# Patient Record
Sex: FEMALE | Race: WHITE | Employment: FULL TIME | URBAN - METROPOLITAN AREA
[De-identification: names, ages, dates, MRNs, and addresses within clinical notes are randomized per-mention and may not be internally consistent; named-entity substitution may affect disease eponyms.]

---

## 2017-01-04 ENCOUNTER — PATIENT MESSAGE (OUTPATIENT)
Dept: FAMILY MEDICINE CLINIC | Age: 42
End: 2017-01-04

## 2017-01-04 DIAGNOSIS — Z79.4 TYPE 2 DIABETES MELLITUS WITH COMPLICATION, WITH LONG-TERM CURRENT USE OF INSULIN (HCC): ICD-10-CM

## 2017-01-04 DIAGNOSIS — E11.8 TYPE 2 DIABETES MELLITUS WITH COMPLICATION, WITH LONG-TERM CURRENT USE OF INSULIN (HCC): ICD-10-CM

## 2017-01-04 DIAGNOSIS — E11.8 TYPE 2 DIABETES MELLITUS WITH COMPLICATION, WITH LONG-TERM CURRENT USE OF INSULIN (HCC): Primary | ICD-10-CM

## 2017-01-04 DIAGNOSIS — Z79.4 TYPE 2 DIABETES MELLITUS WITH COMPLICATION, WITH LONG-TERM CURRENT USE OF INSULIN (HCC): Primary | ICD-10-CM

## 2017-01-04 RX ORDER — LOSARTAN POTASSIUM 50 MG/1
50 TABLET ORAL DAILY
Qty: 90 TABLET | Refills: 1 | Status: SHIPPED | OUTPATIENT
Start: 2017-01-04 | End: 2017-05-30 | Stop reason: SDUPTHER

## 2017-01-04 RX ORDER — OMEPRAZOLE 40 MG/1
40 CAPSULE, DELAYED RELEASE ORAL DAILY
Qty: 30 CAPSULE | Refills: 5 | Status: SHIPPED | OUTPATIENT
Start: 2017-01-04 | End: 2017-01-06 | Stop reason: SDUPTHER

## 2017-01-04 RX ORDER — ATORVASTATIN CALCIUM 20 MG/1
20 TABLET, FILM COATED ORAL DAILY
Qty: 30 TABLET | Refills: 5 | Status: SHIPPED | OUTPATIENT
Start: 2017-01-04 | End: 2017-01-06 | Stop reason: SDUPTHER

## 2017-01-04 RX ORDER — ALPRAZOLAM 0.5 MG/1
TABLET ORAL
Qty: 60 TABLET | Refills: 3 | Status: SHIPPED | OUTPATIENT
Start: 2017-01-04 | End: 2017-07-11 | Stop reason: SDUPTHER

## 2017-01-04 RX ORDER — VENLAFAXINE HYDROCHLORIDE 75 MG/1
75 CAPSULE, EXTENDED RELEASE ORAL DAILY
Qty: 60 CAPSULE | Refills: 5 | Status: SHIPPED | OUTPATIENT
Start: 2017-01-04 | End: 2017-01-06 | Stop reason: SDUPTHER

## 2017-01-04 RX ORDER — PROMETHAZINE HYDROCHLORIDE 25 MG/1
25 TABLET ORAL EVERY 8 HOURS PRN
Qty: 90 TABLET | Refills: 0 | Status: SHIPPED | OUTPATIENT
Start: 2017-01-04 | End: 2017-01-06 | Stop reason: SDUPTHER

## 2017-01-04 RX ORDER — GLUCOSAMINE HCL/CHONDROITIN SU 500-400 MG
CAPSULE ORAL
Qty: 100 STRIP | Refills: 3 | Status: SHIPPED | OUTPATIENT
Start: 2017-01-04 | End: 2017-10-10 | Stop reason: CLARIF

## 2017-01-04 RX ORDER — HYDROCHLOROTHIAZIDE 25 MG/1
25 TABLET ORAL DAILY
Qty: 90 TABLET | Refills: 1 | Status: SHIPPED | OUTPATIENT
Start: 2017-01-04 | End: 2017-05-30 | Stop reason: SDUPTHER

## 2017-01-04 RX ORDER — METOPROLOL TARTRATE 100 MG/1
100 TABLET ORAL 2 TIMES DAILY
Qty: 60 TABLET | Refills: 6 | Status: SHIPPED | OUTPATIENT
Start: 2017-01-04 | End: 2017-01-06 | Stop reason: SDUPTHER

## 2017-01-04 RX ORDER — ERYTHROMYCIN 250 MG/1
250 TABLET, COATED ORAL 4 TIMES DAILY
Qty: 90 TABLET | Refills: 6 | Status: CANCELLED | OUTPATIENT
Start: 2017-01-04

## 2017-01-06 ENCOUNTER — PATIENT MESSAGE (OUTPATIENT)
Dept: FAMILY MEDICINE CLINIC | Age: 42
End: 2017-01-06

## 2017-01-06 RX ORDER — PROMETHAZINE HYDROCHLORIDE 25 MG/1
25 TABLET ORAL EVERY 8 HOURS PRN
Qty: 120 TABLET | Refills: 0 | Status: SHIPPED | OUTPATIENT
Start: 2017-01-06 | End: 2017-06-23

## 2017-01-06 RX ORDER — ATORVASTATIN CALCIUM 20 MG/1
20 TABLET, FILM COATED ORAL DAILY
Qty: 90 TABLET | Refills: 1 | Status: SHIPPED | OUTPATIENT
Start: 2017-01-06 | End: 2017-09-01 | Stop reason: SDUPTHER

## 2017-01-06 RX ORDER — METOPROLOL TARTRATE 100 MG/1
100 TABLET ORAL 2 TIMES DAILY
Qty: 180 TABLET | Refills: 1 | Status: SHIPPED | OUTPATIENT
Start: 2017-01-06 | End: 2017-08-31 | Stop reason: SDUPTHER

## 2017-01-06 RX ORDER — ERYTHROMYCIN 250 MG/1
TABLET, COATED ORAL
Qty: 270 TABLET | Refills: 1 | Status: SHIPPED | OUTPATIENT
Start: 2017-01-06 | End: 2017-07-11 | Stop reason: SDUPTHER

## 2017-01-06 RX ORDER — OMEPRAZOLE 40 MG/1
40 CAPSULE, DELAYED RELEASE ORAL DAILY
Qty: 90 CAPSULE | Refills: 1 | Status: SHIPPED | OUTPATIENT
Start: 2017-01-06 | End: 2017-05-30 | Stop reason: SDUPTHER

## 2017-01-06 RX ORDER — VENLAFAXINE HYDROCHLORIDE 75 MG/1
75 CAPSULE, EXTENDED RELEASE ORAL 2 TIMES DAILY
Qty: 180 CAPSULE | Refills: 1 | Status: SHIPPED | OUTPATIENT
Start: 2017-01-06 | End: 2017-08-31 | Stop reason: SDUPTHER

## 2017-01-10 ENCOUNTER — CLINICAL DOCUMENTATION (OUTPATIENT)
Dept: PHYSICAL THERAPY | Age: 42
End: 2017-01-10

## 2017-01-10 ENCOUNTER — PATIENT MESSAGE (OUTPATIENT)
Dept: FAMILY MEDICINE CLINIC | Age: 42
End: 2017-01-10

## 2017-01-10 DIAGNOSIS — H93.8X3 AUDIBLE HEARTBEAT IN BOTH EARS: Primary | ICD-10-CM

## 2017-01-21 DIAGNOSIS — Z72.0 TOBACCO ABUSE: ICD-10-CM

## 2017-01-26 ENCOUNTER — HOSPITAL ENCOUNTER (OUTPATIENT)
Dept: ULTRASOUND IMAGING | Age: 42
Discharge: HOME | End: 2017-01-26
Payer: COMMERCIAL

## 2017-01-26 DIAGNOSIS — H93.8X3 AUDIBLE HEARTBEAT IN BOTH EARS: ICD-10-CM

## 2017-01-26 DIAGNOSIS — E11.8 TYPE 2 DIABETES MELLITUS WITH COMPLICATION, WITH LONG-TERM CURRENT USE OF INSULIN (HCC): ICD-10-CM

## 2017-01-26 DIAGNOSIS — Z79.4 TYPE 2 DIABETES MELLITUS WITH COMPLICATION, WITH LONG-TERM CURRENT USE OF INSULIN (HCC): ICD-10-CM

## 2017-01-26 PROCEDURE — 93880 EXTRACRANIAL BILAT STUDY: CPT

## 2017-01-27 RX ORDER — BLOOD SUGAR DIAGNOSTIC
STRIP MISCELLANEOUS
Qty: 100 STRIP | Refills: 3 | Status: SHIPPED | OUTPATIENT
Start: 2017-01-27 | End: 2017-10-09 | Stop reason: SDUPTHER

## 2017-01-31 ENCOUNTER — OFFICE VISIT (OUTPATIENT)
Dept: FAMILY MEDICINE CLINIC | Age: 42
End: 2017-01-31

## 2017-01-31 VITALS
DIASTOLIC BLOOD PRESSURE: 62 MMHG | HEART RATE: 84 BPM | SYSTOLIC BLOOD PRESSURE: 114 MMHG | RESPIRATION RATE: 16 BRPM | WEIGHT: 275 LBS | TEMPERATURE: 99.2 F | BODY MASS INDEX: 44.2 KG/M2 | HEIGHT: 66 IN

## 2017-01-31 DIAGNOSIS — E78.5 DYSLIPIDEMIA: ICD-10-CM

## 2017-01-31 DIAGNOSIS — E53.8 B12 DEFICIENCY: ICD-10-CM

## 2017-01-31 DIAGNOSIS — M94.0 COSTOCHONDRITIS: ICD-10-CM

## 2017-01-31 DIAGNOSIS — E11.8 TYPE 2 DIABETES MELLITUS WITH COMPLICATION, WITH LONG-TERM CURRENT USE OF INSULIN (HCC): Primary | ICD-10-CM

## 2017-01-31 DIAGNOSIS — Z79.4 TYPE 2 DIABETES MELLITUS WITH COMPLICATION, WITH LONG-TERM CURRENT USE OF INSULIN (HCC): Primary | ICD-10-CM

## 2017-01-31 DIAGNOSIS — I27.20 PULMONARY HTN (HCC): ICD-10-CM

## 2017-01-31 DIAGNOSIS — R07.9 CHEST PAIN, UNSPECIFIED TYPE: ICD-10-CM

## 2017-01-31 PROCEDURE — 93000 ELECTROCARDIOGRAM COMPLETE: CPT | Performed by: FAMILY MEDICINE

## 2017-01-31 PROCEDURE — 99213 OFFICE O/P EST LOW 20 MIN: CPT | Performed by: FAMILY MEDICINE

## 2017-01-31 PROCEDURE — 96372 THER/PROPH/DIAG INJ SC/IM: CPT | Performed by: FAMILY MEDICINE

## 2017-01-31 RX ORDER — PREDNISONE 20 MG/1
20 TABLET ORAL DAILY
Qty: 5 TABLET | Refills: 0 | Status: SHIPPED | OUTPATIENT
Start: 2017-01-31 | End: 2017-02-05

## 2017-01-31 RX ORDER — CYCLOBENZAPRINE HCL 10 MG
10 TABLET ORAL NIGHTLY PRN
Qty: 40 TABLET | Refills: 1 | Status: SHIPPED | OUTPATIENT
Start: 2017-01-31 | End: 2017-02-10

## 2017-01-31 RX ORDER — CYCLOBENZAPRINE HCL 10 MG
10 TABLET ORAL NIGHTLY PRN
Qty: 40 TABLET | Refills: 1 | Status: SHIPPED | OUTPATIENT
Start: 2017-01-31 | End: 2017-01-31 | Stop reason: SDUPTHER

## 2017-01-31 RX ORDER — PREDNISONE 20 MG/1
20 TABLET ORAL DAILY
Qty: 5 TABLET | Refills: 0 | Status: SHIPPED | OUTPATIENT
Start: 2017-01-31 | End: 2017-01-31 | Stop reason: SDUPTHER

## 2017-01-31 RX ORDER — CYANOCOBALAMIN 1000 UG/ML
1000 INJECTION INTRAMUSCULAR; SUBCUTANEOUS ONCE
Status: COMPLETED | OUTPATIENT
Start: 2017-01-31 | End: 2017-01-31

## 2017-01-31 RX ADMIN — CYANOCOBALAMIN 1000 MCG: 1000 INJECTION INTRAMUSCULAR; SUBCUTANEOUS at 12:02

## 2017-03-06 ENCOUNTER — EMPLOYEE WELLNESS (OUTPATIENT)
Dept: OTHER | Age: 42
End: 2017-03-06

## 2017-03-06 DIAGNOSIS — E11.8 TYPE 2 DIABETES MELLITUS WITH COMPLICATION, WITH LONG-TERM CURRENT USE OF INSULIN (HCC): ICD-10-CM

## 2017-03-06 DIAGNOSIS — Z79.4 TYPE 2 DIABETES MELLITUS WITH COMPLICATION, WITH LONG-TERM CURRENT USE OF INSULIN (HCC): ICD-10-CM

## 2017-03-06 RX ORDER — INSULIN LISPRO 100 [IU]/ML
INJECTION, SOLUTION INTRAVENOUS; SUBCUTANEOUS
Qty: 45 ML | Refills: 1 | Status: SHIPPED | OUTPATIENT
Start: 2017-03-06 | End: 2017-05-08 | Stop reason: SDUPTHER

## 2017-03-15 ENCOUNTER — PATIENT MESSAGE (OUTPATIENT)
Dept: FAMILY MEDICINE CLINIC | Age: 42
End: 2017-03-15

## 2017-03-15 RX ORDER — FLUCONAZOLE 150 MG/1
150 TABLET ORAL ONCE
Qty: 2 TABLET | Refills: 0 | Status: SHIPPED | OUTPATIENT
Start: 2017-03-15 | End: 2017-03-15

## 2017-03-20 ENCOUNTER — OFFICE VISIT (OUTPATIENT)
Dept: OBGYN | Age: 42
End: 2017-03-20

## 2017-03-20 VITALS
DIASTOLIC BLOOD PRESSURE: 62 MMHG | WEIGHT: 281 LBS | HEART RATE: 76 BPM | SYSTOLIC BLOOD PRESSURE: 108 MMHG | BODY MASS INDEX: 45.35 KG/M2

## 2017-03-20 DIAGNOSIS — Z71.3 WEIGHT LOSS COUNSELING, ENCOUNTER FOR: ICD-10-CM

## 2017-03-20 DIAGNOSIS — Z01.419 VISIT FOR GYNECOLOGIC EXAMINATION: Primary | ICD-10-CM

## 2017-03-20 DIAGNOSIS — Z79.890 POSTMENOPAUSAL HRT (HORMONE REPLACEMENT THERAPY): ICD-10-CM

## 2017-03-20 PROCEDURE — 99396 PREV VISIT EST AGE 40-64: CPT | Performed by: OBSTETRICS & GYNECOLOGY

## 2017-03-20 RX ORDER — ESTRADIOL 0.04 MG/D
1 FILM, EXTENDED RELEASE TRANSDERMAL
Qty: 4 PATCH | Refills: 0 | COMMUNITY
Start: 2017-03-20 | End: 2017-06-19

## 2017-03-24 LAB — PAP SMEAR: NORMAL

## 2017-03-30 ASSESSMENT — ENCOUNTER SYMPTOMS
COUGH: 0
VOMITING: 0
NAUSEA: 0
SHORTNESS OF BREATH: 0

## 2017-04-12 ENCOUNTER — NURSE ONLY (OUTPATIENT)
Dept: FAMILY MEDICINE CLINIC | Age: 42
End: 2017-04-12

## 2017-04-12 DIAGNOSIS — E53.8 B12 DEFICIENCY: Primary | ICD-10-CM

## 2017-04-12 PROCEDURE — 96372 THER/PROPH/DIAG INJ SC/IM: CPT | Performed by: FAMILY MEDICINE

## 2017-04-12 RX ORDER — CYANOCOBALAMIN 1000 UG/ML
1000 INJECTION INTRAMUSCULAR; SUBCUTANEOUS ONCE
Status: COMPLETED | OUTPATIENT
Start: 2017-04-12 | End: 2017-04-12

## 2017-04-12 RX ADMIN — CYANOCOBALAMIN 1000 MCG: 1000 INJECTION INTRAMUSCULAR; SUBCUTANEOUS at 15:39

## 2017-04-24 ENCOUNTER — PATIENT MESSAGE (OUTPATIENT)
Dept: FAMILY MEDICINE CLINIC | Age: 42
End: 2017-04-24

## 2017-04-24 RX ORDER — FLUCONAZOLE 150 MG/1
150 TABLET ORAL ONCE
Qty: 2 TABLET | Refills: 1 | Status: SHIPPED | OUTPATIENT
Start: 2017-04-24 | End: 2017-04-24

## 2017-05-08 DIAGNOSIS — Z79.4 TYPE 2 DIABETES MELLITUS WITH COMPLICATION, WITH LONG-TERM CURRENT USE OF INSULIN (HCC): ICD-10-CM

## 2017-05-08 DIAGNOSIS — E11.8 TYPE 2 DIABETES MELLITUS WITH COMPLICATION, WITH LONG-TERM CURRENT USE OF INSULIN (HCC): ICD-10-CM

## 2017-05-08 RX ORDER — INSULIN GLARGINE 100 [IU]/ML
INJECTION, SOLUTION SUBCUTANEOUS
Qty: 60 ML | Refills: 1 | Status: SHIPPED | OUTPATIENT
Start: 2017-05-08 | End: 2017-05-09 | Stop reason: SDUPTHER

## 2017-05-08 RX ORDER — INSULIN LISPRO 100 [IU]/ML
INJECTION, SOLUTION INTRAVENOUS; SUBCUTANEOUS
Qty: 45 ML | Refills: 1 | Status: SHIPPED | OUTPATIENT
Start: 2017-05-08 | End: 2017-05-09 | Stop reason: SDUPTHER

## 2017-05-09 ENCOUNTER — PATIENT MESSAGE (OUTPATIENT)
Dept: FAMILY MEDICINE CLINIC | Age: 42
End: 2017-05-09

## 2017-05-09 DIAGNOSIS — Z79.4 TYPE 2 DIABETES MELLITUS WITH COMPLICATION, WITH LONG-TERM CURRENT USE OF INSULIN (HCC): ICD-10-CM

## 2017-05-09 DIAGNOSIS — E11.8 TYPE 2 DIABETES MELLITUS WITH COMPLICATION, WITH LONG-TERM CURRENT USE OF INSULIN (HCC): ICD-10-CM

## 2017-05-09 DIAGNOSIS — Z12.31 ENCOUNTER FOR SCREENING MAMMOGRAM FOR BREAST CANCER: Primary | ICD-10-CM

## 2017-05-30 DIAGNOSIS — E11.8 TYPE 2 DIABETES MELLITUS WITH COMPLICATION, WITH LONG-TERM CURRENT USE OF INSULIN (HCC): ICD-10-CM

## 2017-05-30 DIAGNOSIS — Z79.4 TYPE 2 DIABETES MELLITUS WITH COMPLICATION, WITH LONG-TERM CURRENT USE OF INSULIN (HCC): ICD-10-CM

## 2017-05-31 RX ORDER — OMEPRAZOLE 40 MG/1
CAPSULE, DELAYED RELEASE ORAL
Qty: 90 CAPSULE | Refills: 1 | Status: SHIPPED | OUTPATIENT
Start: 2017-05-31 | End: 2018-06-21 | Stop reason: SDUPTHER

## 2017-05-31 RX ORDER — LOSARTAN POTASSIUM 50 MG/1
TABLET ORAL
Qty: 90 TABLET | Refills: 1 | Status: SHIPPED | OUTPATIENT
Start: 2017-05-31 | End: 2018-01-02 | Stop reason: SDUPTHER

## 2017-05-31 RX ORDER — PROMETHAZINE HYDROCHLORIDE 25 MG/1
TABLET ORAL
Qty: 90 TABLET | Refills: 1 | Status: SHIPPED | OUTPATIENT
Start: 2017-05-31 | End: 2017-06-19

## 2017-05-31 RX ORDER — HYDROCHLOROTHIAZIDE 25 MG/1
TABLET ORAL
Qty: 90 TABLET | Refills: 1 | Status: SHIPPED | OUTPATIENT
Start: 2017-05-31 | End: 2018-01-02 | Stop reason: SDUPTHER

## 2017-06-01 ENCOUNTER — HOSPITAL ENCOUNTER (OUTPATIENT)
Dept: WOMENS IMAGING | Age: 42
Discharge: HOME | End: 2017-06-01
Payer: COMMERCIAL

## 2017-06-01 DIAGNOSIS — Z12.31 ENCOUNTER FOR SCREENING MAMMOGRAM FOR BREAST CANCER: ICD-10-CM

## 2017-06-01 PROCEDURE — 77063 BREAST TOMOSYNTHESIS BI: CPT

## 2017-06-05 ENCOUNTER — PATIENT MESSAGE (OUTPATIENT)
Dept: FAMILY MEDICINE CLINIC | Age: 42
End: 2017-06-05

## 2017-06-05 DIAGNOSIS — B00.9 HSV-1 (HERPES SIMPLEX VIRUS 1) INFECTION: Primary | ICD-10-CM

## 2017-06-06 DIAGNOSIS — B00.9 HSV-1 (HERPES SIMPLEX VIRUS 1) INFECTION: ICD-10-CM

## 2017-06-06 LAB — HBA1C MFR BLD: 8 % (ref 4.8–5.9)

## 2017-06-19 ENCOUNTER — OFFICE VISIT (OUTPATIENT)
Dept: UROLOGY | Age: 42
End: 2017-06-19

## 2017-06-19 VITALS
HEIGHT: 66 IN | DIASTOLIC BLOOD PRESSURE: 72 MMHG | HEART RATE: 79 BPM | BODY MASS INDEX: 45 KG/M2 | SYSTOLIC BLOOD PRESSURE: 124 MMHG | WEIGHT: 280 LBS

## 2017-06-19 DIAGNOSIS — R33.9 URINARY RETENTION: ICD-10-CM

## 2017-06-19 DIAGNOSIS — R30.0 DYSURIA: Primary | ICD-10-CM

## 2017-06-19 LAB
BILIRUBIN, POC: NORMAL
BLOOD URINE, POC: NORMAL
CLARITY, POC: CLEAR
COLOR, POC: YELLOW
GLUCOSE URINE, POC: NORMAL
KETONES, POC: NORMAL
LEUKOCYTE EST, POC: NORMAL
NITRITE, POC: NORMAL
PH, POC: 5.5
POST VOID RESIDUAL (PVR): 12 ML
PROTEIN, POC: NORMAL
SPECIFIC GRAVITY, POC: 1.02
UROBILINOGEN, POC: 0.2

## 2017-06-19 PROCEDURE — 99214 OFFICE O/P EST MOD 30 MIN: CPT | Performed by: UROLOGY

## 2017-06-19 PROCEDURE — 51798 US URINE CAPACITY MEASURE: CPT | Performed by: UROLOGY

## 2017-06-19 PROCEDURE — 81003 URINALYSIS AUTO W/O SCOPE: CPT | Performed by: UROLOGY

## 2017-06-19 RX ORDER — PENICILLIN V POTASSIUM 500 MG/1
TABLET ORAL
Refills: 0 | COMMUNITY
Start: 2017-06-16 | End: 2017-06-28

## 2017-06-19 RX ORDER — SOLIFENACIN SUCCINATE 5 MG/1
5 TABLET, FILM COATED ORAL DAILY
Qty: 28 TABLET | Refills: 0 | COMMUNITY
Start: 2017-06-19 | End: 2017-09-21

## 2017-06-19 RX ORDER — SOLIFENACIN SUCCINATE 5 MG/1
5 TABLET, FILM COATED ORAL DAILY
Qty: 30 TABLET | Refills: 3 | Status: CANCELLED | OUTPATIENT
Start: 2017-06-19 | End: 2018-06-19

## 2017-06-22 ENCOUNTER — HOSPITAL ENCOUNTER (EMERGENCY)
Age: 42
Discharge: HOME | End: 2017-06-23
Attending: EMERGENCY MEDICINE
Payer: COMMERCIAL

## 2017-06-22 ENCOUNTER — APPOINTMENT (OUTPATIENT)
Dept: GENERAL RADIOLOGY | Age: 42
End: 2017-06-22
Payer: COMMERCIAL

## 2017-06-22 ENCOUNTER — APPOINTMENT (OUTPATIENT)
Dept: CT IMAGING | Age: 42
End: 2017-06-22
Payer: COMMERCIAL

## 2017-06-22 VITALS
SYSTOLIC BLOOD PRESSURE: 111 MMHG | BODY MASS INDEX: 45 KG/M2 | HEART RATE: 74 BPM | HEIGHT: 66 IN | WEIGHT: 280 LBS | OXYGEN SATURATION: 96 % | TEMPERATURE: 97.8 F | DIASTOLIC BLOOD PRESSURE: 48 MMHG | RESPIRATION RATE: 18 BRPM

## 2017-06-22 DIAGNOSIS — H81.10 BENIGN PAROXYSMAL POSITIONAL VERTIGO, UNSPECIFIED LATERALITY: Primary | ICD-10-CM

## 2017-06-22 LAB
CHP ED QC CHECK: YES
GLUCOSE BLD-MCNC: 121 MG/DL
GLUCOSE BLD-MCNC: 121 MG/DL (ref 60–115)
PERFORMED ON: ABNORMAL

## 2017-06-22 PROCEDURE — 96374 THER/PROPH/DIAG INJ IV PUSH: CPT

## 2017-06-22 PROCEDURE — 99285 EMERGENCY DEPT VISIT HI MDM: CPT

## 2017-06-22 PROCEDURE — 71010 XR CHEST PORTABLE: CPT

## 2017-06-22 PROCEDURE — 84484 ASSAY OF TROPONIN QUANT: CPT

## 2017-06-22 PROCEDURE — 70450 CT HEAD/BRAIN W/O DYE: CPT

## 2017-06-22 PROCEDURE — 6360000002 HC RX W HCPCS: Performed by: EMERGENCY MEDICINE

## 2017-06-22 PROCEDURE — 36415 COLL VENOUS BLD VENIPUNCTURE: CPT

## 2017-06-22 PROCEDURE — 85025 COMPLETE CBC W/AUTO DIFF WBC: CPT

## 2017-06-22 PROCEDURE — 6370000000 HC RX 637 (ALT 250 FOR IP): Performed by: EMERGENCY MEDICINE

## 2017-06-22 PROCEDURE — 2580000003 HC RX 258: Performed by: EMERGENCY MEDICINE

## 2017-06-22 PROCEDURE — 93005 ELECTROCARDIOGRAM TRACING: CPT

## 2017-06-22 PROCEDURE — 80053 COMPREHEN METABOLIC PANEL: CPT

## 2017-06-22 RX ORDER — MECLIZINE HYDROCHLORIDE 25 MG/1
25 TABLET ORAL ONCE
Status: COMPLETED | OUTPATIENT
Start: 2017-06-22 | End: 2017-06-22

## 2017-06-22 RX ORDER — SODIUM CHLORIDE 0.9 % (FLUSH) 0.9 %
3 SYRINGE (ML) INJECTION EVERY 8 HOURS
Status: DISCONTINUED | OUTPATIENT
Start: 2017-06-22 | End: 2017-06-23 | Stop reason: HOSPADM

## 2017-06-22 RX ORDER — LORAZEPAM 2 MG/ML
0.5 INJECTION INTRAMUSCULAR ONCE
Status: COMPLETED | OUTPATIENT
Start: 2017-06-22 | End: 2017-06-22

## 2017-06-22 RX ORDER — 0.9 % SODIUM CHLORIDE 0.9 %
500 INTRAVENOUS SOLUTION INTRAVENOUS ONCE
Status: COMPLETED | OUTPATIENT
Start: 2017-06-22 | End: 2017-06-23

## 2017-06-22 RX ADMIN — LORAZEPAM 0.5 MG: 2 INJECTION INTRAMUSCULAR; INTRAVENOUS at 23:26

## 2017-06-22 RX ADMIN — SODIUM CHLORIDE 500 ML: 9 INJECTION, SOLUTION INTRAVENOUS at 23:26

## 2017-06-22 RX ADMIN — SODIUM CHLORIDE, PRESERVATIVE FREE 10 ML: 5 INJECTION INTRAVENOUS at 23:30

## 2017-06-22 RX ADMIN — MECLIZINE HYDROCHLORIDE 25 MG: 25 TABLET ORAL at 23:21

## 2017-06-23 LAB
ALBUMIN SERPL-MCNC: 4.2 G/DL (ref 3.9–4.9)
ALP BLD-CCNC: 93 U/L (ref 40–130)
ALT SERPL-CCNC: 29 U/L (ref 0–33)
ANION GAP SERPL CALCULATED.3IONS-SCNC: 14 MEQ/L (ref 7–13)
AST SERPL-CCNC: 28 U/L (ref 0–35)
BASOPHILS ABSOLUTE: 0.1 K/UL (ref 0–0.2)
BASOPHILS RELATIVE PERCENT: 0.7 %
BILIRUB SERPL-MCNC: 0.5 MG/DL (ref 0–1.2)
BUN BLDV-MCNC: 16 MG/DL (ref 6–20)
CALCIUM SERPL-MCNC: 9.1 MG/DL (ref 8.6–10.2)
CHLORIDE BLD-SCNC: 99 MEQ/L (ref 98–107)
CO2: 24 MEQ/L (ref 22–29)
CREAT SERPL-MCNC: 0.5 MG/DL (ref 0.5–0.9)
EKG ATRIAL RATE: 74 BPM
EKG P AXIS: 38 DEGREES
EKG P-R INTERVAL: 182 MS
EKG Q-T INTERVAL: 426 MS
EKG QRS DURATION: 88 MS
EKG QTC CALCULATION (BAZETT): 472 MS
EKG R AXIS: 26 DEGREES
EKG T AXIS: 43 DEGREES
EKG VENTRICULAR RATE: 74 BPM
EOSINOPHILS ABSOLUTE: 0.2 K/UL (ref 0–0.7)
EOSINOPHILS RELATIVE PERCENT: 2.2 %
GFR AFRICAN AMERICAN: >60
GFR NON-AFRICAN AMERICAN: >60
GLOBULIN: 2.9 G/DL (ref 2.3–3.5)
GLUCOSE BLD-MCNC: 144 MG/DL (ref 74–109)
HCT VFR BLD CALC: 37.2 % (ref 37–47)
HEMOGLOBIN: 12.2 G/DL (ref 12–16)
LYMPHOCYTES ABSOLUTE: 2.7 K/UL (ref 1–4.8)
LYMPHOCYTES RELATIVE PERCENT: 25.3 %
MCH RBC QN AUTO: 26 PG (ref 27–31.3)
MCHC RBC AUTO-ENTMCNC: 32.8 % (ref 33–37)
MCV RBC AUTO: 79.1 FL (ref 82–100)
MONOCYTES ABSOLUTE: 0.7 K/UL (ref 0.2–0.8)
MONOCYTES RELATIVE PERCENT: 6.4 %
NEUTROPHILS ABSOLUTE: 6.9 K/UL (ref 1.4–6.5)
NEUTROPHILS RELATIVE PERCENT: 65.4 %
PDW BLD-RTO: 16.3 % (ref 11.5–14.5)
PLATELET # BLD: 185 K/UL (ref 130–400)
POTASSIUM SERPL-SCNC: 3.5 MEQ/L (ref 3.5–5.1)
RBC # BLD: 4.7 M/UL (ref 4.2–5.4)
SODIUM BLD-SCNC: 137 MEQ/L (ref 132–144)
TOTAL PROTEIN: 7.1 G/DL (ref 6.4–8.1)
TROPONIN: <0.01 NG/ML (ref 0–0.01)
WBC # BLD: 10.5 K/UL (ref 4.8–10.8)

## 2017-06-23 RX ORDER — PROMETHAZINE HYDROCHLORIDE 25 MG/1
25 SUPPOSITORY RECTAL EVERY 6 HOURS PRN
Qty: 12 SUPPOSITORY | Refills: 0 | Status: SHIPPED | OUTPATIENT
Start: 2017-06-23 | End: 2017-06-28

## 2017-06-23 RX ORDER — MECLIZINE HYDROCHLORIDE 25 MG/1
25 TABLET ORAL 3 TIMES DAILY PRN
Qty: 20 TABLET | Refills: 0 | Status: SHIPPED | OUTPATIENT
Start: 2017-06-23 | End: 2017-06-28 | Stop reason: SDUPTHER

## 2017-06-23 RX ORDER — ONDANSETRON 4 MG/1
4 TABLET, FILM COATED ORAL EVERY 8 HOURS PRN
Qty: 20 TABLET | Refills: 0 | Status: SHIPPED | OUTPATIENT
Start: 2017-06-23 | End: 2017-06-29 | Stop reason: SDUPTHER

## 2017-06-26 ENCOUNTER — CARE COORDINATION (OUTPATIENT)
Dept: CARE COORDINATION | Age: 42
End: 2017-06-26

## 2017-06-28 ENCOUNTER — OFFICE VISIT (OUTPATIENT)
Dept: FAMILY MEDICINE CLINIC | Age: 42
End: 2017-06-28

## 2017-06-28 VITALS
SYSTOLIC BLOOD PRESSURE: 122 MMHG | DIASTOLIC BLOOD PRESSURE: 80 MMHG | HEART RATE: 76 BPM | HEIGHT: 66 IN | TEMPERATURE: 98.4 F | RESPIRATION RATE: 14 BRPM

## 2017-06-28 DIAGNOSIS — E11.8 TYPE 2 DIABETES MELLITUS WITH COMPLICATION, WITH LONG-TERM CURRENT USE OF INSULIN (HCC): ICD-10-CM

## 2017-06-28 DIAGNOSIS — Z79.4 TYPE 2 DIABETES MELLITUS WITH COMPLICATION, WITH LONG-TERM CURRENT USE OF INSULIN (HCC): ICD-10-CM

## 2017-06-28 DIAGNOSIS — I95.1 ORTHOSTATIC HYPOTENSION: ICD-10-CM

## 2017-06-28 DIAGNOSIS — E53.8 B12 DEFICIENCY: ICD-10-CM

## 2017-06-28 DIAGNOSIS — H81.10 BENIGN PAROXYSMAL POSITIONAL VERTIGO, UNSPECIFIED LATERALITY: Primary | ICD-10-CM

## 2017-06-28 DIAGNOSIS — E78.5 DYSLIPIDEMIA: ICD-10-CM

## 2017-06-28 PROCEDURE — 99214 OFFICE O/P EST MOD 30 MIN: CPT | Performed by: FAMILY MEDICINE

## 2017-06-28 PROCEDURE — 96372 THER/PROPH/DIAG INJ SC/IM: CPT | Performed by: FAMILY MEDICINE

## 2017-06-28 RX ORDER — MECLIZINE HYDROCHLORIDE 25 MG/1
25 TABLET ORAL 3 TIMES DAILY PRN
Qty: 20 TABLET | Refills: 0 | Status: SHIPPED | OUTPATIENT
Start: 2017-06-28 | End: 2017-06-28 | Stop reason: SDUPTHER

## 2017-06-28 RX ORDER — AMOXICILLIN 875 MG/1
875 TABLET, COATED ORAL 2 TIMES DAILY
Qty: 20 TABLET | Refills: 0 | Status: SHIPPED | OUTPATIENT
Start: 2017-06-28 | End: 2017-07-08

## 2017-06-28 RX ORDER — FLUTICASONE PROPIONATE 50 MCG
1 SPRAY, SUSPENSION (ML) NASAL DAILY
Qty: 1 BOTTLE | Refills: 3 | Status: SHIPPED | OUTPATIENT
Start: 2017-06-28 | End: 2017-06-28 | Stop reason: SDUPTHER

## 2017-06-28 RX ORDER — CYANOCOBALAMIN 1000 UG/ML
1000 INJECTION INTRAMUSCULAR; SUBCUTANEOUS ONCE
Status: COMPLETED | OUTPATIENT
Start: 2017-06-28 | End: 2017-06-28

## 2017-06-28 RX ORDER — AMOXICILLIN 875 MG/1
875 TABLET, COATED ORAL 2 TIMES DAILY
Qty: 20 TABLET | Refills: 0 | Status: SHIPPED | OUTPATIENT
Start: 2017-06-28 | End: 2017-06-28 | Stop reason: SDUPTHER

## 2017-06-28 RX ORDER — FLUCONAZOLE 150 MG/1
150 TABLET ORAL ONCE
Qty: 1 TABLET | Refills: 3 | Status: SHIPPED | OUTPATIENT
Start: 2017-06-28 | End: 2017-06-28

## 2017-06-28 RX ORDER — FLUTICASONE PROPIONATE 50 MCG
1 SPRAY, SUSPENSION (ML) NASAL DAILY
Qty: 1 BOTTLE | Refills: 3 | Status: ON HOLD | OUTPATIENT
Start: 2017-06-28 | End: 2018-09-28 | Stop reason: HOSPADM

## 2017-06-28 RX ORDER — MECLIZINE HYDROCHLORIDE 25 MG/1
25 TABLET ORAL 3 TIMES DAILY PRN
Qty: 20 TABLET | Refills: 0 | Status: SHIPPED | OUTPATIENT
Start: 2017-06-28 | End: 2017-07-08

## 2017-06-28 RX ORDER — ONDANSETRON 4 MG/1
4 TABLET, ORALLY DISINTEGRATING ORAL EVERY 8 HOURS PRN
Qty: 40 TABLET | Refills: 1 | Status: SHIPPED | OUTPATIENT
Start: 2017-06-28 | End: 2018-06-08 | Stop reason: SDUPTHER

## 2017-06-28 RX ORDER — ONDANSETRON 4 MG/1
4 TABLET, ORALLY DISINTEGRATING ORAL EVERY 8 HOURS PRN
Qty: 40 TABLET | Refills: 1 | Status: SHIPPED | OUTPATIENT
Start: 2017-06-28 | End: 2017-06-28 | Stop reason: SDUPTHER

## 2017-06-28 RX ORDER — FLUCONAZOLE 150 MG/1
150 TABLET ORAL ONCE
Qty: 1 TABLET | Refills: 3 | Status: SHIPPED | OUTPATIENT
Start: 2017-06-28 | End: 2017-06-28 | Stop reason: SDUPTHER

## 2017-06-28 RX ADMIN — CYANOCOBALAMIN 1000 MCG: 1000 INJECTION INTRAMUSCULAR; SUBCUTANEOUS at 14:28

## 2017-06-29 ENCOUNTER — CARE COORDINATION (OUTPATIENT)
Dept: CARE COORDINATION | Age: 42
End: 2017-06-29

## 2017-07-09 DIAGNOSIS — E11.8 TYPE 2 DIABETES MELLITUS WITH COMPLICATION, WITH LONG-TERM CURRENT USE OF INSULIN (HCC): ICD-10-CM

## 2017-07-09 DIAGNOSIS — Z79.4 TYPE 2 DIABETES MELLITUS WITH COMPLICATION, WITH LONG-TERM CURRENT USE OF INSULIN (HCC): ICD-10-CM

## 2017-07-10 ENCOUNTER — CARE COORDINATION (OUTPATIENT)
Dept: CARE COORDINATION | Age: 42
End: 2017-07-10

## 2017-07-10 RX ORDER — INSULIN LISPRO 100 [IU]/ML
INJECTION, SOLUTION INTRAVENOUS; SUBCUTANEOUS
Qty: 45 ML | Refills: 1 | Status: SHIPPED | OUTPATIENT
Start: 2017-07-10 | End: 2017-08-30 | Stop reason: SDUPTHER

## 2017-07-10 RX ORDER — INSULIN GLARGINE 100 [IU]/ML
INJECTION, SOLUTION SUBCUTANEOUS
Qty: 60 ML | Refills: 1 | Status: SHIPPED | OUTPATIENT
Start: 2017-07-10 | End: 2017-08-30 | Stop reason: SDUPTHER

## 2017-07-11 ENCOUNTER — CLINICAL DOCUMENTATION (OUTPATIENT)
Dept: FAMILY MEDICINE CLINIC | Age: 42
End: 2017-07-11

## 2017-07-11 RX ORDER — ALPRAZOLAM 0.5 MG/1
TABLET ORAL
Qty: 60 TABLET | Refills: 2 | Status: SHIPPED | OUTPATIENT
Start: 2017-07-11 | End: 2018-02-02 | Stop reason: SDUPTHER

## 2017-07-11 RX ORDER — ERYTHROMYCIN 250 MG/1
TABLET, COATED ORAL
Qty: 270 TABLET | Refills: 1 | Status: SHIPPED | OUTPATIENT
Start: 2017-07-11 | End: 2018-06-08 | Stop reason: ALTCHOICE

## 2017-07-13 ENCOUNTER — CARE COORDINATION (OUTPATIENT)
Dept: CARE COORDINATION | Age: 42
End: 2017-07-13

## 2017-07-31 RX ORDER — OMEPRAZOLE 40 MG/1
CAPSULE, DELAYED RELEASE ORAL
Qty: 90 CAPSULE | Refills: 1 | Status: SHIPPED | OUTPATIENT
Start: 2017-07-31 | End: 2017-09-21 | Stop reason: SDUPTHER

## 2017-08-22 RX ORDER — CIPROFLOXACIN 500 MG/1
500 TABLET, FILM COATED ORAL 2 TIMES DAILY
Qty: 14 TABLET | Refills: 0 | Status: SHIPPED | OUTPATIENT
Start: 2017-08-22 | End: 2017-08-29

## 2017-08-30 ENCOUNTER — PATIENT MESSAGE (OUTPATIENT)
Dept: FAMILY MEDICINE CLINIC | Age: 42
End: 2017-08-30

## 2017-08-30 DIAGNOSIS — E78.5 DYSLIPIDEMIA: Primary | ICD-10-CM

## 2017-08-30 DIAGNOSIS — I27.20 PULMONARY HTN (HCC): ICD-10-CM

## 2017-08-30 DIAGNOSIS — E11.8 TYPE 2 DIABETES MELLITUS WITH COMPLICATION, WITH LONG-TERM CURRENT USE OF INSULIN (HCC): ICD-10-CM

## 2017-08-30 DIAGNOSIS — Z79.4 TYPE 2 DIABETES MELLITUS WITH COMPLICATION, WITH LONG-TERM CURRENT USE OF INSULIN (HCC): ICD-10-CM

## 2017-08-31 DIAGNOSIS — Z79.4 TYPE 2 DIABETES MELLITUS WITH COMPLICATION, WITH LONG-TERM CURRENT USE OF INSULIN (HCC): ICD-10-CM

## 2017-08-31 DIAGNOSIS — E11.8 TYPE 2 DIABETES MELLITUS WITH COMPLICATION, WITH LONG-TERM CURRENT USE OF INSULIN (HCC): ICD-10-CM

## 2017-08-31 DIAGNOSIS — I27.20 PULMONARY HTN (HCC): ICD-10-CM

## 2017-08-31 DIAGNOSIS — E78.5 DYSLIPIDEMIA: ICD-10-CM

## 2017-08-31 LAB
ALBUMIN SERPL-MCNC: 3.8 G/DL (ref 3.9–4.9)
ALP BLD-CCNC: 91 U/L (ref 40–130)
ALT SERPL-CCNC: 38 U/L (ref 0–33)
ANION GAP SERPL CALCULATED.3IONS-SCNC: 18 MEQ/L (ref 7–13)
AST SERPL-CCNC: 31 U/L (ref 0–35)
BILIRUB SERPL-MCNC: 0.3 MG/DL (ref 0–1.2)
BUN BLDV-MCNC: 16 MG/DL (ref 6–20)
CALCIUM SERPL-MCNC: 8.6 MG/DL (ref 8.6–10.2)
CHLORIDE BLD-SCNC: 97 MEQ/L (ref 98–107)
CHOLESTEROL, TOTAL: 121 MG/DL (ref 0–199)
CO2: 23 MEQ/L (ref 22–29)
CREAT SERPL-MCNC: 0.55 MG/DL (ref 0.5–0.9)
CREATININE URINE: 153.1 MG/DL
GFR AFRICAN AMERICAN: >60
GFR NON-AFRICAN AMERICAN: >60
GLOBULIN: 3 G/DL (ref 2.3–3.5)
GLUCOSE BLD-MCNC: 288 MG/DL (ref 74–109)
HBA1C MFR BLD: 8.4 % (ref 4.8–5.9)
HDLC SERPL-MCNC: 23 MG/DL (ref 40–59)
LDL CHOLESTEROL CALCULATED: 44 MG/DL (ref 0–129)
MICROALBUMIN UR-MCNC: 7.3 MG/DL
MICROALBUMIN/CREAT UR-RTO: 47.7 MG/G (ref 0–30)
POTASSIUM SERPL-SCNC: 4.6 MEQ/L (ref 3.5–5.1)
SODIUM BLD-SCNC: 138 MEQ/L (ref 132–144)
TOTAL PROTEIN: 6.8 G/DL (ref 6.4–8.1)
TRIGL SERPL-MCNC: 271 MG/DL (ref 0–200)

## 2017-09-05 ENCOUNTER — PATIENT MESSAGE (OUTPATIENT)
Dept: FAMILY MEDICINE CLINIC | Age: 42
End: 2017-09-05

## 2017-09-05 DIAGNOSIS — E11.8 TYPE 2 DIABETES MELLITUS WITH COMPLICATION, WITH LONG-TERM CURRENT USE OF INSULIN (HCC): ICD-10-CM

## 2017-09-05 DIAGNOSIS — Z79.4 TYPE 2 DIABETES MELLITUS WITH COMPLICATION, WITH LONG-TERM CURRENT USE OF INSULIN (HCC): ICD-10-CM

## 2017-09-05 RX ORDER — INSULIN GLARGINE 100 [IU]/ML
INJECTION, SOLUTION SUBCUTANEOUS
Qty: 60 ML | Refills: 1 | Status: SHIPPED | OUTPATIENT
Start: 2017-09-05 | End: 2017-09-21 | Stop reason: SDUPTHER

## 2017-09-05 RX ORDER — INSULIN LISPRO 100 [IU]/ML
INJECTION, SOLUTION INTRAVENOUS; SUBCUTANEOUS
Qty: 45 ML | Refills: 1 | Status: SHIPPED | OUTPATIENT
Start: 2017-09-05 | End: 2017-09-05 | Stop reason: SDUPTHER

## 2017-09-05 RX ORDER — METOPROLOL TARTRATE 100 MG/1
TABLET ORAL
Qty: 180 TABLET | Refills: 1 | Status: SHIPPED | OUTPATIENT
Start: 2017-09-05 | End: 2018-01-02 | Stop reason: SDUPTHER

## 2017-09-05 RX ORDER — VENLAFAXINE HYDROCHLORIDE 75 MG/1
CAPSULE, EXTENDED RELEASE ORAL
Qty: 180 CAPSULE | Refills: 1 | Status: SHIPPED | OUTPATIENT
Start: 2017-09-05 | End: 2017-10-26 | Stop reason: SDUPTHER

## 2017-09-05 RX ORDER — ATORVASTATIN CALCIUM 20 MG/1
TABLET, FILM COATED ORAL
Qty: 90 TABLET | Refills: 1 | Status: SHIPPED | OUTPATIENT
Start: 2017-09-05 | End: 2018-01-22 | Stop reason: ALTCHOICE

## 2017-09-05 RX ORDER — PROMETHAZINE HYDROCHLORIDE 25 MG/1
TABLET ORAL
Qty: 90 TABLET | Refills: 1 | Status: SHIPPED | OUTPATIENT
Start: 2017-09-05 | End: 2018-01-02 | Stop reason: SDUPTHER

## 2017-09-21 ENCOUNTER — OFFICE VISIT (OUTPATIENT)
Dept: SURGERY | Age: 42
End: 2017-09-21

## 2017-09-21 VITALS
BODY MASS INDEX: 45.29 KG/M2 | SYSTOLIC BLOOD PRESSURE: 119 MMHG | WEIGHT: 281.8 LBS | DIASTOLIC BLOOD PRESSURE: 77 MMHG | OXYGEN SATURATION: 95 % | HEIGHT: 66 IN | HEART RATE: 86 BPM | RESPIRATION RATE: 16 BRPM

## 2017-09-21 DIAGNOSIS — Z23 NEED FOR INFLUENZA VACCINATION: ICD-10-CM

## 2017-09-21 DIAGNOSIS — E53.8 B12 DEFICIENCY: ICD-10-CM

## 2017-09-21 DIAGNOSIS — E66.01 MORBID OBESITY DUE TO EXCESS CALORIES (HCC): ICD-10-CM

## 2017-09-21 DIAGNOSIS — E78.00 HYPERCHOLESTEREMIA: ICD-10-CM

## 2017-09-21 DIAGNOSIS — Z79.4 TYPE 2 DIABETES MELLITUS WITH COMPLICATION, WITH LONG-TERM CURRENT USE OF INSULIN (HCC): Primary | ICD-10-CM

## 2017-09-21 DIAGNOSIS — E11.8 TYPE 2 DIABETES MELLITUS WITH COMPLICATION, WITH LONG-TERM CURRENT USE OF INSULIN (HCC): Primary | ICD-10-CM

## 2017-09-21 LAB — GLUCOSE BLD-MCNC: 171 MG/DL

## 2017-09-21 PROCEDURE — 99213 OFFICE O/P EST LOW 20 MIN: CPT | Performed by: INTERNAL MEDICINE

## 2017-09-21 PROCEDURE — 90471 IMMUNIZATION ADMIN: CPT | Performed by: INTERNAL MEDICINE

## 2017-09-21 PROCEDURE — 96372 THER/PROPH/DIAG INJ SC/IM: CPT | Performed by: INTERNAL MEDICINE

## 2017-09-21 PROCEDURE — 90688 IIV4 VACCINE SPLT 0.5 ML IM: CPT | Performed by: INTERNAL MEDICINE

## 2017-09-21 PROCEDURE — 82962 GLUCOSE BLOOD TEST: CPT | Performed by: INTERNAL MEDICINE

## 2017-09-21 RX ORDER — CYANOCOBALAMIN 1000 UG/ML
1000 INJECTION INTRAMUSCULAR; SUBCUTANEOUS ONCE
Status: COMPLETED | OUTPATIENT
Start: 2017-09-21 | End: 2017-09-21

## 2017-09-21 RX ORDER — ICOSAPENT ETHYL 1000 MG/1
CAPSULE ORAL
Qty: 120 CAPSULE | Refills: 3 | Status: SHIPPED | OUTPATIENT
Start: 2017-09-21 | End: 2017-10-24 | Stop reason: SDUPTHER

## 2017-09-21 RX ADMIN — CYANOCOBALAMIN 1000 MCG: 1000 INJECTION INTRAMUSCULAR; SUBCUTANEOUS at 16:42

## 2017-09-26 ENCOUNTER — OFFICE VISIT (OUTPATIENT)
Dept: CARDIOLOGY | Age: 42
End: 2017-09-26

## 2017-09-26 VITALS
HEART RATE: 76 BPM | BODY MASS INDEX: 45 KG/M2 | RESPIRATION RATE: 12 BRPM | HEIGHT: 66 IN | SYSTOLIC BLOOD PRESSURE: 118 MMHG | WEIGHT: 280 LBS | DIASTOLIC BLOOD PRESSURE: 68 MMHG

## 2017-09-26 DIAGNOSIS — E78.5 DYSLIPIDEMIA: ICD-10-CM

## 2017-09-26 DIAGNOSIS — I95.1 ORTHOSTATIC HYPOTENSION: Primary | ICD-10-CM

## 2017-09-26 DIAGNOSIS — G47.33 SLEEP APNEA, OBSTRUCTIVE: ICD-10-CM

## 2017-09-26 PROCEDURE — 99213 OFFICE O/P EST LOW 20 MIN: CPT | Performed by: INTERNAL MEDICINE

## 2017-09-26 ASSESSMENT — ENCOUNTER SYMPTOMS
COLOR CHANGE: 0
SHORTNESS OF BREATH: 0
CHEST TIGHTNESS: 0
COUGH: 0
APNEA: 0

## 2017-10-04 ENCOUNTER — CLINICAL DOCUMENTATION (OUTPATIENT)
Dept: PHARMACY | Facility: CLINIC | Age: 42
End: 2017-10-04

## 2017-10-04 NOTE — PROGRESS NOTES
Pharmacy Pop Care Documentation:      The application for Elisha Lopes for enrollment into the diabetes management program has been reviewed and accepted on 10/4/17.     Jeremie Celeste

## 2017-10-04 NOTE — LETTER
? Visit with your physician (Second yearly visit)  ? Second A1c  ? Flu vaccination   ? Requirements if A1c is greater than 8 percent:  ? Engage with a [facility] diabetes educator at one of our hospital locations or an ambulatory care coordinator by phone, if your A1c is greater than 8 percent. We will be reviewing your [employer] account and sending you reminders for needed actions. Please remember if you dont have a [address], you will need to submit documentation to [email or web address] or by fax to [phone]. As a reminder, if programs requirements are not met, your benefit may be terminated and you will not be eligible to participate in the program next year. Thank you for participating in the program and taking steps to improve your health.

## 2017-10-09 DIAGNOSIS — Z79.4 TYPE 2 DIABETES MELLITUS WITH COMPLICATION, WITH LONG-TERM CURRENT USE OF INSULIN (HCC): ICD-10-CM

## 2017-10-09 DIAGNOSIS — E11.8 TYPE 2 DIABETES MELLITUS WITH COMPLICATION, WITH LONG-TERM CURRENT USE OF INSULIN (HCC): ICD-10-CM

## 2017-10-10 RX ORDER — LANCETS 30 GAUGE
EACH MISCELLANEOUS
Qty: 200 EACH | Refills: 3 | Status: SHIPPED | OUTPATIENT
Start: 2017-10-10 | End: 2018-10-15 | Stop reason: SDUPTHER

## 2017-10-10 RX ORDER — BLOOD-GLUCOSE METER
EACH MISCELLANEOUS
Qty: 1 DEVICE | Refills: 0 | Status: SHIPPED | OUTPATIENT
Start: 2017-10-10 | End: 2018-05-21 | Stop reason: CLARIF

## 2017-10-24 ENCOUNTER — PATIENT MESSAGE (OUTPATIENT)
Dept: FAMILY MEDICINE CLINIC | Age: 42
End: 2017-10-24

## 2017-10-24 DIAGNOSIS — Z79.4 TYPE 2 DIABETES MELLITUS WITH COMPLICATION, WITH LONG-TERM CURRENT USE OF INSULIN (HCC): ICD-10-CM

## 2017-10-24 DIAGNOSIS — E11.8 TYPE 2 DIABETES MELLITUS WITH COMPLICATION, WITH LONG-TERM CURRENT USE OF INSULIN (HCC): ICD-10-CM

## 2017-10-24 RX ORDER — ICOSAPENT ETHYL 1000 MG/1
CAPSULE ORAL
Qty: 120 CAPSULE | Refills: 3 | Status: SHIPPED | OUTPATIENT
Start: 2017-10-24 | End: 2018-02-27

## 2017-10-25 RX ORDER — FLUCONAZOLE 150 MG/1
150 TABLET ORAL ONCE
Qty: 1 TABLET | Refills: 3 | Status: SHIPPED | OUTPATIENT
Start: 2017-10-25 | End: 2017-10-25

## 2017-10-25 RX ORDER — INSULIN LISPRO 100 [IU]/ML
INJECTION, SOLUTION INTRAVENOUS; SUBCUTANEOUS
Qty: 45 ML | Refills: 1 | Status: SHIPPED | OUTPATIENT
Start: 2017-10-25 | End: 2018-01-08 | Stop reason: SDUPTHER

## 2017-10-25 NOTE — TELEPHONE ENCOUNTER
From: Klever Covert  To: Santy Weems MD  Sent: 10/24/2017 8:26 PM EDT  Subject: Non-Urgent Medical Question    Can you please also order diflucan thanks

## 2017-10-26 ENCOUNTER — PATIENT MESSAGE (OUTPATIENT)
Dept: FAMILY MEDICINE CLINIC | Age: 42
End: 2017-10-26

## 2017-10-26 DIAGNOSIS — Z79.4 TYPE 2 DIABETES MELLITUS WITH COMPLICATION, WITH LONG-TERM CURRENT USE OF INSULIN (HCC): ICD-10-CM

## 2017-10-26 DIAGNOSIS — E11.8 TYPE 2 DIABETES MELLITUS WITH COMPLICATION, WITH LONG-TERM CURRENT USE OF INSULIN (HCC): ICD-10-CM

## 2017-10-26 NOTE — TELEPHONE ENCOUNTER
From: Merrick Leyva  Sent: 10/24/2017 7:35 PM EDT  Subject: Medication Renewal Request    Merrick Autumn would like a refill of the following medications:  venlafaxine (EFFEXOR XR) 75 MG extended release capsule Dorothy Rodriguez MD]    Preferred pharmacy: [address] [place] [phone] -  [phone]    Comment:  you changed my script to take 2 in morning 1 in the evening please refill with those directions thanks

## 2017-10-27 RX ORDER — VENLAFAXINE HYDROCHLORIDE 75 MG/1
CAPSULE, EXTENDED RELEASE ORAL
Qty: 270 CAPSULE | Refills: 1 | Status: SHIPPED | OUTPATIENT
Start: 2017-10-27 | End: 2018-04-14 | Stop reason: SDUPTHER

## 2017-11-01 ENCOUNTER — PATIENT MESSAGE (OUTPATIENT)
Dept: FAMILY MEDICINE CLINIC | Age: 42
End: 2017-11-01

## 2017-11-04 ENCOUNTER — PATIENT MESSAGE (OUTPATIENT)
Dept: FAMILY MEDICINE CLINIC | Age: 42
End: 2017-11-04

## 2017-11-04 RX ORDER — AMOXICILLIN 875 MG/1
875 TABLET, COATED ORAL 2 TIMES DAILY
Qty: 20 TABLET | Refills: 0 | Status: SHIPPED | OUTPATIENT
Start: 2017-11-04 | End: 2017-11-06 | Stop reason: SDUPTHER

## 2017-11-06 RX ORDER — AMOXICILLIN 875 MG/1
875 TABLET, COATED ORAL 2 TIMES DAILY
Qty: 20 TABLET | Refills: 0 | Status: SHIPPED | OUTPATIENT
Start: 2017-11-06 | End: 2017-11-16

## 2017-12-13 DIAGNOSIS — Z79.4 TYPE 2 DIABETES MELLITUS WITH COMPLICATION, WITH LONG-TERM CURRENT USE OF INSULIN (HCC): ICD-10-CM

## 2017-12-13 DIAGNOSIS — E11.8 TYPE 2 DIABETES MELLITUS WITH COMPLICATION, WITH LONG-TERM CURRENT USE OF INSULIN (HCC): ICD-10-CM

## 2017-12-13 DIAGNOSIS — E78.00 HYPERCHOLESTEREMIA: ICD-10-CM

## 2017-12-13 LAB
ALBUMIN SERPL-MCNC: 4.4 G/DL (ref 3.9–4.9)
ALP BLD-CCNC: 83 U/L (ref 40–130)
ALT SERPL-CCNC: 35 U/L (ref 0–33)
ANION GAP SERPL CALCULATED.3IONS-SCNC: 20 MEQ/L (ref 9–17)
AST SERPL-CCNC: 39 U/L (ref 0–35)
BILIRUB SERPL-MCNC: 0.4 MG/DL (ref 0–1.2)
BILIRUBIN DIRECT: 0 MG/DL (ref 0–0.3)
BILIRUBIN, INDIRECT: 0.4 MG/DL (ref 0–0.6)
BUN BLDV-MCNC: 15 MG/DL (ref 6–20)
CALCIUM SERPL-MCNC: 8.7 MG/DL (ref 8.6–10.2)
CHLORIDE BLD-SCNC: 98 MEQ/L (ref 97–107)
CHOLESTEROL, TOTAL: 190 MG/DL (ref 0–199)
CO2: 22 MEQ/L (ref 17–24)
CREAT SERPL-MCNC: 0.37 MG/DL (ref 0.5–0.9)
GFR AFRICAN AMERICAN: >60
GFR NON-AFRICAN AMERICAN: >60
GLUCOSE BLD-MCNC: 165 MG/DL (ref 74–109)
HBA1C MFR BLD: 7.6 % (ref 4.8–5.9)
HDLC SERPL-MCNC: 26 MG/DL (ref 40–59)
LDL CHOLESTEROL CALCULATED: 127 MG/DL (ref 0–129)
POTASSIUM SERPL-SCNC: 4.9 MEQ/L (ref 3.2–4.4)
SODIUM BLD-SCNC: 140 MEQ/L (ref 132–138)
TOTAL PROTEIN: 7.5 G/DL (ref 6.4–8.1)
TRIGL SERPL-MCNC: 183 MG/DL (ref 0–200)

## 2017-12-19 DIAGNOSIS — E11.8 TYPE 2 DIABETES MELLITUS WITH COMPLICATION, WITH LONG-TERM CURRENT USE OF INSULIN (HCC): ICD-10-CM

## 2017-12-19 DIAGNOSIS — Z79.4 TYPE 2 DIABETES MELLITUS WITH COMPLICATION, WITH LONG-TERM CURRENT USE OF INSULIN (HCC): ICD-10-CM

## 2018-01-02 DIAGNOSIS — Z79.4 TYPE 2 DIABETES MELLITUS WITH COMPLICATION, WITH LONG-TERM CURRENT USE OF INSULIN (HCC): ICD-10-CM

## 2018-01-02 DIAGNOSIS — E11.8 TYPE 2 DIABETES MELLITUS WITH COMPLICATION, WITH LONG-TERM CURRENT USE OF INSULIN (HCC): ICD-10-CM

## 2018-01-03 RX ORDER — HYDROCHLOROTHIAZIDE 25 MG/1
TABLET ORAL
Qty: 90 TABLET | Refills: 1 | Status: SHIPPED | OUTPATIENT
Start: 2018-01-03 | End: 2018-06-25 | Stop reason: SDUPTHER

## 2018-01-03 RX ORDER — METOPROLOL TARTRATE 100 MG/1
TABLET ORAL
Qty: 180 TABLET | Refills: 1 | Status: SHIPPED | OUTPATIENT
Start: 2018-01-03 | End: 2018-08-29 | Stop reason: SDUPTHER

## 2018-01-03 RX ORDER — LOSARTAN POTASSIUM 50 MG/1
TABLET ORAL
Qty: 90 TABLET | Refills: 1 | Status: SHIPPED | OUTPATIENT
Start: 2018-01-03 | End: 2018-06-25 | Stop reason: SDUPTHER

## 2018-01-03 RX ORDER — OMEPRAZOLE 40 MG/1
CAPSULE, DELAYED RELEASE ORAL
Qty: 90 CAPSULE | Refills: 1 | Status: SHIPPED | OUTPATIENT
Start: 2018-01-03 | End: 2018-01-18 | Stop reason: SDUPTHER

## 2018-01-03 RX ORDER — PROMETHAZINE HYDROCHLORIDE 25 MG/1
TABLET ORAL
Qty: 90 TABLET | Refills: 1 | Status: SHIPPED | OUTPATIENT
Start: 2018-01-03 | End: 2018-03-24 | Stop reason: SDUPTHER

## 2018-01-07 DIAGNOSIS — E11.8 TYPE 2 DIABETES MELLITUS WITH COMPLICATION, WITH LONG-TERM CURRENT USE OF INSULIN (HCC): ICD-10-CM

## 2018-01-07 DIAGNOSIS — Z79.4 TYPE 2 DIABETES MELLITUS WITH COMPLICATION, WITH LONG-TERM CURRENT USE OF INSULIN (HCC): ICD-10-CM

## 2018-01-08 DIAGNOSIS — E11.8 TYPE 2 DIABETES MELLITUS WITH COMPLICATION, WITH LONG-TERM CURRENT USE OF INSULIN (HCC): ICD-10-CM

## 2018-01-08 DIAGNOSIS — Z79.4 TYPE 2 DIABETES MELLITUS WITH COMPLICATION, WITH LONG-TERM CURRENT USE OF INSULIN (HCC): ICD-10-CM

## 2018-01-08 RX ORDER — INSULIN LISPRO 100 [IU]/ML
INJECTION, SOLUTION INTRAVENOUS; SUBCUTANEOUS
Qty: 15 ML | Refills: 1 | Status: SHIPPED | OUTPATIENT
Start: 2018-01-08 | End: 2018-05-21 | Stop reason: SDUPTHER

## 2018-01-08 NOTE — TELEPHONE ENCOUNTER
From: Ba Morris  Sent: 1/7/2018 12:57 PM EST  Subject: Medication Renewal Request    Ba oMrris would like a refill of the following medications:  HUMALOG KWIKPEN 100 UNIT/ML pen Elziabeth Sampson MD]    Preferred pharmacy: [address] [place] [address] [phone] -  [phone]    Comment:

## 2018-01-17 DIAGNOSIS — E11.8 TYPE 2 DIABETES MELLITUS WITH COMPLICATION, WITH LONG-TERM CURRENT USE OF INSULIN (HCC): ICD-10-CM

## 2018-01-17 DIAGNOSIS — Z79.4 TYPE 2 DIABETES MELLITUS WITH COMPLICATION, WITH LONG-TERM CURRENT USE OF INSULIN (HCC): ICD-10-CM

## 2018-01-18 ENCOUNTER — TELEPHONE (OUTPATIENT)
Dept: PHARMACY | Facility: CLINIC | Age: 43
End: 2018-01-18

## 2018-01-18 ENCOUNTER — SCHEDULED TELEPHONE ENCOUNTER (OUTPATIENT)
Dept: PHARMACY | Facility: CLINIC | Age: 43
End: 2018-01-18

## 2018-01-19 RX ORDER — PEN NEEDLE, DIABETIC 30 GX3/16"
NEEDLE, DISPOSABLE MISCELLANEOUS
Qty: 400 EACH | Refills: 1 | Status: SHIPPED | OUTPATIENT
Start: 2018-01-19 | End: 2018-10-15 | Stop reason: SDUPTHER

## 2018-01-22 RX ORDER — ROSUVASTATIN CALCIUM 10 MG/1
10 TABLET, COATED ORAL NIGHTLY
Qty: 90 TABLET | Refills: 3 | Status: SHIPPED | OUTPATIENT
Start: 2018-01-22 | End: 2018-06-25 | Stop reason: SDUPTHER

## 2018-01-22 NOTE — TELEPHONE ENCOUNTER
Per other encounter:  Zackery Patel MD Physician Signed  Date of Service: 01/19/2018 2:29 PM         Ok crestor 10  No trulicity  vascepa /?  Who started I am uncertain whether to continue or not try only crestor           Albert Pineda, PharmD, [address]  Direct: [phone]  Department, toll free: [phone], option 7     =======================================================   For Pharmacy Admin Tracking Only    PHSO: Yes  Total # of Interventions Recommended: 4  - Discontinued Medication #: 1 Discontinue Reason(s): Patient Preference  - New Order #: 1 New Medication Order Reason(s): Needs Additional Medication Therapy  - Refills Provided #: 1  Total Interventions Accepted: 3  Time Spent (min): 45

## 2018-01-22 NOTE — TELEPHONE ENCOUNTER
Noted, thank you! Left message for patient advising (VM ok per enrollment form):  · Trulicity removed from medication list  · Start rosuvastatin daily (rx sent to mail order pharmacy and covered under diabetes program/benefit)  · This would be in place of atorvastatin and does not carry same drug interaction concern with the erythromycin. · Still to clarify with PCP and/or Dr. Amador Weir at upcoming appts whether the [facility] would still be necessary. Provided my contact information. Sending [employer] message in other encounter also.     Leanna Pineda, PharmD, [address] [facility]  Direct: [phone]  Department, toll free: [phone], option 7

## 2018-02-01 ENCOUNTER — PATIENT MESSAGE (OUTPATIENT)
Dept: FAMILY MEDICINE CLINIC | Age: 43
End: 2018-02-01

## 2018-02-01 DIAGNOSIS — F41.9 ANXIETY: Primary | ICD-10-CM

## 2018-02-02 ENCOUNTER — CLINICAL DOCUMENTATION (OUTPATIENT)
Dept: FAMILY MEDICINE CLINIC | Age: 43
End: 2018-02-02

## 2018-02-02 RX ORDER — FLUCONAZOLE 150 MG/1
150 TABLET ORAL ONCE
Qty: 1 TABLET | Refills: 3 | Status: SHIPPED | OUTPATIENT
Start: 2018-02-02 | End: 2018-02-02

## 2018-02-02 RX ORDER — ALPRAZOLAM 0.5 MG/1
TABLET ORAL
Qty: 60 TABLET | Refills: 0 | Status: SHIPPED | OUTPATIENT
Start: 2018-02-02 | End: 2018-07-18 | Stop reason: SDUPTHER

## 2018-02-02 NOTE — TELEPHONE ENCOUNTER
From: Jarrett Maya  To: Zackery Patel MD  Sent: 2/1/2018 6:15 PM EST  Subject: Prescription Question    Why is it saying my Xanax can't be refilled?

## 2018-02-02 NOTE — TELEPHONE ENCOUNTER
From: Sofia Browning  To: Myrna Dunlap MD  Sent: 2/1/2018 6:16 PM EST  Subject: Prescription Question    Please send diflucan to [facility] I have a yeast infection.  Thank you

## 2018-02-12 ENCOUNTER — E-VISIT (OUTPATIENT)
Dept: FAMILY MEDICINE CLINIC | Age: 43
End: 2018-02-12
Payer: COMMERCIAL

## 2018-02-12 PROCEDURE — 99444 PR PHYSICIAN ONLINE EVALUATION & MANAGEMENT SERVICE: CPT | Performed by: FAMILY MEDICINE

## 2018-02-12 RX ORDER — AMOXICILLIN 875 MG/1
875 TABLET, COATED ORAL 2 TIMES DAILY
Qty: 20 TABLET | Refills: 0 | Status: SHIPPED | OUTPATIENT
Start: 2018-02-12 | End: 2018-02-22

## 2018-02-12 ASSESSMENT — LIFESTYLE VARIABLES: SMOKING_STATUS: NO

## 2018-02-12 NOTE — PROGRESS NOTES
[id number] Meme Timmons for dx sinusitis and tx w amoxil ANY nonresolution or worsening needs appt  The patient was reminded that an antibiotic has been prescribed the predicted course is improvement to cure with no persistent issues. Take antibiotics as directed. If any problems occur, an appointment should be made or ER visit if severe. Because of the risk with ANY antibiotic of C. Difficile colitis if persistent diarrhea or abdominal pain or any concerning symptoms, we should be notified. To reduce this risk, a probiotic pill, yogurt or other preparations containing active cultures should be ingested daily -particularly while on the antibiotic. If any persistent symptoms of illness, follow up appointment should be made in a timely fashion with a physician.

## 2018-02-27 ENCOUNTER — OFFICE VISIT (OUTPATIENT)
Dept: UROLOGY | Age: 43
End: 2018-02-27
Payer: COMMERCIAL

## 2018-02-27 VITALS
SYSTOLIC BLOOD PRESSURE: 120 MMHG | BODY MASS INDEX: 45 KG/M2 | DIASTOLIC BLOOD PRESSURE: 64 MMHG | HEIGHT: 66 IN | HEART RATE: 80 BPM | WEIGHT: 280 LBS

## 2018-02-27 DIAGNOSIS — R33.9 URINE RETENTION: Primary | ICD-10-CM

## 2018-02-27 LAB
BILIRUBIN, POC: NORMAL
BLOOD URINE, POC: NORMAL
CLARITY, POC: CLEAR
COLOR, POC: YELLOW
GLUCOSE URINE, POC: NORMAL
KETONES, POC: NORMAL
LEUKOCYTE EST, POC: NORMAL
NITRITE, POC: NORMAL
PH, POC: 6
POST VOID RESIDUAL (PVR): 84 ML
PROTEIN, POC: NORMAL
SPECIFIC GRAVITY, POC: 1.02
UROBILINOGEN, POC: 1

## 2018-02-27 PROCEDURE — 51798 US URINE CAPACITY MEASURE: CPT | Performed by: UROLOGY

## 2018-02-27 PROCEDURE — 81003 URINALYSIS AUTO W/O SCOPE: CPT | Performed by: UROLOGY

## 2018-02-27 PROCEDURE — 99214 OFFICE O/P EST MOD 30 MIN: CPT | Performed by: UROLOGY

## 2018-02-27 RX ORDER — SOLIFENACIN SUCCINATE 5 MG/1
5 TABLET, FILM COATED ORAL DAILY
Qty: 28 TABLET | Refills: 0 | COMMUNITY
Start: 2018-02-27 | End: 2018-03-13 | Stop reason: SDUPTHER

## 2018-02-27 NOTE — PROGRESS NOTES
[facility] EVALUATION NOTE                                                 H&P                                                                                                                                                 Reason for Visit  Detrusor instability    History of Present Illness  Variable levels of urinary control issues especially with urgency  Most likely secondary to poor blood sugar control  Patient will be given a trial of Vesicare  Patient has not had any UTIs over the past 6 months      Urologic Review of Systems/Symptoms  Denies hematuria  Denies dysuria  Denies incontinence  Denies flank pain  Other Urologic: Intermittent urgency    Review of Systems  Head and neck: No issues/reviewed  Cardiac: No recent issues/reviewed  Pulmonary: No issues/reviewed  Gastrointestinal: No issues/reviewed  Neurologic: No recent issues/reviewed  Extremities: No issues/reviewed  Lymphatics: No lymphadenopathy no change  Genitourinary: See above  Skin: No issues/reviewed  Hospitalization: None recent  Last hemoglobin A1c was about 8  All 14 categories of Review of Systems otherwise reviewed no other findings reported. Past Medical History:   Diagnosis Date    B12 deficiency     Family history of premature CAD 9/4/2013    Fatty liver     HPV (human papilloma virus) anogenital infection     Hyperlipidemia     Hypertension     Liver hemangioma 2009/2015    Obesity 3/11/13    BMI 43.42    Orthostatic hypotension     Ovarian cyst     Rectal fissure     Tobacco abuse 9/3/2015    Tobacco abuse     Uncontrolled diabetes mellitus with complications ([facility] [address]     Unspecified sleep apnea      Past Surgical History:   Procedure Laterality Date    CARDIAC CATHETERIZATION  4-07    COLONOSCOPY  2013    for diarrhea (-)    HYSTERECTOMY  12-10    LEEP  1-05    TONSILLECTOMY AND ADENOIDECTOMY  1981    UPPER GASTROINTESTINAL ENDOSCOPY  10/13/15       [address] [place] [address] Rd SURGERY  6-2015     Social History     Social History    Marital status:      Spouse name: N/A    Number of children: N/A    Years of education: N/A     Social History Main Topics    Smoking status: Former Smoker     Packs/day: 1.00     Types: Cigarettes     Quit date: 1/1/2017    Smokeless tobacco: Never Used      Comment: pt trying to cut back    Alcohol use 0.0 oz/week      Comment: socially    Drug use: No    Sexual activity: Yes     Partners: Male      Comment: single     Other Topics Concern    None     Social History Narrative    None     Family History   Problem Relation Age of Onset    Diabetes Father      Current Outpatient Prescriptions   Medication Sig Dispense Refill    rosuvastatin (CRESTOR) 10 MG tablet Take 1 tablet by mouth nightly 90 tablet 3    HUMALOG KWIKPEN 100 UNIT/ML pen INJECT 40 UNITS INTO THE SKIN THREE TIMES A DAY WITH MEALS 15 mL 1    insulin lispro (HUMALOG KWIKPEN) 100 UNIT/ML pen Inject 40 Units into the skin 3 times daily (before meals) 45 mL 5    hydrochlorothiazide (HYDRODIURIL) 25 MG tablet TAKE 1 TABLET BY MOUTH ONE TIME DAILY 90 tablet 1    metFORMIN (GLUCOPHAGE) 500 MG tablet TAKE ONE TABLET BY MOUTH THREE TIMES A  tablet 1    promethazine (PHENERGAN) 25 MG tablet TAKE ONE TABLET BY MOUTH EVERY 8 HOURS AS NEEDED FOR NAUSEA 90 tablet 1    losartan (COZAAR) 50 MG tablet TAKE 1 TABLET BY MOUTH ONE TIME DAILY 90 tablet 1    metoprolol (LOPRESSOR) 100 MG tablet TAKE ONE TABLET BY MOUTH TWICE A  tablet 1    insulin glargine (TOUJEO SOLOSTAR) 300 UNIT/ML injection pen 200 units at bedtime 20 pen 3    venlafaxine (EFFEXOR XR) 75 MG extended release capsule 2 Q AM, 1Q  capsule 1    erythromycin base (E-MYCIN) 250 MG tablet TAKE ONE TABLET BY MOUTH THREE TIMES DAILY 270 tablet 1    omeprazole (PRILOSEC) 40 MG delayed release capsule TAKE ONE CAPSULE BY MOUTH ONE TIME A DAY 90 capsule 1    ALPRAZolam (XANAX) 0.5 MG tablet TAKE ONE OR TWO TABLETS BY MOUTH THREE

## 2018-03-12 ENCOUNTER — OFFICE VISIT (OUTPATIENT)
Dept: ENDOCRINOLOGY | Age: 43
End: 2018-03-12
Payer: COMMERCIAL

## 2018-03-12 VITALS
BODY MASS INDEX: 44.03 KG/M2 | DIASTOLIC BLOOD PRESSURE: 76 MMHG | HEART RATE: 80 BPM | HEIGHT: 66 IN | SYSTOLIC BLOOD PRESSURE: 122 MMHG | WEIGHT: 274 LBS

## 2018-03-12 DIAGNOSIS — E11.8 TYPE 2 DIABETES MELLITUS WITH COMPLICATION, WITH LONG-TERM CURRENT USE OF INSULIN (HCC): Primary | ICD-10-CM

## 2018-03-12 DIAGNOSIS — E78.5 DYSLIPIDEMIA: ICD-10-CM

## 2018-03-12 DIAGNOSIS — Z79.4 TYPE 2 DIABETES MELLITUS WITH COMPLICATION, WITH LONG-TERM CURRENT USE OF INSULIN (HCC): Primary | ICD-10-CM

## 2018-03-12 LAB
GLUCOSE BLD-MCNC: 272 MG/DL
HBA1C MFR BLD: 6.8 %

## 2018-03-12 PROCEDURE — 83036 HEMOGLOBIN GLYCOSYLATED A1C: CPT | Performed by: INTERNAL MEDICINE

## 2018-03-12 PROCEDURE — 99213 OFFICE O/P EST LOW 20 MIN: CPT | Performed by: INTERNAL MEDICINE

## 2018-03-12 PROCEDURE — 82962 GLUCOSE BLOOD TEST: CPT | Performed by: INTERNAL MEDICINE

## 2018-03-13 RX ORDER — SOLIFENACIN SUCCINATE 5 MG/1
5 TABLET, FILM COATED ORAL DAILY
Qty: 90 TABLET | Refills: 3 | Status: SHIPPED | OUTPATIENT
Start: 2018-03-13 | End: 2019-02-19 | Stop reason: SDUPTHER

## 2018-03-14 ENCOUNTER — EMPLOYEE WELLNESS (OUTPATIENT)
Dept: OTHER | Age: 43
End: 2018-03-14

## 2018-03-14 DIAGNOSIS — E11.8 TYPE 2 DIABETES MELLITUS WITH COMPLICATION, WITH LONG-TERM CURRENT USE OF INSULIN (HCC): ICD-10-CM

## 2018-03-14 DIAGNOSIS — Z79.4 TYPE 2 DIABETES MELLITUS WITH COMPLICATION, WITH LONG-TERM CURRENT USE OF INSULIN (HCC): ICD-10-CM

## 2018-03-14 LAB
CHOLESTEROL, TOTAL: 128 MG/DL (ref 0–199)
CREATININE URINE: 155.4 MG/DL
GLUCOSE BLD-MCNC: 189 MG/DL (ref 74–109)
HBA1C MFR BLD: 7.8 % (ref 4.8–5.9)
HDLC SERPL-MCNC: 28 MG/DL (ref 40–59)
LDL CHOLESTEROL CALCULATED: 55 MG/DL (ref 0–129)
MICROALBUMIN UR-MCNC: 9 MG/DL
MICROALBUMIN/CREAT UR-RTO: 57.9 MG/G (ref 0–30)
TRIGL SERPL-MCNC: 226 MG/DL (ref 0–200)

## 2018-03-17 ENCOUNTER — NURSE TRIAGE (OUTPATIENT)
Dept: OTHER | Facility: CLINIC | Age: 43
End: 2018-03-17

## 2018-03-17 NOTE — TELEPHONE ENCOUNTER
Reason for Disposition   [1] Blood glucose > 240 mg/dl (13 mmol/l) AND [2] uses insulin (e.g., insulin-dependent, all type 1 diabetics)   (all triage questions negative)    Protocols used: DIABETES - HIGH BLOOD SUGAR-ADULT-    Pt is having a hard time bring her blood sugar down. Blood sugar is currently 360. Pt is on Insulin and having no other symptoms. Directed pt on home care instructions.

## 2018-03-19 NOTE — PROGRESS NOTES
Subjective:      Patient ID: Sofia Browning is a 40 y.o. female. Diabetes   She presents for her follow-up diabetic visit. She has type 2 diabetes mellitus. Her disease course has been improving. Hypoglycemia symptoms include confusion, mood changes, nervousness/anxiousness and sweats. Diabetic complications include nephropathy. (Gastroparesis ) Risk factors for coronary artery disease include diabetes mellitus, dyslipidemia, obesity, sedentary lifestyle and hypertension. Current diabetic treatment includes insulin injections (toujeo humalog plus metformin ). She is compliant with treatment all of the time. She is currently taking insulin pre-breakfast, pre-lunch, pre-dinner and at bedtime. She participates in exercise intermittently. Her overall blood glucose range is 180-200 mg/dl. (Lab Results       Component                Value               Date                       LABA1C                   7.8 (H)             03/14/2018                  ) An ACE inhibitor/angiotensin II receptor blocker is being taken. Hyperlipidemia   This is a chronic problem. The current episode started more than 1 year ago. The problem is uncontrolled. Exacerbating diseases include diabetes and obesity. Current antihyperlipidemic treatment includes statins. Risk factors for coronary artery disease include diabetes mellitus, dyslipidemia, obesity and a sedentary lifestyle. Obesity Body mass index is 44.22 kg/m².           Patient Active Problem List   Diagnosis    Orthostatic hypotension    B12 deficiency    HPV (human papilloma virus) anogenital infection    Ovarian cyst    Sleep apnea, obstructive    Dyslipidemia    Family history of premature CAD    Pulmonary HTN    HSV-1 (herpes simplex virus 1) infection    Tobacco abuse    Type 2 diabetes mellitus with complication (HCC)       Current Outpatient Prescriptions:     rosuvastatin (CRESTOR) 10 MG tablet, Take 1 tablet by mouth nightly, Disp: 90 tablet, Rfl: 3    Insulin Pen Needle (PEN NEEDLES) 32G X 4 MM MISC, Use as directed with insulin pens, 4 times daily, Disp: 400 each, Rfl: 1    HUMALOG KWIKPEN 100 UNIT/ML pen, INJECT 40 UNITS INTO THE SKIN THREE TIMES A DAY WITH MEALS, Disp: 15 mL, Rfl: 1    insulin lispro (HUMALOG KWIKPEN) 100 UNIT/ML pen, Inject 40 Units into the skin 3 times daily (before meals), Disp: 45 mL, Rfl: 5    hydrochlorothiazide (HYDRODIURIL) 25 MG tablet, TAKE 1 TABLET BY MOUTH ONE TIME DAILY, Disp: 90 tablet, Rfl: 1    metFORMIN (GLUCOPHAGE) 500 MG tablet, TAKE ONE TABLET BY MOUTH THREE TIMES A DAY, Disp: 270 tablet, Rfl: 1    promethazine (PHENERGAN) 25 MG tablet, TAKE ONE TABLET BY MOUTH EVERY 8 HOURS AS NEEDED FOR NAUSEA, Disp: 90 tablet, Rfl: 1    losartan (COZAAR) 50 MG tablet, TAKE 1 TABLET BY MOUTH ONE TIME DAILY, Disp: 90 tablet, Rfl: 1    metoprolol (LOPRESSOR) 100 MG tablet, TAKE ONE TABLET BY MOUTH TWICE A DAY, Disp: 180 tablet, Rfl: 1    insulin glargine (TOUJEO SOLOSTAR) 300 UNIT/ML injection pen, 200 units at bedtime, Disp: 20 pen, Rfl: 3    venlafaxine (EFFEXOR XR) 75 MG extended release capsule, 2 Q AM, 1Q PM, Disp: 270 capsule, Rfl: 1    Blood Glucose Monitoring Suppl (AGAMATRIX AMP) ALEXANDREA, DX:IDDM--ICD-10 E11.9, Disp: 1 Device, Rfl: 0    glucose blood VI test strips (AGAMATRIX AMP TEST) strip, Checks 2 times daily. Dx:IDDM--ICD-10 E11.9, Disp: 200 each, Rfl: 3    Lancets MISC, Checks 2 times daily.  Dx:IDDM--ICD-10 E11.9, Disp: 200 each, Rfl: 3    ondansetron (ZOFRAN-ODT) 4 MG disintegrating tablet, Take 1 tablet by mouth every 8 hours as needed for Nausea or Vomiting, Disp: 40 tablet, Rfl: 1    fluticasone (FLONASE) 50 MCG/ACT nasal spray, 1 spray by Nasal route daily, Disp: 1 Bottle, Rfl: 3    omeprazole (PRILOSEC) 40 MG delayed release capsule, TAKE ONE CAPSULE BY MOUTH ONE TIME A DAY, Disp: 90 capsule, Rfl: 1    solifenacin (VESICARE) 5 MG tablet, Take 1 tablet by mouth daily, Disp: 90 tablet, Rfl: 3   erythromycin base (E-MYCIN) 250 MG tablet, TAKE ONE TABLET BY MOUTH THREE TIMES DAILY, Disp: 270 tablet, Rfl: 1      Review of Systems   Constitutional: Positive for unexpected weight change. Psychiatric/Behavioral: Positive for confusion. The patient is nervous/anxious. All other systems reviewed and are negative. Vitals:    03/12/18 1542   BP: 122/76   Site: Left Arm   Position: Sitting   Cuff Size: Large Adult   Pulse: 80   Weight: 274 lb (124.3 kg)   Height: 5' 6\" (1.676 m)       Objective:   Physical Exam   Constitutional: She appears well-developed and well-nourished. Neck: Neck supple. Cardiovascular: Normal rate. Pulmonary/Chest: Effort normal.   Abdominal:   Obese    Musculoskeletal: Normal range of motion. Neurological: She is alert. Skin: Skin is warm. Psychiatric: She has a normal mood and affect. Results for Juan Moctezuma (MRN [id number]) as of 3/19/2018 07:10   Ref. Range 3/14/2018 07:30 3/14/2018 07:40   Glucose Latest Ref Range: 74 - 109 mg/dL  189 (H)   Cholesterol, Total Latest Ref Range: 0 - 199 mg/dL  128   HDL Cholesterol Latest Ref Range: 40 - 59 mg/dL  28 (L)   LDL Calculated Latest Ref Range: 0 - 129 mg/dL  55   Triglycerides Latest Ref Range: 0 - 200 mg/dL  226 (H)   Creatinine, Ur Latest Ref Range: Not Established mg/dL 155.4    Microalbumin Creatinine Ratio Latest Ref Range: 0.0 - 30.0 mg/G 57.9 (H)    Microalbumin, Random Urine Latest Ref Range: Not Established mg/dL 9.00 (H)        Assessment:      1. Type 2 diabetes mellitus with complication, with long-term current use of insulin ([facility])  POCT Glucose    POCT glycosylated hemoglobin (Hb A1C)    Microalbumin / Creatinine Urine Ratio    Basic Metabolic Panel    Hemoglobin A1C    CANCELED: POCT rapid strep A   2.  Dyslipidemia  Microalbumin / Creatinine Urine Ratio    Lipid Panel    Hepatic Function Panel           Plan:      Orders Placed This Encounter   Procedures    Microalbumin / Creatinine Urine Ratio

## 2018-03-20 VITALS — BODY MASS INDEX: 44.87 KG/M2 | WEIGHT: 278 LBS

## 2018-03-26 ENCOUNTER — E-VISIT (OUTPATIENT)
Dept: INTERNAL MEDICINE CLINIC | Age: 43
End: 2018-03-26

## 2018-03-26 DIAGNOSIS — N30.00 ACUTE CYSTITIS WITHOUT HEMATURIA: Primary | ICD-10-CM

## 2018-03-26 PROCEDURE — 99444 PR PHYSICIAN ONLINE EVALUATION & MANAGEMENT SERVICE: CPT | Performed by: INTERNAL MEDICINE

## 2018-03-26 RX ORDER — PROMETHAZINE HYDROCHLORIDE 25 MG/1
TABLET ORAL
Qty: 16 TABLET | Refills: 0 | Status: SHIPPED | OUTPATIENT
Start: 2018-03-26 | End: 2018-04-03 | Stop reason: SDUPTHER

## 2018-03-27 ENCOUNTER — PATIENT MESSAGE (OUTPATIENT)
Dept: FAMILY MEDICINE CLINIC | Age: 43
End: 2018-03-27

## 2018-03-27 ENCOUNTER — TELEPHONE (OUTPATIENT)
Dept: FAMILY MEDICINE CLINIC | Age: 43
End: 2018-03-27

## 2018-03-27 RX ORDER — MECLIZINE HYDROCHLORIDE 25 MG/1
25 TABLET ORAL 3 TIMES DAILY PRN
Qty: 40 TABLET | Refills: 1 | Status: SHIPPED | OUTPATIENT
Start: 2018-03-27 | End: 2018-07-18 | Stop reason: SDUPTHER

## 2018-03-27 RX ORDER — CIPROFLOXACIN 500 MG/1
500 TABLET, FILM COATED ORAL 2 TIMES DAILY
Qty: 10 TABLET | Refills: 0 | Status: SHIPPED | OUTPATIENT
Start: 2018-03-27 | End: 2018-04-01

## 2018-03-27 RX ORDER — CIPROFLOXACIN 500 MG/1
500 TABLET, FILM COATED ORAL 2 TIMES DAILY
Qty: 10 TABLET | Refills: 0 | Status: SHIPPED | OUTPATIENT
Start: 2018-03-27 | End: 2018-03-27 | Stop reason: SDUPTHER

## 2018-03-27 NOTE — TELEPHONE ENCOUNTER
From: Iron Cuenca  To: Ashok Salas MD  Sent: 3/27/2018 1:54 PM EDT  Subject: Prescription Question    Can you please send this prescription in to be ordered.  meclizine 25 MG tablet    thank you

## 2018-04-02 ENCOUNTER — PATIENT MESSAGE (OUTPATIENT)
Dept: FAMILY MEDICINE CLINIC | Age: 43
End: 2018-04-02

## 2018-04-02 VITALS — BODY MASS INDEX: 44.06 KG/M2 | WEIGHT: 273 LBS

## 2018-04-03 RX ORDER — PROMETHAZINE HYDROCHLORIDE 25 MG/1
TABLET ORAL
Qty: 180 TABLET | Refills: 1 | Status: SHIPPED | OUTPATIENT
Start: 2018-04-03 | End: 2018-06-25 | Stop reason: SDUPTHER

## 2018-04-05 ENCOUNTER — PATIENT MESSAGE (OUTPATIENT)
Dept: FAMILY MEDICINE CLINIC | Age: 43
End: 2018-04-05

## 2018-04-05 DIAGNOSIS — Z12.31 ENCOUNTER FOR SCREENING MAMMOGRAM FOR BREAST CANCER: Primary | ICD-10-CM

## 2018-04-11 ENCOUNTER — TELEPHONE (OUTPATIENT)
Dept: PHARMACY | Facility: CLINIC | Age: 43
End: 2018-04-11

## 2018-04-16 RX ORDER — VENLAFAXINE HYDROCHLORIDE 75 MG/1
CAPSULE, EXTENDED RELEASE ORAL
Qty: 270 CAPSULE | Refills: 1 | Status: SHIPPED | OUTPATIENT
Start: 2018-04-16 | End: 2018-11-16 | Stop reason: SDUPTHER

## 2018-04-18 ENCOUNTER — PATIENT MESSAGE (OUTPATIENT)
Dept: FAMILY MEDICINE CLINIC | Age: 43
End: 2018-04-18

## 2018-04-19 ENCOUNTER — TELEPHONE (OUTPATIENT)
Dept: FAMILY MEDICINE CLINIC | Age: 43
End: 2018-04-19

## 2018-04-19 DIAGNOSIS — J01.90 ACUTE RHINOSINUSITIS: ICD-10-CM

## 2018-04-19 RX ORDER — AMOXICILLIN AND CLAVULANATE POTASSIUM 875; 125 MG/1; MG/1
1 TABLET, FILM COATED ORAL 2 TIMES DAILY
Qty: 20 TABLET | Refills: 0 | Status: SHIPPED | OUTPATIENT
Start: 2018-04-19 | End: 2018-04-29

## 2018-05-02 ENCOUNTER — PATIENT MESSAGE (OUTPATIENT)
Dept: FAMILY MEDICINE CLINIC | Age: 43
End: 2018-05-02

## 2018-05-02 ENCOUNTER — TELEPHONE (OUTPATIENT)
Dept: FAMILY MEDICINE CLINIC | Age: 43
End: 2018-05-02

## 2018-05-02 RX ORDER — FLUCONAZOLE 150 MG/1
150 TABLET ORAL ONCE
Qty: 1 TABLET | Refills: 3 | Status: SHIPPED | OUTPATIENT
Start: 2018-05-02 | End: 2018-05-02

## 2018-05-03 ENCOUNTER — PATIENT MESSAGE (OUTPATIENT)
Dept: FAMILY MEDICINE CLINIC | Age: 43
End: 2018-05-03

## 2018-05-03 DIAGNOSIS — G47.30 SLEEP APNEA, UNSPECIFIED TYPE: Primary | ICD-10-CM

## 2018-05-14 ENCOUNTER — OFFICE VISIT (OUTPATIENT)
Dept: OBGYN CLINIC | Age: 43
End: 2018-05-14
Payer: COMMERCIAL

## 2018-05-14 VITALS
HEIGHT: 66 IN | BODY MASS INDEX: 44.03 KG/M2 | WEIGHT: 274 LBS | RESPIRATION RATE: 16 BRPM | DIASTOLIC BLOOD PRESSURE: 60 MMHG | OXYGEN SATURATION: 98 % | HEART RATE: 76 BPM | SYSTOLIC BLOOD PRESSURE: 110 MMHG

## 2018-05-14 DIAGNOSIS — Z12.31 ENCOUNTER FOR SCREENING MAMMOGRAM FOR BREAST CANCER: ICD-10-CM

## 2018-05-14 DIAGNOSIS — Z01.419 VISIT FOR GYNECOLOGIC EXAMINATION: ICD-10-CM

## 2018-05-14 DIAGNOSIS — N89.8 VAGINAL ITCHING: ICD-10-CM

## 2018-05-14 DIAGNOSIS — N89.8 VAGINAL IRRITATION: ICD-10-CM

## 2018-05-14 DIAGNOSIS — Z01.419 VISIT FOR GYNECOLOGIC EXAMINATION: Primary | ICD-10-CM

## 2018-05-14 PROCEDURE — 99396 PREV VISIT EST AGE 40-64: CPT | Performed by: OBSTETRICS & GYNECOLOGY

## 2018-05-14 RX ORDER — FLUCONAZOLE 150 MG/1
TABLET ORAL
Qty: 4 TABLET | Refills: 0 | Status: ON HOLD | OUTPATIENT
Start: 2018-05-14 | End: 2018-06-19 | Stop reason: HOSPADM

## 2018-05-14 ASSESSMENT — ENCOUNTER SYMPTOMS
COUGH: 0
VOMITING: 0
NAUSEA: 0
SHORTNESS OF BREATH: 0

## 2018-05-17 LAB
HPV COMMENT: ABNORMAL
HPV TYPE 16: NOT DETECTED
HPV TYPE 18: NOT DETECTED
HPVOH (OTHER TYPES): DETECTED

## 2018-05-21 ENCOUNTER — OFFICE VISIT (OUTPATIENT)
Dept: FAMILY MEDICINE CLINIC | Age: 43
End: 2018-05-21
Payer: COMMERCIAL

## 2018-05-21 VITALS
DIASTOLIC BLOOD PRESSURE: 62 MMHG | BODY MASS INDEX: 44.2 KG/M2 | RESPIRATION RATE: 18 BRPM | SYSTOLIC BLOOD PRESSURE: 114 MMHG | HEART RATE: 72 BPM | TEMPERATURE: 98.1 F | HEIGHT: 66 IN | WEIGHT: 275 LBS

## 2018-05-21 DIAGNOSIS — I95.1 ORTHOSTATIC HYPOTENSION: ICD-10-CM

## 2018-05-21 DIAGNOSIS — Z79.4 TYPE 2 DIABETES MELLITUS WITH COMPLICATION, WITH LONG-TERM CURRENT USE OF INSULIN (HCC): Primary | ICD-10-CM

## 2018-05-21 DIAGNOSIS — R53.83 FATIGUE, UNSPECIFIED TYPE: ICD-10-CM

## 2018-05-21 DIAGNOSIS — E11.8 TYPE 2 DIABETES MELLITUS WITH COMPLICATION, WITH LONG-TERM CURRENT USE OF INSULIN (HCC): ICD-10-CM

## 2018-05-21 DIAGNOSIS — E78.5 DYSLIPIDEMIA: ICD-10-CM

## 2018-05-21 DIAGNOSIS — G47.33 SLEEP APNEA, OBSTRUCTIVE: ICD-10-CM

## 2018-05-21 DIAGNOSIS — E11.8 TYPE 2 DIABETES MELLITUS WITH COMPLICATION, WITH LONG-TERM CURRENT USE OF INSULIN (HCC): Primary | ICD-10-CM

## 2018-05-21 DIAGNOSIS — E53.8 B12 DEFICIENCY: ICD-10-CM

## 2018-05-21 DIAGNOSIS — Z79.4 TYPE 2 DIABETES MELLITUS WITH COMPLICATION, WITH LONG-TERM CURRENT USE OF INSULIN (HCC): ICD-10-CM

## 2018-05-21 PROBLEM — Z01.419 VISIT FOR GYNECOLOGIC EXAMINATION: Status: RESOLVED | Noted: 2018-05-14 | Resolved: 2018-05-21

## 2018-05-21 PROBLEM — Z12.31 ENCOUNTER FOR SCREENING MAMMOGRAM FOR BREAST CANCER: Status: RESOLVED | Noted: 2018-05-14 | Resolved: 2018-05-21

## 2018-05-21 LAB — HGB, POC: 10.2

## 2018-05-21 PROCEDURE — 85018 HEMOGLOBIN: CPT | Performed by: FAMILY MEDICINE

## 2018-05-21 PROCEDURE — 96372 THER/PROPH/DIAG INJ SC/IM: CPT | Performed by: FAMILY MEDICINE

## 2018-05-21 PROCEDURE — 99214 OFFICE O/P EST MOD 30 MIN: CPT | Performed by: FAMILY MEDICINE

## 2018-05-21 RX ORDER — CYANOCOBALAMIN 1000 UG/ML
1000 INJECTION INTRAMUSCULAR; SUBCUTANEOUS ONCE
Status: COMPLETED | OUTPATIENT
Start: 2018-05-21 | End: 2018-05-21

## 2018-05-21 RX ADMIN — CYANOCOBALAMIN 1000 MCG: 1000 INJECTION INTRAMUSCULAR; SUBCUTANEOUS at 16:04

## 2018-05-21 ASSESSMENT — PATIENT HEALTH QUESTIONNAIRE - PHQ9
SUM OF ALL RESPONSES TO PHQ9 QUESTIONS 1 & 2: 0
2. FEELING DOWN, DEPRESSED OR HOPELESS: 0
1. LITTLE INTEREST OR PLEASURE IN DOING THINGS: 0
SUM OF ALL RESPONSES TO PHQ QUESTIONS 1-9: 0

## 2018-06-08 ENCOUNTER — OFFICE VISIT (OUTPATIENT)
Dept: INTERNAL MEDICINE CLINIC | Age: 43
End: 2018-06-08
Payer: COMMERCIAL

## 2018-06-08 VITALS
HEART RATE: 86 BPM | SYSTOLIC BLOOD PRESSURE: 118 MMHG | TEMPERATURE: 98 F | RESPIRATION RATE: 16 BRPM | BODY MASS INDEX: 43.04 KG/M2 | HEIGHT: 66 IN | WEIGHT: 267.8 LBS | DIASTOLIC BLOOD PRESSURE: 70 MMHG | OXYGEN SATURATION: 96 %

## 2018-06-08 DIAGNOSIS — I95.1 ORTHOSTATIC HYPOTENSION: ICD-10-CM

## 2018-06-08 DIAGNOSIS — R53.83 FATIGUE, UNSPECIFIED TYPE: ICD-10-CM

## 2018-06-08 DIAGNOSIS — E11.8 TYPE 2 DIABETES MELLITUS WITH COMPLICATION, WITH LONG-TERM CURRENT USE OF INSULIN (HCC): ICD-10-CM

## 2018-06-08 DIAGNOSIS — E78.5 DYSLIPIDEMIA: ICD-10-CM

## 2018-06-08 DIAGNOSIS — K52.9 GASTROENTERITIS: Primary | ICD-10-CM

## 2018-06-08 DIAGNOSIS — Z79.4 TYPE 2 DIABETES MELLITUS WITH COMPLICATION, WITH LONG-TERM CURRENT USE OF INSULIN (HCC): ICD-10-CM

## 2018-06-08 LAB
ALBUMIN SERPL-MCNC: 4.3 G/DL (ref 3.9–4.9)
ALP BLD-CCNC: 89 U/L (ref 40–130)
ALT SERPL-CCNC: 34 U/L (ref 0–33)
ANION GAP SERPL CALCULATED.3IONS-SCNC: 15 MEQ/L (ref 7–13)
AST SERPL-CCNC: 40 U/L (ref 0–35)
BILIRUB SERPL-MCNC: 0.5 MG/DL (ref 0–1.2)
BUN BLDV-MCNC: 16 MG/DL (ref 6–20)
CALCIUM SERPL-MCNC: 9.5 MG/DL (ref 8.6–10.2)
CHLORIDE BLD-SCNC: 96 MEQ/L (ref 98–107)
CHOLESTEROL, TOTAL: 142 MG/DL (ref 0–199)
CO2: 25 MEQ/L (ref 22–29)
CREAT SERPL-MCNC: 0.55 MG/DL (ref 0.5–0.9)
GFR AFRICAN AMERICAN: >60
GFR NON-AFRICAN AMERICAN: >60
GLOBULIN: 3.6 G/DL (ref 2.3–3.5)
GLUCOSE BLD-MCNC: 182 MG/DL (ref 74–109)
HBA1C MFR BLD: 8.1 % (ref 4.8–5.9)
HCT VFR BLD CALC: 37.4 % (ref 37–47)
HDLC SERPL-MCNC: 28 MG/DL (ref 40–59)
HEMOGLOBIN: 12.3 G/DL (ref 12–16)
LDL CHOLESTEROL CALCULATED: 84 MG/DL (ref 0–129)
MCH RBC QN AUTO: 26.8 PG (ref 27–31.3)
MCHC RBC AUTO-ENTMCNC: 32.9 % (ref 33–37)
MCV RBC AUTO: 81.3 FL (ref 82–100)
PDW BLD-RTO: 17.1 % (ref 11.5–14.5)
PLATELET # BLD: 213 K/UL (ref 130–400)
POTASSIUM SERPL-SCNC: 4.3 MEQ/L (ref 3.5–5.1)
RBC # BLD: 4.6 M/UL (ref 4.2–5.4)
SODIUM BLD-SCNC: 136 MEQ/L (ref 132–144)
T4 FREE: 1.55 NG/DL (ref 0.93–1.7)
TOTAL PROTEIN: 7.9 G/DL (ref 6.4–8.1)
TRIGL SERPL-MCNC: 151 MG/DL (ref 0–200)
TSH SERPL DL<=0.05 MIU/L-ACNC: 1.33 UIU/ML (ref 0.27–4.2)
VITAMIN B-12: 940 PG/ML (ref 232–1245)
VITAMIN D 25-HYDROXY: 17.4 NG/ML (ref 30–100)
WBC # BLD: 8.7 K/UL (ref 4.8–10.8)

## 2018-06-08 PROCEDURE — 99213 OFFICE O/P EST LOW 20 MIN: CPT | Performed by: NURSE PRACTITIONER

## 2018-06-08 RX ORDER — ONDANSETRON 4 MG/1
4 TABLET, ORALLY DISINTEGRATING ORAL EVERY 8 HOURS PRN
Qty: 40 TABLET | Refills: 1 | Status: SHIPPED | OUTPATIENT
Start: 2018-06-08 | End: 2019-01-08 | Stop reason: SDUPTHER

## 2018-06-08 ASSESSMENT — ENCOUNTER SYMPTOMS
SORE THROAT: 0
COUGH: 0
TROUBLE SWALLOWING: 0
VOMITING: 0
WHEEZING: 0
DIARRHEA: 1
SWOLLEN GLANDS: 0
NAUSEA: 1
CHANGE IN BOWEL HABIT: 1
BLOOD IN STOOL: 0
VISUAL CHANGE: 0
ABDOMINAL PAIN: 1
CONSTIPATION: 0
SHORTNESS OF BREATH: 0

## 2018-06-18 ENCOUNTER — APPOINTMENT (OUTPATIENT)
Dept: MRI IMAGING | Age: 43
End: 2018-06-18
Payer: COMMERCIAL

## 2018-06-18 ENCOUNTER — APPOINTMENT (OUTPATIENT)
Dept: GENERAL RADIOLOGY | Age: 43
End: 2018-06-18
Payer: COMMERCIAL

## 2018-06-18 ENCOUNTER — HOSPITAL ENCOUNTER (OUTPATIENT)
Age: 43
Setting detail: OBSERVATION
Discharge: HOME | End: 2018-06-19
Attending: EMERGENCY MEDICINE | Admitting: INTERNAL MEDICINE
Payer: COMMERCIAL

## 2018-06-18 ENCOUNTER — NURSE TRIAGE (OUTPATIENT)
Dept: OTHER | Facility: CLINIC | Age: 43
End: 2018-06-18

## 2018-06-18 DIAGNOSIS — R20.2 PARESTHESIA OF BOTH LOWER EXTREMITIES: Primary | ICD-10-CM

## 2018-06-18 DIAGNOSIS — E11.8 TYPE 2 DIABETES MELLITUS WITH COMPLICATION, WITH LONG-TERM CURRENT USE OF INSULIN (HCC): ICD-10-CM

## 2018-06-18 DIAGNOSIS — Z79.4 TYPE 2 DIABETES MELLITUS WITH COMPLICATION, WITH LONG-TERM CURRENT USE OF INSULIN (HCC): ICD-10-CM

## 2018-06-18 PROBLEM — R29.898 WEAKNESS OF BOTH LOWER EXTREMITIES: Status: ACTIVE | Noted: 2018-06-18

## 2018-06-18 LAB
ALBUMIN SERPL-MCNC: 4.2 G/DL (ref 3.9–4.9)
ALP BLD-CCNC: 94 U/L (ref 40–130)
ALT SERPL-CCNC: 24 U/L (ref 0–33)
ANION GAP SERPL CALCULATED.3IONS-SCNC: 15 MEQ/L (ref 7–13)
AST SERPL-CCNC: 30 U/L (ref 0–35)
BASOPHILS ABSOLUTE: 0 K/UL (ref 0–0.2)
BASOPHILS RELATIVE PERCENT: 0.5 %
BILIRUB SERPL-MCNC: 0.6 MG/DL (ref 0–1.2)
BUN BLDV-MCNC: 11 MG/DL (ref 6–20)
C-REACTIVE PROTEIN: 36.9 MG/L (ref 0–5)
CALCIUM SERPL-MCNC: 9.3 MG/DL (ref 8.6–10.2)
CHLORIDE BLD-SCNC: 96 MEQ/L (ref 98–107)
CO2: 29 MEQ/L (ref 22–29)
CREAT SERPL-MCNC: 0.49 MG/DL (ref 0.5–0.9)
EOSINOPHILS ABSOLUTE: 0.2 K/UL (ref 0–0.7)
EOSINOPHILS RELATIVE PERCENT: 2.3 %
FERRITIN: 157.1 NG/ML (ref 13–150)
GFR AFRICAN AMERICAN: >60
GFR NON-AFRICAN AMERICAN: >60
GLOBULIN: 3.8 G/DL (ref 2.3–3.5)
GLUCOSE BLD-MCNC: 116 MG/DL (ref 60–115)
GLUCOSE BLD-MCNC: 140 MG/DL (ref 60–115)
GLUCOSE BLD-MCNC: 188 MG/DL (ref 74–109)
GLUCOSE BLD-MCNC: 81 MG/DL (ref 60–115)
HCT VFR BLD CALC: 37.8 % (ref 37–47)
HEMOGLOBIN: 12.6 G/DL (ref 12–16)
IRON SATURATION: 14 % (ref 11–46)
IRON: 51 UG/DL (ref 37–145)
LACTIC ACID: 1.9 MMOL/L (ref 0.5–2.2)
LYMPHOCYTES ABSOLUTE: 2.3 K/UL (ref 1–4.8)
LYMPHOCYTES RELATIVE PERCENT: 23.8 %
MCH RBC QN AUTO: 26.9 PG (ref 27–31.3)
MCHC RBC AUTO-ENTMCNC: 33.3 % (ref 33–37)
MCV RBC AUTO: 80.8 FL (ref 82–100)
MONOCYTES ABSOLUTE: 0.5 K/UL (ref 0.2–0.8)
MONOCYTES RELATIVE PERCENT: 4.7 %
NEUTROPHILS ABSOLUTE: 6.7 K/UL (ref 1.4–6.5)
NEUTROPHILS RELATIVE PERCENT: 68.7 %
PDW BLD-RTO: 16.6 % (ref 11.5–14.5)
PERFORMED ON: ABNORMAL
PERFORMED ON: ABNORMAL
PERFORMED ON: NORMAL
PLATELET # BLD: 202 K/UL (ref 130–400)
POTASSIUM SERPL-SCNC: 4.5 MEQ/L (ref 3.5–5.1)
RBC # BLD: 4.68 M/UL (ref 4.2–5.4)
SEDIMENTATION RATE, ERYTHROCYTE: 63 MM (ref 0–20)
SODIUM BLD-SCNC: 140 MEQ/L (ref 132–144)
TOTAL CK: 100 U/L (ref 0–170)
TOTAL IRON BINDING CAPACITY: 354 UG/DL (ref 178–450)
TOTAL PROTEIN: 8 G/DL (ref 6.4–8.1)
TSH SERPL DL<=0.05 MIU/L-ACNC: 1.76 UIU/ML (ref 0.27–4.2)
WBC # BLD: 9.7 K/UL (ref 4.8–10.8)

## 2018-06-18 PROCEDURE — G0378 HOSPITAL OBSERVATION PER HR: HCPCS

## 2018-06-18 PROCEDURE — 2580000003 HC RX 258: Performed by: NURSE PRACTITIONER

## 2018-06-18 PROCEDURE — 83550 IRON BINDING TEST: CPT

## 2018-06-18 PROCEDURE — 72110 X-RAY EXAM L-2 SPINE 4/>VWS: CPT

## 2018-06-18 PROCEDURE — 70553 MRI BRAIN STEM W/O & W/DYE: CPT

## 2018-06-18 PROCEDURE — 82550 ASSAY OF CK (CPK): CPT

## 2018-06-18 PROCEDURE — 36415 COLL VENOUS BLD VENIPUNCTURE: CPT

## 2018-06-18 PROCEDURE — 94660 CPAP INITIATION&MGMT: CPT

## 2018-06-18 PROCEDURE — 80053 COMPREHEN METABOLIC PANEL: CPT

## 2018-06-18 PROCEDURE — 6360000004 HC RX CONTRAST MEDICATION: Performed by: EMERGENCY MEDICINE

## 2018-06-18 PROCEDURE — 6370000000 HC RX 637 (ALT 250 FOR IP): Performed by: INTERNAL MEDICINE

## 2018-06-18 PROCEDURE — 83605 ASSAY OF LACTIC ACID: CPT

## 2018-06-18 PROCEDURE — 82728 ASSAY OF FERRITIN: CPT

## 2018-06-18 PROCEDURE — 84443 ASSAY THYROID STIM HORMONE: CPT

## 2018-06-18 PROCEDURE — 96372 THER/PROPH/DIAG INJ SC/IM: CPT

## 2018-06-18 PROCEDURE — 83540 ASSAY OF IRON: CPT

## 2018-06-18 PROCEDURE — A9579 GAD-BASE MR CONTRAST NOS,1ML: HCPCS | Performed by: EMERGENCY MEDICINE

## 2018-06-18 PROCEDURE — 6360000002 HC RX W HCPCS: Performed by: NURSE PRACTITIONER

## 2018-06-18 PROCEDURE — 85652 RBC SED RATE AUTOMATED: CPT

## 2018-06-18 PROCEDURE — 86140 C-REACTIVE PROTEIN: CPT

## 2018-06-18 PROCEDURE — 99285 EMERGENCY DEPT VISIT HI MDM: CPT

## 2018-06-18 PROCEDURE — 85025 COMPLETE CBC W/AUTO DIFF WBC: CPT

## 2018-06-18 RX ORDER — DEXTROSE MONOHYDRATE 25 G/50ML
12.5 INJECTION, SOLUTION INTRAVENOUS PRN
Status: DISCONTINUED | OUTPATIENT
Start: 2018-06-18 | End: 2018-06-19 | Stop reason: HOSPADM

## 2018-06-18 RX ORDER — ROPINIROLE 0.25 MG/1
0.5 TABLET, FILM COATED ORAL 3 TIMES DAILY
Status: DISCONTINUED | OUTPATIENT
Start: 2018-06-18 | End: 2018-06-19 | Stop reason: HOSPADM

## 2018-06-18 RX ORDER — SODIUM CHLORIDE 0.9 % (FLUSH) 0.9 %
10 SYRINGE (ML) INJECTION PRN
Status: DISCONTINUED | OUTPATIENT
Start: 2018-06-18 | End: 2018-06-19 | Stop reason: HOSPADM

## 2018-06-18 RX ORDER — DEXTROSE MONOHYDRATE 50 MG/ML
100 INJECTION, SOLUTION INTRAVENOUS PRN
Status: DISCONTINUED | OUTPATIENT
Start: 2018-06-18 | End: 2018-06-19 | Stop reason: HOSPADM

## 2018-06-18 RX ORDER — SODIUM CHLORIDE 0.9 % (FLUSH) 0.9 %
10 SYRINGE (ML) INJECTION EVERY 12 HOURS SCHEDULED
Status: DISCONTINUED | OUTPATIENT
Start: 2018-06-18 | End: 2018-06-19 | Stop reason: HOSPADM

## 2018-06-18 RX ORDER — ONDANSETRON 2 MG/ML
4 INJECTION INTRAMUSCULAR; INTRAVENOUS EVERY 6 HOURS PRN
Status: DISCONTINUED | OUTPATIENT
Start: 2018-06-18 | End: 2018-06-19 | Stop reason: HOSPADM

## 2018-06-18 RX ORDER — OXYCODONE HYDROCHLORIDE AND ACETAMINOPHEN 5; 325 MG/1; MG/1
1 TABLET ORAL EVERY 4 HOURS PRN
Status: DISCONTINUED | OUTPATIENT
Start: 2018-06-18 | End: 2018-06-19

## 2018-06-18 RX ORDER — NICOTINE POLACRILEX 4 MG
15 LOZENGE BUCCAL PRN
Status: DISCONTINUED | OUTPATIENT
Start: 2018-06-18 | End: 2018-06-19 | Stop reason: HOSPADM

## 2018-06-18 RX ADMIN — OXYCODONE HYDROCHLORIDE AND ACETAMINOPHEN 1 TABLET: 5; 325 TABLET ORAL at 15:54

## 2018-06-18 RX ADMIN — Medication 10 ML: at 20:56

## 2018-06-18 RX ADMIN — OXYCODONE HYDROCHLORIDE AND ACETAMINOPHEN 1 TABLET: 5; 325 TABLET ORAL at 20:45

## 2018-06-18 RX ADMIN — ENOXAPARIN SODIUM 40 MG: 40 INJECTION SUBCUTANEOUS at 18:27

## 2018-06-18 RX ADMIN — GADOTERIDOL 20 ML: 279.3 INJECTION, SOLUTION INTRAVENOUS at 11:18

## 2018-06-18 RX ADMIN — ROPINIROLE HYDROCHLORIDE 0.5 MG: 0.25 TABLET, FILM COATED ORAL at 18:27

## 2018-06-18 RX ADMIN — ROPINIROLE HYDROCHLORIDE 0.5 MG: 0.25 TABLET, FILM COATED ORAL at 20:46

## 2018-06-18 ASSESSMENT — PAIN DESCRIPTION - LOCATION
LOCATION: BACK

## 2018-06-18 ASSESSMENT — ENCOUNTER SYMPTOMS
BACK PAIN: 1
VOMITING: 0
COUGH: 0
SHORTNESS OF BREATH: 0

## 2018-06-18 ASSESSMENT — PAIN DESCRIPTION - DESCRIPTORS
DESCRIPTORS: ACHING
DESCRIPTORS: ACHING

## 2018-06-18 ASSESSMENT — PAIN DESCRIPTION - ORIENTATION
ORIENTATION: LOWER

## 2018-06-18 ASSESSMENT — PAIN DESCRIPTION - PAIN TYPE
TYPE: ACUTE PAIN

## 2018-06-18 ASSESSMENT — PAIN SCALES - GENERAL
PAINLEVEL_OUTOF10: 5
PAINLEVEL_OUTOF10: 7
PAINLEVEL_OUTOF10: 0
PAINLEVEL_OUTOF10: 7
PAINLEVEL_OUTOF10: 8

## 2018-06-19 ENCOUNTER — APPOINTMENT (OUTPATIENT)
Dept: MRI IMAGING | Age: 43
End: 2018-06-19
Payer: COMMERCIAL

## 2018-06-19 VITALS
WEIGHT: 265 LBS | HEART RATE: 81 BPM | OXYGEN SATURATION: 96 % | RESPIRATION RATE: 18 BRPM | TEMPERATURE: 97.8 F | HEIGHT: 66 IN | BODY MASS INDEX: 42.59 KG/M2 | SYSTOLIC BLOOD PRESSURE: 136 MMHG | DIASTOLIC BLOOD PRESSURE: 66 MMHG

## 2018-06-19 LAB
ANION GAP SERPL CALCULATED.3IONS-SCNC: 13 MEQ/L (ref 7–13)
BUN BLDV-MCNC: 11 MG/DL (ref 6–20)
CALCIUM SERPL-MCNC: 9 MG/DL (ref 8.6–10.2)
CHLORIDE BLD-SCNC: 96 MEQ/L (ref 98–107)
CO2: 31 MEQ/L (ref 22–29)
CREAT SERPL-MCNC: 0.52 MG/DL (ref 0.5–0.9)
GFR AFRICAN AMERICAN: >60
GFR NON-AFRICAN AMERICAN: >60
GLUCOSE BLD-MCNC: 163 MG/DL (ref 60–115)
GLUCOSE BLD-MCNC: 166 MG/DL (ref 60–115)
GLUCOSE BLD-MCNC: 172 MG/DL (ref 74–109)
HCT VFR BLD CALC: 34.3 % (ref 37–47)
HEMOGLOBIN: 11.2 G/DL (ref 12–16)
MCH RBC QN AUTO: 26.4 PG (ref 27–31.3)
MCHC RBC AUTO-ENTMCNC: 32.7 % (ref 33–37)
MCV RBC AUTO: 80.6 FL (ref 82–100)
PDW BLD-RTO: 16.3 % (ref 11.5–14.5)
PERFORMED ON: ABNORMAL
PERFORMED ON: ABNORMAL
PLATELET # BLD: 179 K/UL (ref 130–400)
POTASSIUM REFLEX MAGNESIUM: 3.8 MEQ/L (ref 3.5–5.1)
RBC # BLD: 4.26 M/UL (ref 4.2–5.4)
SODIUM BLD-SCNC: 140 MEQ/L (ref 132–144)
WBC # BLD: 8 K/UL (ref 4.8–10.8)

## 2018-06-19 PROCEDURE — 2580000003 HC RX 258: Performed by: INTERNAL MEDICINE

## 2018-06-19 PROCEDURE — 72158 MRI LUMBAR SPINE W/O & W/DYE: CPT

## 2018-06-19 PROCEDURE — 96372 THER/PROPH/DIAG INJ SC/IM: CPT

## 2018-06-19 PROCEDURE — 80048 BASIC METABOLIC PNL TOTAL CA: CPT

## 2018-06-19 PROCEDURE — 96374 THER/PROPH/DIAG INJ IV PUSH: CPT

## 2018-06-19 PROCEDURE — 85027 COMPLETE CBC AUTOMATED: CPT

## 2018-06-19 PROCEDURE — A9579 GAD-BASE MR CONTRAST NOS,1ML: HCPCS | Performed by: INTERNAL MEDICINE

## 2018-06-19 PROCEDURE — 6370000000 HC RX 637 (ALT 250 FOR IP): Performed by: NURSE PRACTITIONER

## 2018-06-19 PROCEDURE — 6360000002 HC RX W HCPCS: Performed by: NURSE PRACTITIONER

## 2018-06-19 PROCEDURE — G0378 HOSPITAL OBSERVATION PER HR: HCPCS

## 2018-06-19 PROCEDURE — 6360000004 HC RX CONTRAST MEDICATION: Performed by: INTERNAL MEDICINE

## 2018-06-19 PROCEDURE — 72147 MRI CHEST SPINE W/DYE: CPT

## 2018-06-19 PROCEDURE — 36415 COLL VENOUS BLD VENIPUNCTURE: CPT

## 2018-06-19 PROCEDURE — 6370000000 HC RX 637 (ALT 250 FOR IP): Performed by: INTERNAL MEDICINE

## 2018-06-19 RX ORDER — MECLIZINE HYDROCHLORIDE 25 MG/1
25 TABLET ORAL 3 TIMES DAILY PRN
Status: DISCONTINUED | OUTPATIENT
Start: 2018-06-19 | End: 2018-06-19 | Stop reason: HOSPADM

## 2018-06-19 RX ORDER — VENLAFAXINE HYDROCHLORIDE 37.5 MG/1
75 CAPSULE, EXTENDED RELEASE ORAL
Status: DISCONTINUED | OUTPATIENT
Start: 2018-06-19 | End: 2018-06-19 | Stop reason: HOSPADM

## 2018-06-19 RX ORDER — ATORVASTATIN CALCIUM 40 MG/1
40 TABLET, FILM COATED ORAL NIGHTLY
Status: DISCONTINUED | OUTPATIENT
Start: 2018-06-19 | End: 2018-06-19 | Stop reason: HOSPADM

## 2018-06-19 RX ORDER — METOPROLOL TARTRATE 50 MG/1
100 TABLET, FILM COATED ORAL 2 TIMES DAILY
Status: DISCONTINUED | OUTPATIENT
Start: 2018-06-19 | End: 2018-06-19 | Stop reason: HOSPADM

## 2018-06-19 RX ORDER — PANTOPRAZOLE SODIUM 40 MG/1
40 TABLET, DELAYED RELEASE ORAL EVERY MORNING
Status: DISCONTINUED | OUTPATIENT
Start: 2018-06-19 | End: 2018-06-19 | Stop reason: HOSPADM

## 2018-06-19 RX ORDER — TRAMADOL HYDROCHLORIDE 50 MG/1
25 TABLET ORAL EVERY 6 HOURS PRN
Status: DISCONTINUED | OUTPATIENT
Start: 2018-06-19 | End: 2018-06-19 | Stop reason: HOSPADM

## 2018-06-19 RX ORDER — INSULIN GLARGINE 100 [IU]/ML
80 INJECTION, SOLUTION SUBCUTANEOUS NIGHTLY
Status: DISCONTINUED | OUTPATIENT
Start: 2018-06-19 | End: 2018-06-19 | Stop reason: HOSPADM

## 2018-06-19 RX ORDER — ACETAMINOPHEN 80 MG
TABLET,CHEWABLE ORAL ONCE
Status: DISCONTINUED | OUTPATIENT
Start: 2018-06-19 | End: 2018-06-19 | Stop reason: HOSPADM

## 2018-06-19 RX ORDER — SODIUM CHLORIDE 9 MG/ML
INJECTION, SOLUTION INTRAVENOUS CONTINUOUS
Status: DISPENSED | OUTPATIENT
Start: 2018-06-19 | End: 2018-06-19

## 2018-06-19 RX ORDER — 0.9 % SODIUM CHLORIDE 0.9 %
10 VIAL (ML) INJECTION ONCE
Status: DISCONTINUED | OUTPATIENT
Start: 2018-06-19 | End: 2018-06-19 | Stop reason: HOSPADM

## 2018-06-19 RX ORDER — ROSUVASTATIN CALCIUM 10 MG/1
10 TABLET, COATED ORAL NIGHTLY
Status: DISCONTINUED | OUTPATIENT
Start: 2018-06-19 | End: 2018-06-19 | Stop reason: CLARIF

## 2018-06-19 RX ADMIN — ENOXAPARIN SODIUM 40 MG: 40 INJECTION SUBCUTANEOUS at 09:58

## 2018-06-19 RX ADMIN — PANTOPRAZOLE SODIUM 40 MG: 40 TABLET, DELAYED RELEASE ORAL at 09:57

## 2018-06-19 RX ADMIN — SODIUM CHLORIDE: 9 INJECTION, SOLUTION INTRAVENOUS at 06:53

## 2018-06-19 RX ADMIN — METOPROLOL TARTRATE 100 MG: 50 TABLET ORAL at 09:58

## 2018-06-19 RX ADMIN — MECLIZINE HYDROCHLORIDE 25 MG: 25 TABLET ORAL at 12:11

## 2018-06-19 RX ADMIN — ONDANSETRON 4 MG: 2 INJECTION INTRAMUSCULAR; INTRAVENOUS at 17:59

## 2018-06-19 RX ADMIN — GADOTERIDOL 20 ML: 279.3 INJECTION, SOLUTION INTRAVENOUS at 13:27

## 2018-06-19 RX ADMIN — VENLAFAXINE HYDROCHLORIDE 75 MG: 37.5 CAPSULE, EXTENDED RELEASE ORAL at 09:58

## 2018-06-19 RX ADMIN — INSULIN LISPRO 2 UNITS: 100 INJECTION, SOLUTION INTRAVENOUS; SUBCUTANEOUS at 09:59

## 2018-06-19 RX ADMIN — MECLIZINE HYDROCHLORIDE 25 MG: 25 TABLET ORAL at 17:59

## 2018-06-19 ASSESSMENT — ENCOUNTER SYMPTOMS
SHORTNESS OF BREATH: 0
COUGH: 0
NAUSEA: 0
DIARRHEA: 0
VOMITING: 0

## 2018-06-22 ENCOUNTER — TELEPHONE (OUTPATIENT)
Dept: FAMILY MEDICINE CLINIC | Age: 43
End: 2018-06-22

## 2018-06-22 ENCOUNTER — CARE COORDINATION (OUTPATIENT)
Dept: CASE MANAGEMENT | Age: 43
End: 2018-06-22

## 2018-06-22 RX ORDER — OMEPRAZOLE 40 MG/1
40 CAPSULE, DELAYED RELEASE ORAL DAILY
Qty: 90 CAPSULE | Refills: 3 | Status: SHIPPED | OUTPATIENT
Start: 2018-06-22 | End: 2018-11-16 | Stop reason: SDUPTHER

## 2018-06-25 ENCOUNTER — OFFICE VISIT (OUTPATIENT)
Dept: FAMILY MEDICINE CLINIC | Age: 43
End: 2018-06-25
Payer: COMMERCIAL

## 2018-06-25 VITALS
HEIGHT: 66 IN | OXYGEN SATURATION: 95 % | SYSTOLIC BLOOD PRESSURE: 130 MMHG | HEART RATE: 92 BPM | WEIGHT: 260 LBS | BODY MASS INDEX: 41.78 KG/M2 | RESPIRATION RATE: 18 BRPM | DIASTOLIC BLOOD PRESSURE: 66 MMHG | TEMPERATURE: 98 F

## 2018-06-25 DIAGNOSIS — Z79.4 TYPE 2 DIABETES MELLITUS WITH COMPLICATION, WITH LONG-TERM CURRENT USE OF INSULIN (HCC): ICD-10-CM

## 2018-06-25 DIAGNOSIS — M79.10 MYALGIA DUE TO HMG COA REDUCTASE INHIBITOR: ICD-10-CM

## 2018-06-25 DIAGNOSIS — E11.8 TYPE 2 DIABETES MELLITUS WITH COMPLICATION, WITH LONG-TERM CURRENT USE OF INSULIN (HCC): ICD-10-CM

## 2018-06-25 DIAGNOSIS — T46.6X5A MYALGIA DUE TO HMG COA REDUCTASE INHIBITOR: ICD-10-CM

## 2018-06-25 DIAGNOSIS — T46.6X5A MYALGIA DUE TO HMG COA REDUCTASE INHIBITOR: Primary | ICD-10-CM

## 2018-06-25 DIAGNOSIS — M79.10 MYALGIA DUE TO HMG COA REDUCTASE INHIBITOR: Primary | ICD-10-CM

## 2018-06-25 LAB
ANION GAP SERPL CALCULATED.3IONS-SCNC: 20 MEQ/L (ref 7–13)
BUN BLDV-MCNC: 16 MG/DL (ref 6–20)
CALCIUM SERPL-MCNC: 10 MG/DL (ref 8.6–10.2)
CHLORIDE BLD-SCNC: 93 MEQ/L (ref 98–107)
CO2: 26 MEQ/L (ref 22–29)
CREAT SERPL-MCNC: 0.6 MG/DL (ref 0.5–0.9)
GFR AFRICAN AMERICAN: >60
GFR NON-AFRICAN AMERICAN: >60
GLUCOSE BLD-MCNC: 98 MG/DL (ref 74–109)
POTASSIUM SERPL-SCNC: 4.2 MEQ/L (ref 3.5–5.1)
SODIUM BLD-SCNC: 139 MEQ/L (ref 132–144)
TOTAL CK: 71 U/L (ref 0–170)

## 2018-06-25 PROCEDURE — 99213 OFFICE O/P EST LOW 20 MIN: CPT | Performed by: INTERNAL MEDICINE

## 2018-06-25 RX ORDER — LOSARTAN POTASSIUM 50 MG/1
50 TABLET ORAL DAILY
Qty: 90 TABLET | Refills: 1 | Status: ON HOLD | OUTPATIENT
Start: 2018-06-25 | End: 2018-09-28 | Stop reason: HOSPADM

## 2018-06-25 RX ORDER — ATORVASTATIN CALCIUM 20 MG/1
20 TABLET, FILM COATED ORAL DAILY
Qty: 30 TABLET | Refills: 0 | Status: ON HOLD | OUTPATIENT
Start: 2018-06-25 | End: 2018-09-28 | Stop reason: HOSPADM

## 2018-06-25 RX ORDER — ROSUVASTATIN CALCIUM 10 MG/1
10 TABLET, COATED ORAL NIGHTLY
Qty: 90 TABLET | Refills: 1 | Status: ON HOLD | OUTPATIENT
Start: 2018-06-25 | End: 2018-09-28 | Stop reason: HOSPADM

## 2018-06-25 RX ORDER — HYDROCHLOROTHIAZIDE 25 MG/1
25 TABLET ORAL DAILY
Qty: 90 TABLET | Refills: 1 | Status: SHIPPED | OUTPATIENT
Start: 2018-06-25 | End: 2019-01-08 | Stop reason: SDUPTHER

## 2018-06-25 RX ORDER — PROMETHAZINE HYDROCHLORIDE 25 MG/1
TABLET ORAL
Qty: 180 TABLET | Refills: 1 | Status: SHIPPED | OUTPATIENT
Start: 2018-06-25 | End: 2018-12-10 | Stop reason: SDUPTHER

## 2018-06-25 ASSESSMENT — ENCOUNTER SYMPTOMS
EYE PAIN: 0
BACK PAIN: 0
ABDOMINAL PAIN: 0
SHORTNESS OF BREATH: 0

## 2018-07-02 ENCOUNTER — PATIENT MESSAGE (OUTPATIENT)
Dept: FAMILY MEDICINE CLINIC | Age: 43
End: 2018-07-02

## 2018-07-02 ENCOUNTER — CARE COORDINATION (OUTPATIENT)
Dept: FAMILY MEDICINE CLINIC | Age: 43
End: 2018-07-02

## 2018-07-02 DIAGNOSIS — M54.16 LUMBAR RADICULOPATHY: ICD-10-CM

## 2018-07-02 DIAGNOSIS — R29.898 WEAKNESS OF BOTH LOWER EXTREMITIES: Primary | ICD-10-CM

## 2018-07-18 ENCOUNTER — PATIENT MESSAGE (OUTPATIENT)
Dept: FAMILY MEDICINE CLINIC | Age: 43
End: 2018-07-18

## 2018-07-18 DIAGNOSIS — F41.9 ANXIETY: ICD-10-CM

## 2018-07-18 RX ORDER — ALPRAZOLAM 0.5 MG/1
TABLET ORAL
Qty: 60 TABLET | Refills: 0 | Status: SHIPPED | OUTPATIENT
Start: 2018-07-18 | End: 2019-02-11 | Stop reason: SDUPTHER

## 2018-07-18 RX ORDER — MECLIZINE HYDROCHLORIDE 25 MG/1
25 TABLET ORAL 3 TIMES DAILY PRN
Qty: 40 TABLET | Refills: 3 | Status: SHIPPED | OUTPATIENT
Start: 2018-07-18 | End: 2020-01-17

## 2018-07-18 NOTE — TELEPHONE ENCOUNTER
From: Stacie Matamoros  Sent: 7/18/2018 11:19 AM EDT  Subject: Medication Renewal Request    Stacie Matamoros would like a refill of the following medications:     meclizine (ANTIVERT) 25 MG tablet Phuong Schneider MD]    Preferred pharmacy: [address] [place] [address], [address] [place] [address] -  [phone]

## 2018-07-18 NOTE — TELEPHONE ENCOUNTER
From: Shea Malin  To: Saul Tenorio MD  Sent: 7/18/2018 11:20 AM EDT  Subject: Prescription Question    I need a script for Xanax.  Do I need to make an appointment to come in?

## 2018-07-23 ENCOUNTER — HOSPITAL ENCOUNTER (OUTPATIENT)
Dept: PHYSICAL THERAPY | Age: 43
Setting detail: THERAPIES SERIES
Discharge: HOME | End: 2018-07-23
Payer: COMMERCIAL

## 2018-07-23 PROCEDURE — 97162 PT EVAL MOD COMPLEX 30 MIN: CPT

## 2018-07-23 NOTE — PROGRESS NOTES
[address]  PHYSICAL THERAPY EVALUATION    Date: 2018  Patient Name: Rocio Diaz       MRN: [id number]   Account: [de-identified]   : 1975  (44 y.o.)   Gender: female   Referring Practitioner: Tatum Lopez                 Diagnosis: B LE Weakness; Lx Radiculopathy  Treatment Diagnosis: Impaired Strength Core, B LEs; Impaired Balance; Impaired MC  Additional Pertinent Hx: MRI: syrinx in distal spinal cord . mild diffuse disc bulging in multiple levels             Past Medical History:  has a past medical history of B12 deficiency; Family history of premature CAD; Fatty liver; HPV (human papilloma virus) anogenital infection; Hyperlipidemia; Hypertension; Liver hemangioma; Obesity; Orthostatic hypotension; Ovarian cyst; Rectal fissure; Tobacco abuse; Tobacco abuse; Uncontrolled diabetes mellitus with complications ([facility] [address] and Unspecified sleep apnea. Past Surgical History:   has a past surgical history that includes Tonsillectomy and adenoidectomy (); LEEP (); Cardiac catheterization (); Hysterectomy (12-10); Colonoscopy (); Urethra surgery (); and Upper gastrointestinal endoscopy (10/13/15 ). Vital Signs  Patient Currently in Pain: Denies   Pain Screening  Patient Currently in Pain: Denies                Lives With: Spouse  Type of Home: Apartment  Home Layout: One level  Home Access: Stairs to enter with rails  Entrance Stairs - Number of Steps: 10  Entrance Stairs - Rails: Both  Bathroom Shower/Tub: Tub/Shower unit  Type of occupation: [employer]  IADL Comments: Dec function w/ standing jaime for Carmella Delgadillo, cooking, household chores , walking jaime of < 3 mins; non- reciprocal stair decent ; Dec I/ADL's d/t dec jaime to LE WB        Subjective:  Subjective: 3 week onset of LE muscle fatigue w/ amb of 2-3 mins and shaking in her legs w/ walking downstairs;  Denies falls , got a shower chair d/t unable to stand long enough for shower. Was taken off statins adn was off for 2 weeks w/out change;   Comments: Denies N or T; Pt states saw neurologist who felt the syrinx had gotten bigger but is too dangerous to operate. Objective:                  Ambulation 1  Surface: carpet  Device: No Device  Assistance: Independent  Quality of Gait: WNL except dec lumbo-pelvic dissociation  Distance: 2 x 8 ft  Stairs  Stairs Height: 6\"  Rails: Bilateral  Device: No Device  Assistance: Independent  Comment: shaking through B LE's w/ stair descent          Strength RLE  Comment: Quad 4+/5; HS 4/5; DF 5/5;  iliopsoas 4+/5 to 4-/5; Abd 4/5 ; Ext 4/5 ( inc effort to maintain) : shaking  and / twitching of target and non- target muscles w/ extended hold (all w/ 10 s holds )  Strength LLE  Comment: Quad 4+/5; HS 4- /5; DF 5/5;  iliopsoas 4+/5 to 4-/5; Abd 4/5 ; Ext 4-/5 ( inc effort to [facility] ESTEVAN);  shaking  and / twitching of target and non- target muscles w/ extended hold        Strength Other  Other: Pt w/ shaking though trunk w/ lev 1 TA march w/ wlt lumbo-pelvic instability             Observation/Palpation  Observation: Reflexes: Patellar Tendon R WNL, L brisk; Achilles Tendon - slt delay on R , delayed to altered repsonse of continuing responses on L ;  Corrdination: WNL w/ double toe tap, alternaitn toe tap and heel up shin                    Exercises:   Exercises  Exercise 1: TBD by neuro PT*  Modalities:     Manual:     *Indicates exercise,modality, or manual techniques to be initiated when appropriate  Assessment:   Body structures, Functions, Activity limitations: Decreased functional mobility , Decreased ADL status, Decreased balance, Decreased high-level IADLs  Assessment: Pt presents w/ 3 week onset of LE weakness and \"shaking\" w/out incident and MRI (+) for syrinx in distal SC;  Pt demos dec strength and endurance of core and B LEs , dec MC through trunk and LEs, dec balance w/ SLS or narrow DONATO and abnormal LE tendon reflexes B; Dec function 8-10  Short term goal 1: Pt to demo stair descent  w/ 1 HR x 10 steps w/ minimal shaking   Short term goal 2: Pt  to improve BYERS to 56  Short term goal 3: Pt to inc B hip flexor, Abd and ext strength to >/= 4+/5 throughout a 10 s sustained MMT          PT Individual Minutes  Time In: 5584  Time Out: 9562  Minutes: 55  Timed Code Treatment Minutes: 0 Minutes  Procedure Minutes: 55  Electronically signed by Gwendolyn Goodwin PT on 7/23/18 at 5:54 PM

## 2018-07-23 NOTE — PROGRESS NOTES
[address]     Ph: [phone]  Fax: [phone]    [] Certification  [] Recertification [x]  Plan of Care  [] Progress Note [] Discharge      To:  Referring Practitioner: Radha Weiss      From:  Imelda Hwang PT  Patient: [place] Degree     : 1975  Diagnosis: B LE Weakness; Lx Radiculopathy     Date: 2018  Treatment Diagnosis: Impaired Strength Core, B LEs; Impaired Balance; Impaired MC       Progress Report Period from:  2018  to 2018    Total # of Visits to Date: 1   No Show: 0    Canceled Appointment: 0     OBJECTIVE:   Short Term Goals - Time Frame for Short term goals: 8-10    Goals Current/Discharge status  Met   Short term goal 1: Pt to demo stair descent  w/ 1 HR x 10 steps w/ minimal shaking   Pt uses 1-2 HRs w// descent of 4, 6\" steps w/ significant shaking of B LE's  [] yes  [] no   Short term goal 2: Pt  to improve BYERS to 56  51 [] yes  [] no   Short term goal 3: Pt to inc B hip flexor, Abd and ext strength to >/= 4+/5 throughout a 10 s sustained MMT   Strength RLE  Comment: Quad 4+/5; HS 4/5; DF 5/5;  iliopsoas 4+/5 to 4-/5; Abd 4/5 ; Ext 4/5 ( inc effort to maintain) : shaking  and / twitching of target and non- target muscles w/ extended hold (all w/ 10 s holds )  Strength LLE  Comment: Quad 4+/5; HS 4- /5; DF 5/5;  iliopsoas 4+/5 to 4-/5;   Abd 4/5 ; Ext 4-/5 ( inc effort to [facility] ESTEVAN);  shaking  and / twitching of target and non- target muscles w/ extended hold        Strength Other  Other: Pt w/ shaking though trunk w/ lev  w/ wlt lumbo-pelvic instability    [] yes  [] no     Body structures, Functions, Activity limitations: Decreased functional mobility , Decreased ADL status, Decreased balance, Decreased high-level IADLs    Assessment: Pt presents w/ 3 week onset of LE weakness and \"shaking\" w/out incident and MRI (+) for syrinx in distal SC;  Pt

## 2018-07-24 ENCOUNTER — HOSPITAL ENCOUNTER (OUTPATIENT)
Dept: WOMENS IMAGING | Age: 43
Discharge: HOME | End: 2018-07-26
Payer: COMMERCIAL

## 2018-07-24 DIAGNOSIS — Z12.31 ENCOUNTER FOR SCREENING MAMMOGRAM FOR BREAST CANCER: ICD-10-CM

## 2018-07-24 PROCEDURE — 77063 BREAST TOMOSYNTHESIS BI: CPT

## 2018-07-30 ENCOUNTER — PROCEDURE VISIT (OUTPATIENT)
Dept: FAMILY MEDICINE CLINIC | Age: 43
End: 2018-07-30
Payer: COMMERCIAL

## 2018-07-30 VITALS
HEIGHT: 66 IN | DIASTOLIC BLOOD PRESSURE: 58 MMHG | BODY MASS INDEX: 40.98 KG/M2 | RESPIRATION RATE: 16 BRPM | TEMPERATURE: 97.7 F | WEIGHT: 255 LBS | HEART RATE: 100 BPM | SYSTOLIC BLOOD PRESSURE: 136 MMHG

## 2018-07-30 DIAGNOSIS — E78.5 DYSLIPIDEMIA: ICD-10-CM

## 2018-07-30 DIAGNOSIS — I27.20 PULMONARY HTN (HCC): ICD-10-CM

## 2018-07-30 DIAGNOSIS — R20.8 OTHER DISTURBANCES OF SKIN SENSATION: ICD-10-CM

## 2018-07-30 DIAGNOSIS — E11.8 TYPE 2 DIABETES MELLITUS WITH COMPLICATION, WITH LONG-TERM CURRENT USE OF INSULIN (HCC): ICD-10-CM

## 2018-07-30 DIAGNOSIS — Z79.4 TYPE 2 DIABETES MELLITUS WITH COMPLICATION, WITH LONG-TERM CURRENT USE OF INSULIN (HCC): ICD-10-CM

## 2018-07-30 DIAGNOSIS — L98.9 SKIN LESION: ICD-10-CM

## 2018-07-30 DIAGNOSIS — L98.9 SKIN LESION: Primary | ICD-10-CM

## 2018-07-30 DIAGNOSIS — E53.8 B12 DEFICIENCY: ICD-10-CM

## 2018-07-30 DIAGNOSIS — L91.8 SKIN TAG: ICD-10-CM

## 2018-07-30 PROCEDURE — 11200 RMVL SKIN TAGS UP TO&INC 15: CPT | Performed by: FAMILY MEDICINE

## 2018-07-30 PROCEDURE — 99213 OFFICE O/P EST LOW 20 MIN: CPT | Performed by: FAMILY MEDICINE

## 2018-07-30 PROCEDURE — 96372 THER/PROPH/DIAG INJ SC/IM: CPT | Performed by: FAMILY MEDICINE

## 2018-07-30 PROCEDURE — 11300 SHAVE SKIN LESION 0.5 CM/<: CPT | Performed by: FAMILY MEDICINE

## 2018-07-30 RX ORDER — CYANOCOBALAMIN 1000 UG/ML
1000 INJECTION INTRAMUSCULAR; SUBCUTANEOUS ONCE
Status: COMPLETED | OUTPATIENT
Start: 2018-07-30 | End: 2018-07-30

## 2018-07-30 RX ADMIN — CYANOCOBALAMIN 1000 MCG: 1000 INJECTION INTRAMUSCULAR; SUBCUTANEOUS at 18:11

## 2018-07-30 NOTE — PROGRESS NOTES
06/25/2018 71  0 - 170 U/L Final    Sodium 06/25/2018 139  132 - 144 mEq/L Final    Potassium 06/25/2018 4.2  3.5 - 5.1 mEq/L Final    Chloride 06/25/2018 93* 98 - 107 mEq/L Final    CO2 06/25/2018 26  22 - 29 mEq/L Final    Anion Gap 06/25/2018 20* 7 - 13 mEq/L Final    Glucose 06/25/2018 98  74 - 109 mg/dL Final    BUN 06/25/2018 16  6 - 20 mg/dL Final    CREATININE 06/25/2018 0.60  0.50 - 0.90 mg/dL Final    GFR Non- 06/25/2018 >60.0  >60 Final    Comment: >60 mL/min/1.73m2 EGFR, calc. for ages 25 and older using the  MDRD formula (not corrected for weight), is valid for stable  renal function.  GFR  06/25/2018 >60.0  >60 Final    Comment: >60 mL/min/1.73m2 EGFR, calc. for ages 25 and older using the  MDRD formula (not corrected for weight), is valid for stable  renal function.       Calcium 06/25/2018 10.0  8.6 - 10.2 mg/dL Final   Admission on 06/18/2018, Discharged on 06/19/2018   Component Date Value Ref Range Status    WBC 06/18/2018 9.7  4.8 - 10.8 K/uL Final    RBC 06/18/2018 4.68  4.20 - 5.40 M/uL Final    Hemoglobin 06/18/2018 12.6  12.0 - 16.0 g/dL Final    Hematocrit 06/18/2018 37.8  37.0 - 47.0 % Final    MCV 06/18/2018 80.8* 82.0 - 100.0 fL Final    MCH 06/18/2018 26.9* 27.0 - 31.3 pg Final    MCHC 06/18/2018 33.3  33.0 - 37.0 % Final    RDW 06/18/2018 16.6* 11.5 - 14.5 % Final    Platelets 99/83/0112 202  130 - 400 K/uL Final    Neutrophils % 06/18/2018 68.7  % Final    Lymphocytes % 06/18/2018 23.8  % Final    Monocytes % 06/18/2018 4.7  % Final    Eosinophils % 06/18/2018 2.3  % Final    Basophils % 06/18/2018 0.5  % Final    Neutrophils # 06/18/2018 6.7* 1.4 - 6.5 K/uL Final    Lymphocytes # 06/18/2018 2.3  1.0 - 4.8 K/uL Final    Monocytes # 06/18/2018 0.5  0.2 - 0.8 K/uL Final    Eosinophils # 06/18/2018 0.2  0.0 - 0.7 K/uL Final    Basophils # 06/18/2018 0.0  0.0 - 0.2 K/uL Final    Sodium 06/18/2018 140  132 - 144 mEq/L Final 60 - 115 mg/dl Final    Performed on 06/19/2018 ACCU-CHEK   Final    POC Glucose 06/19/2018 166* 60 - 115 mg/dl Final    Performed on 06/19/2018 ACCU-CHEK   Final   Orders Only on 06/08/2018   Component Date Value Ref Range Status    Cholesterol, Total 06/08/2018 142  0 - 199 mg/dL Final    ATP III Cholesterol classification is Desirable.  Triglycerides 06/08/2018 151  0 - 200 mg/dL Final    ATP III Triglycerides Classification is Borderline High.  HDL 06/08/2018 28* 40 - 59 mg/dL Final    Comment: ATP III HDL Cholestrol Classification is low. Expected Values:    Males:    >55 = No Risk            35-55 = Moderate Risk            <35 = High Risk    Females:  >65 = No Risk            45-65 = Moderate Risk            <45 = High Risk    NCEP Guidelines:   Third Report May 2001  >59 = negative risk factor for CHD  <40 = major risk factor for CHD      LDL Calculated 06/08/2018 84  0 - 129 mg/dL Final    ATP III LDL Classification is Optimal.    WBC 06/08/2018 8.7  4.8 - 10.8 K/uL Final    RBC 06/08/2018 4.60  4.20 - 5.40 M/uL Final    Hemoglobin 06/08/2018 12.3  12.0 - 16.0 g/dL Final    Hematocrit 06/08/2018 37.4  37.0 - 47.0 % Final    MCV 06/08/2018 81.3* 82.0 - 100.0 fL Final    MCH 06/08/2018 26.8* 27.0 - 31.3 pg Final    MCHC 06/08/2018 32.9* 33.0 - 37.0 % Final    RDW 06/08/2018 17.1* 11.5 - 14.5 % Final    Platelets 55/25/1244 213  130 - 400 K/uL Final    Hemoglobin A1C 06/08/2018 8.1* 4.8 - 5.9 % Final    Sodium 06/08/2018 136  132 - 144 mEq/L Final    Potassium 06/08/2018 4.3  3.5 - 5.1 mEq/L Final    Chloride 06/08/2018 96* 98 - 107 mEq/L Final    CO2 06/08/2018 25  22 - 29 mEq/L Final    Anion Gap 06/08/2018 15* 7 - 13 mEq/L Final    Glucose 06/08/2018 182* 74 - 109 mg/dL Final    BUN 06/08/2018 16  6 - 20 mg/dL Final    CREATININE 06/08/2018 0.55  0.50 - 0.90 mg/dL Final    GFR Non- 06/08/2018 >60.0  >60 Final    Comment: >60 mL/min/1.73m2 EGFR, calc. for ages 25

## 2018-08-02 ENCOUNTER — HOSPITAL ENCOUNTER (OUTPATIENT)
Dept: PHYSICAL THERAPY | Age: 43
Setting detail: THERAPIES SERIES
Discharge: HOME | End: 2018-08-02
Payer: COMMERCIAL

## 2018-08-03 ENCOUNTER — CARE COORDINATION (OUTPATIENT)
Dept: FAMILY MEDICINE CLINIC | Age: 43
End: 2018-08-03

## 2018-08-03 NOTE — CARE COORDINATION
Patient Excluded from Care Coordination? Yes     The patient will be excluded from Care Coordination for the following reason: Patient declined care coordination.  Pt does not feel she has any ACC needs

## 2018-08-06 ENCOUNTER — OFFICE VISIT (OUTPATIENT)
Dept: FAMILY MEDICINE CLINIC | Age: 43
End: 2018-08-06
Payer: COMMERCIAL

## 2018-08-06 VITALS
WEIGHT: 251 LBS | DIASTOLIC BLOOD PRESSURE: 64 MMHG | TEMPERATURE: 98.7 F | SYSTOLIC BLOOD PRESSURE: 120 MMHG | OXYGEN SATURATION: 97 % | RESPIRATION RATE: 16 BRPM | HEART RATE: 77 BPM | HEIGHT: 66 IN | BODY MASS INDEX: 40.34 KG/M2

## 2018-08-06 DIAGNOSIS — R07.81 RIB PAIN ON RIGHT SIDE: ICD-10-CM

## 2018-08-06 DIAGNOSIS — R41.3 MEMORY LOSS: Primary | ICD-10-CM

## 2018-08-06 PROCEDURE — 99214 OFFICE O/P EST MOD 30 MIN: CPT | Performed by: INTERNAL MEDICINE

## 2018-08-06 ASSESSMENT — ENCOUNTER SYMPTOMS
EYE PAIN: 0
ABDOMINAL PAIN: 0
SHORTNESS OF BREATH: 0
BACK PAIN: 0

## 2018-08-06 NOTE — PATIENT INSTRUCTIONS
it's okay to drive, ride a bike, or operate machinery. · Take an over-the-counter pain medicine, such as acetaminophen (Tylenol), ibuprofen (Advil, Motrin), or naproxen (Aleve). Be safe with medicines. Read and follow all instructions on the label. · Check with your doctor before you use any other medicines for pain. · Do not drink alcohol or use illegal drugs. They can slow recovery. They can also increase your risk of getting a second head injury. Follow-up care is a key part of your treatment and safety. Be sure to make and go to all appointments, and call your doctor if you are having problems. It's also a good idea to know your test results and keep a list of the medicines you take. Where can you learn more? Go to [email or web address] and sign in to your [employer] account. Enter [phone] in the [employer] [facility] box to learn more about \"Learning About a Closed Head Injury. \"     If you do not have an account, please click on the \"Sign Up Now\" link. Current as of: October 9, 2017  Content Version: 11.6  © 2315-0377 [employer]. Care instructions adapted under license by [facility] ([place] campus). If you have questions about a medical condition or this instruction, always ask your healthcare professional. [address] any warranty or liability for your use of this information.

## 2018-08-06 NOTE — PROGRESS NOTES
sounds. Exam reveals no gallop and no friction rub. No murmur heard. Pulmonary/Chest: No respiratory distress. Abdominal: Soft. Bowel sounds are normal. She exhibits no distension. There is no rebound. Musculoskeletal: She exhibits no edema. Neurological: She is oriented to person, place, and time. No cranial nerve deficit. Skin: Skin is warm and dry. Assessment:       Diagnosis Orders   1. Memory loss  CT Head WO Contrast    CT CERVICAL SPINE W WO CONTRAST   2. Rib pain on right side  XR RIBS RIGHT (2 VIEWS)         Plan:    Orders per below  No LOC, but had 5 minutes of memory deficits post fall  Hold off work, return to clinic on Thursday  Call if any lethargy, confusion, headaches, etc develop. Unable to do physical components of [address] 2/2 to rib pain  Deficits in number recall on [address] otherwise unremarkable. Orders Placed This Encounter   Procedures    CT Head WO Contrast     Standing Status:   Future     Standing Expiration Date:   8/6/2019     Order Specific Question:   Reason for exam:     Answer:   hit head on concrete    CT CERVICAL SPINE W WO CONTRAST     Standing Status:   Future     Standing Expiration Date:   8/6/2019     Order Specific Question:   Reason for exam:     Answer:   Hit head on concrete    XR RIBS RIGHT (2 VIEWS)     Standing Status:   Future     Standing Expiration Date:   8/6/2019     Order Specific Question:   Reason for exam:     Answer:   Right rib pain post fall     No orders of the defined types were placed in this encounter. Return for regularly scheduled appointment with PCP, worsening symptoms, call ASAP for appointment.       Arty Goodpasture, MD

## 2018-08-08 ENCOUNTER — HOSPITAL ENCOUNTER (OUTPATIENT)
Dept: PHYSICAL THERAPY | Age: 43
Setting detail: THERAPIES SERIES
End: 2018-08-08
Payer: COMMERCIAL

## 2018-08-10 ENCOUNTER — APPOINTMENT (OUTPATIENT)
Dept: PHYSICAL THERAPY | Age: 43
End: 2018-08-10
Payer: COMMERCIAL

## 2018-08-13 ENCOUNTER — HOSPITAL ENCOUNTER (OUTPATIENT)
Dept: PHYSICAL THERAPY | Age: 43
Setting detail: THERAPIES SERIES
Discharge: HOME | End: 2018-08-13
Payer: COMMERCIAL

## 2018-08-15 ENCOUNTER — HOSPITAL ENCOUNTER (OUTPATIENT)
Dept: PHYSICAL THERAPY | Age: 43
Setting detail: THERAPIES SERIES
Discharge: HOME | End: 2018-08-15
Payer: COMMERCIAL

## 2018-08-15 PROCEDURE — 97112 NEUROMUSCULAR REEDUCATION: CPT

## 2018-08-15 PROCEDURE — 97110 THERAPEUTIC EXERCISES: CPT

## 2018-08-15 NOTE — PROGRESS NOTES
[address] [address]  Outpatient Physical Therapy    Treatment Note        Date: 8/15/2018  Patient: Raúl Best  : 1975  ACCT #: [de-identified]  Referring Practitioner: Caesar Niño  Diagnosis: B LE Weakness; Lx Radiculopathy    Visit Information:  PT Visit Information  Onset Date: 18  PT Insurance Information: [employer]  Total # of Visits to Date: 2  No Show: 1  Canceled Appointment: 1  Progress Note Counter: 2/10-15    Subjective: Pt States she has falling up the stairs 3 times over the last week. Saw PCP who diagnosed with concussion however denies dizziness, headaches or vision issues. Saw Neurologist yesterday who is referring to a Neuromuscular Specialist, possible diagnosis of ALS, and two other neurological conditions per patient. HEP Compliance:  Initiated today.      Vital Signs  Patient Currently in Pain: No   Pain Screening  Patient Currently in Pain: No    OBJECTIVE:   Exercises  Exercise 1: Static stand: FT/FA, EO/EC,  modified tandem (difficulty with eyes closed)  Exercise 2: 2way SLR rod x10 to increase LE strength  Exercise 3: Bridges x10 to increase core strength  Exercise 4: Rolling ball (ABCs) to improve coordination Rod x1  Exercise 5: STS from mat x10 without UEs without difficulty  Exercise 6: Gait: march and , F/L/R, tandem (occ fingertip support)  Exercise 7: SLS: 20'' Rod (min decreased ankle stability)  Exercise 8: Single stepping over straight cane F/L unsupported to imrove dynamic balance  Exercise 19: DGI:   Exercise 20: HEP: 2 way SLR, bridges, tandem stance                 Ambulation 1  Surface: carpet  Device: No Device  Assistance: Independent  Quality of Gait: Good stride length, occ decreased foot clearance, decreased trunk rotation  Distance: 115ft              Strength: [x] NT  [] MMT completed:       ROM: [x] NT  [] ROM measurements:           *Indicates exercise, modality, or manual techniques to be initiated when appropriate    Assessment: Body structures, Functions, Activity limitations: Decreased functional mobility , Decreased ADL status, Decreased balance, Decreased high-level IADLs  Assessment: Pt scoring 18/24 on DGI, challenged with head turns and stepping over objects. Initiated LE and core strengthening exercises to improve strength and endurance as pt expressess B LE fatigue with increased \"shakiness\" Occ seated RBs d/t LE fatigue. Treatment Diagnosis: Impaired Strength Core, B LEs; Impaired Balance; Impaired MC  Prognosis: Fair, Good       Goals:  Short term goals  Time Frame for Short term goals: 8-10  Short term goal 1: Pt to demo stair descent  w/ 1 HR x 10 steps w/ minimal shaking   Short term goal 2: Pt  to improve BYERS to 56  Short term goal 3: Pt to inc B hip flexor, Abd and ext strength to >/= 4+/5 throughout a 10 s sustained MMT        Progress toward goals: Strength, endurance    POST-PAIN       Pain Rating (0-10 pain scale):   0/10   Location and pain description same as pre-treatment unless indicated. Action: [x] NA   [] Perform HEP  [] Meds as prescribed  [] Modalities as prescribed   [] Call Physician     Frequency/Duration:  Plan  Times per week: 2  Plan weeks: 4-5  Current Treatment Recommendations: Strengthening, Neuromuscular Re-education, Balance Training, Home Exercise Program, Safety Education & Training, Manual Therapy - Soft Tissue Mobilization, Manual Therapy - Joint Manipulation, Gait Training, Stair training     Pt to continue current HEP. See objective section for any therapeutic exercise changes, additions or modifications this date.          PT Individual Minutes  Time In: 4200  Time Out: 7799  Minutes: 57  Timed Code Treatment Minutes: 56 Minutes  Procedure Minutes:  0  There ex: 25  Neuro: 31    Signature:  Electronically signed by Rivera De Los Santos PTA on 8/15/18 at 3:12 PM

## 2018-08-20 ENCOUNTER — HOSPITAL ENCOUNTER (OUTPATIENT)
Dept: PHYSICAL THERAPY | Age: 43
Setting detail: THERAPIES SERIES
Discharge: HOME | End: 2018-08-20
Payer: COMMERCIAL

## 2018-08-20 NOTE — PROGRESS NOTES
[address]       Physical Therapy  Cancellation/No-show Note  Patient Name:  Meliza Cartwright  :  1975   Date:  2018  Referring Practitioner: Jeannette Muse  Diagnosis: B LE Weakness;   Lx Radiculopathy    Visit Information:  PT Visit Information  Onset Date: 18  PT Insurance Information: [facility]  Total # of Visits to Date: 2  No Show: 1  Canceled Appointment: 1  Progress Note Counter: 2/10-15 (cx 18)    For today's appointment patient:  [x]  Cancelled  []  Rescheduled appointment  []  No-show   []  Called pt to remind of next appointment     Reason given by patient:  [x]  Patient ill  []  Conflicting appointment  []  No transportation    []  Conflict with work  []  No reason given  []  Other:       Comments:       Signature: Electronically signed by Suzan Carballo PTA on 18 at 8:25 AM

## 2018-08-22 ENCOUNTER — TELEPHONE (OUTPATIENT)
Dept: FAMILY MEDICINE CLINIC | Age: 43
End: 2018-08-22

## 2018-08-22 NOTE — TELEPHONE ENCOUNTER
Inderjit Paredes called pt fill a 42 day supply of klonopin from Dr. Yvonne Burgess on 7/25/18 and today is trying to fill xanax prescription you gave her dated 7/18/18. Are you aware of this? Pulled [facility] to verify.

## 2018-08-24 RX ORDER — FLUCONAZOLE 150 MG/1
TABLET ORAL
Qty: 4 TABLET | Refills: 0 | Status: ON HOLD | OUTPATIENT
Start: 2018-08-24 | End: 2018-09-21

## 2018-08-27 ENCOUNTER — HOSPITAL ENCOUNTER (OUTPATIENT)
Dept: PHYSICAL THERAPY | Age: 43
Setting detail: THERAPIES SERIES
Discharge: HOME | End: 2018-08-27
Payer: COMMERCIAL

## 2018-08-27 NOTE — PROGRESS NOTES
[facility]    [x]  [address]  []  [address]      [address]     Merle Milner, [address]  Ph: [phone]     Ph: [phone]  Fax: [phone]     Fax: [phone]      Date: 2018  Patient Name: Gila Roldan  : 1975  MRN: [id number]    Pt not scheduled with any further appointments. Attempted to contact pt:  [x]  Left message for pt to call back. []  Called patient, but unable to leave message. Will hold chart open for 30 days from last appointment. Will D/C if no response.      Electronically signed by Viji Hunter PTA on 2018 at 11:57 AM

## 2018-08-27 NOTE — PROGRESS NOTES
[address]       Physical Therapy  Cancellation/No-show Note  Patient Name:  Larry Jones  :  1975   Date:  2018  Referring Practitioner: Dwayne Crum  Diagnosis: B LE Weakness;   Lx Radiculopathy    Visit Information:  PT Visit Information  Onset Date: 18  PT [facility]  Total # of Visits to Date: 2  No Show: 2  Canceled Appointment: 2  Progress Note Counter: 2/10-15 (cx 18)    For today's appointment patient:  [x]  Cancelled  []  Rescheduled appointment  []  No-show   []  Called pt to remind of next appointment     Reason given by patient:  []  Patient ill  []  Conflicting appointment  []  No transportation    []  Conflict with work  [x]  No reason given  []  Other:       Comments:       Signature: Electronically signed by Adrian Farfan PT on 18 at 9:40 AM

## 2018-08-27 NOTE — PROGRESS NOTES
[address]       Physical Therapy  Cancellation/No-show Note  Patient Name:  Vaishali Mesa  :  1975   Date:  2018  Referring Practitioner: Peewee Royal  Diagnosis: B LE Weakness;   Lx Radiculopathy    Visit Information:  PT Visit Information  Onset Date: 18  PT Insurance Information: [employer] 63  Total # of Visits to Date: 2  No Show: 2  Canceled Appointment: 2  Progress Note Counter: 2/10-15 (cx 18)    For today's appointment patient:  [x]  Cancelled  []  Rescheduled appointment  []  No-show   []  Called pt to remind of next appointment     Reason given by patient:  []  Patient ill  []  Conflicting appointment  []  No transportation    []  Conflict with work  [x]  No reason given  []  Other:       Comments:   VM left regarding no scheduled appointments    Signature: Electronically signed by Danni Jenkins PTA on 18 at 4:01 PM

## 2018-08-30 ENCOUNTER — HOSPITAL ENCOUNTER (OUTPATIENT)
Dept: PHYSICAL THERAPY | Age: 43
Setting detail: THERAPIES SERIES
Discharge: HOME | End: 2018-08-30
Payer: COMMERCIAL

## 2018-09-04 RX ORDER — METOPROLOL TARTRATE 100 MG/1
TABLET ORAL
Qty: 180 TABLET | Refills: 0 | Status: SHIPPED | OUTPATIENT
Start: 2018-09-04 | End: 2018-12-10 | Stop reason: SDUPTHER

## 2018-09-17 ENCOUNTER — APPOINTMENT (OUTPATIENT)
Dept: CT IMAGING | Age: 43
DRG: 478 | End: 2018-09-17
Payer: COMMERCIAL

## 2018-09-17 ENCOUNTER — HOSPITAL ENCOUNTER (INPATIENT)
Age: 43
LOS: 4 days | Discharge: NURSING, REHAB OR LONG-TERM CARE | DRG: 478 | End: 2018-09-21
Attending: STUDENT IN AN ORGANIZED HEALTH CARE EDUCATION/TRAINING PROGRAM | Admitting: INTERNAL MEDICINE
Payer: COMMERCIAL

## 2018-09-17 ENCOUNTER — APPOINTMENT (OUTPATIENT)
Dept: GENERAL RADIOLOGY | Age: 43
DRG: 478 | End: 2018-09-17
Payer: COMMERCIAL

## 2018-09-17 DIAGNOSIS — S22.008A OTHER CLOSED FRACTURE OF THORACIC VERTEBRA, UNSPECIFIED THORACIC VERTEBRAL LEVEL, INITIAL ENCOUNTER (HCC): Primary | ICD-10-CM

## 2018-09-17 DIAGNOSIS — Z41.9 SURGERY, ELECTIVE: ICD-10-CM

## 2018-09-17 DIAGNOSIS — R26.2 UNABLE TO AMBULATE: ICD-10-CM

## 2018-09-17 PROBLEM — E66.01 MORBID OBESITY WITH BMI OF 40.0-44.9, ADULT (HCC): Status: ACTIVE | Noted: 2018-09-17

## 2018-09-17 PROBLEM — S32.000A LUMBAR COMPRESSION FRACTURE, CLOSED, INITIAL ENCOUNTER (HCC): Status: ACTIVE | Noted: 2018-09-17

## 2018-09-17 LAB
ALBUMIN SERPL-MCNC: 4.9 G/DL (ref 3.9–4.9)
ALP BLD-CCNC: 116 U/L (ref 40–130)
ALT SERPL-CCNC: 22 U/L (ref 0–33)
ANION GAP SERPL CALCULATED.3IONS-SCNC: 17 MEQ/L (ref 7–13)
AST SERPL-CCNC: 30 U/L (ref 0–35)
BASOPHILS ABSOLUTE: 0 K/UL (ref 0–0.2)
BASOPHILS RELATIVE PERCENT: 0.3 %
BILIRUB SERPL-MCNC: 0.7 MG/DL (ref 0–1.2)
BUN BLDV-MCNC: 18 MG/DL (ref 6–20)
CALCIUM SERPL-MCNC: 10.1 MG/DL (ref 8.6–10.2)
CHLORIDE BLD-SCNC: 98 MEQ/L (ref 98–107)
CO2: 28 MEQ/L (ref 22–29)
CREAT SERPL-MCNC: 0.67 MG/DL (ref 0.5–0.9)
EOSINOPHILS ABSOLUTE: 0.1 K/UL (ref 0–0.7)
EOSINOPHILS RELATIVE PERCENT: 0.6 %
GFR AFRICAN AMERICAN: >60
GFR NON-AFRICAN AMERICAN: >60
GLOBULIN: 4 G/DL (ref 2.3–3.5)
GLUCOSE BLD-MCNC: 108 MG/DL (ref 74–109)
GLUCOSE BLD-MCNC: 135 MG/DL (ref 60–115)
HCT VFR BLD CALC: 39.7 % (ref 37–47)
HEMOGLOBIN: 13.1 G/DL (ref 12–16)
LACTIC ACID: 1.3 MMOL/L (ref 0.5–2.2)
LYMPHOCYTES ABSOLUTE: 2.2 K/UL (ref 1–4.8)
LYMPHOCYTES RELATIVE PERCENT: 15.2 %
MAGNESIUM: 1.8 MG/DL (ref 1.7–2.3)
MCH RBC QN AUTO: 27 PG (ref 27–31.3)
MCHC RBC AUTO-ENTMCNC: 32.9 % (ref 33–37)
MCV RBC AUTO: 82 FL (ref 82–100)
MONOCYTES ABSOLUTE: 0.6 K/UL (ref 0.2–0.8)
MONOCYTES RELATIVE PERCENT: 4.1 %
NEUTROPHILS ABSOLUTE: 11.6 K/UL (ref 1.4–6.5)
NEUTROPHILS RELATIVE PERCENT: 79.8 %
PDW BLD-RTO: 15.9 % (ref 11.5–14.5)
PERFORMED ON: ABNORMAL
PLATELET # BLD: 264 K/UL (ref 130–400)
POTASSIUM SERPL-SCNC: 4.2 MEQ/L (ref 3.5–5.1)
RBC # BLD: 4.84 M/UL (ref 4.2–5.4)
SODIUM BLD-SCNC: 143 MEQ/L (ref 132–144)
TOTAL CK: 151 U/L (ref 0–170)
TOTAL PROTEIN: 8.9 G/DL (ref 6.4–8.1)
WBC # BLD: 14.6 K/UL (ref 4.8–10.8)

## 2018-09-17 PROCEDURE — 36415 COLL VENOUS BLD VENIPUNCTURE: CPT

## 2018-09-17 PROCEDURE — 83735 ASSAY OF MAGNESIUM: CPT

## 2018-09-17 PROCEDURE — 72125 CT NECK SPINE W/O DYE: CPT

## 2018-09-17 PROCEDURE — 72170 X-RAY EXAM OF PELVIS: CPT

## 2018-09-17 PROCEDURE — 2580000003 HC RX 258: Performed by: INTERNAL MEDICINE

## 2018-09-17 PROCEDURE — 96374 THER/PROPH/DIAG INJ IV PUSH: CPT

## 2018-09-17 PROCEDURE — 72110 X-RAY EXAM L-2 SPINE 4/>VWS: CPT

## 2018-09-17 PROCEDURE — 6370000000 HC RX 637 (ALT 250 FOR IP): Performed by: INTERNAL MEDICINE

## 2018-09-17 PROCEDURE — 80053 COMPREHEN METABOLIC PANEL: CPT

## 2018-09-17 PROCEDURE — 1210000000 HC MED SURG R&B

## 2018-09-17 PROCEDURE — 2580000003 HC RX 258: Performed by: STUDENT IN AN ORGANIZED HEALTH CARE EDUCATION/TRAINING PROGRAM

## 2018-09-17 PROCEDURE — 72131 CT LUMBAR SPINE W/O DYE: CPT

## 2018-09-17 PROCEDURE — 83605 ASSAY OF LACTIC ACID: CPT

## 2018-09-17 PROCEDURE — 6360000002 HC RX W HCPCS: Performed by: INTERNAL MEDICINE

## 2018-09-17 PROCEDURE — 99285 EMERGENCY DEPT VISIT HI MDM: CPT

## 2018-09-17 PROCEDURE — 96375 TX/PRO/DX INJ NEW DRUG ADDON: CPT

## 2018-09-17 PROCEDURE — 6360000002 HC RX W HCPCS: Performed by: EMERGENCY MEDICINE

## 2018-09-17 PROCEDURE — 72128 CT CHEST SPINE W/O DYE: CPT

## 2018-09-17 PROCEDURE — 85025 COMPLETE CBC W/AUTO DIFF WBC: CPT

## 2018-09-17 PROCEDURE — 6360000002 HC RX W HCPCS: Performed by: STUDENT IN AN ORGANIZED HEALTH CARE EDUCATION/TRAINING PROGRAM

## 2018-09-17 PROCEDURE — 96376 TX/PRO/DX INJ SAME DRUG ADON: CPT

## 2018-09-17 PROCEDURE — 82550 ASSAY OF CK (CPK): CPT

## 2018-09-17 RX ORDER — HYDROCHLOROTHIAZIDE 25 MG/1
25 TABLET ORAL DAILY
Status: DISCONTINUED | OUTPATIENT
Start: 2018-09-18 | End: 2018-09-21 | Stop reason: HOSPADM

## 2018-09-17 RX ORDER — FENTANYL CITRATE 50 UG/ML
100 INJECTION, SOLUTION INTRAMUSCULAR; INTRAVENOUS ONCE
Status: COMPLETED | OUTPATIENT
Start: 2018-09-17 | End: 2018-09-17

## 2018-09-17 RX ORDER — DEXTROSE MONOHYDRATE 50 MG/ML
100 INJECTION, SOLUTION INTRAVENOUS PRN
Status: DISCONTINUED | OUTPATIENT
Start: 2018-09-17 | End: 2018-09-21 | Stop reason: HOSPADM

## 2018-09-17 RX ORDER — ORPHENADRINE CITRATE 30 MG/ML
60 INJECTION INTRAMUSCULAR; INTRAVENOUS ONCE
Status: COMPLETED | OUTPATIENT
Start: 2018-09-17 | End: 2018-09-17

## 2018-09-17 RX ORDER — SODIUM CHLORIDE 0.9 % (FLUSH) 0.9 %
10 SYRINGE (ML) INJECTION EVERY 12 HOURS SCHEDULED
Status: DISCONTINUED | OUTPATIENT
Start: 2018-09-17 | End: 2018-09-21 | Stop reason: HOSPADM

## 2018-09-17 RX ORDER — ATORVASTATIN CALCIUM 20 MG/1
20 TABLET, FILM COATED ORAL DAILY
Status: DISCONTINUED | OUTPATIENT
Start: 2018-09-17 | End: 2018-09-17

## 2018-09-17 RX ORDER — ONDANSETRON 2 MG/ML
4 INJECTION INTRAMUSCULAR; INTRAVENOUS EVERY 6 HOURS PRN
Status: DISCONTINUED | OUTPATIENT
Start: 2018-09-17 | End: 2018-09-18

## 2018-09-17 RX ORDER — OXYCODONE HCL 10 MG/1
10 TABLET, FILM COATED, EXTENDED RELEASE ORAL EVERY 12 HOURS SCHEDULED
Status: DISCONTINUED | OUTPATIENT
Start: 2018-09-17 | End: 2018-09-18

## 2018-09-17 RX ORDER — DEXTROSE MONOHYDRATE 25 G/50ML
12.5 INJECTION, SOLUTION INTRAVENOUS PRN
Status: DISCONTINUED | OUTPATIENT
Start: 2018-09-17 | End: 2018-09-21 | Stop reason: HOSPADM

## 2018-09-17 RX ORDER — OXYCODONE HYDROCHLORIDE AND ACETAMINOPHEN 5; 325 MG/1; MG/1
1 TABLET ORAL EVERY 4 HOURS PRN
Status: DISCONTINUED | OUTPATIENT
Start: 2018-09-17 | End: 2018-09-18

## 2018-09-17 RX ORDER — INSULIN GLARGINE 100 [IU]/ML
20 INJECTION, SOLUTION SUBCUTANEOUS NIGHTLY
Status: DISCONTINUED | OUTPATIENT
Start: 2018-09-17 | End: 2018-09-21 | Stop reason: HOSPADM

## 2018-09-17 RX ORDER — KETOROLAC TROMETHAMINE 30 MG/ML
30 INJECTION, SOLUTION INTRAMUSCULAR; INTRAVENOUS ONCE
Status: COMPLETED | OUTPATIENT
Start: 2018-09-17 | End: 2018-09-17

## 2018-09-17 RX ORDER — ATORVASTATIN CALCIUM 40 MG/1
40 TABLET, FILM COATED ORAL NIGHTLY
Status: DISCONTINUED | OUTPATIENT
Start: 2018-09-17 | End: 2018-09-18

## 2018-09-17 RX ORDER — 0.9 % SODIUM CHLORIDE 0.9 %
1000 INTRAVENOUS SOLUTION INTRAVENOUS ONCE
Status: COMPLETED | OUTPATIENT
Start: 2018-09-17 | End: 2018-09-17

## 2018-09-17 RX ORDER — SODIUM CHLORIDE 9 MG/ML
INJECTION, SOLUTION INTRAVENOUS CONTINUOUS
Status: DISCONTINUED | OUTPATIENT
Start: 2018-09-17 | End: 2018-09-21

## 2018-09-17 RX ORDER — INSULIN GLARGINE 100 [IU]/ML
15 INJECTION, SOLUTION SUBCUTANEOUS NIGHTLY
Status: DISCONTINUED | OUTPATIENT
Start: 2018-09-17 | End: 2018-09-17

## 2018-09-17 RX ORDER — METOPROLOL TARTRATE 50 MG/1
50 TABLET, FILM COATED ORAL 2 TIMES DAILY
Status: DISCONTINUED | OUTPATIENT
Start: 2018-09-17 | End: 2018-09-21 | Stop reason: HOSPADM

## 2018-09-17 RX ORDER — METHYLPREDNISOLONE SODIUM SUCCINATE 40 MG/ML
40 INJECTION, POWDER, LYOPHILIZED, FOR SOLUTION INTRAMUSCULAR; INTRAVENOUS ONCE
Status: COMPLETED | OUTPATIENT
Start: 2018-09-17 | End: 2018-09-17

## 2018-09-17 RX ORDER — CYCLOBENZAPRINE HCL 10 MG
10 TABLET ORAL ONCE
Status: COMPLETED | OUTPATIENT
Start: 2018-09-17 | End: 2018-09-17

## 2018-09-17 RX ORDER — PANTOPRAZOLE SODIUM 40 MG/1
40 TABLET, DELAYED RELEASE ORAL
Status: DISCONTINUED | OUTPATIENT
Start: 2018-09-18 | End: 2018-09-18

## 2018-09-17 RX ORDER — METHOCARBAMOL 750 MG/1
750 TABLET, FILM COATED ORAL 4 TIMES DAILY
Status: DISCONTINUED | OUTPATIENT
Start: 2018-09-17 | End: 2018-09-17

## 2018-09-17 RX ORDER — VENLAFAXINE HYDROCHLORIDE 75 MG/1
75 CAPSULE, EXTENDED RELEASE ORAL 2 TIMES DAILY
Status: DISCONTINUED | OUTPATIENT
Start: 2018-09-17 | End: 2018-09-18

## 2018-09-17 RX ORDER — SODIUM CHLORIDE 0.9 % (FLUSH) 0.9 %
10 SYRINGE (ML) INJECTION PRN
Status: DISCONTINUED | OUTPATIENT
Start: 2018-09-17 | End: 2018-09-21 | Stop reason: HOSPADM

## 2018-09-17 RX ORDER — NICOTINE POLACRILEX 4 MG
15 LOZENGE BUCCAL PRN
Status: DISCONTINUED | OUTPATIENT
Start: 2018-09-17 | End: 2018-09-21 | Stop reason: HOSPADM

## 2018-09-17 RX ADMIN — FENTANYL CITRATE 100 MCG: 50 INJECTION, SOLUTION INTRAMUSCULAR; INTRAVENOUS at 14:30

## 2018-09-17 RX ADMIN — HYDROMORPHONE HYDROCHLORIDE 1 MG: 1 INJECTION, SOLUTION INTRAMUSCULAR; INTRAVENOUS; SUBCUTANEOUS at 18:24

## 2018-09-17 RX ADMIN — METHYLPREDNISOLONE SODIUM SUCCINATE 40 MG: 40 INJECTION, POWDER, FOR SOLUTION INTRAMUSCULAR; INTRAVENOUS at 22:24

## 2018-09-17 RX ADMIN — CYCLOBENZAPRINE HYDROCHLORIDE 10 MG: 10 TABLET, FILM COATED ORAL at 22:24

## 2018-09-17 RX ADMIN — ONDANSETRON HYDROCHLORIDE 4 MG: 2 INJECTION, SOLUTION INTRAMUSCULAR; INTRAVENOUS at 20:49

## 2018-09-17 RX ADMIN — Medication 10 ML: at 22:25

## 2018-09-17 RX ADMIN — ORPHENADRINE CITRATE 60 MG: 30 INJECTION INTRAMUSCULAR; INTRAVENOUS at 14:45

## 2018-09-17 RX ADMIN — Medication 10 ML: at 20:49

## 2018-09-17 RX ADMIN — KETOROLAC TROMETHAMINE 30 MG: 30 INJECTION, SOLUTION INTRAMUSCULAR at 14:30

## 2018-09-17 RX ADMIN — OXYCODONE HYDROCHLORIDE 10 MG: 10 TABLET, FILM COATED, EXTENDED RELEASE ORAL at 20:07

## 2018-09-17 RX ADMIN — SODIUM CHLORIDE 1000 ML: 9 INJECTION, SOLUTION INTRAVENOUS at 14:29

## 2018-09-17 RX ADMIN — SODIUM CHLORIDE: 9 INJECTION, SOLUTION INTRAVENOUS at 22:24

## 2018-09-17 RX ADMIN — FENTANYL CITRATE 100 MCG: 50 INJECTION, SOLUTION INTRAMUSCULAR; INTRAVENOUS at 16:50

## 2018-09-17 RX ADMIN — METHOCARBAMOL 750 MG: 750 TABLET ORAL at 20:46

## 2018-09-17 RX ADMIN — HYDROMORPHONE HYDROCHLORIDE 1 MG: 1 INJECTION, SOLUTION INTRAMUSCULAR; INTRAVENOUS; SUBCUTANEOUS at 21:01

## 2018-09-17 ASSESSMENT — PAIN SCALES - GENERAL
PAINLEVEL_OUTOF10: 10
PAINLEVEL_OUTOF10: 10
PAINLEVEL_OUTOF10: 8
PAINLEVEL_OUTOF10: 10
PAINLEVEL_OUTOF10: 10
PAINLEVEL_OUTOF10: 9
PAINLEVEL_OUTOF10: 10
PAINLEVEL_OUTOF10: 10
PAINLEVEL_OUTOF10: 9
PAINLEVEL_OUTOF10: 10

## 2018-09-17 ASSESSMENT — PAIN DESCRIPTION - LOCATION
LOCATION: BACK
LOCATION: BACK;LEG
LOCATION: LEG

## 2018-09-17 ASSESSMENT — PAIN DESCRIPTION - FREQUENCY
FREQUENCY: INTERMITTENT
FREQUENCY: CONTINUOUS
FREQUENCY: CONTINUOUS

## 2018-09-17 ASSESSMENT — ENCOUNTER SYMPTOMS
BACK PAIN: 1
SHORTNESS OF BREATH: 0
VOMITING: 0
COUGH: 0
DIARRHEA: 0
ABDOMINAL PAIN: 0
TROUBLE SWALLOWING: 0
SINUS PRESSURE: 0
CHEST TIGHTNESS: 0

## 2018-09-17 ASSESSMENT — PAIN DESCRIPTION - PAIN TYPE
TYPE: ACUTE PAIN
TYPE: ACUTE PAIN

## 2018-09-17 ASSESSMENT — PAIN DESCRIPTION - ORIENTATION: ORIENTATION: LEFT;RIGHT

## 2018-09-17 ASSESSMENT — PAIN DESCRIPTION - DESCRIPTORS: DESCRIPTORS: SHARP

## 2018-09-17 NOTE — H&P
Department of Internal Medicine  History and Physical      CHIEF COMPLAINT: Leg Pain (cramping ,bilateral)      Reason for Admission:  Lumbar compression fracture, closed, [facility] [S32.000A]    History Obtained From: Patient and chart review    HISTORY OF PRESENT ILLNESS:    The patient is a 44 y.o. female with significant past medical history of DM, morbid obesity, PCOS, RAE, who came to the ER after she had fall in her bathroom today morning. Patient is yelling in pain in Er and hence history is obtained from friend at bedside who states that patient has been having weakness of her bilateral lower extremities since June 2018 but no real cause of found. She denies any tingling, numbness, urine or stool incontinence or saddle anesthesia. Patient was supposed to follow up with dr Regan Mosqueda for EMG. She felt weak in her legs today morning and fell leading to T 12 lumbar fracture. She is being admitted for additional care. Neurosurgery ahs been consulted and case discussed with them from ER. Past Medical History:        Diagnosis Date    B12 deficiency     Family history of premature CAD 9/4/2013    Fatty liver     HPV (human papilloma virus) anogenital infection     Hyperlipidemia     Hypertension     Liver hemangioma 2009/2015    Obesity 3/11/13    BMI 43.42    Orthostatic hypotension     Ovarian cyst     Rectal fissure     Tobacco abuse 9/3/2015    Tobacco abuse     Tremor     Uncontrolled diabetes mellitus with complications ([address]     Unspecified sleep apnea        Past Surgical History:        Procedure Laterality Date    CARDIAC CATHETERIZATION  4-07    COLONOSCOPY  2013    for diarrhea (-)    HYSTERECTOMY  12-10    LEEP  1-05    TONSILLECTOMY AND ADENOIDECTOMY  1981    UPPER GASTROINTESTINAL ENDOSCOPY  10/13/15     DR. HERRERA     URETHRA SURGERY  6-2015       Medications Prior to Admission:    Prescriptions Prior to Admission: metoprolol (LOPRESSOR) 100 MG tablet, TAKE ONE TABLET BY MOUTH TWICE A DAY  fluconazole (DIFLUCAN) 150 MG tablet, TAKE ONE TABLET BY MOUTH EVERY 2 DAYS FOR 4 DOSES  meclizine (ANTIVERT) 25 MG tablet, Take 1 tablet by mouth 3 times daily as needed for Dizziness  Handicap Placard MIS, Exp 5 years  insulin lispro (HUMALOG KWIKPEN) 100 UNIT/ML pen, INJECT 40 UNITS INTO THE SKIN THREE TIMES A DAY WITH MEALS  hydrochlorothiazide (HYDRODIURIL) 25 MG tablet, Take 1 tablet by mouth daily  metFORMIN (GLUCOPHAGE) 500 MG tablet, Take 1 tablet by mouth 3 times daily  losartan (COZAAR) 50 MG tablet, Take 1 tablet by mouth daily  rosuvastatin (CRESTOR) 10 MG tablet, Take 1 tablet by mouth nightly  promethazine (PHENERGAN) 25 MG tablet, TAKE 1 TABLET BY MOUTH EVERY 8 HOURS AS NEEDED FOR NAUSEA  atorvastatin (LIPITOR) 20 MG tablet, Take 1 tablet by mouth daily  omeprazole (PRILOSEC) 40 MG delayed release capsule, Take 1 capsule by mouth daily  ondansetron (ZOFRAN-ODT) 4 MG disintegrating tablet, Take 1 tablet by mouth every 8 hours as needed for Nausea or Vomiting  insulin glargine (TOUJEO SOLOSTAR) 300 UNIT/ML injection pen, 200 units at bedtime  glucose blood VI test strips (AGAMATRIX AMP TEST) strip, Checks 2 times daily. Dx:IDDM--ICD-10 E11.9  venlafaxine (EFFEXOR XR) 75 MG extended release capsule, TAKE TWO CAPSULES BY MOUTH EVERY MORNING AND TAKE ONE CAPSULE BY MOUTH EVERY EVENING (Patient taking differently: 1 capsule every morning. 2 capsules every night.)  solifenacin (VESICARE) 5 MG tablet, Take 1 tablet by mouth daily  Insulin Pen Needle (PEN NEEDLES) 32G X 4 MM MISC, Use as directed with insulin pens, 4 times daily  Lancets MISC, Checks 2 times daily. Dx:IDDM--ICD-10 E11.9  fluticasone (FLONASE) 50 MCG/ACT nasal spray, 1 spray by Nasal route daily    Allergies:  Bactrim [sulfamethoxazole-trimethoprim]; Nitroglycerin; Victoza [liraglutide];  Ace inhibitors; and Nitrofuran derivatives    Social History:   Social History     Tobacco History     Smoking

## 2018-09-17 NOTE — ED TRIAGE NOTES
Pt fell backward today,states has had bilateral leg cramping since June ,under treatment. Also states due to legs giving out has had numerous falls. . Today hit her head ,no loc.per orders ems 128/82-96%-76-one touch 120.upon arrival pt is a+o x4,resp even and unlabored.

## 2018-09-17 NOTE — ED PROVIDER NOTES
[facility] [facility] ED  eMERGENCY dEPARTMENT eNCOUnter      Pt Name: Herson Evangelista  MRN: [id number]  [place] 1975  Date of evaluation: 9/17/2018  Provider: Justine Johnson Dr       Chief Complaint   Patient presents with    Leg Pain     cramping ,bilateral         HISTORY OF PRESENT ILLNESS   (Location/Symptom, Timing/Onset, Context/Setting, Quality, Duration, Modifying Factors, Severity)  Note limiting factors. Herson Evangelista is a 44 y.o. female who presents to the emergency department with complaint of  Severe bilateral leg cramping which has been going on for  5 weeks. Patient is being followed by Dr. Solis  Who has been working her up for this problem. Today while the patient was in the shower her legs gave out and she fell onto her buttocks. There is no loss of consciousness. Patient doesn't know why she keeps getting these leg spasms. Patient states is very painful for her to turn. Patient denies any incontinence of bowel or bladder. Patient is no saddle anesthesia symptoms. The history is provided by the patient and a friend. Nursing Notes were reviewed. REVIEW OF SYSTEMS    (2-9 systems for level 4, 10 or more for level 5)     Review of Systems   Constitutional: Negative for activity change, appetite change, chills, fever and unexpected weight change. HENT: Negative for drooling, ear pain, nosebleeds, sinus pressure and trouble swallowing. Respiratory: Negative for cough, chest tightness and shortness of breath. Cardiovascular: Negative for chest pain and leg swelling. Gastrointestinal: Negative for abdominal pain, diarrhea and vomiting. Endocrine: Negative for polydipsia and polyphagia. Genitourinary: Negative for dysuria, flank pain and frequency. Musculoskeletal: Positive for back pain. Negative for myalgias. Bilateral leg spasms. Skin: Negative for pallor and rash.    Neurological: Negative for syncope, weakness and headaches. Hematological: Does not bruise/bleed easily. All other systems reviewed and are negative. Except as noted above the remainder of the review of systems was reviewed and negative. PAST MEDICAL HISTORY     Past Medical History:   Diagnosis Date    B12 deficiency     Family history of premature CAD 9/4/2013    Fatty liver     HPV (human papilloma virus) anogenital infection     Hyperlipidemia     Hypertension     Liver hemangioma 2009/2015    Obesity 3/11/13    BMI 43.42    Orthostatic hypotension     Ovarian cyst     Rectal fissure     Tobacco abuse 9/3/2015    Tobacco abuse     Tremor     Uncontrolled diabetes mellitus with complications ([facility] [address]     Unspecified sleep apnea          SURGICAL HISTORY       Past Surgical History:   Procedure Laterality Date    CARDIAC CATHETERIZATION  4-07    COLONOSCOPY  2013    for diarrhea (-)    HYSTERECTOMY  12-10    LEEP  1-05    TONSILLECTOMY AND ADENOIDECTOMY  1981    UPPER GASTROINTESTINAL ENDOSCOPY  10/13/15     DR. HERRERA     URETHRA SURGERY  6-2015         CURRENT MEDICATIONS       Previous Medications    ATORVASTATIN (LIPITOR) 20 MG TABLET    Take 1 tablet by mouth daily    FLUCONAZOLE (DIFLUCAN) 150 MG TABLET    TAKE ONE TABLET BY MOUTH EVERY 2 DAYS FOR 4 DOSES    FLUTICASONE (FLONASE) 50 MCG/ACT NASAL SPRAY    1 spray by Nasal route daily    GLUCOSE BLOOD VI TEST STRIPS (AGAMATRIX AMP TEST) STRIP    Checks 2 times daily. Dx:IDDM--ICD-10 E11.9    HANDICAP PLACARD MISC    Exp 5 years    HYDROCHLOROTHIAZIDE (HYDRODIURIL) 25 MG TABLET    Take 1 tablet by mouth daily    INSULIN GLARGINE (TOUJEO SOLOSTAR) 300 UNIT/ML INJECTION PEN    200 units at bedtime    INSULIN LISPRO (HUMALOG KWIKPEN) 100 UNIT/ML PEN    INJECT 40 UNITS INTO THE SKIN THREE TIMES A DAY WITH MEALS    INSULIN PEN NEEDLE (PEN NEEDLES) 32G X 4 MM MISC    Use as directed with insulin pens, 4 times daily    LANCETS MISC    Checks 2 times daily.  Dx:IDDM--ICD-10 E11.9 disk spaces are intact   No acute fractures or spondylo-listhesis. IMPRESSION:      NO ACUTE FRACTURES. All CT scans at this facility use dose modulation, iterative reconstruction, and/or weight based dosing when appropriate to reduce radiation dose to as low as reasonably achievable. EXAMINATION:  CT SCAN LUMBAR SPINE      CLINICAL HISTORY:  History of fall. Pain. COMPARISON:      TECHNIQUE:  Multiple serial axial images of the lumbar spine from the T12 through the sacral levels with both sagittal and coronal reconstructions were performed. FINDINGS:     Again note is made of a compression fracture of the T12 vertebra with retropulsion of a fracture fragment posteriorly. Please see above report for additional details      There are no acute fractures or spondylolisthesis of lumbar vertebra. The SI joints are intact. IMPRESSION:        1. NO ACUTE FRACTURES OF THE LUMBAR VERTEBRA. 2. AGAIN NOTE IS MADE OF A COMPRESSION FRACTURE OF THE T12 VERTEBRA WITH RETROPULSION OF FRACTURE FRAGMENT WITH ASSOCIATED CANAL STENO. All CT scans at this facility use dose modulation, iterative reconstruction, and/or weight based dosing when appropriate to reduce radiation dose to as low as reasonably achievable. CT CERVICAL SPINE WO CONTRAST   Final Result   THERE IS A COMPRESSION FRACTURE OF T12 WITH ASSOCIATED RETROPULSION OF FRACTURE FRAGMENT. ASSOCIATED CANAL STENOSIS AT THIS LEVEL. DR. Vidal Clemens OF THE EMERGENCY ROOM WAS NOTIFIED OF THE ABOVE FINDINGS AT 0530 HOURS ON SEPTEMBER 17, 2018         All CT scans at this facility use dose modulation, iterative reconstruction, and/or weight based dosing when appropriate to reduce radiation dose to as low as reasonably achievable.                CT THORACIC SPINE WO CONTRAST, CT CERVICAL SPINE WO CONTRAST, CT LUMBAR SPINE WO CONTRAST      CLINICAL HISTORY:  inability to ambulate       COMPARISON: NONE      Findings:      Multiple serial patient's condition which required my urgent intervention. CONSULTS:  IP CONSULT TO HOSPITALIST  IP CONSULT TO NEUROSURGERY    PROCEDURES:  Unless otherwise noted below, none     Procedures    FINAL IMPRESSION      1. Other closed fracture of thoracic vertebra, unspecified thoracic vertebral level, initial encounter ([facility] [address]    2.  Unable to ambulate          DISPOSITION/PLAN   DISPOSITION Admitted 09/17/2018 06:09:01 PM      PATIENT REFERRED TO:  Kely Mcgrath MD  [facility] [address] Pavel Villa MD  [facility] [phone]            DISCHARGE MEDICATIONS:  New Prescriptions    No medications on file          (Please note that portions of this note were completed with a voice recognition program.  Efforts were made to edit the dictations but occasionally words are mis-transcribed.)    Dorian Valle DO (electronically signed)  Attending Emergency Physician          Dorian Valle DO  09/17/18 [id number]

## 2018-09-18 ENCOUNTER — ANESTHESIA EVENT (OUTPATIENT)
Dept: OPERATING ROOM | Age: 43
DRG: 478 | End: 2018-09-18
Payer: COMMERCIAL

## 2018-09-18 ENCOUNTER — ANESTHESIA (OUTPATIENT)
Dept: OPERATING ROOM | Age: 43
DRG: 478 | End: 2018-09-18
Payer: COMMERCIAL

## 2018-09-18 ENCOUNTER — APPOINTMENT (OUTPATIENT)
Dept: GENERAL RADIOLOGY | Age: 43
DRG: 478 | End: 2018-09-18
Payer: COMMERCIAL

## 2018-09-18 VITALS — SYSTOLIC BLOOD PRESSURE: 128 MMHG | OXYGEN SATURATION: 92 % | DIASTOLIC BLOOD PRESSURE: 67 MMHG

## 2018-09-18 PROBLEM — M54.6 ACUTE MIDLINE THORACIC BACK PAIN: Status: ACTIVE | Noted: 2018-09-18

## 2018-09-18 LAB
ANION GAP SERPL CALCULATED.3IONS-SCNC: 16 MEQ/L (ref 7–13)
BUN BLDV-MCNC: 19 MG/DL (ref 6–20)
CALCIUM SERPL-MCNC: 8.9 MG/DL (ref 8.6–10.2)
CHLORIDE BLD-SCNC: 101 MEQ/L (ref 98–107)
CO2: 26 MEQ/L (ref 22–29)
CREAT SERPL-MCNC: 0.56 MG/DL (ref 0.5–0.9)
GFR AFRICAN AMERICAN: >60
GFR NON-AFRICAN AMERICAN: >60
GLUCOSE BLD-MCNC: 116 MG/DL (ref 60–115)
GLUCOSE BLD-MCNC: 116 MG/DL (ref 60–115)
GLUCOSE BLD-MCNC: 124 MG/DL (ref 60–115)
GLUCOSE BLD-MCNC: 156 MG/DL (ref 74–109)
PERFORMED ON: ABNORMAL
POTASSIUM REFLEX MAGNESIUM: 3.7 MEQ/L (ref 3.5–5.1)
SODIUM BLD-SCNC: 143 MEQ/L (ref 132–144)

## 2018-09-18 PROCEDURE — 3209999900 FLUORO FOR SURGICAL PROCEDURES

## 2018-09-18 PROCEDURE — 6370000000 HC RX 637 (ALT 250 FOR IP): Performed by: PHYSICAL MEDICINE & REHABILITATION

## 2018-09-18 PROCEDURE — A4648 IMPLANTABLE TISSUE MARKER: HCPCS | Performed by: NEUROLOGICAL SURGERY

## 2018-09-18 PROCEDURE — 6360000002 HC RX W HCPCS: Performed by: ANESTHESIOLOGY

## 2018-09-18 PROCEDURE — 6370000000 HC RX 637 (ALT 250 FOR IP): Performed by: NEUROLOGICAL SURGERY

## 2018-09-18 PROCEDURE — 36415 COLL VENOUS BLD VENIPUNCTURE: CPT

## 2018-09-18 PROCEDURE — 99221 1ST HOSP IP/OBS SF/LOW 40: CPT | Performed by: PHYSICAL MEDICINE & REHABILITATION

## 2018-09-18 PROCEDURE — 6370000000 HC RX 637 (ALT 250 FOR IP): Performed by: ANESTHESIOLOGY

## 2018-09-18 PROCEDURE — 2700000000 HC OXYGEN THERAPY PER DAY

## 2018-09-18 PROCEDURE — 0PS43ZZ REPOSITION THORACIC VERTEBRA, PERCUTANEOUS APPROACH: ICD-10-PCS | Performed by: NEUROLOGICAL SURGERY

## 2018-09-18 PROCEDURE — 6360000002 HC RX W HCPCS: Performed by: NEUROLOGICAL SURGERY

## 2018-09-18 PROCEDURE — 2709999900 HC NON-CHARGEABLE SUPPLY: Performed by: NEUROLOGICAL SURGERY

## 2018-09-18 PROCEDURE — 3700000001 HC ADD 15 MINUTES (ANESTHESIA): Performed by: NEUROLOGICAL SURGERY

## 2018-09-18 PROCEDURE — 6360000002 HC RX W HCPCS: Performed by: INTERNAL MEDICINE

## 2018-09-18 PROCEDURE — 3600000014 HC SURGERY LEVEL 4 ADDTL 15MIN: Performed by: NEUROLOGICAL SURGERY

## 2018-09-18 PROCEDURE — 0PU43JZ SUPPLEMENT THORACIC VERTEBRA WITH SYNTHETIC SUBSTITUTE, PERCUTANEOUS APPROACH: ICD-10-PCS | Performed by: NEUROLOGICAL SURGERY

## 2018-09-18 PROCEDURE — 0PB43ZX EXCISION OF THORACIC VERTEBRA, PERCUTANEOUS APPROACH, DIAGNOSTIC: ICD-10-PCS | Performed by: NEUROLOGICAL SURGERY

## 2018-09-18 PROCEDURE — 1210000000 HC MED SURG R&B

## 2018-09-18 PROCEDURE — 3600000004 HC SURGERY LEVEL 4 BASE: Performed by: NEUROLOGICAL SURGERY

## 2018-09-18 PROCEDURE — 7100000001 HC PACU RECOVERY - ADDTL 15 MIN: Performed by: NEUROLOGICAL SURGERY

## 2018-09-18 PROCEDURE — 6370000000 HC RX 637 (ALT 250 FOR IP): Performed by: INTERNAL MEDICINE

## 2018-09-18 PROCEDURE — 88307 TISSUE EXAM BY PATHOLOGIST: CPT

## 2018-09-18 PROCEDURE — 3700000000 HC ANESTHESIA ATTENDED CARE: Performed by: NEUROLOGICAL SURGERY

## 2018-09-18 PROCEDURE — 6360000002 HC RX W HCPCS: Performed by: PHYSICAL MEDICINE & REHABILITATION

## 2018-09-18 PROCEDURE — 6360000002 HC RX W HCPCS: Performed by: NURSE ANESTHETIST, CERTIFIED REGISTERED

## 2018-09-18 PROCEDURE — 2500000003 HC RX 250 WO HCPCS: Performed by: NEUROLOGICAL SURGERY

## 2018-09-18 PROCEDURE — 7100000000 HC PACU RECOVERY - FIRST 15 MIN: Performed by: NEUROLOGICAL SURGERY

## 2018-09-18 PROCEDURE — 2500000003 HC RX 250 WO HCPCS: Performed by: NURSE ANESTHETIST, CERTIFIED REGISTERED

## 2018-09-18 PROCEDURE — 88311 DECALCIFY TISSUE: CPT

## 2018-09-18 PROCEDURE — C1713 ANCHOR/SCREW BN/BN,TIS/BN: HCPCS | Performed by: NEUROLOGICAL SURGERY

## 2018-09-18 PROCEDURE — 80048 BASIC METABOLIC PNL TOTAL CA: CPT

## 2018-09-18 DEVICE — BONE CEMENT C01B HV-R WITH MIXER  US
Type: IMPLANTABLE DEVICE | Site: BACK | Status: FUNCTIONAL
Brand: KYPHON® HV-R® BONE CEMENT AND KYPHON® MIXER PACK

## 2018-09-18 RX ORDER — OXYCODONE HYDROCHLORIDE 5 MG/1
5 TABLET ORAL EVERY 4 HOURS PRN
Status: DISCONTINUED | OUTPATIENT
Start: 2018-09-18 | End: 2018-09-21 | Stop reason: HOSPADM

## 2018-09-18 RX ORDER — ONDANSETRON 2 MG/ML
INJECTION INTRAMUSCULAR; INTRAVENOUS PRN
Status: DISCONTINUED | OUTPATIENT
Start: 2018-09-18 | End: 2018-09-18 | Stop reason: SDUPTHER

## 2018-09-18 RX ORDER — MIDAZOLAM HYDROCHLORIDE 1 MG/ML
INJECTION INTRAMUSCULAR; INTRAVENOUS PRN
Status: DISCONTINUED | OUTPATIENT
Start: 2018-09-18 | End: 2018-09-18 | Stop reason: SDUPTHER

## 2018-09-18 RX ORDER — DIAZEPAM 2 MG/1
2 TABLET ORAL EVERY 6 HOURS PRN
Status: DISCONTINUED | OUTPATIENT
Start: 2018-09-18 | End: 2018-09-19

## 2018-09-18 RX ORDER — FENTANYL 25 UG/H
1 PATCH TRANSDERMAL
Status: DISCONTINUED | OUTPATIENT
Start: 2018-09-18 | End: 2018-09-18

## 2018-09-18 RX ORDER — ONDANSETRON 4 MG/1
4 TABLET, FILM COATED ORAL EVERY 8 HOURS PRN
Status: DISCONTINUED | OUTPATIENT
Start: 2018-09-18 | End: 2018-09-21 | Stop reason: HOSPADM

## 2018-09-18 RX ORDER — ONDANSETRON 2 MG/ML
2 INJECTION INTRAMUSCULAR; INTRAVENOUS 4 TIMES DAILY PRN
Status: DISCONTINUED | OUTPATIENT
Start: 2018-09-18 | End: 2018-09-18

## 2018-09-18 RX ORDER — OXYCODONE HYDROCHLORIDE 5 MG/1
10 TABLET ORAL EVERY 4 HOURS PRN
Status: DISCONTINUED | OUTPATIENT
Start: 2018-09-18 | End: 2018-09-21 | Stop reason: HOSPADM

## 2018-09-18 RX ORDER — ONDANSETRON 2 MG/ML
4 INJECTION INTRAMUSCULAR; INTRAVENOUS EVERY 8 HOURS
Status: DISCONTINUED | OUTPATIENT
Start: 2018-09-18 | End: 2018-09-21 | Stop reason: HOSPADM

## 2018-09-18 RX ORDER — HYDROMORPHONE HYDROCHLORIDE 2 MG/1
1 TABLET ORAL
Status: DISCONTINUED | OUTPATIENT
Start: 2018-09-18 | End: 2018-09-18

## 2018-09-18 RX ORDER — LIDOCAINE HYDROCHLORIDE 20 MG/ML
INJECTION, SOLUTION EPIDURAL; INFILTRATION; INTRACAUDAL; PERINEURAL PRN
Status: DISCONTINUED | OUTPATIENT
Start: 2018-09-18 | End: 2018-09-18 | Stop reason: SDUPTHER

## 2018-09-18 RX ORDER — METAXALONE 800 MG/1
800 TABLET ORAL EVERY 6 HOURS PRN
Status: DISCONTINUED | OUTPATIENT
Start: 2018-09-18 | End: 2018-09-19

## 2018-09-18 RX ORDER — PROPOFOL 10 MG/ML
INJECTION, EMULSION INTRAVENOUS CONTINUOUS PRN
Status: DISCONTINUED | OUTPATIENT
Start: 2018-09-18 | End: 2018-09-18 | Stop reason: SDUPTHER

## 2018-09-18 RX ORDER — LIDOCAINE 50 MG/G
3 PATCH TOPICAL DAILY
Status: DISCONTINUED | OUTPATIENT
Start: 2018-09-18 | End: 2018-09-21 | Stop reason: HOSPADM

## 2018-09-18 RX ORDER — LIDOCAINE HYDROCHLORIDE 10 MG/ML
INJECTION, SOLUTION EPIDURAL; INFILTRATION; INTRACAUDAL; PERINEURAL PRN
Status: DISCONTINUED | OUTPATIENT
Start: 2018-09-18 | End: 2018-09-18 | Stop reason: HOSPADM

## 2018-09-18 RX ORDER — DIAZEPAM 5 MG/1
5 TABLET ORAL EVERY 6 HOURS PRN
Status: DISCONTINUED | OUTPATIENT
Start: 2018-09-18 | End: 2018-09-18

## 2018-09-18 RX ORDER — HYDROCODONE BITARTRATE AND ACETAMINOPHEN 10; 325 MG/1; MG/1
1 TABLET ORAL EVERY 6 HOURS PRN
Status: DISCONTINUED | OUTPATIENT
Start: 2018-09-18 | End: 2018-09-18

## 2018-09-18 RX ORDER — CYCLOBENZAPRINE HCL 10 MG
10 TABLET ORAL 3 TIMES DAILY PRN
Status: DISCONTINUED | OUTPATIENT
Start: 2018-09-18 | End: 2018-09-18

## 2018-09-18 RX ORDER — ACETAMINOPHEN 325 MG/1
650 TABLET ORAL EVERY 6 HOURS SCHEDULED
Status: DISCONTINUED | OUTPATIENT
Start: 2018-09-18 | End: 2018-09-21

## 2018-09-18 RX ORDER — METAXALONE 800 MG/1
800 TABLET ORAL 3 TIMES DAILY
Status: DISCONTINUED | OUTPATIENT
Start: 2018-09-18 | End: 2018-09-18

## 2018-09-18 RX ORDER — OXYCODONE HYDROCHLORIDE 5 MG/1
5 TABLET ORAL EVERY 6 HOURS
Status: DISCONTINUED | OUTPATIENT
Start: 2018-09-18 | End: 2018-09-18

## 2018-09-18 RX ORDER — FENTANYL 25 UG/H
1 PATCH TRANSDERMAL
Status: DISCONTINUED | OUTPATIENT
Start: 2018-09-19 | End: 2018-09-18

## 2018-09-18 RX ADMIN — PROPOFOL 140 MCG/KG/MIN: 10 INJECTION, EMULSION INTRAVENOUS at 14:51

## 2018-09-18 RX ADMIN — ONDANSETRON HYDROCHLORIDE 4 MG: 2 INJECTION, SOLUTION INTRAMUSCULAR; INTRAVENOUS at 17:17

## 2018-09-18 RX ADMIN — HYDROMORPHONE HYDROCHLORIDE 1 MG: 1 INJECTION, SOLUTION INTRAMUSCULAR; INTRAVENOUS; SUBCUTANEOUS at 04:59

## 2018-09-18 RX ADMIN — Medication 2 G: at 14:54

## 2018-09-18 RX ADMIN — LIDOCAINE HYDROCHLORIDE 100 MG: 20 INJECTION, SOLUTION EPIDURAL; INFILTRATION; INTRACAUDAL; PERINEURAL at 14:51

## 2018-09-18 RX ADMIN — ACETAMINOPHEN 650 MG: 325 TABLET ORAL at 21:05

## 2018-09-18 RX ADMIN — ONDANSETRON HYDROCHLORIDE 4 MG: 2 INJECTION, SOLUTION INTRAMUSCULAR; INTRAVENOUS at 12:23

## 2018-09-18 RX ADMIN — METOPROLOL TARTRATE 50 MG: 50 TABLET ORAL at 09:06

## 2018-09-18 RX ADMIN — DIAZEPAM 5 MG: 5 TABLET ORAL at 08:31

## 2018-09-18 RX ADMIN — CYCLOBENZAPRINE HYDROCHLORIDE 10 MG: 10 TABLET, FILM COATED ORAL at 04:14

## 2018-09-18 RX ADMIN — HYDROMORPHONE HYDROCHLORIDE 1 MG: 1 INJECTION, SOLUTION INTRAMUSCULAR; INTRAVENOUS; SUBCUTANEOUS at 15:24

## 2018-09-18 RX ADMIN — OXYCODONE HYDROCHLORIDE 10 MG: 10 TABLET, FILM COATED, EXTENDED RELEASE ORAL at 09:06

## 2018-09-18 RX ADMIN — METOPROLOL TARTRATE 50 MG: 50 TABLET ORAL at 21:05

## 2018-09-18 RX ADMIN — HYDROMORPHONE HYDROCHLORIDE 1 MG: 1 INJECTION, SOLUTION INTRAMUSCULAR; INTRAVENOUS; SUBCUTANEOUS at 21:04

## 2018-09-18 RX ADMIN — HYDROMORPHONE HYDROCHLORIDE 1 MG: 1 INJECTION, SOLUTION INTRAMUSCULAR; INTRAVENOUS; SUBCUTANEOUS at 09:06

## 2018-09-18 RX ADMIN — HYDROMORPHONE HYDROCHLORIDE 1 MG: 1 INJECTION, SOLUTION INTRAMUSCULAR; INTRAVENOUS; SUBCUTANEOUS at 14:45

## 2018-09-18 RX ADMIN — HYDROMORPHONE HYDROCHLORIDE 1 MG: 1 INJECTION, SOLUTION INTRAMUSCULAR; INTRAVENOUS; SUBCUTANEOUS at 00:58

## 2018-09-18 RX ADMIN — HYDROMORPHONE HYDROCHLORIDE 1 MG: 1 INJECTION, SOLUTION INTRAMUSCULAR; INTRAVENOUS; SUBCUTANEOUS at 12:23

## 2018-09-18 RX ADMIN — ONDANSETRON HYDROCHLORIDE 4 MG: 2 INJECTION, SOLUTION INTRAMUSCULAR; INTRAVENOUS at 15:16

## 2018-09-18 RX ADMIN — METAXALONE 800 MG: 800 TABLET ORAL at 21:04

## 2018-09-18 RX ADMIN — MIDAZOLAM HYDROCHLORIDE 2 MG: 1 INJECTION, SOLUTION INTRAMUSCULAR; INTRAVENOUS at 14:45

## 2018-09-18 ASSESSMENT — PAIN SCALES - GENERAL
PAINLEVEL_OUTOF10: 9
PAINLEVEL_OUTOF10: 7
PAINLEVEL_OUTOF10: 10
PAINLEVEL_OUTOF10: 7
PAINLEVEL_OUTOF10: 9
PAINLEVEL_OUTOF10: 10

## 2018-09-18 ASSESSMENT — PULMONARY FUNCTION TESTS
PIF_VALUE: 1
PIF_VALUE: 1
PIF_VALUE: 0
PIF_VALUE: 1
PIF_VALUE: 1
PIF_VALUE: 0
PIF_VALUE: 1
PIF_VALUE: 0
PIF_VALUE: 1
PIF_VALUE: 0
PIF_VALUE: 1
PIF_VALUE: 0

## 2018-09-18 ASSESSMENT — ENCOUNTER SYMPTOMS
BACK PAIN: 1
CHEST TIGHTNESS: 0
FACIAL SWELLING: 0
DIARRHEA: 0
RESPIRATORY NEGATIVE: 1
PHOTOPHOBIA: 0
TROUBLE SWALLOWING: 0
EYE REDNESS: 0
NAUSEA: 1
GASTROINTESTINAL NEGATIVE: 1
EYES NEGATIVE: 1
COLOR CHANGE: 0
RECTAL PAIN: 0
COUGH: 0
ABDOMINAL DISTENTION: 0
EYE PAIN: 0
CHOKING: 0
WHEEZING: 0
ABDOMINAL PAIN: 0
VOMITING: 1
ANAL BLEEDING: 0
CONSTIPATION: 0
SHORTNESS OF BREATH: 0

## 2018-09-18 ASSESSMENT — LIFESTYLE VARIABLES: SMOKING_STATUS: 0

## 2018-09-18 ASSESSMENT — PAIN - FUNCTIONAL ASSESSMENT: PAIN_FUNCTIONAL_ASSESSMENT: 0-10

## 2018-09-18 NOTE — CONSULTS
Physical Medicine & Rehabilitation  Consult Note      Admitting Physician: Laurie Roy MD    Primary Care Provider: Tacho Zelaya MD     Reason for Consult:  Asses rehab needs, promote physical and mental function, and decrease likelihood of re-admit to the hospital after discharge. History of Present Illness:    Janay Hernandez is a 44 y.o. female admitted to [facility] on 9/17/2018. A shunt fell at home getting out of the bathtub and fractured her T 12 vertebrae she's been in severe pain. Unfortunately the pain meds at we have been trying have been causing more nausea. She does not tolerate oxycodone products now or in the past.  So far there are no signs of neurogenic bowel and bladder but we need to monitor that closely for that. Right now she is intolerant to any motion and would not tolerate any therapy of course and we would only entertain that postoperatively and her ways. Right now I'm urging with that current providers to help alleviate her pain as she is in dire straits in her family is quite insistent that somebody do something and they do it now. I also warned him that she needs to make sure that she uses her CPAP of to avoid narcotic-related hypoxemia. They are in agreement they are at bedside and she and they all agree that she will use the CPAP. Leg Pain    The pain is at a severity of 10/10. The pain is severe. The pain has been constant since onset. Associated symptoms include an inability to bear weight and a loss of motion. Pertinent negatives include no loss of sensation, muscle weakness, numbness or tingling. She reports no foreign bodies present. The symptoms are aggravated by movement, palpation and weight bearing. Treatments tried: iv dilaudid. The treatment provided mild relief. Back Pain   This is a new problem. The current episode started in the past 7 days. The problem occurs constantly. The problem is unchanged.  The pain is present in the lumbar spine and thoracic spine. The pain is severe. The symptoms are aggravated by bending, lying down, stress, twisting and position. Stiffness is present all day. Associated symptoms include leg pain and weakness. Pertinent negatives include no abdominal pain, chest pain, dysuria, fever, headaches, numbness or tingling. Risk factors include lack of exercise, menopause, obesity and sedentary lifestyle. She has tried heat, analgesics, ice, muscle relaxant and NSAIDs (iv dilaudid) for the symptoms. The treatment provided mild relief. Nausea & Vomiting   This is a new problem. The current episode started in the past 7 days. The problem occurs constantly. The problem has been unchanged. Associated symptoms include arthralgias, fatigue, myalgias, nausea, vomiting and weakness. Pertinent negatives include no abdominal pain, chest pain, chills, coughing, fever, headaches, neck pain, numbness or rash. Exacerbated by: oxycodone. She has tried rest, walking and heat for the symptoms. The treatment provided mild relief. Their inpatient work up has included:    Imaging:    Imaging and other studies reviewed and discussed with patient and staff    Xr Lumbar Spine (min 4 Views)    Result Date: 9/17/2018  EXAMINATION: XR PELVIS (1-2 VIEWS), XR LUMBAR SPINE (MIN 4 VIEWS) CLINICAL HISTORY:   fall COMPARISON:  none FINDINGS: 4 views of the lumbar spine including oblique views are submitted. Patient refused to do additional cord images. There are 5 lumbar type vertebrae. No acute fractures. Disk spaces are intact No significant spondylolisthesis or spondylolysis. The SI joints are unremarkable. IMPRESSION NO ACUTE FRACTURE. EXAMINATION: AP PELVIS  CLINICAL HISTORY: History of fall 5 weeks ago  COMPARISONS: None  FINDINGS: Single AP view of the pelvis is submitted No acute fractures. No dislocations. No focal bony abnormalities      NO ACUTE FRACTURES.      Xr Pelvis (1-2 Views)    Result Date: listhesis     THERE IS A COMPRESSION FRACTURE OF T12 WITH ASSOCIATED RETROPULSION OF FRACTURE FRAGMENT. ASSOCIATED CANAL STENOSIS AT THIS LEVEL. DR. Manohar Cooper OF THE EMERGENCY ROOM WAS NOTIFIED OF THE ABOVE FINDINGS AT 0530 HOURS ON SEPTEMBER 17, 2018 All CT scans at this facility use dose modulation, iterative reconstruction, and/or weight based dosing when appropriate to reduce radiation dose to as low as reasonably achievable. CT THORACIC SPINE WO CONTRAST, CT CERVICAL SPINE WO CONTRAST, CT LUMBAR SPINE WO CONTRAST CLINICAL HISTORY:  inability to ambulate COMPARISON: NONE Findings: Multiple serial axial images of the cervical spine from the base of the skull through the upper thoracic vertebra with both sagittal and coronal reconstructions was performed. There is straightening of the normal expected cervical lordosis. . Prevertebral  soft tissues are  unremarkable. The disk spaces are intact No acute fractures or spondylo-listhesis. IMPRESSION: NO ACUTE FRACTURES. All CT scans at this facility use dose modulation, iterative reconstruction, and/or weight based dosing when appropriate to reduce radiation dose to as low as reasonably achievable. EXAMINATION:  CT SCAN LUMBAR SPINE CLINICAL HISTORY:  History of fall. Pain. COMPARISON: TECHNIQUE:  Multiple serial axial images of the lumbar spine from the T12 through the sacral levels with both sagittal and coronal reconstructions were performed. FINDINGS:  Again note is made of a compression fracture of the T12 vertebra with retropulsion of a fracture fragment posteriorly. Please see above report for additional details There are no acute fractures or spondylolisthesis of lumbar vertebra. The SI joints are intact. IMPRESSION:  1. NO ACUTE FRACTURES OF THE LUMBAR VERTEBRA. 2. AGAIN NOTE IS MADE OF A COMPRESSION FRACTURE OF THE T12 VERTEBRA WITH RETROPULSION OF FRACTURE FRAGMENT WITH ASSOCIATED CANAL STENO.  All CT scans at this facility use dose modulation, iterative reconstruction, and/or weight based dosing when appropriate to reduce radiation dose to as low as reasonably achievable. Ct Lumbar Spine Wo Contrast    Result Date: 9/17/2018  EXAMINATION:  THORACIC SPINE CLINICAL HISTORY:  History of fall. COMPARISON: TECHNIQUE:  Multiple serial axial images of the thoracic spine from the C6 through the L1 levels with both sagittal coronal reconstruction was performed. FINDINGS:  There is a compression fracture of the T12 vertebra. There is some protrusion of a fracture fragment approximately 5 mm posteriorly with some associated canal stenosis at this level. There is multilevel degenerative changes osteophytosis marginal spurring noted. No significant listhesis     THERE IS A COMPRESSION FRACTURE OF T12 WITH ASSOCIATED RETROPULSION OF FRACTURE FRAGMENT. ASSOCIATED CANAL STENOSIS AT THIS LEVEL. DR. Rich Anthony OF THE EMERGENCY ROOM WAS NOTIFIED OF THE ABOVE FINDINGS AT 0530 HOURS ON SEPTEMBER 17, 2018 All CT scans at this facility use dose modulation, iterative reconstruction, and/or weight based dosing when appropriate to reduce radiation dose to as low as reasonably achievable. CT THORACIC SPINE WO CONTRAST, CT CERVICAL SPINE WO CONTRAST, CT LUMBAR SPINE WO CONTRAST CLINICAL HISTORY:  inability to ambulate COMPARISON: NONE Findings: Multiple serial axial images of the cervical spine from the base of the skull through the upper thoracic vertebra with both sagittal and coronal reconstructions was performed. There is straightening of the normal expected cervical lordosis. . Prevertebral  soft tissues are  unremarkable. The disk spaces are intact No acute fractures or spondylo-listhesis. IMPRESSION: NO ACUTE FRACTURES. All CT scans at this facility use dose modulation, iterative reconstruction, and/or weight based dosing when appropriate to reduce radiation dose to as low as reasonably achievable. EXAMINATION:  CT SCAN LUMBAR SPINE CLINICAL HISTORY:  History of fall. Pain.  COMPARISON: TECHNIQUE:  Multiple serial axial images of the lumbar spine from the T12 through the sacral levels with both sagittal and coronal reconstructions were performed. FINDINGS:  Again note is made of a compression fracture of the T12 vertebra with retropulsion of a fracture fragment posteriorly. Please see above report for additional details There are no acute fractures or spondylolisthesis of lumbar vertebra. The SI joints are intact. IMPRESSION:  1. NO ACUTE FRACTURES OF THE LUMBAR VERTEBRA. 2. AGAIN NOTE IS MADE OF A COMPRESSION FRACTURE OF THE T12 VERTEBRA WITH RETROPULSION OF FRACTURE FRAGMENT WITH ASSOCIATED CANAL STENO. All CT scans at this facility use dose modulation, iterative reconstruction, and/or weight based dosing when appropriate to reduce radiation dose to as low as reasonably achievable.              Labs:     labs reviewed and discussed with patient and staff    Lab Results   Component Value Date    POCGLU 135 09/17/2018    POCGLU 166 06/19/2018    POCGLU 163 06/19/2018    POCGLU 140 06/18/2018    POCGLU 116 06/18/2018     Lab Results   Component Value Date     09/18/2018    K 3.7 09/18/2018     09/18/2018    CO2 26 09/18/2018    BUN 19 09/18/2018    CREATININE 0.56 09/18/2018    CALCIUM 8.9 09/18/2018    LABALBU 4.9 09/17/2018    LABALBU 4.4 05/31/2012    BILITOT 0.7 09/17/2018    ALKPHOS 116 09/17/2018    AST 30 09/17/2018    ALT 22 09/17/2018     Lab Results   Component Value Date    WBC 14.6 09/17/2018    RBC 4.84 09/17/2018    RBC 4.57 02/22/2012    HGB 13.1 09/17/2018    HCT 39.7 09/17/2018    MCV 82.0 09/17/2018    MCH 27.0 09/17/2018    MCHC 32.9 09/17/2018    RDW 15.9 09/17/2018     09/17/2018    MPV 8.8 10/13/2015     Lab Results   Component Value Date    VITD25 17.4 06/08/2018     Lab Results   Component Value Date    APPEARANCE CLEAR 02/15/2013    COLORU yellow 02/27/2018    COLORU Yellow 10/11/2015    NITRU Negative 10/11/2015    GLUCOSEU neg 02/27/2018    GLUCOSEU Negative 10/11/2015    GLUCOSEU NEG 01/09/2012    KETUA neg 02/27/2018    KETUA Negative 10/11/2015    UROBILINOGEN 0.2 10/11/2015    BILIRUBINUR neg 02/27/2018    BILIRUBINUR NEG 01/09/2012     Lab Results   Component Value Date    PROTIME 10.0 10/07/2015    PROTIME 10.0 11/21/2011     Lab Results   Component Value Date    INR 0.9 10/07/2015         I discussed results with patient. Current Rehabilitation Assessments:    Rehabilitation:  Physical therapy: FIMS:  Bed Mobility:      Transfers:  ,  ,      FIMS:  ,  ,        Occupational therapy: FIMS:   ,  ,             LTG:                  Speech therapy: FIMS:           Past Medical History:          Diagnosis Date    B12 deficiency     Family history of premature CAD 9/4/2013    Fatty liver     HPV (human papilloma virus) anogenital infection     Hyperlipidemia     Hypertension     Liver hemangioma 2009/2015    Obesity 3/11/13    BMI 43.42    Orthostatic hypotension     Ovarian cyst     Rectal fissure     Tobacco abuse 9/3/2015    Tobacco abuse     Tremor     Uncontrolled diabetes mellitus with complications ([facility] [address]     Unspecified sleep apnea          Past Surgical History:          Procedure Laterality Date    CARDIAC CATHETERIZATION  4-07    COLONOSCOPY  2013    for diarrhea (-)    HYSTERECTOMY  12-10    LEEP  1-05    TONSILLECTOMY AND ADENOIDECTOMY  1981    UPPER GASTROINTESTINAL ENDOSCOPY  10/13/15     DR. Clark Samples URETHRA SURGERY  6-2015         Allergies:      Allergies   Allergen Reactions    Bactrim [Sulfamethoxazole-Trimethoprim] Itching    Nitroglycerin     Victoza [Liraglutide]      NAUSEA    Ace Inhibitors Other (See Comments)     Dizziness and near syncope    Nitrofuran Derivatives Other (See Comments)     Migraines    Percocet [Oxycodone-Acetaminophen] Nausea And Vomiting        Current Medications:     Current Facility-Administered Medications: diazepam (VALIUM) tablet 5 mg, 5 mg, Oral, Q6H PRN  lidocaine (LIDODERM) 5 % 3 patch, 3 patch, Transdermal, Daily  HYDROmorphone (DILAUDID) injection 1 mg, 1 mg, Intravenous, Q3H PRN  ondansetron (ZOFRAN) injection 4 mg, 4 mg, Intravenous, Q8H  fentaNYL (DURAGESIC) 25 MCG/HR 1 patch, 1 patch, Transdermal, Q72H  ondansetron (ZOFRAN) tablet 4 mg, 4 mg, Oral, Q8H PRN  HYDROmorphone (DILAUDID) tablet 1 mg, 1 mg, Oral, Q3H PRN  metaxalone (SKELAXIN) tablet 800 mg, 800 mg, Oral, TID  hydrochlorothiazide (HYDRODIURIL) tablet 25 mg, 25 mg, Oral, Daily  metoprolol tartrate (LOPRESSOR) tablet 50 mg, 50 mg, Oral, BID  sodium chloride flush 0.9 % injection 10 mL, 10 mL, Intravenous, 2 times per day  sodium chloride flush 0.9 % injection 10 mL, 10 mL, Intravenous, PRN  magnesium hydroxide (MILK OF MAGNESIA) 400 MG/5ML suspension 30 mL, 30 mL, Oral, Daily PRN  enoxaparin (LOVENOX) injection 40 mg, 40 mg, Subcutaneous, Daily  glucose (GLUTOSE) 40 % oral gel 15 g, 15 g, Oral, PRN  dextrose 50 % solution 12.5 g, 12.5 g, Intravenous, PRN  glucagon (rDNA) injection 1 mg, 1 mg, Intramuscular, PRN  dextrose 5 % solution, 100 mL/hr, Intravenous, PRN  insulin lispro (HUMALOG) injection vial 0-6 Units, 0-6 Units, Subcutaneous, TID WC  insulin lispro (HUMALOG) injection vial 0-3 Units, 0-3 Units, Subcutaneous, Nightly  insulin glargine (LANTUS) injection vial 20 Units, 20 Units, Subcutaneous, Nightly  0.9 % sodium chloride infusion, , Intravenous, Continuous      Social History:    Social History     Social History    Marital status:      Spouse name: N/A    Number of children: N/A    Years of education: N/A     Occupational History    Not on file.      Social History Main Topics    Smoking status: Former Smoker     Packs/day: 1.00     Types: Cigarettes     Quit date: 1/1/2017    Smokeless tobacco: Current User      Comment: pt trying to cut back    Alcohol use 0.0 oz/week      Comment: socially    Drug use: No    Sexual activity: Yes     Partners: Male      Comment: single     Other Topics Concern  Not on file     Social History Narrative    No narrative on file          Family History:     Family History   Problem Relation Age of Onset    Diabetes Father     Heart Disease Mother        Review of Systems:     Review of Systems   Constitutional: Positive for activity change and fatigue. Negative for appetite change, chills, fever and unexpected weight change. HENT: Negative for ear discharge, ear pain, facial swelling, hearing loss and trouble swallowing. Eyes: Negative for photophobia, pain and redness. Respiratory: Negative for cough, choking, chest tightness, shortness of breath and wheezing. Cardiovascular: Negative for chest pain, palpitations and leg swelling. Gastrointestinal: Positive for nausea and vomiting. Negative for abdominal distention, abdominal pain, anal bleeding, constipation, diarrhea and rectal pain. Genitourinary: Negative for difficulty urinating, dysuria, flank pain, frequency and urgency. Musculoskeletal: Positive for arthralgias, back pain, gait problem and myalgias. Negative for neck pain and neck stiffness. Skin: Negative for color change, pallor, rash and wound. Neurological: Positive for weakness. Negative for dizziness, tingling, tremors, syncope, facial asymmetry, speech difficulty, light-headedness, numbness and headaches. Hematological: Negative for adenopathy. Does not bruise/bleed easily. Psychiatric/Behavioral: Positive for decreased concentration, dysphoric mood and sleep disturbance. Negative for agitation, behavioral problems, confusion, hallucinations, self-injury and suicidal ideas. The patient is not nervous/anxious and is not hyperactive. All other systems reviewed and are negative.             Physical Exam:    /64   Pulse 88   Temp 97 °F (36.1 °C) (Oral)   Resp 20   Ht 5' 5\" (1.651 m)   Wt 250 lb (113.4 kg)   LMP  (LMP Unknown)   SpO2 (!) 81%   BMI 41.60 kg/m²      Physical Exam   Constitutional: She appears well-developed and well-nourished. She appears listless. Non-toxic appearance. She does not have a sickly appearance. She does not appear ill. No distress. HENT:   Head: Normocephalic. Not macrocephalic and not microcephalic. Head is without raccoon's eyes and without Hanley's sign. Mouth/Throat: Oropharyngeal exudate present. Eyes: Pupils are equal, round, and reactive to light. Conjunctivae and EOM are normal. Right eye exhibits no discharge. Left eye exhibits no discharge. No scleral icterus. Neck: Normal range of motion. Normal carotid pulses, no hepatojugular reflux and no JVD present. Spinous process tenderness and muscular tenderness present. Carotid bruit is not present. No tracheal deviation present. No thyromegaly present. Cardiovascular: Exam reveals distant heart sounds. Pulmonary/Chest: Effort normal. No stridor. She has decreased breath sounds. Abdominal: Soft. She exhibits no distension. Bowel sounds are decreased. There is no tenderness. There is no rebound and no guarding. Musculoskeletal:        Cervical back: She exhibits decreased range of motion and tenderness. Thoracic back: She exhibits decreased range of motion and tenderness. Lumbar back: She exhibits decreased range of motion and tenderness. Back:    Lymphadenopathy:        Head (right side): No submental and no submandibular adenopathy present. Head (left side): No submental and no submandibular adenopathy present. She has no cervical adenopathy. She has no axillary adenopathy. Neurological: She appears listless. A sensory deficit is present. Coordination and gait abnormal.   Reflex Scores:       Tricep reflexes are 1+ on the right side and 1+ on the left side. Bicep reflexes are 1+ on the right side and 1+ on the left side. Brachioradialis reflexes are 1+ on the right side and 1+ on the left side. Patellar reflexes are 1+ on the right side.        Achilles reflexes are 0 on the right side and 0 on the left side. Skin: Skin is warm and dry. She is not diaphoretic. There is pallor. Psychiatric: Thought content normal. Her mood appears not anxious. Her affect is not angry. Her speech is delayed. Her speech is not rapid and/or pressured. She is slowed. She is not withdrawn and not actively hallucinating. Cognition and memory are not impaired. She does not express impulsivity or inappropriate judgment. She exhibits a depressed mood. She exhibits abnormal recent memory. She exhibits normal remote memory. She is attentive. Ortho Exam  Neurologic Exam     Cranial Nerves     CN III, IV, VI   Pupils are equal, round, and reactive to light.   Extraocular motions are normal.     Gait, Coordination, and Reflexes     Reflexes   Right brachioradialis: 1+  Left brachioradialis: 1+  Right biceps: 1+  Left biceps: 1+  Right triceps: 1+  Left triceps: 1+  Right patellar: 1+  Right achilles: 0  Left achilles: 0            Diagnostics:    Recent Results (from the past 24 hour(s))   CK    Collection Time: 09/17/18  2:15 PM   Result Value Ref Range    Total  0 - 170 U/L   CBC Auto Differential    Collection Time: 09/17/18  2:15 PM   Result Value Ref Range    WBC 14.6 (H) 4.8 - 10.8 K/uL    RBC 4.84 4.20 - 5.40 M/uL    Hemoglobin 13.1 12.0 - 16.0 g/dL    Hematocrit 39.7 37.0 - 47.0 %    MCV 82.0 82.0 - 100.0 fL    MCH 27.0 27.0 - 31.3 pg    MCHC 32.9 (L) 33.0 - 37.0 %    RDW 15.9 (H) 11.5 - 14.5 %    Platelets 713 021 - 621 K/uL    Neutrophils % 79.8 %    Lymphocytes % 15.2 %    Monocytes % 4.1 %    Eosinophils % 0.6 %    Basophils % 0.3 %    Neutrophils # 11.6 (H) 1.4 - 6.5 K/uL    Lymphocytes # 2.2 1.0 - 4.8 K/uL    Monocytes # 0.6 0.2 - 0.8 K/uL    Eosinophils # 0.1 0.0 - 0.7 K/uL    Basophils # 0.0 0.0 - 0.2 K/uL   Comprehensive Metabolic Panel    Collection Time: 09/17/18  2:15 PM   Result Value Ref Range    Sodium 143 132 - 144 mEq/L    Potassium 4.2 3.5 - 5.1 mEq/L    Chloride 98 98 - 107 mEq/L    CO2 28 22 - 29 mEq/L    Anion Gap 17 (H) 7 - 13 mEq/L    Glucose 108 74 - 109 mg/dL    BUN 18 6 - 20 mg/dL    CREATININE 0.67 0.50 - 0.90 mg/dL    GFR Non-African American >60.0 >60    GFR  >60.0 >60    Calcium 10.1 8.6 - 10.2 mg/dL    Total Protein 8.9 (H) 6.4 - 8.1 g/dL    Alb 4.9 3.9 - 4.9 g/dL    Total Bilirubin 0.7 0.0 - 1.2 mg/dL    Alkaline Phosphatase 116 40 - 130 U/L    ALT 22 0 - 33 U/L    AST 30 0 - 35 U/L    Globulin 4.0 (H) 2.3 - 3.5 g/dL   Magnesium    Collection Time: 09/17/18  2:15 PM   Result Value Ref Range    Magnesium 1.8 1.7 - 2.3 mg/dL   Lactic Acid, Plasma    Collection Time: 09/17/18  2:21 PM   Result Value Ref Range    Lactic Acid 1.3 0.5 - 2.2 mmol/L   POCT Glucose    Collection Time: 09/17/18  9:32 PM   Result Value Ref Range    POC Glucose 135 (H) 60 - 115 mg/dl    Performed on ACCU-CHEK    Basic Metabolic Panel w/ Reflex to MG    Collection Time: 09/18/18  5:27 AM   Result Value Ref Range    Sodium 143 132 - 144 mEq/L    Potassium reflex Magnesium 3.7 3.5 - 5.1 mEq/L    Chloride 101 98 - 107 mEq/L    CO2 26 22 - 29 mEq/L    Anion Gap 16 (H) 7 - 13 mEq/L    Glucose 156 (H) 74 - 109 mg/dL    BUN 19 6 - 20 mg/dL    CREATININE 0.56 0.50 - 0.90 mg/dL    GFR Non-African American >60.0 >60    GFR  >60.0 >60    Calcium 8.9 8.6 - 10.2 mg/dL              Impression:    1. Impaired mobility and ADLs due to  T12 fracture  2. Fatigue and Malaise  3. Nausea  4. Sleep apnea  5. At risk for cauda equina syndrome monitor postvoid residuals      Complex Active General Medical Issues that complicate care Assess & Plan:     1.  Active Problems:    Sleep apnea, obstructive    Dyslipidemia    Morbid obesity with BMI of 40.0-44.9, adult (HCC)    Tobacco abuse    Type 2 diabetes mellitus with complication, with long-term current use of insulin (HCC)    Lumbar compression fracture, closed, initial encounter [facility])  Resolved Problems:    * No resolved hospital

## 2018-09-18 NOTE — PROGRESS NOTES
Patient was new admit during shift change. Patients family worried about patient because she has been rolling around in pain, screaming and unable to sit up. I messaged doctor Melissa Woodward to let her know patient was unable to take PO meds because she could not sit up. Doctor Crispin Bhatti called and said patient needed to sit up and take the OxyContin because that's what was going to help with the pain.  Doctor Crispin Bhatti also put in a transfer to [place] [id number].

## 2018-09-18 NOTE — FLOWSHEET NOTE
Pt transferred to 2 W from  as a new admission. Pt family at bedside, pt moaning in pain, Dr. Miller Lomax in to see, orders placed for flexeril, 02, solumedrol, and tele, VSS. Pt refusing all HS medications except flexeril, pt SPO2 was at 83% on admission vitals, 02 nasal canula bumped up to 4 per Dr. Miller Lomax, cont to monitor Electronically signed by Sharri Mccollum RN on 9/17/2018 at 10:37 PM  Pt complaining of headache and states she hasnt been up to pee all day, msg sent to hospitalist for orders for pain med and/or richter. Tele in place, family member still at bedside, heating pad on pts legs, fan obtained per pt request, O2 sats 95-97 on 4L NC  Making pt NPO in the event of surg tomm once seen by neurosurg tomm Electronically signed by Sharri Mccollum RN on 9/18/2018 at 12:23 AM   Order obtained for zofran q-6 for nausea Electronically signed by Sharri Mccollum RN on 9/18/2018 at 1:06 AM   Pt remains uncomfortable, had a short rest after last dose dilaudid, will not take percocet as she states it makes her feel terrible, nxt dose flexeril due at 0400  Pt felt urge to urinate, had 100 ml out on bedpan, states she still feels pressure, attempted to obtain bladder scanner from rehab, not there, nursing supervisor is locating and bringing Electronically signed by Sharri Mccollum RN on 9/18/2018 at 5:22 AM   Bladder scan done, 46 in bladder Electronically signed by Sharri Mccollum RN on 9/18/2018 at 6:34 AM

## 2018-09-18 NOTE — PROGRESS NOTES
Intramuscular PRN Molly Zurita MD        dextrose 5 % solution  100 mL/hr Intravenous PRN Molly Zurita MD        insulin lispro (HUMALOG) injection vial 0-6 Units  0-6 Units Subcutaneous TID WC Molly Zurita MD   Stopped at 09/18/18 0855    insulin lispro (HUMALOG) injection vial 0-3 Units  0-3 Units Subcutaneous Nightly Molly Mattson MD        insulin glargine (LANTUS) injection vial 20 Units  20 Units Subcutaneous Nightly Molly Mattson MD        0.9 % sodium chloride infusion   Intravenous Continuous Molly Zurita MD 75 mL/hr at 09/17/18 2224           OBJECTIVE:  Vital Signs:  Vitals:    09/17/18 2309   BP:    Pulse: 88   Resp:    Temp:    SpO2:        Focal exam:      General Exam (except as mentioned above):  CONSTITUTIONAL: Awake, alert, no apparent distress  EYES:  PERRL, conjunctiva normal  ENT:  Normocephalic, atraumatic  NECK:  Supple  BACK:  Symmetric  LUNGS:  CTAB except bilateral basilar crackles. CARDIOVASCULAR:  S1S2 present  ABDOMEN:  soft, non-distended, non-tender  MUSCULOSKELETAL:  There is no redness, warmth, or swelling of the joints. NEUROLOGIC:  Alert, awake, oriented x 3. No FND  EXTREMITIES: Warm and well perfused.      LABS  Recent Labs      09/17/18   1415   WBC  14.6*   RBC  4.84   HGB  13.1   HCT  39.7   MCV  82.0   MCH  27.0   MCHC  32.9*   RDW  15.9*   PLT  264       Recent Labs      09/17/18   1415  09/18/18   0527   NA  143  143   K  4.2  3.7   CL  98  101   CO2  28  26   BUN  18  19   CREATININE  0.67  0.56   GLUCOSE  108  156*   CALCIUM  10.1  8.9       Recent Labs      09/17/18   1415   MG  1.8       ASSESSMENT AND PLAN    Active Hospital Problems    Diagnosis Date Noted    Lumbar compression fracture, closed, initial encounter ([facility] [S32.000A] 09/17/2018    Morbid obesity with BMI of 40.0-44.9, adult ([facility] [E66.01, Z68.41] 09/17/2018    Type 2 diabetes mellitus with complication, with long-term current use of insulin ([facility] [E11.8, Z79.4] 11/30/2015    Tobacco abuse

## 2018-09-18 NOTE — CONSULTS
History     Social History    Marital status:      Spouse name: N/A    Number of children: N/A    Years of education: N/A     Occupational History    Not on file. Social History Main Topics    Smoking status: Former Smoker     Packs/day: 1.00     Types: Cigarettes     Quit date: 1/1/2017    Smokeless tobacco: Current User      Comment: pt trying to cut back    Alcohol use 0.0 oz/week      Comment: socially    Drug use: No    Sexual activity: Yes     Partners: Male      Comment: single     Other Topics Concern    Not on file     Social History Narrative    No narrative on file      [] Unable to obtain due to ventilated and/ or neurologic status      Home Medications:      Prescriptions Prior to Admission: venlafaxine (EFFEXOR XR) 75 MG extended release capsule, TAKE TWO CAPSULES BY MOUTH EVERY MORNING AND TAKE ONE CAPSULE BY MOUTH EVERY EVENING (Patient taking differently: 1 capsule every morning.  2 capsules every night.)  metoprolol (LOPRESSOR) 100 MG tablet, TAKE ONE TABLET BY MOUTH TWICE A DAY  fluconazole (DIFLUCAN) 150 MG tablet, TAKE ONE TABLET BY MOUTH EVERY 2 DAYS FOR 4 DOSES  meclizine (ANTIVERT) 25 MG tablet, Take 1 tablet by mouth 3 times daily as needed for Dizziness  Handicap Placard MISC, Exp 5 years  insulin lispro (HUMALOG KWIKPEN) 100 UNIT/ML pen, INJECT 40 UNITS INTO THE SKIN THREE TIMES A DAY WITH MEALS  hydrochlorothiazide (HYDRODIURIL) 25 MG tablet, Take 1 tablet by mouth daily  metFORMIN (GLUCOPHAGE) 500 MG tablet, Take 1 tablet by mouth 3 times daily  losartan (COZAAR) 50 MG tablet, Take 1 tablet by mouth daily  rosuvastatin (CRESTOR) 10 MG tablet, Take 1 tablet by mouth nightly  promethazine (PHENERGAN) 25 MG tablet, TAKE 1 TABLET BY MOUTH EVERY 8 HOURS AS NEEDED FOR NAUSEA  atorvastatin (LIPITOR) 20 MG tablet, Take 1 tablet by mouth daily  omeprazole (PRILOSEC) 40 MG delayed release capsule, Take 1 capsule by mouth daily  ondansetron (ZOFRAN-ODT) 4 MG disintegrating tablet, Take 1 tablet by mouth every 8 hours as needed for Nausea or Vomiting  insulin glargine (TOUJEO SOLOSTAR) 300 UNIT/ML injection pen, 200 units at bedtime  glucose blood VI test strips (AGAMATRIX AMP TEST) strip, Checks 2 times daily. Dx:IDDM--ICD-10 E11.9  solifenacin (VESICARE) 5 MG tablet, Take 1 tablet by mouth daily  Insulin Pen Needle (PEN NEEDLES) 32G X 4 MM MISC, Use as directed with insulin pens, 4 times daily  Lancets MISC, Checks 2 times daily. Dx:IDDM--ICD-10 E11.9  fluticasone (FLONASE) 50 MCG/ACT nasal spray, 1 spray by Nasal route daily    Current Hospital Medications:     Scheduled Meds:   [START ON 9/19/2018] fentaNYL  1 patch Transdermal Q72H    hydrochlorothiazide  25 mg Oral Daily    venlafaxine  75 mg Oral BID    atorvastatin  40 mg Oral Nightly    pantoprazole  40 mg Oral QAM AC    metoprolol  50 mg Oral BID    sodium chloride flush  10 mL Intravenous 2 times per day    enoxaparin  40 mg Subcutaneous Daily    insulin lispro  0-6 Units Subcutaneous TID WC    insulin lispro  0-3 Units Subcutaneous Nightly    oxyCODONE  10 mg Oral 2 times per day    insulin glargine  20 Units Subcutaneous Nightly     Continuous Infusions:   dextrose      sodium chloride 75 mL/hr at 09/17/18 2224     PRN Meds:.diazepam, sodium chloride flush, magnesium hydroxide, ondansetron, glucose, dextrose, glucagon (rDNA), dextrose, oxyCODONE-acetaminophen, HYDROmorphone  .  dextrose      sodium chloride 75 mL/hr at 09/17/18 2224        Allergies: Allergies   Allergen Reactions    Bactrim [Sulfamethoxazole-Trimethoprim] Itching    Nitroglycerin     Victoza [Liraglutide]      NAUSEA    Ace Inhibitors Other (See Comments)     Dizziness and near syncope    Nitrofuran Derivatives Other (See Comments)     Migraines        Physical Exam     On physical examination accompanied by her friend in extreme pain.   Awake alert oriented ×3 afebrile vital signs stable unable to even touch her back because of

## 2018-09-18 NOTE — ANESTHESIA PRE PROCEDURE
(LOPRESSOR) tablet 50 mg  50 mg Oral BID Molly Sanz MD   50 mg at 09/18/18 0906    sodium chloride flush 0.9 % injection 10 mL  10 mL Intravenous 2 times per day Molly Sanz MD   10 mL at 09/17/18 2225    sodium chloride flush 0.9 % injection 10 mL  10 mL Intravenous PRN Molly Sanz MD        magnesium hydroxide (MILK OF MAGNESIA) 400 MG/5ML suspension 30 mL  30 mL Oral Daily PRN Molly Sanz MD        enoxaparin (LOVENOX) injection 40 mg  40 mg Subcutaneous Daily Molly Sanz MD   Stopped at 09/17/18 2153    glucose (GLUTOSE) 40 % oral gel 15 g  15 g Oral PRN Molly Sanz MD        dextrose 50 % solution 12.5 g  12.5 g Intravenous PRN Molly Sanz MD        glucagon (rDNA) injection 1 mg  1 mg Intramuscular PRN Molly Sanz MD        dextrose 5 % solution  100 mL/hr Intravenous PRN Molly Sanz MD        insulin lispro (HUMALOG) injection vial 0-6 Units  0-6 Units Subcutaneous TID WC Molly Sanz MD   Stopped at 09/18/18 0855    insulin lispro (HUMALOG) injection vial 0-3 Units  0-3 Units Subcutaneous Nightly Molly Mattson MD        insulin glargine (LANTUS) injection vial 20 Units  20 Units Subcutaneous Nightly Molly Mattson MD        0.9 % sodium chloride infusion   Intravenous Continuous Molly Sanz MD 75 mL/hr at 09/17/18 2224         Allergies:     Allergies   Allergen Reactions    Bactrim [Sulfamethoxazole-Trimethoprim] Itching    Nitroglycerin     Victoza [Liraglutide]      NAUSEA    Ace Inhibitors Other (See Comments)     Dizziness and near syncope    Nitrofuran Derivatives Other (See Comments)     Migraines    Percocet [Oxycodone-Acetaminophen] Nausea And Vomiting       Problem List:    Patient Active Problem List   Diagnosis Code    Orthostatic hypotension I95.1    B12 deficiency E53.8    HPV (human papilloma virus) anogenital infection A63.0    Ovarian cyst N83.209    Sleep apnea, obstructive G47.33    Dyslipidemia E78.5    Family history of premature CAD Z82.49

## 2018-09-18 NOTE — CONSULTS
INITIAL CONSULT -  PAIN MANAGEMENT     SERVICE DATE:  9/18/2018   SERVICE TIME:  6:14 PM  Admission date 9/17/17/inpatient  REASON FOR CONSULT:  Severe back pain  REQUESTING PHYSICIAN:  Dr Caroline Kirkpatrick, Dr Suzan Gracia MD  PRIMARY CARE PHYSICIAN:  Freddy Muñoz MD    Chief Complaint   Patient presents with    Leg Pain     cramping ,bilateral         HISTORY OF PRESENT ILLNESS:  Ms. Katerina Roblero is a 44 y.o. female who presents for Initial complain of severe back pain leg weakness cramping after she fell in her bathtub on the back and ended up fracture T12. .    Patient was admitted had imaging performed showed a T12 fracture, was taken by Dr. Ruben Ríos for kyphoplasty which he has finished an hour ago. Patient reported reduction of the severe intractable mid back pain since a kyphoplasty who wishes to have a lot of soreness and muscle spasm are on the whole back. .    Patient has long history was difficulty walking muscular weakness in her both lower extremity, she was seen and evaluated by neurology Dr. Geraldine Stiles whom had patient ordered MRI in the past and was reviewing patient to neuromuscular specialist, patient has appointment at the end of November. She stated that she was getting into her shower her legs give out on her back which ended up being here. .    She denies any problem with bladder or bowel control initial, she described more muscle spasm around the back was also leg weakness and pain twisting behind exaggeration on the thigh bilaterally.     PAIN  ASSESSMENT:    constant, severe    aching, shooting, stabbing and throbbing    pain is perceived as severe (6-8 pain scale)    PAST MEDICAL HISTORY:    Past Medical History:   Diagnosis Date    Acute midline thoracic back pain 9/18/2018    B12 deficiency     Family history of premature CAD 9/4/2013    Fatty liver     HPV (human papilloma virus) anogenital infection     Hyperlipidemia     Hypertension     Liver hemangioma 2009/2015    Obesity 3/11/13 BMI 43.42    Orthostatic hypotension     Ovarian cyst     Rectal fissure     Tobacco abuse 9/3/2015    Tobacco abuse     Tremor     Uncontrolled diabetes mellitus with complications ([facility]     Unspecified sleep apnea      PAST SURGICAL HISTORY:    Past Surgical History:   Procedure Laterality Date    CARDIAC CATHETERIZATION  4-07    COLONOSCOPY  2013    for diarrhea (-)    HYSTERECTOMY  12-10    LEEP  1-05    TONSILLECTOMY AND ADENOIDECTOMY  1981    UPPER GASTROINTESTINAL ENDOSCOPY  10/13/15     DR. HERRERA     URETHRA SURGERY  6-2015     FAMILY HISTORY:    Family History   Problem Relation Age of Onset    Diabetes Father     Heart Disease Mother      SOCIAL HISTORY:    Social History     Social History    Marital status:      Spouse name: N/A    Number of children: N/A    Years of education: N/A     Occupational History    Not on file. Social History Main Topics    Smoking status: Former Smoker     Packs/day: 1.00     Types: Cigarettes     Quit date: 1/1/2017    Smokeless tobacco: Current User      Comment: pt trying to cut back    Alcohol use 0.0 oz/week      Comment: socially    Drug use: No    Sexual activity: Yes     Partners: Male      Comment: single     Other Topics Concern    Not on file     Social History Narrative    Social/Functional History              PSYCHOLOGICAL HISTORY: Anxiety disorder, being in Klonopin Xanax as an outpatient by Dr. Brian Fierro automated prescription reporting system review patient was not in any opioids in the past  Opioid risk assessment reviewed/low    MEDICATIONS:  Prescriptions Prior to Admission: venlafaxine (EFFEXOR XR) 75 MG extended release capsule, TAKE TWO CAPSULES BY MOUTH EVERY MORNING AND TAKE ONE CAPSULE BY MOUTH EVERY EVENING (Patient taking differently: 1 capsule every morning.  2 capsules every night.)  metoprolol (LOPRESSOR) 100 MG tablet, TAKE ONE TABLET BY MOUTH TWICE A DAY  fluconazole (DIFLUCAN) 150 MG tablet, TAKE ONE TABLET BY MOUTH EVERY 2 DAYS FOR 4 DOSES  meclizine (ANTIVERT) 25 MG tablet, Take 1 tablet by mouth 3 times daily as needed for Dizziness  Handicap Placard MISC, Exp 5 years  insulin lispro (HUMALOG KWIKPEN) 100 UNIT/ML pen, INJECT 40 UNITS INTO THE SKIN THREE TIMES A DAY WITH MEALS  hydrochlorothiazide (HYDRODIURIL) 25 MG tablet, Take 1 tablet by mouth daily  metFORMIN (GLUCOPHAGE) 500 MG tablet, Take 1 tablet by mouth 3 times daily  losartan (COZAAR) 50 MG tablet, Take 1 tablet by mouth daily  rosuvastatin (CRESTOR) 10 MG tablet, Take 1 tablet by mouth nightly  promethazine (PHENERGAN) 25 MG tablet, TAKE 1 TABLET BY MOUTH EVERY 8 HOURS AS NEEDED FOR NAUSEA  atorvastatin (LIPITOR) 20 MG tablet, Take 1 tablet by mouth daily  omeprazole (PRILOSEC) 40 MG delayed release capsule, Take 1 capsule by mouth daily  ondansetron (ZOFRAN-ODT) 4 MG disintegrating tablet, Take 1 tablet by mouth every 8 hours as needed for Nausea or Vomiting  insulin glargine (TOUJEO SOLOSTAR) 300 UNIT/ML injection pen, 200 units at bedtime  glucose blood VI test strips (AGAMATRIX AMP TEST) strip, Checks 2 times daily. Dx:IDDM--ICD-10 E11.9  solifenacin (VESICARE) 5 MG tablet, Take 1 tablet by mouth daily  Insulin Pen Needle (PEN NEEDLES) 32G X 4 MM MISC, Use as directed with insulin pens, 4 times daily  Lancets MISC, Checks 2 times daily. Dx:IDDM--ICD-10 E11.9  fluticasone (FLONASE) 50 MCG/ACT nasal spray, 1 spray by Nasal route daily  [unfilled]    ALLERGIES:  Bactrim [sulfamethoxazole-trimethoprim]; Nitroglycerin; Victoza [liraglutide]; Ace inhibitors; Nitrofuran derivatives; and Percocet [oxycodone-acetaminophen]    COMPLETE REVIEW OF SYSTEMS:  As noted in HPI, 12 point ROS reviewed and otherwise negative. Review of Systems   Constitutional: Positive for malaise/fatigue. HENT: Negative. Eyes: Negative. Respiratory: Negative. Cardiovascular: Negative. Gastrointestinal: Negative. Genitourinary: Negative. Musculoskeletal: Positive for back pain, falls, joint pain and myalgias. Skin: Negative. Neurological: Positive for dizziness, tingling, focal weakness and weakness. Negative for tremors, sensory change, speech change, seizures, loss of consciousness and headaches. Endo/Heme/Allergies: Negative. Psychiatric/Behavioral: Positive for hallucinations. Negative for depression, memory loss, substance abuse and suicidal ideas. The patient is nervous/anxious and has insomnia. OBJECTIVE  PHYSICAL EXAM:  BP (!) 158/67   Pulse 82   Temp 100 °F (37.8 °C) (Temporal)   Resp 12   Ht 5' 5\" (1.651 m)   Wt 250 lb (113.4 kg)   LMP  (LMP Unknown)   SpO2 94%   BMI 41.60 kg/m²   Body mass index is 41.6 kg/m². CONSTITUTIONAL:  Awake, alert oriented ×3 but sleepy since she just came from surgery  EYES:  Vision intact  ENT:  Short neck, normocepalic, without obvious abnormality  BACK:  Moderately to severely painful limited range of motion, para spinal tenderness at the T12 lesion  LUNGS:  Moderate respiratory distress, good air exchange and Mild retraction  ABDOMEN:  Soft nontender nondistended  MUSCULOSKELETAL:  Symmetrical bilateral lower extremity weakness, no joints deformity or swelling, para spinal thoracolumbar tenderness  NEUROLOGIC:  Symmetric and weakness post lower extremity, muscle tone and muscle mass are intact and symmetrical bilateral, sensory or symmetrical bilateral, difficulty with walking and weight support  SKIN:  no bruising or bleeding    DATA:   Diagnostic tests reviewed for today's visit:    All labs and imaging results reviewed.      Lab Results   Component Value Date    WBC 14.6 09/17/2018    HGB 13.1 09/17/2018    HCT 39.7 09/17/2018    MCV 82.0 09/17/2018     09/17/2018     09/18/2018    K 3.7 09/18/2018     09/18/2018    CO2 26 09/18/2018    BUN 19 09/18/2018    CREATININE 0.56 09/18/2018    CALCIUM 8.9 09/18/2018    BNP 12 09/01/2013    ALKPHOS 116 09/17/2018 ALT 22 09/17/2018    AST 30 09/17/2018    BILITOT 0.7 09/17/2018    BILIDIR 0.0 12/13/2017    LABALBU 4.9 09/17/2018    LABALBU 4.4 05/31/2012       Xr Lumbar Spine (min 4 Views)    Result Date: 9/17/2018  EXAMINATION: XR PELVIS (1-2 VIEWS), XR LUMBAR SPINE (MIN 4 VIEWS) CLINICAL HISTORY:   fall COMPARISON:  none FINDINGS: 4 views of the lumbar spine including oblique views are submitted. Patient refused to do additional cord images. There are 5 lumbar type vertebrae. No acute fractures. Disk spaces are intact No significant spondylolisthesis or spondylolysis. The SI joints are unremarkable. IMPRESSION NO ACUTE FRACTURE. EXAMINATION: AP PELVIS  CLINICAL HISTORY: History of fall 5 weeks ago  COMPARISONS: None  FINDINGS: Single AP view of the pelvis is submitted No acute fractures. No dislocations. No focal bony abnormalities      NO ACUTE FRACTURES. Xr Pelvis (1-2 Views)    Result Date: 9/17/2018  EXAMINATION: XR PELVIS (1-2 VIEWS), XR LUMBAR SPINE (MIN 4 VIEWS) CLINICAL HISTORY:   fall COMPARISON:  none FINDINGS: 4 views of the lumbar spine including oblique views are submitted. Patient refused to do additional cord images. There are 5 lumbar type vertebrae. No acute fractures. Disk spaces are intact No significant spondylolisthesis or spondylolysis. The SI joints are unremarkable. IMPRESSION NO ACUTE FRACTURE. EXAMINATION: AP PELVIS  CLINICAL HISTORY: History of fall 5 weeks ago  COMPARISONS: None  FINDINGS: Single AP view of the pelvis is submitted No acute fractures. No dislocations. No focal bony abnormalities      NO ACUTE FRACTURES. Ct Cervical Spine Wo Contrast    Result Date: 9/17/2018  EXAMINATION:  THORACIC SPINE CLINICAL HISTORY:  History of fall. COMPARISON: TECHNIQUE:  Multiple serial axial images of the thoracic spine from the C6 through the L1 levels with both sagittal coronal reconstruction was performed. FINDINGS:  There is a compression fracture of the T12 vertebra.  There is some reconstruction, and/or weight based dosing when appropriate to reduce radiation dose to as low as reasonably achievable. EXAMINATION:  CT SCAN LUMBAR SPINE CLINICAL HISTORY:  History of fall. Pain. COMPARISON: TECHNIQUE:  Multiple serial axial images of the lumbar spine from the T12 through the sacral levels with both sagittal and coronal reconstructions were performed. FINDINGS:  Again note is made of a compression fracture of the T12 vertebra with retropulsion of a fracture fragment posteriorly. Please see above report for additional details There are no acute fractures or spondylolisthesis of lumbar vertebra. The SI joints are intact. IMPRESSION:  1. NO ACUTE FRACTURES OF THE LUMBAR VERTEBRA. 2. AGAIN NOTE IS MADE OF A COMPRESSION FRACTURE OF THE T12 VERTEBRA WITH RETROPULSION OF FRACTURE FRAGMENT WITH ASSOCIATED CANAL STENO. All CT scans at this facility use dose modulation, iterative reconstruction, and/or weight based dosing when appropriate to reduce radiation dose to as low as reasonably achievable. Ct Lumbar Spine Wo Contrast    Result Date: 9/17/2018  EXAMINATION:  THORACIC SPINE CLINICAL HISTORY:  History of fall. COMPARISON: TECHNIQUE:  Multiple serial axial images of the thoracic spine from the C6 through the L1 levels with both sagittal coronal reconstruction was performed. FINDINGS:  There is a compression fracture of the T12 vertebra. There is some protrusion of a fracture fragment approximately 5 mm posteriorly with some associated canal stenosis at this level. There is multilevel degenerative changes osteophytosis marginal spurring noted. No significant listhesis     THERE IS A COMPRESSION FRACTURE OF T12 WITH ASSOCIATED RETROPULSION OF FRACTURE FRAGMENT. ASSOCIATED CANAL STENOSIS AT THIS LEVEL.  DR. Indu Womack OF THE EMERGENCY ROOM WAS NOTIFIED OF THE ABOVE FINDINGS AT 0530 HOURS ON SEPTEMBER 17, 2018 All CT scans at this facility use dose modulation, iterative reconstruction, and/or weight based dosing when appropriate to reduce radiation dose to as low as reasonably achievable. CT THORACIC SPINE WO CONTRAST, CT CERVICAL SPINE WO CONTRAST, CT LUMBAR SPINE WO CONTRAST CLINICAL HISTORY:  inability to ambulate COMPARISON: NONE Findings: Multiple serial axial images of the cervical spine from the base of the skull through the upper thoracic vertebra with both sagittal and coronal reconstructions was performed. There is straightening of the normal expected cervical lordosis. . Prevertebral  soft tissues are  unremarkable. The disk spaces are intact No acute fractures or spondylo-listhesis. IMPRESSION: NO ACUTE FRACTURES. All CT scans at this facility use dose modulation, iterative reconstruction, and/or weight based dosing when appropriate to reduce radiation dose to as low as reasonably achievable. EXAMINATION:  CT SCAN LUMBAR SPINE CLINICAL HISTORY:  History of fall. Pain. COMPARISON: TECHNIQUE:  Multiple serial axial images of the lumbar spine from the T12 through the sacral levels with both sagittal and coronal reconstructions were performed. FINDINGS:  Again note is made of a compression fracture of the T12 vertebra with retropulsion of a fracture fragment posteriorly. Please see above report for additional details There are no acute fractures or spondylolisthesis of lumbar vertebra. The SI joints are intact. IMPRESSION:  1. NO ACUTE FRACTURES OF THE LUMBAR VERTEBRA. 2. AGAIN NOTE IS MADE OF A COMPRESSION FRACTURE OF THE T12 VERTEBRA WITH RETROPULSION OF FRACTURE FRAGMENT WITH ASSOCIATED CANAL STENO. All CT scans at this facility use dose modulation, iterative reconstruction, and/or weight based dosing when appropriate to reduce radiation dose to as low as reasonably achievable. Fluoro For Surgical Procedures    Result Date: 9/18/2018  Exam: FLUORO FOR SURGICAL PROCEDURES History:Z41.9 Surgery, elective ICD10 Z41.9 Surgery, elective ICD10 Findings: Fluoroscopy time was 27 seconds.  A total of 2

## 2018-09-19 LAB
GLUCOSE BLD-MCNC: 123 MG/DL (ref 60–115)
PERFORMED ON: ABNORMAL

## 2018-09-19 PROCEDURE — 6370000000 HC RX 637 (ALT 250 FOR IP): Performed by: ANESTHESIOLOGY

## 2018-09-19 PROCEDURE — 2580000003 HC RX 258: Performed by: INTERNAL MEDICINE

## 2018-09-19 PROCEDURE — 6370000000 HC RX 637 (ALT 250 FOR IP): Performed by: INTERNAL MEDICINE

## 2018-09-19 PROCEDURE — G8987 SELF CARE CURRENT STATUS: HCPCS

## 2018-09-19 PROCEDURE — 6360000002 HC RX W HCPCS: Performed by: ANESTHESIOLOGY

## 2018-09-19 PROCEDURE — G8978 MOBILITY CURRENT STATUS: HCPCS

## 2018-09-19 PROCEDURE — 6360000002 HC RX W HCPCS: Performed by: INTERNAL MEDICINE

## 2018-09-19 PROCEDURE — 97162 PT EVAL MOD COMPLEX 30 MIN: CPT

## 2018-09-19 PROCEDURE — 99231 SBSQ HOSP IP/OBS SF/LOW 25: CPT | Performed by: PHYSICAL MEDICINE & REHABILITATION

## 2018-09-19 PROCEDURE — 1210000000 HC MED SURG R&B

## 2018-09-19 PROCEDURE — G8979 MOBILITY GOAL STATUS: HCPCS

## 2018-09-19 PROCEDURE — 6360000002 HC RX W HCPCS: Performed by: PHYSICAL MEDICINE & REHABILITATION

## 2018-09-19 PROCEDURE — 6370000000 HC RX 637 (ALT 250 FOR IP): Performed by: PHYSICAL MEDICINE & REHABILITATION

## 2018-09-19 PROCEDURE — G8988 SELF CARE GOAL STATUS: HCPCS

## 2018-09-19 PROCEDURE — 2700000000 HC OXYGEN THERAPY PER DAY

## 2018-09-19 PROCEDURE — 97166 OT EVAL MOD COMPLEX 45 MIN: CPT

## 2018-09-19 RX ORDER — ERGOCALCIFEROL 1.25 MG/1
50000 CAPSULE ORAL DAILY
Status: DISCONTINUED | OUTPATIENT
Start: 2018-09-19 | End: 2018-09-21 | Stop reason: HOSPADM

## 2018-09-19 RX ORDER — KETOROLAC TROMETHAMINE 15 MG/ML
15 INJECTION, SOLUTION INTRAMUSCULAR; INTRAVENOUS EVERY 6 HOURS PRN
Status: DISCONTINUED | OUTPATIENT
Start: 2018-09-19 | End: 2018-09-21 | Stop reason: HOSPADM

## 2018-09-19 RX ORDER — KETOROLAC TROMETHAMINE 30 MG/ML
30 INJECTION, SOLUTION INTRAMUSCULAR; INTRAVENOUS ONCE
Status: COMPLETED | OUTPATIENT
Start: 2018-09-19 | End: 2018-09-19

## 2018-09-19 RX ORDER — ORPHENADRINE CITRATE 30 MG/ML
60 INJECTION INTRAMUSCULAR; INTRAVENOUS EVERY 12 HOURS PRN
Status: DISCONTINUED | OUTPATIENT
Start: 2018-09-19 | End: 2018-09-19 | Stop reason: RX

## 2018-09-19 RX ORDER — MECLIZINE HYDROCHLORIDE 25 MG/1
25 TABLET ORAL 3 TIMES DAILY PRN
Status: DISCONTINUED | OUTPATIENT
Start: 2018-09-19 | End: 2018-09-21 | Stop reason: HOSPADM

## 2018-09-19 RX ORDER — BACLOFEN 20 MG/1
10 TABLET ORAL EVERY 8 HOURS
Status: DISCONTINUED | OUTPATIENT
Start: 2018-09-19 | End: 2018-09-20

## 2018-09-19 RX ADMIN — HYDROCHLOROTHIAZIDE 25 MG: 25 TABLET ORAL at 09:36

## 2018-09-19 RX ADMIN — ENOXAPARIN SODIUM 40 MG: 40 INJECTION SUBCUTANEOUS at 09:36

## 2018-09-19 RX ADMIN — METAXALONE 800 MG: 800 TABLET ORAL at 07:21

## 2018-09-19 RX ADMIN — BACLOFEN 10 MG: 20 TABLET ORAL at 16:02

## 2018-09-19 RX ADMIN — ACETAMINOPHEN 650 MG: 325 TABLET ORAL at 23:56

## 2018-09-19 RX ADMIN — HYDROMORPHONE HYDROCHLORIDE 1 MG: 1 INJECTION, SOLUTION INTRAMUSCULAR; INTRAVENOUS; SUBCUTANEOUS at 12:50

## 2018-09-19 RX ADMIN — METOPROLOL TARTRATE 50 MG: 50 TABLET ORAL at 23:57

## 2018-09-19 RX ADMIN — MECLIZINE HYDROCHLORIDE 25 MG: 25 TABLET ORAL at 11:33

## 2018-09-19 RX ADMIN — OXYCODONE HYDROCHLORIDE 5 MG: 5 TABLET ORAL at 23:57

## 2018-09-19 RX ADMIN — SODIUM CHLORIDE: 9 INJECTION, SOLUTION INTRAVENOUS at 12:53

## 2018-09-19 RX ADMIN — BACLOFEN 10 MG: 20 TABLET ORAL at 23:56

## 2018-09-19 RX ADMIN — ONDANSETRON HYDROCHLORIDE 4 MG: 2 INJECTION, SOLUTION INTRAMUSCULAR; INTRAVENOUS at 19:21

## 2018-09-19 RX ADMIN — Medication 10 ML: at 08:15

## 2018-09-19 RX ADMIN — ONDANSETRON HYDROCHLORIDE 4 MG: 2 INJECTION, SOLUTION INTRAMUSCULAR; INTRAVENOUS at 08:15

## 2018-09-19 RX ADMIN — KETOROLAC TROMETHAMINE 30 MG: 30 INJECTION, SOLUTION INTRAMUSCULAR at 17:22

## 2018-09-19 RX ADMIN — ONDANSETRON HYDROCHLORIDE 4 MG: 2 INJECTION, SOLUTION INTRAMUSCULAR; INTRAVENOUS at 01:48

## 2018-09-19 RX ADMIN — HYDROMORPHONE HYDROCHLORIDE 1 MG: 1 INJECTION, SOLUTION INTRAMUSCULAR; INTRAVENOUS; SUBCUTANEOUS at 01:48

## 2018-09-19 RX ADMIN — ACETAMINOPHEN 650 MG: 325 TABLET ORAL at 11:33

## 2018-09-19 RX ADMIN — HYDROMORPHONE HYDROCHLORIDE 1 MG: 1 INJECTION, SOLUTION INTRAMUSCULAR; INTRAVENOUS; SUBCUTANEOUS at 07:21

## 2018-09-19 RX ADMIN — ACETAMINOPHEN 650 MG: 325 TABLET ORAL at 17:22

## 2018-09-19 RX ADMIN — MECLIZINE HYDROCHLORIDE 25 MG: 25 TABLET ORAL at 19:23

## 2018-09-19 RX ADMIN — ONDANSETRON HYDROCHLORIDE 4 MG: 4 TABLET, FILM COATED ORAL at 11:33

## 2018-09-19 RX ADMIN — METOPROLOL TARTRATE 50 MG: 50 TABLET ORAL at 09:36

## 2018-09-19 ASSESSMENT — PAIN DESCRIPTION - ORIENTATION: ORIENTATION: RIGHT;LEFT

## 2018-09-19 ASSESSMENT — PAIN DESCRIPTION - PAIN TYPE: TYPE: ACUTE PAIN

## 2018-09-19 ASSESSMENT — PAIN SCALES - GENERAL
PAINLEVEL_OUTOF10: 7
PAINLEVEL_OUTOF10: 5
PAINLEVEL_OUTOF10: 7
PAINLEVEL_OUTOF10: 9
PAINLEVEL_OUTOF10: 7
PAINLEVEL_OUTOF10: 7
PAINLEVEL_OUTOF10: 1
PAINLEVEL_OUTOF10: 7

## 2018-09-19 ASSESSMENT — ENCOUNTER SYMPTOMS
RESPIRATORY NEGATIVE: 1
GASTROINTESTINAL NEGATIVE: 1
EYES NEGATIVE: 1
BACK PAIN: 1

## 2018-09-19 ASSESSMENT — PAIN DESCRIPTION - LOCATION: LOCATION: BACK

## 2018-09-19 NOTE — PROGRESS NOTES
Rollator   []  W/C   [] Gianni Gaspar   [x] Shower Chair   [] [facility]  [] Dressing  AE  [] Other:      Previous Functional Status:  Pt reports that she was independent with adl self care and had assist for homemaking tasks. Pain:   Start of session: 0/10  Description:    Location:    End of session: 0/10  Action: [x] No Action Necessary    [] Patient reports pain at acceptable level for treatment  [] Nursing notified    [] Other      Objective:  Observation:  Pt supine in bed    Orientation: Oriented to  [x] Person   [x] Place  [x]Time    Vision:   [x]  WFL   [] Impaired  Comments:      Hearing:  [x] WFL   [] Impaired  Comments:    Sensation:   [x] WFL   []  Impaired   Comments:      Cognition:   [] WFL   [x] Impaired  Comments:  Decreased problem solving  Communication:   [x] WFL   [] Impaired  Comments:    Hand Dominance: [x] Right   [] Left    Range of Motion:  R UE AROM/PROM: [x]  WFL [] Impaired  Comments:   L UE AROM/PROM:  [x]  WFL [] Impaired  Comments:     Strength:   R UE Strength: []1    [] 2   [] 2+   [] 3   [] 3+   [x] 4   [] 4+  [] 5  Comments:   L UE Strength:  []1    [] 2   [] 2+   [] 3   [] 3+  [x] 4   [] 4+   [] 5  Comments:     Quality of Movement:  [] Good   [x] Fair   [] Poor     Coordination:  Gross motor: [x] WFL   [] Impaired   Fine motor: [x] WFL   [] Impaired     Functional Mobility:  Toilet Transfers:  CGA  Bed Transfer:  Supervision  Sit to stand: CGA  Bed to Chair:  Min A    Seated Balance:      Static: [x] Good  [] Fair   [] Poor   Dynamic: []  Good  [x] Fair   [] Poor     Standing Balance:     Static: [] Good   [x] Fair  [] Poor   Dynamic: [] Good   [x] Fair-   [] Poor     Functional Endurance: [] Good  [] Fair  [x] Poor +    ADLs  Feeding:   Positive hand to mouth  UE Dressing:  Min A  LB Dressing:   Mod A  Bathing:  Min A  Toileting: SBA  Grooming: Set up    Treatment Plan will consist of:   [x] ADL Training   [x] Strengthening   [x] Endurance   [x] Transfer Training   [x]  DME ed      [x] 42.03  ADL Inpatient CMS 0-100% Score: 38.32    Recommended DME:  [] W/W   [] Anjali Feldman   [] Rollator   [] W/C   [] iDane Mae  [] Shower Chair   []Dressing AD []  BSC  [] Other:    Plan:Times per week: 1-4x/wk,      G-Codes:  OT G-codes  Functional Limitation: Self care  Self Care Current Status ():  At least 40 percent but less than 60 percent impaired, limited or restricted  Self Care Goal Status (): 0 percent impaired, limited or restricted      1345  Time in:  1345  Time out:  1405  Timed treatment minutes:  20  Total treatment time/minutes:  20    Electronically signed by:    DHIRAJ Ley  0/53/7184, 4:16 PM Electronically signed by DHIRAJ Ley on 6/59/74 at 4:22 PM

## 2018-09-19 NOTE — PROGRESS NOTES
TONSILLECTOMY AND ADENOIDECTOMY  1981    UPPER GASTROINTESTINAL ENDOSCOPY  10/13/15     DR. HERRERA     [facility] SURGERY  6-2015       Chart Reviewed: Yes  Family / Caregiver Present: Yes (family members)  General Comment  Comments: Pt sitting up at EOB - agreeable to PT evaluation    Restrictions:  Restrictions/Precautions: Fall Risk     SUBJECTIVE: Subjective: \"I feel like I'm going to throw up. \"  Pre Treatment Pain Screening  Pain at present: 7  Scale Used: Numeric Score  Intervention List: Patient able to continue with treatment  Comments / Details: Mid back - pt states that she was recently medicated. Post Treatment Pain Screening:   Pain Assessment  Pain Assessment:  (7/10)    Prior Level of Function:  Social/Functional History  Lives With: Alone  Type of Home: Apartment  Home Layout: One level  Home Access: Stairs to enter with rails, Stairs to enter without rails  Entrance Stairs - Number of Steps: 3 steps to enter building without rails, and 6-7 steps to apartment with rails  Home Equipment:  (NA)  ADL Assistance: Independent  Homemaking Assistance: Independent  Ambulation Assistance: Independent (without AD)  Transfer Assistance: Independent  Active : Yes  Additional Comments: Pt expressing frustrations with her recent falls at home (5) and inability to get in to see the neuromuscular specialist until november. Pt states that when she falls, it is without warning - her legs just \"give out\". She has fallen on her steps multiple times, however the fall which brought her to the hosp with the fracture happened while she was attempting to get into her shower. OBJECTIVE:   Vision/Hearing:  Vision: Within Functional Limits  Hearing: Within functional limits    Cognition:  Overall Orientation Status: Within Functional Limits  Follows Commands: Within Functional Limits    Observation/Palpation  Observation: Pt easily distracted and agitated d/t nausea and pain. \"I just can't think. \"    ROM:  RLE PROM: technique with CGA  Long term goal 2: Pt to complete transfers with supervision  Long term goal 3: Pt to ambulate 25ft with LRD and supervision    [facility] (6 CLICK) Lala Farooq 28 Inpatient Mobility Raw Score : 11     Therapy Time:   Individual   Time In 1040   Time Out 1100   Minutes [address] [place] [address], 09/19/18 at 11:15 AM

## 2018-09-19 NOTE — PROGRESS NOTES
Subjective: The patient complains of severe  acute mid and low back pain as well as severe back spasms unrelieved by  Skelaxin and exacerbated by  any motion. I am concerned about patients  ability to handle acute level rehab as well as her severe pain despite recent surgery. I have will titrate her off of the Valium and Skelaxin and put her on baclofen. I will monitor her urine as she does have an elevated temperature and is quite anxious her by mouth intake is very poor she's happy go to skilled level rehab as she is pretty sure she would be able tolerate acute rehab. Yesterday had a long conversation with her about how she does not tolerate Percocet or any oxycodone products to her off of family trialed Dilaudid orally and IV as well as were going to trial Duragesic however, even the doctor you had ordered at pain management had a rearranged her meds. She is back to being very nauseated and I have deferred this treatment to her between her and her pain management specialist.    I would however like to control her nausea is participate better in therapy. ROS x10: The patient also complains of severely impaired mobility and activities of daily living. Otherwise no new problems with vision, hearing, nose, mouth, throat, dermal, cardiovascular, GI, , pulmonary, musculoskeletal, psychiatric or neurological. See Rehab H&P on Rehab chart dated . Vital signs:  /65   Pulse 73   Temp 99.1 °F (37.3 °C) (Oral)   Resp 24   Ht 5' 5\" (1.651 m)   Wt 250 lb (113.4 kg)   LMP  (LMP Unknown)   SpO2 95%   BMI 41.60 kg/m²   I/O:   PO/Intake:    Poor PO intake,  severe nausea likely secondary to opiates especially Percocet    Bowel/Bladder:  continent, no problems noted. General:  Patient is well developed, adequately nourished, non-obese and     well kempt. HEENT:    PERRLA, hearing intact to loud voice, external inspection of ear     and nose benign.   Inspection of lips, tongue and gums

## 2018-09-19 NOTE — PROGRESS NOTES
30 mL  30 mL Oral Daily PRN Molly Mcnair MD        enoxaparin (LOVENOX) injection 40 mg  40 mg Subcutaneous Daily Molly Mattson MD   40 mg at 09/19/18 0936    glucose (GLUTOSE) 40 % oral gel 15 g  15 g Oral PRN Molly Mcnair MD        dextrose 50 % solution 12.5 g  12.5 g Intravenous PRN Molly Mcnair MD        glucagon (rDNA) injection 1 mg  1 mg Intramuscular PRN Molly Mcnair MD        dextrose 5 % solution  100 mL/hr Intravenous PRN Molly Mcnair MD        insulin lispro (HUMALOG) injection vial 0-6 Units  0-6 Units Subcutaneous TID WC Molly Mcnair MD   Stopped at 09/18/18 0855    insulin lispro (HUMALOG) injection vial 0-3 Units  0-3 Units Subcutaneous Nightly Molly Mattson MD        insulin glargine (LANTUS) injection vial 20 Units  20 Units Subcutaneous Nightly Molly Mattson MD        0.9 % sodium chloride infusion   Intravenous Continuous Molly Mcnair MD 75 mL/hr at 09/17/18 2224           OBJECTIVE:  Vital Signs:  Vitals:    09/19/18 0815   BP: (!) 122/55   Pulse: 80   Resp: 22   Temp: 98.1 °F (36.7 °C)   SpO2:        General Exam (except as mentioned above):  CONSTITUTIONAL: Awake, alert, no apparent distress  EYES:  PERRL, conjunctiva normal  ENT:  Normocephalic, atraumatic  NECK:  Supple  BACK:  Symmetric  LUNGS:  CTAB except bilateral basilar crackles. CARDIOVASCULAR:  S1S2 present  ABDOMEN:  soft, non-distended, non-tender  MUSCULOSKELETAL:  There is no redness, warmth, or swelling of the joints. NEUROLOGIC:  Alert, awake, oriented x 3. No FND  EXTREMITIES: Warm and well perfused.      LABS  Recent Labs      09/17/18   1415   WBC  14.6*   RBC  4.84   HGB  13.1   HCT  39.7   MCV  82.0   MCH  27.0   MCHC  32.9*   RDW  15.9*   PLT  264       Recent Labs      09/17/18   1415  09/18/18   0527   NA  143  143   K  4.2  3.7   CL  98  101   CO2  28  26   BUN  18  19   CREATININE  0.67  0.56   GLUCOSE  108  156*   CALCIUM  10.1  8.9       Recent Labs      09/17/18   1415   MG  1.8 ASSESSMENT AND PLAN    Active Hospital Problems    Diagnosis Date Noted    Acute midline thoracic back pain [M54.6] 09/18/2018    Lumbar compression fracture, closed, initial encounter ([email or web address] [S32.000A] 09/17/2018    Morbid obesity with BMI of 40.0-44.9, adult ([email or web address] [E66.01, Z68.41] 09/17/2018    Type 2 diabetes mellitus with complication, with long-term current use of insulin ([email or web address] [E11.8, Z79.4] 11/30/2015    Tobacco abuse [Z72.0] 09/03/2015    Dyslipidemia [E78.5] 09/04/2013    Sleep apnea, obstructive [G47.33] 01/01/2005     - Lumbar compression fracture: -Patient is s/p T 12 vertebroplasty. Pain is better controlled today. Pain management as per Dr Omayra Liu     - RAE : encouraged to use home CPAP     - DM: Lantus+ SSI+ metformin. Accuchecks     - HTN: continue home meds    Patient has been chronic leg weakness for past 2-3 months now. The weakness led to this fall 2 days ago. She was scheduled to see a Neuromuscular doctor as OP in November. She however would like to have IP evaluation and EMG if possible, Dr Sarah Cast has been consulted for the same.     DVT prophylaxis: Oliver Sue MD  Pager : [phone]

## 2018-09-19 NOTE — PROGRESS NOTES
PROGRESS NOTE -  PAIN MANAGEMENT     SERVICE DATE:  9/19/2018   SERVICE TIME:  4:31 PM    CHIEF COMPLAINT: Severe back pain     SUBJECTIVE:  Ms. Debo Salas is a 44 y.o. female who presented for T 12 fracture after a fall, received T12 kyphoplasty.     Patient still complaining of spasm, no neurological deficits or its legs,     Patient states  pain is improving however she still having MUSCLE spasms are on the region of the thoracolumbar spine    PAIN  ASSESSMENT:    intermittent, moderate, mostly positional    aching, sharp, stabbing, throbbing and Spasming    pain is perceived as mild (1-3  pain scale), pain is perceived as severe (6-8 pain scale), positional      MEDICATIONS:    Current Facility-Administered Medications   Medication Dose Route Frequency Provider Last Rate Last Dose    meclizine (ANTIVERT) tablet 25 mg  25 mg Oral TID PRN Molly Mattson MD   25 mg at 09/19/18 1133    baclofen (LIORESAL) tablet 10 mg  10 mg Oral Q8H Sloane Scullin, DO   10 mg at 09/19/18 1602    vitamin D (ERGOCALCIFEROL) capsule 50,000 Units  50,000 Units Oral Daily Sloane Scullin, DO        ketorolac (TORADOL) injection 15 mg  15 mg Intravenous Q6H PRN Silvia Sam MD        orphenadrine (NORFLEX) injection 60 mg  60 mg Intramuscular Q12H PRN Silvia Sam MD        ketorolac (TORADOL) injection 30 mg  30 mg Intravenous Once Silvia Sam MD        lidocaine (LIDODERM) 5 % 3 patch  3 patch Transdermal Daily Sloane Scullin, DO   3 patch at 09/19/18 1104    ondansetron (ZOFRAN) injection 4 mg  4 mg Intravenous Q8H Sloane Scullin, DO   4 mg at 09/19/18 0815    ondansetron (ZOFRAN) tablet 4 mg  4 mg Oral Q8H PRN Sloane Scullin, DO   4 mg at 09/19/18 1133    acetaminophen (TYLENOL) tablet 650 mg  650 mg Oral 4 times per day Silvia Sam MD   650 mg at 09/19/18 1133    oxyCODONE (ROXICODONE) immediate release tablet 5 mg  5 mg Oral Q4H PRN Silvia Sam MD        Or    oxyCODONE (ROXICODONE) immediate release tablet 10 mg  10 mg Oral Q4H PRN Emilie Collier MD        HYDROmorphone (DILAUDID) injection 1 mg  1 mg Intravenous Q4H PRN Emilie Collier MD   1 mg at 09/19/18 1250    hydrochlorothiazide (HYDRODIURIL) tablet 25 mg  25 mg Oral Daily Molly Cortes MD   25 mg at 09/19/18 0936    metoprolol tartrate (LOPRESSOR) tablet 50 mg  50 mg Oral BID Molly Cortes MD   50 mg at 09/19/18 0936    sodium chloride flush 0.9 % injection 10 mL  10 mL Intravenous 2 times per day Azra Saxena MD   10 mL at 09/19/18 0815    sodium chloride flush 0.9 % injection 10 mL  10 mL Intravenous PRN Molly Cortes MD        magnesium hydroxide (MILK OF MAGNESIA) 400 MG/5ML suspension 30 mL  30 mL Oral Daily PRN Molly Cortes MD        enoxaparin (LOVENOX) injection 40 mg  40 mg Subcutaneous Daily Molly Cortes MD   40 mg at 09/19/18 0936    glucose (GLUTOSE) 40 % oral gel 15 g  15 g Oral PRN Molly Cortes MD        dextrose 50 % solution 12.5 g  12.5 g Intravenous PRN Molly Cortes MD        glucagon (rDNA) injection 1 mg  1 mg Intramuscular PRN Molly Cortes MD        dextrose 5 % solution  100 mL/hr Intravenous PRN Molly Cortes MD        insulin lispro (HUMALOG) injection vial 0-6 Units  0-6 Units Subcutaneous TID WC Molly Cortes MD   Stopped at 09/18/18 0855    insulin lispro (HUMALOG) injection vial 0-3 Units  0-3 Units Subcutaneous Nightly Molly Mattson MD        insulin glargine (LANTUS) injection vial 20 Units  20 Units Subcutaneous Nightly Molly Mattson MD        0.9 % sodium chloride infusion   Intravenous Continuous Molly Cortes MD 75 mL/hr at 09/19/18 1253           ALLERGIES:  Bactrim [sulfamethoxazole-trimethoprim]; Nitroglycerin; Victoza [liraglutide]; Ace inhibitors; and Nitrofuran derivatives    Review of Systems   Constitutional: Positive for malaise/fatigue. Negative for chills, diaphoresis, fever and weight loss. HENT: Negative. Eyes: Negative. Respiratory: Negative.          COPD/sleep apnea on CPAP Cardiovascular: Negative. Gastrointestinal: Negative. Genitourinary: Negative. Musculoskeletal: Positive for back pain, falls and myalgias. Negative for joint pain and neck pain. Skin: Negative. Neurological: Positive for weakness. Negative for dizziness, tingling, tremors, sensory change, speech change, focal weakness, seizures, loss of consciousness and headaches. Endo/Heme/Allergies: Negative. Psychiatric/Behavioral: Negative for depression, hallucinations, memory loss, substance abuse and suicidal ideas. The patient is nervous/anxious and has insomnia. OBJECTIVE  PHYSICAL EXAM:  BP (!) 122/55   Pulse 80   Temp 98.1 °F (36.7 °C) (Oral)   Resp 22   Ht 5' 5\" (1.651 m)   Wt 250 lb (113.4 kg)   LMP  (LMP Unknown)   SpO2 95%   BMI 41.60 kg/m²   Body mass index is 41.6 kg/m². CONSTITUTIONAL:  awake, alert, cooperative, no apparent distress, and appears stated age, on sleep apnea/CPAP machine  NECK:  supple, symmetrical, trachea midline, skin normal and no stridor  BACK:  There is no tenderness on the top of T12 however she has mild paraspinal tenderness around the lower thoracic and upper lumbar region, negative SLR test, positive facet pain bilateral thoracolumbar spine  ABDOMEN:  Abdomen soft nontender nondistended  MUSCULOSKELETAL:  Motor strength is diminished symmetrical bilateral 4/5  there is no redness, warmth, or swelling of the joints  full range of motion noted  tone is normal  NEUROLOGIC:  Awake, alert oriented, cranial nerves exam intact, motor is symmetrical bilateral lower extremity no focal deficits, sensory is symmetrical bilateral lower extremity, muscle power 4/5 lower extremity bilateral symmetric and  SKIN:  no bruising or bleeding    DATA:   Diagnostic tests reviewed for today's visit:    All labs and imaging results reviewed.         Lab Results   Component Value Date    WBC 14.6 09/17/2018    HGB 13.1 09/17/2018    HCT 39.7 09/17/2018    MCV 82.0 unremarkable. IMPRESSION NO ACUTE FRACTURE. EXAMINATION: AP PELVIS  CLINICAL HISTORY: History of fall 5 weeks ago  COMPARISONS: None  FINDINGS: Single AP view of the pelvis is submitted No acute fractures. No dislocations. No focal bony abnormalities      NO ACUTE FRACTURES. Ct Cervical Spine Wo Contrast    Result Date: 9/17/2018  EXAMINATION:  THORACIC SPINE CLINICAL HISTORY:  History of fall. COMPARISON: TECHNIQUE:  Multiple serial axial images of the thoracic spine from the C6 through the L1 levels with both sagittal coronal reconstruction was performed. FINDINGS:  There is a compression fracture of the T12 vertebra. There is some protrusion of a fracture fragment approximately 5 mm posteriorly with some associated canal stenosis at this level. There is multilevel degenerative changes osteophytosis marginal spurring noted. No significant listhesis     THERE IS A COMPRESSION FRACTURE OF T12 WITH ASSOCIATED RETROPULSION OF FRACTURE FRAGMENT. ASSOCIATED CANAL STENOSIS AT THIS LEVEL. DR. Orlando Baldwin OF THE EMERGENCY ROOM WAS NOTIFIED OF THE ABOVE FINDINGS AT 0530 HOURS ON SEPTEMBER 17, 2018 All CT scans at this facility use dose modulation, iterative reconstruction, and/or weight based dosing when appropriate to reduce radiation dose to as low as reasonably achievable. CT THORACIC SPINE WO CONTRAST, CT CERVICAL SPINE WO CONTRAST, CT LUMBAR SPINE WO CONTRAST CLINICAL HISTORY:  inability to ambulate COMPARISON: NONE Findings: Multiple serial axial images of the cervical spine from the base of the skull through the upper thoracic vertebra with both sagittal and coronal reconstructions was performed. There is straightening of the normal expected cervical lordosis. . Prevertebral  soft tissues are  unremarkable. The disk spaces are intact No acute fractures or spondylo-listhesis. IMPRESSION: NO ACUTE FRACTURES.  All CT scans at this facility use dose modulation, iterative reconstruction, and/or weight based dosing when appropriate to reduce radiation dose to as low as reasonably achievable. EXAMINATION:  CT SCAN LUMBAR SPINE CLINICAL HISTORY:  History of fall. Pain. COMPARISON: TECHNIQUE:  Multiple serial axial images of the lumbar spine from the T12 through the sacral levels with both sagittal and coronal reconstructions were performed. FINDINGS:  Again note is made of a compression fracture of the T12 vertebra with retropulsion of a fracture fragment posteriorly. Please see above report for additional details There are no acute fractures or spondylolisthesis of lumbar vertebra. The SI joints are intact. IMPRESSION:  1. NO ACUTE FRACTURES OF THE LUMBAR VERTEBRA. 2. AGAIN NOTE IS MADE OF A COMPRESSION FRACTURE OF THE T12 VERTEBRA WITH RETROPULSION OF FRACTURE FRAGMENT WITH ASSOCIATED CANAL STENO. All CT scans at this facility use dose modulation, iterative reconstruction, and/or weight based dosing when appropriate to reduce radiation dose to as low as reasonably achievable. Ct Thoracic Spine Wo Contrast    Result Date: 9/17/2018  EXAMINATION:  THORACIC SPINE CLINICAL HISTORY:  History of fall. COMPARISON: TECHNIQUE:  Multiple serial axial images of the thoracic spine from the C6 through the L1 levels with both sagittal coronal reconstruction was performed. FINDINGS:  There is a compression fracture of the T12 vertebra. There is some protrusion of a fracture fragment approximately 5 mm posteriorly with some associated canal stenosis at this level. There is multilevel degenerative changes osteophytosis marginal spurring noted. No significant listhesis     THERE IS A COMPRESSION FRACTURE OF T12 WITH ASSOCIATED RETROPULSION OF FRACTURE FRAGMENT. ASSOCIATED CANAL STENOSIS AT THIS LEVEL.  DR. Yessy Rudolph OF THE EMERGENCY ROOM WAS NOTIFIED OF THE ABOVE FINDINGS AT 0530 HOURS ON SEPTEMBER 17, 2018 All CT scans at this facility use dose modulation, iterative reconstruction, and/or weight based dosing when appropriate to reduce radiation dose performed. FINDINGS:  There is a compression fracture of the T12 vertebra. There is some protrusion of a fracture fragment approximately 5 mm posteriorly with some associated canal stenosis at this level. There is multilevel degenerative changes osteophytosis marginal spurring noted. No significant listhesis     THERE IS A COMPRESSION FRACTURE OF T12 WITH ASSOCIATED RETROPULSION OF FRACTURE FRAGMENT. ASSOCIATED CANAL STENOSIS AT THIS LEVEL. DR. Sy Steinberg OF THE EMERGENCY ROOM WAS NOTIFIED OF THE ABOVE FINDINGS AT 0530 HOURS ON SEPTEMBER 17, 2018 All CT scans at this facility use dose modulation, iterative reconstruction, and/or weight based dosing when appropriate to reduce radiation dose to as low as reasonably achievable. CT THORACIC SPINE WO CONTRAST, CT CERVICAL SPINE WO CONTRAST, CT LUMBAR SPINE WO CONTRAST CLINICAL HISTORY:  inability to ambulate COMPARISON: NONE Findings: Multiple serial axial images of the cervical spine from the base of the skull through the upper thoracic vertebra with both sagittal and coronal reconstructions was performed. There is straightening of the normal expected cervical lordosis. . Prevertebral  soft tissues are  unremarkable. The disk spaces are intact No acute fractures or spondylo-listhesis. IMPRESSION: NO ACUTE FRACTURES. All CT scans at this facility use dose modulation, iterative reconstruction, and/or weight based dosing when appropriate to reduce radiation dose to as low as reasonably achievable. EXAMINATION:  CT SCAN LUMBAR SPINE CLINICAL HISTORY:  History of fall. Pain. COMPARISON: TECHNIQUE:  Multiple serial axial images of the lumbar spine from the T12 through the sacral levels with both sagittal and coronal reconstructions were performed. FINDINGS:  Again note is made of a compression fracture of the T12 vertebra with retropulsion of a fracture fragment posteriorly.  Please see above report for additional details There are no acute fractures or spondylolisthesis of lumbar vertebra. The SI joints are intact. IMPRESSION:  1. NO ACUTE FRACTURES OF THE LUMBAR VERTEBRA. 2. AGAIN NOTE IS MADE OF A COMPRESSION FRACTURE OF THE T12 VERTEBRA WITH RETROPULSION OF FRACTURE FRAGMENT WITH ASSOCIATED CANAL STENO. All CT scans at this facility use dose modulation, iterative reconstruction, and/or weight based dosing when appropriate to reduce radiation dose to as low as reasonably achievable. Fluoro For Surgical Procedures    Result Date: 9/18/2018  Exam: FLUORO FOR SURGICAL PROCEDURES History:Z41.9 Surgery, elective ICD10 Z41.9 Surgery, elective ICD10 Findings: Fluoroscopy time was 27 seconds. A total of 2 fluoroscopic images were obtained by Dr. Janel Hernandez during the Operative procedure. Localization obtained at the T11-12 interspace laterally with subsequent kyphoplasty material at T12. Impression: Fluoroscopic assistance provided for operative guidance. [place] Mark Simms 40 Problems    Diagnosis Date Noted    Acute midline thoracic back pain [M54.6] 09/18/2018    Lumbar compression fracture, closed[facility] ([facility] [S32.000A] 09/17/2018    Morbid obesity with BMI of 40.0-44.9, adult ([facility] [E66.01, Z68.41] 09/17/2018    Type 2 diabetes mellitus with complication, with long-term current use of insulin ([address] [E11.8, Z79.4] 11/30/2015    Tobacco abuse [Z72.0] 09/03/2015    Dyslipidemia [E78.5] 09/04/2013    Sleep apnea, obstructive [G47.33] 01/01/2005        Status post T12 kyphoplasty, she has no tenderness on the thoracic spine however she has para spinal thoracolumbar muscular spasm which seems to most concerning, she has no neurological red flag signs or symptoms. RECOMMENDATION:  SEE ORDERS      I recommended for starting low-dose Toradol 30 mg then to 15 mg  to help with the inflammatory process around the T12 vertebral compression fracture area and associated muscular injury and inflammation around the site.     I recommended for intramuscular

## 2018-09-19 NOTE — PROGRESS NOTES
Patient: Meliza Cartwright  Unit/Bed: U744/V168-94  YOB: 1975  MRN: [id number] Acct: [de-identified]   Admitting Diagnosis: Lumbar compression fracture, closed, initial encounter ([facility] [address] [place]  Admit Date:  9/17/2018  Hospital Day: 2    Current Medications:    Scheduled Meds:   lidocaine  3 patch Transdermal Daily    ondansetron  4 mg Intravenous Q8H    acetaminophen  650 mg Oral 4 times per day    hydrochlorothiazide  25 mg Oral Daily    metoprolol  50 mg Oral BID    sodium chloride flush  10 mL Intravenous 2 times per day    enoxaparin  40 mg Subcutaneous Daily    insulin lispro  0-6 Units Subcutaneous TID WC    insulin lispro  0-3 Units Subcutaneous Nightly    insulin glargine  20 Units Subcutaneous Nightly     Continuous Infusions:   dextrose      sodium chloride 75 mL/hr at 09/17/18 2224     PRN Meds:.ondansetron, metaxalone, oxyCODONE **OR** oxyCODONE, diazepam, HYDROmorphone, sodium chloride flush, magnesium hydroxide, glucose, dextrose, glucagon (rDNA), dextrose  .  dextrose      sodium chloride 75 mL/hr at 09/17/18 2224        Recent Labs      09/17/18   1415   WBC  14.6*   HGB  13.1   HCT  39.7   MCV  82.0   PLT  264     Recent Labs      09/17/18   1415  09/18/18   0527   NA  143  143   K  4.2  3.7   CL  98  101   CO2  28  26   BUN  18  19   CREATININE  0.67  0.56     Recent Labs      09/17/18   1415   AST  30   ALT  22   BILITOT  0.7   ALKPHOS  116     No results for input(s): LIPASE, AMYLASE in the last 72 hours. Recent Labs      09/17/18   1415   PROT  8.9*       Imaging Results:    Xr Lumbar Spine (min 4 Views)    Result Date: 9/17/2018  EXAMINATION: XR PELVIS (1-2 VIEWS), XR LUMBAR SPINE (MIN 4 VIEWS) CLINICAL HISTORY:   fall COMPARISON:  none FINDINGS: 4 views of the lumbar spine including oblique views are submitted. Patient refused to do additional cord images. There are 5 lumbar type vertebrae. No acute fractures.   Disk spaces are intact No significant reconstruction, and/or weight based dosing when appropriate to reduce radiation dose to as low as reasonably achievable. CT THORACIC SPINE WO CONTRAST, CT CERVICAL SPINE WO CONTRAST, CT LUMBAR SPINE WO CONTRAST CLINICAL HISTORY:  inability to ambulate COMPARISON: NONE Findings: Multiple serial axial images of the cervical spine from the base of the skull through the upper thoracic vertebra with both sagittal and coronal reconstructions was performed. There is straightening of the normal expected cervical lordosis. . Prevertebral  soft tissues are  unremarkable. The disk spaces are intact No acute fractures or spondylo-listhesis. IMPRESSION: NO ACUTE FRACTURES. All CT scans at this facility use dose modulation, iterative reconstruction, and/or weight based dosing when appropriate to reduce radiation dose to as low as reasonably achievable. EXAMINATION:  CT SCAN LUMBAR SPINE CLINICAL HISTORY:  History of fall. Pain. COMPARISON: TECHNIQUE:  Multiple serial axial images of the lumbar spine from the T12 through the sacral levels with both sagittal and coronal reconstructions were performed. FINDINGS:  Again note is made of a compression fracture of the T12 vertebra with retropulsion of a fracture fragment posteriorly. Please see above report for additional details There are no acute fractures or spondylolisthesis of lumbar vertebra. The SI joints are intact. IMPRESSION:  1. NO ACUTE FRACTURES OF THE LUMBAR VERTEBRA. 2. AGAIN NOTE IS MADE OF A COMPRESSION FRACTURE OF THE T12 VERTEBRA WITH RETROPULSION OF FRACTURE FRAGMENT WITH ASSOCIATED CANAL STENO. All CT scans at this facility use dose modulation, iterative reconstruction, and/or weight based dosing when appropriate to reduce radiation dose to as low as reasonably achievable. Ct Thoracic Spine Wo Contrast    Result Date: 9/17/2018  EXAMINATION:  THORACIC SPINE CLINICAL HISTORY:  History of fall.  COMPARISON: TECHNIQUE:  Multiple serial axial images of the thoracic muscle spasm of anterior thigh. Neurologically stable. Incision is clean and dry. Satisfactory result status post vertebroplasty.   Out of bed ambulate PT OT consult placement we'll see her as an outpatient

## 2018-09-20 LAB
BILIRUBIN URINE: NEGATIVE
BLOOD, URINE: ABNORMAL
CLARITY: CLEAR
COLOR: YELLOW
EPITHELIAL CELLS, UA: ABNORMAL /HPF
GLUCOSE BLD-MCNC: 121 MG/DL (ref 60–115)
GLUCOSE BLD-MCNC: 130 MG/DL (ref 60–115)
GLUCOSE BLD-MCNC: 157 MG/DL (ref 60–115)
GLUCOSE BLD-MCNC: 207 MG/DL (ref 60–115)
GLUCOSE URINE: NEGATIVE MG/DL
KETONES, URINE: NEGATIVE MG/DL
LEUKOCYTE ESTERASE, URINE: ABNORMAL
NITRITE, URINE: NEGATIVE
PERFORMED ON: ABNORMAL
PH UA: 7.5 (ref 5–9)
PROTEIN UA: NEGATIVE MG/DL
RBC UA: ABNORMAL /HPF (ref 0–2)
SPECIFIC GRAVITY UA: 1.01 (ref 1–1.03)
URINE REFLEX TO CULTURE: YES
UROBILINOGEN, URINE: 1 E.U./DL
WBC UA: ABNORMAL /HPF (ref 0–5)

## 2018-09-20 PROCEDURE — 1210000000 HC MED SURG R&B

## 2018-09-20 PROCEDURE — 81001 URINALYSIS AUTO W/SCOPE: CPT

## 2018-09-20 PROCEDURE — 6370000000 HC RX 637 (ALT 250 FOR IP): Performed by: INTERNAL MEDICINE

## 2018-09-20 PROCEDURE — 6370000000 HC RX 637 (ALT 250 FOR IP): Performed by: PSYCHIATRY & NEUROLOGY

## 2018-09-20 PROCEDURE — 81003 URINALYSIS AUTO W/O SCOPE: CPT

## 2018-09-20 PROCEDURE — 6370000000 HC RX 637 (ALT 250 FOR IP): Performed by: PHYSICAL MEDICINE & REHABILITATION

## 2018-09-20 PROCEDURE — 97535 SELF CARE MNGMENT TRAINING: CPT

## 2018-09-20 PROCEDURE — 2700000000 HC OXYGEN THERAPY PER DAY

## 2018-09-20 PROCEDURE — 99232 SBSQ HOSP IP/OBS MODERATE 35: CPT | Performed by: PHYSICAL MEDICINE & REHABILITATION

## 2018-09-20 PROCEDURE — 6360000002 HC RX W HCPCS: Performed by: PHYSICAL MEDICINE & REHABILITATION

## 2018-09-20 PROCEDURE — 99253 IP/OBS CNSLTJ NEW/EST LOW 45: CPT | Performed by: SURGERY

## 2018-09-20 PROCEDURE — 6370000000 HC RX 637 (ALT 250 FOR IP): Performed by: ANESTHESIOLOGY

## 2018-09-20 PROCEDURE — 87086 URINE CULTURE/COLONY COUNT: CPT

## 2018-09-20 PROCEDURE — 6360000002 HC RX W HCPCS: Performed by: INTERNAL MEDICINE

## 2018-09-20 PROCEDURE — 2580000003 HC RX 258: Performed by: INTERNAL MEDICINE

## 2018-09-20 RX ORDER — BACLOFEN 10 MG/1
5 TABLET ORAL EVERY 8 HOURS
Status: DISCONTINUED | OUTPATIENT
Start: 2018-09-20 | End: 2018-09-21 | Stop reason: HOSPADM

## 2018-09-20 RX ORDER — PREDNISONE 20 MG/1
60 TABLET ORAL DAILY
Status: DISCONTINUED | OUTPATIENT
Start: 2018-09-20 | End: 2018-09-21

## 2018-09-20 RX ORDER — ACETAMINOPHEN 80 MG
TABLET,CHEWABLE ORAL ONCE
Status: COMPLETED | OUTPATIENT
Start: 2018-09-20 | End: 2018-09-20

## 2018-09-20 RX ADMIN — BACLOFEN 10 MG: 20 TABLET ORAL at 09:48

## 2018-09-20 RX ADMIN — ENOXAPARIN SODIUM 40 MG: 40 INJECTION SUBCUTANEOUS at 09:48

## 2018-09-20 RX ADMIN — ONDANSETRON HYDROCHLORIDE 4 MG: 2 INJECTION, SOLUTION INTRAMUSCULAR; INTRAVENOUS at 09:48

## 2018-09-20 RX ADMIN — ACETAMINOPHEN 650 MG: 325 TABLET ORAL at 09:50

## 2018-09-20 RX ADMIN — ONDANSETRON HYDROCHLORIDE 4 MG: 2 INJECTION, SOLUTION INTRAMUSCULAR; INTRAVENOUS at 17:58

## 2018-09-20 RX ADMIN — METOPROLOL TARTRATE 50 MG: 50 TABLET ORAL at 09:48

## 2018-09-20 RX ADMIN — PREDNISONE 60 MG: 20 TABLET ORAL at 15:59

## 2018-09-20 RX ADMIN — MECLIZINE HYDROCHLORIDE 25 MG: 25 TABLET ORAL at 09:54

## 2018-09-20 RX ADMIN — INSULIN GLARGINE 20 UNITS: 100 INJECTION, SOLUTION SUBCUTANEOUS at 20:18

## 2018-09-20 RX ADMIN — ERGOCALCIFEROL 50000 UNITS: 1.25 CAPSULE ORAL at 09:48

## 2018-09-20 RX ADMIN — ACETAMINOPHEN 650 MG: 325 TABLET ORAL at 15:59

## 2018-09-20 RX ADMIN — Medication: at 15:59

## 2018-09-20 RX ADMIN — MECLIZINE HYDROCHLORIDE 25 MG: 25 TABLET ORAL at 17:58

## 2018-09-20 RX ADMIN — BACLOFEN 5 MG: 10 TABLET ORAL at 20:31

## 2018-09-20 RX ADMIN — HYDROCHLOROTHIAZIDE 25 MG: 25 TABLET ORAL at 09:48

## 2018-09-20 RX ADMIN — ACETAMINOPHEN 650 MG: 325 TABLET ORAL at 22:28

## 2018-09-20 RX ADMIN — SODIUM CHLORIDE: 9 INJECTION, SOLUTION INTRAVENOUS at 15:59

## 2018-09-20 RX ADMIN — SODIUM CHLORIDE: 9 INJECTION, SOLUTION INTRAVENOUS at 02:19

## 2018-09-20 RX ADMIN — METOPROLOL TARTRATE 50 MG: 50 TABLET ORAL at 20:18

## 2018-09-20 ASSESSMENT — PAIN SCALES - GENERAL
PAINLEVEL_OUTOF10: 7
PAINLEVEL_OUTOF10: 8
PAINLEVEL_OUTOF10: 7
PAINLEVEL_OUTOF10: 3
PAINLEVEL_OUTOF10: 5
PAINLEVEL_OUTOF10: 7

## 2018-09-20 ASSESSMENT — PAIN DESCRIPTION - LOCATION: LOCATION: BACK

## 2018-09-20 ASSESSMENT — PAIN DESCRIPTION - PAIN TYPE: TYPE: ACUTE PAIN

## 2018-09-20 ASSESSMENT — PAIN DESCRIPTION - ORIENTATION: ORIENTATION: RIGHT;LEFT

## 2018-09-20 NOTE — PROGRESS NOTES
Subjective: The patient complains of severe  acute mid and low back pain as well as severe back spasms unrelieved by  Skelaxin and exacerbated by  any motion. I am concerned about patients severe nausea on any oxycodone products. The nausea gets a severe that she is been refusing any type of pain medication other than the Tylenol today. She does state however that the baclofen is very effective though it is making her fairly sleepy. We discussed it and she would like to go down to the 5 mg strength the on scheduled dosing and she continues to be very compliant and wears her CPAP whenever she is asleep. She is not complaining of any  overmedication regarding  baclofen or her pain medications. ROS x10: The patient also complains of severely impaired mobility and activities of daily living. Otherwise no new problems with vision, hearing, nose, mouth, throat, dermal, cardiovascular, GI, , pulmonary, musculoskeletal, psychiatric or neurological. See Rehab H&P on Rehab chart dated . Vital signs:  /62   Pulse 76   Temp 98.2 °F (36.8 °C) (Oral)   Resp 20   Ht 5' 5\" (1.651 m)   Wt 250 lb (113.4 kg)   LMP  (LMP Unknown)   SpO2 98%   BMI 41.60 kg/m²   I/O:   PO/Intake:    Poor PO intake,  severe nausea  secondary to opiates especially Percocet-improving since she's been off the Percocet    Bowel/Bladder:  continent, no problems noted. General:  Patient is well developed, adequately nourished, non-obese and     well kempt. HEENT:    PERRLA, hearing intact to loud voice, external inspection of ear     and nose benign. Inspection of lips, tongue and gums benign  Musculoskeletal: No significant change in strength or tone. All joints stable. Inspection and palpation of digits and nails show no clubbing,       cyanosis or inflammatory conditions. Neuro/Psychiatric: Affect: flat but pleasant. Alert and oriented to person, place and     situation.   No significant change in deep ALLERGIES:    Allergies as of 09/17/2018 - Review Complete 09/17/2018   Allergen Reaction Noted    Bactrim [sulfamethoxazole-trimethoprim] Itching 05/25/2017    Nitroglycerin  08/27/2015    Victoza [liraglutide]  11/30/2015    Ace inhibitors Other (See Comments) 10/24/2011    Nitrofuran derivatives Other (See Comments) 10/24/2011      (please also verify by checking [facility])     Complex Physical Medicine & Rehab Issues Assess & Plan:   1. Severe abnormality of gait and mobility and impaired self-care and ADL's secondary to progressive Pain and muscle spasm status post fractured spine T12 . Functional and medical status reassessed regarding patients ability to participate in therapies and patient found to be able to participate in  Skilled inpatient rehabilitation program including PT/OT to improve balance, ambulation, ADLs, and to improve the P/AROM. She is choosing on hospital  2. Bowel and Bladder dysfunction:  frequent toileting, ambulate to bathroom with assistance, check post void residuals. Check for C.difficile x1 if >2 loose stools in 24 hours, continue bowel & bladder program.   3. Severe T12 low back pain as well as severe muscle spasms and nausea secondary to pain meds generalized OA pain: reassess pain every shift and prior to and after each therapy session, give prn Dilaudid  Tylenol, modalities prn in therapy, consider Lidoderm, K-pad prn. I will transition from Skelaxin and Valium  To baclofen monitor for sedation-due to mild sedation I will decrease rigid 5 mg strength of the baclofen continue scheduled baclofen and then she consists changed to when necessary dosing she was to use her CPAP. 4. Skin healing at the incision and large scratches on her back risk:  continue pressure relief program.  Daily skin exams and reports from nursing. Add bacitracin to her back wounds.   5. Complex discharge planning:   She will go to [facility] once her pain is under better control and her

## 2018-09-20 NOTE — CARE COORDINATION
IRENEW spoke with supervisor at [facility] regarding precert. Still no precert as of the time of this note but pt is going to have a muscle biopsy tomorrow so no DC for today.

## 2018-09-20 NOTE — PROGRESS NOTES
Physical Therapy Med Surg Daily Treatment Note  Facility/Department: [facility]  Room: I684/D480-51       NAME: Shea Malin  : 1975 (44 y.o.)  MRN: [id number]  CODE STATUS: Full Code    Date of Service: 2018    Patient Diagnosis(es): Lumbar compression fracture, closed, initial encounter [facility]) [S32.000A]   Chief Complaint   Patient presents with    Leg Pain     cramping ,bilateral     Patient Active Problem List    Diagnosis Date Noted    Acute midline thoracic back pain 2018    Morbid obesity with BMI of 40.0-44.9, adult ([facility] [address] 2018    Lumbar compression fracture, closed, initial encounter ([facility] 2018    Weakness of both lower extremities 2018    Vaginal itching 2018    Vaginal irritation 2018    Type 2 diabetes mellitus with complication, with long-term current use of insulin ([facility] [address] 2015    Tobacco abuse 2015    HSV-1 (herpes simplex virus 1) infection 2014    Pulmonary HTN ([facility] [address] 2013    Dyslipidemia 2013    Family history of premature CAD 2013    Orthostatic hypotension     B12 deficiency     HPV (human papilloma virus) anogenital infection     Ovarian cyst     Sleep apnea, obstructive 2005        Past Medical History:   Diagnosis Date    Acute midline thoracic back pain 2018    B12 deficiency     Family history of premature CAD 2013    Fatty liver     HPV (human papilloma virus) anogenital infection     Hyperlipidemia     Hypertension     Liver hemangioma     Obesity 3/11/13    BMI 43.42    Orthostatic hypotension     Ovarian cyst     Rectal fissure     Tobacco abuse 9/3/2015    Tobacco abuse     Tremor     Uncontrolled diabetes mellitus with complications ([facility] [address]     Unspecified sleep apnea      Past Surgical History:   Procedure Laterality Date    CARDIAC CATHETERIZATION      COLONOSCOPY      for diarrhea (-)    HYSTERECTOMY  12-10    LEEP      TONSILLECTOMY AND ADENOIDECTOMY  1981    UPPER GASTROINTESTINAL ENDOSCOPY  10/13/15     DR. Heydi Saucedo [facility] SURGERY  6-2015          Restrictions:  Restrictions/Precautions: Fall Risk    SUBJECTIVE:  General  Chart Reviewed: Yes  Subjective  Subjective: I have no pain when I'm laying still. General Comment  Comments: Pt. in bed agreeable to work with therapy. Pre Pain Assessment:  Pre Treatment Pain Screening  Pain at present: 0  Pain Screening  Patient Currently in Pain: Yes       Post Pain Assessment:   Pain Assessment  Pain Assessment: 0-10  Pain Level: 7  Pain Type: Acute pain  Pain Location: Back  Pain Orientation: Right;Left       OBJECTIVE:         Bed mobility  Rolling to Left: Contact guard assistance  Rolling to Right: Contact guard assistance  Supine to Sit: Contact guard assistance  Sit to Supine: Moderate assistance  Scooting: Contact guard assistance  Comment: Pt. performing log roll technique. Transfers  Sit to Stand: Contact guard assistance  Stand to sit: Contact guard assistance  Bed to Chair: Contact guard assistance  Comment: Increased pain with all motion. Ambulation  Ambulation?: Yes  Ambulation 1  Surface: level tile  Device: No Device  Assistance: Contact guard assistance  Quality of Gait: slow, reaching for furniture. Distance: 2' to chair. Comments: pt. unwilling to ambulate any further than to chair. Exercises  Knee Long Arc Quad: x 5 pt. c/o cramping after 5 reps. Ankle Pumps: x 20                     ASSESSMENT:  Body structures, Functions, Activity limitations: Decreased functional mobility ; Decreased safe awareness;Decreased balance;Decreased endurance;Decreased ADL status; Decreased ROM; Decreased strength    Assessment: Pt. groaning and exhibiting pain behaviors throughout tx. c/o cramping during therex. Only tolerated sitting in chair for ~5 minutes then asking to transfer back to bed. Increased time for all tasks d/t pt's pain.      Activity

## 2018-09-20 NOTE — PROGRESS NOTES
50 mg at 09/20/18 0948    sodium chloride flush 0.9 % injection 10 mL  10 mL Intravenous 2 times per day Molly Barton MD   10 mL at 09/19/18 0815    sodium chloride flush 0.9 % injection 10 mL  10 mL Intravenous PRN Molly Barton MD        magnesium hydroxide (MILK OF MAGNESIA) 400 MG/5ML suspension 30 mL  30 mL Oral Daily PRN Molly Barton MD        enoxaparin (LOVENOX) injection 40 mg  40 mg Subcutaneous Daily Molly Barton MD   Stopped at 09/21/18 0900    glucose (GLUTOSE) 40 % oral gel 15 g  15 g Oral PRN Molly Barton MD        dextrose 50 % solution 12.5 g  12.5 g Intravenous PRN Molly Barton MD        glucagon (rDNA) injection 1 mg  1 mg Intramuscular PRN Molly Mattson MD        dextrose 5 % solution  100 mL/hr Intravenous PRN Molly Barton MD        insulin lispro (HUMALOG) injection vial 0-6 Units  0-6 Units Subcutaneous TID WC oMlly Barton MD   Stopped at 09/18/18 0855    insulin lispro (HUMALOG) injection vial 0-3 Units  0-3 Units Subcutaneous Nightly Molly Mattson MD        insulin glargine (LANTUS) injection vial 20 Units  20 Units Subcutaneous Nightly Molly Mattson MD        0.9 % sodium chloride infusion   Intravenous Continuous Molly Barton MD 75 mL/hr at 09/20/18 1559           ALLERGIES:  Bactrim [sulfamethoxazole-trimethoprim]; Nitroglycerin; Victoza [liraglutide]; Ace inhibitors; and Nitrofuran derivatives    Review of Systems   Constitutional: Negative. HENT: Negative. Eyes: Negative. Respiratory: Negative. Cardiovascular: Negative. Gastrointestinal: Negative. Genitourinary: Negative. Musculoskeletal: Positive for back pain and myalgias. Negative for falls and neck pain. Skin: Negative. Neurological: Negative. Endo/Heme/Allergies: Negative. Psychiatric/Behavioral: Negative.             OBJECTIVE  PHYSICAL EXAM:  BP (!) 119/58   Pulse 68   Temp 98.2 °F (36.8 °C) (Oral)   Resp 16   Ht 5' 5\" (1.651 m)   Wt 250 lb (113.4 kg)   LMP  (LMP Unknown) spaces are intact No significant spondylolisthesis or spondylolysis. The SI joints are unremarkable. IMPRESSION NO ACUTE FRACTURE. EXAMINATION: AP PELVIS  CLINICAL HISTORY: History of fall 5 weeks ago  COMPARISONS: None  FINDINGS: Single AP view of the pelvis is submitted No acute fractures. No dislocations. No focal bony abnormalities      NO ACUTE FRACTURES. Xr Pelvis (1-2 Views)    Result Date: 9/17/2018  EXAMINATION: XR PELVIS (1-2 VIEWS), XR LUMBAR SPINE (MIN 4 VIEWS) CLINICAL HISTORY:   fall COMPARISON:  none FINDINGS: 4 views of the lumbar spine including oblique views are submitted. Patient refused to do additional cord images. There are 5 lumbar type vertebrae. No acute fractures. Disk spaces are intact No significant spondylolisthesis or spondylolysis. The SI joints are unremarkable. IMPRESSION NO ACUTE FRACTURE. EXAMINATION: AP PELVIS  CLINICAL HISTORY: History of fall 5 weeks ago  COMPARISONS: None  FINDINGS: Single AP view of the pelvis is submitted No acute fractures. No dislocations. No focal bony abnormalities      NO ACUTE FRACTURES. Ct Cervical Spine Wo Contrast    Result Date: 9/17/2018  EXAMINATION:  THORACIC SPINE CLINICAL HISTORY:  History of fall. COMPARISON: TECHNIQUE:  Multiple serial axial images of the thoracic spine from the C6 through the L1 levels with both sagittal coronal reconstruction was performed. FINDINGS:  There is a compression fracture of the T12 vertebra. There is some protrusion of a fracture fragment approximately 5 mm posteriorly with some associated canal stenosis at this level. There is multilevel degenerative changes osteophytosis marginal spurring noted. No significant listhesis     THERE IS A COMPRESSION FRACTURE OF T12 WITH ASSOCIATED RETROPULSION OF FRACTURE FRAGMENT. ASSOCIATED CANAL STENOSIS AT THIS LEVEL.  DR. Manohar Cooper OF THE EMERGENCY ROOM WAS NOTIFIED OF THE ABOVE FINDINGS AT 0530 HOURS ON SEPTEMBER 17, 2018 All CT scans at this facility use dose modulation, iterative reconstruction, and/or weight based dosing when appropriate to reduce radiation dose to as low as reasonably achievable. CT THORACIC SPINE WO CONTRAST, CT CERVICAL SPINE WO CONTRAST, CT LUMBAR SPINE WO CONTRAST CLINICAL HISTORY:  inability to ambulate COMPARISON: NONE Findings: Multiple serial axial images of the cervical spine from the base of the skull through the upper thoracic vertebra with both sagittal and coronal reconstructions was performed. There is straightening of the normal expected cervical lordosis. . Prevertebral  soft tissues are  unremarkable. The disk spaces are intact No acute fractures or spondylo-listhesis. IMPRESSION: NO ACUTE FRACTURES. All CT scans at this facility use dose modulation, iterative reconstruction, and/or weight based dosing when appropriate to reduce radiation dose to as low as reasonably achievable. EXAMINATION:  CT SCAN LUMBAR SPINE CLINICAL HISTORY:  History of fall. Pain. COMPARISON: TECHNIQUE:  Multiple serial axial images of the lumbar spine from the T12 through the sacral levels with both sagittal and coronal reconstructions were performed. FINDINGS:  Again note is made of a compression fracture of the T12 vertebra with retropulsion of a fracture fragment posteriorly. Please see above report for additional details There are no acute fractures or spondylolisthesis of lumbar vertebra. The SI joints are intact. IMPRESSION:  1. NO ACUTE FRACTURES OF THE LUMBAR VERTEBRA. 2. AGAIN NOTE IS MADE OF A COMPRESSION FRACTURE OF THE T12 VERTEBRA WITH RETROPULSION OF FRACTURE FRAGMENT WITH ASSOCIATED CANAL STENO. All CT scans at this facility use dose modulation, iterative reconstruction, and/or weight based dosing when appropriate to reduce radiation dose to as low as reasonably achievable. Ct Thoracic Spine Wo Contrast    Result Date: 9/17/2018  EXAMINATION:  THORACIC SPINE CLINICAL HISTORY:  History of fall.  COMPARISON: TECHNIQUE:  Multiple serial axial encounter [facility]) [B25.547Y] 09/17/2018    Morbid obesity with BMI of 40.0-44.9, adult ([facility] [address] [E66.01, Z68.41] 09/17/2018    Type 2 diabetes mellitus with complication, with long-term current use of insulin ([facility] [E11.8, Z79.4] 11/30/2015    Tobacco abuse [Z72.0] 09/03/2015    Dyslipidemia [E78.5] 09/04/2013    Sleep apnea, obstructive [G47.33] 01/01/2005            RECOMMENDATION:  SEE ORDERS  Patient has significant benefit from adding baclofen muscle relaxant  Also seen by neurology who recommended prednisone WAS started and our goal and seemed that provided patient with some benefit even after giving short course, for suspected polymyalgia rheumatica    Patient seems to be receiving well pain relief from provided regimen, she has less muscle spasm, able to progress and improve with therapy provided, no neurological red flags    Maintain on the provided treatment provided patient transferred to skilled nursing for continuing therapy      SIGNATURE: Tim Mcqueen MD PATIENT NAME: Amirah Restrepo   DATE: September 20, 2018 MRN: [id number]   TIME: 7:19 PM PAGER/CONTACT #: [phone]

## 2018-09-20 NOTE — FLOWSHEET NOTE
Pt complaining of dizziness and nausea at shift change, was given zofran and meclazine, still refusing to eat very much. Monitor rm called to inform pt is off tele, PCA went in to put tele back on and pt sleepy, friend in room, refusing tele, friend states \"she ripped it off earlier\", monitor room informed.  Pt also refused accuchk at this time, will reasess and educate shortly Electronically signed by Jose Monsivais RN on 9/19/2018 at 8:22 PM informed pt that I was taking blood sugar and why, agreeable 123, refusing all insulin, request for pain medication, siderail put up as pt was hanging leg out of bed while sleeping, call light in reach, cpap on, will cont to monitor Electronically signed by Jose Monsivais RN on 9/19/2018 at 11:59 PM

## 2018-09-20 NOTE — PROGRESS NOTES
Department of Internal Medicine  Progress Note      SUBJECTIVE: Patient states, the pain has significantly improved since the procedure. Patient is s/p T 12 VERTEBRALPLASTY . Workup in process for chronic lower extremity weakness, gait instability. Patient refused transfer to [facility]. Wants to have her further care taken by Dr Maia Valentin . No acute events overnight.        ROS:  All 12 systems reviewed and negative except mentioned in HPI and Assessment and plan    MEDICATIONS:  Current Facility-Administered Medications   Medication Dose Route Frequency Provider Last Rate Last Dose    baclofen (LIORESAL) tablet 5 mg  5 mg Oral Q8H Sloane Scullin, DO        pill splitter   Does not apply Once Donato Rolls, DO        predniSONE (DELTASONE) tablet 60 mg  60 mg Oral Daily Js Perry MD        meclizine (ANTIVERT) tablet 25 mg  25 mg Oral TID PRN Molly Mattson MD   25 mg at 09/20/18 0954    vitamin D (ERGOCALCIFEROL) capsule 50,000 Units  50,000 Units Oral Daily Sloane Scullin, DO   50,000 Units at 09/20/18 0948    ketorolac (TORADOL) injection 15 mg  15 mg Intravenous Q6H PRN Recardo Carrel, MD        lidocaine (LIDODERM) 5 % 3 patch  3 patch Transdermal Daily Sloane Scullin, DO   2 patch at 09/20/18 0954    ondansetron (ZOFRAN) injection 4 mg  4 mg Intravenous Q8H Sloane Scullin, DO   4 mg at 09/20/18 0948    ondansetron (ZOFRAN) tablet 4 mg  4 mg Oral Q8H PRN Sloane Scullin, DO   4 mg at 09/19/18 1133    acetaminophen (TYLENOL) tablet 650 mg  650 mg Oral 4 times per day Recardo Carrel, MD   650 mg at 09/20/18 0950    oxyCODONE (ROXICODONE) immediate release tablet 5 mg  5 mg Oral Q4H PRN Recardo Carrel, MD   5 mg at 09/19/18 2357    Or    oxyCODONE (ROXICODONE) immediate release tablet 10 mg  10 mg Oral Q4H PRN Recardo Carrel, MD        hydrochlorothiazide (HYDRODIURIL) tablet 25 mg  25 mg Oral Daily Molly Mattson MD   25 mg at 09/20/18 0948    metoprolol tartrate (LOPRESSOR) tablet 50 mg  50 mg Oral BID 82.0   MCH  27.0   MCHC  32.9*   RDW  15.9*   PLT  264       Recent Labs      09/17/18   1415  09/18/18   0527   NA  143  143   K  4.2  3.7   CL  98  101   CO2  28  26   BUN  18  19   CREATININE  0.67  0.56   GLUCOSE  108  156*   CALCIUM  10.1  8.9       Recent Labs      09/17/18   1415   MG  1.8       ASSESSMENT AND PLAN    Active Hospital Problems    Diagnosis Date Noted    Acute midline thoracic back pain [M54.6] 09/18/2018    Lumbar compression fracture, closed, initial encounter ([facility] [S32.000A] 09/17/2018    Morbid obesity with BMI of 40.0-44.9, adult ([facility] [E66.01, Z68.41] 09/17/2018    Type 2 diabetes mellitus with complication, with long-term current use of insulin ([facility] [E11.8, Z79.4] 11/30/2015    Tobacco abuse [Z72.0] 09/03/2015    Dyslipidemia [E78.5] 09/04/2013    Sleep apnea, obstructive [G47.33] 01/01/2005     - Lumbar compression fracture: -Patient is s/p T 12 vertebroplasty. Pain is better controlled today. Pain management as per Dr Pily Colmenares     - RAE : encouraged to use home CPAP     - DM: Lantus+ SSI+ metformin. Accuchecks     - HTN: continue home meds     Patient has been chronic leg weakness for past 2-3 months now. The weakness led to this fall 2 days ago. She was scheduled to see a Neuromuscular doctor as OP in November. She however would like to have IP evaluation and EMG if possible, Dr Rosita Herndon has been consulted for the same. Patient to get EMG and muscle biopsy.  Patient refused transfer to [facility]       DVT prophylaxis: Oliver Naylor MD  Pager : [phone]

## 2018-09-20 NOTE — CONSULTS
considerable pain all over. She denies any  headaches, nausea, vomiting, chest pain, or shortness of breath. She did  not have any symptoms prior to these happenings, when she became sick. PAST MEDICAL HISTORY:  Remarkable for diabetes. She has history of B12  deficiency, premature fatty liver, hyperlipidemia, hypertension,  hemangioma, obesity, hypertension, renal fissure, ovarian cyst.    PAST SURGICAL HISTORY:  Past cath history of cardiac catheterization,  colonoscopy, hysterectomy, tonsillectomy, adenoidectomy, endoscopy. CURRENT MEDICATIONS:  Her medications were reviewed. The patient is on  baclofen, dextrose, enoxaparin, insulin, ketorolac. She is on meclizine,  hydrocodone as well as ergocalciferol. PHYSICAL EXAMINATION:  GENERAL:  She is in acute distress due to pain. NECK:  Supple. There are no meningeal signs. CNS:  Pupils are equal and reactive. Speech is normal.  Tongue is midline. Palate moves symmetrically. EXTREMITIES:  Examination of upper extremity reveals a strength of 4+/5. She has some minor proximal weakness with significant pain in odd muscles. The joints do not show any swelling or pain. She has lower extremity  weakness of 4/5 with proximal weakness of 4/5. This may be secondary to  pain as well. Reflexes are 2+ with the absent ankle reflexes. There are  no sensory levels. Vibration sense appears to be intact. We were not able  to walk her due to the pain. LABORATORY DATA:  Lab examination is reviewed. Some of the lab examination  is from previous evaluations, as received from [facility].  Her  sedimentation rate is 86 with a CRP 46.6, CPK level of 111, B12 level of  625, folate of 5.3, and some of her other labs are pending. IMPRESSION:  Polymyalgia rheumatica with the inflammatory markers with the  increased sedimentation rate and CRP, with considerable pain.   These  findings could also be suggestive of a subtle previous Guillain-Barré  syndrome that may have occurred due to her illness, which was described in  06/2018 with subsequent lower extremity weakness. Her CPK though remains  normal.  The patient was recommended to have an EMG nerve conduction study,  though it does appear that she has not been able to obtain this. The  patient was seen by Dr. Glynn Mendez in the office and from her notes, I  do see that she would like to refer this patient to [facility] or  [facility], depending on who is in her insurance network. The reason  for this is that she would like her to have an EMG with an experienced  neuromuscular physician. The patient does have an appointment to see Dr. Donnie Hernandez, as this is the best she could get, in 11/2018. I recommended that  she keep an appointment with her doctors. She already has an established  neurologist, who can follow this. In the interim, I have recommended that  we at least obtain a muscle biopsy, given this significant amount of pain  that she has and she has inflammatory markers, to see if there is anything  neuromuscular. The EMG can be done later on. We will also start her on a  very low dose of prednisone, keeping in mind that she does have diabetes,  to see if this will help just her pain and some weakness. We will continue  to keep her observation and follow. Thank you Dr. Sudha Desai for asking us to assist in the care of this patient.         Inna Bajwa MD    D: 09/20/2018 13:13:11       T: 09/20/2018 14:56:15     [email or web address]  Job#: [id number]     Doc#: [id number]    CC:

## 2018-09-21 ENCOUNTER — HOSPITAL ENCOUNTER (INPATIENT)
Age: 43
LOS: 7 days | Discharge: HOME | DRG: 561 | End: 2018-09-28
Attending: INTERNAL MEDICINE | Admitting: INTERNAL MEDICINE
Payer: COMMERCIAL

## 2018-09-21 ENCOUNTER — ANESTHESIA (OUTPATIENT)
Dept: OPERATING ROOM | Age: 43
DRG: 478 | End: 2018-09-21
Payer: COMMERCIAL

## 2018-09-21 ENCOUNTER — ANESTHESIA EVENT (OUTPATIENT)
Dept: OPERATING ROOM | Age: 43
DRG: 478 | End: 2018-09-21
Payer: COMMERCIAL

## 2018-09-21 VITALS — TEMPERATURE: 97 F | OXYGEN SATURATION: 99 % | DIASTOLIC BLOOD PRESSURE: 54 MMHG | SYSTOLIC BLOOD PRESSURE: 102 MMHG

## 2018-09-21 VITALS
HEART RATE: 77 BPM | SYSTOLIC BLOOD PRESSURE: 134 MMHG | RESPIRATION RATE: 15 BRPM | TEMPERATURE: 98.1 F | HEIGHT: 65 IN | BODY MASS INDEX: 41.65 KG/M2 | DIASTOLIC BLOOD PRESSURE: 65 MMHG | WEIGHT: 250 LBS | OXYGEN SATURATION: 98 %

## 2018-09-21 DIAGNOSIS — S32.000A LUMBAR COMPRESSION FRACTURE, CLOSED, INITIAL ENCOUNTER (HCC): Primary | ICD-10-CM

## 2018-09-21 LAB
AMMONIA: 39 UMOL/L (ref 11–51)
C-REACTIVE PROTEIN, HIGH SENSITIVITY: 55.7 MG/L (ref 0–5)
GLUCOSE BLD-MCNC: 110 MG/DL (ref 60–115)
GLUCOSE BLD-MCNC: 129 MG/DL (ref 60–115)
GLUCOSE BLD-MCNC: 172 MG/DL (ref 60–115)
GLUCOSE BLD-MCNC: 259 MG/DL (ref 60–115)
GLUCOSE BLD-MCNC: 314 MG/DL (ref 60–115)
GLUCOSE BLD-MCNC: NORMAL MG/DL
LACTATE DEHYDROGENASE: 236 U/L (ref 135–214)
PERFORMED ON: ABNORMAL
PERFORMED ON: NORMAL
REJECTED TEST: NORMAL
RHEUMATOID FACTOR: <10 IU/ML (ref 0–14)
SEDIMENTATION RATE, ERYTHROCYTE: 82 MM (ref 0–20)

## 2018-09-21 PROCEDURE — 6370000000 HC RX 637 (ALT 250 FOR IP): Performed by: INTERNAL MEDICINE

## 2018-09-21 PROCEDURE — 6370000000 HC RX 637 (ALT 250 FOR IP): Performed by: ANESTHESIOLOGY

## 2018-09-21 PROCEDURE — 7100000001 HC PACU RECOVERY - ADDTL 15 MIN: Performed by: SURGERY

## 2018-09-21 PROCEDURE — 0KBR0ZX EXCISION OF LEFT UPPER LEG MUSCLE, OPEN APPROACH, DIAGNOSTIC: ICD-10-PCS | Performed by: SURGERY

## 2018-09-21 PROCEDURE — 2580000003 HC RX 258: Performed by: SURGERY

## 2018-09-21 PROCEDURE — 6360000002 HC RX W HCPCS: Performed by: NURSE ANESTHETIST, CERTIFIED REGISTERED

## 2018-09-21 PROCEDURE — 7100000000 HC PACU RECOVERY - FIRST 15 MIN: Performed by: SURGERY

## 2018-09-21 PROCEDURE — 6370000000 HC RX 637 (ALT 250 FOR IP): Performed by: PSYCHIATRY & NEUROLOGY

## 2018-09-21 PROCEDURE — 2709999900 HC NON-CHARGEABLE SUPPLY: Performed by: SURGERY

## 2018-09-21 PROCEDURE — 2500000003 HC RX 250 WO HCPCS: Performed by: NURSE ANESTHETIST, CERTIFIED REGISTERED

## 2018-09-21 PROCEDURE — 86141 C-REACTIVE PROTEIN HS: CPT

## 2018-09-21 PROCEDURE — 3600000003 HC SURGERY LEVEL 3 BASE: Performed by: SURGERY

## 2018-09-21 PROCEDURE — 36415 COLL VENOUS BLD VENIPUNCTURE: CPT

## 2018-09-21 PROCEDURE — 82085 ASSAY OF ALDOLASE: CPT

## 2018-09-21 PROCEDURE — 2500000003 HC RX 250 WO HCPCS: Performed by: SURGERY

## 2018-09-21 PROCEDURE — 6370000000 HC RX 637 (ALT 250 FOR IP): Performed by: SURGERY

## 2018-09-21 PROCEDURE — 88300 SURGICAL PATH GROSS: CPT

## 2018-09-21 PROCEDURE — 6360000002 HC RX W HCPCS: Performed by: INTERNAL MEDICINE

## 2018-09-21 PROCEDURE — 3700000000 HC ANESTHESIA ATTENDED CARE: Performed by: SURGERY

## 2018-09-21 PROCEDURE — 3600000013 HC SURGERY LEVEL 3 ADDTL 15MIN: Performed by: SURGERY

## 2018-09-21 PROCEDURE — 83615 LACTATE (LD) (LDH) ENZYME: CPT

## 2018-09-21 PROCEDURE — 85652 RBC SED RATE AUTOMATED: CPT

## 2018-09-21 PROCEDURE — 20205 DEEP MUSCLE BIOPSY: CPT | Performed by: SURGERY

## 2018-09-21 PROCEDURE — 94150 VITAL CAPACITY TEST: CPT

## 2018-09-21 PROCEDURE — 82784 ASSAY IGA/IGD/IGG/IGM EACH: CPT

## 2018-09-21 PROCEDURE — 6360000002 HC RX W HCPCS: Performed by: PHYSICAL MEDICINE & REHABILITATION

## 2018-09-21 PROCEDURE — 86235 NUCLEAR ANTIGEN ANTIBODY: CPT

## 2018-09-21 PROCEDURE — 3700000001 HC ADD 15 MINUTES (ANESTHESIA): Performed by: SURGERY

## 2018-09-21 PROCEDURE — 86431 RHEUMATOID FACTOR QUANT: CPT

## 2018-09-21 PROCEDURE — 2580000003 HC RX 258: Performed by: INTERNAL MEDICINE

## 2018-09-21 PROCEDURE — A4648 IMPLANTABLE TISSUE MARKER: HCPCS | Performed by: SURGERY

## 2018-09-21 PROCEDURE — 82140 ASSAY OF AMMONIA: CPT

## 2018-09-21 PROCEDURE — 6370000000 HC RX 637 (ALT 250 FOR IP): Performed by: PHYSICAL MEDICINE & REHABILITATION

## 2018-09-21 PROCEDURE — 1200000000 HC SEMI PRIVATE

## 2018-09-21 RX ORDER — PREDNISONE 20 MG/1
60 TABLET ORAL DAILY
Status: CANCELLED | OUTPATIENT
Start: 2018-09-22

## 2018-09-21 RX ORDER — NICOTINE POLACRILEX 4 MG
15 LOZENGE BUCCAL PRN
Status: CANCELLED | OUTPATIENT
Start: 2018-09-21

## 2018-09-21 RX ORDER — METOCLOPRAMIDE HYDROCHLORIDE 5 MG/ML
10 INJECTION INTRAMUSCULAR; INTRAVENOUS
Status: DISCONTINUED | OUTPATIENT
Start: 2018-09-21 | End: 2018-09-21 | Stop reason: HOSPADM

## 2018-09-21 RX ORDER — ONDANSETRON 2 MG/ML
4 INJECTION INTRAMUSCULAR; INTRAVENOUS EVERY 8 HOURS
Status: CANCELLED | OUTPATIENT
Start: 2018-09-21

## 2018-09-21 RX ORDER — HYDROCODONE BITARTRATE AND ACETAMINOPHEN 7.5; 325 MG/1; MG/1
1 TABLET ORAL EVERY 4 HOURS PRN
Status: DISCONTINUED | OUTPATIENT
Start: 2018-09-21 | End: 2018-09-25

## 2018-09-21 RX ORDER — BUPIVACAINE HYDROCHLORIDE AND EPINEPHRINE 5; 5 MG/ML; UG/ML
INJECTION, SOLUTION EPIDURAL; INTRACAUDAL; PERINEURAL PRN
Status: DISCONTINUED | OUTPATIENT
Start: 2018-09-21 | End: 2018-09-21 | Stop reason: HOSPADM

## 2018-09-21 RX ORDER — SODIUM CHLORIDE 0.9 % (FLUSH) 0.9 %
10 SYRINGE (ML) INJECTION PRN
Status: CANCELLED | OUTPATIENT
Start: 2018-09-21

## 2018-09-21 RX ORDER — INSULIN GLARGINE 100 [IU]/ML
20 INJECTION, SOLUTION SUBCUTANEOUS NIGHTLY
Status: CANCELLED | OUTPATIENT
Start: 2018-09-21

## 2018-09-21 RX ORDER — PREDNISONE 20 MG/1
20 TABLET ORAL DAILY
Status: CANCELLED | OUTPATIENT
Start: 2018-09-29

## 2018-09-21 RX ORDER — HYDROCHLOROTHIAZIDE 25 MG/1
25 TABLET ORAL DAILY
Status: DISCONTINUED | OUTPATIENT
Start: 2018-09-22 | End: 2018-09-28 | Stop reason: HOSPADM

## 2018-09-21 RX ORDER — METOPROLOL TARTRATE 50 MG/1
50 TABLET, FILM COATED ORAL 2 TIMES DAILY
Status: CANCELLED | OUTPATIENT
Start: 2018-09-21

## 2018-09-21 RX ORDER — PREDNISONE 20 MG/1
20 TABLET ORAL DAILY
Status: DISCONTINUED | OUTPATIENT
Start: 2018-09-29 | End: 2018-09-25

## 2018-09-21 RX ORDER — HYDROCODONE BITARTRATE AND ACETAMINOPHEN 5; 325 MG/1; MG/1
1 TABLET ORAL PRN
Status: DISCONTINUED | OUTPATIENT
Start: 2018-09-21 | End: 2018-09-21 | Stop reason: HOSPADM

## 2018-09-21 RX ORDER — INSULIN GLARGINE 100 [IU]/ML
40 INJECTION, SOLUTION SUBCUTANEOUS NIGHTLY
Status: DISCONTINUED | OUTPATIENT
Start: 2018-09-21 | End: 2018-09-28 | Stop reason: HOSPADM

## 2018-09-21 RX ORDER — MEPERIDINE HYDROCHLORIDE 25 MG/ML
12.5 INJECTION INTRAMUSCULAR; INTRAVENOUS; SUBCUTANEOUS EVERY 5 MIN PRN
Status: DISCONTINUED | OUTPATIENT
Start: 2018-09-21 | End: 2018-09-21 | Stop reason: HOSPADM

## 2018-09-21 RX ORDER — DEXTROSE MONOHYDRATE 50 MG/ML
100 INJECTION, SOLUTION INTRAVENOUS PRN
Status: CANCELLED | OUTPATIENT
Start: 2018-09-21

## 2018-09-21 RX ORDER — FENTANYL CITRATE 50 UG/ML
50 INJECTION, SOLUTION INTRAMUSCULAR; INTRAVENOUS EVERY 10 MIN PRN
Status: DISCONTINUED | OUTPATIENT
Start: 2018-09-21 | End: 2018-09-21 | Stop reason: HOSPADM

## 2018-09-21 RX ORDER — PANTOPRAZOLE SODIUM 40 MG/1
40 TABLET, DELAYED RELEASE ORAL
Status: DISCONTINUED | OUTPATIENT
Start: 2018-09-21 | End: 2018-09-28 | Stop reason: HOSPADM

## 2018-09-21 RX ORDER — HYDROCODONE BITARTRATE AND ACETAMINOPHEN 5; 325 MG/1; MG/1
1 TABLET ORAL EVERY 4 HOURS PRN
Status: DISCONTINUED | OUTPATIENT
Start: 2018-09-21 | End: 2018-09-21 | Stop reason: HOSPADM

## 2018-09-21 RX ORDER — NICOTINE POLACRILEX 4 MG
15 LOZENGE BUCCAL PRN
Status: DISCONTINUED | OUTPATIENT
Start: 2018-09-21 | End: 2018-09-28 | Stop reason: HOSPADM

## 2018-09-21 RX ORDER — DIPHENHYDRAMINE HYDROCHLORIDE 50 MG/ML
12.5 INJECTION INTRAMUSCULAR; INTRAVENOUS
Status: DISCONTINUED | OUTPATIENT
Start: 2018-09-21 | End: 2018-09-21 | Stop reason: HOSPADM

## 2018-09-21 RX ORDER — PREDNISONE 20 MG/1
60 TABLET ORAL DAILY
Status: DISCONTINUED | OUTPATIENT
Start: 2018-09-21 | End: 2018-09-21 | Stop reason: HOSPADM

## 2018-09-21 RX ORDER — HYDROCHLOROTHIAZIDE 25 MG/1
25 TABLET ORAL DAILY
Status: CANCELLED | OUTPATIENT
Start: 2018-09-22

## 2018-09-21 RX ORDER — LIDOCAINE 50 MG/G
3 PATCH TOPICAL DAILY
Status: DISCONTINUED | OUTPATIENT
Start: 2018-09-22 | End: 2018-09-21 | Stop reason: SDUPTHER

## 2018-09-21 RX ORDER — HYDROCODONE BITARTRATE AND ACETAMINOPHEN 5; 325 MG/1; MG/1
2 TABLET ORAL PRN
Status: DISCONTINUED | OUTPATIENT
Start: 2018-09-21 | End: 2018-09-21 | Stop reason: HOSPADM

## 2018-09-21 RX ORDER — TROLAMINE SALICYLATE 10 G/100G
CREAM TOPICAL 3 TIMES DAILY PRN
Status: DISCONTINUED | OUTPATIENT
Start: 2018-09-21 | End: 2018-09-28 | Stop reason: HOSPADM

## 2018-09-21 RX ORDER — DEXTROSE MONOHYDRATE 25 G/50ML
12.5 INJECTION, SOLUTION INTRAVENOUS PRN
Status: DISCONTINUED | OUTPATIENT
Start: 2018-09-21 | End: 2018-09-28 | Stop reason: HOSPADM

## 2018-09-21 RX ORDER — HYDROCODONE BITARTRATE AND ACETAMINOPHEN 5; 325 MG/1; MG/1
2 TABLET ORAL EVERY 4 HOURS PRN
Status: DISCONTINUED | OUTPATIENT
Start: 2018-09-21 | End: 2018-09-21

## 2018-09-21 RX ORDER — MECLIZINE HCL 12.5 MG/1
25 TABLET ORAL 3 TIMES DAILY PRN
Status: DISCONTINUED | OUTPATIENT
Start: 2018-09-21 | End: 2018-09-21

## 2018-09-21 RX ORDER — METOPROLOL TARTRATE 50 MG/1
50 TABLET, FILM COATED ORAL 2 TIMES DAILY
Status: DISCONTINUED | OUTPATIENT
Start: 2018-09-21 | End: 2018-09-28 | Stop reason: HOSPADM

## 2018-09-21 RX ORDER — PROMETHAZINE HYDROCHLORIDE 12.5 MG/1
25 TABLET ORAL 2 TIMES DAILY
Status: DISCONTINUED | OUTPATIENT
Start: 2018-09-21 | End: 2018-09-28 | Stop reason: HOSPADM

## 2018-09-21 RX ORDER — ACETAMINOPHEN 500 MG
500 TABLET ORAL EVERY 4 HOURS PRN
Status: DISCONTINUED | OUTPATIENT
Start: 2018-09-21 | End: 2018-09-23

## 2018-09-21 RX ORDER — ONDANSETRON 2 MG/ML
4 INJECTION INTRAMUSCULAR; INTRAVENOUS
Status: DISCONTINUED | OUTPATIENT
Start: 2018-09-21 | End: 2018-09-21 | Stop reason: HOSPADM

## 2018-09-21 RX ORDER — MAGNESIUM HYDROXIDE 1200 MG/15ML
LIQUID ORAL CONTINUOUS PRN
Status: COMPLETED | OUTPATIENT
Start: 2018-09-21 | End: 2018-09-21

## 2018-09-21 RX ORDER — VENLAFAXINE HYDROCHLORIDE 37.5 MG/1
75 CAPSULE, EXTENDED RELEASE ORAL 2 TIMES DAILY
Status: DISCONTINUED | OUTPATIENT
Start: 2018-09-21 | End: 2018-09-28 | Stop reason: HOSPADM

## 2018-09-21 RX ORDER — HYDROCODONE BITARTRATE AND ACETAMINOPHEN 5; 325 MG/1; MG/1
2 TABLET ORAL EVERY 4 HOURS PRN
Status: DISCONTINUED | OUTPATIENT
Start: 2018-09-21 | End: 2018-09-21 | Stop reason: HOSPADM

## 2018-09-21 RX ORDER — DEXTROSE MONOHYDRATE 50 MG/ML
100 INJECTION, SOLUTION INTRAVENOUS PRN
Status: DISCONTINUED | OUTPATIENT
Start: 2018-09-21 | End: 2018-09-28 | Stop reason: HOSPADM

## 2018-09-21 RX ORDER — HYDROCODONE BITARTRATE AND ACETAMINOPHEN 5; 325 MG/1; MG/1
2 TABLET ORAL EVERY 4 HOURS PRN
Status: CANCELLED | OUTPATIENT
Start: 2018-09-21

## 2018-09-21 RX ORDER — PREDNISONE 20 MG/1
60 TABLET ORAL DAILY
Status: CANCELLED | OUTPATIENT
Start: 2018-09-21 | End: 2018-09-25

## 2018-09-21 RX ORDER — PREDNISONE 20 MG/1
40 TABLET ORAL DAILY
Status: DISCONTINUED | OUTPATIENT
Start: 2018-09-25 | End: 2018-09-21 | Stop reason: HOSPADM

## 2018-09-21 RX ORDER — FENTANYL CITRATE 50 UG/ML
INJECTION, SOLUTION INTRAMUSCULAR; INTRAVENOUS PRN
Status: DISCONTINUED | OUTPATIENT
Start: 2018-09-21 | End: 2018-09-21 | Stop reason: SDUPTHER

## 2018-09-21 RX ORDER — SODIUM CHLORIDE 0.9 % (FLUSH) 0.9 %
10 SYRINGE (ML) INJECTION PRN
Status: DISCONTINUED | OUTPATIENT
Start: 2018-09-21 | End: 2018-09-28 | Stop reason: HOSPADM

## 2018-09-21 RX ORDER — KETOROLAC TROMETHAMINE 15 MG/ML
15 INJECTION, SOLUTION INTRAMUSCULAR; INTRAVENOUS EVERY 6 HOURS PRN
Status: DISCONTINUED | OUTPATIENT
Start: 2018-09-21 | End: 2018-09-21

## 2018-09-21 RX ORDER — LIDOCAINE 50 MG/G
3 PATCH TOPICAL DAILY
Status: CANCELLED | OUTPATIENT
Start: 2018-09-22

## 2018-09-21 RX ORDER — DEXTROSE MONOHYDRATE 25 G/50ML
12.5 INJECTION, SOLUTION INTRAVENOUS PRN
Status: CANCELLED | OUTPATIENT
Start: 2018-09-21

## 2018-09-21 RX ORDER — PROPOFOL 10 MG/ML
INJECTION, EMULSION INTRAVENOUS PRN
Status: DISCONTINUED | OUTPATIENT
Start: 2018-09-21 | End: 2018-09-21 | Stop reason: SDUPTHER

## 2018-09-21 RX ORDER — ONDANSETRON 2 MG/ML
INJECTION INTRAMUSCULAR; INTRAVENOUS PRN
Status: DISCONTINUED | OUTPATIENT
Start: 2018-09-21 | End: 2018-09-21 | Stop reason: SDUPTHER

## 2018-09-21 RX ORDER — SODIUM CHLORIDE 0.9 % (FLUSH) 0.9 %
10 SYRINGE (ML) INJECTION EVERY 12 HOURS SCHEDULED
Status: DISCONTINUED | OUTPATIENT
Start: 2018-09-21 | End: 2018-09-28 | Stop reason: HOSPADM

## 2018-09-21 RX ORDER — BACLOFEN 10 MG/1
5 TABLET ORAL 2 TIMES DAILY PRN
Status: DISCONTINUED | OUTPATIENT
Start: 2018-09-21 | End: 2018-09-28 | Stop reason: HOSPADM

## 2018-09-21 RX ORDER — ONDANSETRON 4 MG/1
4 TABLET, ORALLY DISINTEGRATING ORAL EVERY 8 HOURS PRN
Status: DISCONTINUED | OUTPATIENT
Start: 2018-09-21 | End: 2018-09-28 | Stop reason: HOSPADM

## 2018-09-21 RX ORDER — ONDANSETRON 2 MG/ML
4 INJECTION INTRAMUSCULAR; INTRAVENOUS EVERY 8 HOURS PRN
Status: DISCONTINUED | OUTPATIENT
Start: 2018-09-21 | End: 2018-09-21

## 2018-09-21 RX ORDER — KETOROLAC TROMETHAMINE 30 MG/ML
30 INJECTION, SOLUTION INTRAMUSCULAR; INTRAVENOUS 2 TIMES DAILY PRN
Status: DISCONTINUED | OUTPATIENT
Start: 2018-09-21 | End: 2018-09-21

## 2018-09-21 RX ORDER — KETOROLAC TROMETHAMINE 15 MG/ML
15 INJECTION, SOLUTION INTRAMUSCULAR; INTRAVENOUS EVERY 6 HOURS PRN
Status: CANCELLED | OUTPATIENT
Start: 2018-09-21 | End: 2018-09-24

## 2018-09-21 RX ORDER — ACETAMINOPHEN 325 MG/1
650 TABLET ORAL EVERY 4 HOURS PRN
Status: CANCELLED | OUTPATIENT
Start: 2018-09-21

## 2018-09-21 RX ORDER — ACETAMINOPHEN 325 MG/1
650 TABLET ORAL EVERY 4 HOURS PRN
Status: DISCONTINUED | OUTPATIENT
Start: 2018-09-21 | End: 2018-09-21

## 2018-09-21 RX ORDER — ACETAMINOPHEN 325 MG/1
650 TABLET ORAL EVERY 4 HOURS PRN
Status: DISCONTINUED | OUTPATIENT
Start: 2018-09-21 | End: 2018-09-21 | Stop reason: HOSPADM

## 2018-09-21 RX ORDER — LIDOCAINE HYDROCHLORIDE 20 MG/ML
INJECTION, SOLUTION INFILTRATION; PERINEURAL PRN
Status: DISCONTINUED | OUTPATIENT
Start: 2018-09-21 | End: 2018-09-21 | Stop reason: SDUPTHER

## 2018-09-21 RX ORDER — BACLOFEN 10 MG/1
5 TABLET ORAL EVERY 8 HOURS
Status: CANCELLED | OUTPATIENT
Start: 2018-09-21

## 2018-09-21 RX ORDER — PREDNISONE 20 MG/1
60 TABLET ORAL DAILY
Status: COMPLETED | OUTPATIENT
Start: 2018-09-22 | End: 2018-09-25

## 2018-09-21 RX ORDER — OXYCODONE HYDROCHLORIDE 5 MG/1
5 TABLET ORAL EVERY 4 HOURS PRN
Status: DISCONTINUED | OUTPATIENT
Start: 2018-09-21 | End: 2018-09-21

## 2018-09-21 RX ORDER — PREDNISONE 20 MG/1
20 TABLET ORAL DAILY
Status: DISCONTINUED | OUTPATIENT
Start: 2018-09-29 | End: 2018-09-21 | Stop reason: HOSPADM

## 2018-09-21 RX ORDER — ONDANSETRON 2 MG/ML
4 INJECTION INTRAMUSCULAR; INTRAVENOUS EVERY 6 HOURS PRN
Status: DISCONTINUED | OUTPATIENT
Start: 2018-09-21 | End: 2018-09-21 | Stop reason: HOSPADM

## 2018-09-21 RX ORDER — SODIUM CHLORIDE, SODIUM LACTATE, POTASSIUM CHLORIDE, CALCIUM CHLORIDE 600; 310; 30; 20 MG/100ML; MG/100ML; MG/100ML; MG/100ML
INJECTION, SOLUTION INTRAVENOUS CONTINUOUS
Status: DISCONTINUED | OUTPATIENT
Start: 2018-09-21 | End: 2018-09-21 | Stop reason: HOSPADM

## 2018-09-21 RX ORDER — MECLIZINE HYDROCHLORIDE 25 MG/1
25 TABLET ORAL 3 TIMES DAILY PRN
Status: CANCELLED | OUTPATIENT
Start: 2018-09-21

## 2018-09-21 RX ORDER — PREDNISONE 20 MG/1
40 TABLET ORAL DAILY
Status: DISCONTINUED | OUTPATIENT
Start: 2018-09-25 | End: 2018-09-28 | Stop reason: HOSPADM

## 2018-09-21 RX ORDER — MIDAZOLAM HYDROCHLORIDE 1 MG/ML
INJECTION INTRAMUSCULAR; INTRAVENOUS PRN
Status: DISCONTINUED | OUTPATIENT
Start: 2018-09-21 | End: 2018-09-21 | Stop reason: SDUPTHER

## 2018-09-21 RX ORDER — INSULIN GLARGINE 100 [IU]/ML
20 INJECTION, SOLUTION SUBCUTANEOUS NIGHTLY
Status: DISCONTINUED | OUTPATIENT
Start: 2018-09-21 | End: 2018-09-21

## 2018-09-21 RX ORDER — ONDANSETRON 2 MG/ML
4 INJECTION INTRAMUSCULAR; INTRAVENOUS EVERY 8 HOURS
Status: DISCONTINUED | OUTPATIENT
Start: 2018-09-21 | End: 2018-09-21

## 2018-09-21 RX ORDER — PREDNISONE 20 MG/1
40 TABLET ORAL DAILY
Status: CANCELLED | OUTPATIENT
Start: 2018-09-25 | End: 2018-09-29

## 2018-09-21 RX ORDER — OXYCODONE HYDROCHLORIDE 5 MG/1
10 TABLET ORAL EVERY 4 HOURS PRN
Status: DISCONTINUED | OUTPATIENT
Start: 2018-09-21 | End: 2018-09-21

## 2018-09-21 RX ORDER — OXYCODONE HYDROCHLORIDE 5 MG/1
5 TABLET ORAL EVERY 4 HOURS PRN
Status: CANCELLED | OUTPATIENT
Start: 2018-09-21

## 2018-09-21 RX ORDER — OXYCODONE HYDROCHLORIDE 5 MG/1
10 TABLET ORAL EVERY 4 HOURS PRN
Status: CANCELLED | OUTPATIENT
Start: 2018-09-21

## 2018-09-21 RX ORDER — SODIUM CHLORIDE 0.9 % (FLUSH) 0.9 %
10 SYRINGE (ML) INJECTION EVERY 12 HOURS SCHEDULED
Status: CANCELLED | OUTPATIENT
Start: 2018-09-21

## 2018-09-21 RX ORDER — BACLOFEN 10 MG/1
5 TABLET ORAL EVERY 6 HOURS PRN
Status: DISCONTINUED | OUTPATIENT
Start: 2018-09-21 | End: 2018-09-21

## 2018-09-21 RX ORDER — BACLOFEN 10 MG/1
5 TABLET ORAL EVERY 8 HOURS
Status: DISCONTINUED | OUTPATIENT
Start: 2018-09-21 | End: 2018-09-21

## 2018-09-21 RX ORDER — LIDOCAINE 50 MG/G
3 PATCH TOPICAL DAILY
Status: DISCONTINUED | OUTPATIENT
Start: 2018-09-21 | End: 2018-09-28 | Stop reason: HOSPADM

## 2018-09-21 RX ORDER — HYDROCODONE BITARTRATE AND ACETAMINOPHEN 5; 325 MG/1; MG/1
1 TABLET ORAL EVERY 4 HOURS PRN
Status: CANCELLED | OUTPATIENT
Start: 2018-09-21

## 2018-09-21 RX ORDER — HYDROCODONE BITARTRATE AND ACETAMINOPHEN 5; 325 MG/1; MG/1
1 TABLET ORAL EVERY 4 HOURS PRN
Status: DISCONTINUED | OUTPATIENT
Start: 2018-09-21 | End: 2018-09-21

## 2018-09-21 RX ADMIN — METOPROLOL TARTRATE 50 MG: 50 TABLET ORAL at 10:16

## 2018-09-21 RX ADMIN — ACETAMINOPHEN 500 MG: 500 TABLET, FILM COATED ORAL at 20:52

## 2018-09-21 RX ADMIN — ACETAMINOPHEN 650 MG: 325 TABLET ORAL at 10:16

## 2018-09-21 RX ADMIN — PREDNISONE 60 MG: 20 TABLET ORAL at 10:16

## 2018-09-21 RX ADMIN — BACLOFEN 5 MG: 10 TABLET ORAL at 21:29

## 2018-09-21 RX ADMIN — MIDAZOLAM HYDROCHLORIDE 2 MG: 1 INJECTION, SOLUTION INTRAMUSCULAR; INTRAVENOUS at 07:29

## 2018-09-21 RX ADMIN — Medication 10 ML: at 10:19

## 2018-09-21 RX ADMIN — INSULIN GLARGINE 40 UNITS: 100 INJECTION, SOLUTION SUBCUTANEOUS at 20:53

## 2018-09-21 RX ADMIN — FENTANYL CITRATE 25 MCG: 50 INJECTION, SOLUTION INTRAMUSCULAR; INTRAVENOUS at 07:39

## 2018-09-21 RX ADMIN — ONDANSETRON HYDROCHLORIDE 4 MG: 2 INJECTION, SOLUTION INTRAMUSCULAR; INTRAVENOUS at 10:19

## 2018-09-21 RX ADMIN — FENTANYL CITRATE 25 MCG: 50 INJECTION, SOLUTION INTRAMUSCULAR; INTRAVENOUS at 07:53

## 2018-09-21 RX ADMIN — MECLIZINE HYDROCHLORIDE 25 MG: 25 TABLET ORAL at 10:16

## 2018-09-21 RX ADMIN — VENLAFAXINE HYDROCHLORIDE 75 MG: 37.5 CAPSULE, EXTENDED RELEASE ORAL at 22:36

## 2018-09-21 RX ADMIN — PROMETHAZINE HYDROCHLORIDE 25 MG: 12.5 TABLET ORAL at 21:35

## 2018-09-21 RX ADMIN — BACLOFEN 5 MG: 10 TABLET ORAL at 04:11

## 2018-09-21 RX ADMIN — HYDROCHLOROTHIAZIDE 25 MG: 25 TABLET ORAL at 10:16

## 2018-09-21 RX ADMIN — ONDANSETRON HYDROCHLORIDE 4 MG: 2 INJECTION, SOLUTION INTRAMUSCULAR; INTRAVENOUS at 07:42

## 2018-09-21 RX ADMIN — ACETAMINOPHEN 650 MG: 325 TABLET ORAL at 04:08

## 2018-09-21 RX ADMIN — SODIUM CHLORIDE: 9 INJECTION, SOLUTION INTRAVENOUS at 05:40

## 2018-09-21 RX ADMIN — BACLOFEN 5 MG: 10 TABLET ORAL at 13:26

## 2018-09-21 RX ADMIN — PANTOPRAZOLE SODIUM 40 MG: 40 TABLET, DELAYED RELEASE ORAL at 23:20

## 2018-09-21 RX ADMIN — METOPROLOL TARTRATE 50 MG: 50 TABLET ORAL at 20:52

## 2018-09-21 RX ADMIN — FENTANYL CITRATE 50 MCG: 50 INJECTION, SOLUTION INTRAMUSCULAR; INTRAVENOUS at 08:00

## 2018-09-21 RX ADMIN — ERGOCALCIFEROL 50000 UNITS: 1.25 CAPSULE ORAL at 13:26

## 2018-09-21 RX ADMIN — LIDOCAINE HYDROCHLORIDE 40 MG: 20 INJECTION, SOLUTION INFILTRATION; PERINEURAL at 07:33

## 2018-09-21 RX ADMIN — PROPOFOL 200 MG: 10 INJECTION, EMULSION INTRAVENOUS at 07:33

## 2018-09-21 ASSESSMENT — PAIN DESCRIPTION - ONSET
ONSET: ON-GOING
ONSET: ON-GOING

## 2018-09-21 ASSESSMENT — PULMONARY FUNCTION TESTS
PIF_VALUE: 11
PIF_VALUE: 12
PIF_VALUE: 11
PIF_VALUE: 12
PIF_VALUE: 4
PIF_VALUE: 11
PIF_VALUE: 4
PIF_VALUE: 12
PIF_VALUE: 11
PIF_VALUE: 4
PIF_VALUE: 12
PIF_VALUE: 15
PIF_VALUE: 2
PIF_VALUE: 13
PIF_VALUE: 3
PIF_VALUE: 14
PIF_VALUE: 3
PIF_VALUE: 5
PIF_VALUE: 13
PIF_VALUE: 5
PIF_VALUE: 12
PIF_VALUE: 4
PIF_VALUE: 11
PIF_VALUE: 0
PIF_VALUE: 3
PIF_VALUE: 15
PIF_VALUE: 5
PIF_VALUE: 10
PIF_VALUE: 1
PIF_VALUE: 12
PIF_VALUE: 11
PIF_VALUE: 12
PIF_VALUE: 13
PIF_VALUE: 4
PIF_VALUE: 12
PIF_VALUE: 11
PIF_VALUE: 12

## 2018-09-21 ASSESSMENT — PAIN DESCRIPTION - DESCRIPTORS
DESCRIPTORS: SHARP;ACHING
DESCRIPTORS: ACHING;SHARP

## 2018-09-21 ASSESSMENT — PAIN DESCRIPTION - PROGRESSION
CLINICAL_PROGRESSION: NOT CHANGED
CLINICAL_PROGRESSION: NOT CHANGED

## 2018-09-21 ASSESSMENT — ENCOUNTER SYMPTOMS
BACK PAIN: 1
RESPIRATORY NEGATIVE: 1
EYES NEGATIVE: 1
GASTROINTESTINAL NEGATIVE: 1

## 2018-09-21 ASSESSMENT — PAIN SCALES - GENERAL
PAINLEVEL_OUTOF10: 0
PAINLEVEL_OUTOF10: 2
PAINLEVEL_OUTOF10: 3
PAINLEVEL_OUTOF10: 5
PAINLEVEL_OUTOF10: 0
PAINLEVEL_OUTOF10: 2

## 2018-09-21 ASSESSMENT — PAIN DESCRIPTION - PAIN TYPE
TYPE: ACUTE PAIN
TYPE: ACUTE PAIN

## 2018-09-21 ASSESSMENT — PAIN DESCRIPTION - LOCATION
LOCATION: BACK
LOCATION: BACK

## 2018-09-21 ASSESSMENT — PAIN DESCRIPTION - FREQUENCY
FREQUENCY: INTERMITTENT
FREQUENCY: INTERMITTENT

## 2018-09-21 ASSESSMENT — PAIN DESCRIPTION - ORIENTATION
ORIENTATION: RIGHT;LEFT
ORIENTATION: RIGHT;LEFT

## 2018-09-21 NOTE — DISCHARGE SUMMARY
Physician Discharge Summary     Patient ID:  Bhumi Mojica  [id number]  65 y.o.  1975    Admit date: 9/17/2018    Discharge date and time: 9/21/2018  2:30 PM    Admitting Physician: Neeru Arriaga MD     Discharge Physician: Neeru Arriaga MD    Admission Diagnoses: Lumbar compression fracture, closed, initial encounter [facility]) [address]    Discharge Diagnoses: Active Hospital Problems    Diagnosis Date Noted    Muscle weakness [M62.81]     Myalgia [M79.1]     Acute midline thoracic back pain [M54.6] 09/18/2018    [facility]bar [facility] [S32.000A] 09/17/2018    Morbid obesity with BMI of 40.0-44.9, adult ([facility] [E66.01, Z68.41] 09/17/2018    Type 2 diabetes mellitus with complication, with long-term current use of insulin ([facility] [E11.8, Z79.4] 11/30/2015    Tobacco abuse [Z72.0] 09/03/2015    Dyslipidemia [E78.5] 09/04/2013    Sleep apnea, obstructive [G47.33] 01/01/2005       Admission Condition: fair    Discharged Condition: good    Indication for Admission: fall    Hospital Course: Patient was admitted with fall and was diagnosed with vertebral fracture. She underwent vertebroplasty . She was also evalauted by neurology for chronic leg pain and muscle weakness.   The patient was discharged in stable condition    Inpatient meds:miconazole, , Topical, BID    baclofen, 5 mg, Oral, Q8H    predniSONE, 60 mg, Oral, Daily    vitamin D, 50,000 Units, Oral, Daily    lidocaine, 3 patch, Transdermal, Daily    ondansetron, 4 mg, Intravenous, Q8H    hydrochlorothiazide, 25 mg, Oral, Daily    metoprolol, 50 mg, Oral, BID    sodium chloride flush, 10 mL, Intravenous, 2 times per day    enoxaparin, 40 mg, Subcutaneous, Daily    insulin lispro, 0-6 Units, Subcutaneous, TID WC    insulin lispro, 0-3 Units, Subcutaneous, Nightly    insulin glargine, 20 Units, Subcutaneous, Nightly    Labs:  LABS  No results for input(s): WBC, RBC, HGB, HCT, MCV, MCH, MCHC, RDW, PLT, MPV IMPRESSION:  1. NO ACUTE FRACTURES OF THE LUMBAR VERTEBRA. 2. AGAIN NOTE IS MADE OF A COMPRESSION FRACTURE OF THE T12 VERTEBRA WITH RETROPULSION OF FRACTURE FRAGMENT WITH ASSOCIATED CANAL STENO. All CT scans at this facility use dose modulation, iterative reconstruction, and/or weight based dosing when appropriate to reduce radiation dose to as low as reasonably achievable. Ct Thoracic Spine Wo Contrast    Result Date: 9/17/2018  EXAMINATION:  THORACIC SPINE CLINICAL HISTORY:  History of fall. COMPARISON: TECHNIQUE:  Multiple serial axial images of the thoracic spine from the C6 through the L1 levels with both sagittal coronal reconstruction was performed. FINDINGS:  There is a compression fracture of the T12 vertebra. There is some protrusion of a fracture fragment approximately 5 mm posteriorly with some associated canal stenosis at this level. There is multilevel degenerative changes osteophytosis marginal spurring noted. No significant listhesis     THERE IS A COMPRESSION FRACTURE OF T12 WITH ASSOCIATED RETROPULSION OF FRACTURE FRAGMENT. ASSOCIATED CANAL STENOSIS AT THIS LEVEL. DR. Tayla Lucas OF THE EMERGENCY ROOM WAS NOTIFIED OF THE ABOVE FINDINGS AT 0530 HOURS ON SEPTEMBER 17, 2018 All CT scans at this facility use dose modulation, iterative reconstruction, and/or weight based dosing when appropriate to reduce radiation dose to as low as reasonably achievable. CT THORACIC SPINE WO CONTRAST, CT CERVICAL SPINE WO CONTRAST, CT LUMBAR SPINE WO CONTRAST CLINICAL HISTORY:  inability to ambulate COMPARISON: NONE Findings: Multiple serial axial images of the cervical spine from the base of the skull through the upper thoracic vertebra with both sagittal and coronal reconstructions was performed. There is straightening of the normal expected cervical lordosis. . Prevertebral  soft tissues are  unremarkable. The disk spaces are intact No acute fractures or spondylo-listhesis. IMPRESSION: NO ACUTE FRACTURES.  All CT scans iterative reconstruction, and/or weight based dosing when appropriate to reduce radiation dose to as low as reasonably achievable. CT THORACIC SPINE WO CONTRAST, CT CERVICAL SPINE WO CONTRAST, CT LUMBAR SPINE WO CONTRAST CLINICAL HISTORY:  inability to ambulate COMPARISON: NONE Findings: Multiple serial axial images of the cervical spine from the base of the skull through the upper thoracic vertebra with both sagittal and coronal reconstructions was performed. There is straightening of the normal expected cervical lordosis. . Prevertebral  soft tissues are  unremarkable. The disk spaces are intact No acute fractures or spondylo-listhesis. IMPRESSION: NO ACUTE FRACTURES. All CT scans at this facility use dose modulation, iterative reconstruction, and/or weight based dosing when appropriate to reduce radiation dose to as low as reasonably achievable. EXAMINATION:  CT SCAN LUMBAR SPINE CLINICAL HISTORY:  History of fall. Pain. COMPARISON: TECHNIQUE:  Multiple serial axial images of the lumbar spine from the T12 through the sacral levels with both sagittal and coronal reconstructions were performed. FINDINGS:  Again note is made of a compression fracture of the T12 vertebra with retropulsion of a fracture fragment posteriorly. Please see above report for additional details There are no acute fractures or spondylolisthesis of lumbar vertebra. The SI joints are intact. IMPRESSION:  1. NO ACUTE FRACTURES OF THE LUMBAR VERTEBRA. 2. AGAIN NOTE IS MADE OF A COMPRESSION FRACTURE OF THE T12 VERTEBRA WITH RETROPULSION OF FRACTURE FRAGMENT WITH ASSOCIATED CANAL STENO. All CT scans at this facility use dose modulation, iterative reconstruction, and/or weight based dosing when appropriate to reduce radiation dose to as low as reasonably achievable.     Fluoro For Surgical Procedures    Result Date: 9/18/2018  Exam: FLUORO FOR SURGICAL PROCEDURES History:Z41.9 Surgery, elective ICD10 Z41.9 Surgery, elective ICD10 Findings: Fluoroscopy time was 27 seconds. A total of 2 fluoroscopic images were obtained by Dr. Rodney Jones during the Operative procedure. Localization obtained at the T11-12 interspace laterally with subsequent kyphoplasty material at T12. Impression: Fluoroscopic assistance provided for operative guidance.          Discharge Exam:  BP (!) 110/56   Pulse 71   Temp 97.4 °F (36.3 °C) (Temporal)   Resp 15   Ht 5' 5\" (1.651 m)   Wt 250 lb (113.4 kg)   LMP  (LMP Unknown)   SpO2 93%   BMI 41.60 kg/m²     General Appearance:    Alert, cooperative, no distress, appears stated age   Head:    Normocephalic, without obvious abnormality, atraumatic   Eyes:    PERRL, conjunctiva/corneas clear, EOM's intact, fundi     benign, both eyes   Ears:    Normal TM's and external ear canals, both ears   Nose:   Nares normal, septum midline, mucosa normal, no drainage    or sinus tenderness   Throat:   Lips, mucosa, and tongue normal; teeth and gums normal   Neck:   Supple, symmetrical, trachea midline, no adenopathy;     thyroid:  no enlargement/tenderness/nodules; no carotid    bruit or JVD   Back:     Symmetric, no curvature, ROM normal, no CVA tenderness   Lungs:     Clear to auscultation bilaterally, respirations unlabored   Chest Wall:    No tenderness or deformity    Heart:    Regular rate and rhythm, S1 and S2 normal, no murmur, rub   or gallop   Breast Exam:    No tenderness, masses, or nipple abnormality   Abdomen:     Soft, non-tender, bowel sounds active all four quadrants,     no masses, no organomegaly   Genitalia:    Normal female without lesion, discharge or tenderness   Rectal:    Normal tone ;guaiac negative stool   Extremities:   Extremities normal, atraumatic, no cyanosis or edema   Pulses:   2+ and symmetric all extremities   Skin:   Skin color, texture, turgor normal, no rashes or lesions   Lymph nodes:   Cervical, supraclavicular, and axillary nodes normal   Neurologic:   CNII-XII intact, normal strength, sensation and

## 2018-09-21 NOTE — ANESTHESIA PRE PROCEDURE
release capsule Take 1 capsule by mouth daily 6/22/18   Analilia Arambula MD   ondansetron (ZOFRAN-ODT) 4 MG disintegrating tablet Take 1 tablet by mouth every 8 hours as needed for Nausea or Vomiting 6/8/18   SANIA Person - CNP   insulin glargine (TOUJEO SOLOSTAR) 300 UNIT/ML injection pen 200 units at bedtime 5/21/18   Nilda Menon MD   glucose blood VI test strips (AGAMATRIX AMP TEST) strip Checks 2 times daily. Dx:IDDM--ICD-10 E11.9 5/7/18   Analilia Arambula MD   solifenacin (VESICARE) 5 MG tablet Take 1 tablet by mouth daily 3/13/18 3/13/19  Rebeca George MD   Insulin Pen Needle (PEN NEEDLES) 32G X 4 MM MISC Use as directed with insulin pens, 4 times daily 1/19/18   Analilia Arambula MD   Lancets MISC Checks 2 times daily.  Dx:IDDM--ICD-10 E11.9 10/10/17   Analilia Arambula MD   fluticasone Dorothe Sloop) 50 MCG/ACT nasal spray 1 spray by Nasal route daily 6/28/17   Analilia Arambula MD       Current medications:    Current Facility-Administered Medications   Medication Dose Route Frequency Provider Last Rate Last Dose    baclofen (LIORESAL) tablet 5 mg  5 mg Oral Q8H Sloane Scullin, DO   5 mg at 09/21/18 0411    predniSONE (DELTASONE) tablet 60 mg  60 mg Oral Daily Matteo Rome MD   60 mg at 09/20/18 1559    meclizine (ANTIVERT) tablet 25 mg  25 mg Oral TID PRN Molly Mattson MD   25 mg at 09/20/18 1758    vitamin D (ERGOCALCIFEROL) capsule 50,000 Units  50,000 Units Oral Daily Sloane Scullin, DO   50,000 Units at 09/20/18 0948    ketorolac (TORADOL) injection 15 mg  15 mg Intravenous Q6H PRN Tim Mcqueen MD        lidocaine (LIDODERM) 5 % 3 patch  3 patch Transdermal Daily Sloane Scullin, DO   2 patch at 09/20/18 0954    ondansetron (ZOFRAN) injection 4 mg  4 mg Intravenous Q8H Sloane Scullin, DO   4 mg at 09/20/18 1758    ondansetron (ZOFRAN) tablet 4 mg  4 mg Oral Q8H PRN Sloane Scullin, DO   4 mg at 09/19/18 1133    acetaminophen (TYLENOL) tablet 650 mg  650 mg Oral 4 times Former Smoker     Packs/day: 1.00     Types: Cigarettes     Quit date: 1/1/2017    Smokeless tobacco: Current User      Comment: pt trying to cut back    Alcohol use 0.0 oz/week      Comment: socially                                Ready to quit: Not Answered  Counseling given: Not Answered      Vital Signs (Current):   Vitals:    09/20/18 1612 09/20/18 1915 09/21/18 0242 09/21/18 0645   BP: (!) 119/58 124/62  136/64   Pulse: 68 73  71   Resp: 16 18 18   Temp: 98.2 °F (36.8 °C) 98.2 °F (36.8 °C)  98.7 °F (37.1 °C)   TempSrc: Oral   Temporal   SpO2: 94% 95%  94%   Weight:       Height:                                                  BP Readings from Last 3 Encounters:   09/21/18 136/64   09/18/18 128/67   08/06/18 120/64       NPO Status: Time of last liquid consumption: 2330                        Time of last solid consumption: 1800                        Date of last liquid consumption: 09/20/18                        Date of last solid food consumption: 09/20/18    BMI:   Wt Readings from Last 3 Encounters:   09/17/18 250 lb (113.4 kg)   08/06/18 251 lb (113.9 kg)   07/30/18 255 lb (115.7 kg)     Body mass index is 41.6 kg/m².     CBC:   Lab Results   Component Value Date    WBC 14.6 09/17/2018    RBC 4.84 09/17/2018    RBC 4.57 02/22/2012    HGB 13.1 09/17/2018    HCT 39.7 09/17/2018    MCV 82.0 09/17/2018    RDW 15.9 09/17/2018     09/17/2018       CMP:   Lab Results   Component Value Date     09/18/2018    K 3.7 09/18/2018     09/18/2018    CO2 26 09/18/2018    BUN 19 09/18/2018    CREATININE 0.56 09/18/2018    GFRAA >60.0 09/18/2018    LABGLOM >60.0 09/18/2018    GLUCOSE 156 09/18/2018    GLUCOSE 262 05/31/2012    PROT 8.9 09/17/2018    CALCIUM 8.9 09/18/2018    BILITOT 0.7 09/17/2018    ALKPHOS 116 09/17/2018    AST 30 09/17/2018    ALT 22 09/17/2018       POC Tests:   Recent Labs      09/21/18   0655   POCGLU  129*       Coags:   Lab Results   Component Value Date    PROTIME 10.0 10/07/2015    PROTIME 10.0 11/21/2011    INR 0.9 10/07/2015    APTT 26.9 06/29/2015       HCG (If Applicable): No results found for: PREGTESTUR, PREGSERUM, HCG, HCGQUANT     ABGs: No results found for: PHART, PO2ART, GSB9HUU, WSF0QQD, BEART, D4TSHDGW     Type & Screen (If Applicable):  No results found for: LABABO, [address]    Anesthesia Evaluation  Patient summary reviewed and Nursing notes reviewed no history of anesthetic complications:   Airway: Mallampati: II  TM distance: >3 FB   Neck ROM: full  Mouth opening: > = 3 FB Dental: normal exam         Pulmonary:normal exam  breath sounds clear to auscultation  (+) sleep apnea:                             Cardiovascular:  Exercise tolerance: good (>4 METS),   (+) hypertension:, hyperlipidemia      ECG reviewed  Rhythm: regular  Rate: normal           Beta Blocker:  Dose within 24 Hrs         Neuro/Psych:   Negative Neuro/Psych ROS              GI/Hepatic/Renal: Neg GI/Hepatic/Renal ROS  (+) liver disease:, morbid obesity         ROS comment: BMI 41. Endo/Other:    (+) DiabetesType II DM, , .          Pt had no PAT visit       Abdominal:           Vascular:                                        Anesthesia Plan      general     ASA 3     (LMA)  Induction: intravenous. MIPS: Postoperative opioids intended and Prophylactic antiemetics administered. Anesthetic plan and risks discussed with patient. Plan discussed with CRNA.     Attending anesthesiologist reviewed and agrees with Martinez Morales DO   9/21/2018

## 2018-09-21 NOTE — ANESTHESIA POSTPROCEDURE EVALUATION
Department of Anesthesiology  Postprocedure Note    Patient: Rudy Alvares  MRN: [id number]  YOB: 1975  Date of evaluation: 9/21/2018  Time:  9:17 AM     Procedure Summary     Date:  09/21/18 Room / Location:  [address]    Anesthesia Start:  0730 Anesthesia Stop:  0820    Procedure:  LEFT QUADRICEPS MUSCLE BIOPSY (Left ) Diagnosis:  (MUSCLE WEAKNESS)    Surgeon:  Tone Grider MD Responsible Provider:  Manasa Matamoros DO    Anesthesia Type:  general ASA Status:  3          Anesthesia Type: general    Shahrzad Phase I: Shahrzad Score: 10    Shahrzad Phase II:      Last vitals: Reviewed and per EMR flowsheets.        Anesthesia Post Evaluation    Patient location during evaluation: PACU  Patient participation: complete - patient participated  Level of consciousness: awake and alert  Pain score: 1  Airway patency: patent  Nausea & Vomiting: no nausea and no vomiting  Complications: no  Cardiovascular status: hemodynamically stable  Respiratory status: acceptable and face mask  Hydration status: euvolemic  Comments: Report to RN, normal sinus rhythm

## 2018-09-21 NOTE — INTERVAL H&P NOTE
History and Physical exam reviewed, the patient interviewed and re examined. There have been no interval changes. Left leg is marked.   Electronically signed by Darío Barrientos MD on 9/21/2018 at 7:24 AM

## 2018-09-21 NOTE — CONSULTS
Michelle Kidd MD    D: 09/20/2018 17:18:41       T: 09/20/2018 17:21:07     [email or web address]  Job#: [id number]     Doc#: [id number]    CC:

## 2018-09-21 NOTE — CONSULTS
INITIAL CONSULT -  PAIN MANAGEMENT   Skilled in patient 79Manpreet Carrero  DATE:  9/21/2018   SERVICE TIME:  6:13 PM    REASON FOR CONSULT:  Back pain and leg pain and weakness   REQUESTING PHYSICIAN:  Dr Paul Going:  Danette Mendoza MD    Chief complaint : mid back pain after fall / T 12 fracture  and LE progressive weakness    HISTORY OF PRESENT ILLNESS:  Ms. Mikey Guerrero is a 44 y.o. female who presents for rehabilitation after T 12 Kyphoplasty , she is admitted to receive therapy . She ahs been also having progressive LE weakness, was under care of neurology Dr Rafael Monzon, seen by Dr Mariposa Trivedi past hospitalization at [facility] , started on High dose steroids and got a muscle biopsy as well/ Pending results       PAIN  ASSESSMENT:    intermittent, spasms    aching, sharp and spasming    pain is perceived as mild (1-3  pain scale), pain is perceived as severe (6-8 pain scale), increase with weight bearing and activities     PAST MEDICAL HISTORY:    Past Medical History:   Diagnosis Date    Acute midline thoracic back pain 9/18/2018    B12 deficiency     Family history of premature CAD 9/4/2013    Fatty liver     HPV (human papilloma virus) anogenital infection     Hyperlipidemia     Hypertension     Liver hemangioma 2009/2015    Obesity 3/11/13    BMI 43.42    Orthostatic hypotension     Ovarian cyst     Rectal fissure     Tobacco abuse 9/3/2015    Tobacco abuse     Tremor     Uncontrolled diabetes mellitus with complications ([address]     Unspecified sleep apnea      PAST SURGICAL HISTORY:    Past Surgical History:   Procedure Laterality Date    CARDIAC CATHETERIZATION  4-07    COLONOSCOPY  2013    for diarrhea (-)    HYSTERECTOMY  12-10    LEEP  1-05    TONSILLECTOMY AND ADENOIDECTOMY  1981    UPPER GASTROINTESTINAL ENDOSCOPY  10/13/15     DR. HERRERA     URETHRA SURGERY  6-2015     FAMILY HISTORY:    Family History   Problem Relation Age of Onset    Diabetes Father vertebra with both sagittal and coronal reconstructions was performed. There is straightening of the normal expected cervical lordosis. . Prevertebral  soft tissues are  unremarkable. The disk spaces are intact No acute fractures or spondylo-listhesis. IMPRESSION: NO ACUTE FRACTURES. All CT scans at this facility use dose modulation, iterative reconstruction, and/or weight based dosing when appropriate to reduce radiation dose to as low as reasonably achievable. EXAMINATION:  CT SCAN LUMBAR SPINE CLINICAL HISTORY:  History of fall. Pain. COMPARISON: TECHNIQUE:  Multiple serial axial images of the lumbar spine from the T12 through the sacral levels with both sagittal and coronal reconstructions were performed. FINDINGS:  Again note is made of a compression fracture of the T12 vertebra with retropulsion of a fracture fragment posteriorly. Please see above report for additional details There are no acute fractures or spondylolisthesis of lumbar vertebra. The SI joints are intact. IMPRESSION:  1. NO ACUTE FRACTURES OF THE LUMBAR VERTEBRA. 2. AGAIN NOTE IS MADE OF A COMPRESSION FRACTURE OF THE T12 VERTEBRA WITH RETROPULSION OF FRACTURE FRAGMENT WITH ASSOCIATED CANAL STENO. All CT scans at this facility use dose modulation, iterative reconstruction, and/or weight based dosing when appropriate to reduce radiation dose to as low as reasonably achievable. Ct Thoracic Spine Wo Contrast    Result Date: 9/17/2018  EXAMINATION:  THORACIC SPINE CLINICAL HISTORY:  History of fall. COMPARISON: TECHNIQUE:  Multiple serial axial images of the thoracic spine from the C6 through the L1 levels with both sagittal coronal reconstruction was performed. FINDINGS:  There is a compression fracture of the T12 vertebra. There is some protrusion of a fracture fragment approximately 5 mm posteriorly with some associated canal stenosis at this level. There is multilevel degenerative changes osteophytosis marginal spurring noted.  No significant listhesis     THERE IS A COMPRESSION FRACTURE OF T12 WITH ASSOCIATED RETROPULSION OF FRACTURE FRAGMENT. ASSOCIATED CANAL STENOSIS AT THIS LEVEL. DR. Jackeline Benedict OF THE EMERGENCY ROOM WAS NOTIFIED OF THE ABOVE FINDINGS AT 0530 HOURS ON SEPTEMBER 17, 2018 All CT scans at this facility use dose modulation, iterative reconstruction, and/or weight based dosing when appropriate to reduce radiation dose to as low as reasonably achievable. CT THORACIC SPINE WO CONTRAST, CT CERVICAL SPINE WO CONTRAST, CT LUMBAR SPINE WO CONTRAST CLINICAL HISTORY:  inability to ambulate COMPARISON: NONE Findings: Multiple serial axial images of the cervical spine from the base of the skull through the upper thoracic vertebra with both sagittal and coronal reconstructions was performed. There is straightening of the normal expected cervical lordosis. . Prevertebral  soft tissues are  unremarkable. The disk spaces are intact No acute fractures or spondylo-listhesis. IMPRESSION: NO ACUTE FRACTURES. All CT scans at this facility use dose modulation, iterative reconstruction, and/or weight based dosing when appropriate to reduce radiation dose to as low as reasonably achievable. EXAMINATION:  CT SCAN LUMBAR SPINE CLINICAL HISTORY:  History of fall. Pain. COMPARISON: TECHNIQUE:  Multiple serial axial images of the lumbar spine from the T12 through the sacral levels with both sagittal and coronal reconstructions were performed. FINDINGS:  Again note is made of a compression fracture of the T12 vertebra with retropulsion of a fracture fragment posteriorly. Please see above report for additional details There are no acute fractures or spondylolisthesis of lumbar vertebra. The SI joints are intact. IMPRESSION:  1. NO ACUTE FRACTURES OF THE LUMBAR VERTEBRA. 2. AGAIN NOTE IS MADE OF A COMPRESSION FRACTURE OF THE T12 VERTEBRA WITH RETROPULSION OF FRACTURE FRAGMENT WITH ASSOCIATED CANAL STENO.  All CT scans at this facility use dose modulation, iterative

## 2018-09-21 NOTE — PROGRESS NOTES
Neurology Follow up    SUBJECTIVE:  NO Headache, double vision,blurry vision,difficulty with speech,difficulty with swallowing,  Pain all over and proximal weaknesss  bucles legs    PHYSICAL EXAM:    BP (!) 110/56   Pulse 71   Temp 97.4 °F (36.3 °C) (Temporal)   Resp 15   Ht 5' 5\" (1.651 m)   Wt 250 lb (113.4 kg)   LMP  (LMP Unknown)   SpO2 93%   BMI 41.60 kg/m²    General Appearance:      Mental Status Exam:             Level of Alertness:   awake            Orientation:   person, place, time                        Attention/Concentration:  normal            Language:  normal      Funduscopic Exam:     Cranial Nerves            Cranial nerve III           Pupils:  equal, round, reactive to light      Cranial nerves III, IV, VI           Extraocular Movements: intact      Cranial nerve V           Facial sensation:  intact      Motor:    Drift:  absent  Motor exam is symmetrical 5 out of 5 all extremities bilaterally (proximal 4/5  Tone:  normal  Abnormal Movements:  absent            Sensory:        Pinprick             Right Upper Extremity:  normal             Left Upper Extremity:  normal             Right Lower Extremity:  normal             Left Lower Extremity:  normal           Vibration                         Touch            Proprioception                 Coordination:           Finger/Nose   Right:  normal              Left:  normal          Heel-Knee-Shin                Right:  normal              Left:  normal          Rapid Alternating Movements              Right:  normal              Left:  normal          Gait:                       Casual:   Mild ataxai                       Romberg:  normal            Reflexes:             Deep Tendon Reflexes:             Reflexes are 2 + (absent ankle reflexes )             Plantar response:                Right:  downgoing               Left:  downgoing    Vascular:  Cardiac Exam:  normal         Xr Lumbar Spine (min 4 Views)    Result Date: No significant listhesis     THERE IS A COMPRESSION FRACTURE OF T12 WITH ASSOCIATED RETROPULSION OF FRACTURE FRAGMENT. ASSOCIATED CANAL STENOSIS AT THIS LEVEL. DR. Zoila Olguin OF THE EMERGENCY ROOM WAS NOTIFIED OF THE ABOVE FINDINGS AT 0530 HOURS ON SEPTEMBER 17, 2018 All CT scans at this facility use dose modulation, iterative reconstruction, and/or weight based dosing when appropriate to reduce radiation dose to as low as reasonably achievable. CT THORACIC SPINE WO CONTRAST, CT CERVICAL SPINE WO CONTRAST, CT LUMBAR SPINE WO CONTRAST CLINICAL HISTORY:  inability to ambulate COMPARISON: NONE Findings: Multiple serial axial images of the cervical spine from the base of the skull through the upper thoracic vertebra with both sagittal and coronal reconstructions was performed. There is straightening of the normal expected cervical lordosis. . Prevertebral  soft tissues are  unremarkable. The disk spaces are intact No acute fractures or spondylo-listhesis. IMPRESSION: NO ACUTE FRACTURES. All CT scans at this facility use dose modulation, iterative reconstruction, and/or weight based dosing when appropriate to reduce radiation dose to as low as reasonably achievable. EXAMINATION:  CT SCAN LUMBAR SPINE CLINICAL HISTORY:  History of fall. Pain. COMPARISON: TECHNIQUE:  Multiple serial axial images of the lumbar spine from the T12 through the sacral levels with both sagittal and coronal reconstructions were performed. FINDINGS:  Again note is made of a compression fracture of the T12 vertebra with retropulsion of a fracture fragment posteriorly. Please see above report for additional details There are no acute fractures or spondylolisthesis of lumbar vertebra. The SI joints are intact. IMPRESSION:  1. NO ACUTE FRACTURES OF THE LUMBAR VERTEBRA. 2. AGAIN NOTE IS MADE OF A COMPRESSION FRACTURE OF THE T12 VERTEBRA WITH RETROPULSION OF FRACTURE FRAGMENT WITH ASSOCIATED CANAL STENO.  All CT scans at this facility use dose modulation, iterative reconstruction, and/or weight based dosing when appropriate to reduce radiation dose to as low as reasonably achievable. Ct Thoracic Spine Wo Contrast    Result Date: 9/17/2018  EXAMINATION:  THORACIC SPINE CLINICAL HISTORY:  History of fall. COMPARISON: TECHNIQUE:  Multiple serial axial images of the thoracic spine from the C6 through the L1 levels with both sagittal coronal reconstruction was performed. FINDINGS:  There is a compression fracture of the T12 vertebra. There is some protrusion of a fracture fragment approximately 5 mm posteriorly with some associated canal stenosis at this level. There is multilevel degenerative changes osteophytosis marginal spurring noted. No significant listhesis     THERE IS A COMPRESSION FRACTURE OF T12 WITH ASSOCIATED RETROPULSION OF FRACTURE FRAGMENT. ASSOCIATED CANAL STENOSIS AT THIS LEVEL. DR. Imelda Patterson OF THE EMERGENCY ROOM WAS NOTIFIED OF THE ABOVE FINDINGS AT 0530 HOURS ON SEPTEMBER 17, 2018 All CT scans at this facility use dose modulation, iterative reconstruction, and/or weight based dosing when appropriate to reduce radiation dose to as low as reasonably achievable. CT THORACIC SPINE WO CONTRAST, CT CERVICAL SPINE WO CONTRAST, CT LUMBAR SPINE WO CONTRAST CLINICAL HISTORY:  inability to ambulate COMPARISON: NONE Findings: Multiple serial axial images of the cervical spine from the base of the skull through the upper thoracic vertebra with both sagittal and coronal reconstructions was performed. There is straightening of the normal expected cervical lordosis. . Prevertebral  soft tissues are  unremarkable. The disk spaces are intact No acute fractures or spondylo-listhesis. IMPRESSION: NO ACUTE FRACTURES. All CT scans at this facility use dose modulation, iterative reconstruction, and/or weight based dosing when appropriate to reduce radiation dose to as low as reasonably achievable. EXAMINATION:  CT SCAN LUMBAR SPINE CLINICAL HISTORY:  History of fall. Pain. Fluoroscopic assistance provided for operative guidance. No results for input(s): WBC, HGB, PLT in the last 72 hours. No results for input(s): NA, K, CL, CO2, BUN, CREATININE, GLUCOSE in the last 72 hours. No results for input(s): BILITOT, ALKPHOS, AST, ALT in the last 72 hours. Lab Results   Component Value Date    PROTIME 10.0 10/07/2015    PROTIME 10.0 11/21/2011    INR 0.9 10/07/2015     No results found for: LITHIUM, DILFRTOT, VALPROATE    ASSESSMENT AND PLAN  Polymyalgia Rheumatica and/or autoimmune myositis Rare case below 50 year  ESR 80. crp 45  Muscle biopsy done  emg as out pt  Predinisone  Feels much better, able to walk better amnd less pain  D/c to [employer].  Natalia Land MD, Radha Kent, American Board of Psychiatry & Neurology  Board Certified in Vascular Neurology  Board Certified in Neuromuscular Medicine  Certified in . [address]

## 2018-09-22 LAB
ALDOLASE: 5.9 U/L (ref 1.5–8.1)
ENA TO SSB (LA) ANTIBODY: 0 AU/ML (ref 0–40)
GLUCOSE BLD-MCNC: 121 MG/DL (ref 60–115)
GLUCOSE BLD-MCNC: 156 MG/DL (ref 60–115)
GLUCOSE BLD-MCNC: 216 MG/DL (ref 60–115)
GLUCOSE BLD-MCNC: 274 MG/DL (ref 60–115)
IGG: 984 MG/DL (ref 768–1632)
IGM: 43 MG/DL (ref 35–263)
PERFORMED ON: ABNORMAL
SSA 52 (RO) (ENA) AB, IGG: 24 AU/ML (ref 0–40)
SSA 60 (RO) (ENA) AB, IGG: 2 AU/ML (ref 0–40)
URINE CULTURE, ROUTINE: NORMAL

## 2018-09-22 PROCEDURE — G8978 MOBILITY CURRENT STATUS: HCPCS

## 2018-09-22 PROCEDURE — 6360000002 HC RX W HCPCS: Performed by: INTERNAL MEDICINE

## 2018-09-22 PROCEDURE — 90686 IIV4 VACC NO PRSV 0.5 ML IM: CPT | Performed by: INTERNAL MEDICINE

## 2018-09-22 PROCEDURE — 6370000000 HC RX 637 (ALT 250 FOR IP): Performed by: INTERNAL MEDICINE

## 2018-09-22 PROCEDURE — G8979 MOBILITY GOAL STATUS: HCPCS

## 2018-09-22 PROCEDURE — G0008 ADMIN INFLUENZA VIRUS VAC: HCPCS | Performed by: INTERNAL MEDICINE

## 2018-09-22 PROCEDURE — 6370000000 HC RX 637 (ALT 250 FOR IP): Performed by: ANESTHESIOLOGY

## 2018-09-22 PROCEDURE — G8987 SELF CARE CURRENT STATUS: HCPCS

## 2018-09-22 PROCEDURE — 97530 THERAPEUTIC ACTIVITIES: CPT

## 2018-09-22 PROCEDURE — 2500000003 HC RX 250 WO HCPCS: Performed by: INTERNAL MEDICINE

## 2018-09-22 PROCEDURE — 97166 OT EVAL MOD COMPLEX 45 MIN: CPT

## 2018-09-22 PROCEDURE — G8988 SELF CARE GOAL STATUS: HCPCS

## 2018-09-22 PROCEDURE — 97162 PT EVAL MOD COMPLEX 30 MIN: CPT

## 2018-09-22 PROCEDURE — 1200000000 HC SEMI PRIVATE

## 2018-09-22 RX ORDER — TROSPIUM CHLORIDE 20 MG/1
20 TABLET, FILM COATED ORAL NIGHTLY
Status: DISCONTINUED | OUTPATIENT
Start: 2018-09-22 | End: 2018-09-28 | Stop reason: HOSPADM

## 2018-09-22 RX ADMIN — PREDNISONE 60 MG: 20 TABLET ORAL at 08:42

## 2018-09-22 RX ADMIN — PROMETHAZINE HYDROCHLORIDE 25 MG: 12.5 TABLET ORAL at 21:39

## 2018-09-22 RX ADMIN — BACLOFEN 5 MG: 10 TABLET ORAL at 21:38

## 2018-09-22 RX ADMIN — ACETAMINOPHEN 500 MG: 500 TABLET, FILM COATED ORAL at 05:08

## 2018-09-22 RX ADMIN — TROSPIUM CHLORIDE 20 MG: 20 TABLET ORAL at 21:48

## 2018-09-22 RX ADMIN — METOPROLOL TARTRATE 50 MG: 50 TABLET ORAL at 08:42

## 2018-09-22 RX ADMIN — VENLAFAXINE HYDROCHLORIDE 75 MG: 37.5 CAPSULE, EXTENDED RELEASE ORAL at 21:39

## 2018-09-22 RX ADMIN — INFLUENZA A VIRUS A/MICHIGAN/45/2015 X-275 (H1N1) ANTIGEN (FORMALDEHYDE INACTIVATED), INFLUENZA A VIRUS A/SINGAPORE/INFIMH-16-0019/2016 IVR-186 (H3N2) ANTIGEN (FORMALDEHYDE INACTIVATED), INFLUENZA B VIRUS B/PHUKET/3073/2013 ANTIGEN (FORMALDEHYDE INACTIVATED), AND INFLUENZA B VIRUS B/MARYLAND/15/2016 BX-69A ANTIGEN (FORMALDEHYDE INACTIVATED) 0.5 ML: 15; 15; 15; 15 INJECTION, SUSPENSION INTRAMUSCULAR at 08:40

## 2018-09-22 RX ADMIN — PANTOPRAZOLE SODIUM 40 MG: 40 TABLET, DELAYED RELEASE ORAL at 06:00

## 2018-09-22 RX ADMIN — BACLOFEN 5 MG: 10 TABLET ORAL at 08:42

## 2018-09-22 RX ADMIN — HYDROCHLOROTHIAZIDE 25 MG: 25 TABLET ORAL at 08:43

## 2018-09-22 RX ADMIN — ENOXAPARIN SODIUM 40 MG: 100 INJECTION SUBCUTANEOUS at 08:41

## 2018-09-22 RX ADMIN — INSULIN GLARGINE 40 UNITS: 100 INJECTION, SOLUTION SUBCUTANEOUS at 21:30

## 2018-09-22 RX ADMIN — PROMETHAZINE HYDROCHLORIDE 25 MG: 12.5 TABLET ORAL at 08:42

## 2018-09-22 RX ADMIN — VENLAFAXINE HYDROCHLORIDE 75 MG: 37.5 CAPSULE, EXTENDED RELEASE ORAL at 08:42

## 2018-09-22 RX ADMIN — ACETAMINOPHEN 500 MG: 500 TABLET, FILM COATED ORAL at 21:39

## 2018-09-22 RX ADMIN — MICONAZOLE NITRATE: 20 POWDER TOPICAL at 21:45

## 2018-09-22 RX ADMIN — METOPROLOL TARTRATE 50 MG: 50 TABLET ORAL at 21:39

## 2018-09-22 RX ADMIN — MICONAZOLE NITRATE: 20 POWDER TOPICAL at 08:43

## 2018-09-22 ASSESSMENT — PAIN DESCRIPTION - LOCATION
LOCATION: BACK;LEG
LOCATION: BACK;LEG

## 2018-09-22 ASSESSMENT — PAIN DESCRIPTION - FREQUENCY: FREQUENCY: CONTINUOUS

## 2018-09-22 ASSESSMENT — PAIN DESCRIPTION - ONSET: ONSET: ON-GOING

## 2018-09-22 ASSESSMENT — PAIN SCALES - GENERAL
PAINLEVEL_OUTOF10: 5
PAINLEVEL_OUTOF10: 4

## 2018-09-22 ASSESSMENT — PAIN DESCRIPTION - ORIENTATION
ORIENTATION: LEFT
ORIENTATION: LEFT;RIGHT

## 2018-09-22 ASSESSMENT — PAIN DESCRIPTION - DESCRIPTORS: DESCRIPTORS: ACHING;SHARP

## 2018-09-22 ASSESSMENT — PAIN DESCRIPTION - PAIN TYPE
TYPE: ACUTE PAIN;SURGICAL PAIN
TYPE: ACUTE PAIN;SURGICAL PAIN

## 2018-09-22 ASSESSMENT — PAIN DESCRIPTION - PROGRESSION: CLINICAL_PROGRESSION: NOT CHANGED

## 2018-09-22 NOTE — H&P
Hospital Medicine History & Physical      PCP: Geovanna Talavera MD    Date of Admission: 9/21/2018    Date of Service: 9/22/18      Chief Complaint:  Back pain      History Of Present Illness:  44 y.o. female who presented to [facility] after mechanical fall, T12 compression fracture 5 days ago, was seen and evaluated by neurosurgery, recommended to manage conservatively, regarding legs weakness she underwent muscular biopsy, started on steroids for polymyalgia rheumatica/myositis and after completing her acute stay were DC to [place]     Past Medical History:          Diagnosis Date    Acute midline thoracic back pain 9/18/2018    B12 deficiency     Family history of premature CAD 9/4/2013    Fatty liver     HPV (human papilloma virus) anogenital infection     Hyperlipidemia     Hypertension     Liver hemangioma 2009/2015    Obesity 3/11/13    BMI 43.42    Orthostatic hypotension     Ovarian cyst     Rectal fissure     Tobacco abuse 9/3/2015    Tobacco abuse     Tremor     Uncontrolled diabetes mellitus with complications [address] [address]     Unspecified sleep apnea        Past Surgical History:          Procedure Laterality Date    CARDIAC CATHETERIZATION  4-07    COLONOSCOPY  2013    for diarrhea (-)    HYSTERECTOMY  12-10    LEEP  1-05    TONSILLECTOMY AND ADENOIDECTOMY  1981    UPPER GASTROINTESTINAL ENDOSCOPY  10/13/15     DR. HERRERA     URETHRA SURGERY  6-2015       Medications Prior to Admission:      Prior to Admission medications    Medication Sig Start Date End Date Taking?  Authorizing Provider   metoprolol (LOPRESSOR) 100 MG tablet TAKE ONE TABLET BY MOUTH TWICE A DAY 9/4/18  Yes Nathalie Cantor APRN - CNP   meclizine (ANTIVERT) 25 MG tablet Take 1 tablet by mouth 3 times daily as needed for Dizziness 7/18/18  Yes Geovanna Talavera MD   Handicap [facility] MISC Exp 5 years 7/3/18  Yes Geovanna Talavera MD   insulin lispro (HUMALOG KWIKPEN) 100 UNIT/ML pen INJECT 40 UNITS INTO THE insight  Capillary Refill: Brisk,< 3 seconds   Peripheral Pulses: +2 palpable, equal bilaterally       Labs:     No results for input(s): WBC, HGB, HCT, PLT in the last 72 hours. No results for input(s): NA, K, CL, CO2, BUN, CREATININE, CALCIUM, PHOS in the last 72 hours. Invalid input(s): MAGNES  No results for input(s): AST, ALT, BILIDIR, BILITOT, ALKPHOS in the last 72 hours. No results for input(s): INR in the last 72 hours. No results for input(s): Frann Goes in the last 72 hours. Urinalysis:      Lab Results   Component Value Date    NITRU Negative 09/20/2018    WBCUA 0-2 09/20/2018    BACTERIA Moderate 10/02/2015    RBCUA 5-10 09/20/2018    BLOODU MODERATE 09/20/2018    SPECGRAV 1.011 09/20/2018    GLUCOSEU Negative 09/20/2018    GLUCOSEU NEG 01/09/2012           No orders to display       ASSESSMENT:    C[address] Problems    Diagnosis Date Noted    Myalgia [M79.1]     Muscle weakness [M62.81]     Acute midline thoracic back pain [M54.6] 09/18/2018    Lumbar compression fracture, closed, initial encounter ([facility] [S32.000A] 09/17/2018    Weakness of both lower extremities [R29.898] 06/18/2018    Type 2 diabetes mellitus with complication, with long-term current use of insulin ([facility] [E11.8, Z79.4] 11/30/2015       PLAN:        DVT Prophylaxis:   Diet: DIET CARB CONTROL;  Code Status: Full Code    PT/OT Eval Status:     Dispo - Mechanical fall, T12 compression fracture- pain controlled, PT on case  polymyalgia rheumatica/myositis -better with steroids, will follow with neurology for biopsy report  HTN- controlled  DM- controlled with current Tx  Morbid obesity with BMI 39 due to excessive calory intake- supportive care, PT on case   Stable for admission for skilled status at [facility]    Thank you Henrik Lorenz MD for the opportunity to be involved in this patient's care. If you have any questions or concerns please feel free to contact me.

## 2018-09-22 NOTE — PROGRESS NOTES
she was attempting to get into her shower. Objective     Observation/Palpation  Posture: Fair  Observation: left quad incision form recent muscle biopsy. PROM RLE (degrees)  RLE PROM: WFL  PROM LLE (degrees)  LLE PROM: WFL  PROM RUE (degrees)  RUE General PROM: See OT Eval  AROM RUE (degrees)  RUE General AROM: See OT Eval  PROM LUE (degrees)  LUE General PROM: See OT Eval  AROM LUE (degrees)  LUE General AROM: See OT Eval  Spine  Thoracic: Limited secondary to s/p surgery  Lumbar: Limited secondary to s/p surgery  Strength RLE  Comment: 4/5 grossly assessed  Strength LLE  Comment: 4/5 grossly assessed  Strength RUE  Comment: See OT Eval  Strength LUE  Comment: See OT Eval  Tone RLE  RLE Tone: Normotonic  Tone LLE  LLE Tone: Normotonic  Motor Control  Gross Motor?: WFL  Sensation  Overall Sensation Status: WFL  Bed mobility  Bridging: Unable to assess  Rolling to Left: Modified independent  Rolling to Right: Modified independent  Supine to Sit: Modified independent  Sit to Supine: Modified independent  Scooting: Unable to assess  Transfers  Sit to Stand: Stand by assistance  Stand to sit: Stand by assistance  Bed to Chair: Stand by assistance  Stand Pivot Transfers: Unable to assess  Squat Pivot Transfers: Unable to assess  Lateral Transfers: Unable to assess  Car Transfer: Unable to assess  Ambulation  Ambulation?: Yes  More Ambulation?: No  Ambulation 1  Surface: level tile  Device: No Device  Assistance: Contact guard assistance  Quality of Gait: Pt ambulates with decreased heel strike, decreased toe off, slow pace, min unsteadiness; reaches for walls. Distance: 200'  Stairs/Curb  Stairs?: No  Wheelchair Activities  Wheelchair Parts Management: No  Propulsion: No     Balance  Posture: Fair  Sitting - Static: Good  Sitting - Dynamic: Good  Standing - Static: Fair  Standing - Dynamic: Fair;-        Assessment   Body structures, Functions, Activity limitations: Decreased functional mobility ; Decreased strength;Decreased balance;Decreased endurance;Decreased coordination  Assessment: Pt tolerated all Rx well, but demos deficits with functional mobility, strength, and balance which indicate a need for skilled PT interventions. Treatment Diagnosis: Difficulty walking  Prognosis: Good  Decision Making: Medium Complexity  History: PMH  Exam: strength/gait deficits  Clinical Presentation: Evolving  Patient Education: Educated on fall risk and safety precautions. Barriers to Learning: history of falls  REQUIRES PT FOLLOW UP: Yes  Activity Tolerance  Activity Tolerance: Patient Tolerated treatment well         Plan   Plan  Times per week: 1-2x/day, 5-7 days/week  Times per day: Daily  Plan weeks: 1 week  Current Treatment Recommendations: Strengthening, Balance Training, Functional Mobility Training, Transfer Training, Gait Training, Stair training, Neuromuscular Re-education, Safety Education & Training, Home Exercise Program  Safety Devices  Type of devices: All fall risk precautions in place, Call light within reach, Left in chair  Restraints  Initially in place: No    G-Code  PT G-Codes  Functional Assessment Tool Used: Clinical Judgement  Functional Limitation: Mobility: Walking and moving around  Mobility: Walking and Moving Around Current Status (): At least 40 percent but less than 60 percent impaired, limited or restricted  Mobility: Walking and Moving Around Goal Status (): At least 1 percent but less than 20 percent impaired, limited or restricted  OutComes Score                                           AM-PAC Score             Goals  Short term goals  Time Frame for Short term goals: 3 days  Short term goal 1: Pt to be supervision with transfers with good safety and min to no pain. Short term goal 2: Pt to be supervision with ambulation with no AD or LRD with min to no antalgia and min c/o pain > or equal to 250'.   Short term goal 3: 4+/5 bilateral LE strength for improved mobility

## 2018-09-22 NOTE — PROGRESS NOTES
assistance  Functional Mobility  Functional - Mobility Device: No device  Activity: To/from bathroom  Assist Level: Contact guard assistance (/SBA)  ADL  Feeding: Modified independent   Grooming: Modified independent   UE Bathing: Supervision  LE Bathing: Stand by assistance  UE Dressing: Modified independent   LE Dressing: Stand by assistance;Contact guard assistance  Toileting: Stand by assistance  Tone RUE  RUE Tone: Normotonic  Tone LUE  LUE Tone: Normotonic        Transfers  Sit to stand: Contact guard assistance  Stand to sit: Contact guard assistance              Sensation  Overall Sensation Status: WFL        LUE PROM (degrees)  LUE PROM: WNL  LUE AROM (degrees)  LUE AROM : WNL  Left Hand PROM (degrees)  Left Hand PROM: WNL  Left Hand AROM (degrees)  Left Hand AROM: WNL  RUE PROM (degrees)  RUE PROM: WNL  RUE AROM (degrees)  RUE AROM : WNL  Right Hand PROM (degrees)  Right Hand PROM: WNL  Right Hand AROM (degrees)  Right Hand AROM: WNL                     Assessment   Performance deficits / Impairments: Decreased functional mobility ; Decreased ADL status; Decreased strength;Decreased endurance;Decreased balance;Decreased high-level IADLs  Prognosis: Good  Decision Making: Medium Complexity  History: multiple comorb  Exam: 6 perf def/impair  REQUIRES OT FOLLOW UP: Yes         Plan   Plan  Times per week: 3-6x/wk  Times per day: Daily  Plan weeks: ~ 1wk  Current Treatment Recommendations: Strengthening, Balance Training, Functional Mobility Training, Endurance Training, Stair training, Neuromuscular Re-education, Safety Education & Training, Self-Care / ADL, Home Management Training    G-Code  OT G-codes  Functional Assessment Tool Used: PSFS  Functional Limitation: Self care  Self Care Current Status ():  At least 1 percent but less than 20 percent impaired, limited or restricted  Self Care Goal Status (): 0 percent impaired, limited or restricted          Goals  Short term goals  Time Frame for Short

## 2018-09-22 NOTE — OP NOTE
Operative Note  ______________________________________________________________    Patient: Lis Morataya  YOB: 1975  MRN: [id number]  Date of Procedure: 9/18/2018    Pre-Op Diagnosis: INPATIENT    Post-Op Diagnosis: Same       Procedure(s):  T 12 VERTEBRALPLASTY ROOM 278    Anesthesia: General    Surgeon(s):  Dread Thompson MD    Staff:    Shamir Haile Person First: Savanna Samuels    Estimated Blood Loss:20 ml    Specimens:   ID Type Source Tests Collected by Time Destination   A : T-12 BONE BX. Tissue Back SURGICAL PATHOLOGY Dread Thompson MD 9/18/2018 1511        Implants:    Implant Name Type Inv. Item Serial No.  Lot No. LRB No. Used                              Procedure:  Patient is a 76-year-old female status post fall with a T12 compression fracture with intractable pain despite medical management for which patient is taken to the OR for vertebroplasty. She was given preoperative antibiotics. Positioned prone or table. Neck and pressure points are carefully inspected and protected. Back was then cleaned with ChloraPrep and draped sterilely. Using biplanar fluoroscopy, a stab incision was made on the right paraspinal area. With Mac anesthesia along with the local using 1% lidocaine without epinephrine. Trocar was then introduced into the vertebral body of T12 confirmed with fluoroscopy. Bone biopsy was obtained. And under direct visualization, methylmethacrylate cement was injected into the compression fracture site with satisfactory filling. Trocar was removed. Skin was closed with a 4-0 Vicryl along with the skin glue. Tolerated procedure well and moving all 4 Puri's without difficulties.     Mariah Valente MD   on 9/18/18 at 3:22 PM
sent off for special pathology. Irrigation was done. Excellent hemostasis was noted and the fascia was  reapproximated with 2-0 Vicryl; 2-0 and 3-0 Vicryl were used for the  subcutaneous tissue; and the skin was closed with 4-0 Monocryl with  Dermaflex on the skin edges. She tolerated the surgery well and went to recovery in stable condition,  and I spoke with her family at the end of the procedure.         Jerrell Perry MD    D: 09/21/2018 18:48:23       T: 09/21/2018 18:49:48     [email or web address]  Job#: [id number]     Doc#: [id number]    CC:

## 2018-09-22 NOTE — PLAN OF CARE
Problem: Falls - Risk of:  Goal: Will remain free from falls  Will remain free from falls   Outcome: Ongoing    Goal: Absence of physical injury  Absence of physical injury   Outcome: Ongoing      Problem: Pain:  Goal: Pain level will decrease  Pain level will decrease   Outcome: Ongoing    Goal: Control of acute pain  Control of acute pain   Outcome: Ongoing    Goal: Control of chronic pain  Control of chronic pain   Outcome: Ongoing      Problem: Activity:  Goal: Ability to avoid complications of mobility impairment will improve  Ability to avoid complications of mobility impairment will improve  Outcome: Ongoing    Goal: Ability to tolerate increased activity will improve  Ability to tolerate increased activity will improve  Outcome: Ongoing      Problem:  Bowel/Gastric:  Goal: Gastrointestinal status for postoperative course will improve  Gastrointestinal status for postoperative course will improve  Outcome: Ongoing      Problem: Fluid Volume:  Goal: Maintenance of adequate hydration will improve  Maintenance of adequate hydration will improve  Outcome: Ongoing      Problem: Health Behavior:  Goal: Identification of resources available to assist in meeting health care needs will improve  Identification of resources available to assist in meeting health care needs will improve  Outcome: Ongoing    Goal: Ability to state signs and symptoms to report to health care provider will improve  Ability to state signs and symptoms to report to health care provider will improve  Outcome: Ongoing      Problem: Nutritional:  Goal: Ability to attain and maintain optimal nutritional status will improve  Ability to attain and maintain optimal nutritional status will improve  Outcome: Ongoing      Problem: Physical Regulation:  Goal: Ability to maintain clinical measurements within normal limits will improve  Ability to maintain clinical measurements within normal limits will improve  Outcome: Ongoing    Goal: Will remain free

## 2018-09-23 LAB
GLUCOSE BLD-MCNC: 149 MG/DL (ref 60–115)
GLUCOSE BLD-MCNC: 195 MG/DL (ref 60–115)
GLUCOSE BLD-MCNC: 262 MG/DL (ref 60–115)
GLUCOSE BLD-MCNC: 97 MG/DL (ref 60–115)
PERFORMED ON: ABNORMAL
PERFORMED ON: NORMAL

## 2018-09-23 PROCEDURE — 6370000000 HC RX 637 (ALT 250 FOR IP): Performed by: INTERNAL MEDICINE

## 2018-09-23 PROCEDURE — 1200000000 HC SEMI PRIVATE

## 2018-09-23 PROCEDURE — 6360000002 HC RX W HCPCS: Performed by: INTERNAL MEDICINE

## 2018-09-23 PROCEDURE — 97110 THERAPEUTIC EXERCISES: CPT

## 2018-09-23 PROCEDURE — 97116 GAIT TRAINING THERAPY: CPT

## 2018-09-23 PROCEDURE — 2580000003 HC RX 258: Performed by: INTERNAL MEDICINE

## 2018-09-23 PROCEDURE — 6370000000 HC RX 637 (ALT 250 FOR IP): Performed by: ANESTHESIOLOGY

## 2018-09-23 RX ORDER — UREA 10 %
5 LOTION (ML) TOPICAL NIGHTLY PRN
Status: DISCONTINUED | OUTPATIENT
Start: 2018-09-23 | End: 2018-09-25

## 2018-09-23 RX ORDER — ACETAMINOPHEN 325 MG/1
650 TABLET ORAL EVERY 4 HOURS PRN
Status: DISCONTINUED | OUTPATIENT
Start: 2018-09-23 | End: 2018-09-28 | Stop reason: HOSPADM

## 2018-09-23 RX ADMIN — ACETAMINOPHEN 650 MG: 325 TABLET, FILM COATED ORAL at 22:38

## 2018-09-23 RX ADMIN — TROSPIUM CHLORIDE 20 MG: 20 TABLET ORAL at 22:50

## 2018-09-23 RX ADMIN — INSULIN GLARGINE 40 UNITS: 100 INJECTION, SOLUTION SUBCUTANEOUS at 22:44

## 2018-09-23 RX ADMIN — MICONAZOLE NITRATE: 20 POWDER TOPICAL at 22:41

## 2018-09-23 RX ADMIN — Medication 10 ML: at 22:39

## 2018-09-23 RX ADMIN — PREDNISONE 60 MG: 20 TABLET ORAL at 09:32

## 2018-09-23 RX ADMIN — ACETAMINOPHEN 500 MG: 500 TABLET, FILM COATED ORAL at 06:04

## 2018-09-23 RX ADMIN — HYDROCHLOROTHIAZIDE 25 MG: 25 TABLET ORAL at 09:33

## 2018-09-23 RX ADMIN — VENLAFAXINE HYDROCHLORIDE 75 MG: 37.5 CAPSULE, EXTENDED RELEASE ORAL at 22:38

## 2018-09-23 RX ADMIN — ACETAMINOPHEN 650 MG: 325 TABLET, FILM COATED ORAL at 15:28

## 2018-09-23 RX ADMIN — ACETAMINOPHEN 650 MG: 325 TABLET, FILM COATED ORAL at 10:33

## 2018-09-23 RX ADMIN — METOPROLOL TARTRATE 50 MG: 50 TABLET ORAL at 09:33

## 2018-09-23 RX ADMIN — METOPROLOL TARTRATE 50 MG: 50 TABLET ORAL at 22:35

## 2018-09-23 RX ADMIN — MICONAZOLE NITRATE: 20 POWDER TOPICAL at 09:34

## 2018-09-23 RX ADMIN — Medication 5 MG: at 22:50

## 2018-09-23 RX ADMIN — ACETAMINOPHEN 500 MG: 500 TABLET, FILM COATED ORAL at 02:19

## 2018-09-23 RX ADMIN — VENLAFAXINE HYDROCHLORIDE 75 MG: 37.5 CAPSULE, EXTENDED RELEASE ORAL at 09:32

## 2018-09-23 RX ADMIN — TROLAMINE SALICYLATE: 8.5 CREAM TOPICAL at 16:14

## 2018-09-23 RX ADMIN — ENOXAPARIN SODIUM 40 MG: 100 INJECTION SUBCUTANEOUS at 09:33

## 2018-09-23 RX ADMIN — PANTOPRAZOLE SODIUM 40 MG: 40 TABLET, DELAYED RELEASE ORAL at 06:04

## 2018-09-23 RX ADMIN — BACLOFEN 5 MG: 10 TABLET ORAL at 22:34

## 2018-09-23 RX ADMIN — PROMETHAZINE HYDROCHLORIDE 25 MG: 12.5 TABLET ORAL at 22:38

## 2018-09-23 RX ADMIN — BACLOFEN 5 MG: 10 TABLET ORAL at 09:31

## 2018-09-23 RX ADMIN — PROMETHAZINE HYDROCHLORIDE 25 MG: 12.5 TABLET ORAL at 09:33

## 2018-09-23 ASSESSMENT — PAIN DESCRIPTION - DESCRIPTORS
DESCRIPTORS: ACHING;SHARP
DESCRIPTORS: ACHING;SHARP;SHOOTING

## 2018-09-23 ASSESSMENT — PAIN DESCRIPTION - FREQUENCY
FREQUENCY: CONTINUOUS
FREQUENCY: CONTINUOUS

## 2018-09-23 ASSESSMENT — PAIN DESCRIPTION - PROGRESSION
CLINICAL_PROGRESSION: NOT CHANGED
CLINICAL_PROGRESSION: RAPIDLY IMPROVING
CLINICAL_PROGRESSION: NOT CHANGED

## 2018-09-23 ASSESSMENT — PAIN DESCRIPTION - PAIN TYPE
TYPE: ACUTE PAIN;SURGICAL PAIN
TYPE: ACUTE PAIN

## 2018-09-23 ASSESSMENT — PAIN DESCRIPTION - LOCATION
LOCATION: BACK;LEG
LOCATION: BACK;LEG

## 2018-09-23 ASSESSMENT — PAIN DESCRIPTION - ONSET
ONSET: ON-GOING
ONSET: ON-GOING

## 2018-09-23 ASSESSMENT — PAIN SCALES - GENERAL
PAINLEVEL_OUTOF10: 4
PAINLEVEL_OUTOF10: 5
PAINLEVEL_OUTOF10: 4
PAINLEVEL_OUTOF10: 0
PAINLEVEL_OUTOF10: 4
PAINLEVEL_OUTOF10: 4
PAINLEVEL_OUTOF10: 6

## 2018-09-23 ASSESSMENT — ENCOUNTER SYMPTOMS
GASTROINTESTINAL NEGATIVE: 1
RESPIRATORY NEGATIVE: 1
BACK PAIN: 1
EYES NEGATIVE: 1

## 2018-09-23 ASSESSMENT — PAIN DESCRIPTION - ORIENTATION: ORIENTATION: RIGHT;LEFT

## 2018-09-24 LAB
ANION GAP SERPL CALCULATED.3IONS-SCNC: 12 MEQ/L (ref 7–13)
BASOPHILS ABSOLUTE: 0 K/UL (ref 0–0.2)
BASOPHILS RELATIVE PERCENT: 0.3 %
BUN BLDV-MCNC: 21 MG/DL (ref 6–20)
CALCIUM SERPL-MCNC: 9.9 MG/DL (ref 8.6–10.2)
CHLORIDE BLD-SCNC: 99 MEQ/L (ref 98–107)
CO2: 30 MEQ/L (ref 22–29)
CREAT SERPL-MCNC: 0.6 MG/DL (ref 0.5–0.9)
EOSINOPHILS ABSOLUTE: 0 K/UL (ref 0–0.7)
EOSINOPHILS RELATIVE PERCENT: 0.4 %
GFR AFRICAN AMERICAN: >60
GFR NON-AFRICAN AMERICAN: >60
GLUCOSE BLD-MCNC: 114 MG/DL (ref 60–115)
GLUCOSE BLD-MCNC: 156 MG/DL (ref 74–109)
GLUCOSE BLD-MCNC: 225 MG/DL (ref 60–115)
GLUCOSE BLD-MCNC: 282 MG/DL (ref 60–115)
GLUCOSE BLD-MCNC: 287 MG/DL (ref 60–115)
HCT VFR BLD CALC: 33.3 % (ref 37–47)
HEMOGLOBIN: 11 G/DL (ref 12–16)
LYMPHOCYTES ABSOLUTE: 4.4 K/UL (ref 1–4.8)
LYMPHOCYTES RELATIVE PERCENT: 44.1 %
MCH RBC QN AUTO: 27.1 PG (ref 27–31.3)
MCHC RBC AUTO-ENTMCNC: 33.1 % (ref 33–37)
MCV RBC AUTO: 81.8 FL (ref 82–100)
MONOCYTES ABSOLUTE: 0.8 K/UL (ref 0.2–0.8)
MONOCYTES RELATIVE PERCENT: 7.7 %
NEUTROPHILS ABSOLUTE: 4.8 K/UL (ref 1.4–6.5)
NEUTROPHILS RELATIVE PERCENT: 47.5 %
PDW BLD-RTO: 15.9 % (ref 11.5–14.5)
PERFORMED ON: ABNORMAL
PERFORMED ON: NORMAL
PLATELET # BLD: 225 K/UL (ref 130–400)
POTASSIUM SERPL-SCNC: 4 MEQ/L (ref 3.5–5.1)
RBC # BLD: 4.07 M/UL (ref 4.2–5.4)
SODIUM BLD-SCNC: 141 MEQ/L (ref 132–144)
WBC # BLD: 10.1 K/UL (ref 4.8–10.8)

## 2018-09-24 PROCEDURE — 6370000000 HC RX 637 (ALT 250 FOR IP): Performed by: INTERNAL MEDICINE

## 2018-09-24 PROCEDURE — 85025 COMPLETE CBC W/AUTO DIFF WBC: CPT

## 2018-09-24 PROCEDURE — 36415 COLL VENOUS BLD VENIPUNCTURE: CPT

## 2018-09-24 PROCEDURE — 1200000000 HC SEMI PRIVATE

## 2018-09-24 PROCEDURE — 97116 GAIT TRAINING THERAPY: CPT

## 2018-09-24 PROCEDURE — 97110 THERAPEUTIC EXERCISES: CPT

## 2018-09-24 PROCEDURE — 6360000002 HC RX W HCPCS: Performed by: INTERNAL MEDICINE

## 2018-09-24 PROCEDURE — 97530 THERAPEUTIC ACTIVITIES: CPT

## 2018-09-24 PROCEDURE — 97112 NEUROMUSCULAR REEDUCATION: CPT

## 2018-09-24 PROCEDURE — 80048 BASIC METABOLIC PNL TOTAL CA: CPT

## 2018-09-24 RX ADMIN — ACETAMINOPHEN 650 MG: 325 TABLET, FILM COATED ORAL at 21:39

## 2018-09-24 RX ADMIN — ACETAMINOPHEN 650 MG: 325 TABLET, FILM COATED ORAL at 15:35

## 2018-09-24 RX ADMIN — INSULIN GLARGINE 40 UNITS: 100 INJECTION, SOLUTION SUBCUTANEOUS at 21:50

## 2018-09-24 RX ADMIN — PROMETHAZINE HYDROCHLORIDE 25 MG: 12.5 TABLET ORAL at 09:17

## 2018-09-24 RX ADMIN — VENLAFAXINE HYDROCHLORIDE 75 MG: 37.5 CAPSULE, EXTENDED RELEASE ORAL at 08:20

## 2018-09-24 RX ADMIN — ENOXAPARIN SODIUM 40 MG: 100 INJECTION SUBCUTANEOUS at 08:20

## 2018-09-24 RX ADMIN — PANTOPRAZOLE SODIUM 40 MG: 40 TABLET, DELAYED RELEASE ORAL at 05:51

## 2018-09-24 RX ADMIN — BACLOFEN 5 MG: 10 TABLET ORAL at 08:21

## 2018-09-24 RX ADMIN — PREDNISONE 60 MG: 20 TABLET ORAL at 08:21

## 2018-09-24 RX ADMIN — TROSPIUM CHLORIDE 20 MG: 20 TABLET ORAL at 21:39

## 2018-09-24 RX ADMIN — ACETAMINOPHEN 650 MG: 325 TABLET, FILM COATED ORAL at 10:16

## 2018-09-24 RX ADMIN — BACLOFEN 5 MG: 10 TABLET ORAL at 21:41

## 2018-09-24 RX ADMIN — ACETAMINOPHEN 650 MG: 325 TABLET, FILM COATED ORAL at 05:51

## 2018-09-24 RX ADMIN — PROMETHAZINE HYDROCHLORIDE 25 MG: 12.5 TABLET ORAL at 21:39

## 2018-09-24 RX ADMIN — MICONAZOLE NITRATE: 20 POWDER TOPICAL at 09:06

## 2018-09-24 RX ADMIN — HYDROCHLOROTHIAZIDE 25 MG: 25 TABLET ORAL at 08:20

## 2018-09-24 RX ADMIN — VENLAFAXINE HYDROCHLORIDE 75 MG: 37.5 CAPSULE, EXTENDED RELEASE ORAL at 21:39

## 2018-09-24 RX ADMIN — METOPROLOL TARTRATE 50 MG: 50 TABLET ORAL at 21:39

## 2018-09-24 RX ADMIN — METOPROLOL TARTRATE 50 MG: 50 TABLET ORAL at 08:21

## 2018-09-24 ASSESSMENT — PAIN SCALES - GENERAL
PAINLEVEL_OUTOF10: 4
PAINLEVEL_OUTOF10: 2
PAINLEVEL_OUTOF10: 5
PAINLEVEL_OUTOF10: 5
PAINLEVEL_OUTOF10: 6
PAINLEVEL_OUTOF10: 4
PAINLEVEL_OUTOF10: 5
PAINLEVEL_OUTOF10: 4

## 2018-09-24 ASSESSMENT — PAIN DESCRIPTION - ONSET: ONSET: ON-GOING

## 2018-09-24 ASSESSMENT — PAIN DESCRIPTION - FREQUENCY
FREQUENCY: CONTINUOUS
FREQUENCY: CONTINUOUS

## 2018-09-24 ASSESSMENT — PAIN DESCRIPTION - PAIN TYPE
TYPE: CHRONIC PAIN
TYPE: SURGICAL PAIN
TYPE: CHRONIC PAIN

## 2018-09-24 ASSESSMENT — PAIN DESCRIPTION - LOCATION
LOCATION: BACK;LEG
LOCATION: BACK;LEG
LOCATION: BACK
LOCATION: BACK
LOCATION: LEG

## 2018-09-24 ASSESSMENT — PAIN DESCRIPTION - DESCRIPTORS
DESCRIPTORS: ACHING;SORE;SPASM
DESCRIPTORS: ACHING

## 2018-09-24 ASSESSMENT — PAIN DESCRIPTION - ORIENTATION
ORIENTATION: LOWER;RIGHT
ORIENTATION: LOWER;RIGHT

## 2018-09-24 ASSESSMENT — PAIN SCALES - WONG BAKER
WONGBAKER_NUMERICALRESPONSE: 4
WONGBAKER_NUMERICALRESPONSE: 0
WONGBAKER_NUMERICALRESPONSE: 2

## 2018-09-24 ASSESSMENT — PAIN DESCRIPTION - PROGRESSION: CLINICAL_PROGRESSION: GRADUALLY IMPROVING

## 2018-09-24 NOTE — PROGRESS NOTES
Device  Assistance: Contact guard assistance  Quality of Gait: Pt. demo's some stiffness in B hip rotation and decrease heel strike to toe off with initial gait pattern. Pt. progress with improve quality of giat with B heel strike with distance. Distance: 100'x3  Stairs/Curb  Stairs?: Yes  Stairs  # Steps : 2  Stairs Height: 6\"  Rails: None  Device: Single pt cane  Assistance: Contact guard assistance  Comment: Stair training with step to (3 point gait) x 5 trials. Pt. progress to alternating B LE with second trial x 5 reps with SPC. Tolerated well 2 x 5 reps with 2 steps. Continued stair training with 9 steps with use of HR(left side ascending) 2 x 3 reps first with step to and progressed with alternating B LE without device. Pt. tolerated well with slow pace. Pt. demo's some fear with participating with stair training; however, pt. tolerated good. Balance  Sitting - Static: Good  Sitting - Dynamic: Good (Flexion/ extension ROM only this day.)  Standing - Static: Fair  Standing - Dynamic: Fair;+  Comments: Seated trunk flexion/extension AROM exercise with stretch x 3 sec hold for ROM. Pt. tolerated good with no complaints of pain. Exercises  Heelslides: 2z10  Gluteal Sets: 2x10  Hip Abduction: 2x10  Comments: Supine ther ex B LE                        Assessment   Body structures, Functions, Activity limitations: Decreased functional mobility ; Decreased strength;Decreased balance;Decreased endurance;Decreased coordination  Assessment: Pt. tolerated well with ambulation. Pt. demo's stifffness and 5/10 pain with LE supine ther ex with L LE/ low back. Intemittent rest breaks required for safety.    Treatment Diagnosis: Difficulty walking  REQUIRES PT FOLLOW UP: Yes     G-Code     OutComes Score                                                    AM-PAC Score             Goals  Short term goals  Time Frame for Short term goals: 3 days  Short term goal 1: Pt to be supervision with transfers with good safety

## 2018-09-24 NOTE — PROGRESS NOTES
Physical Therapy  Facility/Department: [facility] MED SURG UNIT  Daily Treatment Note  NAME: Zoila Fernandes  : 1975  MRN: [id number]    Date of Service: 2018    Discharge Recommendations:  Continue to assess pending progress        Patient Diagnosis(es): There were no encounter diagnoses. has a past medical history of Acute midline thoracic back pain; B12 deficiency; Family history of premature CAD; Fatty liver; HPV (human papilloma virus) anogenital infection; Hyperlipidemia; Hypertension; Liver hemangioma; Obesity; Orthostatic hypotension; Ovarian cyst; Rectal fissure; Tobacco abuse; Tobacco abuse; Tremor; Uncontrolled diabetes mellitus with complications ([facility] [address] and Unspecified sleep apnea. has a past surgical history that includes Tonsillectomy and adenoidectomy (); LEEP (); Cardiac catheterization (); Hysterectomy (12-10); Colonoscopy (); Urethra surgery (); and Upper gastrointestinal endoscopy (10/13/15 ). Restrictions  Restrictions/Precautions  Restrictions/Precautions: Fall Risk  Required Braces or Orthoses?: No  Subjective   General  Chart Reviewed: Yes  Family / Caregiver Present: No  Referring Practitioner: Maynor Hoffmann  Subjective  Subjective: Pt. reports 4/10 low back pain. Pt. reports wanting to work on stairs more. Pt. reports feeling a little nervous performing stairs previous session secondary to history of falls. Pain Assessment  Pain Assessment: 0-10  Pain Level: 5 (with movement during heelslides but tolerable.)  Pain Type: Surgical pain  Pain Location: Leg (Left anterior thigh.)       Orientation     Objective   Bed mobility:   Supine <-> sit Supervision with slow movement demo'd with good tech. Transfers  Sit to Stand: Stand by assistance  Stand to sit: Stand by assistance  Stand Pivot Transfers: Stand by assistance  Comment: Tsf's completed without AD. Slow functional stand pivot mobility demonstrating trunk/low back stiffness.     Ambulation  Ambulation?:

## 2018-09-24 NOTE — PROGRESS NOTES
Physical Therapy  Facility/Department: [facility] MED SURG [id number]  Physical Therapy Discharge      NAME: Stacie Matamoros    : 1975 (44 y.o.)  MRN: [id number]    Account: [de-identified]  Gender: female      Patient has been discharged from acute care hospital. DC patient from current PT program.      Electronically signed by Vishal Araujo PT on 18 at 10:17 AM

## 2018-09-24 NOTE — PROGRESS NOTES
coordination for decreased fall risk during ADL- SBA/CGA (pt completed 4 dyn stands with seated rest breaks between)     Transfers  Sit to stand: Stand by assistance  Stand to sit: Stand by assistance      Functional mobility: SBA without AE long hallway distance without any LOB        Additional Activities Comment  Additional Activities: Practiced navigating up/down staircase with 2 steps up and 2 down utilizing [facility] held in R hand in training for pt planned return home- completed this sequence x10 reps for ensuring safety and competence. Pt then completed full staircase of 9 steps up/down with L handrail only (when ascending) to simulate pt's home env't for safe d/c transition and to ensure safety at her apartment environment. Reviewed \"Up with the good leg, down with the bad leg\" for safety training. Pt with good follow through                          Assessment   Performance deficits / Impairments: Decreased functional mobility ; Decreased ADL status; Decreased strength;Decreased endurance;Decreased balance;Decreased high-level IADLs  Prognosis: Good  REQUIRES OT FOLLOW UP: Yes          Plan   Plan  Times per week: 3-6x/wk  Times per day: Daily  Plan weeks: ~ 1wk  Current Treatment Recommendations: Strengthening, Balance Training, Functional Mobility Training, Endurance Training, Stair training, Neuromuscular Re-education, Safety Education & Training, Self-Care / ADL, Home Management Training          Goals  Short term goals  Time Frame for Short term goals: 3-5 days  Short term goal 1: Tolerate 30-35min BUE ther ex/act to increase strength/end for ADL  Short term goal 2: Increase to Spv for toileting  Short term goal 3:  Increase to Spv for LB dressing and bathing  Long term goals  Time Frame for Long term goals : 5-7 days  Long term goal 1: I with BUE HEP  Long term goal 2: I/MI for all fxl ADL xfers  Long term goal 3: I/MI for light homemaking tasks  Long term goal 4: I/MI for LB dressing and bathing tasks  Long

## 2018-09-25 ENCOUNTER — CLINICAL DOCUMENTATION (OUTPATIENT)
Dept: PHYSICAL THERAPY | Age: 43
End: 2018-09-25
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
## 2018-09-25 PROBLEM — S32.019A: Status: ACTIVE | Noted: 2018-09-25

## 2018-09-25 LAB
GLUCOSE BLD-MCNC: 113 MG/DL (ref 60–115)
GLUCOSE BLD-MCNC: 188 MG/DL (ref 60–115)
GLUCOSE BLD-MCNC: 311 MG/DL (ref 60–115)
GLUCOSE BLD-MCNC: 336 MG/DL (ref 60–115)
PERFORMED ON: ABNORMAL
PERFORMED ON: NORMAL

## 2018-09-25 PROCEDURE — 6370000000 HC RX 637 (ALT 250 FOR IP): Performed by: INTERNAL MEDICINE

## 2018-09-25 PROCEDURE — 97530 THERAPEUTIC ACTIVITIES: CPT

## 2018-09-25 PROCEDURE — 1200000000 HC SEMI PRIVATE

## 2018-09-25 PROCEDURE — 97110 THERAPEUTIC EXERCISES: CPT

## 2018-09-25 PROCEDURE — 6360000002 HC RX W HCPCS: Performed by: INTERNAL MEDICINE

## 2018-09-25 PROCEDURE — 97116 GAIT TRAINING THERAPY: CPT

## 2018-09-25 RX ORDER — UREA 10 %
2 LOTION (ML) TOPICAL NIGHTLY PRN
Status: DISCONTINUED | OUTPATIENT
Start: 2018-09-25 | End: 2018-09-28 | Stop reason: HOSPADM

## 2018-09-25 RX ORDER — HYDROCODONE BITARTRATE AND ACETAMINOPHEN 5; 325 MG/1; MG/1
1 TABLET ORAL EVERY 6 HOURS PRN
Status: DISCONTINUED | OUTPATIENT
Start: 2018-09-25 | End: 2018-09-25

## 2018-09-25 RX ORDER — PREDNISONE 20 MG/1
20 TABLET ORAL DAILY
Status: DISCONTINUED | OUTPATIENT
Start: 2018-09-30 | End: 2018-09-28 | Stop reason: HOSPADM

## 2018-09-25 RX ORDER — TRAMADOL HYDROCHLORIDE 50 MG/1
50 TABLET ORAL EVERY 6 HOURS PRN
Status: DISCONTINUED | OUTPATIENT
Start: 2018-09-25 | End: 2018-09-28 | Stop reason: HOSPADM

## 2018-09-25 RX ADMIN — VENLAFAXINE HYDROCHLORIDE 75 MG: 37.5 CAPSULE, EXTENDED RELEASE ORAL at 20:10

## 2018-09-25 RX ADMIN — PANTOPRAZOLE SODIUM 40 MG: 40 TABLET, DELAYED RELEASE ORAL at 05:56

## 2018-09-25 RX ADMIN — INSULIN GLARGINE 40 UNITS: 100 INJECTION, SOLUTION SUBCUTANEOUS at 20:22

## 2018-09-25 RX ADMIN — PROMETHAZINE HYDROCHLORIDE 25 MG: 12.5 TABLET ORAL at 09:46

## 2018-09-25 RX ADMIN — MICONAZOLE NITRATE: 20 POWDER TOPICAL at 09:53

## 2018-09-25 RX ADMIN — TROSPIUM CHLORIDE 20 MG: 20 TABLET ORAL at 20:10

## 2018-09-25 RX ADMIN — ACETAMINOPHEN 650 MG: 325 TABLET, FILM COATED ORAL at 14:31

## 2018-09-25 RX ADMIN — MICONAZOLE NITRATE: 20 POWDER TOPICAL at 20:11

## 2018-09-25 RX ADMIN — BACLOFEN 5 MG: 10 TABLET ORAL at 20:14

## 2018-09-25 RX ADMIN — ACETAMINOPHEN 650 MG: 325 TABLET, FILM COATED ORAL at 09:52

## 2018-09-25 RX ADMIN — BACLOFEN 5 MG: 10 TABLET ORAL at 09:48

## 2018-09-25 RX ADMIN — ACETAMINOPHEN 650 MG: 325 TABLET, FILM COATED ORAL at 20:19

## 2018-09-25 RX ADMIN — ACETAMINOPHEN 650 MG: 325 TABLET, FILM COATED ORAL at 05:56

## 2018-09-25 RX ADMIN — PROMETHAZINE HYDROCHLORIDE 25 MG: 12.5 TABLET ORAL at 20:10

## 2018-09-25 RX ADMIN — HYDROCHLOROTHIAZIDE 25 MG: 25 TABLET ORAL at 09:48

## 2018-09-25 RX ADMIN — PREDNISONE 60 MG: 20 TABLET ORAL at 09:47

## 2018-09-25 RX ADMIN — VENLAFAXINE HYDROCHLORIDE 75 MG: 37.5 CAPSULE, EXTENDED RELEASE ORAL at 09:46

## 2018-09-25 RX ADMIN — ENOXAPARIN SODIUM 40 MG: 100 INJECTION SUBCUTANEOUS at 09:46

## 2018-09-25 RX ADMIN — METOPROLOL TARTRATE 50 MG: 50 TABLET ORAL at 09:48

## 2018-09-25 RX ADMIN — METOPROLOL TARTRATE 50 MG: 50 TABLET ORAL at 20:10

## 2018-09-25 ASSESSMENT — ENCOUNTER SYMPTOMS
GASTROINTESTINAL NEGATIVE: 1
RESPIRATORY NEGATIVE: 1
BACK PAIN: 1
EYES NEGATIVE: 1

## 2018-09-25 ASSESSMENT — PAIN DESCRIPTION - PAIN TYPE: TYPE: CHRONIC PAIN

## 2018-09-25 ASSESSMENT — PAIN SCALES - GENERAL
PAINLEVEL_OUTOF10: 0
PAINLEVEL_OUTOF10: 6
PAINLEVEL_OUTOF10: 5
PAINLEVEL_OUTOF10: 5
PAINLEVEL_OUTOF10: 6
PAINLEVEL_OUTOF10: 5

## 2018-09-25 ASSESSMENT — PAIN DESCRIPTION - LOCATION: LOCATION: BACK

## 2018-09-25 NOTE — PROGRESS NOTES
Functional Mobility Training, Transfer Training, Gait Training, Stair training, Neuromuscular Re-education, Safety Education & Training, Home Exercise Program  Safety Devices  Type of devices:  All fall risk precautions in place, Call light within reach, Left in chair  Restraints  Initially in place: No     Therapy Time   Individual Concurrent Group Co-treatment   Time In  1100         Time Out  1155         Minutes  Ctrantony Valdes 80, PTA  License and [address] Number: [phone]

## 2018-09-25 NOTE — PROGRESS NOTES
No (No stairs in p.m. due to pain.)  Stairs  # Steps : 9  Stairs Height: 6\"  Rails: Right ascending  Assistance: Stand by assistance;Contact guard assistance (CGA with attempts to progress with SBA)  Comment: Alternating step through gait pattern ascending/descending     Balance  Posture: (P) Fair  Standing - Static: (P) Fair  Standing - Dynamic: (P) Fair;+  Exercises  Hip Flexion: (P) x10  Hip Abduction: (P) x10  Knee Long Arc Quad: (P) x10  Ankle Pumps: (P) x10  Comments: (P) Education on seated ther ex B LE with all available planews of motion for strengthening. Assessment   Body structures, Functions, Activity limitations: (P) Decreased functional mobility ; Decreased strength;Decreased balance;Decreased endurance;Decreased coordination  Assessment: (P) Pt. tolerated good with ambulation with intermittent rests and seated ther ex B LE. Treatment Diagnosis: (P) Difficulty walking  Prognosis: (P) Good  Patient Education: (P) Educated pt. on proper postural seated position to improve quality of function and life with reduce pain. REQUIRES PT FOLLOW UP: (P) Yes  Activity Tolerance  Activity Tolerance: (P) Patient Tolerated treatment well     G-Code     OutComes Score                                                    AM-PAC Score             Goals  Short term goals  Time Frame for Short term goals: (P) 3 days  Short term goal 1: (P) Pt to be supervision with transfers with good safety and min to no pain. Short term goal 2: (P) Pt to be supervision with ambulation with no AD or LRD with min to no antalgia and min c/o pain > or equal to 250'. Short term goal 3: (P) 4+/5 bilateral LE strength for improved mobility tolerance. Short term goal 4: (P) Fair+ standing and ambulatory balance to decrease risk of falls. Short term goal 5: (P) Pt to demo good knowledge of ther-ex for HEP.   Long term goals  Time Frame for Long term goals : (P) 1 week  Long term goal 1: (P) Pt to be (I) with transfers

## 2018-09-26 LAB
GLUCOSE BLD-MCNC: 134 MG/DL (ref 60–115)
GLUCOSE BLD-MCNC: 159 MG/DL (ref 60–115)
GLUCOSE BLD-MCNC: 237 MG/DL (ref 60–115)
GLUCOSE BLD-MCNC: 290 MG/DL (ref 60–115)
PERFORMED ON: ABNORMAL

## 2018-09-26 PROCEDURE — 6370000000 HC RX 637 (ALT 250 FOR IP): Performed by: INTERNAL MEDICINE

## 2018-09-26 PROCEDURE — 6360000002 HC RX W HCPCS: Performed by: INTERNAL MEDICINE

## 2018-09-26 PROCEDURE — 97110 THERAPEUTIC EXERCISES: CPT

## 2018-09-26 PROCEDURE — 97530 THERAPEUTIC ACTIVITIES: CPT

## 2018-09-26 PROCEDURE — 97116 GAIT TRAINING THERAPY: CPT

## 2018-09-26 PROCEDURE — 1200000000 HC SEMI PRIVATE

## 2018-09-26 PROCEDURE — 97112 NEUROMUSCULAR REEDUCATION: CPT

## 2018-09-26 RX ADMIN — PROMETHAZINE HYDROCHLORIDE 25 MG: 12.5 TABLET ORAL at 20:42

## 2018-09-26 RX ADMIN — PANTOPRAZOLE SODIUM 40 MG: 40 TABLET, DELAYED RELEASE ORAL at 05:46

## 2018-09-26 RX ADMIN — MICONAZOLE NITRATE: 20 POWDER TOPICAL at 22:06

## 2018-09-26 RX ADMIN — BACLOFEN 5 MG: 10 TABLET ORAL at 20:43

## 2018-09-26 RX ADMIN — ACETAMINOPHEN 650 MG: 325 TABLET, FILM COATED ORAL at 12:01

## 2018-09-26 RX ADMIN — MICONAZOLE NITRATE: 20 POWDER TOPICAL at 09:11

## 2018-09-26 RX ADMIN — PROMETHAZINE HYDROCHLORIDE 25 MG: 12.5 TABLET ORAL at 09:09

## 2018-09-26 RX ADMIN — ENOXAPARIN SODIUM 40 MG: 100 INJECTION SUBCUTANEOUS at 09:08

## 2018-09-26 RX ADMIN — INSULIN GLARGINE 40 UNITS: 100 INJECTION, SOLUTION SUBCUTANEOUS at 20:41

## 2018-09-26 RX ADMIN — HYDROCHLOROTHIAZIDE 25 MG: 25 TABLET ORAL at 09:09

## 2018-09-26 RX ADMIN — METOPROLOL TARTRATE 50 MG: 50 TABLET ORAL at 20:43

## 2018-09-26 RX ADMIN — Medication 2 MG: at 22:05

## 2018-09-26 RX ADMIN — VENLAFAXINE HYDROCHLORIDE 75 MG: 37.5 CAPSULE, EXTENDED RELEASE ORAL at 09:09

## 2018-09-26 RX ADMIN — ACETAMINOPHEN 325 MG: 325 TABLET, FILM COATED ORAL at 20:44

## 2018-09-26 RX ADMIN — METOPROLOL TARTRATE 50 MG: 50 TABLET ORAL at 09:09

## 2018-09-26 RX ADMIN — BACLOFEN 5 MG: 10 TABLET ORAL at 09:09

## 2018-09-26 RX ADMIN — TROSPIUM CHLORIDE 20 MG: 20 TABLET ORAL at 20:43

## 2018-09-26 RX ADMIN — ACETAMINOPHEN 650 MG: 325 TABLET, FILM COATED ORAL at 06:01

## 2018-09-26 RX ADMIN — VENLAFAXINE HYDROCHLORIDE 75 MG: 37.5 CAPSULE, EXTENDED RELEASE ORAL at 20:43

## 2018-09-26 RX ADMIN — PREDNISONE 40 MG: 20 TABLET ORAL at 09:09

## 2018-09-26 ASSESSMENT — PAIN DESCRIPTION - LOCATION
LOCATION: BACK

## 2018-09-26 ASSESSMENT — PAIN SCALES - GENERAL
PAINLEVEL_OUTOF10: 6
PAINLEVEL_OUTOF10: 4
PAINLEVEL_OUTOF10: 2
PAINLEVEL_OUTOF10: 3
PAINLEVEL_OUTOF10: 3
PAINLEVEL_OUTOF10: 1
PAINLEVEL_OUTOF10: 4
PAINLEVEL_OUTOF10: 3

## 2018-09-26 ASSESSMENT — PAIN DESCRIPTION - ORIENTATION
ORIENTATION: RIGHT;LOWER

## 2018-09-26 ASSESSMENT — PAIN DESCRIPTION - PAIN TYPE
TYPE: CHRONIC PAIN

## 2018-09-26 ASSESSMENT — PAIN DESCRIPTION - PROGRESSION
CLINICAL_PROGRESSION: NOT CHANGED
CLINICAL_PROGRESSION: GRADUALLY IMPROVING
CLINICAL_PROGRESSION: NOT CHANGED
CLINICAL_PROGRESSION: NOT CHANGED
CLINICAL_PROGRESSION: GRADUALLY WORSENING

## 2018-09-26 ASSESSMENT — PAIN DESCRIPTION - FREQUENCY
FREQUENCY: CONTINUOUS

## 2018-09-26 ASSESSMENT — PAIN DESCRIPTION - ONSET
ONSET: ON-GOING

## 2018-09-26 ASSESSMENT — PAIN DESCRIPTION - DESCRIPTORS
DESCRIPTORS: ACHING

## 2018-09-26 NOTE — PROGRESS NOTES
Physical Therapy  Facility/Department: [facility] MED SURG UNIT  Daily Treatment Note  NAME: Wilber Turner  : 1975  MRN: [id number]    Date of Service: 2018    Discharge Recommendations:  Continue to assess pending progress        Patient Diagnosis(es): There were no encounter diagnoses. has a past medical history of Acute midline thoracic back pain; B12 deficiency; Family history of premature CAD; Fatty liver; HPV (human papilloma virus) anogenital infection; Hyperlipidemia; Hypertension; Liver hemangioma; Obesity; Orthostatic hypotension; Ovarian cyst; Rectal fissure; Tobacco abuse; Tobacco abuse; Tremor; Uncontrolled diabetes mellitus with complications ([facility] [address] and Unspecified sleep apnea. has a past surgical history that includes Tonsillectomy and adenoidectomy (); LEEP (); Cardiac catheterization (); Hysterectomy (12-10); Colonoscopy (); Urethra surgery (); Upper gastrointestinal endoscopy (10/13/15 ); pr muscle biopsy (Left, 2018); and pr perq vert agmntj cavity crtj uni/bi cannulation (N/A, 2018). Restrictions  Restrictions/Precautions  Restrictions/Precautions: Fall Risk  Required Braces or Orthoses?: No  Subjective   General  Chart Reviewed: Yes  Family / Caregiver Present: No  Referring Practitioner: Andre Scott  Subjective  Subjective: Pt. seen in p.m. Pt. reports ocasional L anterior thigh pain at times after functional gait/activity. General Comment  Comments: Low back pain increased to 4/10 after ambulating on level surfaces, ascending/descending flight of stairs, and static stand activity.   Pain Screening  Patient Currently in Pain: Yes  Pain Assessment  Pain Level: 3  Vital Signs  Patient Currently in Pain: Yes  Pre Treatment Pain Screening  Pain at present: 3  Scale Used: Numeric Score  Intervention List: Patient able to continue with treatment    Orientation     Objective      Transfers  Sit to Stand: Supervision  Stand to sit: Supervision  Bed to Chair:

## 2018-09-26 NOTE — PROGRESS NOTES
homemaking tasks  Long term goal 4: I/MI for LB dressing and bathing tasks  Long term goal 5: I/MI for toileting tasks  Patient Goals   Patient goals :  To go home       Therapy Time   Individual Concurrent Group Co-treatment   Time In  8:15         Time Out  9:05         Minutes  Chuyita Mccauley

## 2018-09-27 LAB
ANION GAP SERPL CALCULATED.3IONS-SCNC: 14 MEQ/L (ref 7–13)
BASOPHILS ABSOLUTE: 0 K/UL (ref 0–0.2)
BASOPHILS RELATIVE PERCENT: 0.3 %
BUN BLDV-MCNC: 21 MG/DL (ref 6–20)
CALCIUM SERPL-MCNC: 10.2 MG/DL (ref 8.6–10.2)
CHLORIDE BLD-SCNC: 96 MEQ/L (ref 98–107)
CO2: 30 MEQ/L (ref 22–29)
CREAT SERPL-MCNC: 0.61 MG/DL (ref 0.5–0.9)
EOSINOPHILS ABSOLUTE: 0 K/UL (ref 0–0.7)
EOSINOPHILS RELATIVE PERCENT: 0.2 %
GFR AFRICAN AMERICAN: >60
GFR NON-AFRICAN AMERICAN: >60
GLUCOSE BLD-MCNC: 132 MG/DL (ref 60–115)
GLUCOSE BLD-MCNC: 144 MG/DL (ref 74–109)
GLUCOSE BLD-MCNC: 145 MG/DL (ref 60–115)
GLUCOSE BLD-MCNC: 263 MG/DL (ref 60–115)
GLUCOSE BLD-MCNC: 330 MG/DL (ref 60–115)
HCT VFR BLD CALC: 39.1 % (ref 37–47)
HEMOGLOBIN: 12.9 G/DL (ref 12–16)
LYMPHOCYTES ABSOLUTE: 5.9 K/UL (ref 1–4.8)
LYMPHOCYTES RELATIVE PERCENT: 44.8 %
MCH RBC QN AUTO: 27.2 PG (ref 27–31.3)
MCHC RBC AUTO-ENTMCNC: 33 % (ref 33–37)
MCV RBC AUTO: 82.4 FL (ref 82–100)
MONOCYTES ABSOLUTE: 0.9 K/UL (ref 0.2–0.8)
MONOCYTES RELATIVE PERCENT: 6.7 %
NEUTROPHILS ABSOLUTE: 6.3 K/UL (ref 1.4–6.5)
NEUTROPHILS RELATIVE PERCENT: 48 %
PDW BLD-RTO: 15.7 % (ref 11.5–14.5)
PERFORMED ON: ABNORMAL
PLATELET # BLD: 250 K/UL (ref 130–400)
POTASSIUM SERPL-SCNC: 3.8 MEQ/L (ref 3.5–5.1)
RBC # BLD: 4.75 M/UL (ref 4.2–5.4)
SODIUM BLD-SCNC: 140 MEQ/L (ref 132–144)
WBC # BLD: 13.1 K/UL (ref 4.8–10.8)

## 2018-09-27 PROCEDURE — 80048 BASIC METABOLIC PNL TOTAL CA: CPT

## 2018-09-27 PROCEDURE — 6370000000 HC RX 637 (ALT 250 FOR IP): Performed by: INTERNAL MEDICINE

## 2018-09-27 PROCEDURE — 6360000002 HC RX W HCPCS: Performed by: INTERNAL MEDICINE

## 2018-09-27 PROCEDURE — 1200000000 HC SEMI PRIVATE

## 2018-09-27 PROCEDURE — 85025 COMPLETE CBC W/AUTO DIFF WBC: CPT

## 2018-09-27 PROCEDURE — 97530 THERAPEUTIC ACTIVITIES: CPT

## 2018-09-27 PROCEDURE — 97112 NEUROMUSCULAR REEDUCATION: CPT

## 2018-09-27 PROCEDURE — 97110 THERAPEUTIC EXERCISES: CPT

## 2018-09-27 PROCEDURE — 97116 GAIT TRAINING THERAPY: CPT

## 2018-09-27 PROCEDURE — 36415 COLL VENOUS BLD VENIPUNCTURE: CPT

## 2018-09-27 RX ADMIN — ACETAMINOPHEN 650 MG: 325 TABLET, FILM COATED ORAL at 13:48

## 2018-09-27 RX ADMIN — PREDNISONE 40 MG: 20 TABLET ORAL at 09:13

## 2018-09-27 RX ADMIN — PANTOPRAZOLE SODIUM 40 MG: 40 TABLET, DELAYED RELEASE ORAL at 05:31

## 2018-09-27 RX ADMIN — BACLOFEN 5 MG: 10 TABLET ORAL at 20:14

## 2018-09-27 RX ADMIN — MICONAZOLE NITRATE: 20 POWDER TOPICAL at 20:26

## 2018-09-27 RX ADMIN — VENLAFAXINE HYDROCHLORIDE 75 MG: 37.5 CAPSULE, EXTENDED RELEASE ORAL at 20:14

## 2018-09-27 RX ADMIN — ACETAMINOPHEN 650 MG: 325 TABLET, FILM COATED ORAL at 09:38

## 2018-09-27 RX ADMIN — VENLAFAXINE HYDROCHLORIDE 75 MG: 37.5 CAPSULE, EXTENDED RELEASE ORAL at 09:13

## 2018-09-27 RX ADMIN — BACLOFEN 5 MG: 10 TABLET ORAL at 09:12

## 2018-09-27 RX ADMIN — MICONAZOLE NITRATE: 20 POWDER TOPICAL at 09:14

## 2018-09-27 RX ADMIN — INSULIN GLARGINE 40 UNITS: 100 INJECTION, SOLUTION SUBCUTANEOUS at 20:20

## 2018-09-27 RX ADMIN — ENOXAPARIN SODIUM 40 MG: 100 INJECTION SUBCUTANEOUS at 09:11

## 2018-09-27 RX ADMIN — TROSPIUM CHLORIDE 20 MG: 20 TABLET ORAL at 20:13

## 2018-09-27 RX ADMIN — ACETAMINOPHEN 650 MG: 325 TABLET, FILM COATED ORAL at 20:14

## 2018-09-27 RX ADMIN — METOPROLOL TARTRATE 50 MG: 50 TABLET ORAL at 09:13

## 2018-09-27 RX ADMIN — Medication 2 MG: at 20:24

## 2018-09-27 RX ADMIN — PROMETHAZINE HYDROCHLORIDE 25 MG: 12.5 TABLET ORAL at 20:14

## 2018-09-27 RX ADMIN — HYDROCHLOROTHIAZIDE 25 MG: 25 TABLET ORAL at 09:13

## 2018-09-27 RX ADMIN — METOPROLOL TARTRATE 50 MG: 50 TABLET ORAL at 20:13

## 2018-09-27 RX ADMIN — ACETAMINOPHEN 650 MG: 325 TABLET, FILM COATED ORAL at 05:31

## 2018-09-27 RX ADMIN — PROMETHAZINE HYDROCHLORIDE 25 MG: 12.5 TABLET ORAL at 09:13

## 2018-09-27 ASSESSMENT — PAIN SCALES - GENERAL
PAINLEVEL_OUTOF10: 3
PAINLEVEL_OUTOF10: 6
PAINLEVEL_OUTOF10: 3
PAINLEVEL_OUTOF10: 6
PAINLEVEL_OUTOF10: 5
PAINLEVEL_OUTOF10: 0
PAINLEVEL_OUTOF10: 4
PAINLEVEL_OUTOF10: 4
PAINLEVEL_OUTOF10: 3

## 2018-09-27 ASSESSMENT — PAIN DESCRIPTION - ONSET
ONSET: ON-GOING

## 2018-09-27 ASSESSMENT — PAIN DESCRIPTION - PROGRESSION

## 2018-09-27 ASSESSMENT — PAIN DESCRIPTION - DESCRIPTORS
DESCRIPTORS: ACHING

## 2018-09-27 ASSESSMENT — PAIN DESCRIPTION - PAIN TYPE
TYPE: CHRONIC PAIN
TYPE: ACUTE PAIN
TYPE: CHRONIC PAIN

## 2018-09-27 ASSESSMENT — PAIN DESCRIPTION - LOCATION
LOCATION: BACK

## 2018-09-27 ASSESSMENT — PAIN DESCRIPTION - ORIENTATION
ORIENTATION: RIGHT;LOWER
ORIENTATION: RIGHT;LOWER

## 2018-09-27 ASSESSMENT — PAIN DESCRIPTION - FREQUENCY
FREQUENCY: CONTINUOUS

## 2018-09-27 NOTE — PROGRESS NOTES
independent  Stand to sit: Modified independent  Functional Mobility  Functional - Mobility Device: No device  Activity: Other (level hallway surface)  Assist Level: Supervision  Functional Mobility Comments: Pt completed community integration training throughout hospital up/down stairwells 9+ steps all spv. Pt also completed community ambulation and integration training walking outside on sidewalks, through parking lot uneven surfaces, up/down sidewalk curb and up/down outside stairs (2) all with spv to increase safety/I within community in prep for d/c home and to simulate what env't pt navigates at home. Transfers  Sit to stand: Modified independent  Stand to sit: Modified independent     Bed mobility supine -> sit MI using log roll technique                    Assessment   Performance deficits / Impairments: Decreased functional mobility ; Decreased ADL status; Decreased strength;Decreased endurance;Decreased balance;Decreased high-level IADLs  Prognosis: Good  REQUIRES OT FOLLOW UP: Yes          Plan   Plan  Times per week: 3-6x/wk  Times per day: Daily  Plan weeks: ~ 1wk  Current Treatment Recommendations: Strengthening, Balance Training, Functional Mobility Training, Endurance Training, Stair training, Neuromuscular Re-education, Safety Education & Training, Self-Care / ADL, Home Management Training       Goals  Short term goals  Time Frame for Short term goals: 3-5 days  Short term goal 1: Tolerate 30-35min BUE ther ex/act to increase strength/end for ADL  Short term goal 2: Increase to Spv for toileting  Short term goal 3: Increase to Spv for LB dressing and bathing  Long term goals  Time Frame for Long term goals : 5-7 days  Long term goal 1: I with BUE HEP  Long term goal 2: I/MI for all fxl ADL xfers  Long term goal 3: I/MI for light homemaking tasks  Long term goal 4: I/MI for LB dressing and bathing tasks  Long term goal 5: I/MI for toileting tasks  Patient Goals   Patient goals :  To go home

## 2018-09-27 NOTE — PLAN OF CARE
Problem: Falls - Risk of:  Goal: Will remain free from falls  Will remain free from falls   Outcome: Ongoing    Goal: Absence of physical injury  Absence of physical injury   Outcome: Ongoing      Problem: Pain:  Goal: Pain level will decrease  Pain level will decrease   Outcome: Ongoing    Goal: Control of acute pain  Control of acute pain   Outcome: Ongoing    Goal: Control of chronic pain  Control of chronic pain   Outcome: Ongoing      Problem: Activity:  Goal: Ability to avoid complications of mobility impairment will improve  Ability to avoid complications of mobility impairment will improve   Outcome: Ongoing    Goal: Ability to tolerate increased activity will improve  Ability to tolerate increased activity will improve   Outcome: Ongoing      Problem:  Bowel/Gastric:  Goal: Gastrointestinal status for postoperative course will improve  Gastrointestinal status for postoperative course will improve   Outcome: Ongoing      Problem: Fluid Volume:  Goal: Maintenance of adequate hydration will improve  Maintenance of adequate hydration will improve   Outcome: Ongoing      Problem: Health Behavior:  Goal: Identification of resources available to assist in meeting health care needs will improve  Identification of resources available to assist in meeting health care needs will improve   Outcome: Ongoing    Goal: Ability to state signs and symptoms to report to health care provider will improve  Ability to state signs and symptoms to report to health care provider will improve   Outcome: Ongoing      Problem: Nutritional:  Goal: Ability to attain and maintain optimal nutritional status will improve  Ability to attain and maintain optimal nutritional status will improve   Outcome: Ongoing      Problem: Physical Regulation:  Goal: Ability to maintain clinical measurements within normal limits will improve  Ability to maintain clinical measurements within normal limits will improve   Outcome: Ongoing    Goal: Will remain

## 2018-09-27 NOTE — PROGRESS NOTES
Physical Therapy  Facility/Department: [facility] MED SURG UNIT  Daily Treatment Note  NAME: Adelaide Bajwa  : 1975  MRN: [id number]    Date of Service: 2018    Discharge Recommendations:  Continue to assess pending progress        Patient Diagnosis(es): There were no encounter diagnoses. has a past medical history of Acute midline thoracic back pain; B12 deficiency; Family history of premature CAD; Fatty liver; HPV (human papilloma virus) anogenital infection; Hyperlipidemia; Hypertension; Liver hemangioma; Obesity; Orthostatic hypotension; Ovarian cyst; Rectal fissure; Tobacco abuse; Tobacco abuse; Tremor; Uncontrolled diabetes mellitus with complications ([facility] [address] and Unspecified sleep apnea. has a past surgical history that includes Tonsillectomy and adenoidectomy (); LEEP (); Cardiac catheterization (); Hysterectomy (12-10); Colonoscopy (); Urethra surgery (); Upper gastrointestinal endoscopy (10/13/15 ); pr muscle biopsy (Left, 2018); and pr perq vert agmntj cavity crtj uni/bi cannulation (N/A, 2018). Restrictions  Restrictions/Precautions  Restrictions/Precautions: Fall Risk  Required Braces or Orthoses?: No  Subjective   General  Chart Reviewed: Yes  Subjective  Subjective: Pt. reports soreness with B quads this a.m. and didnt sleep as well due to muscle soreness in quads from yesterdays exercises. Orientation     Objective   Bed mobility  Scooting: Supervision  Transfers  Sit to Stand: Independent  Stand to sit: Independent  Bed to Chair: Independent  Ambulation  Ambulation?: Yes  Ambulation 1  Surface: level tile;outdoors;uneven;ramp  Device: No Device  Assistance: Supervision; Independent  Quality of Gait: Pt. demo's good safe stride and DONATO with shoes on ambulating on various surfaces. Distance: ~ 450'x 2, 250'x1  Comments: 0 LOB. steady.   Stairs/Curb  Stairs?: Yes  Stairs  # Steps : 18  Stairs Height: 6\"  Rails: Right ascending  Device: No Device  Assistance: Supervision     Balance  Posture: Good  Sitting - Static: Good  Sitting - Dynamic: Good;-  Standing - Static: Good  Standing - Dynamic: Good;-  Comments: Dynamic stand activity with ball toss, catch, and bounce ambulating forward, retro, and B side stepping to increase balance. Good minus dynamic stand balance. Focus on limited trunk rotation and bending with activity for safety. Other exercises  Other exercises?: Yes  Other exercises 1: Standing B LE hip flexion YTB x 10   Other exercises 2: Standing B LE hip abduction YTB x10  Other exercises 3: Standing B LE hip adduction YTB x10  Other exercises 6: Standing B LE hip adduction YTB x 10'                        Assessment   Body structures, Functions, Activity limitations: Decreased functional mobility ; Decreased strength;Decreased balance;Decreased endurance;Decreased coordination  Assessment: Pt. demo's good stride with heel strike to toe off with shoes on ambulating increase distance and flight of stairs without incident. Pt. tolerated well with dynamic stand activity without LOB demonstrating good minus balance. Treatment Diagnosis: Difficulty walking  Prognosis: Good  REQUIRES PT FOLLOW UP: Yes     G-Code     OutComes Score                                                    AM-PAC Score             Goals  Short term goals  Time Frame for Short term goals: 3 days  Short term goal 1: Pt to be supervision with transfers with good safety and min to no pain. Short term goal 2: Pt to be supervision with ambulation with no AD or LRD with min to no antalgia and min c/o pain > or equal to 250'. Short term goal 3: 4+/5 bilateral LE strength for improved mobility tolerance. Short term goal 4: Fair+ standing and ambulatory balance to decrease risk of falls. Short term goal 5: Pt to demo good knowledge of ther-ex for HEP.   Long term goals  Time Frame for Long term goals : 1 week  Long term goal 1: Pt to be (I) with transfers with increased ease

## 2018-09-28 VITALS
HEART RATE: 76 BPM | HEIGHT: 66 IN | OXYGEN SATURATION: 100 % | RESPIRATION RATE: 18 BRPM | SYSTOLIC BLOOD PRESSURE: 148 MMHG | TEMPERATURE: 97.5 F | WEIGHT: 243.61 LBS | DIASTOLIC BLOOD PRESSURE: 89 MMHG | BODY MASS INDEX: 39.15 KG/M2

## 2018-09-28 LAB
GLUCOSE BLD-MCNC: 190 MG/DL (ref 60–115)
PERFORMED ON: ABNORMAL

## 2018-09-28 PROCEDURE — 6370000000 HC RX 637 (ALT 250 FOR IP): Performed by: INTERNAL MEDICINE

## 2018-09-28 PROCEDURE — 97112 NEUROMUSCULAR REEDUCATION: CPT

## 2018-09-28 PROCEDURE — 97110 THERAPEUTIC EXERCISES: CPT

## 2018-09-28 PROCEDURE — 97116 GAIT TRAINING THERAPY: CPT

## 2018-09-28 PROCEDURE — 6360000002 HC RX W HCPCS: Performed by: INTERNAL MEDICINE

## 2018-09-28 RX ORDER — PREDNISONE 20 MG/1
40 TABLET ORAL DAILY
Qty: 4 TABLET | Refills: 0 | Status: SHIPPED | OUTPATIENT
Start: 2018-09-29 | End: 2018-10-01

## 2018-09-28 RX ORDER — TRAMADOL HYDROCHLORIDE 50 MG/1
50 TABLET ORAL EVERY 6 HOURS PRN
Qty: 10 TABLET | Refills: 0 | Status: SHIPPED | OUTPATIENT
Start: 2018-09-28 | End: 2018-10-05

## 2018-09-28 RX ORDER — LIDOCAINE 50 MG/G
3 PATCH TOPICAL DAILY
Qty: 30 PATCH | Refills: 0 | Status: SHIPPED | OUTPATIENT
Start: 2018-09-29 | End: 2019-05-07 | Stop reason: SDUPTHER

## 2018-09-28 RX ORDER — UREA 10 %
2 LOTION (ML) TOPICAL NIGHTLY PRN
Qty: 30 TABLET | Refills: 0 | Status: SHIPPED | OUTPATIENT
Start: 2018-09-28 | End: 2019-02-09

## 2018-09-28 RX ORDER — PREDNISONE 20 MG/1
20 TABLET ORAL DAILY
Qty: 30 TABLET | Refills: 0 | Status: SHIPPED | OUTPATIENT
Start: 2018-09-30 | End: 2018-10-30

## 2018-09-28 RX ORDER — ACETAMINOPHEN 325 MG/1
650 TABLET ORAL EVERY 4 HOURS PRN
Qty: 60 TABLET | Refills: 0 | Status: SHIPPED | OUTPATIENT
Start: 2018-09-28 | End: 2020-01-17

## 2018-09-28 RX ORDER — BACLOFEN 5 MG/1
5 TABLET ORAL 2 TIMES DAILY PRN
Qty: 60 TABLET | Refills: 0 | Status: SHIPPED | OUTPATIENT
Start: 2018-09-28 | End: 2019-02-19 | Stop reason: SDUPTHER

## 2018-09-28 RX ADMIN — PROMETHAZINE HYDROCHLORIDE 25 MG: 12.5 TABLET ORAL at 08:28

## 2018-09-28 RX ADMIN — PREDNISONE 40 MG: 20 TABLET ORAL at 08:28

## 2018-09-28 RX ADMIN — ACETAMINOPHEN 650 MG: 325 TABLET, FILM COATED ORAL at 05:12

## 2018-09-28 RX ADMIN — PANTOPRAZOLE SODIUM 40 MG: 40 TABLET, DELAYED RELEASE ORAL at 06:28

## 2018-09-28 RX ADMIN — BACLOFEN 5 MG: 10 TABLET ORAL at 08:28

## 2018-09-28 RX ADMIN — METOPROLOL TARTRATE 50 MG: 50 TABLET ORAL at 08:28

## 2018-09-28 RX ADMIN — HYDROCHLOROTHIAZIDE 25 MG: 25 TABLET ORAL at 08:28

## 2018-09-28 RX ADMIN — VENLAFAXINE HYDROCHLORIDE 75 MG: 37.5 CAPSULE, EXTENDED RELEASE ORAL at 08:28

## 2018-09-28 ASSESSMENT — PAIN DESCRIPTION - PROGRESSION
CLINICAL_PROGRESSION: NOT CHANGED

## 2018-09-28 ASSESSMENT — PAIN SCALES - GENERAL: PAINLEVEL_OUTOF10: 4

## 2018-09-28 NOTE — DISCHARGE SUMMARY
known as:  EFFEXOR XR  TAKE TWO CAPSULES BY MOUTH EVERY MORNING AND TAKE ONE CAPSULE BY MOUTH EVERY EVENING  What changed:  See the new instructions. CONTINUE taking these medications    blood glucose test strips strip  Commonly known as: AGAMATRIX AMP TEST  Checks 2 times daily. Dx:IDDM--ICD-10 E11.9     Handicap Placard Misc  Exp 5 years     hydrochlorothiazide 25 MG tablet  Commonly known as:  HYDRODIURIL  Take 1 tablet by mouth daily     insulin glargine 300 UNIT/ML injection pen  Commonly known as:  TOUJEO SOLOSTAR  200 units at bedtime     insulin lispro 100 UNIT/ML pen  Commonly known as:  HUMALOG KWIKPEN  INJECT 40 UNITS INTO THE SKIN THREE TIMES A DAY WITH MEALS     Lancets Misc  Checks 2 times daily.  Dx:IDDM--ICD-10 E11.9     meclizine 25 MG tablet  Commonly known as:  ANTIVERT  Take 1 tablet by mouth 3 times daily as needed for Dizziness     metFORMIN 500 MG tablet  Commonly known as:  GLUCOPHAGE  Take 1 tablet by mouth 3 times daily     metoprolol 100 MG tablet  Commonly known as:  LOPRESSOR  TAKE ONE TABLET BY MOUTH TWICE A DAY     omeprazole 40 MG delayed release capsule  Commonly known as:  PRILOSEC  Take 1 capsule by mouth daily     ondansetron 4 MG disintegrating tablet  Commonly known as:  ZOFRAN-ODT  Take 1 tablet by mouth every 8 hours as needed for Nausea or Vomiting     Pen Needles 32G X 4 MM Misc  Use as directed with insulin pens, 4 times daily     promethazine 25 MG tablet  Commonly known as:  PHENERGAN  TAKE 1 TABLET BY MOUTH EVERY 8 HOURS AS NEEDED FOR NAUSEA     solifenacin 5 MG tablet  Commonly known as:  VESICARE  Take 1 tablet by mouth daily        STOP taking these medications    atorvastatin 20 MG tablet  Commonly known as:  LIPITOR     fluconazole 150 MG tablet  Commonly known as:  DIFLUCAN     fluticasone 50 MCG/ACT nasal spray  Commonly known as:  FLONASE     losartan 50 MG tablet  Commonly known as:  COZAAR     rosuvastatin 10 MG tablet  Commonly known as:  CRESTOR Ref Range    POC Glucose 159 (H) 60 - 115 mg/dl    Performed on ACCU-CHEK    POCT Glucose   Result Value Ref Range    POC Glucose 290 (H) 60 - 115 mg/dl    Performed on ACCU-CHEK    POCT Glucose   Result Value Ref Range    POC Glucose 237 (H) 60 - 115 mg/dl    Performed on ACCU-CHEK    POCT Glucose   Result Value Ref Range    POC Glucose 132 (H) 60 - 115 mg/dl    Performed on ACCU-CHEK    POCT Glucose   Result Value Ref Range    POC Glucose 145 (H) 60 - 115 mg/dl    Performed on ACCU-CHEK    POCT Glucose   Result Value Ref Range    POC Glucose 263 (H) 60 - 115 mg/dl    Performed on ACCU-CHEK    POCT Glucose   Result Value Ref Range    POC Glucose 330 (H) 60 - 115 mg/dl    Performed on ACCU-CHEK    POCT Glucose   Result Value Ref Range    POC Glucose 190 (H) 60 - 115 mg/dl    Performed on ACCU-CHEK        Diet:  DIET CARB CONTROL;     Activity:  Activity as tolerated (Patient may move about with assist as indicated or with supervision.)    Follow-up:  in 1 weeks with Elaine Plasencia MD,     Disposition: home    Condition: Stable    Time Spent: 45 minutes    Electronically signed by Deepti Ospina MD on 9/28/2018 at 8:37 AM    Discharging Hospitalist

## 2018-09-28 NOTE — PROGRESS NOTES
Physical Therapy  Facility/Department: [facility] MED SURG UNIT  Daily Treatment Note  NAME: Rossi Nina  : 1975  MRN: [id number]    Date of Service: 2018    Discharge Recommendations:  Continue to assess pending progress        Patient Diagnosis(es): The encounter diagnosis was Lumbar compression fracture, closed, initial encounter ([facility] [address] has a past medical history of Acute midline thoracic back pain; B12 deficiency; Family history of premature CAD; Fatty liver; HPV (human papilloma virus) anogenital infection; Hyperlipidemia; Hypertension; Liver hemangioma; Obesity; Orthostatic hypotension; Ovarian cyst; Rectal fissure; Tobacco abuse; Tobacco abuse; Tremor; Uncontrolled diabetes mellitus with complications ([facility] [address] and Unspecified sleep apnea. has a past surgical history that includes Tonsillectomy and adenoidectomy (); LEEP (); Cardiac catheterization (); Hysterectomy (12-10); Colonoscopy (); Urethra surgery (); Upper gastrointestinal endoscopy (10/13/15 ); pr muscle biopsy (Left, 2018); and pr perq vert agmntj cavity crtj uni/bi cannulation (N/A, 2018). Restrictions  Restrictions/Precautions  Restrictions/Precautions: Fall Risk  Required Braces or Orthoses?: No  Subjective   General  Chart Reviewed: Yes  Family / Caregiver Present: No  Referring Practitioner: (PYara Berrios  Subjective  Subjective: Pt. reports 1/10 low back pain with exercises and 7/10 low back pain with bed mobility with bridging, rolling, and supine<->sit. Orientation     Objective      Transfers  Sit to Stand: (P) Independent  Stand to sit: (P) Independent  Bed to Chair: (P) Independent  Comment: (P) Pt. demo's safe tsf skills with various surfaces. Ambulation  Ambulation?: Yes  Ambulation 1  Surface: level tile  Device: No Device  Assistance: Independent  Quality of Gait: Pt. continues to demo good heel strike to toe off with shoes with good stride and width.    Distance: (P)

## 2018-09-29 ENCOUNTER — CARE COORDINATION (OUTPATIENT)
Dept: CASE MANAGEMENT | Age: 43
End: 2018-09-29

## 2018-10-15 DIAGNOSIS — E11.8 TYPE 2 DIABETES MELLITUS WITH COMPLICATION, WITH LONG-TERM CURRENT USE OF INSULIN (HCC): ICD-10-CM

## 2018-10-15 DIAGNOSIS — Z79.4 TYPE 2 DIABETES MELLITUS WITH COMPLICATION, WITH LONG-TERM CURRENT USE OF INSULIN (HCC): ICD-10-CM

## 2018-10-15 RX ORDER — PEN NEEDLE, DIABETIC 30 GX3/16"
NEEDLE, DISPOSABLE MISCELLANEOUS
Qty: 400 EACH | Refills: 1 | Status: SHIPPED | OUTPATIENT
Start: 2018-10-15 | End: 2019-10-23 | Stop reason: SDUPTHER

## 2018-10-15 RX ORDER — LANCETS 30 GAUGE
EACH MISCELLANEOUS
Qty: 200 EACH | Refills: 3 | Status: SHIPPED | OUTPATIENT
Start: 2018-10-15 | End: 2020-01-17

## 2018-10-16 DIAGNOSIS — Z79.4 TYPE 2 DIABETES MELLITUS WITH COMPLICATION, WITH LONG-TERM CURRENT USE OF INSULIN (HCC): ICD-10-CM

## 2018-10-16 DIAGNOSIS — E11.8 TYPE 2 DIABETES MELLITUS WITH COMPLICATION, WITH LONG-TERM CURRENT USE OF INSULIN (HCC): ICD-10-CM

## 2018-10-18 ENCOUNTER — E-VISIT (OUTPATIENT)
Dept: INTERNAL MEDICINE | Age: 43
End: 2018-10-18
Payer: COMMERCIAL

## 2018-10-18 PROCEDURE — 99444 PR PHYSICIAN ONLINE EVALUATION & MANAGEMENT SERVICE: CPT | Performed by: INTERNAL MEDICINE

## 2018-10-18 RX ORDER — CIPROFLOXACIN 500 MG/1
500 TABLET, FILM COATED ORAL 2 TIMES DAILY
Qty: 6 TABLET | Refills: 0 | Status: SHIPPED | OUTPATIENT
Start: 2018-10-18 | End: 2018-10-21

## 2018-10-22 ENCOUNTER — PATIENT MESSAGE (OUTPATIENT)
Dept: FAMILY MEDICINE CLINIC | Age: 43
End: 2018-10-22

## 2018-10-22 RX ORDER — BACLOFEN 10 MG/1
5 TABLET ORAL 2 TIMES DAILY
Qty: 180 TABLET | Refills: 3 | Status: SHIPPED | OUTPATIENT
Start: 2018-10-22 | End: 2019-01-08 | Stop reason: SDUPTHER

## 2018-10-23 ENCOUNTER — TELEPHONE (OUTPATIENT)
Dept: UROLOGY | Age: 43
End: 2018-10-23

## 2018-10-23 DIAGNOSIS — E11.8 TYPE 2 DIABETES MELLITUS WITH COMPLICATION, WITH LONG-TERM CURRENT USE OF INSULIN (HCC): ICD-10-CM

## 2018-10-23 DIAGNOSIS — E78.5 DYSLIPIDEMIA: ICD-10-CM

## 2018-10-23 DIAGNOSIS — Z79.4 TYPE 2 DIABETES MELLITUS WITH COMPLICATION, WITH LONG-TERM CURRENT USE OF INSULIN (HCC): ICD-10-CM

## 2018-10-23 LAB
ALBUMIN SERPL-MCNC: 4 G/DL (ref 3.9–4.9)
ALP BLD-CCNC: 82 U/L (ref 40–130)
ALT SERPL-CCNC: 19 U/L (ref 0–33)
ANION GAP SERPL CALCULATED.3IONS-SCNC: 16 MEQ/L (ref 7–13)
AST SERPL-CCNC: 14 U/L (ref 0–35)
BILIRUB SERPL-MCNC: 0.5 MG/DL (ref 0–1.2)
BILIRUBIN DIRECT: <0.2 MG/DL (ref 0–0.3)
BILIRUBIN, INDIRECT: NORMAL MG/DL (ref 0–0.6)
BUN BLDV-MCNC: 20 MG/DL (ref 6–20)
C-REACTIVE PROTEIN: 24.3 MG/L (ref 0–5)
CALCIUM SERPL-MCNC: 9.2 MG/DL (ref 8.6–10.2)
CHLORIDE BLD-SCNC: 98 MEQ/L (ref 98–107)
CHOLESTEROL, TOTAL: 163 MG/DL (ref 0–199)
CO2: 27 MEQ/L (ref 22–29)
CREAT SERPL-MCNC: 0.64 MG/DL (ref 0.5–0.9)
CREATININE URINE: 288.5 MG/DL
GFR AFRICAN AMERICAN: >60
GFR NON-AFRICAN AMERICAN: >60
GLUCOSE BLD-MCNC: 161 MG/DL (ref 74–109)
HBA1C MFR BLD: 6.8 % (ref 4.8–5.9)
HDLC SERPL-MCNC: 40 MG/DL (ref 40–59)
LDL CHOLESTEROL CALCULATED: 83 MG/DL (ref 0–129)
MICROALBUMIN UR-MCNC: 10.4 MG/DL
MICROALBUMIN/CREAT UR-RTO: 36 MG/G (ref 0–30)
POTASSIUM SERPL-SCNC: 3.7 MEQ/L (ref 3.5–5.1)
SEDIMENTATION RATE, ERYTHROCYTE: 36 MM (ref 0–20)
SODIUM BLD-SCNC: 141 MEQ/L (ref 132–144)
TOTAL PROTEIN: 7 G/DL (ref 6.4–8.1)
TRIGL SERPL-MCNC: 198 MG/DL (ref 0–200)

## 2018-10-23 RX ORDER — SOLIFENACIN SUCCINATE 10 MG/1
10 TABLET, FILM COATED ORAL DAILY
Qty: 90 TABLET | Refills: 3 | Status: SHIPPED | OUTPATIENT
Start: 2018-10-23 | End: 2019-01-08 | Stop reason: SDUPTHER

## 2018-11-15 ENCOUNTER — OFFICE VISIT (OUTPATIENT)
Dept: FAMILY MEDICINE CLINIC | Age: 43
End: 2018-11-15
Payer: COMMERCIAL

## 2018-11-15 VITALS
SYSTOLIC BLOOD PRESSURE: 124 MMHG | WEIGHT: 257 LBS | DIASTOLIC BLOOD PRESSURE: 70 MMHG | TEMPERATURE: 98.6 F | BODY MASS INDEX: 41.3 KG/M2 | HEART RATE: 111 BPM | RESPIRATION RATE: 14 BRPM | HEIGHT: 66 IN | OXYGEN SATURATION: 97 %

## 2018-11-15 DIAGNOSIS — R35.89 POLYURIA: ICD-10-CM

## 2018-11-15 DIAGNOSIS — N30.00 ACUTE CYSTITIS WITHOUT HEMATURIA: Primary | ICD-10-CM

## 2018-11-15 LAB
APPEARANCE FLUID: CLEAR
BILIRUBIN, POC: NEGATIVE
BLOOD URINE, POC: NEGATIVE
CLARITY, POC: CLEAR
COLOR, POC: NORMAL
GLUCOSE URINE, POC: NEGATIVE
KETONES, POC: NEGATIVE
LEUKOCYTE EST, POC: NORMAL
NITRITE, POC: NEGATIVE
PH, POC: 6
PROTEIN, POC: NORMAL
SPECIFIC GRAVITY, POC: 1.02
UROBILINOGEN, POC: NEGATIVE

## 2018-11-15 PROCEDURE — 99213 OFFICE O/P EST LOW 20 MIN: CPT | Performed by: PHYSICIAN ASSISTANT

## 2018-11-15 PROCEDURE — 81002 URINALYSIS NONAUTO W/O SCOPE: CPT | Performed by: PHYSICIAN ASSISTANT

## 2018-11-15 RX ORDER — PREDNISONE 10 MG/1
15 TABLET ORAL DAILY
COMMUNITY
End: 2020-04-27 | Stop reason: ALTCHOICE

## 2018-11-15 RX ORDER — PHENAZOPYRIDINE HYDROCHLORIDE 200 MG/1
200 TABLET, FILM COATED ORAL 3 TIMES DAILY PRN
Qty: 6 TABLET | Refills: 0 | Status: SHIPPED | OUTPATIENT
Start: 2018-11-15 | End: 2018-11-17

## 2018-11-15 RX ORDER — NITROFURANTOIN 25; 75 MG/1; MG/1
100 CAPSULE ORAL 2 TIMES DAILY
Qty: 20 CAPSULE | Refills: 0 | Status: SHIPPED | OUTPATIENT
Start: 2018-11-15 | End: 2018-11-25

## 2018-11-15 ASSESSMENT — ENCOUNTER SYMPTOMS
CHEST TIGHTNESS: 0
COUGH: 0

## 2018-11-16 ENCOUNTER — PATIENT MESSAGE (OUTPATIENT)
Dept: FAMILY MEDICINE CLINIC | Age: 43
End: 2018-11-16

## 2018-11-16 RX ORDER — VENLAFAXINE HYDROCHLORIDE 75 MG/1
CAPSULE, EXTENDED RELEASE ORAL
Qty: 270 CAPSULE | Refills: 1 | Status: SHIPPED | OUTPATIENT
Start: 2018-11-16 | End: 2019-01-21 | Stop reason: SDUPTHER

## 2018-11-16 RX ORDER — FLUCONAZOLE 150 MG/1
150 TABLET ORAL ONCE
Qty: 2 TABLET | Refills: 3 | Status: SHIPPED | OUTPATIENT
Start: 2018-11-16 | End: 2018-11-16

## 2018-11-16 RX ORDER — OMEPRAZOLE 40 MG/1
40 CAPSULE, DELAYED RELEASE ORAL DAILY
Qty: 90 CAPSULE | Refills: 3 | Status: SHIPPED | OUTPATIENT
Start: 2018-11-16 | End: 2019-01-21 | Stop reason: SDUPTHER

## 2018-11-27 DIAGNOSIS — Z79.4 TYPE 2 DIABETES MELLITUS WITH COMPLICATION, WITH LONG-TERM CURRENT USE OF INSULIN (HCC): ICD-10-CM

## 2018-11-27 DIAGNOSIS — E11.8 TYPE 2 DIABETES MELLITUS WITH COMPLICATION, WITH LONG-TERM CURRENT USE OF INSULIN (HCC): ICD-10-CM

## 2018-11-27 NOTE — TELEPHONE ENCOUNTER
From: Casie Huerta  Sent: 11/27/2018 3:20 PM EST  Subject: Medication Renewal Request    Casie Huerta would like a refill of the following medications:     metFORMIN (GLUCOPHAGE) 500 MG tablet Eulis Favre, MD]    Preferred pharmacy: [address] [place] [address] [phone] - F [phone]        Medication renewals requested in this message routed separately:     insulin lispro (HUMALOG KWIKPEN) 100 UNIT/ML pen [Patrick Marinelli MD]     insulin glargine (TOUJEO MAX SOLOSTAR) 300 UNIT/ML injection pen Jadyn Mascorro MD]

## 2018-11-28 DIAGNOSIS — Z79.4 TYPE 2 DIABETES MELLITUS WITH COMPLICATION, WITH LONG-TERM CURRENT USE OF INSULIN (HCC): ICD-10-CM

## 2018-11-28 DIAGNOSIS — E11.8 TYPE 2 DIABETES MELLITUS WITH COMPLICATION, WITH LONG-TERM CURRENT USE OF INSULIN (HCC): ICD-10-CM

## 2018-12-10 RX ORDER — METOPROLOL TARTRATE 100 MG/1
TABLET ORAL
Qty: 180 TABLET | Refills: 3 | Status: SHIPPED | OUTPATIENT
Start: 2018-12-10 | End: 2019-01-21 | Stop reason: SDUPTHER

## 2018-12-10 RX ORDER — PROMETHAZINE HYDROCHLORIDE 25 MG/1
TABLET ORAL
Qty: 180 TABLET | Refills: 1 | Status: SHIPPED | OUTPATIENT
Start: 2018-12-10 | End: 2019-04-01 | Stop reason: SDUPTHER

## 2018-12-12 ENCOUNTER — HOSPITAL ENCOUNTER (OUTPATIENT)
Dept: NEUROLOGY | Age: 43
Discharge: HOME | End: 2018-12-12
Payer: COMMERCIAL

## 2018-12-12 PROCEDURE — 95912 NRV CNDJ TEST 11-12 STUDIES: CPT

## 2018-12-12 PROCEDURE — 95886 MUSC TEST DONE W/N TEST COMP: CPT

## 2018-12-12 NOTE — PROCEDURES
[address]                      [address], [address] [facility]                             ELECTROMYOGRAM REPORT    PATIENT NAME: Ifrah Farfan                 :        1975  MED REC NO:   [id number]                            ROOM:  ACCOUNT NO:   [de-identified]                           ADMIT DATE: 2018  PROVIDER:     Tiesha Randall MD    DATE OF EM2018    Neurophysiologic studies are requested for the patient's underlying  lower extremity pain. Motor nerve conduction studies of the right common peroneal nerve shows  normal amplitudes, latencies and conduction velocity. Left common  peroneal nerve shows normal normal amplitudes, latencies, and conduction  velocity. Right tibial nerve shows normal amplitudes, latencies,  conduction velocity. Left tibial nerve shows normal amplitudes,  latencies, and conduction velocity. Right sural nerve conduction study  shows normal peak latencies, low amplitudes, and normal conduction  velocity. Left sural nerve conduction study shows normal peak  latencies, normal amplitudes, normal conduction velocity. Right  superficial peroneal nerve shows normal peak latencies, low amplitudes,  and normal conduction velocity. Left superficial peroneal nerve shows  normal peak latencies, low amplitudes, and decreased conduction  velocity. Saphenous nerves are not recordable. F-responses are normal.  H-responses are normal _____ needle electrode examination is normal.    IMPRESSION:  Essentially normal nerve conduction studies of the lower  extremity with few minor low amplitude tracings and borderline  conduction velocity seen in the left superficial peroneal nerve  secondary to diabetes. This is not a neuropathy, though. There is no  suggestion of active denervation to suggest myositis. Multiple sensory  nerve conduction studies are evaluated to rule out an artifact and  temperature differences.   This test is

## 2019-01-07 ENCOUNTER — OFFICE VISIT (OUTPATIENT)
Dept: ENDOCRINOLOGY | Age: 44
End: 2019-01-07
Payer: COMMERCIAL

## 2019-01-07 ENCOUNTER — PATIENT MESSAGE (OUTPATIENT)
Dept: FAMILY MEDICINE CLINIC | Age: 44
End: 2019-01-07

## 2019-01-07 VITALS
WEIGHT: 272 LBS | HEART RATE: 93 BPM | SYSTOLIC BLOOD PRESSURE: 136 MMHG | DIASTOLIC BLOOD PRESSURE: 82 MMHG | BODY MASS INDEX: 43.71 KG/M2 | HEIGHT: 66 IN

## 2019-01-07 DIAGNOSIS — E11.8 TYPE 2 DIABETES MELLITUS WITH COMPLICATION, WITH LONG-TERM CURRENT USE OF INSULIN (HCC): Primary | ICD-10-CM

## 2019-01-07 DIAGNOSIS — Z79.4 TYPE 2 DIABETES MELLITUS WITH COMPLICATION, WITH LONG-TERM CURRENT USE OF INSULIN (HCC): Primary | ICD-10-CM

## 2019-01-07 DIAGNOSIS — E66.01 MORBID OBESITY (HCC): ICD-10-CM

## 2019-01-07 LAB — GLUCOSE BLD-MCNC: 180 MG/DL

## 2019-01-07 PROCEDURE — 82962 GLUCOSE BLOOD TEST: CPT | Performed by: INTERNAL MEDICINE

## 2019-01-07 PROCEDURE — 99213 OFFICE O/P EST LOW 20 MIN: CPT | Performed by: INTERNAL MEDICINE

## 2019-01-08 DIAGNOSIS — K52.9 GASTROENTERITIS: ICD-10-CM

## 2019-01-08 LAB — DIABETIC RETINOPATHY: NEGATIVE

## 2019-01-08 RX ORDER — ONDANSETRON 4 MG/1
4 TABLET, ORALLY DISINTEGRATING ORAL EVERY 8 HOURS PRN
Qty: 40 TABLET | Refills: 1 | Status: SHIPPED | OUTPATIENT
Start: 2019-01-08 | End: 2019-10-23 | Stop reason: SDUPTHER

## 2019-01-08 RX ORDER — BACLOFEN 10 MG/1
5 TABLET ORAL 2 TIMES DAILY
Qty: 90 TABLET | Refills: 3 | Status: SHIPPED | OUTPATIENT
Start: 2019-01-08 | End: 2019-04-15 | Stop reason: SDUPTHER

## 2019-01-08 RX ORDER — SOLIFENACIN SUCCINATE 10 MG/1
10 TABLET, FILM COATED ORAL DAILY
Qty: 90 TABLET | Refills: 3 | Status: SHIPPED | OUTPATIENT
Start: 2019-01-08 | End: 2019-04-01 | Stop reason: SDUPTHER

## 2019-01-08 RX ORDER — LOSARTAN POTASSIUM 50 MG/1
50 TABLET ORAL DAILY
Qty: 90 TABLET | Refills: 3 | Status: SHIPPED | OUTPATIENT
Start: 2019-01-08 | End: 2019-04-01 | Stop reason: SDUPTHER

## 2019-01-08 RX ORDER — HYDROCHLOROTHIAZIDE 25 MG/1
25 TABLET ORAL DAILY
Qty: 90 TABLET | Refills: 3 | Status: SHIPPED | OUTPATIENT
Start: 2019-01-08 | End: 2019-04-01 | Stop reason: SDUPTHER

## 2019-01-09 ENCOUNTER — PATIENT MESSAGE (OUTPATIENT)
Dept: PHARMACY | Facility: CLINIC | Age: 44
End: 2019-01-09

## 2019-01-09 ENCOUNTER — E-VISIT (OUTPATIENT)
Dept: FAMILY MEDICINE CLINIC | Age: 44
End: 2019-01-09
Payer: COMMERCIAL

## 2019-01-09 ENCOUNTER — NURSE ONLY (OUTPATIENT)
Dept: FAMILY MEDICINE CLINIC | Age: 44
End: 2019-01-09
Payer: COMMERCIAL

## 2019-01-09 DIAGNOSIS — N30.00 ACUTE CYSTITIS WITHOUT HEMATURIA: Primary | ICD-10-CM

## 2019-01-09 DIAGNOSIS — N39.0 URINARY TRACT INFECTION WITHOUT HEMATURIA, SITE UNSPECIFIED: Primary | ICD-10-CM

## 2019-01-09 DIAGNOSIS — N39.0 URINARY TRACT INFECTION WITHOUT HEMATURIA, SITE UNSPECIFIED: ICD-10-CM

## 2019-01-09 LAB
BILIRUBIN, POC: NORMAL
BLOOD URINE, POC: NORMAL
CLARITY, POC: CLEAR
COLOR, POC: YELLOW
GLUCOSE URINE, POC: NORMAL
KETONES, POC: NORMAL
LEUKOCYTE EST, POC: NORMAL
NITRITE, POC: NORMAL
PH, POC: 6
PROTEIN, POC: NORMAL
SPECIFIC GRAVITY, POC: 1.02
UROBILINOGEN, POC: NORMAL

## 2019-01-09 PROCEDURE — 81003 URINALYSIS AUTO W/O SCOPE: CPT | Performed by: FAMILY MEDICINE

## 2019-01-09 PROCEDURE — 98969 PR NONPHYSICIAN ONLINE ASSESSMENT AND MANAGEMENT: CPT | Performed by: NURSE PRACTITIONER

## 2019-01-09 RX ORDER — CEPHALEXIN 500 MG/1
500 CAPSULE ORAL 3 TIMES DAILY
Qty: 21 CAPSULE | Refills: 0 | Status: SHIPPED | OUTPATIENT
Start: 2019-01-09 | End: 2019-01-16

## 2019-01-09 RX ORDER — CIPROFLOXACIN 500 MG/1
500 TABLET, FILM COATED ORAL 2 TIMES DAILY
Qty: 14 TABLET | Refills: 0 | Status: SHIPPED | OUTPATIENT
Start: 2019-01-09 | End: 2019-01-16

## 2019-01-11 LAB — URINE CULTURE, ROUTINE: NORMAL

## 2019-01-15 ENCOUNTER — OFFICE VISIT (OUTPATIENT)
Dept: CARDIOLOGY CLINIC | Age: 44
End: 2019-01-15
Payer: COMMERCIAL

## 2019-01-15 VITALS
DIASTOLIC BLOOD PRESSURE: 60 MMHG | HEIGHT: 66 IN | SYSTOLIC BLOOD PRESSURE: 110 MMHG | HEART RATE: 84 BPM | RESPIRATION RATE: 12 BRPM | BODY MASS INDEX: 43.71 KG/M2 | WEIGHT: 272 LBS

## 2019-01-15 DIAGNOSIS — E78.5 DYSLIPIDEMIA: ICD-10-CM

## 2019-01-15 DIAGNOSIS — G47.33 SLEEP APNEA, OBSTRUCTIVE: ICD-10-CM

## 2019-01-15 DIAGNOSIS — I20.9 ANGINA PECTORIS (HCC): Primary | ICD-10-CM

## 2019-01-15 PROCEDURE — 99213 OFFICE O/P EST LOW 20 MIN: CPT | Performed by: INTERNAL MEDICINE

## 2019-01-15 ASSESSMENT — ENCOUNTER SYMPTOMS
VOICE CHANGE: 0
ABDOMINAL DISTENTION: 0
DIARRHEA: 0
CHEST TIGHTNESS: 0
BLOOD IN STOOL: 0
SHORTNESS OF BREATH: 0
WHEEZING: 0
ANAL BLEEDING: 0
TROUBLE SWALLOWING: 0
ABDOMINAL PAIN: 0
NAUSEA: 0
VOMITING: 0
APNEA: 0
FACIAL SWELLING: 0
COLOR CHANGE: 0
COUGH: 0

## 2019-01-19 ENCOUNTER — PATIENT MESSAGE (OUTPATIENT)
Dept: FAMILY MEDICINE CLINIC | Age: 44
End: 2019-01-19

## 2019-01-19 DIAGNOSIS — E78.5 DYSLIPIDEMIA: Primary | ICD-10-CM

## 2019-01-21 ENCOUNTER — PATIENT MESSAGE (OUTPATIENT)
Dept: FAMILY MEDICINE CLINIC | Age: 44
End: 2019-01-21

## 2019-01-21 RX ORDER — METOPROLOL TARTRATE 100 MG/1
100 TABLET ORAL 2 TIMES DAILY
Qty: 180 TABLET | Refills: 3 | Status: SHIPPED | OUTPATIENT
Start: 2019-01-21 | End: 2019-05-06 | Stop reason: SDUPTHER

## 2019-01-21 RX ORDER — OMEPRAZOLE 40 MG/1
40 CAPSULE, DELAYED RELEASE ORAL DAILY
Qty: 90 CAPSULE | Refills: 3 | Status: SHIPPED | OUTPATIENT
Start: 2019-01-21 | End: 2019-02-27 | Stop reason: DRUGHIGH

## 2019-01-21 RX ORDER — VENLAFAXINE HYDROCHLORIDE 75 MG/1
CAPSULE, EXTENDED RELEASE ORAL
Qty: 270 CAPSULE | Refills: 3 | Status: SHIPPED | OUTPATIENT
Start: 2019-01-21 | End: 2019-05-06 | Stop reason: SDUPTHER

## 2019-01-21 RX ORDER — ROSUVASTATIN CALCIUM 10 MG/1
10 TABLET, COATED ORAL NIGHTLY
Qty: 90 TABLET | Refills: 3 | Status: SHIPPED | OUTPATIENT
Start: 2019-01-21 | End: 2019-05-06 | Stop reason: SDUPTHER

## 2019-01-22 ENCOUNTER — HOSPITAL ENCOUNTER (OUTPATIENT)
Dept: LAB | Age: 44
Discharge: HOME | End: 2019-01-22
Payer: COMMERCIAL

## 2019-01-22 LAB
C-REACTIVE PROTEIN: 21.3 MG/L (ref 0–5)
SEDIMENTATION RATE, ERYTHROCYTE: 43 MM (ref 0–20)

## 2019-01-22 PROCEDURE — 85652 RBC SED RATE AUTOMATED: CPT

## 2019-01-22 PROCEDURE — 86140 C-REACTIVE PROTEIN: CPT

## 2019-01-22 PROCEDURE — 36415 COLL VENOUS BLD VENIPUNCTURE: CPT

## 2019-01-31 ENCOUNTER — OFFICE VISIT (OUTPATIENT)
Dept: OBGYN CLINIC | Age: 44
End: 2019-01-31
Payer: COMMERCIAL

## 2019-01-31 ENCOUNTER — HOSPITAL ENCOUNTER (OUTPATIENT)
Age: 44
Setting detail: SPECIMEN
Discharge: HOME | End: 2019-01-31
Payer: COMMERCIAL

## 2019-01-31 VITALS
HEIGHT: 66 IN | SYSTOLIC BLOOD PRESSURE: 106 MMHG | DIASTOLIC BLOOD PRESSURE: 64 MMHG | WEIGHT: 283 LBS | BODY MASS INDEX: 45.48 KG/M2

## 2019-01-31 DIAGNOSIS — N76.0 ACUTE VAGINITIS: ICD-10-CM

## 2019-01-31 DIAGNOSIS — N76.0 ACUTE VAGINITIS: Primary | ICD-10-CM

## 2019-01-31 PROCEDURE — 99214 OFFICE O/P EST MOD 30 MIN: CPT | Performed by: OBSTETRICS & GYNECOLOGY

## 2019-01-31 PROCEDURE — 87210 SMEAR WET MOUNT SALINE/INK: CPT

## 2019-01-31 PROCEDURE — 87660 TRICHOMONAS VAGIN DIR PROBE: CPT

## 2019-02-01 LAB
CLUE CELLS: NORMAL
TRICHOMONAS PREP: NORMAL
TRICHOMONAS VAGINALIS SCREEN: NEGATIVE
YEAST WET PREP: NORMAL

## 2019-02-01 ASSESSMENT — ENCOUNTER SYMPTOMS
DIARRHEA: 0
SHORTNESS OF BREATH: 0
ABDOMINAL PAIN: 0
APNEA: 0
CONSTIPATION: 0

## 2019-02-04 DIAGNOSIS — Z79.4 TYPE 2 DIABETES MELLITUS WITH COMPLICATION, WITH LONG-TERM CURRENT USE OF INSULIN (HCC): ICD-10-CM

## 2019-02-04 DIAGNOSIS — E11.8 TYPE 2 DIABETES MELLITUS WITH COMPLICATION, WITH LONG-TERM CURRENT USE OF INSULIN (HCC): ICD-10-CM

## 2019-02-07 ENCOUNTER — TELEPHONE (OUTPATIENT)
Dept: PHARMACY | Facility: CLINIC | Age: 44
End: 2019-02-07

## 2019-02-08 ENCOUNTER — SCHEDULED TELEPHONE ENCOUNTER (OUTPATIENT)
Dept: PHARMACY | Facility: CLINIC | Age: 44
End: 2019-02-08

## 2019-02-08 RX ORDER — PHENOL 1.4 %
1 AEROSOL, SPRAY (ML) MUCOUS MEMBRANE DAILY
COMMUNITY
End: 2021-02-11

## 2019-02-08 NOTE — TELEPHONE ENCOUNTER
CLINICAL PHARMACY NOTE - [facility]) Employee Diabetes Program    Edwin Powers is a 44 y.o. female enrolled in the [facility] Diabetes Program.    Medications:  Current Outpatient Prescriptions   Medication Sig Dispense Refill    insulin lispro (HUMALOG KWIKPEN) 100 UNIT/ML pen INJECT 50  UNITS INTO THE SKIN THREE TIMES A DAY WITH MEALS plus coverage 50 pen 3    venlafaxine (EFFEXOR XR) 75 MG extended release capsule 2 every morning 1 every evening 270 capsule 3    omeprazole (PRILOSEC) 40 MG delayed release capsule Take 1 capsule by mouth daily 90 capsule 3    metoprolol (LOPRESSOR) 100 MG tablet Take 1 tablet by mouth 2 times daily 180 tablet 3    rosuvastatin (CRESTOR) 10 MG tablet Take 1 tablet by mouth nightly 90 tablet 3    miconazole (MICOTIN) 2 % cream Apply topically 2 times daily. 141 g 3    hydrochlorothiazide (HYDRODIURIL) 25 MG tablet Take 1 tablet by mouth daily 90 tablet 3    baclofen (LIORESAL) 10 MG tablet Take 0.5 tablets by mouth 2 times daily 90 tablet 3    losartan (COZAAR) 50 MG tablet Take 1 tablet by mouth daily 90 tablet 3    solifenacin (VESICARE) 10 MG tablet Take 1 tablet by mouth daily 90 tablet 3    ondansetron (ZOFRAN-ODT) 4 MG disintegrating tablet Take 1 tablet by mouth every 8 hours as needed for Nausea or Vomiting 40 tablet 1    insulin glargine (TOUJEO MAX SOLOSTAR) 300 UNIT/ML injection pen Inject 200 units at bedtime 5 pen 0    promethazine (PHENERGAN) 25 MG tablet TAKE 1 TABLET BY MOUTH EVERY 8 HOURS AS NEEDED FOR NAUSEA 180 tablet 1    metFORMIN (GLUCOPHAGE) 500 MG tablet Take 1 tablet by mouth 3 times daily 270 tablet 3    predniSONE (DELTASONE) 20 MG tablet Take 20 mg by mouth daily      Lancets MISC Checks 2 times daily.  Dx:IDDM--ICD-10 E11.9 200 each 3    Insulin Pen Needle (PEN NEEDLES) 32G X 4 MM MISC Use as directed with insulin pens, 4 times daily 400 each 1    blood glucose test strips (AGAMATRIX AMP TEST) strip Checks 2 times daily. Dx:IDDM--ICD-10 E11.9 200 each 3    acetaminophen (TYLENOL) 325 MG tablet Take 2 tablets by mouth every 4 hours as needed for Pain 60 tablet 0    melatonin 1 MG tablet Take 2 tablets by mouth nightly as needed (insomnia) 30 tablet 0    lidocaine (LIDODERM) 5 % Place 3 patches onto the skin daily 12 hours on, 12 hours off. 30 patch 0    baclofen 5 MG TABS Take 5 mg by mouth 2 times daily as needed (spasms) 60 tablet 0    meclizine (ANTIVERT) 25 MG tablet Take 1 tablet by mouth 3 times daily as needed for Dizziness 40 tablet 3    Handicap Placard MISC Exp 5 years 1 each 0    solifenacin (VESICARE) 5 MG tablet Take 1 tablet by mouth daily 90 tablet 3     No current facility-administered medications for this visit. Current Pharmacy: [facility]  Current testing supplies/frequency: Agamatrix, increased d/t prednisone 4-5x/day --> switch to Prodigy    Allergies:   Allergen Reactions    Bactrim [Sulfamethoxazole-Trimethoprim] Itching    Nitroglycerin     Victoza [Liraglutide]      NAUSEA    Ace Inhibitors Other (See Comments)     Dizziness and near syncope    Nitrofuran Derivatives Other (See Comments)     Migraines    Percocet [Oxycodone-Acetaminophen] Nausea And Vomiting      Vitals/Labs:  BP Readings from Last 3 Encounters:   01/31/19 106/64   01/15/19 110/60   01/07/19 136/82     Microalbumin   Component Value Date    LABMICR 10.40 (H) 10/23/2018     A1c   Component Value Date    LABA1C 6.8 (H) 10/23/2018    LABA1C 8.1 (H) 06/08/2018    LABA1C 7.8 (H) 03/14/2018     Lipids   Component Value Date    CHOL 163 10/23/2018    TRIG 198 10/23/2018    HDL 40 10/23/2018    LDLCALC 83 10/23/2018     ALT   Date Value Ref Range Status   10/23/2018 19 0 - 33 U/L Final     AST   Date Value Ref Range Status   10/23/2018 14 0 - 35 U/L Final     The 10-year ASCVD risk score (Ella Anderson, et al., 2013) is: 1.1%    Values used to calculate the score:      Age: 44 years      Sex: Female      Is Non- : No      Diabetic: Yes      Tobacco smoker: No      Systolic Blood Pressure: 531 mmHg      Is BP treated: No      HDL Cholesterol: 40 mg/dL      Total Cholesterol: 163 mg/dL     SCr   Component Value Date    CREATININE 0.64 10/23/2018     CrCL ~157 mL/min (calculated using sCr 0.64 mg/dL, Ht 1.676 m, Adj. BW 87 kg, using C-G equation)   eGFR: >60 mL/min/1.73 m^2    Immunizations:  Administered Date(s) Administered    Influenza Virus Vaccine 10/22/2013, 10/20/2014, 10/14/2015    Influenza, Stacey Tyrese, 3 Years and older, IM (Fluzone 3 yrs and older or Afluria 5 yrs and older) 09/28/2016, 09/21/2017    Influenza, Stacey Tyrese, 6 mo and older, IM, PF (Flulaval, Fluarix) 09/22/2018    Pneumococcal Polysaccharide ([id number]) 10/14/2015    Tdap (Boostrix, Adacel) 03/06/2012      Social History:  Substance Use Topics    Smoking status: Former Smoker     Packs/day: 1.00     Types: Cigarettes     Quit date: 1/1/2017    Smokeless tobacco: Current User      Comment: pt trying to cut back    Alcohol use 0.0 oz/week      Comment: socially     ASSESSMENT:  Initial Program Requirements (Y indicates has completed for the year, N indicates needs to be completed by 07/01/2019):  Yes - OV with provider for DM (1st)  No - A1c (1st)  Yes - On statin or contraindication(s) - rosuvastatin  Yes - On ACEi/ARB or contraindication(s) - losartan      Ongoing Program Requirements (Y indicates has completed for the year, N indicates needs to be completed by 12/31/2019):   No - OV with provider for DM (2nd)  No - ACC/diabetes educator visit (if A1c over 8%)  No - A1c (2nd)  No - Lipid panel  No - Urine microalbumin  Yes - Pneumococcal vaccination: Up-to-date (not needed again until 66yo)  No - Influenza vaccination for Fall 2019  No - Medication adherence over 70%    Current medications eligible for copay waiver, up to $600, through [facility] ([facility]) (mail order) Pharmacy:  [facility], metformin, rosuvastatin, losartan  - Generic ([facility]-stocked) insulin syringes and pen needles  - Agamatrix or Prodigy meter and supplies    PLAN:  - DM program gaps identified:   · Initial requirements: A1c (1st)   · Ongoing requirements: OV with provider for DM (2nd), ACC/diabetes educator visit (if A1c over 8%), A1c (2nd), Lipid panel, Urine microalbumin, Influenza vaccination for [id number] and Medication adherence over 70%   - Education to patient: program requirements and benefits, meds, testing supplies  - Follow up: PCP for identified gaps or as scheduled below  - Upcoming appointments:   Date Time Provider [facility] Radha   2/19/2019 5:30 PM Wily Lloyd MD [address] [place] [address]   7/16/2019 8:45 AM Luis Miguel Reeder MD [address] [place] [address], PharmD, Marixa mays [facility], [address] [place] [address] Drive Specialist  O: [phone]  C: [address] [place] [phone], [address]    =======================================================    For Pharmacy Admin Tracking Only  PHSO: Yes  Total # of Interventions Recommended: 1  - Updated Order #: 1 Updated Order Reason(s):  Other  Total Interventions Accepted: 1  Time Spent (min): 30

## 2019-02-09 ENCOUNTER — APPOINTMENT (OUTPATIENT)
Dept: GENERAL RADIOLOGY | Age: 44
End: 2019-02-09
Payer: COMMERCIAL

## 2019-02-09 ENCOUNTER — HOSPITAL ENCOUNTER (EMERGENCY)
Age: 44
Discharge: HOME | End: 2019-02-09
Attending: INTERNAL MEDICINE
Payer: COMMERCIAL

## 2019-02-09 ENCOUNTER — NURSE TRIAGE (OUTPATIENT)
Dept: OTHER | Facility: CLINIC | Age: 44
End: 2019-02-09

## 2019-02-09 VITALS
SYSTOLIC BLOOD PRESSURE: 142 MMHG | OXYGEN SATURATION: 98 % | HEART RATE: 89 BPM | DIASTOLIC BLOOD PRESSURE: 70 MMHG | WEIGHT: 280 LBS | RESPIRATION RATE: 18 BRPM | TEMPERATURE: 98.1 F | BODY MASS INDEX: 45 KG/M2 | HEIGHT: 66 IN

## 2019-02-09 DIAGNOSIS — R73.9 HYPERGLYCEMIA: ICD-10-CM

## 2019-02-09 DIAGNOSIS — E86.0 DEHYDRATION: ICD-10-CM

## 2019-02-09 DIAGNOSIS — R10.13 ABDOMINAL PAIN, EPIGASTRIC: ICD-10-CM

## 2019-02-09 DIAGNOSIS — N39.0 URINARY TRACT INFECTION WITHOUT HEMATURIA, SITE UNSPECIFIED: ICD-10-CM

## 2019-02-09 DIAGNOSIS — R31.9 URINARY TRACT INFECTION WITH HEMATURIA, SITE UNSPECIFIED: Primary | ICD-10-CM

## 2019-02-09 DIAGNOSIS — R11.0 NAUSEA: ICD-10-CM

## 2019-02-09 DIAGNOSIS — N39.0 URINARY TRACT INFECTION WITH HEMATURIA, SITE UNSPECIFIED: Primary | ICD-10-CM

## 2019-02-09 LAB
ALBUMIN SERPL-MCNC: 4.3 G/DL (ref 3.5–4.6)
ALP BLD-CCNC: 98 U/L (ref 40–130)
ALT SERPL-CCNC: 36 U/L (ref 0–33)
AMYLASE: 22 U/L (ref 22–93)
ANION GAP SERPL CALCULATED.3IONS-SCNC: 18 MEQ/L (ref 9–15)
AST SERPL-CCNC: 27 U/L (ref 0–35)
BACTERIA: ABNORMAL /HPF
BASOPHILS ABSOLUTE: 0 K/UL (ref 0–0.2)
BASOPHILS RELATIVE PERCENT: 0.3 %
BETA-HYDROXYBUTYRATE: 1.9 MG/DL (ref 0.2–2.8)
BILIRUB SERPL-MCNC: 0.4 MG/DL (ref 0.2–0.7)
BILIRUBIN URINE: NEGATIVE
BLOOD, URINE: NEGATIVE
BUN BLDV-MCNC: 15 MG/DL (ref 6–20)
CALCIUM SERPL-MCNC: 9.4 MG/DL (ref 8.5–9.9)
CHLORIDE BLD-SCNC: 100 MEQ/L (ref 95–107)
CLARITY: CLEAR
CO2: 23 MEQ/L (ref 20–31)
COLOR: YELLOW
CREAT SERPL-MCNC: 0.57 MG/DL (ref 0.5–0.9)
EKG ATRIAL RATE: 98 BPM
EKG P AXIS: 47 DEGREES
EKG P-R INTERVAL: 162 MS
EKG Q-T INTERVAL: 366 MS
EKG QRS DURATION: 70 MS
EKG QTC CALCULATION (BAZETT): 467 MS
EKG R AXIS: 17 DEGREES
EKG T AXIS: 43 DEGREES
EKG VENTRICULAR RATE: 98 BPM
EOSINOPHILS ABSOLUTE: 0.1 K/UL (ref 0–0.7)
EOSINOPHILS RELATIVE PERCENT: 0.6 %
EPITHELIAL CELLS, UA: ABNORMAL /HPF
GFR AFRICAN AMERICAN: >60
GFR NON-AFRICAN AMERICAN: >60
GLOBULIN: 3.2 G/DL (ref 2.3–3.5)
GLUCOSE BLD-MCNC: 153 MG/DL
GLUCOSE BLD-MCNC: 153 MG/DL (ref 60–115)
GLUCOSE BLD-MCNC: 154 MG/DL (ref 70–99)
GLUCOSE BLD-MCNC: 255 MG/DL
GLUCOSE BLD-MCNC: 255 MG/DL (ref 60–115)
GLUCOSE URINE: NEGATIVE MG/DL
HCT VFR BLD CALC: 40.2 % (ref 37–47)
HEMOGLOBIN: 13.3 G/DL (ref 12–16)
HYPOCHROMIA: PRESENT
KETONES, URINE: NEGATIVE MG/DL
LACTIC ACID: 2.1 MMOL/L (ref 0.5–2.2)
LACTIC ACID: 2.4 MMOL/L (ref 0.5–2.2)
LEUKOCYTE ESTERASE, URINE: ABNORMAL
LIPASE: 30 U/L (ref 12–95)
LYMPHOCYTES ABSOLUTE: 2.3 K/UL (ref 1–4.8)
LYMPHOCYTES RELATIVE PERCENT: 17.7 %
MCH RBC QN AUTO: 26.5 PG (ref 27–31.3)
MCHC RBC AUTO-ENTMCNC: 33.1 % (ref 33–37)
MCV RBC AUTO: 80 FL (ref 82–100)
MONOCYTES ABSOLUTE: 0.8 K/UL (ref 0.2–0.8)
MONOCYTES RELATIVE PERCENT: 6.4 %
NEUTROPHILS ABSOLUTE: 9.7 K/UL (ref 1.4–6.5)
NEUTROPHILS RELATIVE PERCENT: 75 %
NITRITE, URINE: NEGATIVE
PDW BLD-RTO: 15.8 % (ref 11.5–14.5)
PERFORMED ON: ABNORMAL
PERFORMED ON: ABNORMAL
PH UA: 5.5 (ref 5–9)
PLATELET # BLD: 273 K/UL (ref 130–400)
POTASSIUM SERPL-SCNC: 3.7 MEQ/L (ref 3.4–4.9)
PROTEIN UA: 30 MG/DL
RAPID INFLUENZA  B AGN: NEGATIVE
RAPID INFLUENZA A AGN: NEGATIVE
RBC # BLD: 5.02 M/UL (ref 4.2–5.4)
RBC UA: ABNORMAL /HPF (ref 0–2)
SODIUM BLD-SCNC: 141 MEQ/L (ref 135–144)
SPECIFIC GRAVITY UA: 1.02 (ref 1–1.03)
TOTAL CK: 91 U/L (ref 0–170)
TOTAL PROTEIN: 7.5 G/DL (ref 6.3–8)
TROPONIN: <0.01 NG/ML (ref 0–0.01)
URINE REFLEX TO CULTURE: YES
UROBILINOGEN, URINE: 0.2 E.U./DL
WBC # BLD: 12.9 K/UL (ref 4.8–10.8)
WBC UA: ABNORMAL /HPF (ref 0–5)

## 2019-02-09 PROCEDURE — 6360000002 HC RX W HCPCS: Performed by: INTERNAL MEDICINE

## 2019-02-09 PROCEDURE — 83605 ASSAY OF LACTIC ACID: CPT

## 2019-02-09 PROCEDURE — 2580000003 HC RX 258: Performed by: INTERNAL MEDICINE

## 2019-02-09 PROCEDURE — 81001 URINALYSIS AUTO W/SCOPE: CPT

## 2019-02-09 PROCEDURE — 82010 KETONE BODYS QUAN: CPT

## 2019-02-09 PROCEDURE — 96365 THER/PROPH/DIAG IV INF INIT: CPT

## 2019-02-09 PROCEDURE — 99285 EMERGENCY DEPT VISIT HI MDM: CPT

## 2019-02-09 PROCEDURE — 2500000003 HC RX 250 WO HCPCS: Performed by: INTERNAL MEDICINE

## 2019-02-09 PROCEDURE — 87086 URINE CULTURE/COLONY COUNT: CPT

## 2019-02-09 PROCEDURE — 85025 COMPLETE CBC W/AUTO DIFF WBC: CPT

## 2019-02-09 PROCEDURE — 96375 TX/PRO/DX INJ NEW DRUG ADDON: CPT

## 2019-02-09 PROCEDURE — 84484 ASSAY OF TROPONIN QUANT: CPT

## 2019-02-09 PROCEDURE — 71045 X-RAY EXAM CHEST 1 VIEW: CPT

## 2019-02-09 PROCEDURE — 87040 BLOOD CULTURE FOR BACTERIA: CPT

## 2019-02-09 PROCEDURE — 6370000000 HC RX 637 (ALT 250 FOR IP): Performed by: INTERNAL MEDICINE

## 2019-02-09 PROCEDURE — 80053 COMPREHEN METABOLIC PANEL: CPT

## 2019-02-09 PROCEDURE — 87804 INFLUENZA ASSAY W/OPTIC: CPT

## 2019-02-09 PROCEDURE — 83690 ASSAY OF LIPASE: CPT

## 2019-02-09 PROCEDURE — 96361 HYDRATE IV INFUSION ADD-ON: CPT

## 2019-02-09 PROCEDURE — 93005 ELECTROCARDIOGRAM TRACING: CPT

## 2019-02-09 PROCEDURE — 82550 ASSAY OF CK (CPK): CPT

## 2019-02-09 PROCEDURE — 82150 ASSAY OF AMYLASE: CPT

## 2019-02-09 RX ORDER — SODIUM CHLORIDE 0.9 % (FLUSH) 0.9 %
3 SYRINGE (ML) INJECTION EVERY 8 HOURS
Status: DISCONTINUED | OUTPATIENT
Start: 2019-02-09 | End: 2019-02-10 | Stop reason: HOSPADM

## 2019-02-09 RX ORDER — ONDANSETRON 2 MG/ML
4 INJECTION INTRAMUSCULAR; INTRAVENOUS ONCE
Status: COMPLETED | OUTPATIENT
Start: 2019-02-09 | End: 2019-02-09

## 2019-02-09 RX ORDER — 0.9 % SODIUM CHLORIDE 0.9 %
1000 INTRAVENOUS SOLUTION INTRAVENOUS ONCE
Status: COMPLETED | OUTPATIENT
Start: 2019-02-09 | End: 2019-02-09

## 2019-02-09 RX ORDER — CIPROFLOXACIN 500 MG/1
500 TABLET, FILM COATED ORAL 2 TIMES DAILY
Qty: 14 TABLET | Refills: 0 | Status: SHIPPED | OUTPATIENT
Start: 2019-02-09 | End: 2019-02-16

## 2019-02-09 RX ORDER — ONDANSETRON 4 MG/1
4 TABLET, ORALLY DISINTEGRATING ORAL EVERY 8 HOURS PRN
Qty: 8 TABLET | Refills: 0 | Status: SHIPPED | OUTPATIENT
Start: 2019-02-09 | End: 2019-05-22 | Stop reason: SDUPTHER

## 2019-02-09 RX ORDER — CIPROFLOXACIN 500 MG/1
500 TABLET, FILM COATED ORAL ONCE
Status: COMPLETED | OUTPATIENT
Start: 2019-02-09 | End: 2019-02-09

## 2019-02-09 RX ADMIN — FAMOTIDINE 20 MG: 10 INJECTION, SOLUTION INTRAVENOUS at 20:40

## 2019-02-09 RX ADMIN — Medication 3 ML: at 20:37

## 2019-02-09 RX ADMIN — SODIUM CHLORIDE 1000 ML: 9 INJECTION, SOLUTION INTRAVENOUS at 20:36

## 2019-02-09 RX ADMIN — ONDANSETRON 4 MG: 2 INJECTION INTRAMUSCULAR; INTRAVENOUS at 20:37

## 2019-02-09 RX ADMIN — SODIUM CHLORIDE 1000 ML: 9 INJECTION, SOLUTION INTRAVENOUS at 21:48

## 2019-02-09 RX ADMIN — CEFTRIAXONE 1 G: 1 INJECTION, POWDER, FOR SOLUTION INTRAMUSCULAR; INTRAVENOUS at 21:46

## 2019-02-09 RX ADMIN — CIPROFLOXACIN HYDROCHLORIDE 500 MG: 500 TABLET, FILM COATED ORAL at 23:47

## 2019-02-09 ASSESSMENT — PAIN DESCRIPTION - LOCATION
LOCATION: ABDOMEN

## 2019-02-09 ASSESSMENT — PAIN DESCRIPTION - PAIN TYPE
TYPE: ACUTE PAIN
TYPE: ACUTE PAIN

## 2019-02-09 ASSESSMENT — PAIN DESCRIPTION - PROGRESSION
CLINICAL_PROGRESSION: GRADUALLY IMPROVING
CLINICAL_PROGRESSION: NOT CHANGED

## 2019-02-09 ASSESSMENT — PAIN DESCRIPTION - FREQUENCY
FREQUENCY: CONTINUOUS
FREQUENCY: INTERMITTENT

## 2019-02-09 ASSESSMENT — PAIN DESCRIPTION - DESCRIPTORS
DESCRIPTORS: SHARP
DESCRIPTORS: BURNING

## 2019-02-09 ASSESSMENT — PAIN DESCRIPTION - ORIENTATION
ORIENTATION: UPPER
ORIENTATION: UPPER

## 2019-02-09 ASSESSMENT — PAIN DESCRIPTION - ONSET
ONSET: ON-GOING
ONSET: ON-GOING

## 2019-02-09 ASSESSMENT — PAIN SCALES - GENERAL
PAINLEVEL_OUTOF10: 1
PAINLEVEL_OUTOF10: 4
PAINLEVEL_OUTOF10: 7

## 2019-02-11 ENCOUNTER — PATIENT MESSAGE (OUTPATIENT)
Dept: FAMILY MEDICINE CLINIC | Age: 44
End: 2019-02-11

## 2019-02-11 ENCOUNTER — CLINICAL DOCUMENTATION (OUTPATIENT)
Dept: FAMILY MEDICINE CLINIC | Age: 44
End: 2019-02-11

## 2019-02-11 DIAGNOSIS — F41.9 ANXIETY: ICD-10-CM

## 2019-02-11 LAB — URINE CULTURE, ROUTINE: NORMAL

## 2019-02-11 RX ORDER — ALPRAZOLAM 0.5 MG/1
TABLET ORAL
Qty: 60 TABLET | Refills: 0 | Status: SHIPPED | OUTPATIENT
Start: 2019-02-11 | End: 2019-07-12 | Stop reason: SDUPTHER

## 2019-02-14 LAB
BLOOD CULTURE, ROUTINE: NORMAL
CULTURE, BLOOD 2: NORMAL

## 2019-02-19 ENCOUNTER — OFFICE VISIT (OUTPATIENT)
Dept: FAMILY MEDICINE CLINIC | Age: 44
End: 2019-02-19
Payer: COMMERCIAL

## 2019-02-19 VITALS
HEIGHT: 66 IN | SYSTOLIC BLOOD PRESSURE: 110 MMHG | DIASTOLIC BLOOD PRESSURE: 70 MMHG | TEMPERATURE: 97.9 F | HEART RATE: 86 BPM | WEIGHT: 277 LBS | OXYGEN SATURATION: 95 % | RESPIRATION RATE: 18 BRPM | BODY MASS INDEX: 44.52 KG/M2

## 2019-02-19 DIAGNOSIS — Z79.4 TYPE 2 DIABETES MELLITUS WITH COMPLICATION, WITH LONG-TERM CURRENT USE OF INSULIN (HCC): Primary | ICD-10-CM

## 2019-02-19 DIAGNOSIS — B37.2 CANDIDAL DERMATITIS: ICD-10-CM

## 2019-02-19 DIAGNOSIS — I10 ESSENTIAL HYPERTENSION: ICD-10-CM

## 2019-02-19 DIAGNOSIS — E11.8 TYPE 2 DIABETES MELLITUS WITH COMPLICATION, WITH LONG-TERM CURRENT USE OF INSULIN (HCC): Primary | ICD-10-CM

## 2019-02-19 PROCEDURE — 99214 OFFICE O/P EST MOD 30 MIN: CPT | Performed by: FAMILY MEDICINE

## 2019-02-19 RX ORDER — NYSTATIN 100000 [USP'U]/G
POWDER TOPICAL
Qty: 1 BOTTLE | Refills: 2 | Status: SHIPPED | OUTPATIENT
Start: 2019-02-19 | End: 2020-01-06 | Stop reason: SDUPTHER

## 2019-02-19 ASSESSMENT — PATIENT HEALTH QUESTIONNAIRE - PHQ9
SUM OF ALL RESPONSES TO PHQ QUESTIONS 1-9: 0
SUM OF ALL RESPONSES TO PHQ QUESTIONS 1-9: 0
SUM OF ALL RESPONSES TO PHQ9 QUESTIONS 1 & 2: 0
1. LITTLE INTEREST OR PLEASURE IN DOING THINGS: 0
2. FEELING DOWN, DEPRESSED OR HOPELESS: 0

## 2019-02-20 ENCOUNTER — HOSPITAL ENCOUNTER (OUTPATIENT)
Dept: LAB | Age: 44
Discharge: HOME | End: 2019-02-20
Payer: COMMERCIAL

## 2019-02-20 DIAGNOSIS — Z79.4 TYPE 2 DIABETES MELLITUS WITH COMPLICATION, WITH LONG-TERM CURRENT USE OF INSULIN (HCC): ICD-10-CM

## 2019-02-20 DIAGNOSIS — E11.8 TYPE 2 DIABETES MELLITUS WITH COMPLICATION, WITH LONG-TERM CURRENT USE OF INSULIN (HCC): ICD-10-CM

## 2019-02-20 LAB
ANION GAP SERPL CALCULATED.3IONS-SCNC: 16 MEQ/L (ref 9–15)
BUN BLDV-MCNC: 17 MG/DL (ref 6–20)
CALCIUM SERPL-MCNC: 9.2 MG/DL (ref 8.5–9.9)
CHLORIDE BLD-SCNC: 97 MEQ/L (ref 95–107)
CO2: 27 MEQ/L (ref 20–31)
CREAT SERPL-MCNC: 0.59 MG/DL (ref 0.5–0.9)
GFR AFRICAN AMERICAN: >60
GFR NON-AFRICAN AMERICAN: >60
GLUCOSE BLD-MCNC: 218 MG/DL (ref 70–99)
HBA1C MFR BLD: 8.4 % (ref 4.8–5.9)
POTASSIUM SERPL-SCNC: 4.3 MEQ/L (ref 3.4–4.9)
SODIUM BLD-SCNC: 140 MEQ/L (ref 135–144)

## 2019-02-20 PROCEDURE — 83036 HEMOGLOBIN GLYCOSYLATED A1C: CPT

## 2019-02-20 PROCEDURE — 80048 BASIC METABOLIC PNL TOTAL CA: CPT

## 2019-02-20 PROCEDURE — 36415 COLL VENOUS BLD VENIPUNCTURE: CPT

## 2019-02-24 ENCOUNTER — APPOINTMENT (OUTPATIENT)
Dept: ULTRASOUND IMAGING | Age: 44
End: 2019-02-24
Payer: COMMERCIAL

## 2019-02-24 ENCOUNTER — HOSPITAL ENCOUNTER (EMERGENCY)
Age: 44
Discharge: HOME | End: 2019-02-24
Attending: FAMILY MEDICINE
Payer: COMMERCIAL

## 2019-02-24 VITALS
TEMPERATURE: 98 F | RESPIRATION RATE: 18 BRPM | HEIGHT: 66 IN | SYSTOLIC BLOOD PRESSURE: 137 MMHG | DIASTOLIC BLOOD PRESSURE: 82 MMHG | OXYGEN SATURATION: 97 % | WEIGHT: 270 LBS | HEART RATE: 111 BPM | BODY MASS INDEX: 43.39 KG/M2

## 2019-02-24 DIAGNOSIS — K21.9 GASTROESOPHAGEAL REFLUX DISEASE WITHOUT ESOPHAGITIS: ICD-10-CM

## 2019-02-24 DIAGNOSIS — K31.84 GASTROPARESIS: Primary | ICD-10-CM

## 2019-02-24 LAB
ALBUMIN SERPL-MCNC: 4.4 G/DL (ref 3.5–4.6)
ALP BLD-CCNC: 80 U/L (ref 40–130)
ALT SERPL-CCNC: 31 U/L (ref 0–33)
AMYLASE: 30 U/L (ref 22–93)
ANION GAP SERPL CALCULATED.3IONS-SCNC: 15 MEQ/L (ref 9–15)
AST SERPL-CCNC: 33 U/L (ref 0–35)
BASOPHILS ABSOLUTE: 0.1 K/UL (ref 0–0.2)
BASOPHILS RELATIVE PERCENT: 0.7 %
BILIRUB SERPL-MCNC: 0.4 MG/DL (ref 0.2–0.7)
BUN BLDV-MCNC: 11 MG/DL (ref 6–20)
CALCIUM SERPL-MCNC: 8.6 MG/DL (ref 8.5–9.9)
CHLORIDE BLD-SCNC: 96 MEQ/L (ref 95–107)
CO2: 24 MEQ/L (ref 20–31)
CREAT SERPL-MCNC: 0.59 MG/DL (ref 0.5–0.9)
EOSINOPHILS ABSOLUTE: 0.1 K/UL (ref 0–0.7)
EOSINOPHILS RELATIVE PERCENT: 0.5 %
GFR AFRICAN AMERICAN: >60
GFR NON-AFRICAN AMERICAN: >60
GLOBULIN: 3.6 G/DL (ref 2.3–3.5)
GLUCOSE BLD-MCNC: 217 MG/DL (ref 70–99)
HBA1C MFR BLD: 8.5 % (ref 4.8–5.9)
HCT VFR BLD CALC: 41.3 % (ref 37–47)
HEMOGLOBIN: 13.5 G/DL (ref 12–16)
LACTIC ACID: 2.2 MMOL/L (ref 0.5–2.2)
LIPASE: 47 U/L (ref 12–95)
LYMPHOCYTES ABSOLUTE: 1.7 K/UL (ref 1–4.8)
LYMPHOCYTES RELATIVE PERCENT: 10.9 %
MCH RBC QN AUTO: 26.3 PG (ref 27–31.3)
MCHC RBC AUTO-ENTMCNC: 32.7 % (ref 33–37)
MCV RBC AUTO: 80.4 FL (ref 82–100)
MONOCYTES ABSOLUTE: 0.8 K/UL (ref 0.2–0.8)
MONOCYTES RELATIVE PERCENT: 5.1 %
NEUTROPHILS ABSOLUTE: 13.2 K/UL (ref 1.4–6.5)
NEUTROPHILS RELATIVE PERCENT: 82.8 %
PDW BLD-RTO: 16.2 % (ref 11.5–14.5)
PLATELET # BLD: 226 K/UL (ref 130–400)
POTASSIUM SERPL-SCNC: 4.3 MEQ/L (ref 3.4–4.9)
RAPID INFLUENZA  B AGN: NEGATIVE
RAPID INFLUENZA A AGN: NEGATIVE
RBC # BLD: 5.14 M/UL (ref 4.2–5.4)
SODIUM BLD-SCNC: 135 MEQ/L (ref 135–144)
TOTAL PROTEIN: 8 G/DL (ref 6.3–8)
WBC # BLD: 16 K/UL (ref 4.8–10.8)

## 2019-02-24 PROCEDURE — C9113 INJ PANTOPRAZOLE SODIUM, VIA: HCPCS | Performed by: FAMILY MEDICINE

## 2019-02-24 PROCEDURE — 80053 COMPREHEN METABOLIC PANEL: CPT

## 2019-02-24 PROCEDURE — 83036 HEMOGLOBIN GLYCOSYLATED A1C: CPT

## 2019-02-24 PROCEDURE — 82150 ASSAY OF AMYLASE: CPT

## 2019-02-24 PROCEDURE — 76705 ECHO EXAM OF ABDOMEN: CPT

## 2019-02-24 PROCEDURE — 83605 ASSAY OF LACTIC ACID: CPT

## 2019-02-24 PROCEDURE — 2580000003 HC RX 258: Performed by: FAMILY MEDICINE

## 2019-02-24 PROCEDURE — 6360000002 HC RX W HCPCS: Performed by: FAMILY MEDICINE

## 2019-02-24 PROCEDURE — 83690 ASSAY OF LIPASE: CPT

## 2019-02-24 PROCEDURE — 96375 TX/PRO/DX INJ NEW DRUG ADDON: CPT

## 2019-02-24 PROCEDURE — 36415 COLL VENOUS BLD VENIPUNCTURE: CPT

## 2019-02-24 PROCEDURE — 87804 INFLUENZA ASSAY W/OPTIC: CPT

## 2019-02-24 PROCEDURE — 85025 COMPLETE CBC W/AUTO DIFF WBC: CPT

## 2019-02-24 PROCEDURE — 96374 THER/PROPH/DIAG INJ IV PUSH: CPT

## 2019-02-24 PROCEDURE — 99284 EMERGENCY DEPT VISIT MOD MDM: CPT

## 2019-02-24 RX ORDER — PANTOPRAZOLE SODIUM 40 MG/10ML
40 INJECTION, POWDER, LYOPHILIZED, FOR SOLUTION INTRAVENOUS DAILY
Status: DISCONTINUED | OUTPATIENT
Start: 2019-02-24 | End: 2019-02-24 | Stop reason: HOSPADM

## 2019-02-24 RX ORDER — PANTOPRAZOLE SODIUM 20 MG/1
40 TABLET, DELAYED RELEASE ORAL DAILY
Qty: 30 TABLET | Refills: 0 | Status: SHIPPED | OUTPATIENT
Start: 2019-02-24 | End: 2019-02-27 | Stop reason: DRUGHIGH

## 2019-02-24 RX ORDER — 0.9 % SODIUM CHLORIDE 0.9 %
1000 INTRAVENOUS SOLUTION INTRAVENOUS ONCE
Status: COMPLETED | OUTPATIENT
Start: 2019-02-24 | End: 2019-02-24

## 2019-02-24 RX ORDER — ONDANSETRON HYDROCHLORIDE 4 MG/5ML
4 SOLUTION ORAL ONCE
Status: DISCONTINUED | OUTPATIENT
Start: 2019-02-24 | End: 2019-02-24

## 2019-02-24 RX ORDER — METOCLOPRAMIDE 10 MG/1
10 TABLET ORAL
Qty: 20 TABLET | Refills: 0 | Status: SHIPPED | OUTPATIENT
Start: 2019-02-24 | End: 2020-01-17

## 2019-02-24 RX ORDER — 0.9 % SODIUM CHLORIDE 0.9 %
10 VIAL (ML) INJECTION DAILY
Status: DISCONTINUED | OUTPATIENT
Start: 2019-02-24 | End: 2019-02-24 | Stop reason: HOSPADM

## 2019-02-24 RX ORDER — FLUCONAZOLE 2 MG/ML
200 INJECTION, SOLUTION INTRAVENOUS ONCE
Status: DISCONTINUED | OUTPATIENT
Start: 2019-02-24 | End: 2019-02-24 | Stop reason: HOSPADM

## 2019-02-24 RX ORDER — METOCLOPRAMIDE HYDROCHLORIDE 5 MG/ML
10 INJECTION INTRAMUSCULAR; INTRAVENOUS ONCE
Status: COMPLETED | OUTPATIENT
Start: 2019-02-24 | End: 2019-02-24

## 2019-02-24 RX ADMIN — PANTOPRAZOLE SODIUM 40 MG: 40 INJECTION, POWDER, FOR SOLUTION INTRAVENOUS at 20:10

## 2019-02-24 RX ADMIN — SODIUM CHLORIDE 1000 ML: 9 INJECTION, SOLUTION INTRAVENOUS at 20:10

## 2019-02-24 RX ADMIN — METOCLOPRAMIDE 10 MG: 5 INJECTION, SOLUTION INTRAMUSCULAR; INTRAVENOUS at 20:10

## 2019-02-24 ASSESSMENT — ENCOUNTER SYMPTOMS
SHORTNESS OF BREATH: 0
ABDOMINAL PAIN: 1
VOMITING: 1

## 2019-02-24 ASSESSMENT — PAIN DESCRIPTION - LOCATION: LOCATION: ABDOMEN

## 2019-02-24 ASSESSMENT — PAIN SCALES - GENERAL: PAINLEVEL_OUTOF10: 6

## 2019-02-24 ASSESSMENT — PAIN DESCRIPTION - PAIN TYPE: TYPE: ACUTE PAIN

## 2019-02-27 RX ORDER — PANTOPRAZOLE SODIUM 40 MG/1
40 TABLET, DELAYED RELEASE ORAL DAILY
Qty: 60 TABLET | Refills: 0 | Status: SHIPPED | OUTPATIENT
Start: 2019-02-27 | End: 2019-04-01 | Stop reason: SDUPTHER

## 2019-02-27 ASSESSMENT — ENCOUNTER SYMPTOMS
COUGH: 0
ABDOMINAL PAIN: 0
DIARRHEA: 0
CONSTIPATION: 0
VOMITING: 0
APNEA: 0
SHORTNESS OF BREATH: 0
CHEST TIGHTNESS: 0
BLOOD IN STOOL: 0
NAUSEA: 0

## 2019-03-12 ENCOUNTER — PATIENT MESSAGE (OUTPATIENT)
Dept: FAMILY MEDICINE CLINIC | Age: 44
End: 2019-03-12

## 2019-03-14 RX ORDER — ERYTHROMYCIN 250 MG/1
250 TABLET, COATED ORAL 3 TIMES DAILY
Qty: 30 TABLET | Refills: 0 | Status: SHIPPED | OUTPATIENT
Start: 2019-03-14 | End: 2019-05-22 | Stop reason: SDUPTHER

## 2019-03-19 ENCOUNTER — EMPLOYEE WELLNESS (OUTPATIENT)
Dept: OTHER | Age: 44
End: 2019-03-19

## 2019-03-19 LAB
CHOLESTEROL, TOTAL: 128 MG/DL (ref 0–199)
GLUCOSE BLD-MCNC: 198 MG/DL (ref 70–99)
HBA1C MFR BLD: 8.5 % (ref 4.8–5.9)
HDLC SERPL-MCNC: 33 MG/DL (ref 40–59)
LDL CHOLESTEROL CALCULATED: 45 MG/DL (ref 0–129)
TRIGL SERPL-MCNC: 252 MG/DL (ref 0–150)

## 2019-03-25 VITALS — WEIGHT: 283 LBS | BODY MASS INDEX: 45.68 KG/M2

## 2019-04-01 ENCOUNTER — PATIENT MESSAGE (OUTPATIENT)
Dept: FAMILY MEDICINE CLINIC | Age: 44
End: 2019-04-01

## 2019-04-01 DIAGNOSIS — E11.8 TYPE 2 DIABETES MELLITUS WITH COMPLICATION, WITH LONG-TERM CURRENT USE OF INSULIN (HCC): ICD-10-CM

## 2019-04-01 DIAGNOSIS — Z79.4 TYPE 2 DIABETES MELLITUS WITH COMPLICATION, WITH LONG-TERM CURRENT USE OF INSULIN (HCC): ICD-10-CM

## 2019-04-01 RX ORDER — PROMETHAZINE HYDROCHLORIDE 25 MG/1
TABLET ORAL
Qty: 180 TABLET | Refills: 0 | Status: SHIPPED | OUTPATIENT
Start: 2019-04-01 | End: 2019-08-19 | Stop reason: SDUPTHER

## 2019-04-01 RX ORDER — HYDROCHLOROTHIAZIDE 25 MG/1
25 TABLET ORAL DAILY
Qty: 90 TABLET | Refills: 3 | Status: SHIPPED | OUTPATIENT
Start: 2019-04-01 | End: 2020-03-28 | Stop reason: SDUPTHER

## 2019-04-01 RX ORDER — LOSARTAN POTASSIUM 50 MG/1
50 TABLET ORAL DAILY
Qty: 90 TABLET | Refills: 3 | Status: SHIPPED | OUTPATIENT
Start: 2019-04-01 | End: 2020-03-28 | Stop reason: SDUPTHER

## 2019-04-01 RX ORDER — SOLIFENACIN SUCCINATE 10 MG/1
10 TABLET, FILM COATED ORAL DAILY
Qty: 90 TABLET | Refills: 3 | Status: SHIPPED | OUTPATIENT
Start: 2019-04-01 | End: 2020-05-06

## 2019-04-01 NOTE — TELEPHONE ENCOUNTER
pt requesting medication refill.  Please approve or deny this request.    Rx requested:  Requested Prescriptions     Pending Prescriptions Disp Refills    hydrochlorothiazide (HYDRODIURIL) 25 MG tablet 90 tablet 3     Sig: Take 1 tablet by mouth daily    losartan (COZAAR) 50 MG tablet 90 tablet 3     Sig: Take 1 tablet by mouth daily    promethazine (PHENERGAN) 25 MG tablet 180 tablet 1     Sig: TAKE 1 TABLET BY MOUTH EVERY 8 HOURS AS NEEDED FOR NAUSEA         Last Office Visit:   2/19/2019      Next Visit Date:  Future Appointments   Date Time Provider Iliana Garcia   5/21/2019 10:30 AM [address], MD [address]   7/16/2019  8:45 AM Karri Doe MD [address]

## 2019-04-01 NOTE — TELEPHONE ENCOUNTER
From: Jose Sam  To: Kita Parada MD  Sent: 4/1/2019 6:27 AM EDT  Subject: Prescription Question    I need refills on losartan hydrclorziade metformin and promathazine

## 2019-04-01 NOTE — TELEPHONE ENCOUNTER
This is not something that I prescribe on a chronic basis at this dose. I can refill on a short-term basis.   Can we please ask how long the patient has been on this dose of medication and has she seen gastroenterology as I would like her to see them at some point to take over prescribing if this is indicated and approved

## 2019-04-01 NOTE — TELEPHONE ENCOUNTER
roxann says she has seen gastro and has been on this dose of medication for years, Dr Sarahi Skelton has been prescribing it ---ah

## 2019-04-02 DIAGNOSIS — Z79.4 TYPE 2 DIABETES MELLITUS WITH COMPLICATION, WITH LONG-TERM CURRENT USE OF INSULIN (HCC): ICD-10-CM

## 2019-04-02 DIAGNOSIS — E11.8 TYPE 2 DIABETES MELLITUS WITH COMPLICATION, WITH LONG-TERM CURRENT USE OF INSULIN (HCC): ICD-10-CM

## 2019-04-08 ENCOUNTER — HOSPITAL ENCOUNTER (OUTPATIENT)
Dept: LAB | Age: 44
Discharge: HOME | End: 2019-04-08
Payer: COMMERCIAL

## 2019-04-08 LAB
C-REACTIVE PROTEIN: 26.9 MG/L (ref 0–5)
SEDIMENTATION RATE, ERYTHROCYTE: 44 MM (ref 0–20)

## 2019-04-08 PROCEDURE — 36415 COLL VENOUS BLD VENIPUNCTURE: CPT

## 2019-04-08 PROCEDURE — 85652 RBC SED RATE AUTOMATED: CPT

## 2019-04-08 PROCEDURE — 86140 C-REACTIVE PROTEIN: CPT

## 2019-04-13 ENCOUNTER — PATIENT MESSAGE (OUTPATIENT)
Dept: FAMILY MEDICINE CLINIC | Age: 44
End: 2019-04-13

## 2019-04-15 RX ORDER — BACLOFEN 10 MG/1
5 TABLET ORAL 2 TIMES DAILY
Qty: 90 TABLET | Refills: 3 | Status: SHIPPED | OUTPATIENT
Start: 2019-04-15 | End: 2019-08-19 | Stop reason: SDUPTHER

## 2019-04-23 ENCOUNTER — OFFICE VISIT (OUTPATIENT)
Dept: ENDOCRINOLOGY | Age: 44
End: 2019-04-23
Payer: COMMERCIAL

## 2019-04-23 VITALS
WEIGHT: 284 LBS | DIASTOLIC BLOOD PRESSURE: 77 MMHG | HEIGHT: 66 IN | HEART RATE: 94 BPM | SYSTOLIC BLOOD PRESSURE: 118 MMHG | BODY MASS INDEX: 45.64 KG/M2

## 2019-04-23 DIAGNOSIS — E11.8 TYPE 2 DIABETES MELLITUS WITH COMPLICATION, WITH LONG-TERM CURRENT USE OF INSULIN (HCC): Primary | ICD-10-CM

## 2019-04-23 DIAGNOSIS — Z79.4 TYPE 2 DIABETES MELLITUS WITH COMPLICATION, WITH LONG-TERM CURRENT USE OF INSULIN (HCC): Primary | ICD-10-CM

## 2019-04-23 LAB
CHP ED QC CHECK: NORMAL
GLUCOSE BLD-MCNC: 193 MG/DL

## 2019-04-23 PROCEDURE — 82962 GLUCOSE BLOOD TEST: CPT | Performed by: INTERNAL MEDICINE

## 2019-04-23 PROCEDURE — 99213 OFFICE O/P EST LOW 20 MIN: CPT | Performed by: INTERNAL MEDICINE

## 2019-04-23 NOTE — PROGRESS NOTES
Subjective:      Patient ID: Lang Ramirez is a 44 y.o. female. 3  month f/u on diabetes   Diabetes   She presents for her follow-up diabetic visit. She has type 2 diabetes mellitus. Diabetic complications include nephropathy. (Gastroparesis ) Risk factors for coronary artery disease include obesity. Current diabetic treatment includes insulin injections (toujeo humalog plus metformin ). She is currently taking insulin pre-breakfast, pre-lunch, pre-dinner and at bedtime. Her overall blood glucose range is 180-200 mg/dl. (Lab Results       Component                Value               Date                       LABA1C                   8.5 (H)             03/19/2019                Fasting glucose 140-180 highest blood sugar after supper in 200 plus range ) An ACE inhibitor/angiotensin II receptor blocker is being taken. Patient on prednisone for polymyalgia rheumatica to neurology reason 5 mg daily that has resulted in weight loss and higher blood sugars    Recent [facility] visit at  [facility] for gastroparesis in February 2019    Obesity Body mass index is 45.84 kg/m².         Patient Active Problem List   Diagnosis    Orthostatic hypotension    B12 deficiency    HPV (human papilloma virus) anogenital infection    Ovarian cyst    Sleep apnea, obstructive    Dyslipidemia    Family history of premature CAD    Morbid obesity with BMI of 40.0-44.9, adult ([facility] [address]    Pulmonary HTN ([facility] [address]    HSV-1 (herpes simplex virus 1) infection    Tobacco abuse    Type 2 diabetes mellitus with complication, with long-term current use of insulin ([facility])    Vaginal itching    Vaginal irritation    Weakness of both lower extremities    Lumbar compression fracture, closed, initial encounter ([facility] [address]    Acute midline thoracic back pain    Muscle weakness    Myalgia    Fracture of L1 vertebra due to fall of 12 feet ([facility])       Current Outpatient Medications:     blood glucose test strips (AGAMATRIX AMP TEST) strip, Checks 2 times daily. Dx:IDDM--ICD-10 E11.9, Disp: 200 each, Rfl: 3    insulin glargine (TOUJEO MAX SOLOSTAR) 300 UNIT/ML injection pen, Inject 240 units at bedtime, Disp: 90 pen, Rfl: 3    insulin lispro (HUMALOG KWIKPEN) 100 UNIT/ML pen, INJECT 50  UNITS am and lunch and 60 units at dinner, Disp: 90 pen, Rfl: 3    baclofen (LIORESAL) 10 MG tablet, Take 0.5 tablets by mouth 2 times daily, Disp: 90 tablet, Rfl: 3    metFORMIN (GLUCOPHAGE) 500 MG tablet, Take 1 tablet by mouth 3 times daily, Disp: 270 tablet, Rfl: 3    pantoprazole (PROTONIX) 40 MG tablet, Take 1 tablet by mouth daily, Disp: 60 tablet, Rfl: 2    solifenacin (VESICARE) 10 MG tablet, Take 1 tablet by mouth daily, Disp: 90 tablet, Rfl: 3    hydrochlorothiazide (HYDRODIURIL) 25 MG tablet, Take 1 tablet by mouth daily, Disp: 90 tablet, Rfl: 3    losartan (COZAAR) 50 MG tablet, Take 1 tablet by mouth daily, Disp: 90 tablet, Rfl: 3    promethazine (PHENERGAN) 25 MG tablet, TAKE 1 TABLET BY MOUTH EVERY 8 HOURS AS NEEDED FOR NAUSEA, Disp: 180 tablet, Rfl: 0    metoclopramide (REGLAN) 10 MG tablet, Take 1 tablet by mouth 2 times daily (before meals) WARNING:  May cause drowsiness. May impair ability to operate vehicles or machinery. Do not use in combination with alcohol., Disp: 20 tablet, Rfl: 0    nystatin (MYCOSTATIN) 938268 UNIT/GM powder, Apply 3 times daily. , Disp: 1 Bottle, Rfl: 2    ondansetron (ZOFRAN ODT) 4 MG disintegrating tablet, Take 1 tablet by mouth every 8 hours as needed for Nausea or Vomiting, Disp: 8 tablet, Rfl: 0    vitamin D (CHOLECALCIFEROL) 1000 UNIT TABS tablet, Take 1,000 Units by mouth daily, Disp: , Rfl:     calcium carbonate 600 MG TABS tablet, Take 1 tablet by mouth daily, Disp: , Rfl:     venlafaxine (EFFEXOR XR) 75 MG extended release capsule, 2 every morning 1 every evening, Disp: 270 capsule, Rfl: 3    metoprolol (LOPRESSOR) 100 MG tablet, Take 1 tablet by mouth 2 times daily, Disp: 180 tablet, Rfl: 3    rosuvastatin (CRESTOR) 10 MG tablet, Take 1 tablet by mouth nightly, Disp: 90 tablet, Rfl: 3    miconazole (MICOTIN) 2 % cream, Apply topically 2 times daily. , Disp: 141 g, Rfl: 3    ondansetron (ZOFRAN-ODT) 4 MG disintegrating tablet, Take 1 tablet by mouth every 8 hours as needed for Nausea or Vomiting, Disp: 40 tablet, Rfl: 1    predniSONE (DELTASONE) 20 MG tablet, Take 5 mg by mouth daily , Disp: , Rfl:     Lancets MISC, Checks 2 times daily. Dx:IDDM--ICD-10 E11.9, Disp: 200 each, Rfl: 3    Insulin Pen Needle (PEN NEEDLES) 32G X 4 MM MISC, Use as directed with insulin pens, 4 times daily, Disp: 400 each, Rfl: 1    acetaminophen (TYLENOL) 325 MG tablet, Take 2 tablets by mouth every 4 hours as needed for Pain, Disp: 60 tablet, Rfl: 0    lidocaine (LIDODERM) 5 %, Place 3 patches onto the skin daily 12 hours on, 12 hours off., Disp: 30 patch, Rfl: 0    meclizine (ANTIVERT) 25 MG tablet, Take 1 tablet by mouth 3 times daily as needed for Dizziness, Disp: 40 tablet, Rfl: 3    Handicap Placard MISC, Exp 5 years, Disp: 1 each, Rfl: 0      Review of Systems   Constitutional: Positive for unexpected weight change. All other systems reviewed and are negative. Vitals:    04/23/19 1643   BP: 118/77   Pulse: 94   Weight: 284 lb (128.8 kg)   Height: 5' 6\" (1.676 m)       Objective:   Physical Exam   Constitutional: She appears well-developed and well-nourished. HENT:   Head: Atraumatic. Eyes: EOM are normal.   Neck: Neck supple. Cardiovascular: Normal rate. Abdominal:   Obese    Musculoskeletal: Normal range of motion. She exhibits no edema. Neurological: She is alert. Psychiatric: She has a normal mood and affect.      Lab Results   Component Value Date     02/24/2019    K 4.3 02/24/2019    CL 96 02/24/2019    CO2 24 02/24/2019    BUN 11 02/24/2019    CREATININE 0.59 02/24/2019    GLUCOSE 193 04/23/2019    CALCIUM 8.6 02/24/2019    PROT 8.0 02/24/2019    LABALBU 4.4 02/24/2019    BILITOT 0.4 02/24/2019 ALKPHOS 80 02/24/2019    AST 33 02/24/2019    ALT 31 02/24/2019    LABGLOM >60.0 02/24/2019    GFRAA >60.0 02/24/2019    GLOB 3.6 (H) 02/24/2019         Assessment:       Diagnosis Orders   1. Type 2 diabetes mellitus with complication, with long-term current use of insulin (HCC)  Basic Metabolic Panel    Hemoglobin A1C    Microalbumin / Creatinine Urine Ratio    POCT Glucose    insulin lispro (HUMALOG KWIKPEN) 100 UNIT/ML pen           Plan:      Orders Placed This Encounter   Procedures    Basic Metabolic Panel     Standing Status:   Future     Standing Expiration Date:   4/23/2020    Hemoglobin A1C     Standing Status:   Future     Standing Expiration Date:   4/23/2020    Microalbumin / Creatinine Urine Ratio     Standing Status:   Future     Standing Expiration Date:   4/23/2020    POCT Glucose     Increase adjust insulin   Orders Placed This Encounter   Medications    blood glucose test strips (AGAMATRIX AMP TEST) strip     Sig: Checks 2 times daily.  Dx:IDDM--ICD-10 E11.9     Dispense:  200 each     Refill:  3    insulin glargine (TOUJEO MAX SOLOSTAR) 300 UNIT/ML injection pen     Sig: Inject 240 units at bedtime     Dispense:  90 pen     Refill:  3    insulin lispro (HUMALOG KWIKPEN) 100 UNIT/ML pen     Sig: INJECT 50  UNITS am and lunch and 60 units at dinner     Dispense:  90 pen     Refill:  3     Continue metformin 500 mg tid   hbaic goal of 7 or lower

## 2019-04-25 ENCOUNTER — E-VISIT (OUTPATIENT)
Dept: FAMILY MEDICINE CLINIC | Age: 44
End: 2019-04-25
Payer: COMMERCIAL

## 2019-04-25 PROCEDURE — 99444 PR PHYSICIAN ONLINE EVALUATION & MANAGEMENT SERVICE: CPT | Performed by: FAMILY MEDICINE

## 2019-04-25 RX ORDER — CIPROFLOXACIN 250 MG/1
250 TABLET, FILM COATED ORAL 2 TIMES DAILY
Qty: 6 TABLET | Refills: 0 | OUTPATIENT
Start: 2019-04-25 | End: 2019-04-28

## 2019-04-25 RX ORDER — CIPROFLOXACIN 250 MG/1
250 TABLET, FILM COATED ORAL 2 TIMES DAILY
Qty: 6 TABLET | Refills: 0 | Status: SHIPPED | OUTPATIENT
Start: 2019-04-25 | End: 2019-04-28

## 2019-05-06 ENCOUNTER — PATIENT MESSAGE (OUTPATIENT)
Dept: FAMILY MEDICINE CLINIC | Age: 44
End: 2019-05-06

## 2019-05-06 DIAGNOSIS — E78.5 DYSLIPIDEMIA: ICD-10-CM

## 2019-05-06 NOTE — TELEPHONE ENCOUNTER
Patient is requesting medication refills. Please approve or deny this request. See her message from [address] [place] [address] [id number] below. [facility] message sent back to patient that she might need an appointment for the lidocaine patches    Rx requested:  Requested Prescriptions     Pending Prescriptions Disp Refills    venlafaxine (EFFEXOR XR) 75 MG extended release capsule 270 capsule 1     Si every morning 1 every evening    metoprolol (LOPRESSOR) 100 MG tablet 180 tablet 1     Sig: Take 1 tablet by mouth 2 times daily    rosuvastatin (CRESTOR) 10 MG tablet 90 tablet 1     Sig: Take 1 tablet by mouth nightly    lidocaine (LIDODERM) 5 % 30 patch 0     Sig: Place 3 patches onto the skin daily 12 hours on, 12 hours off.          Last Office Visit:   2019      Next Visit Date:  Future Appointments   Date Time Provider Iliana Garcia   2019 10:30 AM [address], MD [address]   2019  8:45 AM Jan Muniz MD [address]

## 2019-05-06 NOTE — TELEPHONE ENCOUNTER
From: Jaqueline Thompson  To: Chely Stone MD  Sent: 5/6/2019 3:54 PM EDT  Subject: Non-Urgent Medical Question    I can't select medication refills on my refill page because it was dr. Jimmy Kim, but I need these scripts refilled:    Effexor 75 mg 2 in morning 1 in evening  metoprolol 100 mg 1 tab 2 x a day  rosuvastatin 10 mg 1 time a day  lidocain 5% patches just 30 patches PRN    Please send to mail order pharmacy.  Thank you Jaqueline Thompson

## 2019-05-07 RX ORDER — VENLAFAXINE HYDROCHLORIDE 75 MG/1
CAPSULE, EXTENDED RELEASE ORAL
Qty: 270 CAPSULE | Refills: 1 | Status: SHIPPED | OUTPATIENT
Start: 2019-05-07 | End: 2019-11-10 | Stop reason: SDUPTHER

## 2019-05-07 RX ORDER — METOPROLOL TARTRATE 100 MG/1
100 TABLET ORAL 2 TIMES DAILY
Qty: 180 TABLET | Refills: 1 | Status: SHIPPED | OUTPATIENT
Start: 2019-05-07 | End: 2019-08-19 | Stop reason: SDUPTHER

## 2019-05-07 RX ORDER — LIDOCAINE 50 MG/G
3 PATCH TOPICAL DAILY
Qty: 30 PATCH | Refills: 0 | Status: SHIPPED | OUTPATIENT
Start: 2019-05-07 | End: 2020-01-17

## 2019-05-07 RX ORDER — ROSUVASTATIN CALCIUM 10 MG/1
10 TABLET, COATED ORAL NIGHTLY
Qty: 90 TABLET | Refills: 1 | Status: SHIPPED | OUTPATIENT
Start: 2019-05-07 | End: 2019-11-09 | Stop reason: SDUPTHER

## 2019-05-22 RX ORDER — ONDANSETRON 4 MG/1
4 TABLET, ORALLY DISINTEGRATING ORAL EVERY 8 HOURS PRN
Qty: 20 TABLET | Refills: 0 | Status: SHIPPED | OUTPATIENT
Start: 2019-05-22 | End: 2019-07-12 | Stop reason: ALTCHOICE

## 2019-05-22 RX ORDER — ERYTHROMYCIN 250 MG/1
TABLET, COATED ORAL
Qty: 270 TABLET | Refills: 1 | Status: SHIPPED
Start: 2019-05-22 | End: 2019-07-16 | Stop reason: DRUGHIGH

## 2019-05-31 ENCOUNTER — HOSPITAL ENCOUNTER (OUTPATIENT)
Dept: LAB | Age: 44
Discharge: HOME | End: 2019-05-31
Payer: COMMERCIAL

## 2019-05-31 LAB
C-REACTIVE PROTEIN: 26 MG/L (ref 0–5)
SEDIMENTATION RATE, ERYTHROCYTE: 60 MM (ref 0–20)

## 2019-05-31 PROCEDURE — 85652 RBC SED RATE AUTOMATED: CPT

## 2019-05-31 PROCEDURE — 36415 COLL VENOUS BLD VENIPUNCTURE: CPT

## 2019-05-31 PROCEDURE — 86140 C-REACTIVE PROTEIN: CPT

## 2019-06-10 ENCOUNTER — HOSPITAL ENCOUNTER (OUTPATIENT)
Age: 44
Setting detail: SPECIMEN
Discharge: HOME | End: 2019-06-10
Payer: COMMERCIAL

## 2019-06-10 ENCOUNTER — OFFICE VISIT (OUTPATIENT)
Dept: OBGYN CLINIC | Age: 44
End: 2019-06-10
Payer: COMMERCIAL

## 2019-06-10 VITALS
WEIGHT: 285 LBS | DIASTOLIC BLOOD PRESSURE: 70 MMHG | BODY MASS INDEX: 45.8 KG/M2 | SYSTOLIC BLOOD PRESSURE: 122 MMHG | HEIGHT: 66 IN

## 2019-06-10 DIAGNOSIS — Z01.419 WELL WOMAN EXAM WITH ROUTINE GYNECOLOGICAL EXAM: ICD-10-CM

## 2019-06-10 DIAGNOSIS — Z12.39 BREAST CANCER SCREENING: ICD-10-CM

## 2019-06-10 DIAGNOSIS — Z01.419 WELL WOMAN EXAM WITH ROUTINE GYNECOLOGICAL EXAM: Primary | ICD-10-CM

## 2019-06-10 DIAGNOSIS — Z11.51 SCREENING FOR HPV (HUMAN PAPILLOMAVIRUS): ICD-10-CM

## 2019-06-10 PROCEDURE — 87624 HPV HI-RISK TYP POOLED RSLT: CPT

## 2019-06-10 PROCEDURE — 88175 CYTOPATH C/V AUTO FLUID REDO: CPT

## 2019-06-10 PROCEDURE — 99396 PREV VISIT EST AGE 40-64: CPT | Performed by: OBSTETRICS & GYNECOLOGY

## 2019-06-10 RX ORDER — ESTRADIOL 0.1 MG/G
1 CREAM VAGINAL DAILY
Qty: 1 TUBE | Refills: 3 | Status: SHIPPED | OUTPATIENT
Start: 2019-06-10 | End: 2020-01-17

## 2019-06-10 ASSESSMENT — ENCOUNTER SYMPTOMS
SHORTNESS OF BREATH: 0
ABDOMINAL PAIN: 0
BLOOD IN STOOL: 0
CONSTIPATION: 0
SORE THROAT: 0
COUGH: 0
ABDOMINAL DISTENTION: 0
DIARRHEA: 0
WHEEZING: 0
NAUSEA: 0
VOMITING: 0

## 2019-06-10 ASSESSMENT — PATIENT HEALTH QUESTIONNAIRE - PHQ9
SUM OF ALL RESPONSES TO PHQ9 QUESTIONS 1 & 2: 0
SUM OF ALL RESPONSES TO PHQ QUESTIONS 1-9: 0
SUM OF ALL RESPONSES TO PHQ QUESTIONS 1-9: 0
1. LITTLE INTEREST OR PLEASURE IN DOING THINGS: 0
2. FEELING DOWN, DEPRESSED OR HOPELESS: 0

## 2019-06-10 NOTE — PROGRESS NOTES
Chaperone for Intimate Exam    1.  Was chaperone offered as part of the rooming process? offered, declined     Patient accepts doctor student to be present/perform exam.

## 2019-06-10 NOTE — PROGRESS NOTES
Postmenopausal Annual Shyrl Bumpers is a 38y.o. year old   female who presents today for her annual well woman exam.  The patient is sexually active. The patient has never been taking hormone replacement therapy. Patient denies post-menopausal vaginal bleeding. The patient has regular exercise: no    Vitals:  /70   Ht 5' 6\" (1.676 m)   Wt 285 lb (129.3 kg)   LMP  (LMP Unknown)   BMI 46.00 kg/m²   Allergies:  Bactrim [sulfamethoxazole-trimethoprim]; Nitroglycerin; Victoza [liraglutide]; Ace inhibitors; Nitrofuran derivatives; and Percocet [oxycodone-acetaminophen]  Past Medical History:   Diagnosis Date    Acute midline thoracic back pain 2018    B12 deficiency     Family history of premature CAD 2013    Fatty liver     HPV (human papilloma virus) anogenital infection     Hyperlipidemia     Hypertension     Liver hemangioma     Obesity 3/11/13    BMI 43.42    Orthostatic hypotension     Ovarian cyst     Rectal fissure     Tobacco abuse 9/3/2015    Tobacco abuse     Tremor     Uncontrolled diabetes mellitus with complications ([facility] [address]     Unspecified sleep apnea      Past Surgical History:   Procedure Laterality Date    CARDIAC CATHETERIZATION      COLONOSCOPY      for diarrhea (-)    HYSTERECTOMY  12-10    LEEP  -05    KS MUSCLE BIOPSY Left 2018    LEFT QUADRICEPS MUSCLE BIOPSY performed by Ethan Quevedo MD at [facility] UNI/BI CANNULATION N/A 2018    T 12 VERTEBRALPLASTY ROOM 278 performed by Gene Sagastume MD at [address] ENDOSCOPY  10/13/15     DR. HERRERA     URETHRA SURGERY       Family History   Problem Relation Age of Onset    Diabetes Father     Heart Disease Mother      Social History     Socioeconomic History    Marital status:      Spouse name: Not on file    Number of children: Not on file    Years of education: Not on file    Highest education level: Not on file   Occupational History    Not on file   Social Needs    Financial resource strain: Not on file    Food insecurity:     Worry: Not on file     Inability: Not on file    Transportation needs:     Medical: Not on file     Non-medical: Not on file   Tobacco Use    Smoking status: Former Smoker     Packs/day: 1.00     Types: Cigarettes     Last attempt to quit: 2017     Years since quittin.4    Smokeless tobacco: Never Used    Tobacco comment: pt trying to cut back   Substance and Sexual Activity    Alcohol use: Yes     Alcohol/week: 0.0 oz     Comment: socially    Drug use: No    Sexual activity: Yes     Partners: Male     Comment: single   Lifestyle    Physical activity:     Days per week: Not on file     Minutes per session: Not on file    Stress: Not on file   Relationships    Social connections:     Talks on phone: Not on file     Gets together: Not on file     Attends Islam service: Not on file     Active member of club or organization: Not on file     Attends meetings of clubs or organizations: Not on file     Relationship status: Not on file    Intimate partner violence:     Fear of current or ex partner: Not on file     Emotionally abused: Not on file     Physically abused: Not on file     Forced sexual activity: Not on file   Other Topics Concern    Not on file   Social History Narrative    Social/Functional History            Last mammogram 2018  Breast cancer risk factors : nulliparous  Last Pap 2018    No LMP recorded (lmp unknown). Patient has had a hysterectomy.   Age at menopause onset: 08'  Menopausal symptom assessment: none  Urinary incontinence & GUsymptoms: irritation of the vaginal tissue  Sexual dysfunction: none  Present Hormonal medications: none    Osteoporosis risk assessment : Glucocorticoids  Last bone mineral density : never    History of abnormal lipids:yes -   Hypertension yes  Stroke/MI no    Yearly flu vaccine recommended for persons aged 48 and older. Colonoscopyup-to-date    Review of Systems  Review of Systems   Constitutional: Negative for activity change, appetite change, fatigue and unexpected weight change. HENT: Negative for nosebleeds and sore throat. Eyes: Negative for visual disturbance. Respiratory: Negative for cough, shortness of breath and wheezing. Cardiovascular: Negative for chest pain, palpitations and leg swelling. Gastrointestinal: Negative for abdominal distention, abdominal pain, blood in stool, constipation, diarrhea, nausea and vomiting. Endocrine: Negative for cold intolerance, heat intolerance, polydipsia and polyuria. Genitourinary: Negative for difficulty urinating, dyspareunia, dysuria, frequency, genital sores, hematuria, menstrual problem, pelvic pain, urgency, vaginal bleeding, vaginal discharge and vaginal pain (vaginal irritation). Musculoskeletal: Negative for arthralgias. Skin: Negative for rash. Neurological: Negative for dizziness, weakness, light-headedness and headaches. Hematological: Negative for adenopathy. Does not bruise/bleed easily. Psychiatric/Behavioral: Negative for confusion and sleep disturbance. All other systems reviewed and are negative. Objective:     Vitals:  /70   Ht 5' 6\" (1.676 m)   Wt 285 lb (129.3 kg)   LMP  (LMP Unknown)   BMI 46.00 kg/m²     Physical Exam  Physical Exam   Constitutional: She is oriented to person, place, and time. She appears well-developed and well-nourished. No distress. HENT:   Head: Normocephalic. Right Ear: External ear normal.   Left Ear: External ear normal.   Nose: Nose normal.   Eyes: Pupils are equal, round, and reactive to light. Conjunctivae and EOM are normal.   Neck: No tracheal deviation present. No thyromegaly present. Cardiovascular: Normal rate, regular rhythm, normal heart sounds and intact distal pulses. Exam reveals no gallop and no friction rub.    No murmur heard.  Pulmonary/Chest: Effort normal and breath sounds normal. No respiratory distress. She has no wheezes. She has no rales. She exhibits no tenderness. Right breast exhibits skin change (erythema noted in mammary fold). Right breast exhibits no inverted nipple, no mass, no nipple discharge and no tenderness. Left breast exhibits no inverted nipple, no mass, no nipple discharge, no skin change and no tenderness. No breast tenderness, discharge or bleeding. Abdominal: Soft. Bowel sounds are normal. She exhibits no distension and no mass. There is no tenderness. There is no rebound and no guarding. Genitourinary: Vagina normal. No breast tenderness, discharge or bleeding. There is no rash, tenderness or lesion on the right labia. There is no rash, tenderness or lesion on the left labia. Cervix exhibits no motion tenderness, no discharge and no friability. Right adnexum displays no mass, no tenderness and no fullness. Left adnexum displays no mass, no tenderness and no fullness. No erythema, tenderness or bleeding in the vagina. No vaginal discharge found. Genitourinary Comments: Uterus is not prolapsed and there is not a cystocele or rectocele noted   Musculoskeletal: She exhibits no edema. Lymphadenopathy:        Right: No inguinal adenopathy present. Left: No inguinal adenopathy present. Neurological: She is alert and oriented to person, place, and time. She displays normal reflexes. No cranial nerve deficit. Skin: Skin is warm and dry. She is not diaphoretic. Psychiatric: She has a normal mood and affect. Her behavior is normal. Judgment normal.       Assessment:      Diagnosis Orders   1. Well woman exam with routine gynecological exam  PAP SMEAR   2. Screening for HPV (human papillomavirus)  PAP SMEAR   3. Breast cancer screening  MONIKA DIGITAL SCREEN W CAD BILATERAL       Body mass index is 46 kg/m². Obesity:  Class III  Smoking:  No    Plan:   Pap : indicated:  performed.       Obesity 1 year (around 6/10/2020). Sarthak Gupta,     HEALTH MAINTENANCE:   Health information given. Women's Health patient information and counselling done. Counseled regarding risk/benefits/alternatives to Hormone Therapyand need for yearly re-evaluation. Periodic Pap smear discussed. Mammogram recommended yearly. Colon cancer screening by age 48 & every 5-10 years. Bone mineral density (by age 72 or sooner if indication itwould affect Hormone Therapy management or other risk factors for osteoporosis).

## 2019-06-11 ENCOUNTER — E-VISIT (OUTPATIENT)
Dept: FAMILY MEDICINE CLINIC | Age: 44
End: 2019-06-11
Payer: COMMERCIAL

## 2019-06-11 DIAGNOSIS — B37.31 VAGINAL CANDIDOSIS: Primary | ICD-10-CM

## 2019-06-11 PROCEDURE — 99444 PR PHYSICIAN ONLINE EVALUATION & MANAGEMENT SERVICE: CPT | Performed by: FAMILY MEDICINE

## 2019-06-11 RX ORDER — FLUCONAZOLE 150 MG/1
150 TABLET ORAL ONCE
Qty: 1 TABLET | Refills: 2 | Status: SHIPPED | OUTPATIENT
Start: 2019-06-11 | End: 2019-06-11

## 2019-06-13 ENCOUNTER — TELEPHONE (OUTPATIENT)
Dept: FAMILY MEDICINE CLINIC | Age: 44
End: 2019-06-13

## 2019-06-13 NOTE — TELEPHONE ENCOUNTER
Which medication is it contraindication? How many doses of Diflucan were prescribed? I believe I prescribed only 1 dose. I believe that the benefit of this treatment will outweigh potential risk.   No really inclined to change

## 2019-06-17 LAB
HPV COMMENT: NORMAL
HPV TYPE 16: NOT DETECTED
HPV TYPE 18: NOT DETECTED
HPVOH (OTHER TYPES): NOT DETECTED

## 2019-06-18 ENCOUNTER — HOSPITAL ENCOUNTER (OUTPATIENT)
Dept: NON INVASIVE DIAGNOSTICS | Age: 44
Discharge: HOME | End: 2019-06-18
Payer: COMMERCIAL

## 2019-06-18 DIAGNOSIS — I20.9 ANGINA PECTORIS (HCC): ICD-10-CM

## 2019-06-18 DIAGNOSIS — Z79.4 TYPE 2 DIABETES MELLITUS WITH COMPLICATION, WITH LONG-TERM CURRENT USE OF INSULIN (HCC): ICD-10-CM

## 2019-06-18 DIAGNOSIS — E11.8 TYPE 2 DIABETES MELLITUS WITH COMPLICATION, WITH LONG-TERM CURRENT USE OF INSULIN (HCC): ICD-10-CM

## 2019-06-18 LAB
LV EF: 60 %
LVEF MODALITY: NORMAL

## 2019-06-18 PROCEDURE — 93306 TTE W/DOPPLER COMPLETE: CPT

## 2019-07-02 ENCOUNTER — E-VISIT (OUTPATIENT)
Dept: OBGYN CLINIC | Age: 44
End: 2019-07-02
Payer: COMMERCIAL

## 2019-07-02 DIAGNOSIS — N30.00 ACUTE CYSTITIS WITHOUT HEMATURIA: Primary | ICD-10-CM

## 2019-07-02 PROCEDURE — 98969 PR NONPHYSICIAN ONLINE ASSESSMENT AND MANAGEMENT: CPT | Performed by: NURSE PRACTITIONER

## 2019-07-02 RX ORDER — CEPHALEXIN 500 MG/1
500 CAPSULE ORAL 3 TIMES DAILY
Qty: 21 CAPSULE | Refills: 0 | Status: SHIPPED | OUTPATIENT
Start: 2019-07-02 | End: 2019-07-09

## 2019-07-12 ENCOUNTER — OFFICE VISIT (OUTPATIENT)
Dept: FAMILY MEDICINE CLINIC | Age: 44
End: 2019-07-12
Payer: COMMERCIAL

## 2019-07-12 VITALS
HEIGHT: 66 IN | RESPIRATION RATE: 16 BRPM | HEART RATE: 78 BPM | DIASTOLIC BLOOD PRESSURE: 70 MMHG | SYSTOLIC BLOOD PRESSURE: 114 MMHG | WEIGHT: 290.4 LBS | BODY MASS INDEX: 46.67 KG/M2 | OXYGEN SATURATION: 96 % | TEMPERATURE: 97.2 F

## 2019-07-12 DIAGNOSIS — F41.9 ANXIETY: ICD-10-CM

## 2019-07-12 DIAGNOSIS — B35.3 TINEA PEDIS OF RIGHT FOOT: ICD-10-CM

## 2019-07-12 DIAGNOSIS — G89.29 CHRONIC BILATERAL LOW BACK PAIN WITHOUT SCIATICA: Primary | ICD-10-CM

## 2019-07-12 DIAGNOSIS — M54.50 CHRONIC BILATERAL LOW BACK PAIN WITHOUT SCIATICA: Primary | ICD-10-CM

## 2019-07-12 PROCEDURE — 99214 OFFICE O/P EST MOD 30 MIN: CPT | Performed by: FAMILY MEDICINE

## 2019-07-12 RX ORDER — ALPRAZOLAM 0.5 MG/1
TABLET ORAL
Qty: 60 TABLET | Refills: 0 | Status: SHIPPED | OUTPATIENT
Start: 2019-07-12 | End: 2019-08-11

## 2019-07-12 RX ORDER — CLOTRIMAZOLE 1 %
CREAM (GRAM) TOPICAL
Qty: 1 TUBE | Refills: 1 | Status: SHIPPED | OUTPATIENT
Start: 2019-07-12 | End: 2019-07-19

## 2019-07-15 ENCOUNTER — HOSPITAL ENCOUNTER (OUTPATIENT)
Dept: LAB | Age: 44
Discharge: HOME | End: 2019-07-15
Payer: COMMERCIAL

## 2019-07-15 DIAGNOSIS — Z79.4 TYPE 2 DIABETES MELLITUS WITH COMPLICATION, WITH LONG-TERM CURRENT USE OF INSULIN (HCC): ICD-10-CM

## 2019-07-15 DIAGNOSIS — E11.8 TYPE 2 DIABETES MELLITUS WITH COMPLICATION, WITH LONG-TERM CURRENT USE OF INSULIN (HCC): ICD-10-CM

## 2019-07-15 LAB
ANION GAP SERPL CALCULATED.3IONS-SCNC: 16 MEQ/L (ref 9–15)
BUN BLDV-MCNC: 16 MG/DL (ref 6–20)
C-REACTIVE PROTEIN: 27.3 MG/L (ref 0–5)
CALCIUM SERPL-MCNC: 9.6 MG/DL (ref 8.5–9.9)
CHLORIDE BLD-SCNC: 99 MEQ/L (ref 95–107)
CO2: 24 MEQ/L (ref 20–31)
CREAT SERPL-MCNC: 0.67 MG/DL (ref 0.5–0.9)
CREATININE URINE: 228 MG/DL
GFR AFRICAN AMERICAN: >60
GFR NON-AFRICAN AMERICAN: >60
GLUCOSE BLD-MCNC: 189 MG/DL (ref 70–99)
HBA1C MFR BLD: 8.9 % (ref 4.8–5.9)
MICROALBUMIN UR-MCNC: 18.6 MG/DL
MICROALBUMIN/CREAT UR-RTO: 81.6 MG/G (ref 0–30)
POTASSIUM SERPL-SCNC: 3.6 MEQ/L (ref 3.4–4.9)
SEDIMENTATION RATE, ERYTHROCYTE: 36 MM (ref 0–20)
SODIUM BLD-SCNC: 139 MEQ/L (ref 135–144)

## 2019-07-15 PROCEDURE — 82043 UR ALBUMIN QUANTITATIVE: CPT

## 2019-07-15 PROCEDURE — 80048 BASIC METABOLIC PNL TOTAL CA: CPT

## 2019-07-15 PROCEDURE — 82570 ASSAY OF URINE CREATININE: CPT

## 2019-07-15 PROCEDURE — 86140 C-REACTIVE PROTEIN: CPT

## 2019-07-15 PROCEDURE — 36415 COLL VENOUS BLD VENIPUNCTURE: CPT

## 2019-07-15 PROCEDURE — 83036 HEMOGLOBIN GLYCOSYLATED A1C: CPT

## 2019-07-15 PROCEDURE — 85652 RBC SED RATE AUTOMATED: CPT

## 2019-07-16 ENCOUNTER — OFFICE VISIT (OUTPATIENT)
Dept: CARDIOLOGY CLINIC | Age: 44
End: 2019-07-16
Payer: COMMERCIAL

## 2019-07-16 ENCOUNTER — TELEPHONE (OUTPATIENT)
Dept: ENDOCRINOLOGY | Age: 44
End: 2019-07-16

## 2019-07-16 VITALS
BODY MASS INDEX: 46.61 KG/M2 | HEART RATE: 56 BPM | WEIGHT: 290 LBS | RESPIRATION RATE: 12 BRPM | DIASTOLIC BLOOD PRESSURE: 60 MMHG | SYSTOLIC BLOOD PRESSURE: 100 MMHG | HEIGHT: 66 IN

## 2019-07-16 DIAGNOSIS — I20.9 ANGINA PECTORIS (HCC): Primary | ICD-10-CM

## 2019-07-16 DIAGNOSIS — Z82.49 FAMILY HISTORY OF PREMATURE CAD: ICD-10-CM

## 2019-07-16 DIAGNOSIS — Z72.0 TOBACCO ABUSE: ICD-10-CM

## 2019-07-16 DIAGNOSIS — I27.20 PULMONARY HTN (HCC): ICD-10-CM

## 2019-07-16 PROCEDURE — 99213 OFFICE O/P EST LOW 20 MIN: CPT | Performed by: INTERNAL MEDICINE

## 2019-07-16 RX ORDER — ERYTHROMYCIN 250 MG/1
250 TABLET, DELAYED RELEASE ORAL 3 TIMES DAILY PRN
COMMUNITY
End: 2020-03-11

## 2019-07-16 ASSESSMENT — ENCOUNTER SYMPTOMS
BLOOD IN STOOL: 0
NAUSEA: 0
VOMITING: 0

## 2019-07-16 NOTE — PROGRESS NOTES
[facility] CARDIOLOGY OFFICE FOLLOW-UP      Patient: Mahamed Goodwin  YOB: 1975  MRN: [id number]    Chief Complaint:  Chief Complaint   Patient presents with    Results     ECHO    Chest Pain         Subjective/HPI:  7/16/19: Patient presents today for follow-up of angina. No angina currently. 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  No congestive heart failure symptoms. She has dyslipidemia and hyperglycemia. Echocardiogram was noted. LV ejection fraction is normal.  See me in 1 year    1/15/19: Patient presents today for follow up of angina. She is diabetic and is getting uncontrolled. No further angina. Palpitations are better. She has dyslipidemia that is under control. She is to have an echocardiogram in June 2019 then see me.     9/26/17: Patient presents today for follow-up of angina. She is diabetic and is getting uncontrolled. Julio Thomas suggested pediatric surgery and I agree. No further angina. Palpitations are better. She has dyslipidemia that is under control. See me in 6 months.     11/15/16: For fu of angina. Obese and diabetic. Occasional palpitation. No syncopy. No CHF. See in 6M     9/3/15: Works at [employer] care. C/O L sided CP. And palpitation. Increase toprol to 100 BID. Get Thyroid profile. See me in LO.     3/10/15: Stress test and echo were both normal.Valves OK. Quit smoking for 3 weeks. On nicoderm patch. Hb 1 C 7. 5. See in 6M. No angina,CHF or syncopy.     12/9/14: Still smokes. Will start nicoderm patch. Will see me in March. Will need roxanne,Echo,YAKELIN and acomplete PFT. See me after. She works for [employer].     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 Mother had MI at 48. Father dead in his 46s. Non smoker but had DM. She stopped smoking 7 years ago but now resumed. DM. On insulin. Patient of Kendy Pat. Works at [employer]. Had atypical CP few weeks ago that resolved. Will start altace 5 a day. For renal

## 2019-07-17 NOTE — TELEPHONE ENCOUNTER
Globin A1c was 8.9 slightly increased from 8.5 before slight protein in the urine 81 that has also increased  Will discuss at follow-up

## 2019-07-18 ENCOUNTER — HOSPITAL ENCOUNTER (OUTPATIENT)
Dept: GENERAL RADIOLOGY | Age: 44
Discharge: HOME | End: 2019-07-20
Payer: COMMERCIAL

## 2019-07-18 ENCOUNTER — HOSPITAL ENCOUNTER (OUTPATIENT)
Age: 44
Discharge: HOME | End: 2019-07-20
Payer: COMMERCIAL

## 2019-07-18 DIAGNOSIS — G95.0 SYRINX OF SPINAL CORD (HCC): ICD-10-CM

## 2019-07-18 PROCEDURE — 72110 X-RAY EXAM L-2 SPINE 4/>VWS: CPT

## 2019-07-21 ASSESSMENT — ENCOUNTER SYMPTOMS
APNEA: 0
BLOOD IN STOOL: 0
ABDOMINAL PAIN: 0
DIARRHEA: 0
VOMITING: 0
BACK PAIN: 1
SHORTNESS OF BREATH: 0
CHEST TIGHTNESS: 0
CONSTIPATION: 0
COUGH: 0
NAUSEA: 0

## 2019-07-21 NOTE — PROGRESS NOTES
Wt 290 lb 6.4 oz (131.7 kg)   LMP  (LMP Unknown)   SpO2 96%   BMI 46.87 kg/m²     Physical Exam   Constitutional: She is oriented to person, place, and time. She appears well-developed and well-nourished. No distress. HENT:   Head: Normocephalic and atraumatic. Eyes: Pupils are equal, round, and reactive to light. Conjunctivae and EOM are normal.   Neck: Normal range of motion. Neck supple. No JVD present. Cardiovascular: Normal rate, regular rhythm, normal heart sounds and intact distal pulses. Exam reveals no gallop and no friction rub. No murmur heard. Pulmonary/Chest: Effort normal and breath sounds normal. No respiratory distress. She has no wheezes. She has no rales. She exhibits no tenderness. Abdominal: Soft. Bowel sounds are normal.   Musculoskeletal:        Lumbar back: She exhibits decreased range of motion, tenderness and pain. She exhibits no swelling, no edema and no spasm. Straight leg raise:    Lymphadenopathy:     She has no cervical adenopathy. Neurological: She is alert and oriented to person, place, and time. She has normal reflexes. No cranial nerve deficit. Coordination normal.   Skin: Skin is warm and dry. She is not diaphoretic. Psychiatric: She has a normal mood and affect. Her behavior is normal. Judgment and thought content normal.   Nursing note and vitals reviewed. Assessment & Plan:     1. Anxiety  Refill on an as-needed basis  - ALPRAZolam (XANAX) 0.5 MG tablet; TAKE ONE OR TWO TABLETS BY MOUTH THREE TIMES DAILY AS NEEDED FOR ANXIETY  Dispense: 60 tablet; Refill: 0    2. Chronic bilateral low back pain without sciatica  The patient will benefit from pain management referral and physical therapy  - Ambulatory referral to Pain Clinic  - Ambulatory referral to Physical Therapy    3. Tinea pedis of right foot  Treat with Lotrimin cream twice daily and follow-up if not improved      Return in about 3 months (around 10/12/2019).     Mane Payne MD        Controlled

## 2019-07-25 ENCOUNTER — OFFICE VISIT (OUTPATIENT)
Dept: ENDOCRINOLOGY | Age: 44
End: 2019-07-25
Payer: COMMERCIAL

## 2019-07-25 VITALS — OXYGEN SATURATION: 92 % | SYSTOLIC BLOOD PRESSURE: 132 MMHG | DIASTOLIC BLOOD PRESSURE: 85 MMHG | HEART RATE: 96 BPM

## 2019-07-25 DIAGNOSIS — L30.9 ECZEMA, UNSPECIFIED TYPE: ICD-10-CM

## 2019-07-25 DIAGNOSIS — E11.8 TYPE 2 DIABETES MELLITUS WITH COMPLICATION, WITH LONG-TERM CURRENT USE OF INSULIN (HCC): Primary | ICD-10-CM

## 2019-07-25 DIAGNOSIS — Z79.4 TYPE 2 DIABETES MELLITUS WITH COMPLICATION, WITH LONG-TERM CURRENT USE OF INSULIN (HCC): Primary | ICD-10-CM

## 2019-07-25 LAB
CHP ED QC CHECK: NORMAL
GLUCOSE BLD-MCNC: 202 MG/DL

## 2019-07-25 PROCEDURE — 99213 OFFICE O/P EST LOW 20 MIN: CPT | Performed by: INTERNAL MEDICINE

## 2019-07-25 PROCEDURE — 82962 GLUCOSE BLOOD TEST: CPT | Performed by: INTERNAL MEDICINE

## 2019-08-01 ENCOUNTER — HOSPITAL ENCOUNTER (OUTPATIENT)
Dept: MRI IMAGING | Age: 44
Discharge: HOME | End: 2019-08-03
Payer: COMMERCIAL

## 2019-08-01 VITALS — WEIGHT: 290 LBS | BODY MASS INDEX: 46.81 KG/M2

## 2019-08-01 DIAGNOSIS — G95.0 SYRINX OF SPINAL CORD (HCC): ICD-10-CM

## 2019-08-01 PROCEDURE — 72158 MRI LUMBAR SPINE W/O & W/DYE: CPT

## 2019-08-01 PROCEDURE — A9579 GAD-BASE MR CONTRAST NOS,1ML: HCPCS | Performed by: NEUROLOGICAL SURGERY

## 2019-08-01 PROCEDURE — 6360000004 HC RX CONTRAST MEDICATION: Performed by: NEUROLOGICAL SURGERY

## 2019-08-01 RX ORDER — SODIUM CHLORIDE 0.9 % (FLUSH) 0.9 %
10 SYRINGE (ML) INJECTION 2 TIMES DAILY
Status: DISCONTINUED | OUTPATIENT
Start: 2019-08-01 | End: 2019-08-04 | Stop reason: HOSPADM

## 2019-08-01 RX ADMIN — GADOTERIDOL 20 ML: 279.3 INJECTION, SOLUTION INTRAVENOUS at 18:46

## 2019-08-05 ENCOUNTER — HOSPITAL ENCOUNTER (OUTPATIENT)
Dept: WOMENS IMAGING | Age: 44
Discharge: HOME | End: 2019-08-07
Payer: COMMERCIAL

## 2019-08-05 DIAGNOSIS — Z12.39 BREAST CANCER SCREENING: ICD-10-CM

## 2019-08-05 PROCEDURE — 77067 SCR MAMMO BI INCL CAD: CPT

## 2019-08-11 NOTE — PROGRESS NOTES
Pulmonary HTN ([facility])    HSV-1 (herpes simplex virus 1) infection    Tobacco abuse    Type 2 diabetes mellitus with complication, with long-term current use of insulin ([facility])    Vaginal itching    Vaginal irritation    Weakness of both lower extremities    Lumbar compression fracture, closed, initial encounter ([facility] [address]    Acute midline thoracic back pain    Muscle weakness    Myalgia    Fracture of L1 vertebra due to fall of 12 feet ([facility])       Current Outpatient Medications:     insulin lispro (HUMALOG KWIKPEN) 100 UNIT/ML pen, INJECT 60 units at each meals, Disp: 90 pen, Rfl: 3    insulin glargine (TOUJEO MAX SOLOSTAR) 300 UNIT/ML injection pen, Inject 240 units at bedtime, Disp: 90 pen, Rfl: 3    erythromycin (BRANDON-TAB) 250 MG EC tablet, Take 250 mg by mouth 3 times daily as needed (bacterial infection), Disp: , Rfl:     ALPRAZolam (XANAX) 0.5 MG tablet, TAKE ONE OR TWO TABLETS BY MOUTH THREE TIMES DAILY AS NEEDED FOR ANXIETY, Disp: 60 tablet, Rfl: 0    estradiol (ESTRACE VAGINAL) 0.1 MG/GM vaginal cream, Place 1 g vaginally daily For two weeks then 2-3 times per week, Disp: 1 Tube, Rfl: 3    venlafaxine (EFFEXOR XR) 75 MG extended release capsule, 2 every morning 1 every evening, Disp: 270 capsule, Rfl: 1    metoprolol (LOPRESSOR) 100 MG tablet, Take 1 tablet by mouth 2 times daily, Disp: 180 tablet, Rfl: 1    rosuvastatin (CRESTOR) 10 MG tablet, Take 1 tablet by mouth nightly, Disp: 90 tablet, Rfl: 1    lidocaine (LIDODERM) 5 %, Place 3 patches onto the skin daily 12 hours on, 12 hours off., Disp: 30 patch, Rfl: 0    blood glucose test strips (AGAMATRIX AMP TEST) strip, Checks 2 times daily.  Dx:IDDM--ICD-10 E11.9, Disp: 200 each, Rfl: 3    baclofen (LIORESAL) 10 MG tablet, Take 0.5 tablets by mouth 2 times daily, Disp: 90 tablet, Rfl: 3    metFORMIN (GLUCOPHAGE) 500 MG tablet, Take 1 tablet by mouth 3 times daily, Disp: 270 tablet, Rfl: 3    pantoprazole (PROTONIX) 40 MG tablet, Take 1 tablet by mouth daily, Disp: 60 tablet, Rfl: 2    solifenacin (VESICARE) 10 MG tablet, Take 1 tablet by mouth daily, Disp: 90 tablet, Rfl: 3    hydrochlorothiazide (HYDRODIURIL) 25 MG tablet, Take 1 tablet by mouth daily, Disp: 90 tablet, Rfl: 3    losartan (COZAAR) 50 MG tablet, Take 1 tablet by mouth daily, Disp: 90 tablet, Rfl: 3    promethazine (PHENERGAN) 25 MG tablet, TAKE 1 TABLET BY MOUTH EVERY 8 HOURS AS NEEDED FOR NAUSEA, Disp: 180 tablet, Rfl: 0    vitamin D (CHOLECALCIFEROL) 1000 UNIT TABS tablet, Take 1,000 Units by mouth daily, Disp: , Rfl:     calcium carbonate 600 MG TABS tablet, Take 1 tablet by mouth daily, Disp: , Rfl:     miconazole (MICOTIN) 2 % cream, Apply topically 2 times daily. , Disp: 141 g, Rfl: 3    ondansetron (ZOFRAN-ODT) 4 MG disintegrating tablet, Take 1 tablet by mouth every 8 hours as needed for Nausea or Vomiting, Disp: 40 tablet, Rfl: 1    predniSONE (DELTASONE) 20 MG tablet, Take 5 mg by mouth daily , Disp: , Rfl:     Lancets MISC, Checks 2 times daily. Dx:IDDM--ICD-10 E11.9, Disp: 200 each, Rfl: 3    Insulin Pen Needle (PEN NEEDLES) 32G X 4 MM MISC, Use as directed with insulin pens, 4 times daily, Disp: 400 each, Rfl: 1    meclizine (ANTIVERT) 25 MG tablet, Take 1 tablet by mouth 3 times daily as needed for Dizziness, Disp: 40 tablet, Rfl: 3    Handicap Placard MISC, Exp 5 years, Disp: 1 each, Rfl: 0    lidocaine (LIDODERM) 5 %, Place 1 patch onto the skin daily 12 hours on, 12 hours off., Disp: 30 patch, Rfl: 0    metoclopramide (REGLAN) 10 MG tablet, Take 1 tablet by mouth 2 times daily (before meals) WARNING:  May cause drowsiness. May impair ability to operate vehicles or machinery. Do not use in combination with alcohol. (Patient not taking: Reported on 7/25/2019), Disp: 20 tablet, Rfl: 0    nystatin (MYCOSTATIN) 201819 UNIT/GM powder, Apply 3 times daily.  (Patient not taking: Reported on 7/25/2019), Disp: 1 Bottle, Rfl: 2    acetaminophen (TYLENOL) 325 MG

## 2019-08-15 ENCOUNTER — HOSPITAL ENCOUNTER (OUTPATIENT)
Dept: PHYSICAL THERAPY | Age: 44
Setting detail: THERAPIES SERIES
Discharge: HOME | End: 2019-08-15
Payer: COMMERCIAL

## 2019-08-15 PROCEDURE — 97162 PT EVAL MOD COMPLEX 30 MIN: CPT

## 2019-08-15 PROCEDURE — 97110 THERAPEUTIC EXERCISES: CPT

## 2019-08-15 PROCEDURE — G0283 ELEC STIM OTHER THAN WOUND: HCPCS

## 2019-08-15 ASSESSMENT — PAIN SCALES - GENERAL: PAINLEVEL_OUTOF10: 3

## 2019-08-15 ASSESSMENT — PAIN DESCRIPTION - FREQUENCY: FREQUENCY: INTERMITTENT

## 2019-08-15 ASSESSMENT — PAIN DESCRIPTION - DESCRIPTORS: DESCRIPTORS: ACHING;SHARP

## 2019-08-15 ASSESSMENT — PAIN DESCRIPTION - LOCATION: LOCATION: BACK

## 2019-08-15 ASSESSMENT — PAIN DESCRIPTION - PAIN TYPE: TYPE: CHRONIC PAIN

## 2019-08-15 ASSESSMENT — PAIN DESCRIPTION - ORIENTATION: ORIENTATION: LEFT;RIGHT

## 2019-08-15 NOTE — PROGRESS NOTES
approx 20deg with overuse at L1-2 and mild inc c/o    Strength RLE  Comment: sitting hip grossly 4 to 4+/5 weakest hip abd and ext; Knee 5/5 ext 4+/5 flex; ankle 5/5  Strength LLE  Comment: sitting hip grossly 4 to 4+/5 weakest hip abd and ext; Knee 5/5 ext 4+/5 flex; ankle 5/5     Additional Measures  Flexibility: hamstring 90/90 left -18deg, R -27deg  Special Tests: 22/50= 44% disability Oswestry     Bed mobility  Comment: demonstrates log rolling for back protection, sleeps with RLE in flex, abd and ext rot for pain relief- demonstrated firm jumbo pillow uner knees for pain relief option in supine to create mild hooklying position     Ambulation  Ambulation?: Yes  More Ambulation?: Yes  Ambulation 1  Surface: level tile;carpet  Device: No Device  Assistance: Independent  Quality of Gait: wide DONATO, decreased step lenght bilaterally, pushes off of RLE more than L, decreased knee flex with swing phase, rounded upper trunk with inward facing thumbs  Distance: >200ft   Ambulation 2  Surface - 2: level tile;carpet  Device 2: No device  Assistance 2: Independent  Quality of Gait 2: focused thumbs fwd for more upright posture, and gentle abdominal bracing, mild sorenes, but pt reported that it felt better     Exercises  Exercise 1: lower back stretch sitting fwd bend hands towards floor 10-15 hold x 2  Exercise 2: sitting without back support gentle abdominal bracing 5 hold x 10 reps  Exercise 3: hooklying gentle abdominal bracing tolerated x3, attempt for deeper breathing and exhale with increased pain in right lunmbar- relieved with PT through [facility] R and Piriformis stretch right.    Exercise 4: add standing hip abd for glut medius sets of 5 next session  Exercise 5: add standing hip ext with abdominal bracing, small movement next session  Exercise 6: record distance of walk keeping abdominal bracing   Estim  Assessment   Conditions Requiring Skilled Therapeutic Intervention  Assessment: 44yof with back pain and no

## 2019-08-19 ENCOUNTER — HOSPITAL ENCOUNTER (OUTPATIENT)
Dept: PHYSICAL THERAPY | Age: 44
Setting detail: THERAPIES SERIES
Discharge: HOME | End: 2019-08-19
Payer: COMMERCIAL

## 2019-08-19 DIAGNOSIS — E11.8 TYPE 2 DIABETES MELLITUS WITH COMPLICATION, WITH LONG-TERM CURRENT USE OF INSULIN (HCC): ICD-10-CM

## 2019-08-19 DIAGNOSIS — Z79.4 TYPE 2 DIABETES MELLITUS WITH COMPLICATION, WITH LONG-TERM CURRENT USE OF INSULIN (HCC): ICD-10-CM

## 2019-08-19 PROCEDURE — 97110 THERAPEUTIC EXERCISES: CPT

## 2019-08-19 PROCEDURE — 97140 MANUAL THERAPY 1/> REGIONS: CPT

## 2019-08-19 PROCEDURE — 97530 THERAPEUTIC ACTIVITIES: CPT

## 2019-08-19 PROCEDURE — 97116 GAIT TRAINING THERAPY: CPT

## 2019-08-19 NOTE — PROGRESS NOTES
pain  Long term goal 3: improve posture , decrease fwd head by 1/2 inch, decrease sway back with shoulders over hips demonstrating adominal core strength  Long term goal 4: competent advanced HEP  Long term goal 5: amb >= 500ft mainatining improved gait and posture and back pain <= 2/10  Patient Goals   Patient goals : \"I want to be able to walk and not have my back hurt so bad. \"    Plan:      Plan  Times per week: 2  Plan weeks: 4-5 weeks  Current Treatment Recommendations: Strengthening, Gait Training, Patient/Caregiver Education & Training, Home Exercise Program, Pain Management  Plan Comment: KT tape, estim, CP, MHP, traction        Therapy Time   Individual Concurrent Group Co-treatment   Time In  100         Time Out  200         Minutes  [address] [facility]  License and [address] Number: [id number]

## 2019-08-21 ENCOUNTER — E-VISIT (OUTPATIENT)
Dept: FAMILY MEDICINE CLINIC | Age: 44
End: 2019-08-21
Payer: COMMERCIAL

## 2019-08-21 ENCOUNTER — HOSPITAL ENCOUNTER (OUTPATIENT)
Dept: PHYSICAL THERAPY | Age: 44
Setting detail: THERAPIES SERIES
Discharge: HOME | End: 2019-08-21
Payer: COMMERCIAL

## 2019-08-21 PROCEDURE — 97140 MANUAL THERAPY 1/> REGIONS: CPT

## 2019-08-21 PROCEDURE — 97110 THERAPEUTIC EXERCISES: CPT

## 2019-08-21 PROCEDURE — 98969 PR NONPHYSICIAN ONLINE ASSESSMENT AND MANAGEMENT: CPT | Performed by: NURSE PRACTITIONER

## 2019-08-21 RX ORDER — CIPROFLOXACIN 500 MG/1
500 TABLET, FILM COATED ORAL 2 TIMES DAILY
Qty: 6 TABLET | Refills: 0 | Status: SHIPPED | OUTPATIENT
Start: 2019-08-21 | End: 2019-10-28 | Stop reason: SDUPTHER

## 2019-08-21 ASSESSMENT — PAIN DESCRIPTION - DESCRIPTORS: DESCRIPTORS: SORE

## 2019-08-21 ASSESSMENT — PAIN SCALES - GENERAL: PAINLEVEL_OUTOF10: 2

## 2019-08-21 ASSESSMENT — PAIN DESCRIPTION - PAIN TYPE: TYPE: CHRONIC PAIN

## 2019-08-21 ASSESSMENT — PAIN DESCRIPTION - LOCATION: LOCATION: BACK

## 2019-08-21 ASSESSMENT — PAIN DESCRIPTION - ORIENTATION: ORIENTATION: LOWER

## 2019-08-26 ENCOUNTER — HOSPITAL ENCOUNTER (OUTPATIENT)
Dept: PHYSICAL THERAPY | Age: 44
Setting detail: THERAPIES SERIES
Discharge: HOME | End: 2019-08-26
Payer: COMMERCIAL

## 2019-08-26 PROCEDURE — 97110 THERAPEUTIC EXERCISES: CPT

## 2019-08-26 PROCEDURE — 97140 MANUAL THERAPY 1/> REGIONS: CPT

## 2019-08-26 NOTE — PROGRESS NOTES
Physical Therapy  Daily Treatment Note  Date: 2019  Patient Name: Santiago Osborn  MRN: [id number]     :   1975    Subjective:   General  Chart Reviewed: Yes  Additional Pertinent Hx: polymyalgia rheumatica, fall and back surgery 2018 with T12 kyphoplasty, short rehab stay, now with lower back pain   Referring Practitioner: Dr Amanuel Rico  PT Visit Information  Onset Date: 19  Total # of Visits Approved: 10  Total # of Visits to Date: 4  Plan of Care/Certification Expiration Date: 19  No Show: 0  Canceled Appointment: 0  Subjective  Subjective: Pt states after last session her pain was significantly worse and \"I couldn't make it better\". Pt states \"it was only bad after two days\". But then pt reports she was able to clean her house pain free after those painful two days. She states she had relief when she took her KT tape off. Pt states doing dishes has become less painful since starting therapy.    Pain Screening  Patient Currently in Pain: No  Vital Signs  Patient Currently in Pain: No       Treatment Activities:   Manual therapy  PROM: Passive Stretching B HS to dec tightness with long duration and low load   Soft Tissue Mobalization: MFR lumbar spine in seated flexed over plinth with two pillows (used Biofreeze for further pain relief)                                  Exercises  Exercise 3: abdominal bracing; H5'' x 10   Exercise 4: standing hip extension with abdominal bracing 2 x5 hold 5 sec   Exercise 5: standing hip abduction with abdominal bracing 2x5 hold 5 sec   Exercise 6: capital D's x 10 (VC's for thumbs forward)  Exercise 16: B ext rot; H5'' x 10   Exercise 17: B shoulder extension with abdominal bracing; H3'' 2 x 10 YTB   Exercise 18: LTR; H10'' x 5   Exercise 19: hooklying abdominal bracing with abduction; H5'' x 10   Exercise 20: R HS lacking 26 deg ext, LLE lacking 21 deg ext        Manual therapy  PROM: Passive Stretching B HS to dec tightness with long duration and low Strengthening, Gait Training, Patient/Caregiver Education & Training, Home Exercise Program, Pain Management  Plan Comment: KT tape, estim, CP, MHP, traction        Therapy Time   Individual Concurrent Group Co-treatment   Time In  0900         Time Out  1000         Minutes  [address], PTA  License and [address] Number: [id number]

## 2019-08-29 ENCOUNTER — HOSPITAL ENCOUNTER (OUTPATIENT)
Dept: PHYSICAL THERAPY | Age: 44
Setting detail: THERAPIES SERIES
Discharge: HOME | End: 2019-08-29
Payer: COMMERCIAL

## 2019-08-29 PROCEDURE — 97140 MANUAL THERAPY 1/> REGIONS: CPT

## 2019-08-29 PROCEDURE — 97110 THERAPEUTIC EXERCISES: CPT

## 2019-08-29 RX ORDER — BACLOFEN 10 MG/1
5 TABLET ORAL 2 TIMES DAILY
Qty: 90 TABLET | Refills: 1 | Status: SHIPPED | OUTPATIENT
Start: 2019-08-29 | End: 2020-03-28 | Stop reason: SDUPTHER

## 2019-08-29 RX ORDER — PROMETHAZINE HYDROCHLORIDE 25 MG/1
TABLET ORAL
Qty: 180 TABLET | Refills: 0 | Status: SHIPPED | OUTPATIENT
Start: 2019-08-29 | End: 2020-03-28 | Stop reason: SDUPTHER

## 2019-08-29 RX ORDER — METOPROLOL TARTRATE 100 MG/1
100 TABLET ORAL 2 TIMES DAILY
Qty: 180 TABLET | Refills: 1 | Status: SHIPPED | OUTPATIENT
Start: 2019-08-29 | End: 2020-03-06

## 2019-08-29 NOTE — PROGRESS NOTES
Physical Therapy  Daily Treatment Note  Date: 2019  Patient Name: Daisha Weldon  MRN: [id number]     :   1975    Treatment Diagnosis: low back pain without radicular sx       Subjective:   General  Chart Reviewed: Yes  Additional Pertinent Hx: polymyalgia rheumatica, fall and back surgery 2018 with T12 kyphoplasty, short rehab stay, now with lower back pain   Referring Practitioner: Dr Damon Juares  PT Visit Information  Onset Date: 19  Total # of Visits Approved: 10  Total # of Visits to Date: 5  Plan of Care/Certification Expiration Date: 19  No Show: 0  Canceled Appointment: 0  Subjective  Subjective: Pt. denies pain at the moment and states yesterday pain was 5/10. \" I'm not sure if it was the tape or the ball exercises we did but I took that tape off that same night. \"  Pain Screening  Patient Currently in Pain: No  Vital Signs  Patient Currently in Pain: No       Treatment Activities:   Manual therapy  PROM: Passive stretching B HS, Hip IR and ER to  improve flexibility and decrease tension   Soft Tissue Mobalization: MFR lumbar spine in seated flexed over plinth with two pillows (used Biofreeze for further pain relief)                                  Exercises  Exercise 4: standing hip extension with abdominal bracing x10  hold 5 sec   Exercise 5: standing hip abduction with abdominal bracing x10 hold 5 sec   Exercise 6: capital D's x 10 (VC's for thumbs forward)  Exercise 8: tall seated hamstring stretch hold 30 sec x 3 ea.     Exercise 14: hooklying march with abdominal bracing x5 alternating   Exercise 15: hooklying walkouts 2 tiny steps forward and back with abdominal brace x 5   Exercise 16: B ext rot; H5'' x 10   Exercise 17: B shoulder extension with abdominal bracing; H3'' 2 x 10 YTB      Modalities  Cryotherapy (Minutes\Location): Issued ice to go   Manual therapy  PROM: Passive stretching B HS, Hip IR and ER to  improve flexibility and decrease tension   Soft Tissue

## 2019-08-29 NOTE — TELEPHONE ENCOUNTER
Pt called back and said that is fine but she has seen gastro and has been diagnosed with gastroparesis and she discussed this with you at the appointment--please adise--ah

## 2019-08-29 NOTE — TELEPHONE ENCOUNTER
Please let patient know that if she needs phenergan at that dosage regularly, we will likely need to follow up with GI

## 2019-09-04 ENCOUNTER — HOSPITAL ENCOUNTER (OUTPATIENT)
Dept: PHYSICAL THERAPY | Age: 44
Setting detail: THERAPIES SERIES
Discharge: HOME | End: 2019-09-04
Payer: COMMERCIAL

## 2019-09-09 ENCOUNTER — HOSPITAL ENCOUNTER (OUTPATIENT)
Dept: PHYSICAL THERAPY | Age: 44
Setting detail: THERAPIES SERIES
Discharge: HOME | End: 2019-09-09
Payer: COMMERCIAL

## 2019-09-09 PROCEDURE — 97110 THERAPEUTIC EXERCISES: CPT

## 2019-09-09 PROCEDURE — 97140 MANUAL THERAPY 1/> REGIONS: CPT

## 2019-09-12 ENCOUNTER — HOSPITAL ENCOUNTER (OUTPATIENT)
Dept: PHYSICAL THERAPY | Age: 44
Setting detail: THERAPIES SERIES
Discharge: HOME | End: 2019-09-12
Payer: COMMERCIAL

## 2019-09-12 PROCEDURE — 97140 MANUAL THERAPY 1/> REGIONS: CPT

## 2019-09-12 PROCEDURE — 97110 THERAPEUTIC EXERCISES: CPT

## 2019-09-19 ENCOUNTER — HOSPITAL ENCOUNTER (OUTPATIENT)
Dept: PHYSICAL THERAPY | Age: 44
Setting detail: THERAPIES SERIES
Discharge: HOME | End: 2019-09-19
Payer: COMMERCIAL

## 2019-09-19 PROCEDURE — 97116 GAIT TRAINING THERAPY: CPT

## 2019-09-19 PROCEDURE — 97530 THERAPEUTIC ACTIVITIES: CPT

## 2019-09-19 PROCEDURE — 97110 THERAPEUTIC EXERCISES: CPT

## 2019-09-19 PROCEDURE — 97140 MANUAL THERAPY 1/> REGIONS: CPT

## 2019-09-19 ASSESSMENT — PAIN DESCRIPTION - DESCRIPTORS: DESCRIPTORS: SORE

## 2019-09-19 ASSESSMENT — PAIN DESCRIPTION - FREQUENCY: FREQUENCY: INTERMITTENT

## 2019-09-19 ASSESSMENT — PAIN SCALES - GENERAL: PAINLEVEL_OUTOF10: 5

## 2019-09-19 ASSESSMENT — PAIN DESCRIPTION - LOCATION: LOCATION: BACK

## 2019-09-19 ASSESSMENT — PAIN DESCRIPTION - PAIN TYPE: TYPE: CHRONIC PAIN

## 2019-09-19 ASSESSMENT — PAIN DESCRIPTION - ORIENTATION: ORIENTATION: LOWER

## 2019-09-19 NOTE — PROGRESS NOTES
Physical Therapy  Discharge/Daily Treatment Note  Date: 2019  Patient Name: Todd Sr  MRN: [id number]     :   1975    Subjective:   General  Chart Reviewed: Yes  Additional Pertinent Hx: polymyalgia rheumatica, fall and back surgery 2018 with T12 kyphoplasty, short rehab stay, now with lower back pain   Referring Practitioner: Dr Josephine Ceron  PT Visit Information  Onset Date: 19  Total # of Visits Approved: 10  Total # of Visits to Date: 8  Plan of Care/Certification Expiration Date: 10/18/19  No Show: 0  Canceled Appointment: 1  Subjective  Subjective: Pt states bad flare up today starting this AM, pain to 6/10 . Before that the painwas at a 1/10. Pt now able to wlak into work with better posture without pain, becoming a habit.    General Comment  Comments: Racted to KT tape after 8 hours- defers use; MRI shows changes at L1-2  Pain Screening  Patient Currently in Pain: Yes  Pain Assessment  Pain Assessment: 0-10  Pain Level: 5  Patient's Stated Pain Goal: 2  Pain Type: Chronic pain  Pain Location: Back  Pain Orientation: Lower  Pain Descriptors: Sore  Pain Frequency: Intermittent(less unless flare up days)  Vital Signs  Patient Currently in Pain: Yes  Patient Observation  Observations: standing posture shoulders slightly rounded but improved upright, head now on top of hip in neurtral= 1/2 inch behind fwd shoulders- improved by 2 inches!, mild decrease in kyphosis- more neutral, lumbar mild decrease       Treatment Activities:     Palpation in sitting with less stress and no pain to papalation at T12-L2 for recheck 19  Ambulation  Ambulation?: Yes  Ambulation 1  Surface: level tile;carpet  Device: No Device  Assistance: Independent  Quality of Gait: better pace, better upright posture, thumbs adndfwd rounded shoulders still present but improved, less increase in kyphosis, better heel strike and equal heel strike bilaterally,  500ft with slight increase in pain to 6/10 in low back and all over with flareup 9/19/19  Distance: 500ft  Comments: pain pre walk 5/10  all over, post walk 5.5/10 only slight increase, improved psoture and decrased swayback throughtou 500 ft ith no cuing. AROM RLE (degrees)  RLE General AROM: AAROM hamstring 90/90 -20deg; increase of 7 from eval   AROM LLE (degrees)  LLE General AROM: AAROM hamstring -14 deg , increase of 4 deg fromeval   Spine  Lumbar: flex 90% with inc pain, RROT and LROT [id number]%, LSB adn RSB WFL; backbend/ext inc from 20 to approx 30 deg. Strength RLE  Comment: sitting hip grossly 4+/5; Knee 5/5 ext and flex; ankle 5/5  Strength LLE  Comment: sitting hip 4+/5 improoved abd and ext, knee 5/5,ankle 5/5    Exercises  Exercise 2: sitting hamstring stretch 45 sec x 2 reps   Exercise 20: Oswestry 24/50= 48% disablity; increase from 44% at eval due to flare up at recheck on 9/19/19         Assessment:   Conditions Requiring Skilled Therapeutic Intervention  Body structures, Functions, Activity limitations: Decreased functional mobility ; Increased Pain;Decreased strength;Decreased ROM  Assessment: Pt with improved posture, increased core for functional walking with resulting general decrease in LBP, no radiculat c/o, increase LE strength, increased hamstring ROM to decrease back pressure/pain, doing HEP at least one time daily. Pt  and PT feel pt ready for discharge  to [facility] as long as no flare up in the next 2 weeks. Today will be discharge note, unless further contact and schedulingof PT by pt by 10/3/19. Treatment Diagnosis: low back pain without radicular sx  Prognosis: Good  Barriers to Learning: educated jt protection with emptying - square hips with gentl lunge \" dance with your partner \" to avoid bend and twist combintation = isaias in low back- pt understands. PT to continue HEP for stretching and strenghtening of hips and core.    REQUIRES PT FOLLOW UP: Yes  Activity Tolerance  Activity Tolerance: Patient Tolerated treatment well  Comments: Pain post 1/10 all over- \" stretching and walking helped\"     Goals:  Short term goals  Time Frame for Short term goals: 1-2 weeks  Short term goal 1: pt reporting HEP at least one time daily(previously met to recheck 9/19/19)  Short term goal 2: pt reporting any decrease in low back pain with walking-GOAL MET (les painful unless flare up 9/19/19)  Short term goal 3: increase bilateral hamstring 90/90 by >=5 degrees  Short term goal 4: amb >= 300ft maintaitning upright posture and gentle abdominal bracing of core  Long term goals  Time Frame for Long term goals : 4-5 weeks  Long term goal 1: Oswestry <= 20% disabillity(not met 9/19/19 due to vrall body flareup, pt says impoved)  Long term goal 2: increase bilateral hip and core strength any amount to achieve decrease in back pain(met 9/19/19)  Long term goal 3: improve posture , decrease fwd head by 1/2 inch, decrease sway back with shoulders over hips demonstrating adominal core strength(met 9/19/19)  Long term goal 4: competent advanced HEP(P MET 9/19/19 to pt abiltiy level)  Long term goal 5: amb >= 500ft maintaining improved gait and posture and back pain <= 2/10(met posture , p met pain 9/19/19)    Plan:  Hold chart 2 weeks if flare up and need for PT per pt, otherwise discharge to HEP with goo progress in pain relief, core and hip strengthening, posture, gait and jt protection.       Therapy Time   Individual Concurrent Group Co-treatment   Time In  1459         Time Out  1255         Minutes  Roney Woodrow Van [address], [id number]

## 2019-09-30 RX ORDER — PANTOPRAZOLE SODIUM 40 MG/1
TABLET, DELAYED RELEASE ORAL
Qty: 60 TABLET | Refills: 2 | Status: SHIPPED | OUTPATIENT
Start: 2019-09-30 | End: 2020-03-28 | Stop reason: SDUPTHER

## 2019-10-07 ENCOUNTER — TELEPHONE (OUTPATIENT)
Dept: NEUROLOGY | Age: 44
End: 2019-10-07

## 2019-10-10 ENCOUNTER — HOSPITAL ENCOUNTER (OUTPATIENT)
Dept: LAB | Age: 44
Discharge: HOME | End: 2019-10-10
Payer: COMMERCIAL

## 2019-10-10 LAB
C-REACTIVE PROTEIN: 39.6 MG/L (ref 0–5)
SEDIMENTATION RATE, ERYTHROCYTE: 56 MM (ref 0–20)

## 2019-10-10 PROCEDURE — 86140 C-REACTIVE PROTEIN: CPT

## 2019-10-10 PROCEDURE — 36415 COLL VENOUS BLD VENIPUNCTURE: CPT

## 2019-10-10 PROCEDURE — 85652 RBC SED RATE AUTOMATED: CPT

## 2019-10-11 DIAGNOSIS — E11.8 TYPE 2 DIABETES MELLITUS WITH COMPLICATION, WITH LONG-TERM CURRENT USE OF INSULIN (HCC): ICD-10-CM

## 2019-10-11 DIAGNOSIS — Z79.4 TYPE 2 DIABETES MELLITUS WITH COMPLICATION, WITH LONG-TERM CURRENT USE OF INSULIN (HCC): ICD-10-CM

## 2019-10-15 ENCOUNTER — CARE COORDINATION (OUTPATIENT)
Dept: OTHER | Facility: CLINIC | Age: 44
End: 2019-10-15

## 2019-10-15 SDOH — HEALTH STABILITY: MENTAL HEALTH
STRESS IS WHEN SOMEONE FEELS TENSE, NERVOUS, ANXIOUS, OR CAN'T SLEEP AT NIGHT BECAUSE THEIR MIND IS TROUBLED. HOW STRESSED ARE YOU?: TO SOME EXTENT

## 2019-10-15 SDOH — HEALTH STABILITY: PHYSICAL HEALTH: ON AVERAGE, HOW MANY DAYS PER WEEK DO YOU ENGAGE IN MODERATE TO STRENUOUS EXERCISE (LIKE A BRISK WALK)?: 1 DAY

## 2019-10-15 SDOH — SOCIAL STABILITY: SOCIAL NETWORK: HOW OFTEN DO YOU ATTENT MEETINGS OF THE CLUB OR ORGANIZATION YOU BELONG TO?: NEVER

## 2019-10-15 SDOH — SOCIAL STABILITY: SOCIAL NETWORK: HOW OFTEN DO YOU ATTEND CHURCH OR RELIGIOUS SERVICES?: MORE THAN 4 TIMES PER YEAR

## 2019-10-15 SDOH — ECONOMIC STABILITY: FOOD INSECURITY: WITHIN THE PAST 12 MONTHS, THE FOOD YOU BOUGHT JUST DIDN'T LAST AND YOU DIDN'T HAVE MONEY TO GET MORE.: NEVER TRUE

## 2019-10-15 SDOH — SOCIAL STABILITY: SOCIAL NETWORK: ARE YOU MARRIED, WIDOWED, DIVORCED, SEPARATED, NEVER MARRIED, OR LIVING WITH A PARTNER?: DIVORCED

## 2019-10-15 SDOH — HEALTH STABILITY: MENTAL HEALTH: HOW OFTEN DO YOU HAVE A DRINK CONTAINING ALCOHOL?: MONTHLY OR LESS

## 2019-10-15 SDOH — SOCIAL STABILITY: SOCIAL NETWORK: IN A TYPICAL WEEK, HOW MANY TIMES DO YOU TALK ON THE PHONE WITH FAMILY, FRIENDS, OR NEIGHBORS?: THREE TIMES A WEEK

## 2019-10-15 SDOH — ECONOMIC STABILITY: FOOD INSECURITY: WITHIN THE PAST 12 MONTHS, YOU WORRIED THAT YOUR FOOD WOULD RUN OUT BEFORE YOU GOT MONEY TO BUY MORE.: NEVER TRUE

## 2019-10-15 SDOH — HEALTH STABILITY: PHYSICAL HEALTH: ON AVERAGE, HOW MANY MINUTES DO YOU ENGAGE IN EXERCISE AT THIS LEVEL?: 10 MIN

## 2019-10-15 SDOH — ECONOMIC STABILITY: INCOME INSECURITY: HOW HARD IS IT FOR YOU TO PAY FOR THE VERY BASICS LIKE FOOD, HOUSING, MEDICAL CARE, AND HEATING?: NOT VERY HARD

## 2019-10-15 SDOH — SOCIAL STABILITY: SOCIAL NETWORK: HOW OFTEN DO YOU GET TOGETHER WITH FRIENDS OR RELATIVES?: ONCE A WEEK

## 2019-10-15 SDOH — ECONOMIC STABILITY: TRANSPORTATION INSECURITY
IN THE PAST 12 MONTHS, HAS THE LACK OF TRANSPORTATION KEPT YOU FROM MEDICAL APPOINTMENTS OR FROM GETTING MEDICATIONS?: NO

## 2019-10-15 SDOH — ECONOMIC STABILITY: TRANSPORTATION INSECURITY
IN THE PAST 12 MONTHS, HAS LACK OF TRANSPORTATION KEPT YOU FROM MEETINGS, WORK, OR FROM GETTING THINGS NEEDED FOR DAILY LIVING?: NO

## 2019-10-15 SDOH — SOCIAL STABILITY: SOCIAL NETWORK
DO YOU BELONG TO ANY CLUBS OR ORGANIZATIONS SUCH AS CHURCH GROUPS UNIONS, FRATERNAL OR ATHLETIC GROUPS, OR SCHOOL GROUPS?: NO

## 2019-10-16 ENCOUNTER — OFFICE VISIT (OUTPATIENT)
Dept: NEUROLOGY | Age: 44
End: 2019-10-16
Payer: COMMERCIAL

## 2019-10-16 VITALS
SYSTOLIC BLOOD PRESSURE: 148 MMHG | WEIGHT: 293 LBS | RESPIRATION RATE: 16 BRPM | HEIGHT: 66 IN | BODY MASS INDEX: 47.09 KG/M2 | DIASTOLIC BLOOD PRESSURE: 85 MMHG | HEART RATE: 86 BPM

## 2019-10-16 DIAGNOSIS — M35.3 POLYMYALGIA RHEUMATICA (HCC): Primary | ICD-10-CM

## 2019-10-16 PROCEDURE — 99214 OFFICE O/P EST MOD 30 MIN: CPT | Performed by: PSYCHIATRY & NEUROLOGY

## 2019-10-16 ASSESSMENT — ENCOUNTER SYMPTOMS
PHOTOPHOBIA: 0
NAUSEA: 0
SHORTNESS OF BREATH: 0
CHOKING: 0
BACK PAIN: 0
TROUBLE SWALLOWING: 0
VOMITING: 0

## 2019-10-23 DIAGNOSIS — Z79.4 TYPE 2 DIABETES MELLITUS WITH COMPLICATION, WITH LONG-TERM CURRENT USE OF INSULIN (HCC): ICD-10-CM

## 2019-10-23 DIAGNOSIS — E11.8 TYPE 2 DIABETES MELLITUS WITH COMPLICATION, WITH LONG-TERM CURRENT USE OF INSULIN (HCC): ICD-10-CM

## 2019-10-23 DIAGNOSIS — K52.9 GASTROENTERITIS: ICD-10-CM

## 2019-10-23 RX ORDER — ONDANSETRON 4 MG/1
4 TABLET, ORALLY DISINTEGRATING ORAL EVERY 8 HOURS PRN
Qty: 40 TABLET | Refills: 1 | Status: SHIPPED | OUTPATIENT
Start: 2019-10-23 | End: 2020-01-06 | Stop reason: SDUPTHER

## 2019-10-23 RX ORDER — PEN NEEDLE, DIABETIC 30 GX3/16"
NEEDLE, DISPOSABLE MISCELLANEOUS
Qty: 400 EACH | Refills: 1 | Status: SHIPPED | OUTPATIENT
Start: 2019-10-23 | End: 2021-01-16 | Stop reason: SDUPTHER

## 2019-10-24 ENCOUNTER — CARE COORDINATION (OUTPATIENT)
Dept: OTHER | Facility: CLINIC | Age: 44
End: 2019-10-24

## 2019-10-25 ENCOUNTER — CARE COORDINATION (OUTPATIENT)
Dept: OTHER | Facility: CLINIC | Age: 44
End: 2019-10-25

## 2019-10-28 ENCOUNTER — E-VISIT (OUTPATIENT)
Dept: PRIMARY CARE CLINIC | Age: 44
End: 2019-10-28
Payer: COMMERCIAL

## 2019-10-28 DIAGNOSIS — N39.0 URINARY TRACT INFECTION WITHOUT HEMATURIA, SITE UNSPECIFIED: Primary | ICD-10-CM

## 2019-10-28 PROCEDURE — 98969 PR NONPHYSICIAN ONLINE ASSESSMENT AND MANAGEMENT: CPT | Performed by: NURSE PRACTITIONER

## 2019-10-28 RX ORDER — CIPROFLOXACIN 500 MG/1
500 TABLET, FILM COATED ORAL 2 TIMES DAILY
Qty: 6 TABLET | Refills: 0 | Status: SHIPPED | OUTPATIENT
Start: 2019-10-28 | End: 2020-09-26 | Stop reason: SDUPTHER

## 2019-10-31 ENCOUNTER — CARE COORDINATION (OUTPATIENT)
Dept: OTHER | Facility: CLINIC | Age: 44
End: 2019-10-31

## 2019-11-06 ENCOUNTER — TELEPHONE (OUTPATIENT)
Dept: NEUROLOGY | Age: 44
End: 2019-11-06

## 2019-11-06 DIAGNOSIS — M35.3 POLYMYALGIA RHEUMATICA (HCC): Primary | ICD-10-CM

## 2019-11-08 ENCOUNTER — HOSPITAL ENCOUNTER (OUTPATIENT)
Dept: ULTRASOUND IMAGING | Age: 44
Discharge: HOME | End: 2019-11-10
Payer: COMMERCIAL

## 2019-11-08 ENCOUNTER — OFFICE VISIT (OUTPATIENT)
Dept: FAMILY MEDICINE CLINIC | Age: 44
End: 2019-11-08
Payer: COMMERCIAL

## 2019-11-08 VITALS
BODY MASS INDEX: 46.77 KG/M2 | TEMPERATURE: 98.1 F | SYSTOLIC BLOOD PRESSURE: 124 MMHG | RESPIRATION RATE: 12 BRPM | OXYGEN SATURATION: 91 % | HEIGHT: 66 IN | HEART RATE: 90 BPM | WEIGHT: 291 LBS | DIASTOLIC BLOOD PRESSURE: 72 MMHG

## 2019-11-08 DIAGNOSIS — M79.662 PAIN OF LEFT CALF: Primary | ICD-10-CM

## 2019-11-08 DIAGNOSIS — M79.605 LEFT LEG PAIN: ICD-10-CM

## 2019-11-08 PROCEDURE — 99213 OFFICE O/P EST LOW 20 MIN: CPT | Performed by: NURSE PRACTITIONER

## 2019-11-08 PROCEDURE — 93971 EXTREMITY STUDY: CPT

## 2019-11-08 ASSESSMENT — ENCOUNTER SYMPTOMS: SHORTNESS OF BREATH: 0

## 2019-11-09 DIAGNOSIS — E78.5 DYSLIPIDEMIA: ICD-10-CM

## 2019-11-11 RX ORDER — VENLAFAXINE HYDROCHLORIDE 75 MG/1
CAPSULE, EXTENDED RELEASE ORAL
Qty: 270 CAPSULE | Refills: 1 | Status: SHIPPED | OUTPATIENT
Start: 2019-11-11 | End: 2019-11-15 | Stop reason: SDUPTHER

## 2019-11-14 ENCOUNTER — CARE COORDINATION (OUTPATIENT)
Dept: OTHER | Facility: CLINIC | Age: 44
End: 2019-11-14

## 2019-11-15 RX ORDER — VENLAFAXINE HYDROCHLORIDE 75 MG/1
CAPSULE, EXTENDED RELEASE ORAL
Qty: 270 CAPSULE | Refills: 1 | Status: SHIPPED | OUTPATIENT
Start: 2019-11-15 | End: 2020-11-04 | Stop reason: SDUPTHER

## 2019-11-15 RX ORDER — ROSUVASTATIN CALCIUM 10 MG/1
TABLET, COATED ORAL
Qty: 90 TABLET | Refills: 1 | Status: SHIPPED | OUTPATIENT
Start: 2019-11-15 | End: 2020-07-28 | Stop reason: SDUPTHER

## 2019-11-18 ENCOUNTER — HOSPITAL ENCOUNTER (OUTPATIENT)
Dept: LAB | Age: 44
Discharge: HOME | End: 2019-11-18
Payer: COMMERCIAL

## 2019-11-18 ENCOUNTER — OFFICE VISIT (OUTPATIENT)
Dept: FAMILY MEDICINE CLINIC | Age: 44
End: 2019-11-18
Payer: COMMERCIAL

## 2019-11-18 ENCOUNTER — HOSPITAL ENCOUNTER (OUTPATIENT)
Age: 44
Setting detail: SPECIMEN
Discharge: HOME | End: 2019-11-18
Payer: COMMERCIAL

## 2019-11-18 VITALS
SYSTOLIC BLOOD PRESSURE: 122 MMHG | HEART RATE: 88 BPM | DIASTOLIC BLOOD PRESSURE: 60 MMHG | HEIGHT: 66 IN | OXYGEN SATURATION: 96 % | WEIGHT: 293 LBS | RESPIRATION RATE: 16 BRPM | BODY MASS INDEX: 47.09 KG/M2 | TEMPERATURE: 97 F

## 2019-11-18 DIAGNOSIS — R35.0 URINE FREQUENCY: Primary | ICD-10-CM

## 2019-11-18 DIAGNOSIS — Z79.4 TYPE 2 DIABETES MELLITUS WITH COMPLICATION, WITH LONG-TERM CURRENT USE OF INSULIN (HCC): ICD-10-CM

## 2019-11-18 DIAGNOSIS — E11.8 TYPE 2 DIABETES MELLITUS WITH COMPLICATION, WITH LONG-TERM CURRENT USE OF INSULIN (HCC): ICD-10-CM

## 2019-11-18 DIAGNOSIS — R39.9 UTI SYMPTOMS: ICD-10-CM

## 2019-11-18 DIAGNOSIS — R35.0 URINE FREQUENCY: ICD-10-CM

## 2019-11-18 LAB
ANION GAP SERPL CALCULATED.3IONS-SCNC: 15 MEQ/L (ref 9–15)
BILIRUBIN, POC: NORMAL
BLOOD URINE, POC: NORMAL
BUN BLDV-MCNC: 18 MG/DL (ref 6–20)
CALCIUM SERPL-MCNC: 9.6 MG/DL (ref 8.5–9.9)
CHLORIDE BLD-SCNC: 99 MEQ/L (ref 95–107)
CLARITY, POC: CLEAR
CO2: 26 MEQ/L (ref 20–31)
COLOR, POC: YELLOW
CREAT SERPL-MCNC: 0.64 MG/DL (ref 0.5–0.9)
CREATININE URINE: 151 MG/DL
GFR AFRICAN AMERICAN: >60
GFR NON-AFRICAN AMERICAN: >60
GLUCOSE BLD-MCNC: 229 MG/DL (ref 70–99)
GLUCOSE URINE, POC: NORMAL
HBA1C MFR BLD: 9 % (ref 4.8–5.9)
KETONES, POC: NORMAL
LEUKOCYTE EST, POC: NORMAL
MICROALBUMIN UR-MCNC: 12.1 MG/DL
MICROALBUMIN/CREAT UR-RTO: 80.1 MG/G (ref 0–30)
NITRITE, POC: NORMAL
PH, POC: 6
POTASSIUM SERPL-SCNC: 3.9 MEQ/L (ref 3.4–4.9)
PROTEIN, POC: NORMAL
SODIUM BLD-SCNC: 140 MEQ/L (ref 135–144)
SPECIFIC GRAVITY, POC: 1.02
UROBILINOGEN, POC: NORMAL

## 2019-11-18 PROCEDURE — 81003 URINALYSIS AUTO W/O SCOPE: CPT | Performed by: PHYSICIAN ASSISTANT

## 2019-11-18 PROCEDURE — 36415 COLL VENOUS BLD VENIPUNCTURE: CPT

## 2019-11-18 PROCEDURE — 82570 ASSAY OF URINE CREATININE: CPT

## 2019-11-18 PROCEDURE — 83036 HEMOGLOBIN GLYCOSYLATED A1C: CPT

## 2019-11-18 PROCEDURE — 82043 UR ALBUMIN QUANTITATIVE: CPT

## 2019-11-18 PROCEDURE — 80048 BASIC METABOLIC PNL TOTAL CA: CPT

## 2019-11-18 PROCEDURE — 87086 URINE CULTURE/COLONY COUNT: CPT

## 2019-11-18 PROCEDURE — 99213 OFFICE O/P EST LOW 20 MIN: CPT | Performed by: PHYSICIAN ASSISTANT

## 2019-11-18 RX ORDER — NITROFURANTOIN 25; 75 MG/1; MG/1
100 CAPSULE ORAL 2 TIMES DAILY
Qty: 20 CAPSULE | Refills: 0 | Status: SHIPPED | OUTPATIENT
Start: 2019-11-18 | End: 2019-11-28

## 2019-11-19 ASSESSMENT — ENCOUNTER SYMPTOMS
NAUSEA: 0
VOMITING: 0

## 2019-11-20 LAB — URINE CULTURE, ROUTINE: NORMAL

## 2019-11-21 ENCOUNTER — HOSPITAL ENCOUNTER (OUTPATIENT)
Dept: LAB | Age: 44
Discharge: HOME | End: 2019-11-21
Payer: COMMERCIAL

## 2019-11-21 DIAGNOSIS — M35.3 POLYMYALGIA RHEUMATICA (HCC): ICD-10-CM

## 2019-11-21 LAB
C-REACTIVE PROTEIN: 38.6 MG/L (ref 0–5)
SEDIMENTATION RATE, ERYTHROCYTE: 60 MM (ref 0–20)

## 2019-11-21 PROCEDURE — 36415 COLL VENOUS BLD VENIPUNCTURE: CPT

## 2019-11-21 PROCEDURE — 85652 RBC SED RATE AUTOMATED: CPT

## 2019-11-21 PROCEDURE — 86140 C-REACTIVE PROTEIN: CPT

## 2019-12-03 ENCOUNTER — CARE COORDINATION (OUTPATIENT)
Dept: OTHER | Facility: CLINIC | Age: 44
End: 2019-12-03

## 2019-12-11 ENCOUNTER — CARE COORDINATION (OUTPATIENT)
Dept: OTHER | Facility: CLINIC | Age: 44
End: 2019-12-11

## 2019-12-16 ENCOUNTER — CARE COORDINATION (OUTPATIENT)
Dept: OTHER | Facility: CLINIC | Age: 44
End: 2019-12-16

## 2019-12-24 ENCOUNTER — CARE COORDINATION (OUTPATIENT)
Dept: OTHER | Facility: CLINIC | Age: 44
End: 2019-12-24

## 2020-01-03 ENCOUNTER — CARE COORDINATION (OUTPATIENT)
Dept: OTHER | Facility: CLINIC | Age: 45
End: 2020-01-03

## 2020-01-03 NOTE — CARE COORDINATION
Ambulatory Care Coordination Note  1/3/2020  CM Risk Score: 9  Charlson 10 Year Mortality Risk Score: 23%     ACC: Gee Prescott RN    Summary Note: Called pt to follow up on progress, discuss new issues or concerns, and reinforce/ provide pt education. DM II- Pt states she has been able to decrease her prednisone to 15 mg which has been helping her BG  She is till usually in the 200's but is not having the spikes up in the 300's anymore. She states she has been on PTO for the past 2 weeks and will be starting back to work next week. Her eating schedule has been a little off because of not being in the structure of the work day. She is looking forward to getting back into routine. She continues to follow her DM diet and felt she did very well over the holiday with being compliant. She denies any new issues or concerns. PMSR-  She thinks she would like ot wait until her next neurology appt to discuss the rheumatology referral or at least until she get her new insurance cards.     -Reinforced/ Provided Education on:  Importance and benefits self monitoring, medication, and diet compliance. Importance and benefits of routine follow up appointments and testing compliance. Reinforced to call PCP office with acute needs and appropriate level of care and place of service options for acute needs. Plan:   - Continue Telephonic follow up   - Continue support and education as needed  Pt verbalized understanding and is agreeable to follow up contact.            Care Coordination Interventions    Program Enrollment:  Complex Care  Referral from Primary Care Provider:  No  Suggested Interventions and Community Resources  Diabetes Education:  Completed (Comment: 10/15/19- completed previously)  Fall Risk Prevention:  Completed (Comment: 10/15/19- Falls Prevention and home safety measure discussed)  Specialty Services Referral:  In Process (Comment: 12/11/19: Reumatology referral- network issues referral nt completed)  Zone Management Tools:  Completed (Comment: DM II)         Goals Addressed                 This Visit's Progress     Conditions and Symptoms   On track     I will schedule office visits, as directed by my provider. I will notify my provider of any barriers to my plan of care. I will notify my provider of any symptoms that indicate a worsening of my condition. Barriers: lack of support and overwhelmed by complexity of regimen  Plan for overcoming my barriers: Will work with ACM and providers  Confidence: 9/10  Anticipated Goal Completion Date: 11/30/19; 12/30/19 12/11/19: Goal completion date revised as interventions still in process. Pt is compliant with visits and Provider contact. Still needs to establish with specialist.          Medication Management   On track     I will take my medication as directed. I will notify my provider of any problems with medications, like adverse effects or side effects. I will notify my provider for advice before I stop taking any of my medication. Barriers: medication side effects  Plan for overcoming my barriers: Will work with ACM and Providers  Confidence: 7/10  Anticipated Goal Completion Date: 11/15/; 12/30/19    12/11/19: goal completion date updated as interventions still in process. Pt is communicating with specialist in regards to adjustments on mediations to limit SE            Prior to Admission medications    Medication Sig Start Date End Date Taking?  Authorizing Provider   venlafaxine (EFFEXOR XR) 75 MG extended release capsule TAKE TWO CAPSULES BY MOUTH EVERY MORNING AND TAKE ONE CAPSULE BY MOUTH EVERY EVENING 11/15/19   Maryjane Hernández MD   rosuvastatin (CRESTOR) 10 MG tablet TAKE ONE TABLET BY MOUTH NIGHTLY 11/15/19   Maryjane Hernández MD   ondansetron (ZOFRAN-ODT) 4 MG disintegrating tablet Take 1 tablet by mouth every 8 hours as needed for Nausea or Vomiting 10/23/19   Oleg Coles MD   Insulin Pen Needle (PEN NEEDLES) 32G X 4 MM MISC Use as directed with insulin pens, 4 times daily 10/23/19   Neisha Delgadillo MD   insulin lispro (HUMALOG KWIKPEN) 100 UNIT/ML pen INJECT 60 units at each meals 10/11/19   Neisha Delgadillo MD   pantoprazole (PROTONIX) 40 MG tablet TAKE 1 TABLET BY MOUTH ONE TIME A DAY 9/30/19   Rudi Benson MD   promethazine (PHENERGAN) 25 MG tablet TAKE 1 TABLET BY MOUTH EVERY 8 HOURS AS NEEDED FOR NAUSEA 8/29/19   Dani Moritz, MD   baclofen (LIORESAL) 10 MG tablet Take 0.5 tablets by mouth 2 times daily 8/29/19   Dani Moritz, MD   metoprolol (LOPRESSOR) 100 MG tablet Take 1 tablet by mouth 2 times daily 8/29/19   Dani Moritz, MD   metFORMIN (GLUCOPHAGE) 500 MG tablet Take 1 tablet by mouth 3 times daily 8/21/19   Neisha Delgadillo MD   blood glucose test strips (AGAMATRIX AMP TEST) strip Checks 2 times daily. Dx:IDDM--ICD-10 E11.9 8/21/19   Neisha Delgadillo MD   insulin glargine (TOUJEO MAX SOLOSTAR) 300 UNIT/ML injection pen Inject 240 units at bedtime 8/21/19   Neisha Delgadillo MD   erythromycin (BRANDON-TAB) 250 MG EC tablet Take 250 mg by mouth 3 times daily as needed (bacterial infection)    Historical Provider, MD   estradiol (ESTRACE VAGINAL) 0.1 MG/GM vaginal cream Place 1 g vaginally daily For two weeks then 2-3 times per week 6/10/19   Dalton Do,    lidocaine (LIDODERM) 5 % Place 3 patches onto the skin daily 12 hours on, 12 hours off. 5/7/19   Dani Moritz, MD   solifenacin (VESICARE) 10 MG tablet Take 1 tablet by mouth daily 4/1/19 3/31/20  Jackelyn Huff MD   hydrochlorothiazide (HYDRODIURIL) 25 MG tablet Take 1 tablet by mouth daily 4/1/19   Dani Moritz, MD   losartan (COZAAR) 50 MG tablet Take 1 tablet by mouth daily 4/1/19   Dani Moritz, MD   metoclopramide (REGLAN) 10 MG tablet Take 1 tablet by mouth 2 times daily (before meals) WARNING:  May cause drowsiness. May impair ability to operate vehicles or machinery. Do not use in combination with alcohol.  2/24/19   Darrell Curtis MD   nystatin (MYCOSTATIN) 472882 UNIT/GM powder Apply 3 times daily. 2/19/19   Rudi Magana MD   vitamin D (CHOLECALCIFEROL) 1000 UNIT TABS tablet Take 1,000 Units by mouth daily    Historical Provider, MD   calcium carbonate 600 MG TABS tablet Take 1 tablet by mouth daily    Historical Provider, MD   miconazole (MICOTIN) 2 % cream Apply topically 2 times daily. 1/21/19   Donal Duverney, MD   predniSONE (DELTASONE) 20 MG tablet Take 5 mg by mouth daily     Historical Provider, MD   Lancets MISC Checks 2 times daily. Dx:IDDM--ICD-10 E11.9 10/15/18   Donal Duverney, MD   acetaminophen (TYLENOL) 325 MG tablet Take 2 tablets by mouth every 4 hours as needed for Pain 9/28/18   Kasandra Cohn MD   meclizine (ANTIVERT) 25 MG tablet Take 1 tablet by mouth 3 times daily as needed for Dizziness 7/18/18   Donal Duverney, MD   Handicap Placard [address] [facility] Exp 5 years 7/3/18   Donal Duverney, MD       Future Appointments   Date Time Provider Iliana Garcia   2/20/2020  4:30 PM Toula Aschoff, MD [facility]     ,   Diabetes Assessment    Medic Alert ID:  No  Meal Planning:  Avoidance of concentrated sweets, Carb counting   How often do you test your blood sugar?:  Meals, Bedtime   Do you have barriers with adherence to non-pharmacologic self-management interventions?  (Nutrition/Exercise/Self-Monitoring):  Yes   Have you ever had to go to the ED for symptoms of low blood sugar?:  No       Increase or Decrease trend in Blood Sugars   Do you have hyperglycemia symptoms?:  No   Do you have hypoglycemia symptoms?:  No   Last Blood Sugar Value:  251   Blood Sugar Monitoring Regimen:  Before Meals, At Bedtime   Blood Sugar Trends:  Steady Decrease       and   General Assessment    Do you have any symptoms that are causing concern?:  No

## 2020-01-07 RX ORDER — NYSTATIN 100000 [USP'U]/G
POWDER TOPICAL
Qty: 1 BOTTLE | Refills: 2 | Status: SHIPPED | OUTPATIENT
Start: 2020-01-07 | End: 2020-10-19

## 2020-01-07 RX ORDER — ONDANSETRON 4 MG/1
4 TABLET, ORALLY DISINTEGRATING ORAL EVERY 8 HOURS PRN
Qty: 40 TABLET | Refills: 1 | Status: SHIPPED | OUTPATIENT
Start: 2020-01-07 | End: 2020-06-27 | Stop reason: SDUPTHER

## 2020-01-13 ENCOUNTER — TELEPHONE (OUTPATIENT)
Dept: PHARMACY | Facility: CLINIC | Age: 45
End: 2020-01-13

## 2020-01-13 NOTE — LETTER
Romayne Powers   [address]           01/13/20     Dear Renita Robb! You have completed the 2019 requirements for the [address] will automatically be re-enrolled into the [facility]) Diabetes Management Program for 2020. One of the requirements to participate in the [facility] Diabetes Management Program is to complete a Clinical Pharmacist Telephone appointment yearly. We would like to work with you and your doctor to:  - Review your medications, including over-the-counter and herbal medications  - Answer questions about your medications and how to get the most benefit from them  - Identify potential drug interactions or side effects and help fix them  - Identify preferred medications that are equally effective, but available at a lower cost to you  - Help you reach the necessary requirements to remain enrolled in the Diabetes Management Program offered by [facility]     Please call [phone] and select option #7 to schedule this appointment to take advantage of this service. Telephone appointments are available Monday thru Friday from 7:30 AM till 5:30 PM.     This is a courtesy reminder. If you have this appointment already scheduled for your 2020 enrollment in the program, please disregard this message. If you have not scheduled this appointment yet, please contact us at the above number to schedule.      Sincerely,      [address]  Phone: [phone], option 7

## 2020-01-14 ENCOUNTER — CARE COORDINATION (OUTPATIENT)
Dept: OTHER | Facility: CLINIC | Age: 45
End: 2020-01-14

## 2020-01-14 ENCOUNTER — OFFICE VISIT (OUTPATIENT)
Dept: FAMILY MEDICINE CLINIC | Age: 45
End: 2020-01-14
Payer: COMMERCIAL

## 2020-01-14 ENCOUNTER — HOSPITAL ENCOUNTER (OUTPATIENT)
Age: 45
Setting detail: SPECIMEN
Discharge: HOME | End: 2020-01-14
Payer: COMMERCIAL

## 2020-01-14 VITALS
RESPIRATION RATE: 18 BRPM | BODY MASS INDEX: 47.09 KG/M2 | WEIGHT: 293 LBS | DIASTOLIC BLOOD PRESSURE: 66 MMHG | TEMPERATURE: 97.7 F | HEART RATE: 94 BPM | OXYGEN SATURATION: 98 % | SYSTOLIC BLOOD PRESSURE: 116 MMHG | HEIGHT: 66 IN

## 2020-01-14 PROCEDURE — 81002 URINALYSIS NONAUTO W/O SCOPE: CPT | Performed by: PHYSICIAN ASSISTANT

## 2020-01-14 PROCEDURE — 87086 URINE CULTURE/COLONY COUNT: CPT

## 2020-01-14 PROCEDURE — 99213 OFFICE O/P EST LOW 20 MIN: CPT | Performed by: PHYSICIAN ASSISTANT

## 2020-01-14 ASSESSMENT — ENCOUNTER SYMPTOMS
NAUSEA: 0
BACK PAIN: 0
VOMITING: 0
DIARRHEA: 0

## 2020-01-14 ASSESSMENT — PATIENT HEALTH QUESTIONNAIRE - PHQ9
SUM OF ALL RESPONSES TO PHQ QUESTIONS 1-9: 0
2. FEELING DOWN, DEPRESSED OR HOPELESS: 0
1. LITTLE INTEREST OR PLEASURE IN DOING THINGS: 0
SUM OF ALL RESPONSES TO PHQ QUESTIONS 1-9: 0
SUM OF ALL RESPONSES TO PHQ9 QUESTIONS 1 & 2: 0

## 2020-01-14 NOTE — CARE COORDINATION
pantoprazole (PROTONIX) 40 MG tablet TAKE 1 TABLET BY MOUTH ONE TIME A DAY 9/30/19   Rudi Fletcher MD   promethazine (PHENERGAN) 25 MG tablet TAKE 1 TABLET BY MOUTH EVERY 8 HOURS AS NEEDED FOR NAUSEA 8/29/19   Edmundo Alanis MD   baclofen (LIORESAL) 10 MG tablet Take 0.5 tablets by mouth 2 times daily 8/29/19   Edmundo Alanis MD   metoprolol (LOPRESSOR) 100 MG tablet Take 1 tablet by mouth 2 times daily 8/29/19   Edmundo Alanis MD   metFORMIN (GLUCOPHAGE) 500 MG tablet Take 1 tablet by mouth 3 times daily 8/21/19   Angelia Hdz MD   insulin glargine (TOUJEO MAX SOLOSTAR) 300 UNIT/ML injection pen Inject 240 units at bedtime 8/21/19   Angelia Hdz MD   erythromycin (BRANDON-TAB) 250 MG EC tablet Take 250 mg by mouth 3 times daily as needed (bacterial infection)    Historical Provider, MD   estradiol (ESTRACE VAGINAL) 0.1 MG/GM vaginal cream Place 1 g vaginally daily For two weeks then 2-3 times per week 6/10/19   Portia Thurston DO   lidocaine (LIDODERM) 5 % Place 3 patches onto the skin daily 12 hours on, 12 hours off. 5/7/19   Edmundo Alanis MD   solifenacin (VESICARE) 10 MG tablet Take 1 tablet by mouth daily 4/1/19 3/31/20  Chayito Vera MD   hydrochlorothiazide (HYDRODIURIL) 25 MG tablet Take 1 tablet by mouth daily 4/1/19   Edmundo Alanis MD   losartan (COZAAR) 50 MG tablet Take 1 tablet by mouth daily 4/1/19   Edmundo Alanis MD   metoclopramide (REGLAN) 10 MG tablet Take 1 tablet by mouth 2 times daily (before meals) WARNING:  May cause drowsiness. May impair ability to operate vehicles or machinery. Do not use in combination with alcohol. 2/24/19   Kun Curtis MD   vitamin D (CHOLECALCIFEROL) 1000 UNIT TABS tablet Take 1,000 Units by mouth daily    Historical Provider, MD   calcium carbonate 600 MG TABS tablet Take 1 tablet by mouth daily    Historical Provider, MD   miconazole (MICOTIN) 2 % cream Apply topically 2 times daily.  1/21/19   Reshma Sandoval MD   predniSONE (DELTASONE) 20 MG tablet Take 5 mg by mouth daily     Historical Provider, MD   [place] MISC Checks 2 times daily.  Dx:IDDM--ICD-10 E11.9 10/15/18   Ty Calloway MD   acetaminophen (TYLENOL) 325 MG tablet Take 2 tablets by mouth every 4 hours as needed for Pain 9/28/18   Dana Morales MD   meclizine (ANTIVERT) 25 MG tablet Take 1 tablet by mouth 3 times daily as needed for Dizziness 7/18/18   Ty Calloway MD   Hand[address] Exp 5 years 7/3/18   Ty Calloway MD       Future Appointments   Date Time Provider Iliana Garcia   1/16/2020  3:30 PM SCHEDULE, [facility] None   2/20/2020  4:30 PM Christine Chao MD [facility]

## 2020-01-14 NOTE — PROGRESS NOTES
Relation Age of Onset    Diabetes Father     Heart Disease Mother        Allergies   Allergen Reactions    Bactrim [Sulfamethoxazole-Trimethoprim] Itching    Nitroglycerin Other (See Comments)     Migraine    Victoza [Liraglutide]      NAUSEA    Ace Inhibitors Other (See Comments)     Dizziness and near syncope    Percocet [Oxycodone-Acetaminophen] Nausea And Vomiting       Current Outpatient Medications   Medication Sig Dispense Refill    nystatin (MYCOSTATIN) 578253 UNIT/GM powder Apply 3 times daily. 1 Bottle 2    blood glucose test strips (AGAMATRIX AMP TEST) strip Checks 2 times daily.  Dx:IDDM--ICD-10 E11.9 200 each 1    ondansetron (ZOFRAN-ODT) 4 MG disintegrating tablet Take 1 tablet by mouth every 8 hours as needed for Nausea or Vomiting 40 tablet 1    venlafaxine (EFFEXOR XR) 75 MG extended release capsule TAKE TWO CAPSULES BY MOUTH EVERY MORNING AND TAKE ONE CAPSULE BY MOUTH EVERY EVENING 270 capsule 1    rosuvastatin (CRESTOR) 10 MG tablet TAKE ONE TABLET BY MOUTH NIGHTLY 90 tablet 1    Insulin Pen Needle (PEN NEEDLES) 32G X 4 MM MISC Use as directed with insulin pens, 4 times daily 400 each 1    insulin lispro (HUMALOG KWIKPEN) 100 UNIT/ML pen INJECT 60 units at each meals 90 pen 3    pantoprazole (PROTONIX) 40 MG tablet TAKE 1 TABLET BY MOUTH ONE TIME A DAY 60 tablet 2    promethazine (PHENERGAN) 25 MG tablet TAKE 1 TABLET BY MOUTH EVERY 8 HOURS AS NEEDED FOR NAUSEA 180 tablet 0    metoprolol (LOPRESSOR) 100 MG tablet Take 1 tablet by mouth 2 times daily 180 tablet 1    metFORMIN (GLUCOPHAGE) 500 MG tablet Take 1 tablet by mouth 3 times daily 270 tablet 3    insulin glargine (TOUJEO MAX SOLOSTAR) 300 UNIT/ML injection pen Inject 240 units at bedtime 90 pen 3    erythromycin (BRANDON-TAB) 250 MG EC tablet Take 250 mg by mouth 3 times daily as needed (bacterial infection)      estradiol (ESTRACE VAGINAL) 0.1 MG/GM vaginal cream Place 1 g vaginally daily For two weeks then 2-3 times per week 1 Tube 3    lidocaine (LIDODERM) 5 % Place 3 patches onto the skin daily 12 hours on, 12 hours off. 30 patch 0    solifenacin (VESICARE) 10 MG tablet Take 1 tablet by mouth daily 90 tablet 3    hydrochlorothiazide (HYDRODIURIL) 25 MG tablet Take 1 tablet by mouth daily 90 tablet 3    losartan (COZAAR) 50 MG tablet Take 1 tablet by mouth daily 90 tablet 3    metoclopramide (REGLAN) 10 MG tablet Take 1 tablet by mouth 2 times daily (before meals) WARNING:  May cause drowsiness. May impair ability to operate vehicles or machinery. Do not use in combination with alcohol. 20 tablet 0    vitamin D (CHOLECALCIFEROL) 1000 UNIT TABS tablet Take 1,000 Units by mouth daily      calcium carbonate 600 MG TABS tablet Take 1 tablet by mouth daily      miconazole (MICOTIN) 2 % cream Apply topically 2 times daily. 141 g 3    predniSONE (DELTASONE) 20 MG tablet Take 5 mg by mouth daily       Lancets MISC Checks 2 times daily. Dx:IDDM--ICD-10 E11.9 200 each 3    acetaminophen (TYLENOL) 325 MG tablet Take 2 tablets by mouth every 4 hours as needed for Pain 60 tablet 0    meclizine (ANTIVERT) 25 MG tablet Take 1 tablet by mouth 3 times daily as needed for Dizziness 40 tablet 3    Handicap Placard MISC Exp 5 years 1 each 0    baclofen (LIORESAL) 10 MG tablet Take 0.5 tablets by mouth 2 times daily 90 tablet 1     No current facility-administered medications for this visit. Review of Systems   Constitutional: Negative for chills and fever. Gastrointestinal: Negative for diarrhea, nausea and vomiting. Genitourinary: Positive for frequency. Negative for dysuria, flank pain, hematuria, hesitancy, urgency and vaginal discharge. Musculoskeletal: Negative for back pain.        Objective    Vitals:    01/14/20 1323   BP: 116/66   Site: Right Upper Arm   Position: Sitting   Cuff Size: Large Adult   Pulse: 94   Resp: 18   Temp: 97.7 °F (36.5 °C)   TempSrc: Oral   SpO2: 98%   Weight: 294 lb (133.4 kg)   Height: 5' 6\" (1.676 m)       Physical Exam  Vitals signs reviewed. Constitutional:       General: She is not in acute distress. Appearance: She is well-developed. She is not ill-appearing, toxic-appearing or diaphoretic. HENT:      Head: Normocephalic and atraumatic. Eyes:      General:         Right eye: No discharge. Left eye: No discharge. Conjunctiva/sclera: Conjunctivae normal.   Cardiovascular:      Rate and Rhythm: Normal rate and regular rhythm. Heart sounds: Normal heart sounds. Pulmonary:      Effort: Pulmonary effort is normal.      Breath sounds: Normal breath sounds. Abdominal:      General: There is no distension. Palpations: Abdomen is soft. Tenderness: There is no tenderness. There is no right CVA tenderness or left CVA tenderness. Skin:     Coloration: Skin is not pale. Assessment and Plan      ICD-10-CM    1. UTI symptoms R39.9 POCT Urinalysis no Micro     Urine Culture   2. Urinary frequency R35.0        Orders Placed This Encounter   Procedures    Urine Culture     Standing Status:   Future     Standing Expiration Date:   1/14/2021     Order Specific Question:   Specify (ex-cath, midstream, cysto, etc)? Answer:   midstream    POCT Urinalysis no Micro     Results for POC orders placed in visit on 01/14/20   POCT Urinalysis no Micro   Result Value Ref Range    Color, UA yellow     Clarity, UA clear     Glucose, UA POC neg     Bilirubin, UA neg     Ketones, UA +-     Spec Grav, UA 1.020     Blood, UA POC neg     pH, UA 6.0     Protein, UA POC +-     Urobilinogen, UA +-     Leukocytes, UA 1+     Nitrite, UA neg      Patient has a Rx for Macrobid from 2 months ago that she never used due to urine culture coming back normal.  I advised she begin this prescription and I'll call with  Urine culture. Push fluids. No orders of the defined types were placed in this encounter.       Reviewed with the patient: current clinicalstatus, medications, activities and diet. Side effects, adverse effects of the medication prescribed today, as well as treatment plan and result expectations have been discussed with the patient who expressesunderstanding and desires to proceed. Close follow up to evaluate treatment results and for coordination of care. I have reviewed the patient's medical history in detail and updated the computerized patientrecord. Return if symptoms worsen or fail to improve, for follow up with PCP.     TONY Brian

## 2020-01-16 ENCOUNTER — SCHEDULED TELEPHONE ENCOUNTER (OUTPATIENT)
Dept: PHARMACY | Facility: CLINIC | Age: 45
End: 2020-01-16

## 2020-01-16 LAB — URINE CULTURE, ROUTINE: NORMAL

## 2020-01-16 NOTE — TELEPHONE ENCOUNTER
[address] [place] [address] Employee Diabetes Program  =================================================================  Marcella Hayes is a 38 y.o. female enrolled in the [address] [place] [address] Employee Diabetes Program. Patient provided Raza Morales with verbal consent to remain in the program for this year. Medications:  Current Outpatient Medications   Medication Sig Dispense Refill    nystatin (MYCOSTATIN) 383678 UNIT/GM powder Apply 3 times daily. 1 Bottle 2    blood glucose test strips (AGAMATRIX AMP TEST) strip Checks 2 times daily.  Dx:IDDM--ICD-10 E11.9 200 each 1    ondansetron (ZOFRAN-ODT) 4 MG disintegrating tablet Take 1 tablet by mouth every 8 hours as needed for Nausea or Vomiting 40 tablet 1    venlafaxine (EFFEXOR XR) 75 MG extended release capsule TAKE TWO CAPSULES BY MOUTH EVERY MORNING AND TAKE ONE CAPSULE BY MOUTH EVERY EVENING 270 capsule 1    rosuvastatin (CRESTOR) 10 MG tablet TAKE ONE TABLET BY MOUTH NIGHTLY 90 tablet 1    Insulin Pen Needle (PEN NEEDLES) 32G X 4 MM MISC Use as directed with insulin pens, 4 times daily 400 each 1    insulin lispro (HUMALOG KWIKPEN) 100 UNIT/ML pen    ** Instructed to do 70 units before meals **   INJECT 60 units at each meals 90 pen 3    pantoprazole (PROTONIX) 40 MG tablet TAKE 1 TABLET BY MOUTH ONE TIME A DAY 60 tablet 2    promethazine (PHENERGAN) 25 MG tablet TAKE 1 TABLET BY MOUTH EVERY 8 HOURS AS NEEDED FOR NAUSEA 180 tablet 0    baclofen (LIORESAL) 10 MG tablet Take 0.5 tablets by mouth 2 times daily 90 tablet 1    metoprolol (LOPRESSOR) 100 MG tablet Take 1 tablet by mouth 2 times daily 180 tablet 1    metFORMIN (GLUCOPHAGE) 500 MG tablet    ** takes 1,000 mg in the morning and 500 mg at night **   Take 1 tablet by mouth 3 times daily 270 tablet 3    insulin glargine (TOUJEO MAX SOLOSTAR) 300 UNIT/ML injection pen   Inject 240 units at bedtime 90 pen 3    erythromycin (BRANDON-TAB) 250 MG EC tablet Take 250 mg by mouth 3 times daily as needed (bacterial infection)      estradiol (ESTRACE VAGINAL) 0.1 MG/GM vaginal cream    ** Not taking ** Place 1 g vaginally daily For two weeks then 2-3 times per week 1 Tube 3    lidocaine (LIDODERM) 5 %    ** Not taking very often due to lack of benefit **   Place 3 patches onto the skin daily 12 hours on, 12 hours off. 30 patch 0    solifenacin (VESICARE) 10 MG tablet Take 1 tablet by mouth daily 90 tablet 3    hydrochlorothiazide (HYDRODIURIL) 25 MG tablet Take 1 tablet by mouth daily 90 tablet 3    losartan (COZAAR) 50 MG tablet Take 1 tablet by mouth daily 90 tablet 3    metoclopramide (REGLAN) 10 MG tablet    ** Not taking ** Take 1 tablet by mouth 2 times daily (before meals) WARNING:  May cause drowsiness. May impair ability to operate vehicles or machinery. Do not use in combination with alcohol. 20 tablet 0    vitamin D (CHOLECALCIFEROL) 1000 UNIT TABS tablet Take 1,000 Units by mouth daily      calcium carbonate 600 MG TABS tablet Take 1 tablet by mouth daily      miconazole (MICOTIN) 2 % cream Apply topically 2 times daily. 141 g 3    predniSONE (DELTASONE) 20 MG tablet    ** taking 15 mg daily **   Take 5 mg by mouth daily       Lancets MISC Checks 2 times daily. Dx:IDDM--ICD-10 E11.9 200 each 3    acetaminophen (TYLENOL) 325 MG tablet    ** Not taking **   Take 2 tablets by mouth every 4 hours as needed for Pain 60 tablet 0    meclizine (ANTIVERT) 25 MG tablet    ** Not taking **   Take 1 tablet by mouth 3 times daily as needed for Dizziness 40 tablet 3    Handicap Placard MISC Exp 5 years 1 each 0     Additional Medications  Ibuprofen OTC -- prn   Alprazolam 0.5 mg -- 1-2 tablets po TID prn    Current Pharmacy: Home delivery  Current testing supplies/frequency: [employer] - tests 3-4 times per day (fastin-220; after a meal: 250-350)  Pen needles/syringes: Not sure what the brand is but thinks it might be Leader    Allergies:   Allergies   Allergen Reactions    Bactrim [Sulfamethoxazole-Trimethoprim] Itching    Nitroglycerin Other (See Comments)     Migraine    Victoza [Liraglutide]      NAUSEA    Ace Inhibitors Other (See Comments)     Dizziness and near syncope    Percocet [Oxycodone-Acetaminophen] Nausea And Vomiting      Vitals/Labs:  BP Readings from Last 3 Encounters:   01/14/20 116/66   11/18/19 122/60   11/08/19 124/72     Lab Results   Component Value Date    LABMICR 12.10 (H) 11/18/2019   Microalbumin:creatinine ratio = 81.8        Lab Results   Component Value Date    LABA1C 9.0 (H) 11/18/2019    LABA1C 8.9 (H) 07/15/2019    LABA1C 8.5 (H) 03/19/2019     Lab Results   Component Value Date    CHOL 128 03/19/2019    TRIG 252 (H) 03/19/2019    HDL 33 (L) 03/19/2019    LDLCALC 45 03/19/2019     ALT   Date Value Ref Range Status   02/24/2019 31 0 - 33 U/L Final     Comment:     Specimen hemolysis has exceeded the interference as defined  by Roche. Result may be affected. Suggest recollection if  clinically indicated. AST   Date Value Ref Range Status   02/24/2019 33 0 - 35 U/L Final     Comment:     Specimen hemolysis has exceeded the interference as defined  by Roche. Value may be falsely increased. Suggest  recollection if clinically indicated. The ASCVD Risk score (Drake Akhtar., et al., 2013) failed to calculate for the following reasons:     The valid total cholesterol range is 130 to 320 mg/dL     Lab Results   Component Value Date    CREATININE 0.64 11/18/2019     CrCL ~230 mL/min     Immunizations:  Immunization History   Administered Date(s) Administered    Influenza 10/22/2013    Influenza Virus Vaccine 10/20/2014, 10/14/2015, 10/14/2019    Influenza, Quadv, 6 mo and older, IM, PF (Flulaval, Fluarix) 09/22/2018    Influenza, Quadv, IM, (6 mo and older Fluzone, Flulaval, Fluarix and 3 yrs and older Afluria) 09/28/2016, 09/21/2017    Pneumococcal Polysaccharide ([id number]) 10/14/2015    Tdap (Boostrix, Adacel) 03/06/2012      Social for [id number] and Medication adherence over 70%   - Education to patient:   · Discussed diet/exercise recommendations  · Discussed management of hypoglycemia  · Discussed receiving yearly foot/eye exams    - Upcoming appointments:   Future Appointments   Date Time Provider Iliana Garcia   1/16/2020  3:30 PM SCHEDULE, [facility] S Clin Rx None   2/20/2020  4:30 PM MD Mauricio Sethi, PharmD Candidate [id number]  Dept: [phone], option 7

## 2020-01-17 ENCOUNTER — TELEPHONE (OUTPATIENT)
Dept: ENDOCRINOLOGY | Age: 45
End: 2020-01-17

## 2020-01-17 RX ORDER — PIOGLITAZONEHYDROCHLORIDE 15 MG/1
15 TABLET ORAL DAILY
Qty: 90 TABLET | Refills: 1 | Status: CANCELLED | OUTPATIENT
Start: 2020-01-17

## 2020-01-17 RX ORDER — ALPRAZOLAM 0.5 MG/1
.5-1 TABLET ORAL 3 TIMES DAILY PRN
COMMUNITY
End: 2020-03-11 | Stop reason: SDUPTHER

## 2020-01-17 RX ORDER — IBUPROFEN 200 MG
200 TABLET ORAL EVERY 6 HOURS PRN
COMMUNITY
End: 2020-04-17 | Stop reason: SDUPTHER

## 2020-01-17 RX ORDER — BLOOD-GLUCOSE METER
EACH MISCELLANEOUS
Qty: 1 KIT | Refills: 0 | Status: SHIPPED | OUTPATIENT
Start: 2020-01-17

## 2020-01-17 RX ORDER — BLOOD-GLUCOSE CONTROL, LOW
EACH MISCELLANEOUS
Qty: 400 EACH | Refills: 3 | Status: SHIPPED | OUTPATIENT
Start: 2020-01-17 | End: 2022-01-18 | Stop reason: SDUPTHER

## 2020-01-17 NOTE — TELEPHONE ENCOUNTER
Ramona Fagan MD,  Your patient is currently enrolled in [facility]. After your patient's recent visit with a [facility], the below were identified:  · Would patient benefit from increase of metformin to 1000mg BID? (currently 1000mg qAM and 500mg qPM; denies any previous intolerance)  · Would patient benefit from starting pioglitazone 15mg daily? (A1c uncontrolled despite Humalog 70u with meals and Toujeo 240u daily, as well as metformin as noted; states had been on pioglitazone in past with success, but was stopped when warnings came out regarding bladder cancer. Denies any bladder cancer or family hx.   - Patient agreeable to either or both of above, if you agree - orders pended in this encounter for your signature/modifcation if you agree    See pharmacist note dated 1/16/20 for complete details.      Thank you,  Bishop Flores, PharmD Candidate 2020  Jules GonzalesD, [address]  Direct: [phone]  Department, toll free: [phone], option 7      =======================================================            Lab Results   Component Value Date     LABA1C 9.0 (H) 11/18/2019     LABA1C 8.9 (H) 07/15/2019     LABA1C 8.5 (H) 03/19/2019

## 2020-01-17 NOTE — TELEPHONE ENCOUNTER
Reviewed and agree with PharmD candidate.   · Medication list updated  · Per Elba padilla hx, has rec'd Leader brand ($0) pen needles  · Note, hydrochlorothiazide also eligible for copay waiver in 2020  · See refill encounter to endocrinology re [employer] meter/supplies  · See telephone encounter to endocrinology re consideration: increase metformin to 1000mg BID and/or add pioglitazone 15mg daily    Simba Hutchison PharmD, [address]  Direct: [phone]  Department, toll free: [phone], option 7

## 2020-01-20 ENCOUNTER — TELEPHONE (OUTPATIENT)
Dept: NEUROLOGY | Age: 45
End: 2020-01-20

## 2020-01-20 NOTE — TELEPHONE ENCOUNTER
Pt sent message in [facility] and is wanting to know if you can do an order for CRP and Sed rate. She states that she is having a little pain and would like to know if you can order so results are in before you see her on 2/20. Please sign if ok to do. Thank you.

## 2020-01-22 ENCOUNTER — HOSPITAL ENCOUNTER (OUTPATIENT)
Dept: LAB | Age: 45
Discharge: HOME | End: 2020-01-22
Payer: COMMERCIAL

## 2020-01-22 LAB
C-REACTIVE PROTEIN: 27.9 MG/L (ref 0–5)
SEDIMENTATION RATE, ERYTHROCYTE: 48 MM (ref 0–20)

## 2020-01-22 PROCEDURE — 85652 RBC SED RATE AUTOMATED: CPT

## 2020-01-22 PROCEDURE — 36415 COLL VENOUS BLD VENIPUNCTURE: CPT

## 2020-01-22 PROCEDURE — 86140 C-REACTIVE PROTEIN: CPT

## 2020-01-22 NOTE — TELEPHONE ENCOUNTER
Other encounter re metformin increase and/or adding pioglitazone still pending to endocrinology; meter & test strip/lancet rxs signed. Will send MyChart and close this note at this time; has endocrine appt 2/27/20.     =======================================================   For Pharmacy Admin Tracking Only    PHSO: Yes  Total # of Interventions Recommended: 3  - New Order #: 1 New Medication Order Reason(s): Cost Conversion  - Updated Order #: 1 Updated Order Reason(s):  Other  Recommended intervention potential cost savings: 1  Total Interventions Accepted: 2  Time Spent (min): 45

## 2020-01-24 ENCOUNTER — CARE COORDINATION (OUTPATIENT)
Dept: OTHER | Facility: CLINIC | Age: 45
End: 2020-01-24

## 2020-01-24 NOTE — CARE COORDINATION
Ambulatory Care Coordination Note  1/24/2020  CM Risk Score: 9  Charlson 10 Year Mortality Risk Score: 23%     ACC: Torri Dodson RN    Summary Note: Called pt to follow up on progress, reinforce previous education/ provide pt education, and discuss any new issues or concerns. HIPAA compliant message left requesting a return phone call at patients convenience to discuss. Will continue to follow. Care Coordination Interventions    Program Enrollment:  Complex Care  Referral from Primary Care Provider:  No  Suggested Interventions and Community Resources  Diabetes Education:  Completed (Comment: 10/15/19- completed previously)  Fall Risk Prevention:  Completed (Comment: 10/15/19- Falls Prevention and home safety measure discussed)  Specialty Services Referral:  In Process (Comment: 12/11/19: Reumatology referral- network issues referral nt completed)  Zone Management Tools:  Completed (Comment: DM II)         Goals Addressed    None         Prior to Admission medications    Medication Sig Start Date End Date Taking? Authorizing Provider   Blood Glucose Monitoring Suppl (PRODIGY AUTOCODE BLOOD GLUCOSE) w/Device KIT Use as directed to test up to 4 times daily 1/17/20   Michael Sainz MD   blood glucose test strips (PRODIGY NO CODING BLOOD GLUC) strip 1 each by In Vitro route 4 times daily As needed. 1/17/20   Michael Sainz MD   Eventstag[employer].am LANCETS 28G MISC Use as directed to test up to 4 times daily 1/17/20   Michael Sainz MD   ibuprofen (ADVIL;MOTRIN) 200 MG tablet Take 200 mg by mouth every 6 hours as needed for Pain    Historical Provider, MD   ALPRAZolam Seretha Evens) 0.5 MG tablet Take 0.5-1 mg by mouth 3 times daily as needed for Sleep. Historical Provider, MD   nystatin (MYCOSTATIN) 576443 UNIT/GM powder Apply 3 times daily.  1/7/20   Elgin Estrella MD   ondansetron (ZOFRAN-ODT) 4 MG disintegrating tablet Take 1 tablet by mouth every 8 hours as needed for Nausea or Vomiting 1/7/20   Michael Sainz MD   venlafaxine (EFFEXOR XR) 75 MG extended release capsule TAKE TWO CAPSULES BY MOUTH EVERY MORNING AND TAKE ONE CAPSULE BY MOUTH EVERY EVENING 11/15/19   Yvette Holman MD   rosuvastatin (CRESTOR) 10 MG tablet TAKE ONE TABLET BY MOUTH NIGHTLY 11/15/19   Yvette Holman MD   Insulin Pen Needle (PEN NEEDLES) 32G X 4 MM MISC Use as directed with insulin pens, 4 times daily 10/23/19   Nicole Carballo MD   insulin lispro (HUMALOG KWIKPEN) 100 UNIT/ML pen INJECT 60 units at each meals  Patient taking differently: Inject 70 Units into the skin 3 times daily (before meals)  10/11/19   Nicole Carballo MD   pantoprazole (PROTONIX) 40 MG tablet TAKE 1 TABLET BY MOUTH ONE TIME A DAY 9/30/19   Rudi Fisher MD   promethazine (PHENERGAN) 25 MG tablet TAKE 1 TABLET BY MOUTH EVERY 8 HOURS AS NEEDED FOR NAUSEA 8/29/19   Yvette Holman MD   baclofen (LIORESAL) 10 MG tablet Take 0.5 tablets by mouth 2 times daily 8/29/19   Yvette Holman MD   metoprolol (LOPRESSOR) 100 MG tablet Take 1 tablet by mouth 2 times daily 8/29/19   Yvette Holman MD   metFORMIN (GLUCOPHAGE) 500 MG tablet Take 1 tablet by mouth 3 times daily 8/21/19   Nicole Carballo MD   insulin glargine (TOUJEO MAX SOLOSTAR) 300 UNIT/ML injection pen Inject 240 units at bedtime 8/21/19   Nicole Carballo MD   erythromycin (BRANDON-TAB) 250 MG EC tablet Take 250 mg by mouth 3 times daily as needed (bacterial infection)    Historical Provider, MD   solifenacin (VESICARE) 10 MG tablet Take 1 tablet by mouth daily 4/1/19 3/31/20  Olena Zacarias MD   hydrochlorothiazide (HYDRODIURIL) 25 MG tablet Take 1 tablet by mouth daily 4/1/19   Yvette Holman MD   losartan (COZAAR) 50 MG tablet Take 1 tablet by mouth daily 4/1/19   Yvette Holman MD   vitamin D (CHOLECALCIFEROL) 1000 UNIT TABS tablet Take 1,000 Units by mouth daily    Historical Provider, MD   calcium carbonate 600 MG TABS tablet Take 1 tablet by mouth daily    Historical Provider, MD   miconazole (MICOTIN) 2 % cream Apply topically 2 times daily.  1/21/19 Tiarra Feliz MD   predniSONE (DELTASONE) 10 MG tablet Take 15 mg by mouth daily     Historical Provider, MD   Handicap Placard [address] Exp 5 years 7/3/18   Tiarra Feliz MD       Future Appointments   Date Time Provider Iliana Garcia   2/6/2020  8:00 AM Clint Whalen MD [facility]   2/20/2020  4:30 PM Petey Rubio MD [facility]   2/27/2020  4:45 PM Oleg Coles MD [address] [place] [address]

## 2020-02-03 ENCOUNTER — CARE COORDINATION (OUTPATIENT)
Dept: OTHER | Facility: CLINIC | Age: 45
End: 2020-02-03

## 2020-02-03 NOTE — CARE COORDINATION
Prevention and home safety measure discussed)  Specialty Services Referral:  In Process (Comment: 12/11/19: Reumatology referral- network issues referral nt completed)  Zone Management Tools:  Completed (Comment: DM II)         Goals Addressed                 This Visit's Progress     Conditions and Symptoms   On track     I will schedule office visits, as directed by my provider. I will notify my provider of any barriers to my plan of care. I will notify my provider of any symptoms that indicate a worsening of my condition. Barriers: lack of support and overwhelmed by complexity of regimen  Plan for overcoming my barriers: Will work with ACM and providers  Confidence: 9/10  Anticipated Goal Completion Date: 11/30/19; 12/30/19; 3/1/20    12/11/19: Goal completion date revised as interventions still in process. Pt is compliant with visits and Provider contact. Still needs to establish with specialist.     2/3/20: Gal completion date updated as interventions still in progress for establishing with specialist         Medication Management   On track     I will take my medication as directed. I will notify my provider of any problems with medications, like adverse effects or side effects. I will notify my provider for advice before I stop taking any of my medication. Barriers: medication side effects  Plan for overcoming my barriers: Will work with ACM and Providers  Confidence: 7/10  Anticipated Goal Completion Date: 11/15/; 12/30/19; 2/14/20 12/11/19: goal completion date updated as interventions still in process. Pt is communicating with specialist in regards to adjustments on mediations to limit SE    2/3/20: goal completion date updated as pt still working with specialists on medication adjustments            Prior to Admission medications    Medication Sig Start Date End Date Taking?  Authorizing Provider   Blood Glucose Monitoring Suppl (PRODIGY AUTOCODE BLOOD GLUCOSE) w/Device KIT Use as directed UNIT/ML injection pen Inject 240 units at bedtime 8/21/19   Angelia Hdz MD   erythromycin (BRANDON-TAB) 250 MG EC tablet Take 250 mg by mouth 3 times daily as needed (bacterial infection)    Historical Provider, MD   solifenacin (VESICARE) 10 MG tablet Take 1 tablet by mouth daily 4/1/19 3/31/20  Chayito Vera MD   hydrochlorothiazide (HYDRODIURIL) 25 MG tablet Take 1 tablet by mouth daily 4/1/19   Edmundo Alanis MD   losartan (COZAAR) 50 MG tablet Take 1 tablet by mouth daily 4/1/19   Edmundo Alanis MD   vitamin D (CHOLECALCIFEROL) 1000 UNIT TABS tablet Take 1,000 Units by mouth daily    Historical Provider, MD   calcium carbonate 600 MG TABS tablet Take 1 tablet by mouth daily    Historical Provider, MD   miconazole (MICOTIN) 2 % cream Apply topically 2 times daily.  1/21/19   Reshma Sandoval MD   predniSONE (DELTASONE) 10 MG tablet Take 15 mg by mouth daily     Historical Provider, MD   Handicap Placard [address] [place] [address] Exp 5 years 7/3/18   Reshma Sandoval MD       Future Appointments   Date Time Provider Iliana Garcia   2/6/2020  8:00 AM Chayito Vera MD [facility]   2/20/2020  4:30 PM Coby Gibbs MD [facility]   2/27/2020  4:45 PM Angelia Hdz MD [facility]

## 2020-02-12 ENCOUNTER — E-VISIT (OUTPATIENT)
Dept: FAMILY MEDICINE CLINIC | Age: 45
End: 2020-02-12
Payer: COMMERCIAL

## 2020-02-12 PROCEDURE — 98970 NQHP OL DIG ASSMT&MGMT 5-10: CPT | Performed by: NURSE PRACTITIONER

## 2020-02-12 RX ORDER — OSELTAMIVIR PHOSPHATE 75 MG/1
75 CAPSULE ORAL 2 TIMES DAILY
Qty: 10 CAPSULE | Refills: 0 | Status: SHIPPED | OUTPATIENT
Start: 2020-02-12 | End: 2020-02-17

## 2020-02-12 ASSESSMENT — LIFESTYLE VARIABLES
SMOKING_YEARS: 10
PACKS_PER_DAY: 1
SMOKING_STATUS: NO, BUT I USED TO SMOKE

## 2020-02-13 ENCOUNTER — CARE COORDINATION (OUTPATIENT)
Dept: OTHER | Facility: CLINIC | Age: 45
End: 2020-02-13

## 2020-02-13 NOTE — CARE COORDINATION
Ambulatory Care Coordination Note  2/13/2020  CM Risk Score: 5  Charlson 10 Year Mortality Risk Score: 23%     ACC: Adam Parnell, RN    Summary Note: Called pt to follow up on progress, discuss new issues or concerns, and reinforce/ provide pt education. Flu like symptoms- Pt states she has not started the Tamiflu yet. She wasn't sure that she has the flu since she did have the flu shot this year. Her daughter is getting better. But she is still not feeling well. She declines to make an appt with PCP as she doesn't want to expose anyone if she does have it and that she is still not feeling well. She will start the Tamiflu if she is still running a fever.    -Reinforced/ Provided Education on:  Importance and benefits self monitoring, medication, and diet compliance. Importance and benefits of routine follow up appointments and testing compliance. Reinforced to call PCP office with acute needs and appropriate level of care and place of service options for acute needs. Plan:   - Continue Telephonic follow up   - Continue support and education as needed  Pt verbalized understanding and is agreeable to follow up contact. Care Coordination Interventions    Program Enrollment:  Complex Care  Referral from Primary Care Provider:  No  Suggested Interventions and Community Resources  Diabetes Education:  Completed (Comment: 10/15/19- completed previously)  Fall Risk Prevention:  Completed (Comment: 10/15/19- Falls Prevention and home safety measure discussed)  Specialty Services Referral:  In Process (Comment: 12/11/19: Reumatology referral- network issues referral nt completed)  Zone Management Tools:  Completed (Comment: DM II)         Goals Addressed    None         Prior to Admission medications    Medication Sig Start Date End Date Taking?  Authorizing Provider   oseltamivir (TAMIFLU) 75 MG capsule Take 1 capsule by mouth 2 times daily for 5 days 2/12/20 2/17/20  [facility] APRN - CNP   Blood Glucose Monitoring Suppl (PRODIGY AUTOCODE BLOOD GLUCOSE) w/Device KIT Use as directed to test up to 4 times daily 1/17/20   Nicole Carballo MD   blood glucose test strips (PRODIGY NO CODING BLOOD GLUC) strip 1 each by In Vitro route 4 times daily As needed. 1/17/20   MD ROOSEVELT Ayala LANCETS 28G MISC Use as directed to test up to 4 times daily 1/17/20   Nicole Carballo MD   ibuprofen (ADVIL;MOTRIN) 200 MG tablet Take 200 mg by mouth every 6 hours as needed for Pain    Historical Provider, MD   ALPRAZolam Lauren Simple) 0.5 MG tablet Take 0.5-1 mg by mouth 3 times daily as needed for Sleep. Historical Provider, MD   nystatin (MYCOSTATIN) 573387 UNIT/GM powder Apply 3 times daily.  1/7/20   Yvette Holman MD   ondansetron (ZOFRAN-ODT) 4 MG disintegrating tablet Take 1 tablet by mouth every 8 hours as needed for Nausea or Vomiting 1/7/20   Nicole Carballo MD   venlafaxine (EFFEXOR XR) 75 MG extended release capsule TAKE TWO CAPSULES BY MOUTH EVERY MORNING AND TAKE ONE CAPSULE BY MOUTH EVERY EVENING 11/15/19   Yvette Holman MD   rosuvastatin (CRESTOR) 10 MG tablet TAKE ONE TABLET BY MOUTH NIGHTLY 11/15/19   Yvette Holman MD   Insulin Pen Needle (PEN NEEDLES) 32G X 4 MM MISC Use as directed with insulin pens, 4 times daily 10/23/19   Nicole Carballo MD   insulin lispro (HUMALOG KWIKPEN) 100 UNIT/ML pen INJECT 60 units at each meals  Patient taking differently: Inject 70 Units into the skin 3 times daily (before meals)  10/11/19   Nicole Carballo MD   pantoprazole (PROTONIX) 40 MG tablet TAKE 1 TABLET BY MOUTH ONE TIME A DAY 9/30/19   Rudi Fisher MD   promethazine (PHENERGAN) 25 MG tablet TAKE 1 TABLET BY MOUTH EVERY 8 HOURS AS NEEDED FOR NAUSEA 8/29/19   Yvette Holman MD   baclofen (LIORESAL) 10 MG tablet Take 0.5 tablets by mouth 2 times daily 8/29/19   Yvette Holman MD   metoprolol (LOPRESSOR) 100 MG tablet Take 1 tablet by mouth 2 times daily 8/29/19   Yvette Holman MD   metFORMIN (GLUCOPHAGE) 500 MG tablet Take 1 tablet by mouth 3 times daily 8/21/19   Brannon Joseph MD   insulin glargine (TOUJEO MAX SOLOSTAR) 300 UNIT/ML injection pen Inject 240 units at bedtime 8/21/19   Brannon Joseph MD   erythromycin (BRANDON-TAB) 250 MG EC tablet Take 250 mg by mouth 3 times daily as needed (bacterial infection)    Historical Provider, MD   solifenacin (VESICARE) 10 MG tablet Take 1 tablet by mouth daily 4/1/19 3/31/20  Omar Benoit MD   hydrochlorothiazide (HYDRODIURIL) 25 MG tablet Take 1 tablet by mouth daily 4/1/19   Lynne Garcia MD   losartan (COZAAR) 50 MG tablet Take 1 tablet by mouth daily 4/1/19   Lynne Garcia MD   vitamin D (CHOLECALCIFEROL) 1000 UNIT TABS tablet Take 1,000 Units by mouth daily    Historical Provider, MD   calcium carbonate 600 MG TABS tablet Take 1 tablet by mouth daily    Historical Provider, MD   miconazole (MICOTIN) 2 % cream Apply topically 2 times daily.  1/21/19   Bhargavi Hanson MD   predniSONE (DELTASONE) 10 MG tablet Take 15 mg by mouth daily     Historical Provider, MD   Handicap Placard [address] [facility] Exp 5 years 7/3/18   Bhargavi Hanson MD       Future Appointments   Date Time Provider Iilana Garcia   2/27/2020  4:30 PM MD Preston Valentin   2/28/2020  4:30 PM Brannon Joseph MD [facility]

## 2020-02-15 ENCOUNTER — E-VISIT (OUTPATIENT)
Dept: FAMILY MEDICINE CLINIC | Age: 45
End: 2020-02-15
Payer: COMMERCIAL

## 2020-02-15 PROCEDURE — 98970 NQHP OL DIG ASSMT&MGMT 5-10: CPT | Performed by: NURSE PRACTITIONER

## 2020-02-15 RX ORDER — FLUTICASONE PROPIONATE 50 MCG
1 SPRAY, SUSPENSION (ML) NASAL 2 TIMES DAILY
Qty: 1 BOTTLE | Refills: 0 | Status: SHIPPED | OUTPATIENT
Start: 2020-02-15 | End: 2020-03-11

## 2020-02-15 RX ORDER — AMOXICILLIN AND CLAVULANATE POTASSIUM 875; 125 MG/1; MG/1
1 TABLET, FILM COATED ORAL 2 TIMES DAILY
Qty: 14 TABLET | Refills: 0 | Status: SHIPPED | OUTPATIENT
Start: 2020-02-15 | End: 2020-02-22

## 2020-02-15 ASSESSMENT — LIFESTYLE VARIABLES
SMOKING_STATUS: NO, I'M A FORMER SMOKER
SMOKING_YEARS: 15
PACKS_PER_DAY: 1

## 2020-02-20 ENCOUNTER — CARE COORDINATION (OUTPATIENT)
Dept: OTHER | Facility: CLINIC | Age: 45
End: 2020-02-20

## 2020-02-20 NOTE — CARE COORDINATION
Ambulatory Care Coordination Note  2/20/2020  CM Risk Score: 5  Charlson 10 Year Mortality Risk Score: 23%     ACC: Branden Garcia RN    Summary Note: Called pt to follow up on progress, discuss new issues or concerns, and reinforce/ provide pt education. Acute illness- Pt states she is doing much better. She only has a few doses of her antibiotic left and her symptoms have resolved. DM- Pt states her BG were a little higher than they had been because of her illness, but no big swings or changes. She has appts to see her PCP, Pain management, neurologist, and endo next week. -Reinforced/ Provided Education on:  Importance and benefits self monitoring, medication, and diet compliance. Importance and benefits of routine follow up appointments and testing compliance. Reinforced to call PCP office with acute needs and appropriate level of care and place of service options for acute needs. Plan:   - Continue Telephonic follow up   - Continue support and education as needed  Pt verbalized understanding and is agreeable to follow up contact. Care Coordination Interventions    Program Enrollment:  Complex Care  Referral from Primary Care Provider:  No  Suggested Interventions and Community Resources  Diabetes Education:  Completed (Comment: 10/15/19- completed previously)  Fall Risk Prevention:  Completed (Comment: 10/15/19- Falls Prevention and home safety measure discussed)  Specialty Services Referral:  In Process (Comment: 2/20/20: Pain management referral- Appt made 12/11/19: Reumatology referral- network issues referral nt completed)  Zone Management Tools:  Completed (Comment: DM II)         Goals Addressed                 This Visit's Progress     Conditions and Symptoms   On track     I will schedule office visits, as directed by my provider. I will notify my provider of any barriers to my plan of care.   I will notify my provider of any symptoms that indicate a worsening of my condition. Barriers: lack of support and overwhelmed by complexity of regimen  Plan for overcoming my barriers: Will work with ACM and providers  Confidence: 9/10  Anticipated Goal Completion Date: 11/30/19; 12/30/19; 3/1/20    12/11/19: Goal completion date revised as interventions still in process. Pt is compliant with visits and Provider contact. Still needs to establish with specialist.     2/3/20: Gal completion date updated as interventions still in progress for establishing with specialist         Medication Management   On track     I will take my medication as directed. I will notify my provider of any problems with medications, like adverse effects or side effects. I will notify my provider for advice before I stop taking any of my medication. Barriers: medication side effects  Plan for overcoming my barriers: Will work with ACM and Providers  Confidence: 7/10  Anticipated Goal Completion Date: 11/15/; 12/30/19; 2/28/20 12/11/19: goal completion date updated as interventions still in process. Pt is communicating with specialist in regards to adjustments on mediations to limit SE    2/3/20: goal completion date updated as pt still working with specialists on medication adjustments            Prior to Admission medications    Medication Sig Start Date End Date Taking? Authorizing Provider   fluticasone (FLONASE) 50 MCG/ACT nasal spray 1 spray by Each Nostril route 2 times daily 2/15/20   [facility]   amoxicillin-clavulanate (AUGMENTIN) 875-125 MG per tablet Take 1 tablet by mouth 2 times daily for 7 days 2/15/20 2/22/20  [facility]   Blood Glucose Monitoring Suppl (PRODIGY AUTOCODE BLOOD GLUCOSE) w/Device KIT Use as directed to test up to 4 times daily 1/17/20   Alfie Johnson MD   blood glucose test strips (PRODIGY NO CODING BLOOD GLUC) strip 1 each by In Vitro route 4 times daily As needed.  1/17/20   MD ROOSEVELT Hung 28G MISC Use as directed to test up to 4 times daily 1/17/20   Uriel Fine MD   ibuprofen (ADVIL;MOTRIN) 200 MG tablet Take 200 mg by mouth every 6 hours as needed for Pain    Historical Provider, MD   ALPRAZolam Dian Proper) 0.5 MG tablet Take 0.5-1 mg by mouth 3 times daily as needed for Sleep. Historical Provider, MD   nystatin (MYCOSTATIN) [id number] UNIT/GM powder Apply 3 times daily.  1/7/20   Zac Cartwright MD   ondansetron (ZOFRAN-ODT) 4 MG disintegrating tablet Take 1 tablet by mouth every 8 hours as needed for Nausea or Vomiting 1/7/20   Uriel Fine MD   venlafaxine (EFFEXOR XR) 75 MG extended release capsule TAKE TWO CAPSULES BY MOUTH EVERY MORNING AND TAKE ONE CAPSULE BY MOUTH EVERY EVENING 11/15/19   Zac Cartwright MD   rosuvastatin (CRESTOR) 10 MG tablet TAKE ONE TABLET BY MOUTH NIGHTLY 11/15/19   Zac Cartwright MD   Insulin Pen Needle (PEN NEEDLES) 32G X 4 MM MISC Use as directed with insulin pens, 4 times daily 10/23/19   Uriel Fine MD   insulin lispro (HUMALOG KWIKPEN) 100 UNIT/ML pen INJECT 60 units at each meals  Patient taking differently: Inject 70 Units into the skin 3 times daily (before meals)  10/11/19   Uriel Fine MD   pantoprazole (PROTONIX) 40 MG tablet TAKE 1 TABLET BY MOUTH ONE TIME A DAY 9/30/19   Rudi Chi MD   promethazine (PHENERGAN) 25 MG tablet TAKE 1 TABLET BY MOUTH EVERY 8 HOURS AS NEEDED FOR NAUSEA 8/29/19   Zac Cartwright MD   baclofen (LIORESAL) 10 MG tablet Take 0.5 tablets by mouth 2 times daily 8/29/19   Zac Cartwright MD   metoprolol (LOPRESSOR) 100 MG tablet Take 1 tablet by mouth 2 times daily 8/29/19   Zac Cartwright MD   metFORMIN (GLUCOPHAGE) 500 MG tablet Take 1 tablet by mouth 3 times daily 8/21/19   Uriel Fine MD   insulin glargine (TOUJEO MAX SOLOSTAR) 300 UNIT/ML injection pen Inject 240 units at bedtime 8/21/19   Uriel Fine MD   erythromycin (BRANDON-TAB) 250 MG EC tablet Take 250 mg by mouth 3 times daily as needed (bacterial infection)    Historical Provider, MD

## 2020-03-02 NOTE — TELEPHONE ENCOUNTER
Neisha Delgadillo MD, upcoming appt 3/9/20. Your patient is currently enrolled in [facility]. After your patient's recent visit with a [facility], the below were identified:  · Would patient benefit from increase of metformin to 1000mg BID? (currently 1000mg qAM and 500mg qPM; denies any previous intolerance)  · Would patient benefit from starting pioglitazone 15mg daily? (A1c uncontrolled despite Humalog 70u with meals and Toujeo 240u daily, as well as metformin as noted; states had been on pioglitazone in past with success, but was stopped when warnings came out regarding bladder cancer. Denies any bladder cancer or family hx.   - Patient agreeable to either or both of above, if you agree  See pharmacist note dated 1/16/20 for complete details.      Thank you,  Malina Marcos, PharmD Candidate 2020  Casey Mariscal, PharmD, [address]  Direct: [phone]  Department, toll free: [phone], option 7       =======================================================                Lab Results   Component Value Date     LABA1C 9.0 (H) 11/18/2019     LABA1C 8.9 (H) 07/15/2019     LABA1C 8.5 (H) 03/19/2019

## 2020-03-03 ENCOUNTER — CARE COORDINATION (OUTPATIENT)
Dept: OTHER | Facility: CLINIC | Age: 45
End: 2020-03-03

## 2020-03-03 PROBLEM — G82.20 PARAPARESIS (HCC): Status: ACTIVE | Noted: 2018-06-18

## 2020-03-03 PROBLEM — M62.9 DISORDER OF MUSCLE, UNSPECIFIED: Status: ACTIVE | Noted: 2018-09-21

## 2020-03-03 NOTE — CARE COORDINATION
Ambulatory Care Coordination Note  3/3/2020  CM Risk Score: 5  Charlson 10 Year Mortality Risk Score: 79%     ACC: Ney Woodward, RN    Summary Note: Pt sent email  Stating that she was not able to go to any of her appts last week due to a family emergency and she did not have a car. She is requesting to follow up after her apps are rescheduled. ACM e-mail patient to notify her when appts rescheduled. Care Coordination Interventions    Program Enrollment:  Complex Care  Referral from Primary Care Provider:  No  Suggested Interventions and Community Resources  Diabetes Education:  Completed (Comment: 10/15/19- completed previously)  Fall Risk Prevention:  Completed (Comment: 10/15/19- Falls Prevention and home safety measure discussed)  Specialty Services Referral:  In Process (Comment: 2/20/20: Pain management referral- Appt made 12/11/19: Reumatology referral- network issues referral nt completed)  Zone Management Tools:  Completed (Comment: DM II)         Goals Addressed    None         Prior to Admission medications    Medication Sig Start Date End Date Taking? Authorizing Provider   fluticasone (FLONASE) 50 MCG/ACT nasal spray 1 spray by Each Nostril route 2 times daily 2/15/20   [facility], APRN - CNP   Blood Glucose Monitoring Suppl (PRODIGY AUTOCODE BLOOD GLUCOSE) w/Device KIT Use as directed to test up to 4 times daily 1/17/20   Kolton De Los Santos MD   blood glucose test strips (PRODIGY NO CODING BLOOD GLUC) strip 1 each by In Vitro route 4 times daily As needed. 1/17/20   MD ROOSEVELT Campbell LANCETS 28G MISC Use as directed to test up to 4 times daily 1/17/20   Kolton De Los Santos MD   ibuprofen (ADVIL;MOTRIN) 200 MG tablet Take 200 mg by mouth every 6 hours as needed for Pain    Historical Provider, MD   ALPRAZolam [place]) 0.5 MG tablet Take 0.5-1 mg by mouth 3 times daily as needed for Sleep. Historical Provider, MD   nystatin (MYCOSTATIN) [id number] UNIT/GM powder Apply 3 times daily. 1/7/20   Faby Butt MD   ondansetron (ZOFRAN-ODT) 4 MG disintegrating tablet Take 1 tablet by mouth every 8 hours as needed for Nausea or Vomiting 1/7/20   Kay Hernandes MD   venlafaxine (EFFEXOR XR) 75 MG extended release capsule TAKE TWO CAPSULES BY MOUTH EVERY MORNING AND TAKE ONE CAPSULE BY MOUTH EVERY EVENING 11/15/19   Faby Butt MD   rosuvastatin (CRESTOR) 10 MG tablet TAKE ONE TABLET BY MOUTH NIGHTLY 11/15/19   Faby Butt MD   Insulin Pen Needle (PEN NEEDLES) 32G X 4 MM MISC Use as directed with insulin pens, 4 times daily 10/23/19   Kay Hernandes MD   insulin lispro (HUMALOG KWIKPEN) 100 UNIT/ML pen INJECT 60 units at each meals  Patient taking differently: Inject 70 Units into the skin 3 times daily (before meals)  10/11/19   Kay Hernandes MD   pantoprazole (PROTONIX) 40 MG tablet TAKE 1 TABLET BY MOUTH ONE TIME A DAY 9/30/19   Faby Butt MD   promethazine (PHENERGAN) 25 MG tablet TAKE 1 TABLET BY MOUTH EVERY 8 HOURS AS NEEDED FOR NAUSEA 8/29/19   Faby Butt MD   baclofen (LIORESAL) 10 MG tablet Take 0.5 tablets by mouth 2 times daily 8/29/19   Faby Butt MD   metoprolol (LOPRESSOR) 100 MG tablet Take 1 tablet by mouth 2 times daily 8/29/19   Faby Butt MD   metFORMIN (GLUCOPHAGE) 500 MG tablet Take 1 tablet by mouth 3 times daily 8/21/19   Kay Hernandes MD   insulin glargine (TOUJEO MAX SOLOSTAR) 300 UNIT/ML injection pen Inject 240 units at bedtime 8/21/19   Kay Hernandes MD   erythromycin (BRANDON-TAB) 250 MG EC tablet Take 250 mg by mouth 3 times daily as needed (bacterial infection)    Historical Provider, MD   solifenacin (VESICARE) 10 MG tablet Take 1 tablet by mouth daily 4/1/19 3/31/20  Gabriel Lema MD   hydrochlorothiazide (HYDRODIURIL) 25 MG tablet Take 1 tablet by mouth daily 4/1/19   Faby Butt MD   losartan (COZAAR) 50 MG tablet Take 1 tablet by mouth daily 4/1/19   Faby Butt MD   vitamin D (CHOLECALCIFEROL) 1000 UNIT TABS tablet Take 1,000 Units by mouth daily    Historical

## 2020-03-06 RX ORDER — METOPROLOL TARTRATE 100 MG/1
TABLET ORAL
Qty: 180 TABLET | Refills: 0 | Status: SHIPPED | OUTPATIENT
Start: 2020-03-06 | End: 2020-06-04 | Stop reason: SDUPTHER

## 2020-03-06 NOTE — TELEPHONE ENCOUNTER
1st attempt to reach patient. Called patient @ [phone]- and left message on machine for patient to return call during normal business hours of 8:30 AM and 5 PM @ [phone] option 2.

## 2020-03-11 ENCOUNTER — OFFICE VISIT (OUTPATIENT)
Dept: NEUROLOGY | Age: 45
End: 2020-03-11
Payer: COMMERCIAL

## 2020-03-11 ENCOUNTER — OFFICE VISIT (OUTPATIENT)
Dept: FAMILY MEDICINE CLINIC | Age: 45
End: 2020-03-11
Payer: COMMERCIAL

## 2020-03-11 VITALS
TEMPERATURE: 97.7 F | HEIGHT: 66 IN | BODY MASS INDEX: 47.09 KG/M2 | SYSTOLIC BLOOD PRESSURE: 118 MMHG | RESPIRATION RATE: 16 BRPM | WEIGHT: 293 LBS | OXYGEN SATURATION: 93 % | DIASTOLIC BLOOD PRESSURE: 64 MMHG | HEART RATE: 64 BPM

## 2020-03-11 VITALS — HEART RATE: 104 BPM | SYSTOLIC BLOOD PRESSURE: 138 MMHG | DIASTOLIC BLOOD PRESSURE: 75 MMHG

## 2020-03-11 PROCEDURE — 99214 OFFICE O/P EST MOD 30 MIN: CPT | Performed by: PSYCHIATRY & NEUROLOGY

## 2020-03-11 PROCEDURE — 99214 OFFICE O/P EST MOD 30 MIN: CPT | Performed by: FAMILY MEDICINE

## 2020-03-11 RX ORDER — ALPRAZOLAM 0.5 MG/1
.5-1 TABLET ORAL NIGHTLY PRN
Qty: 45 TABLET | Refills: 0 | Status: SHIPPED | OUTPATIENT
Start: 2020-03-11 | End: 2020-04-10

## 2020-03-11 RX ORDER — PREDNISONE 1 MG/1
TABLET ORAL
Qty: 300 TABLET | Refills: 11 | Status: SHIPPED | OUTPATIENT
Start: 2020-03-11 | End: 2020-04-27 | Stop reason: ALTCHOICE

## 2020-03-11 NOTE — PROGRESS NOTES
service: More than 4 times per year     Active member of club or organization: No     Attends meetings of clubs or organizations: Never     Relationship status:     Intimate partner violence     Fear of current or ex partner: Not on file     Emotionally abused: Not on file     Physically abused: Not on file     Forced sexual activity: Not on file   Other Topics Concern    Not on file   Social History Narrative    Social/Functional History          Current Outpatient Medications on File Prior to Visit   Medication Sig Dispense Refill    metoprolol (LOPRESSOR) 100 MG tablet TAKE 1 TABLET BY MOUTH 2 TIMES A  tablet 0    Blood Glucose Monitoring Suppl (PRODIGY AUTOCODE BLOOD GLUCOSE) w/Device KIT Use as directed to test up to 4 times daily 1 kit 0    blood glucose test strips (PRODIGY NO CODING BLOOD GLUC) strip 1 each by In Vitro route 4 times daily As needed. 400 each 3    PRODIGY LANCETS 28G MISC Use as directed to test up to 4 times daily 400 each 3    ibuprofen (ADVIL;MOTRIN) 200 MG tablet Take 200 mg by mouth every 6 hours as needed for Pain      nystatin (MYCOSTATIN) 149112 UNIT/GM powder Apply 3 times daily.  1 Bottle 2    ondansetron (ZOFRAN-ODT) 4 MG disintegrating tablet Take 1 tablet by mouth every 8 hours as needed for Nausea or Vomiting 40 tablet 1    venlafaxine (EFFEXOR XR) 75 MG extended release capsule TAKE TWO CAPSULES BY MOUTH EVERY MORNING AND TAKE ONE CAPSULE BY MOUTH EVERY EVENING 270 capsule 1    rosuvastatin (CRESTOR) 10 MG tablet TAKE ONE TABLET BY MOUTH NIGHTLY 90 tablet 1    Insulin Pen Needle (PEN NEEDLES) 32G X 4 MM MISC Use as directed with insulin pens, 4 times daily 400 each 1    insulin lispro (HUMALOG KWIKPEN) 100 UNIT/ML pen INJECT 60 units at each meals (Patient taking differently: Inject 70 Units into the skin 3 times daily (before meals) ) 90 pen 3    pantoprazole (PROTONIX) 40 MG tablet TAKE 1 TABLET BY MOUTH ONE TIME A DAY 60 tablet 2    promethazine (PHENERGAN) 25 MG tablet TAKE 1 TABLET BY MOUTH EVERY 8 HOURS AS NEEDED FOR NAUSEA 180 tablet 0    baclofen (LIORESAL) 10 MG tablet Take 0.5 tablets by mouth 2 times daily 90 tablet 1    metFORMIN (GLUCOPHAGE) 500 MG tablet Take 1 tablet by mouth 3 times daily 270 tablet 3    insulin glargine (TOUJEO MAX SOLOSTAR) 300 UNIT/ML injection pen Inject 240 units at bedtime 90 pen 3    solifenacin (VESICARE) 10 MG tablet Take 1 tablet by mouth daily 90 tablet 3    hydrochlorothiazide (HYDRODIURIL) 25 MG tablet Take 1 tablet by mouth daily 90 tablet 3    losartan (COZAAR) 50 MG tablet Take 1 tablet by mouth daily 90 tablet 3    vitamin D (CHOLECALCIFEROL) 1000 UNIT TABS tablet Take 1,000 Units by mouth daily      calcium carbonate 600 MG TABS tablet Take 1 tablet by mouth daily      miconazole (MICOTIN) 2 % cream Apply topically 2 times daily. 141 g 3    predniSONE (DELTASONE) 10 MG tablet Take 15 mg by mouth daily       Handicap Placard MISC Exp 5 years 1 each 0     No current facility-administered medications on file prior to visit. Allergies:  Ace inhibitors; Bactrim [sulfamethoxazole-trimethoprim]; Nitroglycerin; Percocet [oxycodone-acetaminophen]; and Victoza [liraglutide]    Review of Systems   Constitutional: Negative for activity change, appetite change, fatigue, malaise/fatigue and weight loss. Eyes: Negative for blurred vision. Respiratory: Negative for apnea, cough, chest tightness and shortness of breath. Cardiovascular: Negative for chest pain, palpitations, orthopnea, leg swelling and PND. Gastrointestinal: Negative for abdominal pain, blood in stool, constipation, diarrhea, nausea and vomiting. Endocrine: Negative for polydipsia, polyphagia and polyuria. Musculoskeletal: Negative for arthralgias and neck pain. Neurological: Negative for seizures, weakness and headaches.    Psychiatric/Behavioral: Negative for confusion, decreased concentration, dysphoric mood, hallucinations, self-injury, sleep disturbance and suicidal ideas. The patient is nervous/anxious. Objective:   /64 (Site: Right Upper Arm, Position: Sitting, Cuff Size: Large Adult)   Pulse 64   Temp 97.7 °F (36.5 °C) (Temporal)   Resp 16   Ht 5' 6\" (1.676 m)   Wt (!) 302 lb 3.2 oz (137.1 kg)   LMP  (LMP Unknown)   SpO2 93%   BMI 48.78 kg/m²     Physical Exam  Vitals signs and nursing note reviewed. Constitutional:       General: She is not in acute distress. Appearance: Normal appearance. She is well-developed. She is not diaphoretic. HENT:      Head: Normocephalic and atraumatic. Nose: Nose normal.      Mouth/Throat:      Mouth: Mucous membranes are moist.      Pharynx: Oropharynx is clear. Eyes:      Conjunctiva/sclera: Conjunctivae normal.      Pupils: Pupils are equal, round, and reactive to light. Neck:      Musculoskeletal: Normal range of motion. Cardiovascular:      Rate and Rhythm: Normal rate and regular rhythm. Heart sounds: Normal heart sounds. No murmur. No friction rub. No gallop. Pulmonary:      Effort: Pulmonary effort is normal. No respiratory distress. Breath sounds: Normal breath sounds. No wheezing or rales. Chest:      Chest wall: No tenderness. Abdominal:      General: Abdomen is flat. Bowel sounds are normal.      Palpations: Abdomen is soft. Tenderness: There is no abdominal tenderness. Skin:     General: Skin is warm and dry. Neurological:      Mental Status: She is alert and oriented to person, place, and time. Psychiatric:         Behavior: Behavior normal.         Thought Content: Thought content normal.         Judgment: Judgment normal.         Assessment & Plan:     1. PMR (polymyalgia rheumatica) (HCC)  SHe may benefit from physical therapy and specific aqua therapy  - Ambulatory referral to Physical Therapy    2. Pulmonary hypertension (HCC)  Stable: Continue current medication    3.  Type 2 diabetes mellitus with complication,

## 2020-03-12 ASSESSMENT — ENCOUNTER SYMPTOMS
CHOKING: 0
NAUSEA: 0
TROUBLE SWALLOWING: 0
BACK PAIN: 0
VOMITING: 0
PHOTOPHOBIA: 0
SHORTNESS OF BREATH: 0

## 2020-03-12 NOTE — PROGRESS NOTES
BIOPSY Left 9/21/2018    LEFT QUADRICEPS MUSCLE BIOPSY performed by Larry Mueller MD at [address] UNI/BI CANNULATION N/A 9/18/2018    T 12 VERTEBRALPLASTY ROOM 278 performed by Adam Newman MD at [address] ENDOSCOPY  10/13/15      [address] [place] [address] SURGERY  6-2015     Social History     Socioeconomic History    Marital status:      Spouse name: Not on file    Number of children: 1    Years of education: Not on file    Highest education level: Not on file   Occupational History    Not on file   Social Needs    Financial resource strain: Not very hard    Food insecurity     Worry: Never true     Inability: Never true    Transportation needs     Medical: No     Non-medical: No   Tobacco Use    Smoking status: Former Smoker     Packs/day: 1.00     Types: Cigarettes     Last attempt to quit: 1/1/2017     Years since quitting: 3.1    Smokeless tobacco: Never Used   Substance and Sexual Activity    Alcohol use: Yes     Alcohol/week: 0.0 standard drinks     Frequency: Monthly or less     Binge frequency: Never     Comment: socially    Drug use: No    Sexual activity: Yes     Partners: Male     Comment: single   Lifestyle    Physical activity     Days per week: 1 day     Minutes per session: 10 min    Stress: To some extent   Relationships    Social connections     Talks on phone:  Three times a week     Gets together: Once a week     Attends [employer] service: More than 4 times per year     Active member of club or organization: No     Attends meetings of clubs or organizations: Never     Relationship status:     Intimate partner violence     Fear of current or ex partner: Not on file     Emotionally abused: Not on file     Physically abused: Not on file     Forced sexual activity: Not on file   Other Topics Concern    Not on file   Social History Narrative    Social/Functional History Family History   Problem Relation Age of Onset    Diabetes Father     Heart Disease Mother      Allergies   Allergen Reactions    Ace Inhibitors Other (See Comments)     Dizziness and near syncope    Bactrim [Sulfamethoxazole-Trimethoprim] Itching    Nitroglycerin Other (See Comments)     Migraine    Percocet [Oxycodone-Acetaminophen] Nausea And Vomiting    Victoza [Liraglutide]      NAUSEA         Review of Systems   Constitutional: Negative for fever. HENT: Negative for ear pain, tinnitus and trouble swallowing. Eyes: Negative for photophobia and visual disturbance. Respiratory: Negative for choking and shortness of breath. Cardiovascular: Negative for chest pain and palpitations. Gastrointestinal: Negative for nausea and vomiting. Musculoskeletal: Negative for back pain, gait problem, joint swelling, myalgias, neck pain and neck stiffness. Neurological: Negative for dizziness, tremors, seizures, syncope, facial asymmetry, speech difficulty, weakness, light-headedness, numbness and headaches. Psychiatric/Behavioral: Negative for behavioral problems, confusion, hallucinations and sleep disturbance. Objective:   /75 (Site: Left Upper Arm, Position: Sitting, Cuff Size: Medium Adult)   Pulse 104   LMP  (LMP Unknown)     Physical Exam  Vitals signs reviewed. Eyes:      Pupils: Pupils are equal, round, and reactive to light. Neck:      Musculoskeletal: Normal range of motion. Cardiovascular:      Rate and Rhythm: Normal rate and regular rhythm. Heart sounds: No murmur. Pulmonary:      Effort: Pulmonary effort is normal.      Breath sounds: Normal breath sounds. Musculoskeletal: Normal range of motion. Neurological:      Mental Status: She is alert and oriented to person, place, and time. Cranial Nerves: No cranial nerve deficit. Sensory: No sensory deficit. Motor: No abnormal muscle tone.       Coordination: Coordination normal.      Deep she is down to 5 mg. She still decreasing her numbers. Her sed rate is around around 40-50. We have not recommended to treat the sed rate we will cleared her clinically from now on. She will continue on the lowest possible dose. She is having some major issues with her lower back and I recommended repeat her MRI to make sure she does not have a collapsed vertebra due to long-term use of steroids she is still on vitamin D and calcium replacements. Repeat her sed rate and INR again. Plan:      Orders Placed This Encounter   Procedures    MRI Lumbar Spine WO Contrast     Standing Status:   Future     Standing Expiration Date:   3/11/2021     Order Specific Question:   Reason for exam:     Answer:   lumbar radicu    Sedimentation Rate     Standing Status:   Future     Standing Expiration Date:   3/11/2021    C-Reactive Protein     Standing Status:   Future     Standing Expiration Date:   3/11/2021     Orders Placed This Encounter   Medications    predniSONE (DELTASONE) 1 MG tablet     Sig: As directed prn     Dispense:  300 tablet     Refill:  11       Return in about 4 months (around 7/11/2020).       Angelia Liao MD

## 2020-03-15 ASSESSMENT — ENCOUNTER SYMPTOMS
BLOOD IN STOOL: 0
CHEST TIGHTNESS: 0
CONSTIPATION: 0
ABDOMINAL PAIN: 0
SHORTNESS OF BREATH: 0
DIARRHEA: 0
VOMITING: 0
COUGH: 0
APNEA: 0
ORTHOPNEA: 0
VISUAL CHANGE: 0
NAUSEA: 0
BLURRED VISION: 0

## 2020-03-19 ENCOUNTER — TELEPHONE (OUTPATIENT)
Dept: PHARMACY | Facility: CLINIC | Age: 45
End: 2020-03-19

## 2020-03-19 NOTE — TELEPHONE ENCOUNTER
CLINICAL PHARMACY CONSULT: NICOTINE REPLACEMENT THERAPY     SUBJECTIVE  Denise De Los Santos is a 38 y.o. female currently identified as interested in nicotine replacement therapy through the [address] [facility] Program.     OBJECTIVE  Social History     Tobacco Use    Smoking status: Former Smoker     Packs/day: 1.00     Types: Cigarettes     Last attempt to quit: 1/1/2017     Years since quitting: 3.2    Smokeless tobacco: Never Used   Substance Use Topics    Alcohol use: Yes     Alcohol/week: 0.0 standard drinks     Frequency: Monthly or less     Binge frequency: Never     Comment: socially       Responses per [employer] Questionnaire:    Consent: Yes I consent to enrolling in the [facility] Cessation Treatment program and having my information reviewed by a [facility] pharmacist.    Current Nicotine Products Used: \"E-Cigarettes/E-Vaping\"  # Of Cigarettes/day: n/a  Time Of 1st Cigarette: n/a  Cigar/cigarillo Frequency: n/a   Smokeless Tobacco Frequency: n/a  Ecig/vaping Frequency: \"21 or more days\"  Other Nicotine Product Frequency: n/a  Health Conditions: \"Hypertension/ Anxiety/ Diabetes\"  Pregnant: \"No\"  Dentures/dental Work: \"No\"  Tried Quitting: \"Yes\"  Methods Used: \"Nicotine patch/ Nicotine gum\"  Methods/products To Try: \"Nicotine lozenge/ Nicotine gum\"  Other Methods/products To Try: n/a    Methods Patient interested in: nicotine gum or lozenge - will call to clarify which she may want to try  Mailing address: [address] [place] [address], [address] [place] [address], [address] [place] [address], [facility], [address]"  Phone number: \"[phone]\"     Attempting to reach patient. No answer, LM. Will continue to attempt to reach as appropriate.  Would like to discuss:  · Would she like to try nicotine gum or lozenge  · Check in on DM meds, adherence, and any questions/issues - noted 3/24/20 endo visit canceled    Ashwini Hernandez, PharmD, [address] Pharmacist  Dept: [phone] (toll free [phone], option 7)

## 2020-03-22 ENCOUNTER — E-VISIT (OUTPATIENT)
Dept: FAMILY MEDICINE CLINIC | Age: 45
End: 2020-03-22
Payer: COMMERCIAL

## 2020-03-22 PROCEDURE — 98970 NQHP OL DIG ASSMT&MGMT 5-10: CPT | Performed by: NURSE PRACTITIONER

## 2020-03-22 RX ORDER — NITROFURANTOIN 25; 75 MG/1; MG/1
100 CAPSULE ORAL 2 TIMES DAILY
Qty: 20 CAPSULE | Refills: 0 | Status: SHIPPED | OUTPATIENT
Start: 2020-03-22 | End: 2020-04-01

## 2020-03-22 NOTE — PROGRESS NOTES
flowsheet reviewed and symptoms likely a UTI. States allergic to cipro but not on allergies. Ordered macrobid. Advised to follow up if symptoms worsen or do not improve as expected. 5-10 minutes were spent on the digital evaluation and management of this patient.

## 2020-03-23 ENCOUNTER — TELEMEDICINE (OUTPATIENT)
Dept: FAMILY MEDICINE CLINIC | Age: 45
End: 2020-03-23
Payer: COMMERCIAL

## 2020-03-23 PROCEDURE — 99213 OFFICE O/P EST LOW 20 MIN: CPT | Performed by: NURSE PRACTITIONER

## 2020-03-23 RX ORDER — PREDNISONE 20 MG/1
20 TABLET ORAL DAILY
Qty: 5 TABLET | Refills: 0 | Status: SHIPPED | OUTPATIENT
Start: 2020-03-23 | End: 2020-10-29 | Stop reason: SDUPTHER

## 2020-03-23 RX ORDER — CEPHALEXIN 500 MG/1
500 CAPSULE ORAL 3 TIMES DAILY
Qty: 21 CAPSULE | Refills: 0 | Status: SHIPPED | OUTPATIENT
Start: 2020-03-23 | End: 2020-03-30

## 2020-03-23 ASSESSMENT — ENCOUNTER SYMPTOMS
ABDOMINAL PAIN: 0
NAUSEA: 0
WHEEZING: 0
VOMITING: 0
DIARRHEA: 0
COUGH: 0
SHORTNESS OF BREATH: 0
COLOR CHANGE: 0

## 2020-03-23 NOTE — PROGRESS NOTES
Subjective:     Patient ID: Arthur Wyatt is a 38 y.o. female who presentstoday for:  Chief Complaint   Patient presents with    Arm Pain     right x 2 days       Arm Pain    The incident occurred 12 to 24 hours ago. There was no injury mechanism. The pain is present in the right elbow. The quality of the pain is described as aching. The pain does not radiate. The pain is mild. The pain has been constant since the incident. Pertinent negatives include no chest pain, muscle weakness, numbness or tingling. The symptoms are aggravated by palpation. She has tried NSAIDs for the symptoms. The treatment provided mild relief. Past Medical History:   Diagnosis Date    Acute midline thoracic back pain 9/18/2018    B12 deficiency     Family history of premature CAD 9/4/2013    Fatty liver     HPV (human papilloma virus) anogenital infection     Hyperlipidemia     Hypertension     Liver hemangioma 2009/2015    Obesity 3/11/13    BMI 43.42    Orthostatic hypotension     Ovarian cyst     PMR (polymyalgia rheumatica) (HCC)     Rectal fissure     Tobacco abuse 9/3/2015    Tobacco abuse     Tremor     Uncontrolled diabetes mellitus with complications (HCC)     Unspecified sleep apnea      Current Outpatient Medications on File Prior to Visit   Medication Sig Dispense Refill    nitrofurantoin, macrocrystal-monohydrate, (MACROBID) 100 MG capsule Take 1 capsule by mouth 2 times daily for 10 days 20 capsule 0    ALPRAZolam (XANAX) 0.5 MG tablet Take 1-2 tablets by mouth nightly as needed for Sleep or Anxiety for up to 30 days.  45 tablet 0    predniSONE (DELTASONE) 1 MG tablet As directed prn 300 tablet 11    metoprolol (LOPRESSOR) 100 MG tablet TAKE 1 TABLET BY MOUTH 2 TIMES A  tablet 0    Blood Glucose Monitoring Suppl (PRODIGY AUTOCODE BLOOD GLUCOSE) w/Device KIT Use as directed to test up to 4 times daily 1 kit 0    blood glucose test strips (PRODIGY NO CODING BLOOD GLUC) strip 1 each by In Vitro route 4 times daily As needed. 400 each 3    PRODIGY LANCETS 28G MISC Use as directed to test up to 4 times daily 400 each 3    ibuprofen (ADVIL;MOTRIN) 200 MG tablet Take 200 mg by mouth every 6 hours as needed for Pain      nystatin (MYCOSTATIN) 483728 UNIT/GM powder Apply 3 times daily.  1 Bottle 2    ondansetron (ZOFRAN-ODT) 4 MG disintegrating tablet Take 1 tablet by mouth every 8 hours as needed for Nausea or Vomiting 40 tablet 1    venlafaxine (EFFEXOR XR) 75 MG extended release capsule TAKE TWO CAPSULES BY MOUTH EVERY MORNING AND TAKE ONE CAPSULE BY MOUTH EVERY EVENING 270 capsule 1    rosuvastatin (CRESTOR) 10 MG tablet TAKE ONE TABLET BY MOUTH NIGHTLY 90 tablet 1    Insulin Pen Needle (PEN NEEDLES) 32G X 4 MM MISC Use as directed with insulin pens, 4 times daily 400 each 1    insulin lispro (HUMALOG KWIKPEN) 100 UNIT/ML pen INJECT 60 units at each meals (Patient taking differently: Inject 70 Units into the skin 3 times daily (before meals) ) 90 pen 3    pantoprazole (PROTONIX) 40 MG tablet TAKE 1 TABLET BY MOUTH ONE TIME A DAY 60 tablet 2    promethazine (PHENERGAN) 25 MG tablet TAKE 1 TABLET BY MOUTH EVERY 8 HOURS AS NEEDED FOR NAUSEA 180 tablet 0    baclofen (LIORESAL) 10 MG tablet Take 0.5 tablets by mouth 2 times daily 90 tablet 1    metFORMIN (GLUCOPHAGE) 500 MG tablet Take 1 tablet by mouth 3 times daily 270 tablet 3    insulin glargine (TOUJEO MAX SOLOSTAR) 300 UNIT/ML injection pen Inject 240 units at bedtime 90 pen 3    solifenacin (VESICARE) 10 MG tablet Take 1 tablet by mouth daily 90 tablet 3    hydrochlorothiazide (HYDRODIURIL) 25 MG tablet Take 1 tablet by mouth daily 90 tablet 3    losartan (COZAAR) 50 MG tablet Take 1 tablet by mouth daily 90 tablet 3    vitamin D (CHOLECALCIFEROL) 1000 UNIT TABS tablet Take 1,000 Units by mouth daily      calcium carbonate 600 MG TABS tablet Take 1 tablet by mouth daily      miconazole (MICOTIN) 2 % cream Apply topically 2 times together: Once a week     Attends [employer] service: More than 4 times per year     Active member of club or organization: No     Attends meetings of clubs or organizations: Never     Relationship status:     Intimate partner violence     Fear of current or ex partner: Not on file     Emotionally abused: Not on file     Physically abused: Not on file     Forced sexual activity: Not on file   Other Topics Concern    Not on file   Social History Narrative    Social/Functional History          Allergies:  Ace inhibitors; Bactrim [sulfamethoxazole-trimethoprim]; Nitroglycerin; Percocet [oxycodone-acetaminophen]; and Victoza [liraglutide]    Review of Systems   Constitutional: Negative for chills, diaphoresis and fever. Respiratory: Negative for cough, shortness of breath and wheezing. Cardiovascular: Negative for chest pain, palpitations and leg swelling. Gastrointestinal: Negative for abdominal pain, diarrhea, nausea and vomiting. Genitourinary: Negative for difficulty urinating. Musculoskeletal: Positive for arthralgias and joint swelling. Skin: Negative for color change and wound. Neurological: Negative for dizziness, tingling, syncope, weakness, light-headedness, numbness and headaches. Objective:    LMP  (LMP Unknown)     Physical Exam  Constitutional:       Appearance: Normal appearance. She is not toxic-appearing. Eyes:      Conjunctiva/sclera: Conjunctivae normal.   Neck:      Musculoskeletal: Neck supple. Pulmonary:      Effort: Pulmonary effort is normal. No respiratory distress. Musculoskeletal:         General: Swelling and tenderness present. Right elbow: She exhibits swelling. Tenderness found. Skin:     Findings: No erythema. Neurological:      Mental Status: She is alert and oriented to person, place, and time. Psychiatric:         Behavior: Behavior normal.         Assessment & Plan:       Diagnosis Orders   1.  Cellulitis of right elbow  cephALEXin

## 2020-03-24 ENCOUNTER — NURSE TRIAGE (OUTPATIENT)
Dept: OTHER | Facility: CLINIC | Age: 45
End: 2020-03-24

## 2020-03-27 ENCOUNTER — VIRTUAL VISIT (OUTPATIENT)
Dept: FAMILY MEDICINE CLINIC | Age: 45
End: 2020-03-27
Payer: COMMERCIAL

## 2020-03-27 PROCEDURE — 99213 OFFICE O/P EST LOW 20 MIN: CPT | Performed by: NURSE PRACTITIONER

## 2020-03-27 ASSESSMENT — ENCOUNTER SYMPTOMS
COLOR CHANGE: 0
COUGH: 0
ABDOMINAL PAIN: 0
WHEEZING: 0
NAUSEA: 0
SHORTNESS OF BREATH: 0
VOMITING: 0
DIARRHEA: 0

## 2020-03-27 NOTE — PROGRESS NOTES
 predniSONE (DELTASONE) 1 MG tablet As directed prn 300 tablet 11    metoprolol (LOPRESSOR) 100 MG tablet TAKE 1 TABLET BY MOUTH 2 TIMES A  tablet 0    Blood Glucose Monitoring Suppl (PRODIGY AUTOCODE BLOOD GLUCOSE) w/Device KIT Use as directed to test up to 4 times daily 1 kit 0    blood glucose test strips (PRODIGY NO CODING BLOOD GLUC) strip 1 each by In Vitro route 4 times daily As needed. 400 each 3    PRODIGY LANCETS 28G MISC Use as directed to test up to 4 times daily 400 each 3    ibuprofen (ADVIL;MOTRIN) 200 MG tablet Take 200 mg by mouth every 6 hours as needed for Pain      nystatin (MYCOSTATIN) 168705 UNIT/GM powder Apply 3 times daily.  1 Bottle 2    ondansetron (ZOFRAN-ODT) 4 MG disintegrating tablet Take 1 tablet by mouth every 8 hours as needed for Nausea or Vomiting 40 tablet 1    venlafaxine (EFFEXOR XR) 75 MG extended release capsule TAKE TWO CAPSULES BY MOUTH EVERY MORNING AND TAKE ONE CAPSULE BY MOUTH EVERY EVENING 270 capsule 1    rosuvastatin (CRESTOR) 10 MG tablet TAKE ONE TABLET BY MOUTH NIGHTLY 90 tablet 1    Insulin Pen Needle (PEN NEEDLES) 32G X 4 MM MISC Use as directed with insulin pens, 4 times daily 400 each 1    insulin lispro (HUMALOG KWIKPEN) 100 UNIT/ML pen INJECT 60 units at each meals (Patient taking differently: Inject 70 Units into the skin 3 times daily (before meals) ) 90 pen 3    pantoprazole (PROTONIX) 40 MG tablet TAKE 1 TABLET BY MOUTH ONE TIME A DAY 60 tablet 2    promethazine (PHENERGAN) 25 MG tablet TAKE 1 TABLET BY MOUTH EVERY 8 HOURS AS NEEDED FOR NAUSEA 180 tablet 0    baclofen (LIORESAL) 10 MG tablet Take 0.5 tablets by mouth 2 times daily 90 tablet 1    metFORMIN (GLUCOPHAGE) 500 MG tablet Take 1 tablet by mouth 3 times daily 270 tablet 3    insulin glargine (TOUJEO MAX SOLOSTAR) 300 UNIT/ML injection pen Inject 240 units at bedtime 90 pen 3    solifenacin (VESICARE) 10 MG tablet Take 1 tablet by mouth daily 90 tablet 3    hydrochlorothiazide (HYDRODIURIL) 25 MG tablet Take 1 tablet by mouth daily 90 tablet 3    losartan (COZAAR) 50 MG tablet Take 1 tablet by mouth daily 90 tablet 3    vitamin D (CHOLECALCIFEROL) 1000 UNIT TABS tablet Take 1,000 Units by mouth daily      calcium carbonate 600 MG TABS tablet Take 1 tablet by mouth daily      miconazole (MICOTIN) 2 % cream Apply topically 2 times daily. 141 g 3    predniSONE (DELTASONE) 10 MG tablet Take 15 mg by mouth daily       Handicap Placard MISC Exp 5 years 1 each 0     No current facility-administered medications on file prior to visit. Past Surgical History:   Procedure Laterality Date    CARDIAC CATHETERIZATION  4-07    COLONOSCOPY  2013    for diarrhea (-)    HYSTERECTOMY  12-10    LEEP  1-05    [place] MUSCLE BIOPSY Left 9/21/2018    LEFT QUADRICEPS MUSCLE BIOPSY performed by Ketan Pereira MD at [address] [facility] UNI/BI CANNULATION N/A 9/18/2018    T 12 VERTEBRALPLASTY ROOM 278 performed by Cesia Ghosh MD at [address] ENDOSCOPY  10/13/15      [address] [place] [address] Rd SURGERY  6-2015        Family History   Problem Relation Age of Onset    Diabetes Father     Heart Disease Mother      Social History     Socioeconomic History    Marital status:      Spouse name: Not on file    Number of children: 1    Years of education: Not on file    Highest education level: Not on file   Occupational History    Not on file   Social Needs    Financial resource strain: Not very hard    Food insecurity     Worry: Never true     Inability: Never true    Transportation needs     Medical: No     Non-medical: No   Tobacco Use    Smoking status: Former Smoker     Packs/day: 1.00     Types: Cigarettes     Last attempt to quit: 1/1/2017     Years since quitting: 3.2    Smokeless tobacco: Never Used   Substance and Sexual Activity    Alcohol use:  Yes     Alcohol/week: 0.0 standard drinks     Frequency: Monthly or less     Binge frequency: Never     Comment: socially    Drug use: No    Sexual activity: Yes     Partners: Male     Comment: single   Lifestyle    Physical activity     Days per week: 1 day     Minutes per session: 10 min    Stress: To some extent   Relationships    Social connections     Talks on phone: Three times a week     Gets together: Once a week     Attends [employer] service: More than 4 times per year     Active member of club or organization: No     Attends meetings of clubs or organizations: Never     Relationship status:     Intimate partner violence     Fear of current or ex partner: Not on file     Emotionally abused: Not on file     Physically abused: Not on file     Forced sexual activity: Not on file   Other Topics Concern    Not on file   Social History Narrative    Social/Functional History          Allergies:  Ace inhibitors; Bactrim [sulfamethoxazole-trimethoprim]; Nitroglycerin; Percocet [oxycodone-acetaminophen]; and Victoza [liraglutide]    Review of Systems   Constitutional: Negative for chills, diaphoresis and fever. Respiratory: Negative for cough, shortness of breath and wheezing. Cardiovascular: Negative for chest pain, palpitations and leg swelling. Gastrointestinal: Negative for abdominal pain, diarrhea, nausea and vomiting. Genitourinary: Negative for difficulty urinating. Musculoskeletal: Negative for arthralgias and joint swelling. Skin: Negative for color change and wound. Neurological: Negative for dizziness, syncope, weakness, light-headedness, numbness and headaches. Objective:    LMP  (LMP Unknown)     Physical Exam  Constitutional:       Appearance: Normal appearance. She is not toxic-appearing. Eyes:      Conjunctiva/sclera: Conjunctivae normal.   Neck:      Musculoskeletal: Neck supple. Pulmonary:      Effort: Pulmonary effort is normal. No respiratory distress.    Musculoskeletal:

## 2020-03-30 RX ORDER — PANTOPRAZOLE SODIUM 40 MG/1
40 TABLET, DELAYED RELEASE ORAL DAILY
Qty: 60 TABLET | Refills: 2 | Status: SHIPPED | OUTPATIENT
Start: 2020-03-30 | End: 2020-11-18

## 2020-03-30 RX ORDER — BACLOFEN 10 MG/1
5 TABLET ORAL 2 TIMES DAILY
Qty: 90 TABLET | Refills: 1 | Status: SHIPPED | OUTPATIENT
Start: 2020-03-30 | End: 2020-09-22 | Stop reason: SDUPTHER

## 2020-03-30 RX ORDER — PROMETHAZINE HYDROCHLORIDE 25 MG/1
TABLET ORAL
Qty: 180 TABLET | Refills: 0 | Status: SHIPPED | OUTPATIENT
Start: 2020-03-30 | End: 2020-08-11 | Stop reason: SDUPTHER

## 2020-03-30 RX ORDER — LOSARTAN POTASSIUM 50 MG/1
50 TABLET ORAL DAILY
Qty: 90 TABLET | Refills: 3 | Status: SHIPPED | OUTPATIENT
Start: 2020-03-30 | End: 2020-12-30 | Stop reason: SDUPTHER

## 2020-03-30 RX ORDER — HYDROCHLOROTHIAZIDE 25 MG/1
25 TABLET ORAL DAILY
Qty: 90 TABLET | Refills: 3 | Status: SHIPPED | OUTPATIENT
Start: 2020-03-30 | End: 2020-12-30 | Stop reason: SDUPTHER

## 2020-03-30 NOTE — TELEPHONE ENCOUNTER
Pharmacy is requesting medication refill.  Please approve or deny this request.    Rx requested:  Requested Prescriptions     Pending Prescriptions Disp Refills    hydroCHLOROthiazide (HYDRODIURIL) 25 MG tablet 90 tablet 3     Sig: Take 1 tablet by mouth daily    losartan (COZAAR) 50 MG tablet 90 tablet 3     Sig: Take 1 tablet by mouth daily    promethazine (PHENERGAN) 25 MG tablet 180 tablet 0     Sig: TAKE 1 TABLET BY MOUTH EVERY 8 HOURS AS NEEDED FOR NAUSEA    baclofen (LIORESAL) 10 MG tablet 90 tablet 1     Sig: Take 0.5 tablets by mouth 2 times daily    pantoprazole (PROTONIX) 40 MG tablet 60 tablet 2     Sig: Take 1 tablet by mouth daily         Last Office Visit:   3/11/2020      Next Visit Date:  Future Appointments   Date Time Provider Iliana Garcia   4/3/2020 11:00 AM [facility] ROOM [address]   7/23/2020  4:30 PM MD Preston Rodríguez

## 2020-03-31 NOTE — TELEPHONE ENCOUNTER
Patient returned call regarding preference in gum or lozenge and she prefers gum. Will be available at phone listed if you have any further questions.

## 2020-04-07 NOTE — TELEPHONE ENCOUNTER
Spoke with patient, discussed per previous note. States she is using 3-6 mg of nicotine (this is considered low/light). Advised patient would suggest trying nicotine gum 2 mg prn, can adjust if needed. Edu on MOA/use, administration, encouraged cessation of other nicotine products when starting NRT. Also briefly discussed DM - patient states she currently has all medications/supplies. States prednisone dose decreased to 5 mg daily so blood sugars are improving. She is monitoring closely and aware to f/u with provider if needed via telephone care with blood sugar readings.

## 2020-04-16 ENCOUNTER — CARE COORDINATION (OUTPATIENT)
Dept: OTHER | Facility: CLINIC | Age: 45
End: 2020-04-16

## 2020-04-17 ENCOUNTER — E-VISIT (OUTPATIENT)
Dept: FAMILY MEDICINE CLINIC | Age: 45
End: 2020-04-17
Payer: COMMERCIAL

## 2020-04-17 PROCEDURE — 99421 OL DIG E/M SVC 5-10 MIN: CPT | Performed by: FAMILY MEDICINE

## 2020-04-17 RX ORDER — PENICILLIN V POTASSIUM 500 MG/1
500 TABLET ORAL EVERY 6 HOURS
Qty: 40 TABLET | Refills: 0 | Status: SHIPPED | OUTPATIENT
Start: 2020-04-17 | End: 2020-04-27

## 2020-04-17 RX ORDER — IBUPROFEN 800 MG/1
800 TABLET ORAL EVERY 8 HOURS PRN
Qty: 30 TABLET | Refills: 0 | Status: SHIPPED | OUTPATIENT
Start: 2020-04-17 | End: 2020-05-19 | Stop reason: CLARIF

## 2020-04-26 ENCOUNTER — APPOINTMENT (OUTPATIENT)
Dept: GENERAL RADIOLOGY | Age: 45
End: 2020-04-26
Payer: COMMERCIAL

## 2020-04-26 ENCOUNTER — HOSPITAL ENCOUNTER (EMERGENCY)
Age: 45
Discharge: HOME | End: 2020-04-27
Payer: COMMERCIAL

## 2020-04-26 LAB
ALBUMIN SERPL-MCNC: 4.2 G/DL (ref 3.5–4.6)
ALP BLD-CCNC: 84 U/L (ref 40–130)
ALT SERPL-CCNC: 31 U/L (ref 0–33)
ANION GAP SERPL CALCULATED.3IONS-SCNC: 15 MEQ/L (ref 9–15)
AST SERPL-CCNC: 33 U/L (ref 0–35)
BASOPHILS ABSOLUTE: 0.1 K/UL (ref 0–0.2)
BASOPHILS RELATIVE PERCENT: 0.8 %
BILIRUB SERPL-MCNC: 0.3 MG/DL (ref 0.2–0.7)
BUN BLDV-MCNC: 16 MG/DL (ref 6–20)
CALCIUM SERPL-MCNC: 9 MG/DL (ref 8.5–9.9)
CHLORIDE BLD-SCNC: 94 MEQ/L (ref 95–107)
CO2: 26 MEQ/L (ref 20–31)
CREAT SERPL-MCNC: 0.45 MG/DL (ref 0.5–0.9)
D DIMER: 0.31 MG/L FEU (ref 0–0.5)
EKG ATRIAL RATE: 106 BPM
EKG P AXIS: 37 DEGREES
EKG P-R INTERVAL: 186 MS
EKG Q-T INTERVAL: 362 MS
EKG QRS DURATION: 74 MS
EKG QTC CALCULATION (BAZETT): 480 MS
EKG R AXIS: 21 DEGREES
EKG T AXIS: 27 DEGREES
EKG VENTRICULAR RATE: 106 BPM
EOSINOPHILS ABSOLUTE: 0.2 K/UL (ref 0–0.7)
EOSINOPHILS RELATIVE PERCENT: 1.7 %
GFR AFRICAN AMERICAN: >60
GFR NON-AFRICAN AMERICAN: >60
GLOBULIN: 3.6 G/DL (ref 2.3–3.5)
GLUCOSE BLD-MCNC: 207 MG/DL (ref 70–99)
HCT VFR BLD CALC: 37.6 % (ref 37–47)
HEMOGLOBIN: 12.3 G/DL (ref 12–16)
LYMPHOCYTES ABSOLUTE: 2.4 K/UL (ref 1–4.8)
LYMPHOCYTES RELATIVE PERCENT: 23.1 %
MAGNESIUM: 1.5 MG/DL (ref 1.7–2.4)
MCH RBC QN AUTO: 25.7 PG (ref 27–31.3)
MCHC RBC AUTO-ENTMCNC: 32.8 % (ref 33–37)
MCV RBC AUTO: 78.3 FL (ref 82–100)
MONOCYTES ABSOLUTE: 0.7 K/UL (ref 0.2–0.8)
MONOCYTES RELATIVE PERCENT: 6.3 %
NEUTROPHILS ABSOLUTE: 7.1 K/UL (ref 1.4–6.5)
NEUTROPHILS RELATIVE PERCENT: 68.1 %
PDW BLD-RTO: 17.2 % (ref 11.5–14.5)
PLATELET # BLD: 205 K/UL (ref 130–400)
POTASSIUM SERPL-SCNC: 3.9 MEQ/L (ref 3.4–4.9)
RBC # BLD: 4.8 M/UL (ref 4.2–5.4)
SODIUM BLD-SCNC: 135 MEQ/L (ref 135–144)
TOTAL PROTEIN: 7.8 G/DL (ref 6.3–8)
TROPONIN: <0.01 NG/ML (ref 0–0.01)
WBC # BLD: 10.4 K/UL (ref 4.8–10.8)

## 2020-04-26 PROCEDURE — 85025 COMPLETE CBC W/AUTO DIFF WBC: CPT

## 2020-04-26 PROCEDURE — 71045 X-RAY EXAM CHEST 1 VIEW: CPT

## 2020-04-26 PROCEDURE — 84484 ASSAY OF TROPONIN QUANT: CPT

## 2020-04-26 PROCEDURE — 6370000000 HC RX 637 (ALT 250 FOR IP): Performed by: STUDENT IN AN ORGANIZED HEALTH CARE EDUCATION/TRAINING PROGRAM

## 2020-04-26 PROCEDURE — 96361 HYDRATE IV INFUSION ADD-ON: CPT

## 2020-04-26 PROCEDURE — 36415 COLL VENOUS BLD VENIPUNCTURE: CPT

## 2020-04-26 PROCEDURE — 85379 FIBRIN DEGRADATION QUANT: CPT

## 2020-04-26 PROCEDURE — 80053 COMPREHEN METABOLIC PANEL: CPT

## 2020-04-26 PROCEDURE — 96374 THER/PROPH/DIAG INJ IV PUSH: CPT

## 2020-04-26 PROCEDURE — 83735 ASSAY OF MAGNESIUM: CPT

## 2020-04-26 PROCEDURE — 6360000002 HC RX W HCPCS: Performed by: STUDENT IN AN ORGANIZED HEALTH CARE EDUCATION/TRAINING PROGRAM

## 2020-04-26 PROCEDURE — 99285 EMERGENCY DEPT VISIT HI MDM: CPT

## 2020-04-26 PROCEDURE — 93005 ELECTROCARDIOGRAM TRACING: CPT | Performed by: EMERGENCY MEDICINE

## 2020-04-26 PROCEDURE — 2580000003 HC RX 258: Performed by: STUDENT IN AN ORGANIZED HEALTH CARE EDUCATION/TRAINING PROGRAM

## 2020-04-26 PROCEDURE — 96375 TX/PRO/DX INJ NEW DRUG ADDON: CPT

## 2020-04-26 RX ORDER — MORPHINE SULFATE 2 MG/ML
4 INJECTION, SOLUTION INTRAMUSCULAR; INTRAVENOUS
Status: DISCONTINUED | OUTPATIENT
Start: 2020-04-26 | End: 2020-04-27 | Stop reason: HOSPADM

## 2020-04-26 RX ORDER — ASPIRIN 325 MG
325 TABLET ORAL ONCE
Status: COMPLETED | OUTPATIENT
Start: 2020-04-26 | End: 2020-04-26

## 2020-04-26 RX ORDER — ONDANSETRON 2 MG/ML
4 INJECTION INTRAMUSCULAR; INTRAVENOUS ONCE
Status: COMPLETED | OUTPATIENT
Start: 2020-04-26 | End: 2020-04-26

## 2020-04-26 RX ORDER — 0.9 % SODIUM CHLORIDE 0.9 %
1000 INTRAVENOUS SOLUTION INTRAVENOUS ONCE
Status: COMPLETED | OUTPATIENT
Start: 2020-04-26 | End: 2020-04-27

## 2020-04-26 RX ADMIN — ASPIRIN 325 MG: 325 TABLET, FILM COATED ORAL at 23:14

## 2020-04-26 RX ADMIN — SODIUM CHLORIDE 1000 ML: 9 INJECTION, SOLUTION INTRAVENOUS at 22:50

## 2020-04-26 RX ADMIN — MORPHINE SULFATE 1 MG: 2 INJECTION, SOLUTION INTRAMUSCULAR; INTRAVENOUS at 23:15

## 2020-04-26 RX ADMIN — ONDANSETRON 4 MG: 2 INJECTION INTRAMUSCULAR; INTRAVENOUS at 23:14

## 2020-04-26 ASSESSMENT — PAIN DESCRIPTION - PAIN TYPE
TYPE: ACUTE PAIN
TYPE: ACUTE PAIN

## 2020-04-26 ASSESSMENT — PAIN DESCRIPTION - FREQUENCY: FREQUENCY: INTERMITTENT

## 2020-04-26 ASSESSMENT — PAIN SCALES - GENERAL
PAINLEVEL_OUTOF10: 10
PAINLEVEL_OUTOF10: 6
PAINLEVEL_OUTOF10: 10

## 2020-04-26 ASSESSMENT — HEART SCORE: ECG: 0

## 2020-04-26 ASSESSMENT — PAIN DESCRIPTION - DESCRIPTORS: DESCRIPTORS: ACHING

## 2020-04-26 ASSESSMENT — PAIN DESCRIPTION - LOCATION
LOCATION: CHEST
LOCATION: BACK;CHEST

## 2020-04-26 ASSESSMENT — PAIN DESCRIPTION - ORIENTATION: ORIENTATION: LEFT

## 2020-04-27 ENCOUNTER — HOSPITAL ENCOUNTER (EMERGENCY)
Age: 45
Discharge: HOME | End: 2020-04-27
Attending: EMERGENCY MEDICINE
Payer: COMMERCIAL

## 2020-04-27 ENCOUNTER — PATIENT MESSAGE (OUTPATIENT)
Dept: FAMILY MEDICINE CLINIC | Age: 45
End: 2020-04-27

## 2020-04-27 ENCOUNTER — E-VISIT (OUTPATIENT)
Dept: FAMILY MEDICINE CLINIC | Age: 45
End: 2020-04-27
Payer: COMMERCIAL

## 2020-04-27 ENCOUNTER — CARE COORDINATION (OUTPATIENT)
Dept: OTHER | Facility: CLINIC | Age: 45
End: 2020-04-27

## 2020-04-27 VITALS
TEMPERATURE: 97.5 F | HEIGHT: 66 IN | SYSTOLIC BLOOD PRESSURE: 132 MMHG | WEIGHT: 290 LBS | HEART RATE: 86 BPM | RESPIRATION RATE: 16 BRPM | BODY MASS INDEX: 46.61 KG/M2 | DIASTOLIC BLOOD PRESSURE: 71 MMHG | OXYGEN SATURATION: 94 %

## 2020-04-27 VITALS
SYSTOLIC BLOOD PRESSURE: 168 MMHG | HEART RATE: 98 BPM | TEMPERATURE: 97.8 F | OXYGEN SATURATION: 99 % | RESPIRATION RATE: 18 BRPM | HEIGHT: 66 IN | DIASTOLIC BLOOD PRESSURE: 77 MMHG | BODY MASS INDEX: 46.61 KG/M2 | WEIGHT: 290 LBS

## 2020-04-27 LAB — TROPONIN: <0.01 NG/ML (ref 0–0.01)

## 2020-04-27 PROCEDURE — 99421 OL DIG E/M SVC 5-10 MIN: CPT | Performed by: FAMILY MEDICINE

## 2020-04-27 PROCEDURE — 99282 EMERGENCY DEPT VISIT SF MDM: CPT

## 2020-04-27 PROCEDURE — 93010 ELECTROCARDIOGRAM REPORT: CPT | Performed by: INTERNAL MEDICINE

## 2020-04-27 PROCEDURE — 84484 ASSAY OF TROPONIN QUANT: CPT

## 2020-04-27 PROCEDURE — 36415 COLL VENOUS BLD VENIPUNCTURE: CPT

## 2020-04-27 PROCEDURE — 6370000000 HC RX 637 (ALT 250 FOR IP): Performed by: EMERGENCY MEDICINE

## 2020-04-27 RX ORDER — PREDNISONE 20 MG/1
20 TABLET ORAL ONCE
Status: COMPLETED | OUTPATIENT
Start: 2020-04-27 | End: 2020-04-27

## 2020-04-27 RX ORDER — PREDNISONE 20 MG/1
20 TABLET ORAL DAILY
Qty: 4 TABLET | Refills: 0 | Status: SHIPPED | OUTPATIENT
Start: 2020-04-27 | End: 2020-04-28 | Stop reason: SDUPTHER

## 2020-04-27 RX ORDER — FAMOTIDINE 20 MG/1
20 TABLET, FILM COATED ORAL 2 TIMES DAILY
Qty: 10 TABLET | Refills: 5 | Status: SHIPPED | OUTPATIENT
Start: 2020-04-27 | End: 2020-05-19 | Stop reason: CLARIF

## 2020-04-27 RX ORDER — FAMOTIDINE 20 MG/1
20 TABLET, FILM COATED ORAL ONCE
Status: COMPLETED | OUTPATIENT
Start: 2020-04-27 | End: 2020-04-27

## 2020-04-27 RX ADMIN — FAMOTIDINE 20 MG: 20 TABLET, FILM COATED ORAL at 22:50

## 2020-04-27 RX ADMIN — PREDNISONE 20 MG: 20 TABLET ORAL at 22:50

## 2020-04-27 ASSESSMENT — ENCOUNTER SYMPTOMS
ABDOMINAL PAIN: 0
WHEEZING: 0
COUGH: 0
NAUSEA: 0
SHORTNESS OF BREATH: 0
VOMITING: 0
PHOTOPHOBIA: 0

## 2020-04-27 ASSESSMENT — PAIN DESCRIPTION - FREQUENCY: FREQUENCY: CONTINUOUS

## 2020-04-27 ASSESSMENT — PAIN DESCRIPTION - DESCRIPTORS: DESCRIPTORS: ACHING

## 2020-04-27 ASSESSMENT — PAIN SCALES - GENERAL: PAINLEVEL_OUTOF10: 5

## 2020-04-27 ASSESSMENT — PAIN DESCRIPTION - ORIENTATION: ORIENTATION: RIGHT;LEFT

## 2020-04-27 ASSESSMENT — PAIN DESCRIPTION - LOCATION: LOCATION: HAND

## 2020-04-27 ASSESSMENT — PAIN DESCRIPTION - PAIN TYPE: TYPE: ACUTE PAIN

## 2020-04-27 NOTE — ED NOTES
Blood work collected, labeled, and sent to lab via tube system.      Brittany Manley RN  04/26/20 [id number]

## 2020-04-27 NOTE — ED NOTES
Repeat troponin collected, labeled, and sent to lab via tube system.       Monica Hyatt RN  04/27/20 0028

## 2020-04-27 NOTE — ED NOTES
Patient is resting in bed with call light within reach. No distress noted. Respirations are even and unlabored. Skin is warm, pink, and dry. VS stable. Patient denies any needs at this time.      Mark Wright RN  04/27/20 0030

## 2020-04-27 NOTE — ED NOTES
During medication administration patient was aware and educated on the medications she was receiving. TONY Medrano entered the room to assess patient. Patient told Marcela she changed her mind about receiving the morphine during IV push. 0.5mL of morphine was already pushed and 3.5mL of morphine was wasted.  Dose visualized by TONY Medrano and LANIE Iqbal RN  04/26/20 1137

## 2020-04-27 NOTE — PATIENT INSTRUCTIONS
Triderm  What is the most important information I should know about triamcinolone topical?  Follow all directions on your medicine label and package. Tell each of your healthcare providers about all your medical conditions, allergies, and all medicines you use. What is triamcinolone topical?  Triamcinolone is a steroid that helps reduce inflammation in the body. Triamcinolone topical (for the skin) is used to treat the inflammation and itching caused by skin conditions that respond to steroid medication. The dental paste form of triamcinolone topical is used to treat mouth ulcers. Triamcinolone topical may also be used for purposes not listed in this medication guide. What should I discuss with my healthcare provider before using triamcinolone topical?  You should not use triamcinolone if you are allergic to it. Tell your doctor if you have ever had:  · any type of skin infection;  · a skin reaction to any steroid medicine;  · liver disease; or  · an adrenal gland disorder. Steroid medicines can increase the glucose (sugar) levels in your blood or urine. Tell your doctor if you have diabetes. Tell your doctor if you are pregnant or breastfeeding. If you apply triamcinolone to your chest, avoid areas that may come into contact with the nursing baby's mouth. How should I use triamcinolone topical?  Follow all directions on your prescription label and read all medication guides or instruction sheets. Use the medicine exactly as directed. Triamcinolone topical cream, lotion, ointment, or spray is for use only on the skin. Triamcinolone dental paste is applied directly onto an ulcer inside the mouth and left in place. Do not swallow this medicine. Wash your hands before and after using triamcinolone topical, unless you are using this medicine to treat the skin on your hands. Apply a thin layer of medicine to the affected skin.  Do not apply this medicine over a large area of skin unless your doctor has told license by [facility] ([facility]). If you have questions about a medical condition or this instruction, always ask your healthcare professional. [place] [address] any warranty or liability for your use of this information. Patient Education        nystatin topical  Pronunciation:  abdiel STAT in  Brand:  Mycostatin Topical, Nyamyc, Nystop, Pediaderm AF, Pedi-Dri  What is the most important information I should know about nystatin topical?  Do not use nystatin topical to treat any skin condition that has not been checked by your doctor. Nystatin topical (for the skin) is not for use to treat a vaginal yeast infection. Avoid getting this medication in your eyes or mouth. If this does happen, rinse with water. Use this medication for the full prescribed length of time. Your symptoms may improve before the infection is completely cleared. Call your doctor if your symptoms do not improve, or if they get worse while using nystatin topical.  Do not share this medication with another person, even if they have the same symptoms you have. What is nystatin topical?  Nystatin is an antifungal medication. Nystatin prevents fungus from growing on your skin. Nystatin topical (for the skin) is used to treat skin infections caused by yeast.  Nystatin topical is not for use to treat a vaginal yeast infection. Nystatin topical may also be used for purposes not listed in this medication guide. What should I discuss with my healthcare provider before using nystatin topical?  You should not use nystatin topical if you have ever had an allergic reaction to it. FDA pregnancy category C. It is not known whether nystatin topical will harm an unborn baby. Tell your doctor if you are pregnant or plan to become pregnant while using this medication. It is not known whether nystatin topical passes into breast milk or if it could harm a nursing baby.  Do not use this medication without telling your doctor if you are breast-feeding a baby. How should I use nystatin topical?  Use exactly as prescribed by your doctor. Do not use in larger or smaller amounts or for longer than recommended. Follow the directions on your prescription label. Do not use nystatin topical to treat any skin condition that has not been checked by your doctor. Wash your hands before and after using this medication. Clean and dry the skin before you apply nystatin topical.  Do not cover treated skin with bandages or dressings that do not allow air circulation unless your doctor tells you to. Use this medication for the full prescribed length of time. Your symptoms may improve before the infection is completely cleared. Call your doctor if your symptoms do not improve, or if they get worse while using nystatin topical.  Do not share this medication with another person, even if they have the same symptoms you have. Store at room temperature away from moisture and heat. What happens if I miss a dose? Use the missed dose as soon as you remember. Skip the missed dose if it is almost time for your next scheduled dose. Do not  use extra medicine to make up the missed dose. What happens if I overdose? Seek emergency medical attention or call the Poison Help line at 1-664.848.2700. What should I avoid while using nystatin topical?  Avoid getting this medication in your eyes or mouth. If this does happen, rinse with water. Avoid wearing tight-fitting, synthetic clothing (such as nylon) that doesn't allow air circulation. Wear clothing made of loose cotton and other natural fibers until your infection is healed. What are the possible side effects of nystatin topical?  Get emergency medical help if you have any of these signs of an allergic reaction:  hives; difficulty breathing; swelling of your face, lips, tongue, or throat.   Stop using nystatin topical and call your doctor at once if you have severe burning, itching, rash, pain, or other irritation drug combination is safe, effective or appropriate for any given patient. [facility] does not assume any responsibility for any aspect of healthcare administered with the aid of information [facility] provides. The information contained herein is not intended to cover all possible uses, directions, precautions, warnings, drug interactions, allergic reactions, or adverse effects. If you have questions about the drugs you are taking, check with your doctor, nurse or pharmacist.  Copyright 8966-4740 [address] [place]. Version: 7.02. Revision date: 12/3/2013. Care instructions adapted under license by [facility] ([facility]). If you have questions about a medical condition or this instruction, always ask your healthcare professional. [address] any warranty or liability for your use of this information.

## 2020-04-27 NOTE — ED PROVIDER NOTES
[address] [facility] ED  EMERGENCY DEPARTMENT ENCOUNTER      Pt Name: Samantha Barrett  MRN: [id number]  Arms[place]fsteve 1975  Date of evaluation: 4/26/2020  Provider: Flako Matt, [facility]       Chief Complaint   Patient presents with    Chest Pain         HISTORY OF PRESENT ILLNESS   (Location/Symptom, Timing/Onset, Context/Setting, Quality, Duration, Modifying Factors, Severity)  Note limiting factors. Samantha Barrett is a 38 y.o. female who per chart review has pmhx of RAE, TIIDM, HTN, PMR presents to the emergency department with intermittent, moderate, substernal/right sided chest pain that occasionally radiates underneath her left breast x 4 hours. Pt states pain began after she ate dinner this evening. Described as sharp. Episodes last only a few seconds. Not associated with nausea, vomiting, diaphoresis, or shortness of breath. Not increasing in frequency or intensity over the last four hours. She states that one episode of pain woke her up out of sleep which worried her. She notes that when she woke up she felt her heart was racing as well. She has history of anxiety and states that she felt her heart was racing due to anxiety. She also has a history of PMR in which she takes prednisone for. She just lowered her prednisone dose 2 weeks ago which she thinks may be causing the pain as this feels similar to prior flare ups of her PMR. No aggravating or alleviating factors. Her cardiologist is Dr. Ernestina Smith who she saw approximately 1 year ago. She had an echo done on 6/18/19 which showed trace 1+ mitral regurg and mildly dilated L atrium with EF of 60%. She is currently chest pain free in the ED. She denies nvd, fever, chills, sob, palpitations, abd pain, cough, orthopnea, swelling, abd distention, urinary symptoms. HPI    Nursing Notes were reviewed.     REVIEW OF SYSTEMS    (2-9 systems for level 4, 10 or more for level 5)     Review of Systems   Constitutional: Negative for Disp-40 tablet, R-1Normal      venlafaxine (EFFEXOR XR) 75 MG extended release capsule TAKE TWO CAPSULES BY MOUTH EVERY MORNING AND TAKE ONE CAPSULE BY MOUTH EVERY EVENING, Disp-270 capsule, R-1Normal      rosuvastatin (CRESTOR) 10 MG tablet TAKE ONE TABLET BY MOUTH NIGHTLY, Disp-90 tablet, R-1Normal      Insulin Pen Needle (PEN NEEDLES) 32G X 4 MM MISC Use as directed with insulin pens, 4 times daily, Disp-400 each, R-1Normal      insulin lispro (HUMALOG KWIKPEN) 100 UNIT/ML pen INJECT 60 units at each meals, Disp-90 pen, R-3Normal      insulin glargine (TOUJEO MAX SOLOSTAR) 300 UNIT/ML injection pen Inject 240 units at bedtime, Disp-90 pen, R-3Normal      solifenacin (VESICARE) 10 MG tablet Take 1 tablet by mouth daily, Disp-90 tablet, R-3Normal      vitamin D (CHOLECALCIFEROL) 1000 UNIT TABS tablet Take 1,000 Units by mouth dailyHistorical Med      calcium carbonate 600 MG TABS tablet Take 1 tablet by mouth dailyHistorical Med      miconazole (MICOTIN) 2 % cream Apply topically 2 times daily. , Disp-141 g, R-3, Normal      !! predniSONE (DELTASONE) 10 MG tablet Take 15 mg by mouth daily Historical Med      Handicap Placard [facility] Starting Tue 7/3/2018, Disp-1 each, R-0, PrintExp 5 years       !! - Potential duplicate medications found. Please discuss with provider. ALLERGIES     Ace inhibitors; Bactrim [sulfamethoxazole-trimethoprim];  Nitroglycerin; Percocet [oxycodone-acetaminophen]; and Victoza [liraglutide]    FAMILY HISTORY       Family History   Problem Relation Age of Onset    Diabetes Father     Heart Disease Mother           SOCIAL HISTORY       Social History     Socioeconomic History    Marital status:      Spouse name: None    Number of children: 1    Years of education: None    Highest education level: None   Occupational History    None   Social Needs    Financial resource strain: Not very hard    Food insecurity     Worry: Never true     Inability: Never true   Meggan Lenz Transportation needs     Medical: No     Non-medical: No   Tobacco Use    Smoking status: Former Smoker     Packs/day: 1.00     Types: Cigarettes     Last attempt to quit: 1/1/2017     Years since quitting: 3.3    Smokeless tobacco: Never Used   Substance and Sexual Activity    Alcohol use: Yes     Alcohol/week: 0.0 standard drinks     Frequency: Monthly or less     Binge frequency: Never     Comment: socially    Drug use: No    Sexual activity: Yes     Partners: Male     Comment: single   Lifestyle    Physical activity     Days per week: 1 day     Minutes per session: 10 min    Stress: To some extent   Relationships    Social connections     Talks on phone:  Three times a week     Gets together: Once a week     Attends Confucianist service: More than 4 times per year     Active member of club or organization: No     Attends meetings of clubs or organizations: Never     Relationship status:     Intimate partner violence     Fear of current or ex partner: None     Emotionally abused: None     Physically abused: None     Forced sexual activity: None   Other Topics Concern    None   Social History Narrative    Social/Functional History            SCREENINGS    [place]son Coma Scale  Eye Opening: Spontaneous  Best Verbal Response: Oriented  Best Motor Response: Obeys commands  Neel Coma Scale Score: 15 Heart Score for chest pain patients  History: Slightly Suspicious  ECG: Normal  Patient Age: < 45 years  *Risk factors for Atherosclerotic disease: Diabetes Mellitus, Hypercholesterolemia, Hypertension, Obesity, Positive family History  Risk Factors: > 3 Risk factors or history of atherosclerotic disease*  Troponin: < 1X normal limit  Heart Score Total: 2         PHYSICAL EXAM    (up to 7 for level 4, 8 or more for level 5)     ED Triage Vitals   BP Temp Temp Source Pulse Resp SpO2 Height Weight   04/26/20 2215 04/26/20 2212 04/26/20 2212 04/26/20 2212 04/26/20 2212 04/26/20 2212 04/26/20 2212 04/26/20 2212 by the radiologist. Brice Tran radiographic images are visualized and preliminarily interpreted by the emergency physician with the below findings:    CXR negative for acute abnormality     Interpretation per the Radiologist below, if available at the time of this note:    XR CHEST PORTABLE    (Results Pending)         ED BEDSIDE ULTRASOUND:   Performed by ED Physician - none    LABS:  Labs Reviewed   CBC WITH AUTO DIFFERENTIAL - Abnormal; Notable for the following components:       Result Value    MCV 78.3 (*)     MCH 25.7 (*)     MCHC 32.8 (*)     RDW 17.2 (*)     Neutrophils Absolute 7.1 (*)     All other components within normal limits   COMPREHENSIVE METABOLIC PANEL - Abnormal; Notable for the following components:    Chloride 94 (*)     Glucose 207 (*)     CREATININE 0.45 (*)     Globulin 3.6 (*)     All other components within normal limits   MAGNESIUM - Abnormal; Notable for the following components:    Magnesium 1.5 (*)     All other components within normal limits   TROPONIN   D-DIMER, QUANTITATIVE   TROPONIN       All other labs were within normal range or not returned as of this dictation. EMERGENCY DEPARTMENT COURSE and DIFFERENTIAL DIAGNOSIS/MDM:   Vitals:    Vitals:    04/26/20 2215 04/26/20 2314 04/27/20 0029 04/27/20 0040   BP: 121/66 (!) 151/91 (!) 157/68 132/71   Pulse:  91 88 86   Resp:  20 18 16   Temp:       TempSrc:       SpO2:  95% 94% 94%   Weight:       Height:         MDM     Pt is a 37 yo F who presents to the ED for chest pain. She is afebrile and tachycardic to 106. She was given 1 L IV NS and PO ASA in the ED. CMP remarkable for glucose 207. Mag 1.5. Initial trop negative. D-dimer wnl. EKG shows sinus tach with , normal axis, normal intervals, no ST changes. No changes compared to 2/2019. CXR negative for acute abnormality. Pt states she had a brief episode of right-sided chest pain in the ED.  Pt given IV Morphine and IV Zofran however chest pain resolved on its own and pt denied

## 2020-04-28 ENCOUNTER — HOSPITAL ENCOUNTER (EMERGENCY)
Age: 45
Discharge: HOME HEALTH | End: 2020-04-28
Payer: COMMERCIAL

## 2020-04-28 ENCOUNTER — OFFICE VISIT (OUTPATIENT)
Dept: FAMILY MEDICINE CLINIC | Age: 45
End: 2020-04-28
Payer: COMMERCIAL

## 2020-04-28 ENCOUNTER — HOSPITAL ENCOUNTER (EMERGENCY)
Age: 45
Discharge: HOME | End: 2020-04-28
Attending: EMERGENCY MEDICINE
Payer: COMMERCIAL

## 2020-04-28 ENCOUNTER — CARE COORDINATION (OUTPATIENT)
Dept: OTHER | Facility: CLINIC | Age: 45
End: 2020-04-28

## 2020-04-28 ENCOUNTER — VIRTUAL VISIT (OUTPATIENT)
Dept: CARDIOLOGY CLINIC | Age: 45
End: 2020-04-28
Payer: COMMERCIAL

## 2020-04-28 VITALS
BODY MASS INDEX: 46.61 KG/M2 | RESPIRATION RATE: 13 BRPM | OXYGEN SATURATION: 97 % | WEIGHT: 290 LBS | SYSTOLIC BLOOD PRESSURE: 122 MMHG | DIASTOLIC BLOOD PRESSURE: 68 MMHG | TEMPERATURE: 98.3 F | HEIGHT: 66 IN | HEART RATE: 105 BPM

## 2020-04-28 VITALS
RESPIRATION RATE: 18 BRPM | BODY MASS INDEX: 45.52 KG/M2 | DIASTOLIC BLOOD PRESSURE: 66 MMHG | HEIGHT: 67 IN | TEMPERATURE: 98.2 F | HEART RATE: 128 BPM | OXYGEN SATURATION: 96 % | WEIGHT: 290 LBS | SYSTOLIC BLOOD PRESSURE: 127 MMHG

## 2020-04-28 LAB
BACTERIA: ABNORMAL /HPF
BILIRUBIN URINE: ABNORMAL
BLOOD, URINE: ABNORMAL
CLARITY: CLEAR
COLOR: YELLOW
EPITHELIAL CELLS, UA: ABNORMAL /HPF
GLUCOSE URINE: NEGATIVE MG/DL
KETONES, URINE: ABNORMAL MG/DL
LEUKOCYTE ESTERASE, URINE: ABNORMAL
NITRITE, URINE: NEGATIVE
PH UA: 6 (ref 5–9)
PROTEIN UA: 100 MG/DL
RBC UA: ABNORMAL /HPF (ref 0–2)
SPECIFIC GRAVITY UA: 1.02 (ref 1–1.03)
URINE REFLEX TO CULTURE: YES
UROBILINOGEN, URINE: 0.2 E.U./DL
WBC UA: ABNORMAL /HPF (ref 0–5)

## 2020-04-28 PROCEDURE — 87077 CULTURE AEROBIC IDENTIFY: CPT

## 2020-04-28 PROCEDURE — 1111F DSCHRG MED/CURRENT MED MERGE: CPT | Performed by: INTERNAL MEDICINE

## 2020-04-28 PROCEDURE — 99283 EMERGENCY DEPT VISIT LOW MDM: CPT

## 2020-04-28 PROCEDURE — 99244 OFF/OP CNSLTJ NEW/EST MOD 40: CPT | Performed by: INTERNAL MEDICINE

## 2020-04-28 PROCEDURE — 99212 OFFICE O/P EST SF 10 MIN: CPT | Performed by: NURSE PRACTITIONER

## 2020-04-28 PROCEDURE — 87086 URINE CULTURE/COLONY COUNT: CPT

## 2020-04-28 PROCEDURE — 87186 SC STD MICRODIL/AGAR DIL: CPT

## 2020-04-28 PROCEDURE — 81001 URINALYSIS AUTO W/SCOPE: CPT

## 2020-04-28 RX ORDER — PREDNISONE 20 MG/1
40 TABLET ORAL DAILY
Qty: 14 TABLET | Refills: 0 | Status: SHIPPED | OUTPATIENT
Start: 2020-04-28 | End: 2020-05-05

## 2020-04-28 ASSESSMENT — PAIN DESCRIPTION - PAIN TYPE
TYPE: ACUTE PAIN
TYPE: ACUTE PAIN

## 2020-04-28 ASSESSMENT — PAIN DESCRIPTION - ORIENTATION
ORIENTATION: RIGHT;LEFT
ORIENTATION: RIGHT;LEFT

## 2020-04-28 ASSESSMENT — PAIN DESCRIPTION - FREQUENCY
FREQUENCY: CONTINUOUS
FREQUENCY: CONTINUOUS

## 2020-04-28 ASSESSMENT — PAIN SCALES - GENERAL
PAINLEVEL_OUTOF10: 7
PAINLEVEL_OUTOF10: 7

## 2020-04-28 ASSESSMENT — PAIN DESCRIPTION - LOCATION
LOCATION: HAND
LOCATION: HAND

## 2020-04-28 ASSESSMENT — ENCOUNTER SYMPTOMS
SHORTNESS OF BREATH: 0
FACIAL SWELLING: 1
SHORTNESS OF BREATH: 0
BLOOD IN STOOL: 0
TROUBLE SWALLOWING: 0
CHEST TIGHTNESS: 0
VOMITING: 0
NAUSEA: 0
DIARRHEA: 0
APNEA: 0

## 2020-04-28 ASSESSMENT — PAIN DESCRIPTION - PROGRESSION
CLINICAL_PROGRESSION: GRADUALLY WORSENING
CLINICAL_PROGRESSION: GRADUALLY WORSENING

## 2020-04-28 ASSESSMENT — PAIN DESCRIPTION - ONSET
ONSET: ON-GOING
ONSET: ON-GOING

## 2020-04-28 ASSESSMENT — PAIN DESCRIPTION - DESCRIPTORS
DESCRIPTORS: THROBBING
DESCRIPTORS: THROBBING

## 2020-04-28 NOTE — ED TRIAGE NOTES
Pt came to ER for bilat hand swelling. Pt was at ER on Sunday and was given Morphine, Pt woke then on Monday with swelling on hands, upper arms, no trouble swallowing or breathing. Pt then returned to ER and was given Rx for prednisone and cream.  Hands remain swollen and painful.

## 2020-04-28 NOTE — ED TRIAGE NOTES
Pt arrives from home with c/o rash. Pt states she was seen in this ED last night and called her PMD today but rash is getting worse. Pt has large hives on bilat arms, states her hands feel swollen. Pt denies tongue swelling, denies airway compromise. Pt speaking in complete sentences, able to swallow her own saliva. Pt alert and oriented x 4. Skin pink, warm, dry. Respirations even and unlabored. No distress noted at this time. Pt states she took Claritin with no relief.   States she is itchy all over

## 2020-04-28 NOTE — ED PROVIDER NOTES
Urticaria of upper extremities   Neurological:      Mental Status: She is alert and oriented to person, place, and time. Cranial Nerves: No cranial nerve deficit. Psychiatric:         Thought Content: Thought content normal.         DIAGNOSTIC RESULTS     EKG: All EKG's are interpreted by the Emergency Department Physician who either signs or Co-signs this chart in the absence of a cardiologist.    RADIOLOGY:   Non-plain film images such as CT, Ultrasound and MRI are read by the radiologist. Plain radiographic images are visualized and preliminarily interpreted by the emergency physician with the below findings:    Interpretation per the Radiologist below, if available at the time of this note:    No orders to display       LABS:  Labs Reviewed - No data to display    All other labs were within normal range or not returned as of this dictation. EMERGENCY DEPARTMENT COURSE and DIFFERENTIAL DIAGNOSIS/MDM:   Vitals:    Vitals:    04/27/20 2240   BP: (!) 168/77   Pulse: 98   Resp: 18   Temp: 97.8 °F (36.6 °C)   TempSrc: Oral   SpO2: 99%   Weight: 290 lb (131.5 kg)   Height: 5' 6\" (1.676 m)       MDM  Number of Diagnoses or Management Options  Allergic dermatitis:   Diagnosis management comments: 46-year-old female presenting with allergic type rash of upper extremities, urticaria visible. She is otherwise well-appearing on exam.  Given steroids and Pepcid in ER. Patient instructed to take Benadryl at home. Suspect allergic dermatitis from morphine given yesterday while in ER. Patient will be discharged home in good condition. Patient has been hemodynamically stable throughout ED course and is appropriate for outpatient follow up. Patient should follow up with PCP in 2-3 days for rash recheck or return to ED immediately for any new or worsening symptoms. Patient is well appearing on discharge and agreeable with plan of care.       Patient Progress  Patient progress: stable      Procedures    CRITICAL CARE TIME   Total Critical Care time was 0 minutes, excluding separately reportable procedures. There was a high probability of clinically significant/life threatening deterioration in the patient's condition which required my urgent intervention. FINAL IMPRESSION      1.  Allergic dermatitis          DISPOSITION/PLAN   DISPOSITION Decision To Discharge 04/27/2020 10:46:03 PM      (Please note that portions of this note were completed with a voice recognition program.  Efforts were made to edit the dictations but occasionally words are mis-transcribed.)    Mo Stinson MD (electronically signed)  Attending Emergency Physician        Mo Stinson MD  04/27/20 9012

## 2020-04-28 NOTE — PROGRESS NOTES
Musculoskeletal:   [x] Normal gait with no signs of ataxia         [] Normal range of motion of neck        [] Abnormal-       Neurological:        [x] No Facial Asymmetry (Cranial nerve 7 motor function) (limited exam to video visit)          [] No gaze palsy        [] Abnormal-         Skin:        [x] No significant exanthematous lesions or discoloration noted on facial skin         [] Abnormal-            Psychiatric:       [x] Normal Affect [] No Hallucinations        [] Abnormal-     Other pertinent observable physical exam findings-     ASSESSMENT/PLAN:  1. Angina at rest ([address]    - NM MYOCARDIAL SPECT REST EXERCISE OR RX; Future  - AK DISCHARGE MEDS RECONCILED W/ CURRENT OUTPATIENT MED LIST  - AK DISCHARGE MEDS RECONCILED W/ CURRENT OUTPATIENT MED LIST    2. Type 2 diabetes mellitus with complication, with long-term current use of insulin (HCC)    - NM MYOCARDIAL SPECT REST EXERCISE OR RX; Future  - AK DISCHARGE MEDS RECONCILED W/ CURRENT OUTPATIENT MED LIST  - AK DISCHARGE MEDS RECONCILED W/ CURRENT OUTPATIENT MED LIST      Return in about 1 month (around 5/28/2020). Alexandrea Fonseca is a 38 y.o. female being evaluated by a Virtual Visit (video visit) encounter to address concerns as mentioned above. A caregiver was present when appropriate. Due to this being a TeleHealth encounter ([facility]), evaluation of the following organ systems was limited: Vitals/Constitutional/EENT/Resp/CV/GI//MS/Neuro/Skin/Heme-Lymph-Imm. Pursuant to the emergency declaration under the [address] [place] [address], [address] [place] [address] authority and the [employer] Act, this Virtual Visit was conducted with patient's (and/or legal guardian's) consent, to reduce the patient's risk of exposure to COVID-19 and provide necessary medical care.   The patient (and/or legal guardian) has also been advised to contact this office for worsening conditions or problems, and seek emergency medical treatment and/or call 911 if deemed necessary. Patient identification was verified at the start of the visit: Yes    Total time spent on this encounter: 21-30 MINUTES    Services were provided through a video synchronous discussion virtually to substitute for in-person clinic visit. Patient and provider were located at their individual homes. --Sulma Mac MD on 4/30/2020 at 9:40 AM    An electronic signature was used to authenticate this note. [facility] CARDIOLOGY OFFICE FOLLOW-UP      Patient: Abigail Craft  YOB: 1975  MRN: [id number]    Chief Complaint:  Chief Complaint   Patient presents with    Follow[facility]Up from [facility]    Chest Pain         Subjective/HPI:  7/16/19: Patient presents today for follow-up of angina. No angina currently. 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  No congestive heart failure symptoms. She has dyslipidemia and hyperglycemia. Echocardiogram was noted. LV ejection fraction is normal.  See me in 1 year    1/15/19: Patient presents today for follow up of angina. She is diabetic and is getting uncontrolled. No further angina. Palpitations are better. She has dyslipidemia that is under control. She is to have an echocardiogram in June 2019 then see me.     9/26/17: Patient presents today for follow-up of angina. She is diabetic and is getting uncontrolled. Crispin Coleman suggested pediatric surgery and I agree. No further angina. Palpitations are better. She has dyslipidemia that is under control. See me in 6 months.     11/15/16: For fu of angina. Obese and diabetic. Occasional palpitation. No syncopy. No CHF. See in 6M     9/3/15: Works at [employer] care. C/O L sided CP. And palpitation. Increase toprol to 100 BID. Get Thyroid profile. See me in LO.     3/10/15: Stress test and echo were both normal.Valves OK. Quit smoking for 3 weeks. On nicoderm patch. Hb 1 1    famotidine (PEPCID) 20 MG tablet Take 1 tablet by mouth 2 times daily 10 tablet 5    nicotine polacrilex (NICORETTE) 2 MG gum Chew 1 piece of gum when the urge to smoke occurs. Do not exceed 24 pieces per day. 440 each 4    hydroCHLOROthiazide (HYDRODIURIL) 25 MG tablet Take 1 tablet by mouth daily 90 tablet 3    losartan (COZAAR) 50 MG tablet Take 1 tablet by mouth daily 90 tablet 3    promethazine (PHENERGAN) 25 MG tablet TAKE 1 TABLET BY MOUTH EVERY 8 HOURS AS NEEDED FOR NAUSEA 180 tablet 0    baclofen (LIORESAL) 10 MG tablet Take 0.5 tablets by mouth 2 times daily 90 tablet 1    pantoprazole (PROTONIX) 40 MG tablet Take 1 tablet by mouth daily 60 tablet 2    metFORMIN (GLUCOPHAGE) 500 MG tablet Take 1 tablet by mouth 3 times daily 270 tablet 3    metoprolol (LOPRESSOR) 100 MG tablet TAKE 1 TABLET BY MOUTH 2 TIMES A  tablet 0    Blood Glucose Monitoring Suppl (PRODIGY AUTOCODE BLOOD GLUCOSE) w/Device KIT Use as directed to test up to 4 times daily 1 kit 0    blood glucose test strips (PRODIGY NO CODING BLOOD GLUC) strip 1 each by In Vitro route 4 times daily As needed. 400 each 3    PRODIGY LANCETS 28G MISC Use as directed to test up to 4 times daily 400 each 3    nystatin (MYCOSTATIN) 606898 UNIT/GM powder Apply 3 times daily.  1 Bottle 2    ondansetron (ZOFRAN-ODT) 4 MG disintegrating tablet Take 1 tablet by mouth every 8 hours as needed for Nausea or Vomiting 40 tablet 1    venlafaxine (EFFEXOR XR) 75 MG extended release capsule TAKE TWO CAPSULES BY MOUTH EVERY MORNING AND TAKE ONE CAPSULE BY MOUTH EVERY EVENING 270 capsule 1    rosuvastatin (CRESTOR) 10 MG tablet TAKE ONE TABLET BY MOUTH NIGHTLY 90 tablet 1    Insulin Pen Needle (PEN NEEDLES) 32G X 4 MM MISC Use as directed with insulin pens, 4 times daily 400 each 1    insulin lispro (HUMALOG KWIKPEN) 100 UNIT/ML pen INJECT 60 units at each meals (Patient taking differently: Inject 70 Units into the skin 3 times daily (before meals) ) 90 pen 3    insulin glargine (TOUJEO MAX SOLOSTAR) 300 UNIT/ML injection pen Inject 240 units at bedtime 90 pen 3    vitamin D (CHOLECALCIFEROL) 1000 UNIT TABS tablet Take 1,000 Units by mouth daily      calcium carbonate 600 MG TABS tablet Take 1 tablet by mouth daily      miconazole (MICOTIN) 2 % cream Apply topically 2 times daily. 141 g 3    Handicap Placard MISC Exp 5 years 1 each 0    predniSONE (DELTASONE) 20 MG tablet Take 2 tablets by mouth daily for 7 days 14 tablet 0    ibuprofen (ADVIL;MOTRIN) 800 MG tablet Take 1 tablet by mouth every 8 hours as needed for Pain 30 tablet 0    solifenacin (VESICARE) 10 MG tablet Take 1 tablet by mouth daily 90 tablet 3     No current facility-administered medications for this visit. Review of Systems:   Review of Systems   Constitutional: Negative for diaphoresis. HENT: Negative for nosebleeds. Cardiovascular: Negative for chest pain, palpitations and leg swelling. Gastrointestinal: Negative for blood in stool, nausea and vomiting. Musculoskeletal: Negative for myalgias. Neurological: Negative for dizziness, seizures, weakness and headaches. Psychiatric/Behavioral: The patient is not nervous/anxious. Review of System is negative except for as mentioned above. Physical Examination:    LMP  (LMP Unknown)    Physical Exam   Constitutional: She appears healthy. HENT:   Nose: Nose normal.   Mouth/Throat: Dentition is normal. Oropharynx is clear. Eyes: Pupils are equal, round, and reactive to light. Neck: Normal range of motion. Cardiovascular: Normal rate, regular rhythm, S1 normal, S2 normal, normal heart sounds, intact distal pulses and normal pulses. No extrasystoles are present. Exam reveals no gallop. No murmur heard. Pulmonary/Chest: Effort normal and breath sounds normal. She has no wheezes. She has no rales. She exhibits no tenderness. Abdominal: Soft.  Bowel sounds are normal. She exhibits no distension

## 2020-04-28 NOTE — PROGRESS NOTES
rosuvastatin (CRESTOR) 10 MG tablet TAKE ONE TABLET BY MOUTH NIGHTLY 90 tablet 1    Insulin Pen Needle (PEN NEEDLES) 32G X 4 MM MISC Use as directed with insulin pens, 4 times daily 400 each 1    insulin lispro (HUMALOG KWIKPEN) 100 UNIT/ML pen INJECT 60 units at each meals (Patient taking differently: Inject 70 Units into the skin 3 times daily (before meals) ) 90 pen 3    insulin glargine (TOUJEO MAX SOLOSTAR) 300 UNIT/ML injection pen Inject 240 units at bedtime 90 pen 3    solifenacin (VESICARE) 10 MG tablet Take 1 tablet by mouth daily 90 tablet 3    vitamin D (CHOLECALCIFEROL) 1000 UNIT TABS tablet Take 1,000 Units by mouth daily      calcium carbonate 600 MG TABS tablet Take 1 tablet by mouth daily      miconazole (MICOTIN) 2 % cream Apply topically 2 times daily. 141 g 3    Handicap Placard MISC Exp 5 years 1 each 0     No current facility-administered medications on file prior to visit. Review of Systems    Objective    Vitals:    04/28/20 1613   BP: 122/68   Pulse: 105   Resp: 13   Temp: 98.3 °F (36.8 °C)   SpO2: 97%   Weight: 290 lb (131.5 kg)   Height: 5' 6\" (1.676 m)       Physical Exam  POC Testing Today: No results found for this visit on 04/28/20. Assessment and Plan    There are no diagnoses linked to this encounter. Procedures:  Unless otherwise noted below, none    No follow-ups on file. Side effects and adverse effects of any medication prescribed today, as well as treatment plan/rationale, follow-up care, and result expectations have been discussed with the patient. Expresses understanding and desires to proceed with treatment plan. The patient was reminded that if an antibiotic has been prescribed the predicted course is improvement to cure with no persistent issues. Take antibiotics as directed. If any problems occur, an appointment should be made or ER visit if severe. Because of the risk with ANY antibiotic of C.  Difficile colitis if persistent diarrhea or

## 2020-04-28 NOTE — ED PROVIDER NOTES
 B12 deficiency     Family history of premature CAD 9/4/2013    Fatty liver     HPV (human papilloma virus) anogenital infection     Hyperlipidemia     Hypertension     Liver hemangioma 2009/2015    Obesity 3/11/13    BMI 43.42    Orthostatic hypotension     Ovarian cyst     PMR (polymyalgia rheumatica) (HCC)     Rectal fissure     Tobacco abuse 9/3/2015    Tobacco abuse     Tremor     Uncontrolled diabetes mellitus with complications ([address]     Unspecified sleep apnea          SURGICALHISTORY       Past Surgical History:   Procedure Laterality Date    CARDIAC CATHETERIZATION  4-07    COLONOSCOPY  2013    for diarrhea (-)    HYSTERECTOMY  12-10    LEEP  1-05    [place] MUSCLE BIOPSY Left 9/21/2018    LEFT QUADRICEPS MUSCLE BIOPSY performed by Emani Bryan MD at [address] [place] [address] UNI/BI CANNULATION N/A 9/18/2018    T 12 VERTEBRALPLASTY ROOM 278 performed by Dharmesh Walker MD at [place] [address] ENDOSCOPY  10/13/15      [facility] SURGERY  6-2015         CURRENT MEDICATIONS       Current Discharge Medication List      CONTINUE these medications which have NOT CHANGED    Details   nystatin-triamcinolone (MYCOLOG II) 641871-4.1 UNIT/GM-% cream Apply topically 2 times daily.   Qty: 30 g, Refills: 1    Associated Diagnoses: Dermatitis      famotidine (PEPCID) 20 MG tablet Take 1 tablet by mouth 2 times daily  Qty: 10 tablet, Refills: 5      hydroCHLOROthiazide (HYDRODIURIL) 25 MG tablet Take 1 tablet by mouth daily  Qty: 90 tablet, Refills: 3      losartan (COZAAR) 50 MG tablet Take 1 tablet by mouth daily  Qty: 90 tablet, Refills: 3      promethazine (PHENERGAN) 25 MG tablet TAKE 1 TABLET BY MOUTH EVERY 8 HOURS AS NEEDED FOR NAUSEA  Qty: 180 tablet, Refills: 0      baclofen (LIORESAL) 10 MG tablet Take 0.5 tablets by mouth 2 times daily  Qty: 90 tablet, Refills: 1      pantoprazole (PROTONIX) 40 MG tablet piece of gum when the urge to smoke occurs. Do not exceed 24 pieces per day. Qty: 440 each, Refills: 4      Blood Glucose Monitoring Suppl (PRODIGY AUTOCODE BLOOD GLUCOSE) w/Device KIT Use as directed to test up to 4 times daily  Qty: 1 kit, Refills: 0      blood glucose test strips (PRODIGY NO CODING BLOOD GLUC) strip 1 each by In Vitro route 4 times daily As needed. Qty: 400 each, Refills: 3      PRODIGY LANCETS 28G MISC Use as directed to test up to 4 times daily  Qty: 400 each, Refills: 3      Insulin Pen Needle (PEN NEEDLES) 32G X 4 MM MISC Use as directed with insulin pens, 4 times daily  Qty: 400 each, Refills: 1    Associated Diagnoses: Type 2 diabetes mellitus with complication, with long-term current use of insulin (HCC)      Handicap Placard MISC Exp 5 years  Qty: 1 each, Refills: 0    Associated Diagnoses: Weakness of both lower extremities; Lumbar radiculopathy             ALLERGIES     Morphine and related; Ace inhibitors; Bactrim [sulfamethoxazole-trimethoprim]; Nitroglycerin; Percocet [oxycodone-acetaminophen]; and Victoza [liraglutide]    FAMILY HISTORY       Family History   Problem Relation Age of Onset    Diabetes Father     Heart Disease Mother           SOCIAL HISTORY       Social History     Socioeconomic History    Marital status:      Spouse name: None    Number of children: 1    Years of education: None    Highest education level: None   Occupational History    None   Social Needs    Financial resource strain: Not very hard    Food insecurity     Worry: Never true     Inability: Never true    Transportation needs     Medical: No     Non-medical: No   Tobacco Use    Smoking status: Former Smoker     Packs/day: 1.00     Types: Cigarettes     Last attempt to quit: 1/1/2017     Years since quitting: 3.3    Smokeless tobacco: Never Used   Substance and Sexual Activity    Alcohol use:  Yes     Alcohol/week: 0.0 standard drinks     Frequency: Monthly or less     Binge frequency: Never     Comment: socially    Drug use: No    Sexual activity: Yes     Partners: Male     Comment: single   Lifestyle    Physical activity     Days per week: 1 day     Minutes per session: 10 min    Stress: To some extent   Relationships    Social connections     Talks on phone: Three times a week     Gets together: Once a week     Attends [employer] service: More than 4 times per year     Active member of club or organization: No     Attends meetings of clubs or organizations: Never     Relationship status:     Intimate partner violence     Fear of current or ex partner: None     Emotionally abused: None     Physically abused: None     Forced sexual activity: None   Other Topics Concern    None   Social History Narrative    Social/Functional History            SCREENINGS      @FLOW([id number])@      PHYSICAL EXAM    (up to 7 for level 4, 8 or more for level 5)     ED Triage Vitals [04/28/20 1730]   BP Temp Temp Source Pulse Resp SpO2 Height Weight   127/66 98.2 °F (36.8 °C) Oral 128 18 93 % 5' 7\" (1.702 m) 290 lb (131.5 kg)       Physical Exam  This is a 81-year-old female without distress. Redness and swelling noted in the forehead cheeks and chin but not the lips or mouth. Patient speaking without muffled voice. Swallowing easily. Lungs clear. Heart rate and rhythm without murmur. Residual area of rash to the right upper inner arm. It is fading. Small spots of rash on the torso which are dime to quarter size. There is diffuse redness edema and tenderness to the palms of the hand. No specific joint swelling. Patient is awake alert and appropriate. Patient moves all extremities symmetrically without focal weakness or sensory deficit. Speech pattern and cranial nerves appear to be intact. No focal neurologic deficit.       DIAGNOSTIC RESULTS     EKG: All EKG's are interpreted by the Emergency Department Physician who either signs or Co-signsthis chart in the absence of a cardiologist.        RADIOLOGY:   Anil Dowdy such as CT, Ultrasound and MRI are read by the radiologist. Plain radiographic images are visualized and preliminarily interpreted by the emergency physician with the below findings:        Interpretation per the Radiologist below, if available at the time ofthis note:    No orders to display         ED BEDSIDE ULTRASOUND:   Performed by ED Physician - none    LABS:  Labs Reviewed   URINE RT REFLEX TO CULTURE - Abnormal; Notable for the following components:       Result Value    Protein,  (*)     All other components within normal limits   MICROSCOPIC URINALYSIS - Abnormal; Notable for the following components:    WBC, UA 20-50 (*)     RBC, UA 3-5 (*)     Bacteria, UA MANY (*)     All other components within normal limits   CULTURE, URINE       All other labs were within normal range or not returned as of this dictation. EMERGENCY DEPARTMENT COURSE and DIFFERENTIAL DIAGNOSIS/MDM:   Vitals:    Vitals:    04/28/20 1730   BP: 127/66   Pulse: 128   Resp: 18   Temp: 98.2 °F (36.8 °C)   TempSrc: Oral   SpO2: 93%   Weight: 290 lb (131.5 kg)   Height: 5' 7\" (1.702 m)       No specific diagnostics indicated in the ED. Patient has a dermatitis involving primarily hands and the feet that looks inflammatory and certainly could be allergic in nature although only medication was single dose of morphine exposure. We will increase prednisone to 40 mg daily. Continue Benadryl. Dermatology referral given. No angioedema or open skin lesions or blistered lesions at this time. Nothing that appears life-threatening. MDM    CRITICAL CARE TIME   Total Critical Care time was  minutes, excluding separately reportableprocedures. There was a high probability of clinicallysignificant/life threatening deterioration in the patient's condition which required my urgent intervention.       CONSULTS:  None    PROCEDURES:  Unless otherwise noted below, none

## 2020-04-29 ENCOUNTER — CARE COORDINATION (OUTPATIENT)
Dept: OTHER | Facility: CLINIC | Age: 45
End: 2020-04-29

## 2020-05-01 LAB
ORGANISM: ABNORMAL
URINE CULTURE, ROUTINE: ABNORMAL

## 2020-05-06 RX ORDER — SOLIFENACIN SUCCINATE 10 MG/1
TABLET, FILM COATED ORAL
Qty: 90 TABLET | Refills: 3 | Status: SHIPPED | OUTPATIENT
Start: 2020-05-06 | End: 2020-09-25

## 2020-05-12 ENCOUNTER — CARE COORDINATION (OUTPATIENT)
Dept: OTHER | Facility: CLINIC | Age: 45
End: 2020-05-12

## 2020-05-19 ENCOUNTER — TELEMEDICINE (OUTPATIENT)
Dept: FAMILY MEDICINE CLINIC | Age: 45
End: 2020-05-19
Payer: COMMERCIAL

## 2020-05-19 ENCOUNTER — OFFICE VISIT (OUTPATIENT)
Dept: OBGYN CLINIC | Age: 45
End: 2020-05-19
Payer: COMMERCIAL

## 2020-05-19 VITALS
WEIGHT: 292.6 LBS | DIASTOLIC BLOOD PRESSURE: 86 MMHG | BODY MASS INDEX: 45.92 KG/M2 | HEIGHT: 67 IN | SYSTOLIC BLOOD PRESSURE: 140 MMHG

## 2020-05-19 DIAGNOSIS — E11.8 TYPE 2 DIABETES MELLITUS WITH COMPLICATION, WITH LONG-TERM CURRENT USE OF INSULIN (HCC): ICD-10-CM

## 2020-05-19 DIAGNOSIS — Z79.4 TYPE 2 DIABETES MELLITUS WITH COMPLICATION, WITH LONG-TERM CURRENT USE OF INSULIN (HCC): ICD-10-CM

## 2020-05-19 DIAGNOSIS — N93.9 VAGINAL BLEEDING: ICD-10-CM

## 2020-05-19 LAB
ANION GAP SERPL CALCULATED.3IONS-SCNC: 18 MEQ/L (ref 9–15)
BUN BLDV-MCNC: 15 MG/DL (ref 6–20)
CALCIUM SERPL-MCNC: 10.2 MG/DL (ref 8.5–9.9)
CHLORIDE BLD-SCNC: 96 MEQ/L (ref 95–107)
CO2: 23 MEQ/L (ref 20–31)
CREAT SERPL-MCNC: 0.62 MG/DL (ref 0.5–0.9)
GFR AFRICAN AMERICAN: >60
GFR NON-AFRICAN AMERICAN: >60
GLUCOSE BLD-MCNC: 261 MG/DL (ref 70–99)
HBA1C MFR BLD: 9.3 % (ref 4.8–5.9)
POTASSIUM SERPL-SCNC: 4.3 MEQ/L (ref 3.4–4.9)
SODIUM BLD-SCNC: 137 MEQ/L (ref 135–144)

## 2020-05-19 PROCEDURE — 99214 OFFICE O/P EST MOD 30 MIN: CPT | Performed by: FAMILY MEDICINE

## 2020-05-19 PROCEDURE — 99214 OFFICE O/P EST MOD 30 MIN: CPT | Performed by: OBSTETRICS & GYNECOLOGY

## 2020-05-19 RX ORDER — OMEPRAZOLE 40 MG/1
40 CAPSULE, DELAYED RELEASE ORAL
Qty: 90 CAPSULE | Refills: 0 | Status: SHIPPED | OUTPATIENT
Start: 2020-05-19 | End: 2020-06-04 | Stop reason: SDUPTHER

## 2020-05-19 RX ORDER — FLUCONAZOLE 200 MG/1
200 TABLET ORAL ONCE
Qty: 7 TABLET | Refills: 0 | Status: SHIPPED | OUTPATIENT
Start: 2020-05-19 | End: 2020-05-19

## 2020-05-19 RX ORDER — PREDNISONE 20 MG/1
20 TABLET ORAL 2 TIMES DAILY
Qty: 10 TABLET | Refills: 0 | Status: SHIPPED | OUTPATIENT
Start: 2020-05-19 | End: 2020-05-24

## 2020-05-19 RX ORDER — CLOTRIMAZOLE AND BETAMETHASONE DIPROPIONATE 10; .64 MG/G; MG/G
CREAM TOPICAL
Qty: 1 TUBE | Refills: 3 | Status: SHIPPED | OUTPATIENT
Start: 2020-05-19 | End: 2020-11-05 | Stop reason: SDUPTHER

## 2020-05-19 ASSESSMENT — ENCOUNTER SYMPTOMS
SHORTNESS OF BREATH: 0
ABDOMINAL PAIN: 0
VOMITING: 0
ABDOMINAL PAIN: 0
NAUSEA: 0
SHORTNESS OF BREATH: 0
CONSTIPATION: 0
DIARRHEA: 0
APNEA: 0
APNEA: 0
BLOOD IN STOOL: 0
COUGH: 0
CHEST TIGHTNESS: 0

## 2020-05-19 NOTE — PROGRESS NOTES
The patient was asked if she would like a chaperone present for her intimate exam. She  Declined the chaperone.  Germán Alcantara
on File Prior to Visit   Medication Sig Dispense Refill    omeprazole (PRILOSEC) 40 MG delayed release capsule Take 1 capsule by mouth every morning (before breakfast) 90 capsule 0    predniSONE (DELTASONE) 20 MG tablet Take 1 tablet by mouth 2 times daily for 5 days 10 tablet 0    fluconazole (DIFLUCAN) 100 MG tablet Take 1.5 tablets by mouth daily for 14 days 21 tablet 0    solifenacin (VESICARE) 10 MG tablet TAKE 1 TABLET BY MOUTH ONE TIME A DAY 90 tablet 3    nystatin-triamcinolone (MYCOLOG II) 182392-9.1 UNIT/GM-% cream Apply topically 2 times daily. 30 g 1    hydroCHLOROthiazide (HYDRODIURIL) 25 MG tablet Take 1 tablet by mouth daily 90 tablet 3    losartan (COZAAR) 50 MG tablet Take 1 tablet by mouth daily 90 tablet 3    promethazine (PHENERGAN) 25 MG tablet TAKE 1 TABLET BY MOUTH EVERY 8 HOURS AS NEEDED FOR NAUSEA 180 tablet 0    baclofen (LIORESAL) 10 MG tablet Take 0.5 tablets by mouth 2 times daily 90 tablet 1    pantoprazole (PROTONIX) 40 MG tablet Take 1 tablet by mouth daily 60 tablet 2    metFORMIN (GLUCOPHAGE) 500 MG tablet Take 1 tablet by mouth 3 times daily 270 tablet 3    metoprolol (LOPRESSOR) 100 MG tablet TAKE 1 TABLET BY MOUTH 2 TIMES A  tablet 0    Blood Glucose Monitoring Suppl (PRODIGY AUTOCODE BLOOD GLUCOSE) w/Device KIT Use as directed to test up to 4 times daily 1 kit 0    blood glucose test strips (PRODIGY NO CODING BLOOD GLUC) strip 1 each by In Vitro route 4 times daily As needed. 400 each 3    PRODIGY LANCETS 28G MISC Use as directed to test up to 4 times daily 400 each 3    nystatin (MYCOSTATIN) 579275 UNIT/GM powder Apply 3 times daily.  1 Bottle 2    ondansetron (ZOFRAN-ODT) 4 MG disintegrating tablet Take 1 tablet by mouth every 8 hours as needed for Nausea or Vomiting 40 tablet 1    venlafaxine (EFFEXOR XR) 75 MG extended release capsule TAKE TWO CAPSULES BY MOUTH EVERY MORNING AND TAKE ONE CAPSULE BY MOUTH EVERY EVENING 270 capsule 1    rosuvastatin

## 2020-05-26 ENCOUNTER — HOSPITAL ENCOUNTER (OUTPATIENT)
Dept: NON INVASIVE DIAGNOSTICS | Age: 45
Discharge: HOME | End: 2020-05-26
Payer: COMMERCIAL

## 2020-05-26 ENCOUNTER — HOSPITAL ENCOUNTER (OUTPATIENT)
Dept: NUCLEAR MEDICINE | Age: 45
Discharge: HOME | End: 2020-05-28
Payer: COMMERCIAL

## 2020-05-26 VITALS — DIASTOLIC BLOOD PRESSURE: 78 MMHG | HEART RATE: 75 BPM | SYSTOLIC BLOOD PRESSURE: 120 MMHG

## 2020-05-26 PROCEDURE — 2580000003 HC RX 258: Performed by: INTERNAL MEDICINE

## 2020-05-26 PROCEDURE — 6360000002 HC RX W HCPCS: Performed by: INTERNAL MEDICINE

## 2020-05-26 PROCEDURE — 78452 HT MUSCLE IMAGE SPECT MULT: CPT

## 2020-05-26 PROCEDURE — A9502 TC99M TETROFOSMIN: HCPCS | Performed by: INTERNAL MEDICINE

## 2020-05-26 PROCEDURE — 3430000000 HC RX DIAGNOSTIC RADIOPHARMACEUTICAL: Performed by: INTERNAL MEDICINE

## 2020-05-26 PROCEDURE — 93017 CV STRESS TEST TRACING ONLY: CPT

## 2020-05-26 RX ORDER — SODIUM CHLORIDE 0.9 % (FLUSH) 0.9 %
10 SYRINGE (ML) INJECTION PRN
Status: DISCONTINUED | OUTPATIENT
Start: 2020-05-26 | End: 2020-05-29 | Stop reason: HOSPADM

## 2020-05-26 RX ADMIN — REGADENOSON 0.4 MG: 0.08 INJECTION, SOLUTION INTRAVENOUS at 08:49

## 2020-05-26 RX ADMIN — Medication 10 ML: at 08:50

## 2020-05-26 RX ADMIN — TETROFOSMIN 35 MILLICURIE: 1.38 INJECTION, POWDER, LYOPHILIZED, FOR SOLUTION INTRAVENOUS at 08:50

## 2020-05-26 RX ADMIN — Medication 10 ML: at 08:49

## 2020-05-27 ENCOUNTER — VIRTUAL VISIT (OUTPATIENT)
Dept: ENDOCRINOLOGY | Age: 45
End: 2020-05-27
Payer: COMMERCIAL

## 2020-05-27 PROCEDURE — 99213 OFFICE O/P EST LOW 20 MIN: CPT | Performed by: INTERNAL MEDICINE

## 2020-05-27 RX ORDER — INSULIN LISPRO 100 [IU]/ML
INJECTION, SOLUTION INTRAVENOUS; SUBCUTANEOUS
Qty: 90 PEN | Refills: 3
Start: 2020-05-27 | End: 2020-06-04 | Stop reason: SDUPTHER

## 2020-05-27 RX ORDER — INSULIN GLARGINE 300 U/ML
INJECTION, SOLUTION SUBCUTANEOUS
Qty: 90 PEN | Refills: 3
Start: 2020-05-27 | End: 2020-10-19

## 2020-05-27 NOTE — PROGRESS NOTES
2020    TELEHEALTH EVALUATION -- Audio/Visual (During [facility] public health emergency)    Due to [place] 19 outbreak, patient's office visit was converted to a virtual visit. Patient was contacted and agreed to proceed with a virtual visit via [email or web address]  The risks and benefits of converting to a virtual visit were discussed in light of the current infectious disease epidemic. Patient also understood that insurance coverage and co-pays are up to their individual insurance plans. HPI: Made aware this is a video visit billable visit agreed to proceed patient was at home I was at my office    Abigail Craft (:  1975) has requested an audio/video evaluation for the following concern(s):    diabetes on Toujeo 240 units at bedtime plus Humalog 70 units with each meals blood sugars are still high fasting and postprandial currently on prednisone taper dose at this time only and 5 mg daily testing 3 times daily    Reviewed most current labs they are still above 9      Results for [facility]" (MRN [id number]) as of 2020 11:36   Ref. Range 2020 13:03   Sodium Latest Ref Range: 135 - 144 mEq/L 137   Potassium Latest Ref Range: 3.4 - 4.9 mEq/L 4.3   Chloride Latest Ref Range: 95 - 107 mEq/L 96   CO2 Latest Ref Range: 20 - 31 mEq/L 23   BUN Latest Ref Range: 6 - 20 mg/dL 15   Creatinine Latest Ref Range: 0.50 - 0.90 mg/dL 0.62   Anion Gap Latest Ref Range: 9 - 15 mEq/L 18 (H)   GFR Non- Latest Ref Range: >60  >60.0   GFR African American Latest Ref Range: >60  >60.0   Glucose Latest Ref Range: 70 - 99 mg/dL 261 (H)   Calcium Latest Ref Range: 8.5 - 9.9 mg/dL 10.2 (H)   Hemoglobin A1C Latest Ref Range: 4.8 - 5.9 % 9.3 (H)       Results for [facility]" (MRN [id number]) as of 2020 11:36   Ref.  Range 2019 20:24 3/19/2019 07:34 7/15/2019 07:47 2019 07:32 2020 13:03   Hemoglobin A1C Latest Ref Range: 4.8 - 5.9 % Alcohol/week: 0.0 standard drinks     Frequency: Monthly or less     Binge frequency: Never     Comment: socially    Drug use: No            PHYSICAL EXAMINATION:  [ INSTRUCTIONS:  \"[x]\" Indicates a positive item  \"[]\" Indicates a negative item  -- DELETE ALL ITEMS NOT EXAMINED]  [] Alert  [] Oriented to person/place/time    [] No apparent distress  [] Toxic appearing    [] Face flushed appearing [] Sclera clear  [] Lips are cyanotic      [] Breathing appears normal  [] Appears tachypneic      [] Rash on visible skin    [] Cranial Nerves II-XII grossly intact    [] Motor grossly intact in visible upper extremities    [] Motor grossly intact in visible lower extremities    [] Normal Mood  [] Anxious appearing    [] Depressed appearing  [] Confused appearing      [] Poor short term memory  [] Poor long term memory    [] OTHER:      Due to this being a TeleHealth encounter, evaluation of the following organ systems is limited: Vitals/Constitutional/EENT/Resp/CV/GI//MS/Neuro/Skin/Heme-Lymph-Imm. ASSESSMENT/PLAN:     Diagnosis Orders   1.  Type 2 diabetes mellitus with complication, with long-term current use of insulin (HCC)  Basic Metabolic Panel    Hemoglobin A1C     Orders Placed This Encounter   Procedures    Basic Metabolic Panel     Standing Status:   Future     Standing Expiration Date:   2021    Hemoglobin A1C     Standing Status:   Future     Standing Expiration Date:   2021     Orders Placed This Encounter   Medications    Insulin Glargine, 2 Unit Dial, (TOUJEO MAX SOLOSTAR) 300 UNIT/ML SOPN     Si units at bedtime     Dispense:  90 pen     Refill:  3    insulin lispro, 1 Unit Dial, (HUMALOG KWIKPEN) 100 UNIT/ML SOPN     Si units with each meals     Dispense:  90 pen     Refill:  3     Increase Toujeo to 280 units at bedtime increase Humalog to 80 with each meals A1c goal of less than 7    Total time spent with patient was 16 minutes      An  electronic signature was used to authenticate this note. --Regan Palm MD on 5/27/2020 at 11:36 AM        Pursuant to the emergency declaration under the [address] [place] [address] waiver authority and the [employer] Act, this Virtual  Visit was conducted, with patient's consent, to reduce the patient's risk of exposure to COVID-19 and provide continuity of care for an established patient. Services were provided through a video synchronous discussion virtually to substitute for in-person clinic visit.

## 2020-05-29 ENCOUNTER — HOSPITAL ENCOUNTER (OUTPATIENT)
Dept: NUCLEAR MEDICINE | Age: 45
Discharge: HOME | End: 2020-05-31
Payer: COMMERCIAL

## 2020-05-29 ENCOUNTER — HOSPITAL ENCOUNTER (OUTPATIENT)
Dept: NON INVASIVE DIAGNOSTICS | Age: 45
Discharge: HOME | End: 2020-05-29
Payer: COMMERCIAL

## 2020-05-29 PROCEDURE — 3430000000 HC RX DIAGNOSTIC RADIOPHARMACEUTICAL: Performed by: INTERNAL MEDICINE

## 2020-05-29 PROCEDURE — A9502 TC99M TETROFOSMIN: HCPCS | Performed by: INTERNAL MEDICINE

## 2020-05-29 PROCEDURE — 78451 HT MUSCLE IMAGE SPECT SING: CPT

## 2020-05-29 PROCEDURE — 2580000003 HC RX 258: Performed by: INTERNAL MEDICINE

## 2020-05-29 RX ORDER — SODIUM CHLORIDE 0.9 % (FLUSH) 0.9 %
10 SYRINGE (ML) INJECTION PRN
Status: DISCONTINUED | OUTPATIENT
Start: 2020-05-29 | End: 2020-06-01 | Stop reason: HOSPADM

## 2020-05-29 RX ADMIN — TETROFOSMIN 35.6 MILLICURIE: 1.38 INJECTION, POWDER, LYOPHILIZED, FOR SOLUTION INTRAVENOUS at 12:05

## 2020-05-29 RX ADMIN — Medication 10 ML: at 12:05

## 2020-06-05 RX ORDER — INSULIN LISPRO 100 [IU]/ML
INJECTION, SOLUTION INTRAVENOUS; SUBCUTANEOUS
Qty: 90 PEN | Refills: 3 | Status: SHIPPED | OUTPATIENT
Start: 2020-06-05 | End: 2021-01-22 | Stop reason: ALTCHOICE

## 2020-06-05 RX ORDER — METOPROLOL TARTRATE 100 MG/1
100 TABLET ORAL 2 TIMES DAILY
Qty: 180 TABLET | Refills: 0 | Status: SHIPPED | OUTPATIENT
Start: 2020-06-05 | End: 2020-08-27 | Stop reason: SDUPTHER

## 2020-06-05 RX ORDER — OMEPRAZOLE 40 MG/1
40 CAPSULE, DELAYED RELEASE ORAL
Qty: 90 CAPSULE | Refills: 0 | Status: SHIPPED | OUTPATIENT
Start: 2020-06-05 | End: 2020-09-10 | Stop reason: SDUPTHER

## 2020-06-08 NOTE — PROCEDURES
[address] [address]                              CARDIAC STRESS TEST    PATIENT NAME: Marla Beverly               :        1975  MED REC NO:   [id number]                              ROOM:  ACCOUNT NO:   [de-identified]                           ADMIT DATE: 2020  PROVIDER:     Zari Lazaro MD    CARDIOVASCULAR DIAGNOSTIC DEPARTMENT    DATE OF STUDY:  2020    INDICATIONS:  Chest pain. This is a 2-day study. TECHNIQUE:  At rest, the patient was injected with 35.6 mCi of Myoview. Resting images were obtained. The patient was then given 0.4 mg of  Lexiscan, followed by administration of 35.0 mCi of Myoview. Stress  tomographic images were then obtained. Left ventricular ejection  fraction and gated wall motion were acquired. RESULTS:  Stress EKG with sinus rhythm and was within normal limits. Maximum heart rate achieved was 92 beats per minute. No diagnostic ST  segment changes were noted. IMAGING RESULTS:  Review of rest and stress tomographic images revealed  moderate-sized perfusion defect in the anterior wall, which is most  consistent with breast attenuation artifact. Left ventricular ejection  fraction is diminished at 47%, but may be under estimated. TID ratio  1.25, which is elevated. IMPRESSION:  Moderate-sized perfusion defect, which most likely  represent breast attenuation artifact, however, in the presence of  elevated TID ratio and diminished left ventricular ejection, anterior  wall ischemia cannot be ruled out. Suggest clinical correlation.         Cliff Peterson MD    D: 2020 #16:49:18       T: 2020 17:32:11     [id number]  Job#: [id number]     Doc#: [id number]    CC:

## 2020-06-09 ENCOUNTER — VIRTUAL VISIT (OUTPATIENT)
Dept: CARDIOLOGY CLINIC | Age: 45
End: 2020-06-09
Payer: COMMERCIAL

## 2020-06-09 PROCEDURE — 99213 OFFICE O/P EST LOW 20 MIN: CPT | Performed by: INTERNAL MEDICINE

## 2020-06-09 ASSESSMENT — ENCOUNTER SYMPTOMS
VOMITING: 0
NAUSEA: 0
SHORTNESS OF BREATH: 0
APNEA: 0
CHEST TIGHTNESS: 0
DIARRHEA: 0
BLOOD IN STOOL: 0

## 2020-06-09 NOTE — PROGRESS NOTES
2020    TELEHEALTH EVALUATION -- Audio/Visual (During [facility] public health emergency)    HPI:    Cristhian Richardson (:  1975) has requested an audio/video evaluation for the following concern(s): For follow-up of chest pain. Stress test was a 2-day study. Was negative. Has multiple risk factors coronary artery disease including obesity diabetes hypertension. Dyslipidemia. At this point doing well. No angina congestive heart failure. She will see me in 6 months    Review of Systems   Constitutional: Negative for diaphoresis and fatigue. HENT: Negative for nosebleeds. Respiratory: Negative for apnea, chest tightness and shortness of breath. Cardiovascular: Negative for chest pain, palpitations and leg swelling. Gastrointestinal: Negative for blood in stool, diarrhea, nausea and vomiting. Musculoskeletal: Negative for myalgias, neck pain and neck stiffness. Neurological: Negative for dizziness, seizures, syncope, weakness, light-headedness, numbness and headaches. Hematological: Does not bruise/bleed easily. Psychiatric/Behavioral: Negative for confusion. The patient is not nervous/anxious. All other systems reviewed and are negative. Prior to Visit Medications    Medication Sig Taking? Authorizing Provider   metoprolol (LOPRESSOR) 100 MG tablet Take 1 tablet by mouth 2 times daily Yes Donzella Seip, MD   omeprazole (PRILOSEC) 40 MG delayed release capsule Take 1 capsule by mouth every morning (before breakfast) Yes Donzella Seip, MD   insulin lispro, 1 Unit Dial, (HUMALOG KWIKPEN) 100 UNIT/ML SOPN 80 units with each meals Yes Tiffanie Breaux MD   Insulin Glargine, 2 Unit Dial, (TOUJEO MAX SOLOSTAR) 300 UNIT/ML SOPN 280 units at bedtime Yes Tiffanie Breaux MD   clotrimazole-betamethasone (LOTRISONE) 1-0.05 % cream Apply topically 2 times daily.  Yes Anjali Matamoros DO   solifenacin (VESICARE) 10 MG tablet TAKE 1 TABLET BY MOUTH ONE TIME A DAY Yes Clark Quinn MD nystatin-triamcinolone (MYCOLOG II) 958975-1.1 UNIT/GM-% cream Apply topically 2 times daily. Yes Yoli Segura MD   hydroCHLOROthiazide (HYDRODIURIL) 25 MG tablet Take 1 tablet by mouth daily Yes Tavo Perdue MD   losartan (COZAAR) 50 MG tablet Take 1 tablet by mouth daily Yes Tavo Perdue MD   promethazine (PHENERGAN) 25 MG tablet TAKE 1 TABLET BY MOUTH EVERY 8 HOURS AS NEEDED FOR NAUSEA Yes Tavo Perdue MD   baclofen (LIORESAL) 10 MG tablet Take 0.5 tablets by mouth 2 times daily Yes Tavo Perdue MD   pantoprazole (PROTONIX) 40 MG tablet Take 1 tablet by mouth daily Yes Tavo Perdue MD   metFORMIN (GLUCOPHAGE) 500 MG tablet Take 1 tablet by mouth 3 times daily Yes Ganesh Pino MD   Blood Glucose Monitoring Suppl (PRODIGY AUTOCODE BLOOD GLUCOSE) w/Device KIT Use as directed to test up to 4 times daily Yes Ganesh Pino MD   blood glucose test strips (PRODIGY NO CODING BLOOD GLUC) strip 1 each by In Vitro route 4 times daily As needed. Yes MD ROOSEVELT Jacob LANCETS 28G MISC Use as directed to test up to 4 times daily Yes Ganesh Pino MD   nystatin (MYCOSTATIN) 216096 UNIT/GM powder Apply 3 times daily.  Yes Tavo Perdue MD   ondansetron (ZOFRAN-ODT) 4 MG disintegrating tablet Take 1 tablet by mouth every 8 hours as needed for Nausea or Vomiting Yes Ganesh Pino MD   venlafaxine (EFFEXOR XR) 75 MG extended release capsule TAKE TWO CAPSULES BY MOUTH EVERY MORNING AND TAKE ONE CAPSULE BY MOUTH EVERY EVENING Yes Tavo Perdue MD   rosuvastatin (CRESTOR) 10 MG tablet TAKE ONE TABLET BY MOUTH NIGHTLY Yes Rudi Vazquez MD   Insulin Pen Needle (PEN NEEDLES) 32G X 4 MM MISC Use as directed with insulin pens, 4 times daily Yes Ganesh Pino MD   insulin lispro (HUMALOG KWIKPEN) 100 UNIT/ML pen INJECT 60 units at each meals  Patient taking differently: Inject 70 Units into the skin 3 times daily (before meals)  Yes Ganesh Pino MD   insulin glargine (TOUJEO MAX SOLOSTAR) 300 UNIT/ML injection pen Inject 240 units at bedtime Yes Dorothy Baxter MD   vitamin D (CHOLECALCIFEROL) 1000 UNIT TABS tablet Take 1,000 Units by mouth daily Yes Historical Provider, MD   calcium carbonate 600 MG TABS tablet Take 1 tablet by mouth daily Yes Historical Provider, MD   miconazole (MICOTIN) 2 % cream Apply topically 2 times daily. Yes Candy Hill MD   Handicap Placard MISC Exp 5 years Yes Candy Hill MD   cephALEXin (KEFLEX) 500 MG capsule Take 1 capsule by mouth 4 times daily for 7 days  Gilles Nevarez MD       Social History     Tobacco Use    Smoking status: Former Smoker     Packs/day: 1.00     Types: Cigarettes     Last attempt to quit: 1/1/2017     Years since quitting: 3.4    Smokeless tobacco: Never Used   Substance Use Topics    Alcohol use: Yes     Alcohol/week: 0.0 standard drinks     Frequency: Monthly or less     Binge frequency: Never     Comment: socially    Drug use: No            PHYSICAL EXAMINATION:  [ INSTRUCTIONS:  \"[x]\" Indicates a positive item  \"[]\" Indicates a negative item  -- DELETE ALL ITEMS NOT EXAMINED]  Vital Signs: (As obtained by patient/caregiver or practitioner observation)    Blood pressure-  Heart rate-    Respiratory rate-    Temperature-  Pulse oximetry-     Constitutional: [] Appears well-developed and well-nourished [] No apparent distress      [] Abnormal-   Mental status  [x] Alert and awake  [x] Oriented to person/place/time []Able to follow commands      Eyes:  EOM    []  Normal  [] Abnormal-  Sclera  []  Normal  [] Abnormal -         Discharge [x]  None visible  [] Abnormal -    HENT:   [] Normocephalic, atraumatic.   [] Abnormal   [] Mouth/Throat: Mucous membranes are moist.     External Ears [x] Normal  [] Abnormal-     Neck: [x] No visualized mass     Pulmonary/Chest: [x] Respiratory effort normal.  [x] No visualized signs of difficulty breathing or respiratory distress        [] Abnormal-      Musculoskeletal:   [x] Normal gait with no signs of ataxia         [] Normal range of motion of neck        [] Abnormal-       Neurological:        [x] No Facial Asymmetry (Cranial nerve 7 motor function) (limited exam to video visit)          [] No gaze palsy        [] Abnormal-         Skin:        [x] No significant exanthematous lesions or discoloration noted on facial skin         [] Abnormal-            Psychiatric:       [] Normal Affect [] No Hallucinations        [] Abnormal-     Other pertinent observable physical exam findings-     ASSESSMENT/PLAN:  1. Acute thoracic back pain, unspecified back pain laterality    2. Dyslipidemia        Return in about 6 months (around 12/9/2020). Oracio Curtis is a 41 y.o. female being evaluated by a Virtual Visit (video visit) encounter to address concerns as mentioned above. A caregiver was present when appropriate. Due to this being a TeleHealth encounter (During [place] [address] public health emergency), evaluation of the following organ systems was limited: Vitals/Constitutional/EENT/Resp/CV/GI//MS/Neuro/Skin/Heme-Lymph-Imm. Pursuant to the emergency declaration under the [address] [place] [address] authority and the [employer] Act, this Virtual Visit was conducted with patient's (and/or legal guardian's) consent, to reduce the patient's risk of exposure to COVID-19 and provide necessary medical care. The patient (and/or legal guardian) has also been advised to contact this office for worsening conditions or problems, and seek emergency medical treatment and/or call 911 if deemed necessary. Patient identification was verified at the start of the visit: Yes    Total time spent on this encounter: 21 - 30 minutes    Services were provided through a video synchronous discussion virtually to substitute for in-person clinic visit. Patient and provider were located at their individual homes.     --Randi Lyons MD on 6/18/2020 at 8:58 AM    An

## 2020-06-10 ENCOUNTER — TELEPHONE (OUTPATIENT)
Dept: PHARMACY | Facility: CLINIC | Age: 45
End: 2020-06-10

## 2020-06-13 ENCOUNTER — E-VISIT (OUTPATIENT)
Dept: FAMILY MEDICINE CLINIC | Age: 45
End: 2020-06-13
Payer: COMMERCIAL

## 2020-06-13 ENCOUNTER — TELEPHONE (OUTPATIENT)
Dept: FAMILY MEDICINE CLINIC | Age: 45
End: 2020-06-13

## 2020-06-13 PROCEDURE — 99421 OL DIG E/M SVC 5-10 MIN: CPT | Performed by: FAMILY MEDICINE

## 2020-06-13 RX ORDER — PHENAZOPYRIDINE HYDROCHLORIDE 100 MG/1
100 TABLET, FILM COATED ORAL 3 TIMES DAILY PRN
Qty: 6 TABLET | Refills: 0 | Status: SHIPPED | OUTPATIENT
Start: 2020-06-13 | End: 2020-06-15

## 2020-06-13 RX ORDER — CEPHALEXIN 500 MG/1
500 CAPSULE ORAL 4 TIMES DAILY
Qty: 28 CAPSULE | Refills: 0 | Status: SHIPPED | OUTPATIENT
Start: 2020-06-13 | End: 2020-06-20

## 2020-06-23 NOTE — TELEPHONE ENCOUNTER
[id number] Page Werner is requesting medication refill. Please approve or deny this request.    Rx requested:  Requested Prescriptions     Pending Prescriptions Disp Refills    nystatin-triamcinolone (MYCOLOG II) 810383-6.1 UNIT/GM-% cream 30 g 1     Sig: Apply topically 2 times daily.          Last Office Visit:   3/11/2020      Next Visit Date:  Future Appointments   Date Time Provider [facility] Radha   7/23/2020  4:30 PM Keaton Norton MD [facility]   12/15/2020 10:45 AM Lalit Arias MD [address]

## 2020-06-30 RX ORDER — ONDANSETRON 4 MG/1
4 TABLET, ORALLY DISINTEGRATING ORAL EVERY 8 HOURS PRN
Qty: 40 TABLET | Refills: 1 | Status: SHIPPED | OUTPATIENT
Start: 2020-06-30 | End: 2020-08-11

## 2020-07-28 RX ORDER — ROSUVASTATIN CALCIUM 10 MG/1
TABLET, COATED ORAL
Qty: 90 TABLET | Refills: 0 | Status: SHIPPED | OUTPATIENT
Start: 2020-07-28 | End: 2020-11-09 | Stop reason: SDUPTHER

## 2020-07-28 NOTE — TELEPHONE ENCOUNTER
[id number] [address] Werner is requesting medication refill.  Please approve or deny this request.    Rx requested:  Requested Prescriptions     Pending Prescriptions Disp Refills    rosuvastatin (CRESTOR) 10 MG tablet 90 tablet 1     Sig: TAKE ONE TABLET BY MOUTH NIGHTLY         Last Office Visit:   3/11/2020      Next Visit Date:  Future Appointments   Date Time Provider Iliana Garcia   12/15/2020 10:45 AM Trevor Urena MD [address]

## 2020-07-30 ENCOUNTER — E-VISIT (OUTPATIENT)
Dept: OBGYN CLINIC | Age: 45
End: 2020-07-30
Payer: COMMERCIAL

## 2020-07-30 PROCEDURE — 99422 OL DIG E/M SVC 11-20 MIN: CPT | Performed by: FAMILY MEDICINE

## 2020-07-30 RX ORDER — NITROFURANTOIN 25; 75 MG/1; MG/1
100 CAPSULE ORAL 2 TIMES DAILY
Qty: 20 CAPSULE | Refills: 0 | Status: SHIPPED | OUTPATIENT
Start: 2020-07-30 | End: 2020-08-09

## 2020-07-30 NOTE — PROGRESS NOTES
HPI: as per patient provided history    Exam: N/A (electronic visit)    ASSESSMENT/PLAN:  1. Acute cystitis without hematuria    - nitrofurantoin, macrocrystal-monohydrate, (MACROBID) 100 MG capsule; Take 1 capsule by mouth 2 times daily for 10 days  Dispense: 20 capsule; Refill: 0      Patient instructed to call the office if worsens, or fails to improve as anticipated. 11-20 minutes were spent on the digital evaluation and management of this patient.

## 2020-08-11 ENCOUNTER — HOSPITAL ENCOUNTER (EMERGENCY)
Age: 45
Discharge: HOME | End: 2020-08-11
Payer: COMMERCIAL

## 2020-08-11 VITALS
HEART RATE: 105 BPM | HEIGHT: 66 IN | BODY MASS INDEX: 46.61 KG/M2 | WEIGHT: 290 LBS | DIASTOLIC BLOOD PRESSURE: 60 MMHG | TEMPERATURE: 97.7 F | RESPIRATION RATE: 20 BRPM | OXYGEN SATURATION: 94 % | SYSTOLIC BLOOD PRESSURE: 109 MMHG

## 2020-08-11 LAB
ALBUMIN SERPL-MCNC: 4.1 G/DL (ref 3.5–4.6)
ALP BLD-CCNC: 91 U/L (ref 40–130)
ALT SERPL-CCNC: 30 U/L (ref 0–33)
ANION GAP SERPL CALCULATED.3IONS-SCNC: 17 MEQ/L (ref 9–15)
AST SERPL-CCNC: 31 U/L (ref 0–35)
BASOPHILS ABSOLUTE: 0.1 K/UL (ref 0–0.2)
BASOPHILS RELATIVE PERCENT: 0.6 %
BILIRUB SERPL-MCNC: 0.4 MG/DL (ref 0.2–0.7)
BUN BLDV-MCNC: 12 MG/DL (ref 6–20)
CALCIUM SERPL-MCNC: 9.5 MG/DL (ref 8.5–9.9)
CHLORIDE BLD-SCNC: 101 MEQ/L (ref 95–107)
CO2: 26 MEQ/L (ref 20–31)
CREAT SERPL-MCNC: 0.6 MG/DL (ref 0.5–0.9)
EOSINOPHILS ABSOLUTE: 0.1 K/UL (ref 0–0.7)
EOSINOPHILS RELATIVE PERCENT: 0.8 %
GFR AFRICAN AMERICAN: >60
GFR NON-AFRICAN AMERICAN: >60
GLOBULIN: 3.9 G/DL (ref 2.3–3.5)
GLUCOSE BLD-MCNC: 162 MG/DL (ref 60–115)
GLUCOSE BLD-MCNC: 182 MG/DL (ref 70–99)
HCT VFR BLD CALC: 41.7 % (ref 37–47)
HEMOGLOBIN: 13.5 G/DL (ref 12–16)
LACTIC ACID: 2.9 MMOL/L (ref 0.5–2.2)
LIPASE: 30 U/L (ref 12–95)
LYMPHOCYTES ABSOLUTE: 2.9 K/UL (ref 1–4.8)
LYMPHOCYTES RELATIVE PERCENT: 20 %
MCH RBC QN AUTO: 26 PG (ref 27–31.3)
MCHC RBC AUTO-ENTMCNC: 32.4 % (ref 33–37)
MCV RBC AUTO: 80.4 FL (ref 82–100)
MONOCYTES ABSOLUTE: 0.9 K/UL (ref 0.2–0.8)
MONOCYTES RELATIVE PERCENT: 5.9 %
NEUTROPHILS ABSOLUTE: 10.6 K/UL (ref 1.4–6.5)
NEUTROPHILS RELATIVE PERCENT: 72.7 %
PDW BLD-RTO: 17.4 % (ref 11.5–14.5)
PERFORMED ON: ABNORMAL
PLATELET # BLD: 241 K/UL (ref 130–400)
POTASSIUM SERPL-SCNC: 3.8 MEQ/L (ref 3.4–4.9)
RBC # BLD: 5.18 M/UL (ref 4.2–5.4)
SODIUM BLD-SCNC: 144 MEQ/L (ref 135–144)
TOTAL PROTEIN: 8 G/DL (ref 6.3–8)
WBC # BLD: 14.5 K/UL (ref 4.8–10.8)

## 2020-08-11 PROCEDURE — 96361 HYDRATE IV INFUSION ADD-ON: CPT

## 2020-08-11 PROCEDURE — 96375 TX/PRO/DX INJ NEW DRUG ADDON: CPT

## 2020-08-11 PROCEDURE — 2500000003 HC RX 250 WO HCPCS: Performed by: NURSE PRACTITIONER

## 2020-08-11 PROCEDURE — 83690 ASSAY OF LIPASE: CPT

## 2020-08-11 PROCEDURE — 6360000002 HC RX W HCPCS: Performed by: NURSE PRACTITIONER

## 2020-08-11 PROCEDURE — 96376 TX/PRO/DX INJ SAME DRUG ADON: CPT

## 2020-08-11 PROCEDURE — 36415 COLL VENOUS BLD VENIPUNCTURE: CPT

## 2020-08-11 PROCEDURE — 83605 ASSAY OF LACTIC ACID: CPT

## 2020-08-11 PROCEDURE — 2580000003 HC RX 258: Performed by: NURSE PRACTITIONER

## 2020-08-11 PROCEDURE — 80053 COMPREHEN METABOLIC PANEL: CPT

## 2020-08-11 PROCEDURE — 96374 THER/PROPH/DIAG INJ IV PUSH: CPT

## 2020-08-11 PROCEDURE — 99283 EMERGENCY DEPT VISIT LOW MDM: CPT

## 2020-08-11 PROCEDURE — 85025 COMPLETE CBC W/AUTO DIFF WBC: CPT

## 2020-08-11 RX ORDER — ONDANSETRON 2 MG/ML
4 INJECTION INTRAMUSCULAR; INTRAVENOUS ONCE
Status: COMPLETED | OUTPATIENT
Start: 2020-08-11 | End: 2020-08-11

## 2020-08-11 RX ORDER — ONDANSETRON 4 MG/1
4 TABLET, ORALLY DISINTEGRATING ORAL EVERY 8 HOURS PRN
Qty: 9 TABLET | Refills: 0 | Status: SHIPPED | OUTPATIENT
Start: 2020-08-11 | End: 2020-08-14

## 2020-08-11 RX ORDER — 0.9 % SODIUM CHLORIDE 0.9 %
1000 INTRAVENOUS SOLUTION INTRAVENOUS ONCE
Status: COMPLETED | OUTPATIENT
Start: 2020-08-11 | End: 2020-08-11

## 2020-08-11 RX ORDER — SUCRALFATE 1 G/1
1 TABLET ORAL 4 TIMES DAILY
Qty: 28 TABLET | Refills: 0 | OUTPATIENT
Start: 2020-08-11 | End: 2020-12-16

## 2020-08-11 RX ORDER — PROMETHAZINE HYDROCHLORIDE 25 MG/1
TABLET ORAL
Qty: 180 TABLET | Refills: 0 | Status: SHIPPED | OUTPATIENT
Start: 2020-08-11 | End: 2020-11-18

## 2020-08-11 RX ADMIN — SODIUM CHLORIDE 1000 ML: 9 INJECTION, SOLUTION INTRAVENOUS at 02:14

## 2020-08-11 RX ADMIN — FAMOTIDINE 20 MG: 10 INJECTION INTRAVENOUS at 02:14

## 2020-08-11 RX ADMIN — ONDANSETRON 4 MG: 2 INJECTION INTRAMUSCULAR; INTRAVENOUS at 02:14

## 2020-08-11 RX ADMIN — ONDANSETRON 4 MG: 2 INJECTION INTRAMUSCULAR; INTRAVENOUS at 03:24

## 2020-08-11 ASSESSMENT — ENCOUNTER SYMPTOMS
VOMITING: 1
RHINORRHEA: 0
SHORTNESS OF BREATH: 0
SORE THROAT: 0
BACK PAIN: 0
WHEEZING: 0
COUGH: 0
CHEST TIGHTNESS: 0
ABDOMINAL DISTENTION: 0
COLOR CHANGE: 0
TROUBLE SWALLOWING: 0
ABDOMINAL PAIN: 0
CONSTIPATION: 0
NAUSEA: 1
DIARRHEA: 0

## 2020-08-11 ASSESSMENT — PAIN SCALES - GENERAL: PAINLEVEL_OUTOF10: 4

## 2020-08-11 ASSESSMENT — PAIN DESCRIPTION - LOCATION: LOCATION: ABDOMEN

## 2020-08-11 ASSESSMENT — PAIN DESCRIPTION - DESCRIPTORS: DESCRIPTORS: ACHING

## 2020-08-11 ASSESSMENT — PAIN DESCRIPTION - PAIN TYPE: TYPE: ACUTE PAIN

## 2020-08-11 NOTE — TELEPHONE ENCOUNTER
Patient is requesting medication refill.  Please approve or deny this request.    Rx requested:  Requested Prescriptions     Pending Prescriptions Disp Refills    promethazine (PHENERGAN) 25 MG tablet 180 tablet 0     Sig: TAKE 1 TABLET BY MOUTH EVERY 8 HOURS AS NEEDED FOR NAUSEA         Last Office Visit:   5/19/2020      Next Visit Date:  Future Appointments   Date Time Provider Iliana Garcia   12/15/2020 10:45 AM Marquis Lomax MD [address]

## 2020-08-11 NOTE — ED NOTES
DC instructions,prescriptions, and follow up appointments reviewed with patient. Patient verbalized understanding. Patient aware when to come back to ER. Patient ambulated with a steady gate out of ER.       Mariana Thomas RN  08/11/20 7056

## 2020-08-11 NOTE — ED PROVIDER NOTES
[address] [facility] ED  EMERGENCY DEPARTMENT ENCOUNTER      Pt Name: Adebayo Garcia  MRN: [id number]  [place] 1975  Date of evaluation: 8/11/2020  Provider: Pamela Su       Chief Complaint   Patient presents with    Emesis         HISTORY OF PRESENT ILLNESS   (Location/Symptom, Timing/Onset,Context/Setting, Quality, Duration, Modifying Factors, Severity)  Note limiting factors. Adebayo Garcia is a 41 y.o. female who presents to the emergency department for complaint of a sudden onset of intense nausea followed by multiple episodes of vomiting on 6 PM tonight. Patient states she has some general pain discomfort with vomiting but in between episodes has no pain only a pressure and intense nausea sensation the upper abdomen. She denies eating anything that was bad or suspected to be old. She denies any recent illnesses or injuries. She states prior to this she felt fine had no other illness symptoms. She denies any fevers chills sweats or body aches. She denies chest pain cough congestion or shortness of breath. She states that her bowel movements have been regular fashion was earlier today normal.  She denies any dysuria or changes in urinary habits. She states she is diabetic but has been monitoring her blood glucose regularly and has been controlled. Nursing Notes were reviewed. REVIEW OF SYSTEMS    (2-9 systems for level 4, 10 or more for level 5)     Review of Systems   Constitutional: Positive for fatigue. Negative for activity change, appetite change, chills, diaphoresis and fever. HENT: Negative for congestion, ear pain, postnasal drip, rhinorrhea, sore throat and trouble swallowing. Eyes: Negative for visual disturbance. Respiratory: Negative for cough, chest tightness, shortness of breath and wheezing. Cardiovascular: Negative for chest pain and palpitations. Gastrointestinal: Positive for nausea and vomiting. Negative for abdominal distention, abdominal pain, constipation and diarrhea. Genitourinary: Negative for difficulty urinating, dysuria, flank pain, frequency and urgency. Musculoskeletal: Negative for arthralgias, back pain, myalgias, neck pain and neck stiffness. Skin: Negative for color change and rash. Neurological: Positive for weakness. Negative for dizziness, tremors, seizures, syncope, speech difficulty, light-headedness, numbness and headaches. Except as noted above the remainder of the review of systems was reviewed and negative. PAST MEDICAL HISTORY     Past Medical History:   Diagnosis Date    Acute midline thoracic back pain 9/18/2018    B12 deficiency     Family history of premature CAD 9/4/2013    Fatty liver     HPV (human papilloma virus) anogenital infection     Hyperlipidemia     Hypertension     Liver hemangioma 2009/2015    Obesity 3/11/13    BMI 43.42    Orthostatic hypotension     Ovarian cyst     PMR (polymyalgia rheumatica) (HCC)     Rectal fissure     Tobacco abuse 9/3/2015    Tobacco abuse     Tremor     Uncontrolled diabetes mellitus with complications ([facility] [address]     Unspecified sleep apnea      Past Surgical History:   Procedure Laterality Date    CARDIAC CATHETERIZATION  4-07    COLONOSCOPY  2013    for diarrhea (-)    HYSTERECTOMY  12-10    LEEP  1-05    NV MUSCLE BIOPSY Left 9/21/2018    LEFT QUADRICEPS MUSCLE BIOPSY performed by Kevin Padilla MD at [address] UNI/BI CANNULATION N/A 9/18/2018    T 12 VERTEBRALPLASTY ROOM 278 performed by Misbah Morataya MD at [address] ENDOSCOPY  10/13/15     DR. Balderrama [facility] SURGERY  6-2015     Social History     Socioeconomic History    Marital status:      Spouse name: None    Number of children: 1    Years of education: None    Highest education level: None   Occupational History    None Social Needs    Financial resource strain: Not very hard    Food insecurity     Worry: Never true     Inability: Never true    Transportation needs     Medical: No     Non-medical: No   Tobacco Use    Smoking status: Former Smoker     Packs/day: 1.00     Types: Cigarettes     Last attempt to quit: 1/1/2017     Years since quitting: 3.6    Smokeless tobacco: Never Used   Substance and Sexual Activity    Alcohol use: Yes     Alcohol/week: 0.0 standard drinks     Frequency: Monthly or less     Binge frequency: Never     Comment: socially    Drug use: No    Sexual activity: Yes     Partners: Male     Comment: single   Lifestyle    Physical activity     Days per week: 1 day     Minutes per session: 10 min    Stress: To some extent   Relationships    Social connections     Talks on phone: Three times a week     Gets together: Once a week     Attends [employer] service: More than 4 times per year     Active member of club or organization: No     Attends meetings of clubs or organizations: Never     Relationship status:     Intimate partner violence     Fear of current or ex partner: None     Emotionally abused: None     Physically abused: None     Forced sexual activity: None   Other Topics Concern    None   Social History Narrative    Social/Functional History            SCREENINGS             PHYSICAL EXAM    (up to 7 for level 4, 8 or more for level 5)     ED Triage Vitals [08/11/20 0125]   BP Temp Temp Source Pulse Resp SpO2 Height Weight   (!) 165/89 97.7 °F (36.5 °C) Oral 105 20 95 % 5' 6\" (1.676 m) 290 lb (131.5 kg)       Physical Exam  Constitutional:       General: She is not in acute distress. Appearance: Normal appearance. She is obese. She is ill-appearing. She is not toxic-appearing or diaphoretic. HENT:      Head: Normocephalic and atraumatic.       Right Ear: External ear normal.      Left Ear: External ear normal.      Nose: Nose normal.      Mouth/Throat:      Mouth: Mucous membranes are moist.      Pharynx: Oropharynx is clear. No oropharyngeal exudate or posterior oropharyngeal erythema. Eyes:      General:         Right eye: No discharge. Left eye: No discharge. Conjunctiva/sclera: Conjunctivae normal.      Pupils: Pupils are equal, round, and reactive to light. Neck:      Musculoskeletal: Normal range of motion and neck supple. No neck rigidity or muscular tenderness. Cardiovascular:      Rate and Rhythm: Normal rate and regular rhythm. Pulses: Normal pulses. Pulmonary:      Effort: Pulmonary effort is normal.      Breath sounds: Normal breath sounds. Abdominal:      General: Bowel sounds are normal. There is no distension. Palpations: Abdomen is soft. Tenderness: There is no abdominal tenderness. Musculoskeletal: Normal range of motion. General: No tenderness or signs of injury. Skin:     General: Skin is warm and dry. Capillary Refill: Capillary refill takes less than 2 seconds. Neurological:      General: No focal deficit present. Mental Status: She is alert and oriented to person, place, and time. Mental status is at baseline. Cranial Nerves: No cranial nerve deficit. Sensory: No sensory deficit. Motor: No weakness.       Coordination: Coordination normal.         RESULTS     EKG: All EKG's are interpreted by the Emergency Department Physician who either signs or Co-signsthis chart in the absence of a cardiologist.        RADIOLOGY:   Mallie Rocher such as CT, Ultrasound and MRI are read by the radiologist. Plain radiographic images are visualized and preliminarily interpreted by the emergency physician with the below findings:        Interpretation per the Radiologist below, if available at the time ofthis note:    No orders to display         ED BEDSIDE ULTRASOUND:   Performed by ED Physician - none    LABS:  Labs Reviewed   CBC WITH AUTO DIFFERENTIAL - Abnormal; Notable for the following components:       Result Value    WBC 14.5 (*)     MCV 80.4 (*)     MCH 26.0 (*)     MCHC 32.4 (*)     RDW 17.4 (*)     Neutrophils Absolute 10.6 (*)     Monocytes Absolute 0.9 (*)     All other components within normal limits   COMPREHENSIVE METABOLIC PANEL - Abnormal; Notable for the following components:    Anion Gap 17 (*)     Glucose 182 (*)     Globulin 3.9 (*)     All other components within normal limits   LACTIC ACID, PLASMA - Abnormal; Notable for the following components:    Lactic Acid 2.9 (*)     All other components within normal limits   POCT GLUCOSE - Abnormal; Notable for the following components:    POC Glucose 162 (*)     All other components within normal limits   LIPASE   URINE RT REFLEX TO CULTURE   LACTIC ACID, PLASMA       All other labs were within normal range or not returned as of this dictation. EMERGENCY DEPARTMENT COURSE and DIFFERENTIAL DIAGNOSIS/MDM:   Vitals:    Vitals:    08/11/20 0125 08/11/20 0215 08/11/20 0224   BP: (!) 165/89 109/60    Pulse: 105     Resp: 20     Temp: 97.7 °F (36.5 °C)     TempSrc: Oral     SpO2: 95% 93% 94%   Weight: 290 lb (131.5 kg)     Height: 5' 6\" (1.676 m)              MDM patient is afebrile nontoxic no acute distress noted to have mild illness appearance and vomiting on arrival.  Blood pressure was noted to be elevated likely related to patient's repeated vomiting episodes. Labs ordered for evaluation IV fluids and medication provided. Lab work shows a mild elevation white blood cell count and lactic acid suggestive of multiple episodes of vomiting since 6 PM today. Patient appears mildly dehydrated however the remainder the lab work is grossly remarkable there is no endorgan dysfunction or electrolyte imbalance. Following medication and fluid patient states she feels significantly better and symptoms resolved she has only mild nausea notable but has not had any continuous vomiting since medication.   She states she is feeling much better and will be discharged home and appear stable for discharge this time with additional medication for symptom management. Ready to follow-up primary care provider soon as possible further evaluation return to the ER for any onset of new concerning symptoms worsening condition specifically the onset of fever with abdominal pain uncontrollable vomiting or inability tolerate p.o. intake. Patient verbalized understanding all given instruction education. CRITICAL CARE TIME       CONSULTS:  None    PROCEDURES:  Unless otherwise noted below, none     Procedures    FINAL IMPRESSION      1.  Non-intractable vomiting with nausea, unspecified vomiting type          DISPOSITION/PLAN   DISPOSITION        PATIENT REFERRED TO:  MD Sunday Pollard   [phone]    Call in 1 day        DISCHARGE MEDICATIONS:  New Prescriptions    ONDANSETRON (ZOFRAN ODT) 4 MG DISINTEGRATING TABLET    Take 1 tablet by mouth every 8 hours as needed for Nausea or Vomiting    SUCRALFATE (CARAFATE) 1 GM TABLET    Take 1 tablet by mouth 4 times daily for 7 days          (Please notethat portions of this note were completed with a voice recognition program.  Efforts were made to edit the dictations but occasionally words are mis-transcribed.)    SANIA Armenta CNP (electronically signed)  Attending Emergency Physician           SANIA Armenta CNP  08/11/20 2420

## 2020-08-12 ENCOUNTER — CARE COORDINATION (OUTPATIENT)
Dept: OTHER | Facility: CLINIC | Age: 45
End: 2020-08-12

## 2020-08-12 NOTE — CARE COORDINATION
[address] [facility] ED Follow Up Call    2020    Patient: Marlen Joshi Patient : 1975   MRN: [id number]  Reason for Admission: Nausea/ vomiting  Discharge Date: 20    Summary:  [facility]) contacted the patient by telephone to perform post ED visit assessment. Verified name and  with patient as identifiers. Provided introduction to self, and explanation of the ACM role. Nausea/ vomiting-  Pt states she just had a \"GI bug\". She is feeling beter today and has been able to keep food and fluids down. She has not had any issues with her BG. Reinforced sick day rules. Pt verbalized understanding. Pt declined assistance in scheduleing PCP follow up appt. She will call later if needed. Education Provided:  Importance and benefits of: Symptom management, Medication compliance, Diet compliance, Provider follow up appointment compliance and Utilization of appropriate level of care    Plan:  - Continue Telephonic follow up to discuss: Provider appointment adherence and Appropriate utilization of resources    Pt verbalized understanding and is agreeable to follow up contact.          Care Transitions ED Follow Up    Care Transitions Interventions  Do you have all of your prescriptions and are they filled?:  Yes

## 2020-08-27 RX ORDER — METOPROLOL TARTRATE 100 MG/1
100 TABLET ORAL 2 TIMES DAILY
Qty: 180 TABLET | Refills: 0 | Status: SHIPPED | OUTPATIENT
Start: 2020-08-27 | End: 2020-11-16 | Stop reason: SDUPTHER

## 2020-08-27 NOTE — TELEPHONE ENCOUNTER
[id number] [address] Werner is requesting medication refill.  Please approve or deny this request.    Rx requested:  Requested Prescriptions     Pending Prescriptions Disp Refills    metoprolol (LOPRESSOR) 100 MG tablet 180 tablet 0     Sig: Take 1 tablet by mouth 2 times daily         Last Office Visit:   5/19/2020      Next Visit Date:  Future Appointments   Date Time Provider Iliana Garcia   12/15/2020 10:45 AM David Larios MD [address]

## 2020-08-31 ENCOUNTER — PATIENT MESSAGE (OUTPATIENT)
Dept: OBGYN CLINIC | Age: 45
End: 2020-08-31

## 2020-08-31 NOTE — TELEPHONE ENCOUNTER
From: Roma Harrell  To: Brad Joshua DO  Sent: 8/31/2020 12:40 PM EDT  Subject: Non-Urgent Medical Question    Please send order so I can get my mammogram

## 2020-09-09 NOTE — TELEPHONE ENCOUNTER
[id number] [address] Werner is requesting medication refill.  Please approve or deny this request.    Rx requested:  Requested Prescriptions     Pending Prescriptions Disp Refills    omeprazole (PRILOSEC) 40 MG delayed release capsule 90 capsule 0     Sig: Take 1 capsule by mouth every morning (before breakfast)         Last Office Visit:   5/19/2020      Next Visit Date:  Future Appointments   Date Time Provider Iliana Garcia   12/15/2020 10:45 AM Jerry Garcia MD [address]

## 2020-09-10 RX ORDER — INSULIN GLARGINE 300 U/ML
INJECTION, SOLUTION SUBCUTANEOUS
Qty: 135 ML | Refills: 3 | Status: SHIPPED | OUTPATIENT
Start: 2020-09-10 | End: 2021-01-22 | Stop reason: ALTCHOICE

## 2020-09-10 RX ORDER — OMEPRAZOLE 40 MG/1
40 CAPSULE, DELAYED RELEASE ORAL
Qty: 90 CAPSULE | Refills: 0 | Status: SHIPPED | OUTPATIENT
Start: 2020-09-10 | End: 2020-11-16 | Stop reason: SDUPTHER

## 2020-09-17 ENCOUNTER — CARE COORDINATION (OUTPATIENT)
Dept: OTHER | Facility: CLINIC | Age: 45
End: 2020-09-17

## 2020-09-17 NOTE — CARE COORDINATION
Ambulatory Care Coordination Note  2020  CM Risk Score: 9  Charlson 10 Year Mortality Risk Score: 79%     ACC: Dawit Kaye, RN    Summary Note:  [facility]) contacted the patient by telephone to follow up on progress, discuss new issues or concerns, and reinforce/ provide pt education. Verified name and  with patient as identifiers. DM II-  Pt states she feels her BG are much better . She is usually under 180. She is able to describe medication regimen and taking as prescribed. Pt does feel she is having a lot more fatigue. She feels her fatigue may be due to her sleep apnea. She hasn't had her CPAP adjusted for several years. She will request a follow up sleep study to see if her settings are effective. She also is having a lot more back pain. She was supposed to start on water therapy for her back in the spring, but that was put on hold. She isn't sure if she is able to start that. She will call the facility and see if she is now able to start. Education Provided on importance and benefits of:  Self monitoring compliance: SBGM  Red Flag symptoms to report  Symptom management  Medication compliance  Diet compliance: Diabetic  Provider follow up appointment compliance  Specialist appointment compliance: Endocrinology  Ordered diagnostic testing compliance  Utilization of appropriate level of care    Plan:  - Continue Telephonic follow up to discuss:  Reinforce/ review Diabetic Zone sheet education  Medication compliance  Ordered diagnostic testing compliance  Utilization of appropriate level of care  Patient verbalized understanding and is agreeable to follow up contact.             Care Coordination Interventions    Program Enrollment:  Complex Care  Referral from Primary Care Provider:  No  Suggested Interventions and Community Resources  Diabetes Education:  Completed (Comment: 10/15/19- completed previously)  Fall Risk Prevention:  Completed (Comment: 10/15/19- Falls Prevention and home safety measure discussed)  Specialty Services Referral:  Completed (Comment: 2/20/20: Pain management referral- Appt made )  Zone Management Tools:  Completed (Comment: DM II)         Goals Addressed                 This Visit's Progress     COMPLETED: Conditions and Symptoms   On track     I will schedule office visits, as directed by my provider. I will notify my provider of any barriers to my plan of care. I will notify my provider of any symptoms that indicate a worsening of my condition. Barriers: lack of support and overwhelmed by complexity of regimen  Plan for overcoming my barriers: Will work with ACM and providers  Confidence: 9/10  Anticipated Goal Completion Date: 11/30/19; 12/30/19; 3/1/20    12/11/19: Goal completion date revised as interventions still in process. Pt is compliant with visits and Provider contact. Still needs to establish with specialist.     2/3/20: Goal completion date updated as interventions still in progress for establishing with specialist   Goal completed as MET         COMPLETED: Medication Management   On track     I will take my medication as directed. I will notify my provider of any problems with medications, like adverse effects or side effects. I will notify my provider for advice before I stop taking any of my medication. Barriers: medication side effects  Plan for overcoming my barriers: Will work with ACM and Providers  Confidence: 7/10  Anticipated Goal Completion Date: 11/15/; 12/30/19; 2/28/20 12/11/19: goal completion date updated as interventions still in process.   Pt is communicating with specialist in regards to adjustments on mediations to limit SE    2/3/20: goal completion date updated as pt still working with specialists on medication adjustments    Goal completed as MET            ,   Diabetes Assessment    Medic Alert ID:  No  Meal Planning:  Avoidance of concentrated sweets, Carb counting   How often do you test your blood sugar?:  Meals, Bedtime   Do you have barriers with adherence to non-pharmacologic self-management interventions? (Nutrition/Exercise/Self-Monitoring):  Yes   Have you ever had to go to the ED for symptoms of low blood sugar?:  No       Do you have hyperglycemia symptoms?:  No   Do you have hypoglycemia symptoms?:  No   Last Blood Sugar Value:  138   Blood Sugar Monitoring Regimen:  Before Meals, Morning Fasting, At Bedtime   Blood Sugar Trends:  Fluctuating       and   General Assessment    Do you have any symptoms that are causing concern?:  Yes  Reported Symptoms:  Fatigue, Pain (Comment: Chronic Back pain)       Care Coordination Interventions    Program Enrollment:  Complex Care  Referral from Primary Care Provider:  No  Suggested Interventions and Community Resources  Diabetes Education:  Completed (Comment: 10/15/19- completed previously)  Fall Risk Prevention:  Completed (Comment: 10/15/19- Falls Prevention and home safety measure discussed)  Specialty Services Referral:  In Process (Comment: 2/20/20: Pain management referral- Appt made 12/11/19: Reumatology referral- network issues referral nt completed)  Zone Management Tools:  Completed (Comment: DM II)           Prior to Admission medications    Medication Sig Start Date End Date Taking?  Authorizing Provider   omeprazole (PRILOSEC) 40 MG delayed release capsule Take 1 capsule by mouth every morning (before breakfast) 9/10/20   Abena Armas MD   Insulin Glargine, 2 Unit Dial, (TOUJEO MAX SOLOSTAR) 300 UNIT/ML SOPN INJECT 240 UNITS SUBCUTANEOUSLY AT BEDTIME 9/10/20   Soco Peterson MD   metoprolol (LOPRESSOR) 100 MG tablet Take 1 tablet by mouth 2 times daily 8/27/20   Abena Armas MD   sucralfate (CARAFATE) 1 GM tablet Take 1 tablet by mouth 4 times daily for 7 days 8/11/20 8/18/20  SANIA Brooks - CNP   promethazine (PHENERGAN) 25 MG tablet TAKE 1 TABLET BY MOUTH EVERY 8 HOURS AS NEEDED FOR NAUSEA 8/11/20   Abena Armas MD rosuvastatin (CRESTOR) 10 MG tablet TAKE ONE TABLET BY MOUTH NIGHTLY 7/28/20   SANIA Waite - CNP   insulin lispro, 1 Unit Dial, (HUMALOG KWIKPEN) 100 UNIT/ML SOPN 80 units with each meals 6/5/20   Patrick Marinelli MD   Insulin Glargine, 2 Unit Dial, (TOUJEO MAX SOLOSTAR) 300 UNIT/ML SOPN 280 units at bedtime 5/27/20   Mikayla Haq MD   clotrimazole-betamethasone (LOTRISONE) 1-0.05 % cream Apply topically 2 times daily. 5/19/20   Jeremías Iyer DO   solifenacin (VESICARE) 10 MG tablet TAKE 1 TABLET BY MOUTH ONE TIME A DAY 5/6/20   Lisbeth Banda MD   nystatin-triamcinolone [facility]) 752830-1.1 UNIT/GM-% cream Apply topically 2 times daily. 4/27/20   Charlene Golden MD   hydroCHLOROthiazide (HYDRODIURIL) 25 MG tablet Take 1 tablet by mouth daily 3/30/20   Nuvia Schneider MD   losartan (COZAAR) 50 MG tablet Take 1 tablet by mouth daily 3/30/20   Nuvia Schneider MD   baclofen (LIORESAL) 10 MG tablet Take 0.5 tablets by mouth 2 times daily 3/30/20   Nuvia Schneider MD   pantoprazole (PROTONIX) 40 MG tablet Take 1 tablet by mouth daily 3/30/20   Nuvia Schneider MD   metFORMIN (GLUCOPHAGE) 500 MG tablet Take 1 tablet by mouth 3 times daily 3/30/20   Mikayla Haq MD   Blood Glucose Monitoring Suppl (PRODIGY AUTOCODE BLOOD GLUCOSE) w/Device KIT Use as directed to test up to 4 times daily 1/17/20   Mikayla Haq MD   blood glucose test strips (PRODIGY NO CODING BLOOD GLUC) strip 1 each by In Vitro route 4 times daily As needed. 1/17/20   MD ROOSEVELT [place] LANCETS 28G MISC Use as directed to test up to 4 times daily 1/17/20   Mikayla Haq MD   nystatin (MYCOSTATIN) 754799 UNIT/GM powder Apply 3 times daily.  1/7/20   Rudi Mesa MD   venlafaxine (EFFEXOR XR) 75 MG extended release capsule TAKE TWO CAPSULES BY MOUTH EVERY MORNING AND TAKE ONE CAPSULE BY MOUTH EVERY EVENING 11/15/19   Nuvia Schneider MD   Insulin Pen Needle (PEN NEEDLES) 32G X 4 MM MISC Use as directed with insulin pens, 4 times daily 10/23/19 Tammie Garcia MD   insulin lispro (HUMALOG KWIKPEN) 100 UNIT/ML pen INJECT 60 units at each meals  Patient taking differently: Inject 70 Units into the skin 3 times daily (before meals)  10/11/19   Tammie Garcia MD   vitamin D (CHOLECALCIFEROL) 1000 UNIT TABS tablet Take 1,000 Units by mouth daily    Historical Provider, MD   calcium carbonate 600 MG TABS tablet Take 1 tablet by mouth daily    Historical Provider, MD   miconazole (MICOTIN) 2 % cream Apply topically 2 times daily.  1/21/19   Mery Quan MD   Handicap Placard [facility] Exp 5 years 7/3/18   Mery Quan MD       Future Appointments   Date Time Provider Iliana Garcia   12/15/2020 10:45 AM Sadie Zacarias MD [address]

## 2020-09-22 RX ORDER — BACLOFEN 10 MG/1
5 TABLET ORAL 2 TIMES DAILY
Qty: 90 TABLET | Refills: 1 | Status: SHIPPED | OUTPATIENT
Start: 2020-09-22 | End: 2020-12-31 | Stop reason: SDUPTHER

## 2020-09-22 NOTE — TELEPHONE ENCOUNTER
[id number] [address] Werner is requesting medication refill.  Please approve or deny this request.    Rx requested:  Requested Prescriptions     Pending Prescriptions Disp Refills    baclofen (LIORESAL) 10 MG tablet 90 tablet 1     Sig: Take 0.5 tablets by mouth 2 times daily         Last Office Visit:   5/19/2020      Next Visit Date:  Future Appointments   Date Time Provider Iliana Garcia   12/15/2020 10:45 AM Lambert Chiu MD [address]

## 2020-09-24 ENCOUNTER — CARE COORDINATION (OUTPATIENT)
Dept: OTHER | Facility: CLINIC | Age: 45
End: 2020-09-24

## 2020-09-24 NOTE — CARE COORDINATION
Ambulatory Care Coordination Note  2020  CM Risk Score: 9  Charlson 10 Year Mortality Risk Score: 79%     ACC: Dawit Kaye, RN    Summary Note:  [facility]) contacted the patient by telephone to follow up on progress, discuss new issues or concerns, and reinforce/ provide pt education. Verified name and  with patient as identifiers. DM II-  Pt states she is still having difficulty getting her BG under control. She is doing better since being off the steroids for a while, but knows she will probably be talking again at some point. She does not want to see Rheumatology at this time. She is having increased fatigue and feels it is due to hr sleep apnea. She states it has been years since she had her Cpap adjusted and feels it might not be effective any more. She will contact her provider for an order. Education Provided on importance and benefits of:  Self monitoring compliance: SBGM  Diabetes Zone Sheet   Medication compliance  Diet compliance: Diabetic  Ordered diagnostic testing compliance    Plan:  - Continue Telephonic follow up to discuss:  Diet compliance: Diabetic  Ordered diagnostic testing compliance  Utilization of appropriate level of care  Goal Progress  Patient verbalized understanding and is agreeable to follow up contact. Care Coordination Interventions    Program Enrollment:  Complex Care  Referral from Primary Care Provider:  No  Suggested Interventions and Community Resources  Diabetes Education:  Completed (Comment: 10/15/19- completed previously)  Fall Risk Prevention:  Completed (Comment: 10/15/19- Falls Prevention and home safety measure discussed)  Specialty Services Referral:  Completed (Comment: 20: Pain management referral- Appt made )  Other Therapy Services:   In Process (Comment: Water therapy for back pain)  Zone Management Tools:  Completed (Comment: DM II)          Diabetes Assessment    Medic Alert ID:  No  Meal Planning: Avoidance of concentrated sweets, Carb counting   How often do you test your blood sugar?:  Meals, Bedtime   Do you have barriers with adherence to non-pharmacologic self-management interventions? (Nutrition/Exercise/Self-Monitoring):  Yes   Have you ever had to go to the ED for symptoms of low blood sugar?:  No       Do you have hyperglycemia symptoms?:  No   Do you have hypoglycemia symptoms?:  No   Last Blood Sugar Value:  187   Blood Sugar Monitoring Regimen:  Morning Fasting, At Bedtime, Before Meals   Blood Sugar Trends:  Fluctuating       and   General Assessment    Do you have any symptoms that are causing concern?:  Yes  Progression since Onset:  Intermittent - Waxing/Waning, Gradually Worsening  Reported Symptoms:  Fatigue         Prior to Admission medications    Medication Sig Start Date End Date Taking?  Authorizing Provider   baclofen (LIORESAL) 10 MG tablet Take 0.5 tablets by mouth 2 times daily 9/22/20   Jocelyne Pickens MD   omeprazole (PRILOSEC) 40 MG delayed release capsule Take 1 capsule by mouth every morning (before breakfast) 9/10/20   Jocelyne Pickens MD   Insulin Glargine, 2 Unit Dial, (TOUJEO MAX SOLOSTAR) 300 UNIT/ML SOPN INJECT 240 UNITS SUBCUTANEOUSLY AT BEDTIME 9/10/20   Ada Baxter MD   metoprolol (LOPRESSOR) 100 MG tablet Take 1 tablet by mouth 2 times daily 8/27/20   Jocelyne Pickens MD   sucralfate (CARAFATE) 1 GM tablet Take 1 tablet by mouth 4 times daily for 7 days 8/11/20 8/18/20  SANIA Freitas - CNP   promethazine (PHENERGAN) 25 MG tablet TAKE 1 TABLET BY MOUTH EVERY 8 HOURS AS NEEDED FOR NAUSEA 8/11/20   Rudi Reid MD   rosuvastatin (CRESTOR) 10 MG tablet TAKE ONE TABLET BY MOUTH NIGHTLY 7/28/20   SANIA Barr CNP   insulin lispro, 1 Unit Dial, (HUMALOG KWIKPEN) 100 UNIT/ML SOPN 80 units with each meals 6/5/20   Ada Baxter MD   Insulin Glargine, 2 Unit Dial, (TOUJEO MAX SOLOSTAR) 300 UNIT/ML SOPN 280 units at bedtime 5/27/20   Ada Baxter MD clotrimazole-betamethasone (LOTRISONE) 1-0.05 % cream Apply topically 2 times daily. 5/19/20   Jeremías Iyer DO   solifenacin (VESICARE) 10 MG tablet TAKE 1 TABLET BY MOUTH ONE TIME A DAY 5/6/20   Lisbeth Banda MD   nystatin-triamcinolone [facility] II) 062097-2.1 UNIT/GM-% cream Apply topically 2 times daily. 4/27/20   Charlene Golden MD   hydroCHLOROthiazide (HYDRODIURIL) 25 MG tablet Take 1 tablet by mouth daily 3/30/20   Nuvia Schneider MD   losartan (COZAAR) 50 MG tablet Take 1 tablet by mouth daily 3/30/20   Nuvia Schneider MD   pantoprazole (PROTONIX) 40 MG tablet Take 1 tablet by mouth daily 3/30/20   Nuvia Schneider MD   metFORMIN (GLUCOPHAGE) 500 MG tablet Take 1 tablet by mouth 3 times daily 3/30/20   Mikayla Haq MD   Blood Glucose Monitoring Suppl (PRODIGY AUTOCODE BLOOD GLUCOSE) w/Device KIT Use as directed to test up to 4 times daily 1/17/20   Mikayla Haq MD   blood glucose test strips (PRODIGY NO CODING BLOOD GLUC) strip 1 each by In Vitro route 4 times daily As needed. 1/17/20   MD ROOSEVELT [place] LANCETS 28G MISC Use as directed to test up to 4 times daily 1/17/20   Mikayla Haq MD   nystatin (MYCOSTATIN) 434093 UNIT/GM powder Apply 3 times daily.  1/7/20   Nuvia Schneider MD   venlafaxine (EFFEXOR XR) 75 MG extended release capsule TAKE TWO CAPSULES BY MOUTH EVERY MORNING AND TAKE ONE CAPSULE BY MOUTH EVERY EVENING 11/15/19   Nuiva Schneider MD   Insulin Pen Needle (PEN NEEDLES) 32G X 4 MM MISC Use as directed with insulin pens, 4 times daily 10/23/19   Mikayla Haq MD   insulin lispro (HUMALOG KWIKPEN) 100 UNIT/ML pen INJECT 60 units at each meals  Patient taking differently: Inject 70 Units into the skin 3 times daily (before meals)  10/11/19   Mikayla Haq MD   vitamin D (CHOLECALCIFEROL) 1000 UNIT TABS tablet Take 1,000 Units by mouth daily    Historical Provider, MD   calcium carbonate 600 MG TABS tablet Take 1 tablet by mouth daily    Historical Provider, MD   miconazole (MICOTIN) 2 % cream Apply topically 2 times daily.  1/21/19   Faby Bird MD   Handicap Placard [address] Exp 5 years 7/3/18   Faby Bird MD       Future Appointments   Date Time Provider Iliana Garcia   11/3/2020  9:00 AM [facility] ROOM [address] [place] [address]   12/15/2020 10:45 AM Abdi Rubalcava MD [address] [facility]

## 2020-09-25 RX ORDER — SOLIFENACIN SUCCINATE 10 MG/1
TABLET, FILM COATED ORAL
Qty: 90 TABLET | Refills: 3 | Status: SHIPPED | OUTPATIENT
Start: 2020-09-25 | End: 2021-09-18

## 2020-09-26 ENCOUNTER — OFFICE VISIT (OUTPATIENT)
Dept: FAMILY MEDICINE CLINIC | Age: 45
End: 2020-09-26
Payer: COMMERCIAL

## 2020-09-26 VITALS
SYSTOLIC BLOOD PRESSURE: 130 MMHG | DIASTOLIC BLOOD PRESSURE: 78 MMHG | TEMPERATURE: 97.4 F | HEIGHT: 66 IN | BODY MASS INDEX: 47.09 KG/M2 | HEART RATE: 84 BPM | WEIGHT: 293 LBS

## 2020-09-26 DIAGNOSIS — R39.15 URGENCY OF URINATION: ICD-10-CM

## 2020-09-26 LAB
BILIRUBIN, POC: ABNORMAL
BLOOD URINE, POC: 3
CLARITY, POC: ABNORMAL
COLOR, POC: ABNORMAL
GLUCOSE URINE, POC: 1
KETONES, POC: ABNORMAL
LEUKOCYTE EST, POC: 3
NITRITE, POC: ABNORMAL
PH, POC: 6
PROTEIN, POC: 1
SPECIFIC GRAVITY, POC: 1.02
UROBILINOGEN, POC: ABNORMAL

## 2020-09-26 PROCEDURE — 81003 URINALYSIS AUTO W/O SCOPE: CPT | Performed by: PHYSICIAN ASSISTANT

## 2020-09-26 PROCEDURE — 99213 OFFICE O/P EST LOW 20 MIN: CPT | Performed by: PHYSICIAN ASSISTANT

## 2020-09-26 RX ORDER — CIPROFLOXACIN 500 MG/1
500 TABLET, FILM COATED ORAL 2 TIMES DAILY
Qty: 14 TABLET | Refills: 0 | Status: SHIPPED | OUTPATIENT
Start: 2020-09-26 | End: 2020-10-03

## 2020-09-26 ASSESSMENT — ENCOUNTER SYMPTOMS
BACK PAIN: 0
VOMITING: 0
NAUSEA: 0

## 2020-09-26 NOTE — PROGRESS NOTES
Subjective     Amirah Coles 41 y.o. female presents 9/26/20 with   Chief Complaint   Patient presents with    Other     Poss UTI, c/o urgency. Started this morning       Urinary Tract Infection    This is a new problem. The current episode started today. The problem occurs every urination. The problem has been gradually worsening. The quality of the pain is described as burning and aching. There has been no fever. Associated symptoms include frequency and urgency. Pertinent negatives include no chills, discharge, flank pain, hematuria, hesitancy, nausea, possible pregnancy, sweats or vomiting. She has tried nothing for the symptoms. Reviewed the following history:    Past Medical History:   Diagnosis Date    Acute midline thoracic back pain 9/18/2018    B12 deficiency     Family history of premature CAD 9/4/2013    Fatty liver     HPV (human papilloma virus) anogenital infection     Hyperlipidemia     Hypertension     Liver hemangioma 2009/2015    Obesity 3/11/13    BMI 43.42    Orthostatic hypotension     Ovarian cyst     PMR (polymyalgia rheumatica) (HCC)     Rectal fissure     Tobacco abuse 9/3/2015    Tobacco abuse     Tremor     Uncontrolled diabetes mellitus with complications ([facility] [address]     Unspecified sleep apnea      Past Surgical History:   Procedure Laterality Date    CARDIAC CATHETERIZATION  4-07    COLONOSCOPY  2013    for diarrhea (-)    HYSTERECTOMY  12-10    LEEP  1-05    [place] MUSCLE BIOPSY Left 9/21/2018    LEFT QUADRICEPS MUSCLE BIOPSY performed by Monica Gibbs MD at [address] [facility] [address] UNI/BI CANNULATION N/A 9/18/2018    T 12 VERTEBRALPLASTY ROOM 278 performed by Maximo Brunner, MD at [address] ENDOSCOPY  10/13/15       [address]     URETHRA SURGERY  6-2015     Family History   Problem Relation Age of Onset    Diabetes Father     Heart Disease Mother        Allergies Allergen Reactions    Morphine And Related Hives and Rash    Ace Inhibitors Other (See Comments)     Dizziness and near syncope    Bactrim [Sulfamethoxazole-Trimethoprim] Itching    Nitroglycerin Other (See Comments)     Migraine    Percocet [Oxycodone-Acetaminophen] Nausea And Vomiting    Victoza [Liraglutide]      NAUSEA       Current Outpatient Medications   Medication Sig Dispense Refill    ciprofloxacin (CIPRO) 500 MG tablet Take 1 tablet by mouth 2 times daily for 7 days 14 tablet 0    solifenacin (VESICARE) 10 MG tablet TAKE 1 TABLET BY MOUTH ONE TIME A DAY 90 tablet 3    baclofen (LIORESAL) 10 MG tablet Take 0.5 tablets by mouth 2 times daily 90 tablet 1    omeprazole (PRILOSEC) 40 MG delayed release capsule Take 1 capsule by mouth every morning (before breakfast) 90 capsule 0    Insulin Glargine, 2 Unit Dial, (TOUJEO MAX SOLOSTAR) 300 UNIT/ML SOPN INJECT 240 UNITS SUBCUTANEOUSLY AT BEDTIME 135 mL 3    metoprolol (LOPRESSOR) 100 MG tablet Take 1 tablet by mouth 2 times daily 180 tablet 0    promethazine (PHENERGAN) 25 MG tablet TAKE 1 TABLET BY MOUTH EVERY 8 HOURS AS NEEDED FOR NAUSEA 180 tablet 0    rosuvastatin (CRESTOR) 10 MG tablet TAKE ONE TABLET BY MOUTH NIGHTLY 90 tablet 0    insulin lispro, 1 Unit Dial, (HUMALOG KWIKPEN) 100 UNIT/ML SOPN 80 units with each meals 90 pen 3    Insulin Glargine, 2 Unit Dial, (TOUJEO MAX SOLOSTAR) 300 UNIT/ML SOPN 280 units at bedtime 90 pen 3    clotrimazole-betamethasone (LOTRISONE) 1-0.05 % cream Apply topically 2 times daily. 1 Tube 3    nystatin-triamcinolone (MYCOLOG II) 819203-0.1 UNIT/GM-% cream Apply topically 2 times daily.  30 g 1    hydroCHLOROthiazide (HYDRODIURIL) 25 MG tablet Take 1 tablet by mouth daily 90 tablet 3    losartan (COZAAR) 50 MG tablet Take 1 tablet by mouth daily 90 tablet 3    pantoprazole (PROTONIX) 40 MG tablet Take 1 tablet by mouth daily 60 tablet 2    metFORMIN (GLUCOPHAGE) 500 MG tablet Take 1 tablet by mouth 3 times daily 270 tablet 3    Blood Glucose Monitoring Suppl (PRODIGY AUTOCODE BLOOD GLUCOSE) w/Device KIT Use as directed to test up to 4 times daily 1 kit 0    blood glucose test strips (PRODIGY NO CODING BLOOD GLUC) strip 1 each by In Vitro route 4 times daily As needed. 400 each 3    PRODIGY LANCETS 28G MISC Use as directed to test up to 4 times daily 400 each 3    nystatin (MYCOSTATIN) 944849 UNIT/GM powder Apply 3 times daily. 1 Bottle 2    venlafaxine (EFFEXOR XR) 75 MG extended release capsule TAKE TWO CAPSULES BY MOUTH EVERY MORNING AND TAKE ONE CAPSULE BY MOUTH EVERY EVENING 270 capsule 1    Insulin Pen Needle (PEN NEEDLES) 32G X 4 MM MISC Use as directed with insulin pens, 4 times daily 400 each 1    insulin lispro (HUMALOG KWIKPEN) 100 UNIT/ML pen INJECT 60 units at each meals (Patient taking differently: Inject 70 Units into the skin 3 times daily (before meals) ) 90 pen 3    vitamin D (CHOLECALCIFEROL) 1000 UNIT TABS tablet Take 1,000 Units by mouth daily      calcium carbonate 600 MG TABS tablet Take 1 tablet by mouth daily      miconazole (MICOTIN) 2 % cream Apply topically 2 times daily. 141 g 3    Handicap Placard MISC Exp 5 years 1 each 0    sucralfate (CARAFATE) 1 GM tablet Take 1 tablet by mouth 4 times daily for 7 days 28 tablet 0     No current facility-administered medications for this visit. Review of Systems   Constitutional: Negative for chills and fever. Gastrointestinal: Negative for nausea and vomiting. Genitourinary: Positive for dysuria, frequency and urgency. Negative for flank pain, hematuria, hesitancy, vaginal bleeding and vaginal discharge. Musculoskeletal: Negative for back pain. All other systems reviewed and are negative. Objective    Vitals:    09/26/20 1401   BP: 130/78   Pulse: 84   Temp: 97.4 °F (36.3 °C)   Weight: 297 lb (134.7 kg)   Height: 5' 6\" (1.676 m)       Physical Exam  Vitals signs and nursing note reviewed.    Constitutional: General: She is not in acute distress. Appearance: Normal appearance. She is well-developed. HENT:      Head: Normocephalic and atraumatic. Eyes:      General:         Right eye: No discharge. Left eye: No discharge. Conjunctiva/sclera: Conjunctivae normal.   Cardiovascular:      Rate and Rhythm: Normal rate and regular rhythm. Pulmonary:      Effort: Pulmonary effort is normal.      Breath sounds: Normal breath sounds. Abdominal:      General: Abdomen is flat. Bowel sounds are normal. There is no distension. Palpations: Abdomen is soft. There is no mass. Tenderness: There is no abdominal tenderness. There is no right CVA tenderness, left CVA tenderness, guarding or rebound. Neurological:      Mental Status: She is alert. Assessment and Plan      ICD-10-CM    1. Urgency of urination  R39.15 POCT Urinalysis No Micro (Auto)     Culture, Urine   2. Acute cystitis with hematuria  N30.01        Orders Placed This Encounter   Procedures    Culture, Urine     Standing Status:   Future     Standing Expiration Date:   9/26/2021     Order Specific Question:   Specify (ex-cath, midstream, cysto, etc)? Answer:   clean catch    POCT Urinalysis No Micro (Auto)     Results for POC orders placed in visit on 09/26/20   POCT Urinalysis No Micro (Auto)   Result Value Ref Range    Color, UA cloudy yellow     Clarity, UA cloudy     Glucose, UA POC 1     Bilirubin, UA -     Ketones, UA -     Spec Grav, UA 1.020     Blood, UA POC 3     pH, UA 6.0     Protein, UA POC 1     Urobilinogen, UA +/-     Leukocytes, UA 3     Nitrite, UA neg        Orders Placed This Encounter   Medications    ciprofloxacin (CIPRO) 500 MG tablet     Sig: Take 1 tablet by mouth 2 times daily for 7 days     Dispense:  14 tablet     Refill:  0       Reviewed with the patient: current clinicalstatus, medications, activities and diet.      Side effects, adverse effects of the medication prescribed today, as well as

## 2020-09-29 ENCOUNTER — CARE COORDINATION (OUTPATIENT)
Dept: OTHER | Facility: CLINIC | Age: 45
End: 2020-09-29

## 2020-09-29 LAB
ORGANISM: ABNORMAL
URINE CULTURE, ROUTINE: ABNORMAL

## 2020-09-29 NOTE — CARE COORDINATION
Ambulatory Care Coordination Note  9/29/2020  CM Risk Score: 9  Charlson 10 Year Mortality Risk Score: 79%     ACC: Brody Ruiz, RN    Summary Note: ACM contacted patient by telephone to follow up on progress, reinforce previous education/ provide pt education, and discuss any new issues or concerns. HIPAA compliant message left requesting a return phone call at patients convenience to discuss. Will continue to follow. Will send PermissionTVt message in the meantime. Care Coordination Interventions    Program Enrollment:  Complex Care  Referral from Primary Care Provider:  No  Suggested Interventions and Community Resources  Diabetes Education:  Completed (Comment: 10/15/19- completed previously)  Fall Risk Prevention:  Completed (Comment: 10/15/19- Falls Prevention and home safety measure discussed)  Specialty Services Referral:  Completed (Comment: 2/20/20: Pain management referral- Appt made )  Other Therapy Services: In Process (Comment: Water therapy for back pain)  Zone Management Tools:  Completed (Comment: DM II)           Prior to Admission medications    Medication Sig Start Date End Date Taking?  Authorizing Provider   ciprofloxacin (CIPRO) 500 MG tablet Take 1 tablet by mouth 2 times daily for 7 days 9/26/20 10/3/20  TONY Goodman   solifenacin (VESICARE) 10 MG tablet TAKE 1 TABLET BY MOUTH ONE TIME A DAY 9/25/20   Vadim Lane MD   baclofen (LIORESAL) 10 MG tablet Take 0.5 tablets by mouth 2 times daily 9/22/20   Belinda Bernstein MD   omeprazole (PRILOSEC) 40 MG delayed release capsule Take 1 capsule by mouth every morning (before breakfast) 9/10/20   Belinda Bernstein MD   Insulin Glargine, 2 Unit Dial, (TOUJEO MAX SOLOSTAR) 300 UNIT/ML SOPN INJECT 240 UNITS SUBCUTANEOUSLY AT BEDTIME 9/10/20   Imelda Hodge MD   metoprolol (LOPRESSOR) 100 MG tablet Take 1 tablet by mouth 2 times daily 8/27/20   Belinda Bernstein MD   sucralfate (CARAFATE) 1 GM tablet Take 1 tablet by mouth 4 times daily for 7 days 8/11/20 8/18/20  Suad Zelaya APRN - CNP   promethazine (PHENERGAN) 25 MG tablet TAKE 1 TABLET BY MOUTH EVERY 8 HOURS AS NEEDED FOR NAUSEA 8/11/20   Karla Wang MD   rosuvastatin (CRESTOR) 10 MG tablet TAKE ONE TABLET BY MOUTH NIGHTLY 7/28/20   Dipak Mcknight, APRN - CNP   insulin lispro, 1 Unit Dial, (HUMALOG KWIKPEN) 100 UNIT/ML SOPN 80 units with each meals 6/5/20   Julius Ureña MD   Insulin Glargine, 2 Unit Dial, (TOUJEO MAX SOLOSTAR) 300 UNIT/ML SOPN 280 units at bedtime 5/27/20   Julius Ureña MD   clotrimazole-betamethasone (LOTRISONE) 1-0.05 % cream Apply topically 2 times daily. 5/19/20   Jeremías Iyer DO   nystatin-triamcinolone (MYCOLOG II) 882943-0.1 UNIT/GM-% cream Apply topically 2 times daily. 4/27/20   Charlene Golden MD   hydroCHLOROthiazide (HYDRODIURIL) 25 MG tablet Take 1 tablet by mouth daily 3/30/20   Karla Wang MD   losartan (COZAAR) 50 MG tablet Take 1 tablet by mouth daily 3/30/20   Karla Wang MD   pantoprazole (PROTONIX) 40 MG tablet Take 1 tablet by mouth daily 3/30/20   Karla Wang MD   metFORMIN (GLUCOPHAGE) 500 MG tablet Take 1 tablet by mouth 3 times daily 3/30/20   Julius Ureña MD   Blood Glucose Monitoring Suppl (PRODIGY AUTOCODE BLOOD GLUCOSE) w/Device KIT Use as directed to test up to 4 times daily 1/17/20   Julius Ureña MD   blood glucose test strips (PRODIGY NO CODING BLOOD GLUC) strip 1 each by In Vitro route 4 times daily As needed. 1/17/20   MD ROOSEVELT Gonsalez LANCETS 28G MISC Use as directed to test up to 4 times daily 1/17/20   Julius Ureña MD   nystatin (MYCOSTATIN) 103574 UNIT/GM powder Apply 3 times daily.  1/7/20   Karla Wang MD   venlafaxine (EFFEXOR XR) 75 MG extended release capsule TAKE TWO CAPSULES BY MOUTH EVERY MORNING AND TAKE ONE CAPSULE BY MOUTH EVERY EVENING 11/15/19   Karla Wang MD   Insulin Pen Needle (PEN NEEDLES) 32G X 4 MM MISC Use as directed with insulin pens, 4 times daily 10/23/19   Julius Ureña MD   insulin lispro (HUMALOG KWIKPEN) 100 UNIT/ML pen INJECT 60 units at each meals  Patient taking differently: Inject 70 Units into the skin 3 times daily (before meals)  10/11/19   Suraj Mae MD   vitamin D (CHOLECALCIFEROL) 1000 UNIT TABS tablet Take 1,000 Units by mouth daily    Historical Provider, MD   calcium carbonate 600 MG TABS tablet Take 1 tablet by mouth daily    Historical Provider, MD   miconazole (MICOTIN) 2 % cream Apply topically 2 times daily.  1/21/19   Oracio Jackson MD   Handicap Placard [address] [place] [address] Exp 5 years 7/3/18   Oracio Jackson MD       Future Appointments   Date Time Provider Iliana Garcia   11/3/2020  9:00 AM [facility] ROOM [address] [place] [address]   12/15/2020 10:45 AM Leela Santamaria MD [address] [facility] [address]

## 2020-10-01 ENCOUNTER — VIRTUAL VISIT (OUTPATIENT)
Dept: FAMILY MEDICINE CLINIC | Age: 45
End: 2020-10-01
Payer: COMMERCIAL

## 2020-10-01 PROCEDURE — 99213 OFFICE O/P EST LOW 20 MIN: CPT | Performed by: FAMILY MEDICINE

## 2020-10-01 NOTE — PROGRESS NOTES
Subjective:      Patient ID: Shelton Emmanuel is a 41 y.o. female who presents today for:     Chief Complaint   Patient presents with    Sleep Apnea     Patient presents with virtual visit to discuss sleep study for cpap machine settings. HPI    Past Medical History:   Diagnosis Date    Acute midline thoracic back pain 9/18/2018    B12 deficiency     Family history of premature CAD 9/4/2013    Fatty liver     HPV (human papilloma virus) anogenital infection     Hyperlipidemia     Hypertension     Liver hemangioma 2009/2015    Obesity 3/11/13    BMI 43.42    Orthostatic hypotension     Ovarian cyst     PMR (polymyalgia rheumatica) (HCC)     Rectal fissure     Tobacco abuse 9/3/2015    Tobacco abuse     Tremor     Uncontrolled diabetes mellitus with complications ([facility] [address]     Unspecified sleep apnea      Past Surgical History:   Procedure Laterality Date    CARDIAC CATHETERIZATION  4-07    COLONOSCOPY  2013    for diarrhea (-)    HYSTERECTOMY  12-10    LEEP  1-05    [place] MUSCLE BIOPSY Left 9/21/2018    LEFT QUADRICEPS MUSCLE BIOPSY performed by Kenneth Rubio MD at [address] UNI/BI CANNULATION N/A 9/18/2018    T 12 VERTEBRALPLASTY ROOM 278 performed by Loly Hagan MD at [address]  10/13/15       [address] [place] [address] SURGERY  6-2015     Family History   Problem Relation Age of Onset    Diabetes Father     Heart Disease Mother      Social History     Socioeconomic History    Marital status:      Spouse name: Not on file    Number of children: 1    Years of education: Not on file    Highest education level: Not on file   Occupational History    Not on file   Social Needs    Financial resource strain: Not very hard    Food insecurity     Worry: Never true     Inability: Never true    Transportation needs     Medical: No     Non-medical: No   Tobacco Use    Smoking status: Former Smoker     Packs/day: 1.00     Types: Cigarettes     Last attempt to quit: 1/1/2017     Years since quitting: 3.7    Smokeless tobacco: Never Used   Substance and Sexual Activity    Alcohol use: Yes     Alcohol/week: 0.0 standard drinks     Frequency: Monthly or less     Binge frequency: Never     Comment: socially    Drug use: No    Sexual activity: Yes     Partners: Male     Comment: single   Lifestyle    Physical activity     Days per week: 1 day     Minutes per session: 10 min    Stress: To some extent   Relationships    Social connections     Talks on phone:  Three times a week     Gets together: Once a week     Attends Nondenominational service: More than 4 times per year     Active member of club or organization: No     Attends meetings of clubs or organizations: Never     Relationship status:     Intimate partner violence     Fear of current or ex partner: Not on file     Emotionally abused: Not on file     Physically abused: Not on file     Forced sexual activity: Not on file   Other Topics Concern    Not on file   Social History Narrative    Social/Functional History          Current Outpatient Medications on File Prior to Visit   Medication Sig Dispense Refill    ciprofloxacin (CIPRO) 500 MG tablet Take 1 tablet by mouth 2 times daily for 7 days 14 tablet 0    solifenacin (VESICARE) 10 MG tablet TAKE 1 TABLET BY MOUTH ONE TIME A DAY 90 tablet 3    baclofen (LIORESAL) 10 MG tablet Take 0.5 tablets by mouth 2 times daily 90 tablet 1    omeprazole (PRILOSEC) 40 MG delayed release capsule Take 1 capsule by mouth every morning (before breakfast) 90 capsule 0    Insulin Glargine, 2 Unit Dial, (TOUJEO MAX SOLOSTAR) 300 UNIT/ML SOPN INJECT 240 UNITS SUBCUTANEOUSLY AT BEDTIME 135 mL 3    metoprolol (LOPRESSOR) 100 MG tablet Take 1 tablet by mouth 2 times daily 180 tablet 0    promethazine (PHENERGAN) 25 MG tablet TAKE 1 TABLET BY MOUTH EVERY 8 HOURS AS NEEDED FOR NAUSEA 180 tablet 0  rosuvastatin (CRESTOR) 10 MG tablet TAKE ONE TABLET BY MOUTH NIGHTLY 90 tablet 0    insulin lispro, 1 Unit Dial, (HUMALOG KWIKPEN) 100 UNIT/ML SOPN 80 units with each meals 90 pen 3    Insulin Glargine, 2 Unit Dial, (TOUJEO MAX SOLOSTAR) 300 UNIT/ML SOPN 280 units at bedtime 90 pen 3    clotrimazole-betamethasone (LOTRISONE) 1-0.05 % cream Apply topically 2 times daily. 1 Tube 3    nystatin-triamcinolone (MYCOLOG II) 870571-7.1 UNIT/GM-% cream Apply topically 2 times daily. 30 g 1    hydroCHLOROthiazide (HYDRODIURIL) 25 MG tablet Take 1 tablet by mouth daily 90 tablet 3    losartan (COZAAR) 50 MG tablet Take 1 tablet by mouth daily 90 tablet 3    pantoprazole (PROTONIX) 40 MG tablet Take 1 tablet by mouth daily 60 tablet 2    metFORMIN (GLUCOPHAGE) 500 MG tablet Take 1 tablet by mouth 3 times daily 270 tablet 3    Blood Glucose Monitoring Suppl (PRODIGY AUTOCODE BLOOD GLUCOSE) w/Device KIT Use as directed to test up to 4 times daily 1 kit 0    blood glucose test strips (PRODIGY NO CODING BLOOD GLUC) strip 1 each by In Vitro route 4 times daily As needed. 400 each 3    PRODIGY LANCETS 28G MISC Use as directed to test up to 4 times daily 400 each 3    nystatin (MYCOSTATIN) 843007 UNIT/GM powder Apply 3 times daily. 1 Bottle 2    venlafaxine (EFFEXOR XR) 75 MG extended release capsule TAKE TWO CAPSULES BY MOUTH EVERY MORNING AND TAKE ONE CAPSULE BY MOUTH EVERY EVENING 270 capsule 1    Insulin Pen Needle (PEN NEEDLES) 32G X 4 MM MISC Use as directed with insulin pens, 4 times daily 400 each 1    insulin lispro (HUMALOG KWIKPEN) 100 UNIT/ML pen INJECT 60 units at each meals (Patient taking differently: Inject 70 Units into the skin 3 times daily (before meals) ) 90 pen 3    vitamin D (CHOLECALCIFEROL) 1000 UNIT TABS tablet Take 1,000 Units by mouth daily      calcium carbonate 600 MG TABS tablet Take 1 tablet by mouth daily      miconazole (MICOTIN) 2 % cream Apply topically 2 times daily.  141 g 3  Handicap Placard MISC Exp 5 years 1 each 0    sucralfate (CARAFATE) 1 GM tablet Take 1 tablet by mouth 4 times daily for 7 days 28 tablet 0     No current facility-administered medications on file prior to visit. Allergies:  Morphine and related; Ace inhibitors; Bactrim [sulfamethoxazole-trimethoprim]; Nitroglycerin; Percocet [oxycodone-acetaminophen]; and Victoza [liraglutide]    Review of Systems    Objective:   LMP  (LMP Unknown)     Physical Exam    Assessment & Plan:     There are no diagnoses linked to this encounter. No follow-ups on file.     Sami Salazar MD

## 2020-10-05 ASSESSMENT — ENCOUNTER SYMPTOMS
SHORTNESS OF BREATH: 0
CONSTIPATION: 0
BLOOD IN STOOL: 0
VOMITING: 0
COUGH: 0
NAUSEA: 0
APNEA: 0
ABDOMINAL PAIN: 0
CHEST TIGHTNESS: 0
DIARRHEA: 0

## 2020-10-06 NOTE — PROGRESS NOTES
10/1/2020    TELEHEALTH EVALUATION -- Audio/Visual (During [id number] public health emergency)    Due to COVID 19 outbreak, patient's office visit was converted to a virtual visit. Patient was contacted and agreed to proceed with a virtual visit via Doxy. me  The risks and benefits of converting to a virtual visit were discussed in light of the current infectious disease epidemic. Patient also understood that insurance coverage and co-pays are up to their individual insurance plans. HPI:    Amalia Dowling (:  1975) has requested an audio/video evaluation for the following concern(s):    Patient is a 70-year-old female presents today to follow-up on sleep apnea. Her last sleep study was over 5 years ago and patient feels the pressure is not correct as she is not sleeping comfortably on her current settings. Patient would like to have a repeat sleep study done to titrate her settings  Additionally patient would like to follow-up with aqua therapy as she was not able to pursue further secondary to COVID-19 restrictions. Review of Systems   Constitutional: Negative for activity change, appetite change and fatigue. Respiratory: Negative for apnea, cough, chest tightness and shortness of breath. Cardiovascular: Negative for chest pain, palpitations and leg swelling. Gastrointestinal: Negative for abdominal pain, blood in stool, constipation, diarrhea, nausea and vomiting. Musculoskeletal: Positive for arthralgias and myalgias. Neurological: Negative for seizures and headaches. Psychiatric/Behavioral: Positive for sleep disturbance. Negative for hallucinations and suicidal ideas. Prior to Visit Medications    Medication Sig Taking?  Authorizing Provider   solifenacin (VESICARE) 10 MG tablet TAKE 1 TABLET BY MOUTH ONE TIME A DAY Yes Geovani Wise MD   baclofen (LIORESAL) 10 MG tablet Take 0.5 tablets by mouth 2 times daily Yes Tiesha Linda MD   omeprazole (PRILOSEC) 40 MG delayed release capsule Take 1 capsule by mouth every morning (before breakfast) Yes Rudi Foreman MD   Insulin Glargine, 2 Unit Dial, (TOUJEO MAX SOLOSTAR) 300 UNIT/ML SOPN INJECT 240 UNITS SUBCUTANEOUSLY AT BEDTIME Yes Gallo Aguero MD   metoprolol (LOPRESSOR) 100 MG tablet Take 1 tablet by mouth 2 times daily Yes Rudi Foreman MD   promethazine (PHENERGAN) 25 MG tablet TAKE 1 TABLET BY MOUTH EVERY 8 HOURS AS NEEDED FOR NAUSEA Yes Rudi Foreman MD   rosuvastatin (CRESTOR) 10 MG tablet TAKE ONE TABLET BY MOUTH NIGHTLY Yes SANIA Babcock - CNP   insulin lispro, 1 Unit Dial, (HUMALOG KWIKPEN) 100 UNIT/ML SOPN 80 units with each meals Yes Patrick Marinelli MD   Insulin Glargine, 2 Unit Dial, (TOUJEO MAX SOLOSTAR) 300 UNIT/ML SOPN 280 units at bedtime Yes Patrick Marinelli MD   clotrimazole-betamethasone (LOTRISONE) 1-0.05 % cream Apply topically 2 times daily. Yes Milan Perez DO   nystatin-triamcinolone (MYCOLOG II) 556483-3.1 UNIT/GM-% cream Apply topically 2 times daily. Yes Sarah Adams MD   hydroCHLOROthiazide (HYDRODIURIL) 25 MG tablet Take 1 tablet by mouth daily Yes Ace Benson MD   losartan (COZAAR) 50 MG tablet Take 1 tablet by mouth daily Yes Ace Benson MD   pantoprazole (PROTONIX) 40 MG tablet Take 1 tablet by mouth daily Yes Ace Benson MD   metFORMIN (GLUCOPHAGE) 500 MG tablet Take 1 tablet by mouth 3 times daily Yes Gallo Aguero MD   Blood Glucose Monitoring Suppl (PRODIGY AUTOCODE BLOOD GLUCOSE) w/Device KIT Use as directed to test up to 4 times daily Yes Gallo Aguero MD   blood glucose test strips (PRODIGY NO CODING BLOOD GLUC) strip 1 each by In Vitro route 4 times daily As needed. Yes MD ROOSEVELT Aguirre LANCETS 28G MISC Use as directed to test up to 4 times daily Yes Gallo Aguero MD   nystatin (MYCOSTATIN) 950964 UNIT/GM powder Apply 3 times daily.  Yes Ace Benson MD   venlafaxine (EFFEXOR XR) 75 MG extended release capsule TAKE TWO CAPSULES BY MOUTH EVERY MORNING AND TAKE ONE CAPSULE BY MOUTH EVERY EVENING Yes Shari Randle MD   Insulin Pen Needle (PEN NEEDLES) 32G X 4 MM MISC Use as directed with insulin pens, 4 times daily Yes Faviola Vaughn MD   insulin lispro (HUMALOG KWIKPEN) 100 UNIT/ML pen INJECT 60 units at each meals  Patient taking differently: Inject 70 Units into the skin 3 times daily (before meals)  Yes Patrick Marinelli MD   vitamin D (CHOLECALCIFEROL) 1000 UNIT TABS tablet Take 1,000 Units by mouth daily Yes Historical Provider, MD   calcium carbonate 600 MG TABS tablet Take 1 tablet by mouth daily Yes Historical Provider, MD   miconazole (MICOTIN) 2 % cream Apply topically 2 times daily. Yes Tone Qiu MD   Handicap Placard MISC Exp 5 years Yes Tone Qiu MD   sucralfate (CARAFATE) 1 GM tablet Take 1 tablet by mouth 4 times daily for 7 days  Liz Jang, APRN - CNP       Social History     Tobacco Use    Smoking status: Former Smoker     Packs/day: 1.00     Types: Cigarettes     Last attempt to quit: 1/1/2017     Years since quitting: 3.7    Smokeless tobacco: Never Used   Substance Use Topics    Alcohol use: Yes     Alcohol/week: 0.0 standard drinks     Frequency: Monthly or less     Binge frequency: Never     Comment: socially    Drug use: No          PHYSICAL EXAMINATION:    [x] Alert  [x] Oriented to person/place/time    [x] No apparent distress   [x] Sclera clear  [x] Breathing appears normal     [x] Cranial Nerves II-XII grossly intact    [x] Motor grossly intact in visible upper extremities    [x] Motor grossly intact in visible lower extremities    [x] Normal Mood  [] OTHER:      Due to this being a TeleHealth encounter, evaluation of the following organ systems is limited: Vitals/Constitutional/EENT/Resp/CV/GI//MS/Neuro/Skin/Heme-Lymph-Imm. ASSESSMENT/PLAN:  1. Obstructive sleep apnea syndrome  We will order a sleep study for further evaluation    2.  PMR (polymyalgia rheumatica) (HCC)  We will reorder physical therapy specifically aqua therapy per patient request  - Ambulatory referral to Physical Therapy      Return if symptoms worsen or fail to improve. An  electronic signature was used to authenticate this note.    --Earl Franks MD on 10/5/2020 at 8:50 PM    119}    Pursuant to the emergency declaration under the [address] [place] [address] waiver authority and the [employer] and [employer] Act, this Virtual  Visit was conducted, with patient's consent, to reduce the patient's risk of exposure to COVID-19 and provide continuity of care for an established patient. Services were provided through a video synchronous discussion virtually to substitute for in-person clinic visit.

## 2020-10-19 ENCOUNTER — OFFICE VISIT (OUTPATIENT)
Dept: UROLOGY | Age: 45
End: 2020-10-19
Payer: COMMERCIAL

## 2020-10-19 ENCOUNTER — CARE COORDINATION (OUTPATIENT)
Dept: OTHER | Facility: CLINIC | Age: 45
End: 2020-10-19

## 2020-10-19 VITALS
HEIGHT: 66 IN | BODY MASS INDEX: 46.61 KG/M2 | WEIGHT: 290 LBS | HEART RATE: 77 BPM | SYSTOLIC BLOOD PRESSURE: 120 MMHG | DIASTOLIC BLOOD PRESSURE: 66 MMHG

## 2020-10-19 DIAGNOSIS — N39.0 RECURRENT UTI: ICD-10-CM

## 2020-10-19 LAB
BILIRUBIN, POC: ABNORMAL
BLOOD URINE, POC: ABNORMAL
CLARITY, POC: CLEAR
COLOR, POC: YELLOW
GLUCOSE URINE, POC: ABNORMAL
KETONES, POC: ABNORMAL
LEUKOCYTE EST, POC: ABNORMAL
NITRITE, POC: ABNORMAL
PH, POC: 6
POST VOID RESIDUAL (PVR): 10 ML
PROTEIN, POC: ABNORMAL
SPECIFIC GRAVITY, POC: 1.02
UROBILINOGEN, POC: 1

## 2020-10-19 PROCEDURE — 99214 OFFICE O/P EST MOD 30 MIN: CPT | Performed by: UROLOGY

## 2020-10-19 PROCEDURE — 81003 URINALYSIS AUTO W/O SCOPE: CPT | Performed by: UROLOGY

## 2020-10-19 NOTE — PROGRESS NOTES
[facility] EVALUATION NOTE                                                 H&P                                                                                                                                                 Reason for Visit  Microhematuria noted on urinalysis    History of Present Illness  72-year-old female with history of poorly controlled blood sugar secondary to diabetes and treatment with prednisone for fibromyalgia  Patient has had 2 UTIs documented by urine culture  Was noted to have microhematuria secondary to urine culture prior to treatment  Current urinalysis is clear  Patient is asymptomatic  Urine will be sent for culture      Urologic Review of Systems/Symptoms  Recurrent UTIs    Review of Systems  Hospitalization: None recent  All 14 categories of Review of Systems otherwise reviewed no other findings reported.     Past Medical History:   Diagnosis Date    Acute midline thoracic back pain 9/18/2018    B12 deficiency     Family history of premature CAD 9/4/2013    Fatty liver     HPV (human papilloma virus) anogenital infection     Hyperlipidemia     Hypertension     Liver hemangioma 2009/2015    Obesity 3/11/13    BMI 43.42    Orthostatic hypotension     Ovarian cyst     PMR (polymyalgia rheumatica) (HCC)     Rectal fissure     Tobacco abuse 9/3/2015    Tobacco abuse     Tremor     Uncontrolled diabetes mellitus with complications ([facility] [address]     Unspecified sleep apnea      Past Surgical History:   Procedure Laterality Date    CARDIAC CATHETERIZATION  4-07    COLONOSCOPY  2013    for diarrhea (-)    HYSTERECTOMY  12-10    LEEP  1-05    NM MUSCLE BIOPSY Left 9/21/2018    LEFT QUADRICEPS MUSCLE BIOPSY performed by Patriciaann Denver, MD at [address] UNI/BI CANNULATION N/A 9/18/2018    T 12 VERTEBRALPLASTY ROOM 278 performed by Keyona Ann MD at [facility] 10/13/15     DR. Balderrama [address] SURGERY  6-2015     Social History     Socioeconomic History    Marital status:      Spouse name: None    Number of children: 1    Years of education: None    Highest education level: None   Occupational History    None   Social Needs    Financial resource strain: Not very hard    Food insecurity     Worry: Never true     Inability: Never true    Transportation needs     Medical: No     Non-medical: No   Tobacco Use    Smoking status: Former Smoker     Packs/day: 1.00     Types: Cigarettes     Last attempt to quit: 1/1/2017     Years since quitting: 3.8    Smokeless tobacco: Never Used   Substance and Sexual Activity    Alcohol use: Yes     Alcohol/week: 0.0 standard drinks     Frequency: Monthly or less     Binge frequency: Never     Comment: socially    Drug use: No    Sexual activity: Yes     Partners: Male     Comment: single   Lifestyle    Physical activity     Days per week: 1 day     Minutes per session: 10 min    Stress: To some extent   Relationships    Social connections     Talks on phone:  Three times a week     Gets together: Once a week     Attends [employer] service: More than 4 times per year     Active member of club or organization: No     Attends meetings of clubs or organizations: Never     Relationship status:     Intimate partner violence     Fear of current or ex partner: None     Emotionally abused: None     Physically abused: None     Forced sexual activity: None   Other Topics Concern    None   Social History Narrative    Social/Functional History          Family History   Problem Relation Age of Onset    Diabetes Father     Heart Disease Mother      Current Outpatient Medications   Medication Sig Dispense Refill    solifenacin (VESICARE) 10 MG tablet TAKE 1 TABLET BY MOUTH ONE TIME A DAY 90 tablet 3    baclofen (LIORESAL) 10 MG tablet Take 0.5 tablets by mouth 2 times daily 90 tablet 1    omeprazole (PRILOSEC) 40 MG delayed release capsule Take 1 capsule by mouth every morning (before breakfast) 90 capsule 0    Insulin Glargine, 2 Unit Dial, (TOUJEO MAX SOLOSTAR) 300 UNIT/ML SOPN INJECT 240 UNITS SUBCUTANEOUSLY AT BEDTIME 135 mL 3    metoprolol (LOPRESSOR) 100 MG tablet Take 1 tablet by mouth 2 times daily 180 tablet 0    promethazine (PHENERGAN) 25 MG tablet TAKE 1 TABLET BY MOUTH EVERY 8 HOURS AS NEEDED FOR NAUSEA 180 tablet 0    rosuvastatin (CRESTOR) 10 MG tablet TAKE ONE TABLET BY MOUTH NIGHTLY 90 tablet 0    insulin lispro, 1 Unit Dial, (HUMALOG KWIKPEN) 100 UNIT/ML SOPN 80 units with each meals 90 pen 3    clotrimazole-betamethasone (LOTRISONE) 1-0.05 % cream Apply topically 2 times daily. 1 Tube 3    hydroCHLOROthiazide (HYDRODIURIL) 25 MG tablet Take 1 tablet by mouth daily 90 tablet 3    losartan (COZAAR) 50 MG tablet Take 1 tablet by mouth daily 90 tablet 3    pantoprazole (PROTONIX) 40 MG tablet Take 1 tablet by mouth daily 60 tablet 2    metFORMIN (GLUCOPHAGE) 500 MG tablet Take 1 tablet by mouth 3 times daily 270 tablet 3    Blood Glucose Monitoring Suppl (PRODIGY AUTOCODE BLOOD GLUCOSE) w/Device KIT Use as directed to test up to 4 times daily 1 kit 0    blood glucose test strips (PRODIGY NO CODING BLOOD GLUC) strip 1 each by In Vitro route 4 times daily As needed. 400 each 3    PRODIGY LANCETS 28G MISC Use as directed to test up to 4 times daily 400 each 3    venlafaxine (EFFEXOR XR) 75 MG extended release capsule TAKE TWO CAPSULES BY MOUTH EVERY MORNING AND TAKE ONE CAPSULE BY MOUTH EVERY EVENING 270 capsule 1    Insulin Pen Needle (PEN NEEDLES) 32G X 4 MM MISC Use as directed with insulin pens, 4 times daily 400 each 1    vitamin D (CHOLECALCIFEROL) 1000 UNIT TABS tablet Take 1,000 Units by mouth daily      calcium carbonate 600 MG TABS tablet Take 1 tablet by mouth daily      miconazole (MICOTIN) 2 % cream Apply topically 2 times daily.  141 g 3    Handicap Placard MISC Exp 5 years 1 each 0  sucralfate (CARAFATE) 1 GM tablet Take 1 tablet by mouth 4 times daily for 7 days 28 tablet 0     No current facility-administered medications for this visit. Morphine and related; Ace inhibitors; Bactrim [sulfamethoxazole-trimethoprim]; Nitroglycerin; Percocet [oxycodone-acetaminophen]; and Victoza [liraglutide]  All reviewed and verified by Dr Oli Crowder on today's visit    No results found for: PSA, PSADIA  Results for POC orders placed in visit on 10/19/20   POCT Urinalysis No Micro (Auto)   Result Value Ref Range    Color, UA yellow     Clarity, UA clear     Glucose, UA POC neg     Bilirubin, UA neg     Ketones, UA neg     Spec Grav, UA 1.020     Blood, UA POC neg     pH, UA 6.0     Protein, UA POC 30 mg/dL     Urobilinogen, UA 1.0     Leukocytes, UA small     Nitrite, UA neg    poct post void residual   Result Value Ref Range    post void residual 10 ml    Narrative    A point of care test   Post Void Residual was completed by performing  ultrasound scan of the bladder and  reviewed by Dr Oli Crowder       Physical Exam  Vitals:    10/19/20 1401   BP: 120/66   Pulse: 77   Weight: 290 lb (131.5 kg)   Height: 5' 6\" (1.676 m)     Constitutional: Not in distress. Head and Neck: Normal no lymphadenopathy  Cardiovascular: Normal rate, BP reviewed. Normal rhythm  Pulmonary/Chest: Normal respiratory effort no shortness of breath  Abdominal: Not distended. Denies suprapubic pain  Urologic Exam  Postvoid residual 10. Urinalysis shows pyuria. No evidence of microhematuria  Culture sent. Musculoskeletal: Normal.  Extremities: No edema  Neurological: Intact  Skin: No rashes  Psychiatric: Normal affect.   Assessment/Medical Necessity-Decision Making  Microhematuria is cleared after treatment of UTI  Plan  Urine culture sent  Notify patient with results  Greater than 50% 25 minutes spent consulting patient face-to-face  Orders Placed This Encounter   Procedures    Culture, Urine     Standing Status:   Future Standing Expiration Date:   10/19/2021     Order Specific Question:   Specify (ex-cath, midstream, cysto, etc)? Answer:   m    POCT Urinalysis No Micro (Auto)    poct post void residual     No orders of the defined types were placed in this encounter. Chuy Flores MD       Please note this report has been partially produced using speech recognition software  And may cause contain errors related to that system including grammar, punctuation and spelling as well as words and phrases that may seem inappropriate. If there are questions or concerns please feel free to contact me to clarify.

## 2020-10-21 ENCOUNTER — HOSPITAL ENCOUNTER (OUTPATIENT)
Dept: PHYSICAL THERAPY | Age: 45
Setting detail: THERAPIES SERIES
Discharge: HOME | End: 2020-10-21
Payer: COMMERCIAL

## 2020-10-21 LAB — URINE CULTURE, ROUTINE: NORMAL

## 2020-10-23 ENCOUNTER — CARE COORDINATION (OUTPATIENT)
Dept: OTHER | Facility: CLINIC | Age: 45
End: 2020-10-23

## 2020-10-23 ENCOUNTER — HOSPITAL ENCOUNTER (OUTPATIENT)
Dept: SLEEP CENTER | Age: 45
Discharge: HOME | End: 2020-10-25
Payer: COMMERCIAL

## 2020-10-23 PROCEDURE — 95810 POLYSOM 6/> YRS 4/> PARAM: CPT

## 2020-10-23 NOTE — CARE COORDINATION
Ambulatory Care Coordination Note  10/23/2020  CM Risk Score: 9  Charlson 10 Year Mortality Risk Score: 79%     ACC: Aleyda Acevedo RN    Summary Note: [facility]) contacted the patient by telephone to follow up on progress, discuss new issues or concerns, and reinforce/ provide pt education. Verified name and  with patient as identifiers. DM II-  Pt states she is doing well right now. She states she feels her BG has improved. She is doing better in regards to following her DM diet as she is home most of the time now. She is seeing PT for her back pain so she can increase her physical activity. She is compliant with her follow up appts. She is awaiting a call from the sleep center to schedule her CPAP titration study. Otherwise, she has no new issues or concerns, and feels she has no ongoing ACM needs. Education Provided on importance and benefits of:  Self monitoring compliance: SBGM  Red Flag symptoms to report  Medication compliance  Diet compliance: Diabetic  Provider follow up appointment compliance  Specialist appointment compliance: Pain Management, Endocrinology, PT  Ordered diagnostic testing compliance  Utilization of appropriate level of care    Plan:  ACM will sign off as patient condition has improved, goals met, and no ongoing ACM needs at this time. Patient verbalized understanding and is agreeable.          Care Coordination Interventions    Program Enrollment:  Complex Care  Referral from Primary Care Provider:  No  Suggested Interventions and Community Resources  Diabetes Education:  Completed (Comment: 10/15/19- completed previously)  Fall Risk Prevention:  Completed (Comment: 10/15/19- Falls Prevention and home safety measure discussed)  Specialty Services Referral:  Completed (Comment: 20: Pain management referral- Appt made )  Other Therapy Services:  Completed (Comment: Water therapy for back pain)  Zone Management Tools:  Completed (Comment: DM Use as directed to test up to 4 times daily 1/17/20  Yes Judith Anguiano MD   venlafaxine (EFFEXOR XR) 75 MG extended release capsule TAKE TWO CAPSULES BY MOUTH EVERY MORNING AND TAKE ONE CAPSULE BY MOUTH EVERY EVENING 11/15/19  Yes Elif Guzman MD   Insulin Pen Needle (PEN NEEDLES) 32G X 4 MM MISC Use as directed with insulin pens, 4 times daily 10/23/19  Yes Judith Anguiano MD   vitamin D (CHOLECALCIFEROL) 1000 UNIT TABS tablet Take 1,000 Units by mouth daily   Yes Historical Provider, MD   calcium carbonate 600 MG TABS tablet Take 1 tablet by mouth daily   Yes Historical Provider, MD   miconazole (MICOTIN) 2 % cream Apply topically 2 times daily. 1/21/19  Yes Lisandro Chapman MD   Handicap [facility] [address] [facility] Exp 5 years 7/3/18  Yes Lisandro Chapman MD   sucralfate (CARAFATE) 1 GM tablet Take 1 tablet by mouth 4 times daily for 7 days 8/11/20 8/18/20  SANIA Rodgers - CNP       Future Appointments   Date Time Provider Iliana Garcia   11/3/2020  9:00 AM [facility] ROOM [address] [facility] [place]   12/15/2020 10:45 AM Yashira Quiros MD [facility] [place]     ,   Diabetes Assessment    Medic Alert ID:  No  Meal Planning:  Avoidance of concentrated sweets, Carb counting   How often do you test your blood sugar?:  Meals, Bedtime   Do you have barriers with adherence to non-pharmacologic self-management interventions?  (Nutrition/Exercise/Self-Monitoring):  Yes   Have you ever had to go to the ED for symptoms of low blood sugar?:  No       No patient-reported symptoms   Do you have hyperglycemia symptoms?:  No   Do you have hypoglycemia symptoms?:  No   Last Blood Sugar Value:  146   Blood Sugar Monitoring Regimen:  Morning Fasting, Before Meals   Blood Sugar Trends:  No Change       and   General Assessment    Do you have any symptoms that are causing concern?:  No

## 2020-10-28 PROCEDURE — 95810 POLYSOM 6/> YRS 4/> PARAM: CPT | Performed by: INTERNAL MEDICINE

## 2020-10-29 RX ORDER — PREDNISONE 20 MG/1
20 TABLET ORAL DAILY
Qty: 90 TABLET | Refills: 2 | Status: SHIPPED | OUTPATIENT
Start: 2020-10-29 | End: 2020-12-26 | Stop reason: ALTCHOICE

## 2020-10-29 RX ORDER — PREDNISONE 1 MG/1
5 TABLET ORAL DAILY
Qty: 90 TABLET | Refills: 2 | Status: SHIPPED | OUTPATIENT
Start: 2020-10-29 | End: 2021-07-06 | Stop reason: SDUPTHER

## 2020-11-03 ENCOUNTER — E-VISIT (OUTPATIENT)
Dept: FAMILY MEDICINE CLINIC | Age: 45
End: 2020-11-03
Payer: COMMERCIAL

## 2020-11-03 ENCOUNTER — HOSPITAL ENCOUNTER (OUTPATIENT)
Dept: WOMENS IMAGING | Age: 45
Discharge: HOME | End: 2020-11-05
Payer: COMMERCIAL

## 2020-11-03 ENCOUNTER — E-VISIT (OUTPATIENT)
Dept: FAMILY MEDICINE CLINIC | Age: 45
End: 2020-11-03

## 2020-11-03 PROCEDURE — 98970 NQHP OL DIG ASSMT&MGMT 5-10: CPT | Performed by: NURSE PRACTITIONER

## 2020-11-03 PROCEDURE — 77063 BREAST TOMOSYNTHESIS BI: CPT

## 2020-11-03 RX ORDER — AMOXICILLIN 875 MG/1
875 TABLET, COATED ORAL 2 TIMES DAILY
Qty: 14 TABLET | Refills: 0 | Status: SHIPPED | OUTPATIENT
Start: 2020-11-03 | End: 2020-11-10

## 2020-11-03 ASSESSMENT — LIFESTYLE VARIABLES
SMOKING_STATUS: NO, I'M A FORMER SMOKER
PACKS_PER_DAY: 1
SMOKING_YEARS: 20
SMOKING_YEARS: 20
PACKS_PER_DAY: 1
SMOKING_STATUS: NO, I'M A FORMER SMOKER

## 2020-11-03 NOTE — TELEPHONE ENCOUNTER
pharmacy requesting medication refill.  Please approve or deny this request.    Rx requested:  Requested Prescriptions     Pending Prescriptions Disp Refills    venlafaxine (EFFEXOR XR) 75 MG extended release capsule 270 capsule 1     Sig: TAKE TWO CAPSULES BY MOUTH EVERY MORNING AND TAKE ONE CAPSULE BY MOUTH EVERY EVENING         Last Office Visit:   10/1/2020      Next Visit Date:  Future Appointments   Date Time Provider Iliana Garcia   11/5/2020  9:15 AM Jason Muhammad DO OB/GYN [facility]   12/15/2020 10:45 AM Rubi Solis MD [address]   3/1/2021  3:30 PM Raymond Negrete MD [facility]

## 2020-11-04 RX ORDER — VENLAFAXINE HYDROCHLORIDE 75 MG/1
CAPSULE, EXTENDED RELEASE ORAL
Qty: 270 CAPSULE | Refills: 1 | Status: SHIPPED | OUTPATIENT
Start: 2020-11-04 | End: 2021-04-20 | Stop reason: SDUPTHER

## 2020-11-04 NOTE — PROGRESS NOTES
HPI: as per patient provided history  Exam: N/A (electronic visit)  ASSESSMENT/PLAN:  1. Acute non-recurrent maxillary sinusitis  - amoxicillin (AMOXIL) 875 MG tablet; Take 1 tablet by mouth 2 times daily for 7 days  Dispense: 14 tablet; Refill: 0      Patient instructed to call the office if worsens, or fails to improve as anticipated. 5-10 minutes were spent on the digital evaluation and management of this patient.

## 2020-11-05 ENCOUNTER — OFFICE VISIT (OUTPATIENT)
Dept: OBGYN CLINIC | Age: 45
End: 2020-11-05
Payer: COMMERCIAL

## 2020-11-05 VITALS
WEIGHT: 293 LBS | SYSTOLIC BLOOD PRESSURE: 124 MMHG | BODY MASS INDEX: 47.09 KG/M2 | HEIGHT: 66 IN | DIASTOLIC BLOOD PRESSURE: 70 MMHG

## 2020-11-05 DIAGNOSIS — Z11.51 SCREENING FOR HPV (HUMAN PAPILLOMAVIRUS): ICD-10-CM

## 2020-11-05 DIAGNOSIS — Z01.419 VISIT FOR GYNECOLOGIC EXAMINATION: ICD-10-CM

## 2020-11-05 PROCEDURE — 88175 CYTOPATH C/V AUTO FLUID REDO: CPT

## 2020-11-05 PROCEDURE — 87624 HPV HI-RISK TYP POOLED RSLT: CPT

## 2020-11-05 PROCEDURE — 99396 PREV VISIT EST AGE 40-64: CPT | Performed by: OBSTETRICS & GYNECOLOGY

## 2020-11-05 RX ORDER — METRONIDAZOLE 500 MG/1
500 TABLET ORAL 2 TIMES DAILY
Qty: 14 TABLET | Refills: 0 | Status: SHIPPED | OUTPATIENT
Start: 2020-11-05 | End: 2020-11-05 | Stop reason: SDUPTHER

## 2020-11-05 ASSESSMENT — ENCOUNTER SYMPTOMS
WHEEZING: 0
DIARRHEA: 0
ABDOMINAL PAIN: 0
NAUSEA: 0
VOMITING: 0
SORE THROAT: 0
BLOOD IN STOOL: 0
CONSTIPATION: 0
COUGH: 0
ABDOMINAL DISTENTION: 0
SHORTNESS OF BREATH: 0

## 2020-11-05 NOTE — PROGRESS NOTES
Subjective:      Rohan Castañeda is a 41 y.o. female  here for routine exam.  Current Complaints: normal menses, no abnormal bleeding, pelvic pain or discharge, no breast pain or new or enlarging lumps on self exam.    Menstrual history:   regular menses 28 days  Sexual activity:  yes, denies knowledge of risky exposure  Abnormalvaginal discharge:  No  Contraceptive method:  none    Vitals:  /70   Ht 5' 6\" (1.676 m)   Wt 300 lb (136.1 kg)   LMP  (LMP Unknown)   BMI 48.42 kg/m²   Allergies:Morphine and related; Ace inhibitors; Bactrim [sulfamethoxazole-trimethoprim]; Nitroglycerin; Percocet [oxycodone-acetaminophen]; and Victoza [liraglutide]  Past Medical History:   Diagnosis Date    Acute midline thoracic back pain 2018    B12 deficiency     Family history of premature CAD 2013    Fatty liver     HPV (human papilloma virus) anogenital infection     Hyperlipidemia     Hypertension     Liver hemangioma     Obesity 3/11/13    BMI 43.42    Orthostatic hypotension     Ovarian cyst     PMR (polymyalgia rheumatica) (HCC)     Rectal fissure     Tobacco abuse 9/3/2015    Tobacco abuse     Tremor     Uncontrolled diabetes mellitus with complications ([facility] [address]     Unspecified sleep apnea      Past Surgical History:   Procedure Laterality Date    CARDIAC CATHETERIZATION      COLONOSCOPY      for diarrhea (-)    HYSTERECTOMY  12-10    LEEP      [place] MUSCLE BIOPSY Left 2018    LEFT QUADRICEPS MUSCLE BIOPSY performed by Carlos Courtney MD at [address] [facility] UNI/BI CANNULATION N/A 2018    T 12 VERTEBRALPLASTY ROOM 278 performed by Eboni Monte MD at [address] ENDOSCOPY  10/13/15       [address] [place] [address] SURGERY       Family History   Problem Relation Age of Onset    Diabetes Father     Heart Disease Mother      Social History     Socioeconomic History  Marital status:      Spouse name: Not on file    Number of children: 1    Years of education: Not on file    Highest education level: Not on file   Occupational History    Not on file   Social Needs    Financial resource strain: Not very hard    Food insecurity     Worry: Never true     Inability: Never true    Transportation needs     Medical: No     Non-medical: No   Tobacco Use    Smoking status: Former Smoker     Packs/day: 1.00     Types: Cigarettes     Last attempt to quit: 2017     Years since quitting: 3.8    Smokeless tobacco: Never Used   Substance and Sexual Activity    Alcohol use: Yes     Alcohol/week: 0.0 standard drinks     Frequency: Monthly or less     Binge frequency: Never     Comment: socially    Drug use: No    Sexual activity: Yes     Partners: Male     Comment: single   Lifestyle    Physical activity     Days per week: 1 day     Minutes per session: 10 min    Stress: To some extent   Relationships    Social connections     Talks on phone: Three times a week     Gets together: Once a week     Attends [employer] service: More than 4 times per year     Active member of club or organization: No     Attends meetings of clubs or organizations: Never     Relationship status:     Intimate partner violence     Fear of current or ex partner: Not on file     Emotionally abused: Not on file     Physically abused: Not on file     Forced sexual activity: Not on file   Other Topics Concern    Not on file   Social History Narrative    Social/Functional History            GynecologicHistory  No LMP recorded (lmp unknown). Patient has had a hysterectomy.   Last Pap: 2019 Results: normal  Last Mammogram: Yes Results: normal  no fmhx cancer  OB History        1    Para        Term                AB        Living   1       SAB        TAB        Ectopic        Molar        Multiple        Live Births   1              Patient's medications, allergies, past medical, surgical, social and family histories were reviewed and updated as appropriate. Review of Systems  Review of Systems   Constitutional: Negative for activity change, appetite change, fatigue and unexpected weight change. HENT: Negative for nosebleeds and sore throat. Eyes: Negative for visual disturbance. Respiratory: Negative for cough, shortness of breath and wheezing. Cardiovascular: Negative for chest pain, palpitations and leg swelling. Gastrointestinal: Negative for abdominal distention, abdominal pain, blood in stool, constipation, diarrhea, nausea and vomiting. Endocrine: Negative for cold intolerance, heat intolerance, polydipsia and polyuria. Genitourinary: Negative for difficulty urinating, dyspareunia, dysuria, frequency, genital sores, hematuria, menstrual problem, pelvic pain, urgency, vaginal bleeding, vaginal discharge and vaginal pain. Musculoskeletal: Negative for arthralgias. Skin: Negative for rash. Neurological: Negative for dizziness, weakness, light-headedness and headaches. Hematological: Negative for adenopathy. Does not bruise/bleed easily. Psychiatric/Behavioral: Negative for agitation, confusion, dysphoric mood and sleep disturbance. Objective:     Vitals:  /70   Ht 5' 6\" (1.676 m)   Wt 300 lb (136.1 kg)   LMP  (LMP Unknown)   BMI 48.42 kg/m²     Physical Exam  Constitutional:       Appearance: Normal appearance. She is well-developed. She is obese. Eyes:      Pupils: Pupils are equal, round, and reactive to light. Cardiovascular:      Rate and Rhythm: Normal rate and regular rhythm. Heart sounds: Normal heart sounds. Pulmonary:      Effort: Pulmonary effort is normal.   Chest:      Breasts:         Right: No inverted nipple, mass, nipple discharge, skin change or tenderness. Left: No inverted nipple, mass, nipple discharge or tenderness.    Abdominal:      General: Bowel sounds are normal.      Palpations: Abdomen is soft. Genitourinary:     Labia:         Right: No rash, tenderness or lesion. Left: No rash, tenderness or lesion. Urethra: No prolapse, urethral pain, urethral swelling or urethral lesion. Vagina: Vaginal discharge present. No erythema, tenderness or bleeding. Cervix: No discharge, friability, lesion or erythema. Uterus: Not enlarged and not tender. Adnexa:         Right: No mass, tenderness or fullness. Left: No mass, tenderness or fullness. Comments: Limited view of cervical region secondary to obesity. Limited pelvic exam  Musculoskeletal:      Right lower leg: No edema. Left lower leg: No edema. Lymphadenopathy:      Upper Body:      Right upper body: No supraclavicular or axillary adenopathy. Left upper body: No supraclavicular or axillary adenopathy. Skin:     General: Skin is warm and dry. Neurological:      Mental Status: She is alert and oriented to person, place, and time. Psychiatric:         Mood and Affect: Mood normal.         Behavior: Behavior normal.         Assessment:      Diagnosis Orders   1. Visit for gynecologic examination  PAP SMEAR   2. Screening for HPV (human papillomavirus)  PAP SMEAR   3. Vaginal discharge         Body mass index is 48.42 kg/m². Obesity:  Overweight        Plan:   Pap smear : indicated:  performed. Breast exam :Normal  STD work up : As appropriate    Obesity Counseling:  Given  Smoking Counseling:  N/A  STD counseling: Will call pt with results    Orders Placed This Encounter   Procedures    PAP SMEAR     Standing Status:   Future     Standing Expiration Date:   11/5/2021     Order Specific Question:   Collection Type     Answer: Thin Prep     Order Specific Question:   Prior Abnormal Pap Test     Answer:   No     Order Specific Question:   Screening or Diagnostic     Answer:   Screening     Order Specific Question:   HPV Requested?      Answer:   Yes     Comments:   16/18     Order Specific Question:   High Risk Patient     Answer:   N/A     Orders Placed This Encounter   Medications    DISCONTD: metroNIDAZOLE (FLAGYL) 500 MG tablet     Sig: Take 1 tablet by mouth 2 times daily for 7 days     Dispense:  14 tablet     Refill:  0       Follow up:  Return in about 2 years (around 11/5/2022) for annual examination.       Mercedes Nichole DO

## 2020-11-06 RX ORDER — CLOTRIMAZOLE AND BETAMETHASONE DIPROPIONATE 10; .64 MG/G; MG/G
CREAM TOPICAL
Qty: 1 TUBE | Refills: 3 | Status: SHIPPED | OUTPATIENT
Start: 2020-11-06 | End: 2021-06-14

## 2020-11-09 RX ORDER — ROSUVASTATIN CALCIUM 10 MG/1
TABLET, COATED ORAL
Qty: 90 TABLET | Refills: 0 | Status: SHIPPED | OUTPATIENT
Start: 2020-11-09 | End: 2021-02-08 | Stop reason: SDUPTHER

## 2020-11-10 RX ORDER — BLOOD SUGAR DIAGNOSTIC
1 STRIP MISCELLANEOUS 4 TIMES DAILY
Qty: 400 EACH | Refills: 3 | Status: SHIPPED | OUTPATIENT
Start: 2020-11-10

## 2020-11-11 RX ORDER — BLOOD SUGAR DIAGNOSTIC
1 STRIP MISCELLANEOUS 4 TIMES DAILY
Qty: 400 EACH | Refills: 3 | Status: SHIPPED | OUTPATIENT
Start: 2020-11-11 | End: 2022-01-21 | Stop reason: SDUPTHER

## 2020-11-11 RX ORDER — NYSTATIN 100000 [USP'U]/G
POWDER TOPICAL
Qty: 1 BOTTLE | Refills: 2 | Status: SHIPPED | OUTPATIENT
Start: 2020-11-11 | End: 2021-06-04 | Stop reason: SDUPTHER

## 2020-11-11 NOTE — TELEPHONE ENCOUNTER
Last refill nystatin powder 1/7/20  Last refill test strips 1/17/20  Last visit 10/1/20  Pt does not have f/u visit scheduled

## 2020-11-13 ENCOUNTER — E-VISIT (OUTPATIENT)
Dept: OBGYN CLINIC | Age: 45
End: 2020-11-13
Payer: COMMERCIAL

## 2020-11-13 PROCEDURE — 98970 NQHP OL DIG ASSMT&MGMT 5-10: CPT | Performed by: NURSE PRACTITIONER

## 2020-11-13 RX ORDER — METRONIDAZOLE 500 MG/1
500 TABLET ORAL 2 TIMES DAILY
Qty: 14 TABLET | Refills: 0 | Status: SHIPPED | OUTPATIENT
Start: 2020-11-13 | End: 2020-11-20

## 2020-11-13 RX ORDER — NITROFURANTOIN 25; 75 MG/1; MG/1
100 CAPSULE ORAL 2 TIMES DAILY
Qty: 10 CAPSULE | Refills: 0 | Status: SHIPPED | OUTPATIENT
Start: 2020-11-13 | End: 2020-11-18

## 2020-11-13 NOTE — PROGRESS NOTES
HPI: as per patient provided history  Exam: N/A (electronic visit)  ASSESSMENT/PLAN:  1. Urinary tract infection with hematuria, site unspecified  - Increase water intake  -Cranberry juice  - Discuss recurrent infections with PCP  - nitrofurantoin, macrocrystal-monohydrate, (MACROBID) 100 MG capsule; Take 1 capsule by mouth 2 times daily for 5 days  Dispense: 10 capsule; Refill: 0      Patient instructed to call the office if worsens, or fails to improve as anticipated. 5-10 minutes were spent on the digital evaluation and management of this patient.

## 2020-11-16 RX ORDER — METOPROLOL TARTRATE 100 MG/1
100 TABLET ORAL 2 TIMES DAILY
Qty: 180 TABLET | Refills: 0 | Status: SHIPPED | OUTPATIENT
Start: 2020-11-16 | End: 2021-02-08 | Stop reason: SDUPTHER

## 2020-11-16 RX ORDER — OMEPRAZOLE 40 MG/1
40 CAPSULE, DELAYED RELEASE ORAL
Qty: 90 CAPSULE | Refills: 0 | Status: SHIPPED | OUTPATIENT
Start: 2020-11-16 | End: 2021-02-11

## 2020-11-16 NOTE — TELEPHONE ENCOUNTER
Pharmacy  requesting medication refill.  Please approve or deny this request.    Rx requested:  Requested Prescriptions     Pending Prescriptions Disp Refills    metoprolol (LOPRESSOR) 100 MG tablet 180 tablet 0     Sig: Take 1 tablet by mouth 2 times daily    omeprazole (PRILOSEC) 40 MG delayed release capsule 90 capsule 0     Sig: Take 1 capsule by mouth every morning (before breakfast)         Last Office Visit:   10/1/2020      Next Visit Date:  Future Appointments   Date Time Provider Iliana Meadowsi   12/15/2020 10:45 AM Tiff Ray MD [address]   3/1/2021  3:30 PM Petros Ngo MD [facility]

## 2020-11-18 ENCOUNTER — HOSPITAL ENCOUNTER (OUTPATIENT)
Dept: ULTRASOUND IMAGING | Age: 45
Discharge: HOME | End: 2020-11-20
Payer: COMMERCIAL

## 2020-11-18 ENCOUNTER — HOSPITAL ENCOUNTER (OUTPATIENT)
Dept: LAB | Age: 45
Discharge: HOME | End: 2020-11-18
Payer: COMMERCIAL

## 2020-11-18 ENCOUNTER — OFFICE VISIT (OUTPATIENT)
Dept: FAMILY MEDICINE CLINIC | Age: 45
End: 2020-11-18
Payer: COMMERCIAL

## 2020-11-18 ENCOUNTER — TELEPHONE (OUTPATIENT)
Dept: NEUROLOGY | Age: 45
End: 2020-11-18

## 2020-11-18 VITALS
HEART RATE: 87 BPM | TEMPERATURE: 98.2 F | HEIGHT: 66 IN | OXYGEN SATURATION: 95 % | SYSTOLIC BLOOD PRESSURE: 138 MMHG | BODY MASS INDEX: 47.09 KG/M2 | DIASTOLIC BLOOD PRESSURE: 80 MMHG | WEIGHT: 293 LBS

## 2020-11-18 LAB
ANION GAP SERPL CALCULATED.3IONS-SCNC: 17 MEQ/L (ref 9–15)
BUN BLDV-MCNC: 11 MG/DL (ref 6–20)
CALCIUM SERPL-MCNC: 9.8 MG/DL (ref 8.5–9.9)
CHLORIDE BLD-SCNC: 97 MEQ/L (ref 95–107)
CO2: 26 MEQ/L (ref 20–31)
CREAT SERPL-MCNC: 0.67 MG/DL (ref 0.5–0.9)
GFR AFRICAN AMERICAN: >60
GFR NON-AFRICAN AMERICAN: >60
GLUCOSE BLD-MCNC: 179 MG/DL (ref 70–99)
HBA1C MFR BLD: 9.2 % (ref 4.8–5.9)
POTASSIUM SERPL-SCNC: 4.1 MEQ/L (ref 3.4–4.9)
SODIUM BLD-SCNC: 140 MEQ/L (ref 135–144)

## 2020-11-18 PROCEDURE — 99213 OFFICE O/P EST LOW 20 MIN: CPT | Performed by: NURSE PRACTITIONER

## 2020-11-18 PROCEDURE — 36415 COLL VENOUS BLD VENIPUNCTURE: CPT

## 2020-11-18 PROCEDURE — 83036 HEMOGLOBIN GLYCOSYLATED A1C: CPT

## 2020-11-18 PROCEDURE — 80048 BASIC METABOLIC PNL TOTAL CA: CPT

## 2020-11-18 PROCEDURE — 93971 EXTREMITY STUDY: CPT

## 2020-11-18 RX ORDER — ONDANSETRON 4 MG/1
TABLET, ORALLY DISINTEGRATING ORAL
COMMUNITY
Start: 2020-11-14 | End: 2020-12-16 | Stop reason: ALTCHOICE

## 2020-11-18 ASSESSMENT — ENCOUNTER SYMPTOMS
COUGH: 0
COLOR CHANGE: 0
SHORTNESS OF BREATH: 0
WHEEZING: 0

## 2020-11-18 NOTE — PROGRESS NOTES
Problem Relation Age of Onset    Diabetes Father     Heart Disease Mother      Allergies   Allergen Reactions    Morphine And Related Hives and Rash    Ace Inhibitors Other (See Comments)     Dizziness and near syncope    Bactrim [Sulfamethoxazole-Trimethoprim] Itching    Nitroglycerin Other (See Comments)     Migraine    Percocet [Oxycodone-Acetaminophen] Nausea And Vomiting    Victoza [Liraglutide]      NAUSEA         Review of Systems   Constitutional: Negative for chills, fatigue and fever. Respiratory: Negative for cough, shortness of breath and wheezing. Cardiovascular: Negative for chest pain. Musculoskeletal: Positive for myalgias. Negative for arthralgias, gait problem and joint swelling. Skin: Negative for color change and rash. Neurological: Negative for tingling, weakness and numbness. Objective:   /80 (Site: Right Upper Arm, Position: Sitting, Cuff Size: Large Adult)   Pulse 87   Temp 98.2 °F (36.8 °C)   Ht 5' 6\" (1.676 m)   Wt 296 lb (134.3 kg)   LMP  (LMP Unknown)   SpO2 95%   BMI 47.78 kg/m²     Physical Exam  Vitals signs reviewed. Constitutional:       General: She is not in acute distress. Appearance: She is well-developed. She is not ill-appearing. HENT:      Head: Normocephalic. Neck:      Musculoskeletal: Normal range of motion. Cardiovascular:      Rate and Rhythm: Normal rate and regular rhythm. Pulses: Normal pulses. Heart sounds: Normal heart sounds. Pulmonary:      Effort: Pulmonary effort is normal. No respiratory distress. Breath sounds: Normal breath sounds. Musculoskeletal: Normal range of motion. Right lower leg: She exhibits tenderness. She exhibits no swelling. No edema. Left lower leg: No edema. Legs:       Right foot: Normal range of motion. Feet:      Right foot:      Skin integrity: Skin integrity normal. No erythema or warmth. Skin:     General: Skin is warm and dry.       Capillary

## 2020-11-18 NOTE — PATIENT INSTRUCTIONS
Patient Education        Leg Pain: Care Instructions  Your Care Instructions  Many things can cause leg pain. Too much exercise or overuse can cause a muscle cramp (or charley horse). You can get leg cramps from not eating a balanced diet that has enough potassium, calcium, and other minerals. If you do not drink enough fluids or are taking certain medicines, you may develop leg cramps. Other causes of leg pain include injuries, blood flow problems, nerve damage, and twisted and enlarged veins (varicose veins). You can usually ease pain with self-care. Your doctor may recommend that you rest your leg and keep it elevated. Follow-up care is a key part of your treatment and safety. Be sure to make and go to all appointments, and call your doctor if you are having problems. It's also a good idea to know your test results and keep a list of the medicines you take. How can you care for yourself at home? · Take pain medicines exactly as directed. ? If the doctor gave you a prescription medicine for pain, take it as prescribed. ? If you are not taking a prescription pain medicine, ask your doctor if you can take an over-the-counter medicine. · Take any other medicines exactly as prescribed. Call your doctor if you think you are having a problem with your medicine. · Rest your leg while you have pain, and avoid standing for long periods of time. · Prop up your leg at or above the level of your heart when possible. · Make sure you are eating a balanced diet that is rich in calcium, potassium, and magnesium, especially if you are pregnant. · If directed by your doctor, put ice or a cold pack on the area for 10 to 20 minutes at a time. Put a thin cloth between the ice and your skin. · Your leg may be in a splint, a brace, or an elastic bandage, and you may have crutches to help you walk. Follow your doctor's directions about how long to wear supports and how to use the crutches. When should you call for help? Call 911 anytime you think you may need emergency care. For example, call if:    · You have sudden chest pain and shortness of breath, or you cough up blood.     · Your leg is cool or pale or changes color. Call your doctor now or seek immediate medical care if:    · You have increasing or severe pain.     · Your leg suddenly feels weak and you cannot move it.     · You have signs of a blood clot, such as:  ? Pain in your calf, back of the knee, thigh, or groin. ? Redness and swelling in your leg or groin.     · You have signs of infection, such as:  ? Increased pain, swelling, warmth, or redness. ? Red streaks leading from the sore area. ? Pus draining from a place on your leg. ? A fever.     · You cannot bear weight on your leg. Watch closely for changes in your health, and be sure to contact your doctor if:    · You do not get better as expected. Where can you learn more? Go to https://[employer]. org and sign in to your [facility] account. Enter S048 in the [employer] box to learn more about \"Leg Pain: Care Instructions. \"     If you do not have an account, please click on the \"Sign Up Now\" link. Current as of: June 26, 2019               Content Version: 12.6  © 3744-2418 [employer]. Care instructions adapted under license by [facility] ([facility]). If you have questions about a medical condition or this instruction, always ask your healthcare professional. [place] [address] any warranty or liability for your use of this information.

## 2020-11-20 ENCOUNTER — HOSPITAL ENCOUNTER (OUTPATIENT)
Dept: SLEEP CENTER | Age: 45
Discharge: HOME | End: 2020-11-22
Payer: COMMERCIAL

## 2020-11-20 PROCEDURE — 95811 POLYSOM 6/>YRS CPAP 4/> PARM: CPT

## 2020-11-20 PROCEDURE — 95811 POLYSOM 6/>YRS CPAP 4/> PARM: CPT | Performed by: INTERNAL MEDICINE

## 2020-12-10 ENCOUNTER — E-VISIT (OUTPATIENT)
Dept: FAMILY MEDICINE CLINIC | Age: 45
End: 2020-12-10

## 2020-12-11 ENCOUNTER — E-VISIT (OUTPATIENT)
Dept: PRIMARY CARE CLINIC | Age: 45
End: 2020-12-11
Payer: COMMERCIAL

## 2020-12-11 PROCEDURE — 99422 OL DIG E/M SVC 11-20 MIN: CPT | Performed by: INTERNAL MEDICINE

## 2020-12-11 RX ORDER — CEPHALEXIN 500 MG/1
500 CAPSULE ORAL 4 TIMES DAILY
Qty: 24 CAPSULE | Refills: 0 | Status: SHIPPED | OUTPATIENT
Start: 2020-12-11 | End: 2020-12-17

## 2020-12-11 NOTE — PROGRESS NOTES
I have started you on Keflex 500 mg four times daily for 6 days. Please finish the prescription. You also need a urine culture and a urinalysis. The orders for both have been sent.

## 2020-12-16 ENCOUNTER — HOSPITAL ENCOUNTER (EMERGENCY)
Age: 45
Discharge: HOME | End: 2020-12-16
Attending: EMERGENCY MEDICINE
Payer: COMMERCIAL

## 2020-12-16 VITALS
HEIGHT: 66 IN | RESPIRATION RATE: 16 BRPM | WEIGHT: 290 LBS | HEART RATE: 98 BPM | BODY MASS INDEX: 46.61 KG/M2 | TEMPERATURE: 97.3 F | DIASTOLIC BLOOD PRESSURE: 47 MMHG | OXYGEN SATURATION: 96 % | SYSTOLIC BLOOD PRESSURE: 106 MMHG

## 2020-12-16 LAB
ALBUMIN SERPL-MCNC: 4.3 G/DL (ref 3.5–4.6)
ALP BLD-CCNC: 105 U/L (ref 40–130)
ALT SERPL-CCNC: 29 U/L (ref 0–33)
ANION GAP SERPL CALCULATED.3IONS-SCNC: 14 MEQ/L (ref 9–15)
AST SERPL-CCNC: 35 U/L (ref 0–35)
BACTERIA: ABNORMAL /HPF
BASOPHILS ABSOLUTE: 0.1 K/UL (ref 0–0.2)
BASOPHILS RELATIVE PERCENT: 0.8 %
BILIRUB SERPL-MCNC: 0.4 MG/DL (ref 0.2–0.7)
BILIRUBIN DIRECT: <0.2 MG/DL (ref 0–0.4)
BILIRUBIN URINE: ABNORMAL
BILIRUBIN, INDIRECT: ABNORMAL MG/DL (ref 0–0.6)
BLOOD, URINE: NEGATIVE
BUN BLDV-MCNC: 15 MG/DL (ref 6–20)
CALCIUM SERPL-MCNC: 8.9 MG/DL (ref 8.5–9.9)
CHLORIDE BLD-SCNC: 99 MEQ/L (ref 95–107)
CLARITY: CLEAR
CO2: 26 MEQ/L (ref 20–31)
COLOR: ABNORMAL
CREAT SERPL-MCNC: 0.74 MG/DL (ref 0.5–0.9)
EOSINOPHILS ABSOLUTE: 0.1 K/UL (ref 0–0.7)
EOSINOPHILS RELATIVE PERCENT: 0.5 %
EPITHELIAL CELLS, UA: ABNORMAL /HPF
GFR AFRICAN AMERICAN: >60
GFR NON-AFRICAN AMERICAN: >60
GLUCOSE BLD-MCNC: 194 MG/DL (ref 70–99)
GLUCOSE URINE: 100 MG/DL
HCT VFR BLD CALC: 40.4 % (ref 37–47)
HEMOGLOBIN: 13.2 G/DL (ref 12–16)
HYALINE CASTS: ABNORMAL /LPF (ref 0–5)
KETONES, URINE: ABNORMAL MG/DL
LEUKOCYTE ESTERASE, URINE: NEGATIVE
LIPASE: 40 U/L (ref 12–95)
LYMPHOCYTES ABSOLUTE: 3.2 K/UL (ref 1–4.8)
LYMPHOCYTES RELATIVE PERCENT: 21.6 %
MCH RBC QN AUTO: 26.2 PG (ref 27–31.3)
MCHC RBC AUTO-ENTMCNC: 32.7 % (ref 33–37)
MCV RBC AUTO: 80.2 FL (ref 82–100)
MONOCYTES ABSOLUTE: 0.5 K/UL (ref 0.2–0.8)
MONOCYTES RELATIVE PERCENT: 3.5 %
MUCUS: PRESENT /LPF
NEUTROPHILS ABSOLUTE: 11 K/UL (ref 1.4–6.5)
NEUTROPHILS RELATIVE PERCENT: 73.6 %
NITRITE, URINE: NEGATIVE
PDW BLD-RTO: 16.6 % (ref 11.5–14.5)
PH UA: 5.5 (ref 5–9)
PLATELET # BLD: 285 K/UL (ref 130–400)
POTASSIUM SERPL-SCNC: 3.5 MEQ/L (ref 3.4–4.9)
PROTEIN UA: 100 MG/DL
RBC # BLD: 5.04 M/UL (ref 4.2–5.4)
RBC UA: ABNORMAL /HPF (ref 0–2)
SODIUM BLD-SCNC: 139 MEQ/L (ref 135–144)
SPECIFIC GRAVITY UA: 1.03 (ref 1–1.03)
TOTAL PROTEIN: 8.1 G/DL (ref 6.3–8)
URINE REFLEX TO CULTURE: YES
UROBILINOGEN, URINE: 1 E.U./DL
WBC # BLD: 15 K/UL (ref 4.8–10.8)
WBC UA: ABNORMAL /HPF (ref 0–5)

## 2020-12-16 PROCEDURE — 6370000000 HC RX 637 (ALT 250 FOR IP): Performed by: EMERGENCY MEDICINE

## 2020-12-16 PROCEDURE — 96375 TX/PRO/DX INJ NEW DRUG ADDON: CPT

## 2020-12-16 PROCEDURE — 85025 COMPLETE CBC W/AUTO DIFF WBC: CPT

## 2020-12-16 PROCEDURE — 2580000003 HC RX 258: Performed by: EMERGENCY MEDICINE

## 2020-12-16 PROCEDURE — 80048 BASIC METABOLIC PNL TOTAL CA: CPT

## 2020-12-16 PROCEDURE — 81001 URINALYSIS AUTO W/SCOPE: CPT

## 2020-12-16 PROCEDURE — 96374 THER/PROPH/DIAG INJ IV PUSH: CPT

## 2020-12-16 PROCEDURE — 36415 COLL VENOUS BLD VENIPUNCTURE: CPT

## 2020-12-16 PROCEDURE — 87086 URINE CULTURE/COLONY COUNT: CPT

## 2020-12-16 PROCEDURE — 83690 ASSAY OF LIPASE: CPT

## 2020-12-16 PROCEDURE — 99285 EMERGENCY DEPT VISIT HI MDM: CPT

## 2020-12-16 PROCEDURE — 6360000002 HC RX W HCPCS: Performed by: EMERGENCY MEDICINE

## 2020-12-16 PROCEDURE — 96376 TX/PRO/DX INJ SAME DRUG ADON: CPT

## 2020-12-16 PROCEDURE — 80076 HEPATIC FUNCTION PANEL: CPT

## 2020-12-16 RX ORDER — ONDANSETRON 2 MG/ML
4 INJECTION INTRAMUSCULAR; INTRAVENOUS ONCE
Status: COMPLETED | OUTPATIENT
Start: 2020-12-16 | End: 2020-12-16

## 2020-12-16 RX ORDER — METOCLOPRAMIDE HYDROCHLORIDE 5 MG/ML
10 INJECTION INTRAMUSCULAR; INTRAVENOUS ONCE
Status: COMPLETED | OUTPATIENT
Start: 2020-12-16 | End: 2020-12-16

## 2020-12-16 RX ORDER — KETOROLAC TROMETHAMINE 30 MG/ML
30 INJECTION, SOLUTION INTRAMUSCULAR; INTRAVENOUS ONCE
Status: COMPLETED | OUTPATIENT
Start: 2020-12-16 | End: 2020-12-16

## 2020-12-16 RX ORDER — ONDANSETRON 4 MG/1
4 TABLET, ORALLY DISINTEGRATING ORAL EVERY 8 HOURS PRN
Qty: 7 TABLET | Refills: 0 | Status: SHIPPED | OUTPATIENT
Start: 2020-12-16 | End: 2021-01-26 | Stop reason: SDUPTHER

## 2020-12-16 RX ORDER — 0.9 % SODIUM CHLORIDE 0.9 %
1000 INTRAVENOUS SOLUTION INTRAVENOUS ONCE
Status: COMPLETED | OUTPATIENT
Start: 2020-12-16 | End: 2020-12-16

## 2020-12-16 RX ORDER — FAMOTIDINE 20 MG/1
20 TABLET, FILM COATED ORAL 2 TIMES DAILY
Qty: 20 TABLET | Refills: 0 | Status: SHIPPED | OUTPATIENT
Start: 2020-12-16 | End: 2021-02-11

## 2020-12-16 RX ADMIN — SODIUM CHLORIDE 1000 ML: 9 INJECTION, SOLUTION INTRAVENOUS at 05:49

## 2020-12-16 RX ADMIN — ONDANSETRON 4 MG: 2 INJECTION INTRAMUSCULAR; INTRAVENOUS at 07:06

## 2020-12-16 RX ADMIN — LIDOCAINE HYDROCHLORIDE: 20 SOLUTION ORAL; TOPICAL at 07:06

## 2020-12-16 RX ADMIN — ONDANSETRON 4 MG: 2 INJECTION INTRAMUSCULAR; INTRAVENOUS at 05:49

## 2020-12-16 RX ADMIN — KETOROLAC TROMETHAMINE 30 MG: 30 INJECTION, SOLUTION INTRAMUSCULAR at 05:49

## 2020-12-16 RX ADMIN — METOCLOPRAMIDE HYDROCHLORIDE 10 MG: 5 INJECTION INTRAMUSCULAR; INTRAVENOUS at 07:06

## 2020-12-16 ASSESSMENT — ENCOUNTER SYMPTOMS
RESPIRATORY NEGATIVE: 1
COUGH: 0
BACK PAIN: 0
EYE PAIN: 0
BLOOD IN STOOL: 0
CHEST TIGHTNESS: 0
ABDOMINAL DISTENTION: 0
NAUSEA: 1
SORE THROAT: 0
SINUS PAIN: 0
EYE DISCHARGE: 0
VOMITING: 1
ABDOMINAL PAIN: 1
DIARRHEA: 0
WHEEZING: 0
EYES NEGATIVE: 1
SHORTNESS OF BREATH: 0

## 2020-12-16 ASSESSMENT — PAIN DESCRIPTION - PAIN TYPE
TYPE: ACUTE PAIN
TYPE: ACUTE PAIN

## 2020-12-16 ASSESSMENT — PAIN DESCRIPTION - DESCRIPTORS
DESCRIPTORS: ACHING
DESCRIPTORS: ACHING

## 2020-12-16 ASSESSMENT — PAIN SCALES - GENERAL
PAINLEVEL_OUTOF10: 5

## 2020-12-16 ASSESSMENT — PAIN DESCRIPTION - LOCATION
LOCATION: ABDOMEN

## 2020-12-16 NOTE — ED TRIAGE NOTES
Pt states she woke up at 1am with nausea and vomiting. States she felt fine when she went to bed last night. States in the past 4 hours she has vomited 10+ times. Denies diarrhea, denies dysuria. C/o moderate abd aching, \"5/10\",. Pt alert and oriented x 4. Skin pink, warm, dry. Respirations even and unlabored. No distress noted at this time.

## 2020-12-16 NOTE — ED PROVIDER NOTES
[address] [facility] ED  eMERGENCY dEPARTMENT eNCOUnter      Pt Name: Kobe English  MRN: [id number]  [place] 1975  Date of evaluation: 12/16/2020  Provider: Ashia [address]       Chief Complaint   Patient presents with    Emesis         HISTORY OF PRESENT ILLNESS   (Location/Symptom, Timing/Onset,Context/Setting, Quality, Duration, Modifying Factors, Severity)  Note limiting factors. Kobe English is a 41 y.o. female who presents to the emergency department complaining of nausea and vomiting since 1 AM.  The patient's little sore around the diaphragm epigastrium. The patient has had no diarrhea. She denies any fevers or chills. She denies any chest pain or shortness of breath. She denies any urinary frequency urgency or burning. Patient has had a hysterectomy in the past for surgical history social history admits to moderate body aches. HPI    NursingNotes were reviewed. REVIEW OF SYSTEMS    (2-9 systems for level 4, 10 or more for level 5)     Review of Systems   Constitutional: Negative. Negative for chills and fever. HENT: Negative. Negative for ear pain, sinus pain and sore throat. Eyes: Negative. Negative for pain and discharge. Respiratory: Negative. Negative for cough, chest tightness, shortness of breath and wheezing. Cardiovascular: Negative. Negative for chest pain, palpitations and leg swelling. Gastrointestinal: Positive for abdominal pain, nausea and vomiting. Negative for abdominal distention, blood in stool and diarrhea. Endocrine: Negative. Negative for polydipsia and polyuria. Genitourinary: Negative. Negative for difficulty urinating, dysuria, flank pain, frequency, hematuria and urgency. Musculoskeletal: Negative. Negative for arthralgias, back pain, myalgias and neck pain. Skin: Negative. Negative for rash and wound. Neurological: Negative. Negative for dizziness, seizures, syncope, weakness and headaches. Hematological: Negative. Negative for adenopathy. Does not bruise/bleed easily. Psychiatric/Behavioral: Negative. Negative for confusion, hallucinations, self-injury and suicidal ideas. All other systems reviewed and are negative. Except as noted above the remainder of the review of systems was reviewed and negative. PAST MEDICAL HISTORY     Past Medical History:   Diagnosis Date    Acute midline thoracic back pain 9/18/2018    B12 deficiency     Family history of premature CAD 9/4/2013    Fatty liver     HPV (human papilloma virus) anogenital infection     Hyperlipidemia     Hypertension     Liver hemangioma 2009/2015    Obesity 3/11/13    BMI 43.42    Orthostatic hypotension     Ovarian cyst     PMR (polymyalgia rheumatica) (HCC)     Rectal fissure     Tobacco abuse 9/3/2015    Tobacco abuse     Tremor     Uncontrolled diabetes mellitus with complications ([facility] [address]     Unspecified sleep apnea          SURGICALHISTORY       Past Surgical History:   Procedure Laterality Date    CARDIAC CATHETERIZATION  4-07    COLONOSCOPY  2013    for diarrhea (-)    HYSTERECTOMY  12-10    LEEP  1-05    OH MUSCLE BIOPSY Left 9/21/2018    LEFT QUADRICEPS MUSCLE BIOPSY performed by Dieudonne Philippe MD at [address] UNI/BI CANNULATION N/A 9/18/2018    T 12 VERTEBRALPLASTY ROOM 278 performed by Иван Muller MD at [address] ENDOSCOPY  10/13/15       [address] [place] [address] SURGERY  6-2015         CURRENT MEDICATIONS       Discharge Medication List as of 12/16/2020  8:27 AM      CONTINUE these medications which have NOT CHANGED    Details   cephALEXin (KEFLEX) 500 MG capsule Take 1 capsule by mouth 4 times daily for 6 days, Disp-24 capsule,R-0Normal      metoprolol (LOPRESSOR) 100 MG tablet Take 1 tablet by mouth 2 times daily, Disp-180 tablet,R-0Normal omeprazole (PRILOSEC) 40 MG delayed release capsule Take 1 capsule by mouth every morning (before breakfast), Disp-90 capsule,R-0Normal      nystatin (MYCOSTATIN) 175232 UNIT/GM powder Apply 3 times daily. , Disp-1 Bottle,R-2, Normal      !! blood glucose test strips (PRODIGY NO CODING BLOOD GLUC) strip 1 each by In Vitro route 4 times daily As needed. , Disp-400 each,R-3Normal      !! blood glucose test strips (PRODIGY NO CODING BLOOD GLUC) strip 1 each by In Vitro route 4 times daily As needed. , Disp-400 each,R-3Normal      rosuvastatin (CRESTOR) 10 MG tablet TAKE ONE TABLET BY MOUTH NIGHTLY, Disp-90 tablet,R-0Normal      clotrimazole-betamethasone (LOTRISONE) 1-0.05 % cream Apply topically 2 times daily. , Disp-1 Tube,R-3, Normal      venlafaxine (EFFEXOR XR) 75 MG extended release capsule TAKE TWO CAPSULES BY MOUTH EVERY MORNING AND TAKE ONE CAPSULE BY MOUTH EVERY EVENING, Disp-270 capsule,R-1Normal      !! predniSONE (DELTASONE) 5 MG tablet Take 1 tablet by mouth daily, Disp-90 tablet,R-2Normal      !! predniSONE (DELTASONE) 20 MG tablet Take 1 tablet by mouth daily, Disp-90 tablet,R-2Normal      solifenacin (VESICARE) 10 MG tablet TAKE 1 TABLET BY MOUTH ONE TIME A DAY, Disp-90 tablet,R-3Normal      baclofen (LIORESAL) 10 MG tablet Take 0.5 tablets by mouth 2 times daily, Disp-90 tablet,R-1Normal      Insulin Glargine, 2 Unit Dial, (TOUJEO MAX SOLOSTAR) 300 UNIT/ML SOPN INJECT 240 UNITS SUBCUTANEOUSLY AT BEDTIME, Disp-135 mL,R-3Normal      insulin lispro, 1 Unit Dial, (HUMALOG KWIKPEN) 100 UNIT/ML SOPN 80 units with each meals, Disp-90 pen, R-3Normal      hydroCHLOROthiazide (HYDRODIURIL) 25 MG tablet Take 1 tablet by mouth daily, Disp-90 tablet, R-3Normal      losartan (COZAAR) 50 MG tablet Take 1 tablet by mouth daily, Disp-90 tablet, R-3Normal      metFORMIN (GLUCOPHAGE) 500 MG tablet Take 1 tablet by mouth 3 times daily, Disp-270 tablet, R-3Normal Blood Glucose Monitoring Suppl (PRODIGY AUTOCODE BLOOD GLUCOSE) w/Device KIT Disp-1 kit, R-0, NormalUse as directed to test up to 4 times daily      PRODIGY LANCETS 28G MISC Disp-400 each, R-3, NormalUse as directed to test up to 4 times daily      Insulin Pen Needle (PEN NEEDLES) 32G X 4 MM MISC Use as directed with insulin pens, 4 times daily, Disp-400 each, R-1Normal      vitamin D (CHOLECALCIFEROL) 1000 UNIT TABS tablet Take 1,000 Units by mouth dailyHistorical Med      calcium carbonate 600 MG TABS tablet Take 1 tablet by mouth dailyHistorical Med      miconazole (MICOTIN) 2 % cream Apply topically 2 times daily. , Disp-141 g, R-3, Normal      Handicap Placard MISC Starting Tue 7/3/2018, Disp-1 each, R-0, PrintExp 5 years       !! - Potential duplicate medications found. Please discuss with provider. ALLERGIES     Morphine and related, Ace inhibitors, Bactrim [sulfamethoxazole-trimethoprim], Nitroglycerin, Percocet [oxycodone-acetaminophen], and Victoza [liraglutide]    FAMILY HISTORY       Family History   Problem Relation Age of Onset    Diabetes Father     Heart Disease Mother           SOCIAL HISTORY       Social History     Socioeconomic History    Marital status:      Spouse name: None    Number of children: 1    Years of education: None    Highest education level: None   Occupational History    None   Social Needs    Financial resource strain: Not very hard    Food insecurity     Worry: Never true     Inability: Never true    Transportation needs     Medical: No     Non-medical: No   Tobacco Use    Smoking status: Former Smoker     Packs/day: 1.00     Types: Cigarettes     Quit date: 1/1/2017     Years since quitting: 3.9    Smokeless tobacco: Never Used   Substance and Sexual Activity    Alcohol use:  Yes     Alcohol/week: 0.0 standard drinks     Frequency: Monthly or less     Binge frequency: Never     Comment: socially    Drug use: No    Sexual activity: Yes Effort: Pulmonary effort is normal. No respiratory distress. Breath sounds: Normal breath sounds. No stridor. No wheezing. Abdominal:      General: Bowel sounds are normal. There is no distension. Palpations: Abdomen is soft. Tenderness: There is abdominal tenderness. Musculoskeletal: Normal range of motion. General: No swelling, tenderness or deformity. Skin:     General: Skin is warm and dry. Coloration: Skin is not jaundiced or pale. Findings: No bruising or erythema. Neurological:      Mental Status: She is alert and oriented to person, place, and time. Psychiatric:         Behavior: Behavior normal.     Tenderness along the diaphragm epigastrium    DIAGNOSTIC RESULTS     EKG: All EKG's are interpreted by the Emergency Department Physician who either signs or Co-signsthis chart in the absence of a cardiologist.        RADIOLOGY:   Levie Pila such as CT, Ultrasound and MRI are read by the radiologist. Plain radiographic images are visualized and preliminarily interpreted by the emergency physician with the below findings:        Interpretation per the Radiologist below, if available at the time ofthis note:    No orders to display         ED BEDSIDE ULTRASOUND:   Performed by ED Physician - none    LABS:  Labs Reviewed   BASIC METABOLIC PANEL - Abnormal; Notable for the following components:       Result Value    Glucose 194 (*)     All other components within normal limits   CBC WITH AUTO DIFFERENTIAL - Abnormal; Notable for the following components:    WBC 15.0 (*)     MCV 80.2 (*)     MCH 26.2 (*)     MCHC 32.7 (*)     RDW 16.6 (*)     Neutrophils Absolute 11.0 (*)     All other components within normal limits   HEPATIC FUNCTION PANEL - Abnormal; Notable for the following components:     Total Protein 8.1 (*)     All other components within normal limits   URINE RT REFLEX TO CULTURE - Abnormal; Notable for the following components: CONSULTS:  None    PROCEDURES:  Unless otherwise noted below, none     Procedures    FINAL IMPRESSION      1. Food poisoning due to staphylococcus    2. Dehydration    3. Nausea and vomiting, intractability of vomiting not specified, unspecified vomiting type    4. Abdominal pain, epigastric    5.  Type 2 diabetes mellitus with hyperglycemia, with long-term current use of insulin [facility])          DISPOSITION/PLAN   DISPOSITION Decision To Discharge 12/16/2020 08:21:50 AM      PATIENT REFERRED TO:  Tita Ramos MD  [address] [facility] [address]  [phone]    Call today      Demetrius Fan MD  [address] [place] [address] 22.    Call in 1 day  If symptoms worsen      DISCHARGE MEDICATIONS:  Discharge Medication List as of 12/16/2020  8:27 AM      START taking these medications    Details   ondansetron (ZOFRAN ODT) 4 MG disintegrating tablet Take 1 tablet by mouth every 8 hours as needed for Nausea or Vomiting, Disp-7 tablet, R-0Print      famotidine (PEPCID) 20 MG tablet Take 1 tablet by mouth 2 times daily for 10 days, Disp-20 tablet, R-0Print                (Please note that portions of this note were completed with a voice recognition program.  Efforts were made to edit the dictations but occasionally words are mis-transcribed.)    Nikunj Luna DO (electronically signed)  Attending Emergency Physician          Nikunj Luna DO  12/16/20 9514

## 2020-12-17 ENCOUNTER — CARE COORDINATION (OUTPATIENT)
Dept: OTHER | Facility: CLINIC | Age: 45
End: 2020-12-17

## 2020-12-17 LAB — URINE CULTURE, ROUTINE: NORMAL

## 2020-12-17 NOTE — CARE COORDINATION
follow up appointment(s): None    Plan:  ACM will sign off as pt has improved and denies any new issues or ongoing ACM needs at this time. Patient verbalized understanding and is agreeable.         Care Transitions ED Follow Up    Care Transitions Interventions  Did you call your PCP prior to going to the ED?: No - Did not call PCP   Do you have all of your prescriptions and are they filled?: Yes

## 2020-12-26 ENCOUNTER — OFFICE VISIT (OUTPATIENT)
Dept: FAMILY MEDICINE CLINIC | Age: 45
End: 2020-12-26
Payer: COMMERCIAL

## 2020-12-26 VITALS
TEMPERATURE: 97.3 F | SYSTOLIC BLOOD PRESSURE: 120 MMHG | HEIGHT: 66 IN | HEART RATE: 78 BPM | OXYGEN SATURATION: 97 % | BODY MASS INDEX: 47.09 KG/M2 | WEIGHT: 293 LBS | DIASTOLIC BLOOD PRESSURE: 74 MMHG

## 2020-12-26 PROCEDURE — 99213 OFFICE O/P EST LOW 20 MIN: CPT | Performed by: NURSE PRACTITIONER

## 2020-12-26 RX ORDER — CEPHALEXIN 500 MG/1
500 CAPSULE ORAL 3 TIMES DAILY
Qty: 21 CAPSULE | Refills: 0 | Status: SHIPPED | OUTPATIENT
Start: 2020-12-26 | End: 2021-01-02

## 2020-12-26 RX ORDER — NAPROXEN 500 MG/1
500 TABLET ORAL 2 TIMES DAILY WITH MEALS
Qty: 30 TABLET | Refills: 0 | Status: SHIPPED | OUTPATIENT
Start: 2020-12-26 | End: 2021-02-11

## 2020-12-26 ASSESSMENT — ENCOUNTER SYMPTOMS
VOMITING: 0
SORE THROAT: 0
COUGH: 0
WHEEZING: 0
COLOR CHANGE: 0
DIARRHEA: 0
SHORTNESS OF BREATH: 0
RHINORRHEA: 0
NAUSEA: 0

## 2020-12-26 NOTE — PATIENT INSTRUCTIONS
Patient Education        Arthritis: Care Instructions  Overview  Arthritis, also called osteoarthritis, is a breakdown of the cartilage that cushions your joints. When the cartilage wears down, your bones rub against each other. This causes pain and stiffness. Many people have some arthritis as they age. Arthritis most often affects the joints of the spine, hands, hips, knees, or feet. Arthritis never goes away completely. But medicine and home treatment can help reduce pain and help you stay active. Follow-up care is a key part of your treatment and safety. Be sure to make and go to all appointments, and call your doctor if you are having problems. It's also a good idea to know your test results and keep a list of the medicines you take. How can you care for yourself at home? · Stay at a healthy weight. Being overweight puts extra strain on your joints. · Talk to your doctor or physical therapist about exercises that will help ease joint pain. ? Stretch. You may enjoy gentle forms of yoga to help keep your joints and muscles flexible. ? Walk instead of jog. Other types of exercise that are less stressful on the joints include riding a bike, swimming, mike chi, or water exercise. ? Lift weights. Strong muscles help reduce stress on your joints. Stronger thigh muscles, for example, take some of the stress off of the knees and hips. Learn the right way to lift weights so you don't make joint pain worse. · Take your medicines exactly as prescribed. Call your doctor if you think you are having a problem with your medicine. · Take pain medicines exactly as directed. ? If the doctor gave you a prescription medicine for pain, take it as prescribed. ? If you are not taking a prescription pain medicine, ask your doctor if you can take an over-the-counter medicine. Introduction  Here are some examples of exercises for you to try. The exercises may be suggested for a condition or for rehabilitation. Start each exercise slowly. Ease off the exercises if you start to have pain. You will be told when to start these exercises and which ones will work best for you. How to do the exercises  Wrist flexion and extension   1. Place your forearm on a table, with your hand and affected wrist extended beyond the table, palm down. 2. Bend your wrist to move your hand upward and allow your hand to close into a fist, then lower your hand and allow your fingers to relax. Hold each position for about 6 seconds. 3. Repeat 8 to 12 times. Hand flips   1. While seated, place your forearm and affected wrist on your thigh, palm down. 2. Flip your hand over so the back of your hand rests on your thigh and your palm is up. Alternate between palm up and palm down while keeping your forearm on your thigh. 3. Repeat 8 to 12 times. Wrist radial and ulnar deviation   1. Hold your affected hand out in front of you, palm down. 2. Slowly bend your wrist as far as you can from side to side. Hold each position for about 6 seconds. 3. Repeat 8 to 12 times. Wrist extensor stretch   1. Extend the arm with the affected wrist in front of you and point your fingers toward the floor. 2. With your other hand, gently bend your wrist farther until you feel a mild to moderate stretch in your forearm. 3. Hold the stretch for at least 15 to 30 seconds. 4. Repeat 2 to 4 times. 5. When you can do this stretch with ease and no pain, repeat steps 1 through 4. But this time extend your affected arm in front of you and make a fist with your palm facing down. Then bend your wrist, pointing your fist toward the floor. Wrist flexor stretch   1. Extend the arm with the affected wrist in front of you with your palm facing away from your body. 2. Bend back your wrist, pointing your hand up toward the ceiling. 3. With your other hand, gently bend your wrist farther until you feel a mild to moderate stretch in your forearm. 4. Hold the stretch for at least 15 to 30 seconds. 5. Repeat 2 to 4 times. 6. Repeat steps 1 through 5, but this time extend your affected arm in front of you with your palm facing up. Then bend back your wrist, pointing your hand toward the floor. Follow-up care is a key part of your treatment and safety. Be sure to make and go to all appointments, and call your doctor if you are having problems. It's also a good idea to know your test results and keep a list of the medicines you take. Where can you learn more? Go to [email or web address] org and sign in to your [employer] account. Enter F037 in the [employer] box to learn more about \"Wrist Tendinitis: Exercises. \"     If you do not have an account, please click on the \"Sign Up Now\" link. Current as of: March 2, 2020               Content Version: 12.6  © 0249-8287 [employer]. Care instructions adapted under license by [facility] ([facility]). If you have questions about a medical condition or this instruction, always ask your healthcare professional. [place] [address] any warranty or liability for your use of this information.

## 2020-12-30 RX ORDER — HYDROCHLOROTHIAZIDE 25 MG/1
25 TABLET ORAL DAILY
Qty: 90 TABLET | Refills: 1 | Status: SHIPPED | OUTPATIENT
Start: 2020-12-30 | End: 2021-09-16 | Stop reason: SDUPTHER

## 2020-12-30 RX ORDER — LOSARTAN POTASSIUM 50 MG/1
50 TABLET ORAL DAILY
Qty: 90 TABLET | Refills: 1 | Status: SHIPPED | OUTPATIENT
Start: 2020-12-30 | End: 2021-06-15 | Stop reason: SDUPTHER

## 2020-12-31 RX ORDER — BACLOFEN 10 MG/1
5 TABLET ORAL 2 TIMES DAILY
Qty: 90 TABLET | Refills: 1 | Status: SHIPPED | OUTPATIENT
Start: 2020-12-31 | End: 2021-03-23 | Stop reason: SDUPTHER

## 2021-01-06 ENCOUNTER — VIRTUAL VISIT (OUTPATIENT)
Dept: GASTROENTEROLOGY | Age: 46
End: 2021-01-06
Payer: COMMERCIAL

## 2021-01-06 DIAGNOSIS — R19.7 DIARRHEA, UNSPECIFIED TYPE: ICD-10-CM

## 2021-01-06 DIAGNOSIS — Z01.818 PRE-OP TESTING: Primary | ICD-10-CM

## 2021-01-06 DIAGNOSIS — K21.9 GASTROESOPHAGEAL REFLUX DISEASE, UNSPECIFIED WHETHER ESOPHAGITIS PRESENT: ICD-10-CM

## 2021-01-06 DIAGNOSIS — R11.2 NAUSEA AND VOMITING, INTRACTABILITY OF VOMITING NOT SPECIFIED, UNSPECIFIED VOMITING TYPE: ICD-10-CM

## 2021-01-06 PROCEDURE — 99204 OFFICE O/P NEW MOD 45 MIN: CPT | Performed by: NURSE PRACTITIONER

## 2021-01-06 RX ORDER — ESOMEPRAZOLE MAGNESIUM 40 MG/1
40 CAPSULE, DELAYED RELEASE ORAL DAILY
Qty: 30 CAPSULE | Refills: 3 | Status: SHIPPED | OUTPATIENT
Start: 2021-01-06 | End: 2021-04-19

## 2021-01-06 RX ORDER — POLYETHYLENE GLYCOL 3350, SODIUM CHLORIDE, SODIUM BICARBONATE, POTASSIUM CHLORIDE 420; 11.2; 5.72; 1.48 G/4L; G/4L; G/4L; G/4L
4000 POWDER, FOR SOLUTION ORAL ONCE
Qty: 1 BOTTLE | Refills: 0 | Status: SHIPPED | OUTPATIENT
Start: 2021-01-06 | End: 2021-01-06

## 2021-01-06 ASSESSMENT — ENCOUNTER SYMPTOMS
TROUBLE SWALLOWING: 0
WHEEZING: 0
VOMITING: 1
ANAL BLEEDING: 0
CONSTIPATION: 0
COLOR CHANGE: 0
NAUSEA: 1
VOICE CHANGE: 0
DIARRHEA: 1
PHOTOPHOBIA: 0
RECTAL PAIN: 0
EYE REDNESS: 0
EYE PAIN: 0
BLOOD IN STOOL: 0
CHEST TIGHTNESS: 0
SHORTNESS OF BREATH: 0
ABDOMINAL PAIN: 0
ABDOMINAL DISTENTION: 0

## 2021-01-06 NOTE — PROGRESS NOTES
Gastrointestinal: Positive for diarrhea, nausea and vomiting. Negative for abdominal distention, abdominal pain, anal bleeding, blood in stool, constipation and rectal pain. Endocrine: Negative for polydipsia, polyphagia and polyuria. Genitourinary: Negative for difficulty urinating and hematuria. Skin: Negative for color change, pallor and rash. Neurological: Negative for dizziness, speech difficulty and headaches. Psychiatric/Behavioral: Negative for confusion and suicidal ideas. Prior to Visit Medications    Medication Sig Taking? Authorizing Provider   polyethylene glycol-electrolytes (NULYTELY) 420 g solution Take 4,000 mLs by mouth once for 1 dose Yes SANIA Wetzel - CNP   esomeprazole (NEXIUM) 40 MG delayed release capsule Take 1 capsule by mouth daily Yes SANIA Wetzel - CNP   baclofen (LIORESAL) 10 MG tablet Take 0.5 tablets by mouth 2 times daily Yes Lala Lenz MD   losartan (COZAAR) 50 MG tablet Take 1 tablet by mouth daily Yes Lala Lenz MD   hydroCHLOROthiazide (HYDRODIURIL) 25 MG tablet Take 1 tablet by mouth daily Yes Lala Lenz MD   naproxen (NAPROSYN) 500 MG tablet Take 1 tablet by mouth 2 times daily (with meals) Yes SANIA Donaldson - CNP   ondansetron (ZOFRAN ODT) 4 MG disintegrating tablet Take 1 tablet by mouth every 8 hours as needed for Nausea or Vomiting Yes Andrew Lizarraga,    metoprolol (LOPRESSOR) 100 MG tablet Take 1 tablet by mouth 2 times daily Yes Lala Lenz MD   omeprazole (PRILOSEC) 40 MG delayed release capsule Take 1 capsule by mouth every morning (before breakfast) Yes Rudi Narvaez MD   nystatin (MYCOSTATIN) 959097 UNIT/GM powder Apply 3 times daily. Yes Lala Lenz MD   blood glucose test strips (PRODIGY NO CODING BLOOD GLUC) strip 1 each by In Vitro route 4 times daily As needed.  Yes Lala Lenz MD blood glucose test strips (PRODIGY NO CODING BLOOD GLUC) strip 1 each by In Vitro route 4 times daily As needed. Yes Fransisco Prado MD   rosuvastatin (CRESTOR) 10 MG tablet TAKE ONE TABLET BY MOUTH NIGHTLY Yes Roberta Arellano MD   clotrimazole-betamethasone (LOTRISONE) 1-0.05 % cream Apply topically 2 times daily. Yes Jose Caputo DO   venlafaxine (EFFEXOR XR) 75 MG extended release capsule TAKE TWO CAPSULES BY MOUTH EVERY MORNING AND TAKE ONE CAPSULE BY MOUTH EVERY EVENING Yes Roberta Arellano MD   predniSONE (DELTASONE) 5 MG tablet Take 1 tablet by mouth daily Yes Duran Chacko MD   solifenacin (VESICARE) 10 MG tablet TAKE 1 TABLET BY MOUTH ONE TIME A DAY Yes Harjinder Hollins MD   Insulin Glargine, 2 Unit Dial, (TOUJEO MAX SOLOSTAR) 300 UNIT/ML SOPN INJECT 240 UNITS SUBCUTANEOUSLY AT BEDTIME Yes Fransisco Prado MD   insulin lispro, 1 Unit Dial, (HUMALOG KWIKPEN) 100 UNIT/ML SOPN 80 units with each meals Yes Fransisco Prado MD   metFORMIN (GLUCOPHAGE) 500 MG tablet Take 1 tablet by mouth 3 times daily Yes Fransisco Prado MD   Blood Glucose Monitoring Suppl (PRODIGY AUTOCODE BLOOD GLUCOSE) w/Device KIT Use as directed to test up to 4 times daily Yes MD CHAR JuradoY LANCETS 28G MISC Use as directed to test up to 4 times daily Yes Patrick Marinelli MD   Insulin Pen Needle (PEN NEEDLES) 32G X 4 MM MISC Use as directed with insulin pens, 4 times daily Yes Fransisco Prado MD   vitamin D (CHOLECALCIFEROL) 1000 UNIT TABS tablet Take 1,000 Units by mouth daily Yes Historical Provider, MD   calcium carbonate 600 MG TABS tablet Take 1 tablet by mouth daily Yes Historical Provider, MD   miconazole (MICOTIN) 2 % cream Apply topically 2 times daily.  Yes MD Deb Jnekins MISC Exp 5 years Yes Nancy Diallo MD   famotidine (PEPCID) 20 MG tablet Take 1 tablet by mouth 2 times daily for 10 days  Rony Tong DO sucralfate (CARAFATE) 1 GM tablet Take 1 tablet by mouth 4 times daily for 7 days  Caridad Pyle APRN - CNP       Social History     Tobacco Use    Smoking status: Former Smoker     Packs/day: 1.00     Types: Cigarettes     Quit date: 2017     Years since quittin.0    Smokeless tobacco: Never Used   Substance Use Topics    Alcohol use:  Yes     Alcohol/week: 0.0 standard drinks     Frequency: Monthly or less     Binge frequency: Never     Comment: socially    Drug use: No        Allergies   Allergen Reactions    Morphine And Related Hives and Rash    Ace Inhibitors Other (See Comments)     Dizziness and near syncope    Bactrim [Sulfamethoxazole-Trimethoprim] Itching    Nitroglycerin Other (See Comments)     Migraine    Percocet [Oxycodone-Acetaminophen] Nausea And Vomiting    Victoza [Liraglutide]      NAUSEA   ,   Past Medical History:   Diagnosis Date    Acute midline thoracic back pain 2018    B12 deficiency     Family history of premature CAD 2013    Fatty liver     Gastroesophageal reflux disease 2021    HPV (human papilloma virus) anogenital infection     Hyperlipidemia     Hypertension     Liver hemangioma     Obesity 3/11/13    BMI 43.42    Orthostatic hypotension     Ovarian cyst     PMR (polymyalgia rheumatica) (HCC)     Rectal fissure     Tobacco abuse 9/3/2015    Tobacco abuse     Tremor     Uncontrolled diabetes mellitus with complications ([facility] [address]     Unspecified sleep apnea    ,   Past Surgical History:   Procedure Laterality Date    CARDIAC CATHETERIZATION      COLONOSCOPY      for diarrhea (-)    HYSTERECTOMY  12-10    LEEP  1-05    OR MUSCLE BIOPSY Left 2018    LEFT QUADRICEPS MUSCLE BIOPSY performed by Earlene Montes De Oca MD at [address] [facility] UNI/BI CANNULATION N/A 2018    T 12 VERTEBRALPLASTY ROOM 278 performed by Raven Barlow MD at [facility]  UPPER GASTROINTESTINAL ENDOSCOPY  10/13/15     DR. Balderrama [facility] [address] SURGERY     ,   Social History     Tobacco Use    Smoking status: Former Smoker     Packs/day: 1.00     Types: Cigarettes     Quit date: 2017     Years since quittin.0    Smokeless tobacco: Never Used   Substance Use Topics    Alcohol use: Yes     Alcohol/week: 0.0 standard drinks     Frequency: Monthly or less     Binge frequency: Never     Comment: socially    Drug use: No   ,   Family History   Problem Relation Age of Onset    Diabetes Father     Heart Disease Mother        PHYSICAL EXAMINATION: Deferred due to telephone visit   [ Diaz Yennifer:  \"[x]\" Indicates a positive item  \"[]\" Indicates a negative item  -- DELETE ALL ITEMS NOT EXAMINED]  [] Alert  [] Oriented to person/place/time    [] No apparent distress  [] Toxic appearing    [] Face flushed appearing [] Sclera clear  [] Lips are cyanotic      [] Breathing appears normal  [] Appears tachypneic      [] Rash on visible skin    [] Cranial Nerves II-XII grossly intact    [] Motor grossly intact in visible upper extremities    [] Motor grossly intact in visible lower extremities    [] Normal Mood  [] Anxious appearing    [] Depressed appearing  [] Confused appearing      [] Poor short term memory  [] Poor long term memory    [] OTHER:      Due to this being a TeleHealth encounter, evaluation of the following organ systems is limited: Vitals/Constitutional/EENT/Resp/CV/GI//MS/Neuro/Skin/Heme-Lymph-Imm. ASSESSMENT/PLAN:  1. Diarrhea, unspecified type  3-week history of loose watery diarrhea no blood or mucus, no abdominal pain, fever or chills, recent sick contacts, eating out, or travel. Has been on PPI long-term, no nicotine, NSAIDs, no SSRIs. Previous colonoscopy by Dr. Martínez Nieves in  was normal  - Clostridium difficile EIA; Future  - Culture Stool-Salmonella, Shigella, Campy; Future  - Colonoscopy;  Future 4.  Associated medical conditions include but are not limited to . ... Dyslipidemia, class III obesity, pulmonary hypertension, former nicotine use, DM 2 last HbgA1c 9.5, polymyalgia rheumatica-on chronic steroids    Return in about 4 weeks (around 2/3/2021), or if symptoms worsen or fail to improve, for post procedure results. An  electronic signature was used to authenticate this note. --SANIA Zhang - CNP on 1/6/2021 at 4:00 PM        Pursuant to the emergency declaration under the [facility], [facility] waiver authority and the [employer] Act, this Virtual  Visit was conducted, with patient's consent, to reduce the patient's risk of exposure to COVID-19 and provide continuity of care for an established patient. Services were provided through a video synchronous discussion virtually to substitute for in-person clinic visit.

## 2021-01-08 ENCOUNTER — TELEPHONE (OUTPATIENT)
Dept: PHARMACY | Facility: CLINIC | Age: 46
End: 2021-01-08

## 2021-01-12 RX ORDER — SODIUM, POTASSIUM,MAG SULFATES 17.5-3.13G
1 SOLUTION, RECONSTITUTED, ORAL ORAL ONCE
Qty: 1 KIT | Refills: 0 | Status: SHIPPED | OUTPATIENT
Start: 2021-01-12 | End: 2021-01-12

## 2021-01-12 NOTE — TELEPHONE ENCOUNTER
[facility] called to switch to alternative Problem: RESPIRATORY - ADULT  Goal: Achieves optimal ventilation and oxygenation  Description  INTERVENTIONS:  - Assess for changes in respiratory status  - Assess for changes in mentation and behavior  - Position to facilitate oxygenation and minimize r

## 2021-01-14 NOTE — TELEPHONE ENCOUNTER
2nd Attempt Documentation:  2nd attempt to contact this patient regarding the previous message  CLINICAL PHARMACY: 2021 Pharmacist Appointment  Patient unavailable at the time of call. Left following message on home TAD: please call back at toll-free [phone] option 7 to retrieve previous message.     Patient has read Edel Sorto

## 2021-01-15 RX ORDER — ONDANSETRON 4 MG/1
TABLET, ORALLY DISINTEGRATING ORAL
Qty: 40 TABLET | Refills: 1 | OUTPATIENT
Start: 2021-01-15

## 2021-01-16 DIAGNOSIS — E11.8 TYPE 2 DIABETES MELLITUS WITH COMPLICATION, WITH LONG-TERM CURRENT USE OF INSULIN (HCC): ICD-10-CM

## 2021-01-16 DIAGNOSIS — Z79.4 TYPE 2 DIABETES MELLITUS WITH COMPLICATION, WITH LONG-TERM CURRENT USE OF INSULIN (HCC): ICD-10-CM

## 2021-01-17 ENCOUNTER — E-VISIT (OUTPATIENT)
Dept: FAMILY MEDICINE CLINIC | Age: 46
End: 2021-01-17
Payer: COMMERCIAL

## 2021-01-17 DIAGNOSIS — E11.8 TYPE 2 DIABETES MELLITUS WITH COMPLICATION, WITH LONG-TERM CURRENT USE OF INSULIN (HCC): ICD-10-CM

## 2021-01-17 DIAGNOSIS — Z79.4 TYPE 2 DIABETES MELLITUS WITH COMPLICATION, WITH LONG-TERM CURRENT USE OF INSULIN (HCC): ICD-10-CM

## 2021-01-17 PROCEDURE — 99422 OL DIG E/M SVC 11-20 MIN: CPT | Performed by: PHYSICIAN ASSISTANT

## 2021-01-17 RX ORDER — NITROFURANTOIN 25; 75 MG/1; MG/1
100 CAPSULE ORAL 2 TIMES DAILY
Qty: 14 CAPSULE | Refills: 0 | Status: SHIPPED | OUTPATIENT
Start: 2021-01-17 | End: 2021-01-24

## 2021-01-17 NOTE — PROGRESS NOTES
21  Gabriel Thompson : 1975 Sex: female  Age: 41 y.o. No chief complaint on file. HPI:  41 y.o. female presents for acute visit due to UTI. The patient via the questionnaire is stating that she potentially has a urinary tract infection for about 2 days. The patient is a diabetic. The patient states that she routinely gets these types of infections. Last 1 being in December. Antibiotics usually helps. The patient seems to have a better tolerance of Macrobid than other antibiotics. The patient is not having any nausea, vomiting, diarrhea or constipation. Have not noted any fevers. No vaginal discharge. No vaginal itching. History:    Current Outpatient Medications:     esomeprazole (NEXIUM) 40 MG delayed release capsule, Take 1 capsule by mouth daily, Disp: 30 capsule, Rfl: 3    baclofen (LIORESAL) 10 MG tablet, Take 0.5 tablets by mouth 2 times daily, Disp: 90 tablet, Rfl: 1    losartan (COZAAR) 50 MG tablet, Take 1 tablet by mouth daily, Disp: 90 tablet, Rfl: 1    hydroCHLOROthiazide (HYDRODIURIL) 25 MG tablet, Take 1 tablet by mouth daily, Disp: 90 tablet, Rfl: 1    naproxen (NAPROSYN) 500 MG tablet, Take 1 tablet by mouth 2 times daily (with meals), Disp: 30 tablet, Rfl: 0    ondansetron (ZOFRAN ODT) 4 MG disintegrating tablet, Take 1 tablet by mouth every 8 hours as needed for Nausea or Vomiting, Disp: 7 tablet, Rfl: 0    famotidine (PEPCID) 20 MG tablet, Take 1 tablet by mouth 2 times daily for 10 days, Disp: 20 tablet, Rfl: 0    metoprolol (LOPRESSOR) 100 MG tablet, Take 1 tablet by mouth 2 times daily, Disp: 180 tablet, Rfl: 0    omeprazole (PRILOSEC) 40 MG delayed release capsule, Take 1 capsule by mouth every morning (before breakfast), Disp: 90 capsule, Rfl: 0    nystatin (MYCOSTATIN) 153237 UNIT/GM powder, Apply 3 times daily. , Disp: 1 Bottle, Rfl: 2   blood glucose test strips (PRODIGY NO CODING BLOOD GLUC) strip, 1 each by In Vitro route 4 times daily As needed. , Disp: 400 each, Rfl: 3    blood glucose test strips (PRODIGY NO CODING BLOOD GLUC) strip, 1 each by In Vitro route 4 times daily As needed. , Disp: 400 each, Rfl: 3    rosuvastatin (CRESTOR) 10 MG tablet, TAKE ONE TABLET BY MOUTH NIGHTLY, Disp: 90 tablet, Rfl: 0    clotrimazole-betamethasone (LOTRISONE) 1-0.05 % cream, Apply topically 2 times daily. , Disp: 1 Tube, Rfl: 3    venlafaxine (EFFEXOR XR) 75 MG extended release capsule, TAKE TWO CAPSULES BY MOUTH EVERY MORNING AND TAKE ONE CAPSULE BY MOUTH EVERY EVENING, Disp: 270 capsule, Rfl: 1    predniSONE (DELTASONE) 5 MG tablet, Take 1 tablet by mouth daily, Disp: 90 tablet, Rfl: 2    solifenacin (VESICARE) 10 MG tablet, TAKE 1 TABLET BY MOUTH ONE TIME A DAY, Disp: 90 tablet, Rfl: 3    Insulin Glargine, 2 Unit Dial, (TOUJEO MAX SOLOSTAR) 300 UNIT/ML SOPN, INJECT 240 UNITS SUBCUTANEOUSLY AT BEDTIME, Disp: 135 mL, Rfl: 3    insulin lispro, 1 Unit Dial, (HUMALOG KWIKPEN) 100 UNIT/ML SOPN, 80 units with each meals, Disp: 90 pen, Rfl: 3    metFORMIN (GLUCOPHAGE) 500 MG tablet, Take 1 tablet by mouth 3 times daily, Disp: 270 tablet, Rfl: 3    Blood Glucose Monitoring Suppl (PRODIGY AUTOCODE BLOOD GLUCOSE) w/Device KIT, Use as directed to test up to 4 times daily, Disp: 1 kit, Rfl: 0    PRODIGY LANCETS 28G MISC, Use as directed to test up to 4 times daily, Disp: 400 each, Rfl: 3    Insulin Pen Needle (PEN NEEDLES) 32G X 4 MM MISC, Use as directed with insulin pens, 4 times daily, Disp: 400 each, Rfl: 1    vitamin D (CHOLECALCIFEROL) 1000 UNIT TABS tablet, Take 1,000 Units by mouth daily, Disp: , Rfl:     calcium carbonate 600 MG TABS tablet, Take 1 tablet by mouth daily, Disp: , Rfl:     miconazole (MICOTIN) 2 % cream, Apply topically 2 times daily. , Disp: 141 g, Rfl: 3    Handicap Placard MISC, Exp 5 years, Disp: 1 each, Rfl: 0    Allergies Allergen Reactions    Morphine And Related Hives and Rash    Ace Inhibitors Other (See Comments)     Dizziness and near syncope    Bactrim [Sulfamethoxazole-Trimethoprim] Itching    Nitroglycerin Other (See Comments)     Migraine    Percocet [Oxycodone-Acetaminophen] Nausea And Vomiting    Victoza [Liraglutide]      NAUSEA       Past Medical History:   Diagnosis Date    Acute midline thoracic back pain 9/18/2018    B12 deficiency     Family history of premature CAD 9/4/2013    Fatty liver     Gastroesophageal reflux disease 1/6/2021    HPV (human papilloma virus) anogenital infection     Hyperlipidemia     Hypertension     Liver hemangioma 2009/2015    Obesity 3/11/13    BMI 43.42    Orthostatic hypotension     Ovarian cyst     PMR (polymyalgia rheumatica) (HCC)     Rectal fissure     Tobacco abuse 9/3/2015    Tobacco abuse     Tremor     Uncontrolled diabetes mellitus with complications ([facility] [address]     Unspecified sleep apnea      Past Surgical History:   Procedure Laterality Date    CARDIAC CATHETERIZATION  4-07    COLONOSCOPY  2013    for diarrhea (-)    HYSTERECTOMY  12-10    LEEP  1-05    DC MUSCLE BIOPSY Left 9/21/2018    LEFT QUADRICEPS MUSCLE BIOPSY performed by Kina Ferrera MD at [address] UNI/BI CANNULATION N/A 9/18/2018    T 12 VERTEBRALPLASTY ROOM 278 performed by Nito Monahan MD at [place] [address] ENDOSCOPY  10/13/15     DR. Chantale La     URETHRA SURGERY  6-2015     Family History   Problem Relation Age of Onset    Diabetes Father     Heart Disease Mother      Social History     Tobacco History     Smoking Status  Former Smoker Quit date  1/1/2017 Smoking Frequency  1 pack/day Smoking Tobacco Type  Cigarettes    Smokeless Tobacco Use  Never Used               No vital signs obtained for this E-visit    Past medical history reviewed. Allergies reviewed. Medications reviewed. Reviewing the patient's questionnaire does seem to be consistent with a UTI. The patient will be treated with Macrobid. We will send her a my chart message. The patient will need to follow-up with PCP to have formal urinalysis and urine culture if the symptoms do not improve on the antibiotic. If any signs or symptoms worsen including but not limited to fever, chills, nausea, vomiting, severe back or flank pain the patient is to go directly to the ED for further evaluation. Assessment and Plan:  There are no diagnoses linked to this encounter.       Jaleesa Mclaughlin III, [id number]  1/17/21  7:38 AM

## 2021-01-18 RX ORDER — PEN NEEDLE, DIABETIC 30 GX3/16"
NEEDLE, DISPOSABLE MISCELLANEOUS
Qty: 400 EACH | Refills: 1 | Status: SHIPPED | OUTPATIENT
Start: 2021-01-18 | End: 2022-01-19 | Stop reason: SDUPTHER

## 2021-01-18 RX ORDER — PEN NEEDLE, DIABETIC 30 GX3/16"
NEEDLE, DISPOSABLE MISCELLANEOUS
Qty: 400 EACH | Refills: 1 | Status: SHIPPED | OUTPATIENT
Start: 2021-01-18

## 2021-01-22 ENCOUNTER — VIRTUAL VISIT (OUTPATIENT)
Dept: ENDOCRINOLOGY | Age: 46
End: 2021-01-22
Payer: COMMERCIAL

## 2021-01-22 DIAGNOSIS — Z79.4 TYPE 2 DIABETES MELLITUS WITH COMPLICATION, WITH LONG-TERM CURRENT USE OF INSULIN (HCC): Primary | ICD-10-CM

## 2021-01-22 DIAGNOSIS — E11.8 TYPE 2 DIABETES MELLITUS WITH COMPLICATION, WITH LONG-TERM CURRENT USE OF INSULIN (HCC): Primary | ICD-10-CM

## 2021-01-22 PROCEDURE — 99442 PR PHYS/QHP TELEPHONE EVALUATION 11-20 MIN: CPT | Performed by: INTERNAL MEDICINE

## 2021-01-22 RX ORDER — INSULIN HUMAN 500 [IU]/ML
INJECTION, SOLUTION SUBCUTANEOUS
Qty: 90 PEN | Refills: 3 | Status: SHIPPED
Start: 2021-01-22 | End: 2021-02-19

## 2021-01-22 RX ORDER — FLASH GLUCOSE SENSOR
KIT MISCELLANEOUS
Qty: 6 EACH | Refills: 3 | Status: SHIPPED | OUTPATIENT
Start: 2021-01-22 | End: 2021-11-12

## 2021-01-22 RX ORDER — PIOGLITAZONEHYDROCHLORIDE 30 MG/1
30 TABLET ORAL DAILY
Qty: 90 TABLET | Refills: 3 | Status: SHIPPED | OUTPATIENT
Start: 2021-01-22 | End: 2021-11-15

## 2021-01-22 RX ORDER — FLASH GLUCOSE SCANNING READER
EACH MISCELLANEOUS
Qty: 1 DEVICE | Refills: 0 | Status: SHIPPED | OUTPATIENT
Start: 2021-01-22

## 2021-01-22 NOTE — PROGRESS NOTES
2021    TELEHEALTH EVALUATION -- Audio/Visual (During [facility] public health emergency)    Due to COVID 19 outbreak, patient's office visit was converted to a virtual visit. Patient was contacted and agreed to proceed with a virtual visit via Telephone Visit  The risks and benefits of converting to a virtual visit were discussed in light of the current infectious disease epidemic. Patient also understood that insurance coverage and co-pays are up to their individual insurance plans. HPI: Follow-up on type 2 diabetes telephone visit    Betancur Alona (:  1975) has requested an audio/video evaluation for the following concern(s):    Follow-up on type 2 diabetes A1c has been staying between 9-9.3 range last A1c was 9.2 blood sugars are staying in the 200+ range currently on high-dose insulin continue Toujeo to 40 units at bedtime Humalog 8 units with each meals plus Metformin    Results for Dakota Wallace" (MRN [id number]) as of 2021 15:02   Ref.  Range 2020 09:23 2020 16:20 2020 05:45   Sodium Latest Ref Range: 135 - 144 mEq/L 140  139   Potassium Latest Ref Range: 3.4 - 4.9 mEq/L 4.1  3.5   Chloride Latest Ref Range: 95 - 107 mEq/L 97  99   CO2 Latest Ref Range: 20 - 31 mEq/L 26  26   BUN Latest Ref Range: 6 - 20 mg/dL 11  15   Creatinine Latest Ref Range: 0.50 - 0.90 mg/dL 0.67  0.74   Anion Gap Latest Ref Range: 9 - 15 mEq/L 17 (H)  14   GFR Non- Latest Ref Range: >60  >60.0  >60.0   GFR African American Latest Ref Range: >60  >60.0  >60.0   Glucose Latest Ref Range: 70 - 99 mg/dL 179 (H)  194 (H)   Calcium Latest Ref Range: 8.5 - 9.9 mg/dL 9.8  8.9   Total Protein Latest Ref Range: 6.3 - 8.0 g/dL   8.1 (H)   Albumin Latest Ref Range: 3.5 - 4.6 g/dL   4.3   Alk Phos Latest Ref Range: 40 - 130 U/L   105   ALT Latest Ref Range: 0 - 33 U/L   29   AST Latest Ref Range: 0 - 35 U/L   35 blood glucose test strips (PRODIGY NO CODING BLOOD GLUC) strip 1 each by In Vitro route 4 times daily As needed. Faye Cooper MD   blood glucose test strips (PRODIGY NO CODING BLOOD GLUC) strip 1 each by In Vitro route 4 times daily As needed. Karlso Mccann MD   rosuvastatin (CRESTOR) 10 MG tablet TAKE ONE TABLET BY MOUTH NIGHTLY  Rudi Martínez MD   clotrimazole-betamethasone (LOTRISONE) 1-0.05 % cream Apply topically 2 times daily. Alvaro Louis DO   venlafaxine (EFFEXOR XR) 75 MG extended release capsule TAKE TWO CAPSULES BY MOUTH EVERY MORNING AND TAKE ONE CAPSULE BY MOUTH EVERY EVENING  Rudi Martínez MD   predniSONE (DELTASONE) 5 MG tablet Take 1 tablet by mouth daily  Duran Delgado MD   solifenacin (VESICARE) 10 MG tablet TAKE 1 TABLET BY MOUTH ONE TIME A DAY  Diana Estrella MD   Insulin Glargine, 2 Unit Dial, (TOUJEO MAX SOLOSTAR) 300 UNIT/ML SOPN INJECT 240 UNITS SUBCUTANEOUSLY AT BEDTIME  Karlos Mccann MD   sucralfate (CARAFATE) 1 GM tablet Take 1 tablet by mouth 4 times daily for 7 days  SANIA Scott - CNP   insulin lispro, 1 Unit Dial, (HUMALOG KWIKPEN) 100 UNIT/ML SOPN 80 units with each meals  Karlos Mccann MD   metFORMIN (GLUCOPHAGE) 500 MG tablet Take 1 tablet by mouth 3 times daily  Karlos Mccann MD   Blood Glucose Monitoring Suppl (PRODIGY AUTOCODE BLOOD GLUCOSE) w/Device KIT Use as directed to test up to 4 times daily  MD ROOSEVELT Jurado LANCETS 28G MISC Use as directed to test up to 4 times daily  Karlos Mccann MD   vitamin D (CHOLECALCIFEROL) 1000 UNIT TABS tablet Take 1,000 Units by mouth daily  Historical Provider, MD   calcium carbonate 600 MG TABS tablet Take 1 tablet by mouth daily  Historical Provider, MD   miconazole (MICOTIN) 2 % cream Apply topically 2 times daily.   Lexii Cedillo MD   Handicap Placard [facility] Exp 5 years  Lexii Cedillo MD       Social History     Tobacco Use    Smoking status: Former Smoker     Packs/day: 1.00     Types: Cigarettes Quit date: 2017     Years since quittin.0    Smokeless tobacco: Never Used   Substance Use Topics    Alcohol use: Yes     Alcohol/week: 0.0 standard drinks     Frequency: Monthly or less     Binge frequency: Never     Comment: socially    Drug use: No            PHYSICAL EXAMINATION:  [ INSTRUCTIONS:  \"[x]\" Indicates a positive item  \"[]\" Indicates a negative item  -- DELETE ALL ITEMS NOT EXAMINED]  [] Alert  [] Oriented to person/place/time    [] No apparent distress  [] Toxic appearing    [] Face flushed appearing [] Sclera clear  [] Lips are cyanotic      [] Breathing appears normal  [] Appears tachypneic      [] Rash on visible skin    [] Cranial Nerves II-XII grossly intact    [] Motor grossly intact in visible upper extremities    [] Motor grossly intact in visible lower extremities    [] Normal Mood  [] Anxious appearing    [] Depressed appearing  [] Confused appearing      [] Poor short term memory  [] Poor long term memory    [] OTHER:      Due to this being a TeleHealth encounter, evaluation of the following organ systems is limited: Vitals/Constitutional/EENT/Resp/CV/GI//MS/Neuro/Skin/Heme-Lymph-Imm. ASSESSMENT/PLAN:     Diagnosis Orders   1.  Type 2 diabetes mellitus with complication, with long-term current use of insulin (HCC)  Basic Metabolic Panel    Hemoglobin A1C     Orders Placed This Encounter   Procedures    Basic Metabolic Panel     Standing Status:   Future     Standing Expiration Date:   2022    Hemoglobin A1C     Standing Status:   Future     Standing Expiration Date:   2022     Orders Placed This Encounter   Medications    insulin regular human (HUMULIN R U-500 KWIKPEN) 500 UNIT/ML SOPN concentrated injection pen     Si-160 units three times a day     Dispense:  90 pen     Refill:  3    Continuous Blood Gluc Sensor (FREESTYLE JAIRO 14 DAY SENSOR) MISC     Sig: Every 2 weeks     Dispense:  6 each     Refill:  03  Continuous Blood Gluc  (FREESTYLE JAIRO 14 DAY READER) ALEXANDREA     Sig: As directed     Dispense:  1 Device     Refill:  00    pioglitazone (ACTOS) 30 MG tablet     Sig: Take 1 tablet by mouth daily     Dispense:  90 tablet     Refill:  3     Medications Discontinued During This Encounter   Medication Reason    Insulin Glargine, 2 Unit Dial, (TOUJEO MAX SOLOSTAR) 300 UNIT/ML SOPN Therapy completed    insulin lispro, 1 Unit Dial, (HUMALOG KWIKPEN) 100 UNIT/ML SOPN Therapy completed         Will add Actos to her medication regimen also tried Humulin U 500 3 times a day start from 140 to 160 units  Discontinue Toujeo and Humalog    Follow-up in 3 months A1c goal of 7 or lower  An  electronic signature was used to authenticate this note. --Shabbir Pete MD on 1/22/2021 at 3:02 PM        Pursuant to the emergency declaration under the [facility], [facility] waiver authority and the [employer] Act, this Virtual  Visit was conducted, with patient's consent, to reduce the patient's risk of exposure to COVID-19 and provide continuity of care for an established patient. Services were provided through a video synchronous discussion virtually to substitute for in-person clinic visit.

## 2021-01-26 ENCOUNTER — OFFICE VISIT (OUTPATIENT)
Dept: UROLOGY | Age: 46
End: 2021-01-26
Payer: COMMERCIAL

## 2021-01-26 VITALS
HEIGHT: 66 IN | DIASTOLIC BLOOD PRESSURE: 82 MMHG | SYSTOLIC BLOOD PRESSURE: 120 MMHG | BODY MASS INDEX: 47.09 KG/M2 | HEART RATE: 83 BPM | WEIGHT: 293 LBS

## 2021-01-26 DIAGNOSIS — R33.9 RETENTION OF URINE: Primary | ICD-10-CM

## 2021-01-26 DIAGNOSIS — M35.3 PMR (POLYMYALGIA RHEUMATICA) (HCC): ICD-10-CM

## 2021-01-26 DIAGNOSIS — E78.5 DYSLIPIDEMIA: ICD-10-CM

## 2021-01-26 LAB
ALBUMIN SERPL-MCNC: 4.1 G/DL (ref 3.5–4.6)
ALP BLD-CCNC: 86 U/L (ref 40–130)
ALT SERPL-CCNC: 28 U/L (ref 0–33)
ANION GAP SERPL CALCULATED.3IONS-SCNC: 16 MEQ/L (ref 9–15)
AST SERPL-CCNC: 25 U/L (ref 0–35)
BILIRUB SERPL-MCNC: 0.4 MG/DL (ref 0.2–0.7)
BUN BLDV-MCNC: 12 MG/DL (ref 6–20)
C-REACTIVE PROTEIN: 26.2 MG/L (ref 0–5)
CALCIUM SERPL-MCNC: 9.4 MG/DL (ref 8.5–9.9)
CHLORIDE BLD-SCNC: 97 MEQ/L (ref 95–107)
CHOLESTEROL, TOTAL: 127 MG/DL (ref 0–199)
CO2: 24 MEQ/L (ref 20–31)
CREAT SERPL-MCNC: 0.64 MG/DL (ref 0.5–0.9)
GFR AFRICAN AMERICAN: >60
GFR NON-AFRICAN AMERICAN: >60
GLOBULIN: 3.8 G/DL (ref 2.3–3.5)
GLUCOSE BLD-MCNC: 154 MG/DL (ref 70–99)
HDLC SERPL-MCNC: 35 MG/DL (ref 40–59)
LDL CHOLESTEROL CALCULATED: 65 MG/DL (ref 0–129)
POST VOID RESIDUAL (PVR): 58 ML
POTASSIUM SERPL-SCNC: 3.7 MEQ/L (ref 3.4–4.9)
SEDIMENTATION RATE, ERYTHROCYTE: 54 MM (ref 0–20)
SODIUM BLD-SCNC: 137 MEQ/L (ref 135–144)
TOTAL PROTEIN: 7.9 G/DL (ref 6.3–8)
TRIGL SERPL-MCNC: 137 MG/DL (ref 0–150)

## 2021-01-26 PROCEDURE — 99214 OFFICE O/P EST MOD 30 MIN: CPT | Performed by: UROLOGY

## 2021-01-26 PROCEDURE — 51798 US URINE CAPACITY MEASURE: CPT | Performed by: UROLOGY

## 2021-01-26 RX ORDER — METHENAMINE, SODIUM PHOSPHATE, MONOBASIC, MONOHYDRATE, PHENYL SALICYLATE, METHYLENE BLUE, AND HYOSCYAMINE SULFATE 120; 40.8; 36; 10; .12 MG/1; MG/1; MG/1; MG/1; MG/1
120 CAPSULE ORAL
Qty: 120 CAPSULE | Refills: 11 | Status: SHIPPED
Start: 2021-01-26 | End: 2021-02-11

## 2021-01-26 RX ORDER — ONDANSETRON 4 MG/1
4 TABLET, ORALLY DISINTEGRATING ORAL EVERY 8 HOURS PRN
Qty: 30 TABLET | Refills: 3 | Status: SHIPPED | OUTPATIENT
Start: 2021-01-26 | End: 2021-04-28 | Stop reason: SDUPTHER

## 2021-01-26 RX ORDER — SODIUM, POTASSIUM,MAG SULFATES 17.5-3.13G
SOLUTION, RECONSTITUTED, ORAL ORAL
COMMUNITY
Start: 2021-01-13 | End: 2021-02-11

## 2021-01-26 NOTE — PROGRESS NOTES
 BRENNAN  1-05    [place] MUSCLE BIOPSY Left 2018    LEFT QUADRICEPS MUSCLE BIOPSY performed by Jose Gamble MD at [address] UNI/BI CANNULATION N/A 2018    T 12 VERTEBRALPLASTY ROOM 278 performed by Jigar Fine MD at [address] ENDOSCOPY  10/13/15      [address] [place] [address] SURGERY  -     Social History     Socioeconomic History    Marital status:      Spouse name: None    Number of children: 1    Years of education: None    Highest education level: None   Occupational History    None   Social Needs    Financial resource strain: Not very hard    Food insecurity     Worry: Never true     Inability: Never true    Transportation needs     Medical: No     Non-medical: No   Tobacco Use    Smoking status: Former Smoker     Packs/day: 1.00     Types: Cigarettes     Quit date: 2017     Years since quittin.0    Smokeless tobacco: Never Used   Substance and Sexual Activity    Alcohol use: Yes     Alcohol/week: 0.0 standard drinks     Frequency: Monthly or less     Binge frequency: Never     Comment: socially    Drug use: No    Sexual activity: Yes     Partners: Male     Comment: single   Lifestyle    Physical activity     Days per week: 1 day     Minutes per session: 10 min    Stress: To some extent   Relationships    Social connections     Talks on phone:  Three times a week     Gets together: Once a week     Attends [employer] service: More than 4 times per year     Active member of club or organization: No     Attends meetings of clubs or organizations: Never     Relationship status:     Intimate partner violence     Fear of current or ex partner: None     Emotionally abused: None     Physically abused: None     Forced sexual activity: None   Other Topics Concern    None   Social History Narrative    Social/Functional History          Family History Problem Relation Age of Onset    Diabetes Father     Heart Disease Mother      Current Outpatient Medications   Medication Sig Dispense Refill    Meth-Hyo-M Bl-Na Phos-Ph Sal (USTELL) 120 MG CAPS Take 120 mg by mouth every 4-6 hours as needed (urinary pressure) 120 capsule 11    insulin regular human (HUMULIN R U-500 KWIKPEN) 500 UNIT/ML SOPN concentrated injection pen 140-160 units three times a day 90 pen 3    Continuous Blood Gluc Sensor (FREESTYLE JAIRO 14 DAY SENSOR) MISC Every 2 weeks 6 each 03    Continuous Blood Gluc  (FREESTYLE JAIRO 14 DAY READER) ALEXANDREA As directed 1 Device 00    pioglitazone (ACTOS) 30 MG tablet Take 1 tablet by mouth daily 90 tablet 3    Insulin Pen Needle (PEN NEEDLES) 32G X 4 MM MISC Use as directed with insulin pens, 4 times daily 400 each 1    Insulin Pen Needle (PEN NEEDLES) 32G X 4 MM MISC Use as directed with insulin pens, 4 times daily 400 each 1    esomeprazole (NEXIUM) 40 MG delayed release capsule Take 1 capsule by mouth daily 30 capsule 3    baclofen (LIORESAL) 10 MG tablet Take 0.5 tablets by mouth 2 times daily 90 tablet 1    losartan (COZAAR) 50 MG tablet Take 1 tablet by mouth daily 90 tablet 1    hydroCHLOROthiazide (HYDRODIURIL) 25 MG tablet Take 1 tablet by mouth daily 90 tablet 1    naproxen (NAPROSYN) 500 MG tablet Take 1 tablet by mouth 2 times daily (with meals) 30 tablet 0    ondansetron (ZOFRAN ODT) 4 MG disintegrating tablet Take 1 tablet by mouth every 8 hours as needed for Nausea or Vomiting 7 tablet 0    metoprolol (LOPRESSOR) 100 MG tablet Take 1 tablet by mouth 2 times daily 180 tablet 0    omeprazole (PRILOSEC) 40 MG delayed release capsule Take 1 capsule by mouth every morning (before breakfast) 90 capsule 0    nystatin (MYCOSTATIN) 218101 UNIT/GM powder Apply 3 times daily. 1 Bottle 2    blood glucose test strips (PRODIGY NO CODING BLOOD GLUC) strip 1 each by In Vitro route 4 times daily As needed.  400 each 3  blood glucose test strips (PRODIGY NO CODING BLOOD GLUC) strip 1 each by In Vitro route 4 times daily As needed. 400 each 3    rosuvastatin (CRESTOR) 10 MG tablet TAKE ONE TABLET BY MOUTH NIGHTLY 90 tablet 0    clotrimazole-betamethasone (LOTRISONE) 1-0.05 % cream Apply topically 2 times daily. 1 Tube 3    venlafaxine (EFFEXOR XR) 75 MG extended release capsule TAKE TWO CAPSULES BY MOUTH EVERY MORNING AND TAKE ONE CAPSULE BY MOUTH EVERY EVENING 270 capsule 1    predniSONE (DELTASONE) 5 MG tablet Take 1 tablet by mouth daily 90 tablet 2    solifenacin (VESICARE) 10 MG tablet TAKE 1 TABLET BY MOUTH ONE TIME A DAY 90 tablet 3    metFORMIN (GLUCOPHAGE) 500 MG tablet Take 1 tablet by mouth 3 times daily 270 tablet 3    Blood Glucose Monitoring Suppl (PRODIGY AUTOCODE BLOOD GLUCOSE) w/Device KIT Use as directed to test up to 4 times daily 1 kit 0    PRODIGY LANCETS 28G MISC Use as directed to test up to 4 times daily 400 each 3    vitamin D (CHOLECALCIFEROL) 1000 UNIT TABS tablet Take 1,000 Units by mouth daily      calcium carbonate 600 MG TABS tablet Take 1 tablet by mouth daily      miconazole (MICOTIN) 2 % cream Apply topically 2 times daily. 141 g 3    Handicap Placard MISC Exp 5 years 1 each 0    SUPREP BOWEL PREP KIT 17.5-3.13-1.6 GM/177ML SOLN       famotidine (PEPCID) 20 MG tablet Take 1 tablet by mouth 2 times daily for 10 days 20 tablet 0     No current facility-administered medications for this visit.       Morphine and related, Ace inhibitors, Bactrim [sulfamethoxazole-trimethoprim], Nitroglycerin, Percocet [oxycodone-acetaminophen], and Victoza [liraglutide]  All reviewed and verified by Dr Celso Hernandez on today's visit    No results found for: PSA, PSADIA  Results for POC orders placed in visit on 01/26/21   poct post void residual   Result Value Ref Range    post void residual 58 ml    Narrative    A point of care test

## 2021-01-27 ENCOUNTER — PATIENT MESSAGE (OUTPATIENT)
Dept: UROLOGY | Age: 46
End: 2021-01-27

## 2021-02-04 ENCOUNTER — NURSE ONLY (OUTPATIENT)
Dept: PRIMARY CARE CLINIC | Age: 46
End: 2021-02-04

## 2021-02-04 DIAGNOSIS — Z01.818 PRE-OP TESTING: ICD-10-CM

## 2021-02-05 ENCOUNTER — TELEPHONE (OUTPATIENT)
Dept: NEUROLOGY | Age: 46
End: 2021-02-05

## 2021-02-05 LAB
SARS-COV-2: NOT DETECTED
SOURCE: NORMAL

## 2021-02-05 NOTE — TELEPHONE ENCOUNTER
----- Message from Sergio Dodson MD sent at 2/4/2021  5:40 PM EST -----  Patient CRP and sed rate are remaining in the same ranges as they have been in the last year or 2.

## 2021-02-07 DIAGNOSIS — E78.5 DYSLIPIDEMIA: ICD-10-CM

## 2021-02-08 RX ORDER — ROSUVASTATIN CALCIUM 10 MG/1
TABLET, COATED ORAL
Qty: 90 TABLET | Refills: 0 | Status: SHIPPED | OUTPATIENT
Start: 2021-02-08 | End: 2021-05-05 | Stop reason: SDUPTHER

## 2021-02-08 RX ORDER — METOPROLOL TARTRATE 100 MG/1
100 TABLET ORAL 2 TIMES DAILY
Qty: 180 TABLET | Refills: 0 | Status: SHIPPED | OUTPATIENT
Start: 2021-02-08 | End: 2021-05-17 | Stop reason: SDUPTHER

## 2021-02-10 ENCOUNTER — ANCILLARY PROCEDURE (OUTPATIENT)
Dept: ENDOSCOPY | Age: 46
End: 2021-02-10
Attending: INTERNAL MEDICINE
Payer: COMMERCIAL

## 2021-02-10 ENCOUNTER — HOSPITAL ENCOUNTER (OUTPATIENT)
Age: 46
Setting detail: OUTPATIENT SURGERY
Discharge: HOME | End: 2021-02-10
Attending: INTERNAL MEDICINE | Admitting: INTERNAL MEDICINE
Payer: COMMERCIAL

## 2021-02-10 ENCOUNTER — ANESTHESIA (OUTPATIENT)
Dept: ENDOSCOPY | Age: 46
End: 2021-02-10
Payer: COMMERCIAL

## 2021-02-10 ENCOUNTER — ANESTHESIA EVENT (OUTPATIENT)
Dept: ENDOSCOPY | Age: 46
End: 2021-02-10
Payer: COMMERCIAL

## 2021-02-10 VITALS
OXYGEN SATURATION: 96 % | SYSTOLIC BLOOD PRESSURE: 140 MMHG | RESPIRATION RATE: 16 BRPM | HEART RATE: 81 BPM | DIASTOLIC BLOOD PRESSURE: 70 MMHG

## 2021-02-10 VITALS
OXYGEN SATURATION: 99 % | SYSTOLIC BLOOD PRESSURE: 114 MMHG | DIASTOLIC BLOOD PRESSURE: 59 MMHG | RESPIRATION RATE: 15 BRPM

## 2021-02-10 LAB
GLUCOSE BLD-MCNC: 173 MG/DL (ref 60–115)
PERFORMED ON: ABNORMAL

## 2021-02-10 PROCEDURE — 3609017100 HC EGD: Performed by: INTERNAL MEDICINE

## 2021-02-10 PROCEDURE — 3700000000 HC ANESTHESIA ATTENDED CARE: Performed by: INTERNAL MEDICINE

## 2021-02-10 PROCEDURE — 43239 EGD BIOPSY SINGLE/MULTIPLE: CPT | Performed by: INTERNAL MEDICINE

## 2021-02-10 PROCEDURE — 45380 COLONOSCOPY AND BIOPSY: CPT | Performed by: INTERNAL MEDICINE

## 2021-02-10 PROCEDURE — 2709999900 HC NON-CHARGEABLE SUPPLY: Performed by: INTERNAL MEDICINE

## 2021-02-10 PROCEDURE — 88305 TISSUE EXAM BY PATHOLOGIST: CPT

## 2021-02-10 PROCEDURE — 2580000003 HC RX 258: Performed by: INTERNAL MEDICINE

## 2021-02-10 PROCEDURE — 6360000002 HC RX W HCPCS: Performed by: NURSE ANESTHETIST, CERTIFIED REGISTERED

## 2021-02-10 PROCEDURE — 88342 IMHCHEM/IMCYTCHM 1ST ANTB: CPT

## 2021-02-10 PROCEDURE — 2580000003 HC RX 258: Performed by: NURSE ANESTHETIST, CERTIFIED REGISTERED

## 2021-02-10 PROCEDURE — 2500000003 HC RX 250 WO HCPCS: Performed by: NURSE ANESTHETIST, CERTIFIED REGISTERED

## 2021-02-10 PROCEDURE — 2580000003 HC RX 258

## 2021-02-10 PROCEDURE — 7100000011 HC PHASE II RECOVERY - ADDTL 15 MIN: Performed by: INTERNAL MEDICINE

## 2021-02-10 PROCEDURE — 7100000010 HC PHASE II RECOVERY - FIRST 15 MIN: Performed by: INTERNAL MEDICINE

## 2021-02-10 PROCEDURE — 3609027000 HC COLONOSCOPY: Performed by: INTERNAL MEDICINE

## 2021-02-10 PROCEDURE — 45385 COLONOSCOPY W/LESION REMOVAL: CPT | Performed by: INTERNAL MEDICINE

## 2021-02-10 PROCEDURE — 3700000001 HC ADD 15 MINUTES (ANESTHESIA): Performed by: INTERNAL MEDICINE

## 2021-02-10 PROCEDURE — 88313 SPECIAL STAINS GROUP 2: CPT

## 2021-02-10 RX ORDER — SODIUM CHLORIDE 9 MG/ML
INJECTION, SOLUTION INTRAVENOUS
Status: COMPLETED
Start: 2021-02-10 | End: 2021-02-10

## 2021-02-10 RX ORDER — ONDANSETRON 2 MG/ML
4 INJECTION INTRAMUSCULAR; INTRAVENOUS
Status: DISCONTINUED | OUTPATIENT
Start: 2021-02-10 | End: 2021-02-10 | Stop reason: HOSPADM

## 2021-02-10 RX ORDER — PROPOFOL 10 MG/ML
INJECTION, EMULSION INTRAVENOUS PRN
Status: DISCONTINUED | OUTPATIENT
Start: 2021-02-10 | End: 2021-02-10 | Stop reason: SDUPTHER

## 2021-02-10 RX ORDER — SODIUM CHLORIDE 9 MG/ML
INJECTION, SOLUTION INTRAVENOUS CONTINUOUS PRN
Status: DISCONTINUED | OUTPATIENT
Start: 2021-02-10 | End: 2021-02-10 | Stop reason: SDUPTHER

## 2021-02-10 RX ORDER — LIDOCAINE HYDROCHLORIDE 20 MG/ML
INJECTION, SOLUTION INFILTRATION; PERINEURAL PRN
Status: DISCONTINUED | OUTPATIENT
Start: 2021-02-10 | End: 2021-02-10 | Stop reason: SDUPTHER

## 2021-02-10 RX ORDER — MAGNESIUM HYDROXIDE 1200 MG/15ML
LIQUID ORAL PRN
Status: DISCONTINUED | OUTPATIENT
Start: 2021-02-10 | End: 2021-02-10 | Stop reason: ALTCHOICE

## 2021-02-10 RX ADMIN — PROPOFOL 50 MG: 10 INJECTION, EMULSION INTRAVENOUS at 13:40

## 2021-02-10 RX ADMIN — PROPOFOL 70 MG: 10 INJECTION, EMULSION INTRAVENOUS at 13:10

## 2021-02-10 RX ADMIN — PROPOFOL 30 MG: 10 INJECTION, EMULSION INTRAVENOUS at 13:32

## 2021-02-10 RX ADMIN — PROPOFOL 30 MG: 10 INJECTION, EMULSION INTRAVENOUS at 13:38

## 2021-02-10 RX ADMIN — PROPOFOL 50 MG: 10 INJECTION, EMULSION INTRAVENOUS at 13:11

## 2021-02-10 RX ADMIN — SODIUM CHLORIDE: 9 INJECTION, SOLUTION INTRAVENOUS at 12:56

## 2021-02-10 RX ADMIN — LIDOCAINE HYDROCHLORIDE 40 MG: 20 INJECTION, SOLUTION INFILTRATION; PERINEURAL at 13:10

## 2021-02-10 RX ADMIN — PROPOFOL 50 MG: 10 INJECTION, EMULSION INTRAVENOUS at 13:15

## 2021-02-10 RX ADMIN — PROPOFOL 40 MG: 10 INJECTION, EMULSION INTRAVENOUS at 13:21

## 2021-02-10 RX ADMIN — PROPOFOL 30 MG: 10 INJECTION, EMULSION INTRAVENOUS at 13:35

## 2021-02-10 RX ADMIN — SODIUM CHLORIDE 1000 ML: 9 INJECTION, SOLUTION INTRAVENOUS at 12:55

## 2021-02-10 RX ADMIN — PROPOFOL 40 MG: 10 INJECTION, EMULSION INTRAVENOUS at 13:18

## 2021-02-10 RX ADMIN — PROPOFOL 30 MG: 10 INJECTION, EMULSION INTRAVENOUS at 13:28

## 2021-02-10 RX ADMIN — PROPOFOL 40 MG: 10 INJECTION, EMULSION INTRAVENOUS at 13:24

## 2021-02-10 RX ADMIN — PROPOFOL 50 MG: 10 INJECTION, EMULSION INTRAVENOUS at 13:13

## 2021-02-10 RX ADMIN — PROPOFOL 50 MG: 10 INJECTION, EMULSION INTRAVENOUS at 13:12

## 2021-02-10 RX ADMIN — PROPOFOL 40 MG: 10 INJECTION, EMULSION INTRAVENOUS at 13:26

## 2021-02-10 ASSESSMENT — PULMONARY FUNCTION TESTS
PIF_VALUE: 1

## 2021-02-10 ASSESSMENT — LIFESTYLE VARIABLES: SMOKING_STATUS: 1

## 2021-02-10 ASSESSMENT — PAIN SCALES - GENERAL: PAINLEVEL_OUTOF10: 0

## 2021-02-10 NOTE — H&P
Patient Name: Crista Sommers  : 1975  MRN: [id number]  DATE: 02/10/21      ENDOSCOPY  History and Physical    Procedure:    [x] Diagnostic Colonoscopy       [] Screening Colonoscopy  [x] EGD      [] ERCP      [] EUS       [] Other    [x] Previous office notes/History and Physical reviewed from the patients chart. Please see EMR for further details of HPI. I have examined the patient's status immediately prior to the procedure and:      Indications/HPI:    []Abdominal Pain   []Cancer- GI/Lung  []Fhx of colon CA/polyps  []History of Polyps   []Quigleys   []Melena  []Abnormal Imaging   []Dysphagia    []Persistent Pneumonia  []Anemia   []Food Impaction  []History of Polyps  []GI Bleed   []Pulmonary nodule/Mass  []Change in bowel habits  [x]Heartburn/Reflux  []Rectal Bleed (BRBPR)  []Chest Pain - Non Cardiac  []Heme (+) Stool  []Ulcers  []Constipation   []Hemoptysis   []Varices  [x]Diarrhea   []Hypoxemia  []Nausea/Vomiting   []Screening   []Crohns/Colitis  []Other:    Anesthesia:   [x] MAC [] Moderate Sedation   [] General   [] None     ROS: 12 pt Review of Symptoms was negative unless mentioned above    Medications:   Prior to Admission medications    Medication Sig Start Date End Date Taking?  Authorizing Provider   metoprolol (LOPRESSOR) 100 MG tablet Take 1 tablet by mouth 2 times daily 21   Fatemeh Mcneil MD   rosuvastatin (CRESTOR) 10 MG tablet TAKE ONE TABLET BY MOUTH NIGHTLY 21   Fatemeh Mcneil MD   SUPREP BOWEL PREP KIT 17.5-3.13-1.6 GM/177ML SOLN  21   Historical Provider, MD Rodrigue Vera Phos-Ph Sal (USTELL) 120 MG CAPS Take 120 mg by mouth every 4-6 hours as needed (urinary pressure) 21   Sage Hernandez MD   ondansetron (ZOFRAN ODT) 4 MG disintegrating tablet Take 1 tablet by mouth every 8 hours as needed for Nausea or Vomiting 21   Emmett Davison MD insulin regular human (HUMULIN R U-500 KWIKPEN) 500 UNIT/ML SOPN concentrated injection pen 140-160 units three times a day 1/22/21   Clint Jiménez MD   Continuous Blood Gluc Sensor (FREESTYLE JAIRO 14 DAY SENSOR) MISC Every 2 weeks 1/22/21   Clint Jiménez MD   Continuous Blood Gluc  (FREESTYLE JAIRO 14 DAY READER) ALEXANDREA As directed 1/22/21   Clint Jiménez MD   pioglitazone (ACTOS) 30 MG tablet Take 1 tablet by mouth daily 1/22/21   Clint Jiménez MD   Insulin Pen Needle (PEN NEEDLES) 32G X 4 MM MISC Use as directed with insulin pens, 4 times daily 1/18/21   Clint Jiménez MD   Insulin Pen Needle (PEN NEEDLES) 32G X 4 MM MISC Use as directed with insulin pens, 4 times daily 1/18/21   Clint Jiménez MD   esomeprazole (NEXIUM) 40 MG delayed release capsule Take 1 capsule by mouth daily 1/6/21   SANIA Rao - MANDO   baclofen (LIORESAL) 10 MG tablet Take 0.5 tablets by mouth 2 times daily 12/31/20   Arya Del Castillo MD   losartan (COZAAR) 50 MG tablet Take 1 tablet by mouth daily 12/30/20   Arya Del Castillo MD   hydroCHLOROthiazide (HYDRODIURIL) 25 MG tablet Take 1 tablet by mouth daily 12/30/20   Arya Del Castillo MD   naproxen (NAPROSYN) 500 MG tablet Take 1 tablet by mouth 2 times daily (with meals) 12/26/20   SANIA Donaldson CNP   famotidine (PEPCID) 20 MG tablet Take 1 tablet by mouth 2 times daily for 10 days 12/16/20 12/26/20  Meryl Lizarraga DO   omeprazole (PRILOSEC) 40 MG delayed release capsule Take 1 capsule by mouth every morning (before breakfast) 11/16/20   Arya Del Castillo MD   nystatin (MYCOSTATIN) 878636 UNIT/GM powder Apply 3 times daily. 11/11/20   Arya Del Castillo MD   blood glucose test strips (PRODIGY NO CODING BLOOD GLUC) strip 1 each by In Vitro route 4 times daily As needed. 11/11/20   Arya Del Castillo MD   blood glucose test strips (PRODIGY NO CODING BLOOD GLUC) strip 1 each by In Vitro route 4 times daily As needed.  11/10/20   Clint Jiménez MD clotrimazole-betamethasone (LOTRISONE) 1-0.05 % cream Apply topically 2 times daily. 11/6/20   Jeremías Iyer DO   venlafaxine (EFFEXOR XR) 75 MG extended release capsule TAKE TWO CAPSULES BY MOUTH EVERY MORNING AND TAKE ONE CAPSULE BY MOUTH EVERY EVENING 11/4/20   Ada Reyes MD   solifenacin (VESICARE) 10 MG tablet TAKE 1 TABLET BY MOUTH ONE TIME A DAY 9/25/20   Chio Jacobson MD   sucralfate (CARAFATE) 1 GM tablet Take 1 tablet by mouth 4 times daily for 7 days 8/11/20 12/16/20  SANIA Griffith - CNP   metFORMIN (GLUCOPHAGE) 500 MG tablet Take 1 tablet by mouth 3 times daily 3/30/20   Jia Tubbs MD   Blood Glucose Monitoring Suppl (PRODIGY AUTOCODE BLOOD GLUCOSE) w/Device KIT Use as directed to test up to 4 times daily 1/17/20   Jia Tubbs MD   PRODIGY LANCETS 28G MISC Use as directed to test up to 4 times daily 1/17/20   Jia Tubbs MD   vitamin D (CHOLECALCIFEROL) 1000 UNIT TABS tablet Take 1,000 Units by mouth daily    Historical Provider, MD   calcium carbonate 600 MG TABS tablet Take 1 tablet by mouth daily    Historical Provider, MD   miconazole (MICOTIN) 2 % cream Apply topically 2 times daily. 1/21/19   Aishwarya Decker MD   Handicap Placard MISC Exp 5 years 7/3/18   Aishwarya Decker MD       Allergies:    Allergies   Allergen Reactions    Morphine And Related Hives and Rash    Ace Inhibitors Other (See Comments)     Dizziness and near syncope    Bactrim [Sulfamethoxazole-Trimethoprim] Itching    Nitroglycerin Other (See Comments)     Migraine    Percocet [Oxycodone-Acetaminophen] Nausea And Vomiting    Victoza [Liraglutide]      NAUSEA        History of allergic reaction to anesthesia:  No    Past Medical History:  Past Medical History:   Diagnosis Date    Acute midline thoracic back pain 9/18/2018    B12 deficiency     Family history of premature CAD 9/4/2013    Fatty liver     Gastroesophageal reflux disease 1/6/2021 Arely Kim MD  12:00 PM  \

## 2021-02-10 NOTE — ANESTHESIA PRE PROCEDURE
Department of Anesthesiology  Preprocedure Note       Name:  Teri Vee   Age:  41 y.o.  :  1975                                          MRN:  [id number]         Date:  2/10/2021      Surgeon: Lo Ribeiro):  Lizzie Galan MD    Procedure: Procedure(s):  EGD ESOPHAGOGASTRODUODENOSCOPY  COLONOSCOPY DIAGNOSTIC    Medications prior to admission:   Prior to Admission medications    Medication Sig Start Date End Date Taking?  Authorizing Provider   metoprolol (LOPRESSOR) 100 MG tablet Take 1 tablet by mouth 2 times daily 21   Leann Garg MD   rosuvastatin (CRESTOR) 10 MG tablet TAKE ONE TABLET BY MOUTH NIGHTLY 21   Leann Garg MD   SUPREP BOWEL PREP KIT 17.5-3.13-1.6 GM/177ML SOLN  21   Historical Provider, MD Rodrigue Gaffney Phos-Ph Sal (USTELL) 120 MG CAPS Take 120 mg by mouth every 4-6 hours as needed (urinary pressure) 21   Lars Knapp MD   ondansetron (ZOFRAN ODT) 4 MG disintegrating tablet Take 1 tablet by mouth every 8 hours as needed for Nausea or Vomiting 21   Gay Beckett MD   insulin regular human (HUMULIN R U-500 KWIKPEN) 500 UNIT/ML SOPN concentrated injection pen 140-160 units three times a day 21   Gay Beckett MD   Continuous Blood Gluc Sensor (FREESTYLE JAIRO 14 DAY SENSOR) MISC Every 2 weeks 21   Gay Beckett MD   Continuous Blood Gluc  (FREESTYLE JAIRO 14 DAY READER) ALEXANDREA As directed 21   Gay Beckett MD   pioglitazone (ACTOS) 30 MG tablet Take 1 tablet by mouth daily 21   Gay Beckett MD   Insulin Pen Needle (PEN NEEDLES) 32G X 4 MM MISC Use as directed with insulin pens, 4 times daily 21   Gay Beckett MD   Insulin Pen Needle (PEN NEEDLES) 32G X 4 MM MISC Use as directed with insulin pens, 4 times daily 21   Gay Beckett MD   esomeprazole (NEXIUM) 40 MG delayed release capsule Take 1 capsule by mouth daily 21   Severo Blend, APRN - CNP baclofen (LIORESAL) 10 MG tablet Take 0.5 tablets by mouth 2 times daily 12/31/20   Rox Zavala MD   losartan (COZAAR) 50 MG tablet Take 1 tablet by mouth daily 12/30/20   Rox Zavala MD   hydroCHLOROthiazide (HYDRODIURIL) 25 MG tablet Take 1 tablet by mouth daily 12/30/20   Rox Zavala MD   naproxen (NAPROSYN) 500 MG tablet Take 1 tablet by mouth 2 times daily (with meals) 12/26/20   SANIA Donaldson CNP   famotidine (PEPCID) 20 MG tablet Take 1 tablet by mouth 2 times daily for 10 days 12/16/20 12/26/20  Maria Guadalupe Lizarraga DO   omeprazole (PRILOSEC) 40 MG delayed release capsule Take 1 capsule by mouth every morning (before breakfast) 11/16/20   Rox Zavala MD   nystatin (MYCOSTATIN) 269319 UNIT/GM powder Apply 3 times daily. 11/11/20   Rox Zavala MD   blood glucose test strips (PRODIGY NO CODING BLOOD GLUC) strip 1 each by In Vitro route 4 times daily As needed. 11/11/20   Rox Zavala MD   blood glucose test strips (PRODIGY NO CODING BLOOD GLUC) strip 1 each by In Vitro route 4 times daily As needed. 11/10/20   Dejah Tuttle MD   clotrimazole-betamethasone (LOTRISONE) 1-0.05 % cream Apply topically 2 times daily.  11/6/20   Jeremías Iyer DO   venlafaxine (EFFEXOR XR) 75 MG extended release capsule TAKE TWO CAPSULES BY MOUTH EVERY MORNING AND TAKE ONE CAPSULE BY MOUTH EVERY EVENING 11/4/20   Rox Zavala MD   solifenacin (VESICARE) 10 MG tablet TAKE 1 TABLET BY MOUTH ONE TIME A DAY 9/25/20   Randy Cason MD   sucralfate (CARAFATE) 1 GM tablet Take 1 tablet by mouth 4 times daily for 7 days 8/11/20 12/16/20  SANIA Rubi CNP   metFORMIN (GLUCOPHAGE) 500 MG tablet Take 1 tablet by mouth 3 times daily 3/30/20   Dejah Tuttle MD   Blood Glucose Monitoring Suppl (PRODIGY AUTOCODE BLOOD GLUCOSE) w/Device KIT Use as directed to test up to 4 times daily 1/17/20   Dejah Tuttle MD   PRODIGY LANCETS 28G MISC Use as directed to test up to 4 times daily 1/17/20   Dejah Tuttle MD vitamin D (CHOLECALCIFEROL) 1000 UNIT TABS tablet Take 1,000 Units by mouth daily    Historical Provider, MD   calcium carbonate 600 MG TABS tablet Take 1 tablet by mouth daily    Historical Provider, MD   miconazole (MICOTIN) 2 % cream Apply topically 2 times daily. 1/21/19   Lexii Cedillo MD   Handicap Placard MISC Exp 5 years 7/3/18   Lexii Cedillo MD       Current medications:    Current Facility-Administered Medications   Medication Dose Route Frequency Provider Last Rate Last Admin    sterile water for irrigation    PRN Rob Preston MD   1,000 mL at 02/10/21 1214    Simethicone 20 MG/0.3ML SUSP                Allergies:     Allergies   Allergen Reactions    Morphine And Related Hives and Rash    Ace Inhibitors Other (See Comments)     Dizziness and near syncope    Bactrim [Sulfamethoxazole-Trimethoprim] Itching    Nitroglycerin Other (See Comments)     Migraine    Percocet [Oxycodone-Acetaminophen] Nausea And Vomiting    Victoza [Liraglutide]      NAUSEA       Problem List:    Patient Active Problem List   Diagnosis Code    Orthostatic hypotension I95.1    Cobalamin deficiency E53.8    HPV (human papilloma virus) anogenital infection A63.0    Cyst of ovary N83.209    RAE (obstructive sleep apnea) G47.33    Dyslipidemia E78.5    FH: premature coronary heart disease Z82.49    Adult body mass index 40 and over [id number]    Pulmonary hypertension ([facility]) I27.20    Herpes simplex type 1 infection B00.9    Tobacco user Z72.0    Type 2 diabetes mellitus with complication, with long-term current use of insulin ([facility]) E11.8, Z79.4    Pruritus of vagina N89.8    Vaginal irritation N89.8    Paraparesis ([facility]) G82.20    Compression fracture of lumbar vertebra ([facility]) S32.000A    Acute thoracic back pain M54.6    Muscle weakness M62.81    Muscle pain M79.10    Fracture of first lumbar vertebra ([facility]) S32.019A    PMR (polymyalgia rheumatica) ([facility]) M35.3  Disorder of muscle, unspecified M62.9    Diarrhea R19.7    Nausea and vomiting R11.2    Gastroesophageal reflux disease K21.9       Past Medical History:        Diagnosis Date    Acute midline thoracic back pain 2018    B12 deficiency     Family history of premature CAD 2013    Fatty liver     Gastroesophageal reflux disease 2021    HPV (human papilloma virus) anogenital infection     Hyperlipidemia     Hypertension     Liver hemangioma     Obesity 3/11/13    BMI 43.42    Orthostatic hypotension     Ovarian cyst     PMR (polymyalgia rheumatica) (HCC)     Rectal fissure     Tobacco abuse 9/3/2015    Tobacco abuse     Tremor     Uncontrolled diabetes mellitus with complications ([facility] [address]     Unspecified sleep apnea        Past Surgical History:        Procedure Laterality Date    CARDIAC CATHETERIZATION      COLONOSCOPY      for diarrhea (-)    HYSTERECTOMY  12-10    LEEP      [place] MUSCLE BIOPSY Left 2018    LEFT QUADRICEPS MUSCLE BIOPSY performed by Kj Stanley MD at [address] [facility] UNI/BI CANNULATION N/A 2018    T 12 VERTEBRALPLASTY ROOM 278 performed by Tayla Bernal MD at [address] ENDOSCOPY  10/13/15      [address] [place] [address] SURGERY         Social History:    Social History     Tobacco Use    Smoking status: Former Smoker     Packs/day: 1.00     Types: Cigarettes     Quit date: 2017     Years since quittin.1    Smokeless tobacco: Never Used   Substance Use Topics    Alcohol use: Yes     Alcohol/week: 0.0 standard drinks     Frequency: Monthly or less     Binge frequency: Never     Comment: socially                                Counseling given: Not Answered      Vital Signs (Current): There were no vitals filed for this visit.                                            BP Readings from Last 3 Encounters:   21 120/82 12/26/20 120/74   12/16/20 (!) 106/47       NPO Status:                                                                                 BMI:   Wt Readings from Last 3 Encounters:   01/26/21 295 lb (133.8 kg)   12/26/20 295 lb (133.8 kg)   12/16/20 290 lb (131.5 kg)     There is no height or weight on file to calculate BMI.    CBC:   Lab Results   Component Value Date    WBC 15.0 12/16/2020    RBC 5.04 12/16/2020    RBC 4.57 02/22/2012    HGB 13.2 12/16/2020    HCT 40.4 12/16/2020    MCV 80.2 12/16/2020    RDW 16.6 12/16/2020     12/16/2020       CMP:   Lab Results   Component Value Date     01/26/2021    K 3.7 01/26/2021    K 3.7 09/18/2018    CL 97 01/26/2021    CO2 24 01/26/2021    BUN 12 01/26/2021    CREATININE 0.64 01/26/2021    GFRAA >60.0 01/26/2021    LABGLOM >60.0 01/26/2021    GLUCOSE 154 01/26/2021    GLUCOSE 262 05/31/2012    PROT 7.9 01/26/2021    CALCIUM 9.4 01/26/2021    BILITOT 0.4 01/26/2021    ALKPHOS 86 01/26/2021    AST 25 01/26/2021    ALT 28 01/26/2021       POC Tests: No results for input(s): POCGLU, POCNA, POCK, POCCL, POCBUN, POCHEMO, POCHCT in the last 72 hours.     Coags:   Lab Results   Component Value Date    PROTIME 10.0 10/07/2015    PROTIME 10.0 11/21/2011    INR 0.9 10/07/2015    APTT 26.9 06/29/2015       HCG (If Applicable): No results found for: PREGTESTUR, PREGSERUM, HCG, HCGQUANT     ABGs: No results found for: PHART, PO2ART, BXI6URZ, DVX9JHH, BEART, O9QEIFPM     Type & Screen (If Applicable):  No results found for: LABABO, LABRH    Drug/Infectious Status (If Applicable):  No results found for: HIV, HEPCAB    COVID-19 Screening (If Applicable):   Lab Results   Component Value Date    COVID19 Not Detected 02/04/2021         Anesthesia Evaluation  Patient summary reviewed and Nursing notes reviewed  Airway: Mallampati: III  TM distance: >3 FB   Neck ROM: full  Mouth opening: > = 3 FB Dental: normal exam         Pulmonary:normal exam    (+) sleep apnea:  current smoker

## 2021-02-10 NOTE — ANESTHESIA POSTPROCEDURE EVALUATION
Department of Anesthesiology  Postprocedure Note    Patient: Inderjit Bellamy  MRN: [id number]  YOB: 1975  Date of evaluation: 2/10/2021  Time:  1:52 PM     Procedure Summary     Date: 02/10/21 Room / Location: [address] [place] [address] [facility]    Anesthesia Start: 1306 Anesthesia Stop: 1351    Procedures:       EGD ESOPHAGOGASTRODUODENOSCOPY (N/A )      COLONOSCOPY DIAGNOSTIC (N/A ) Diagnosis: (nausea, vomiting, diarrhea; R11.2, R19.7)    Surgeons: Cony Espinoza MD Responsible Provider: SANIA Lou CRNA    Anesthesia Type: MAC ASA Status: 3          Anesthesia Type: MAC    Shahrzad Phase I: Shahrzad Score: 10    Shahrzad Phase II:      Last vitals: Reviewed and per EMR flowsheets.        Anesthesia Post Evaluation    Patient location during evaluation: PACU  Patient participation: complete - patient participated  Level of consciousness: awake  Pain score: 0  Airway patency: patent  Nausea & Vomiting: no nausea and no vomiting  Complications: no  Cardiovascular status: blood pressure returned to baseline and hemodynamically stable  Respiratory status: acceptable  Hydration status: euvolemic

## 2021-02-11 ENCOUNTER — SCHEDULED TELEPHONE ENCOUNTER (OUTPATIENT)
Dept: PHARMACY | Facility: CLINIC | Age: 46
End: 2021-02-11

## 2021-02-11 ENCOUNTER — TELEPHONE (OUTPATIENT)
Dept: FAMILY MEDICINE CLINIC | Age: 46
End: 2021-02-11

## 2021-02-11 RX ORDER — PREDNISONE 1 MG/1
5 TABLET ORAL DAILY
COMMUNITY
End: 2021-11-15 | Stop reason: SDUPTHER

## 2021-02-11 RX ORDER — ALPRAZOLAM 0.5 MG/1
0.5 TABLET ORAL 3 TIMES DAILY PRN
COMMUNITY
End: 2021-02-19 | Stop reason: SDUPTHER

## 2021-02-11 NOTE — TELEPHONE ENCOUNTER
Received a fax from [facility] requesting a paper prescription be faxed to [phone] or a call at [phone].      The medication being requested is:  Alprazolam 0.5 MG tablet 0.5  Written Quantity: 45  SIG: take 1 to 2 tablet by mouth nightly as needed for sleep or anxiety for up to 30 days  Last Fill Date: 05/19/2020

## 2021-02-11 NOTE — TELEPHONE ENCOUNTER
[facility] CLINICAL PHARMACY REVIEW - Be Well with Diabetes    Anna Mejía is a 41 y.o. female enrolled in the [address] [place] [address],[address] Floor Employee Diabetes Program. Patient provided Jc Phillip with verbal consent to remain in the program for this year. Medications:  Medication Sig    predniSONE (DELTASONE) 5 MG tablet Take 5 mg by mouth daily    ALPRAZolam (XANAX) 0.5 MG tablet Take 0.5 mg by mouth 3 times daily as needed for Anxiety.  metoprolol (LOPRESSOR) 100 MG tablet Take 1 tablet by mouth 2 times daily    rosuvastatin (CRESTOR) 10 MG tablet  · Covered by DM program TAKE ONE TABLET BY MOUTH NIGHTLY    ondansetron (ZOFRAN ODT) 4 MG disintegrating tablet Take 1 tablet by mouth every 8 hours as needed for Nausea or Vomiting    insulin regular human (HUMULIN R U-500 KWIKPEN) 500 UNIT/ML SOPN concentrated injection pen  · Covered by DM program 140-160 units three times a day    pioglitazone (ACTOS) 30 MG tablet  · Covered by DM program Take 1 tablet by mouth daily    esomeprazole (NEXIUM) 40 MG delayed release capsule Take 1 capsule by mouth daily    baclofen (LIORESAL) 10 MG tablet Take 0.5 tablets by mouth 2 times daily    losartan (COZAAR) 50 MG tablet  · Covered by DM program Take 1 tablet by mouth daily    hydroCHLOROthiazide (HYDRODIURIL) 25 MG tablet  · Covered by DM program Take 1 tablet by mouth daily    nystatin (MYCOSTATIN) 410409 UNIT/GM powder Apply 3 times daily.  clotrimazole-betamethasone (LOTRISONE) 1-0.05 % cream Apply topically 2 times daily.  venlafaxine (EFFEXOR XR) 75 MG extended release capsule TAKE TWO CAPSULES BY MOUTH EVERY MORNING AND TAKE ONE CAPSULE BY MOUTH EVERY EVENING    solifenacin (VESICARE) 10 MG tablet TAKE 1 TABLET BY MOUTH ONE TIME A DAY    metFORMIN (GLUCOPHAGE) 500 MG tablet  · Covered by DM program Take 1 tablet by mouth 3 times daily    miconazole (MICOTIN) 2 % cream Apply topically 2 times daily.       [facility]  Current testing supplies/frequency: Beth Cantu - patient states that she recently started Arnav Cantu and is very happy with it. Advised patient that she will likely go over the $600 benefit in 2021 due to the expense of Chrissy (used $240 year to date). Patient states understanding. Pen needles/syringes: generic pen needles    Allergies: Allergies   Allergen Reactions    Morphine And Related Hives and Rash    Ace Inhibitors Other (See Comments)     Dizziness and near syncope    Bactrim [Sulfamethoxazole-Trimethoprim] Itching    Nitroglycerin Other (See Comments)     Migraine    Percocet [Oxycodone-Acetaminophen] Nausea And Vomiting    Victoza [Liraglutide]      NAUSEA      Vitals/Labs:  BP Readings from Last 3 Encounters:   02/10/21 (!) 140/70   02/10/21 (!) 114/59   01/26/21 120/82     Lab Results   Component Value Date    LABMICR 12.10 (H) 11/18/2019     Lab Results   Component Value Date    LABA1C 9.2 (H) 11/18/2020    LABA1C 9.3 (H) 05/19/2020    LABA1C 9.0 (H) 11/18/2019     Lab Results   Component Value Date    CHOL 127 01/26/2021    TRIG 137 01/26/2021    HDL 35 (L) 01/26/2021    LDLCALC 65 01/26/2021     ALT   Date Value Ref Range Status   01/26/2021 28 0 - 33 U/L Final     AST   Date Value Ref Range Status   01/26/2021 25 0 - 35 U/L Final     The ASCVD Risk score (Kati Houston, et al., 2013) failed to calculate for the following reasons: The valid total cholesterol range is 130 to 320 mg/dL     Lab Results   Component Value Date    CREATININE 0.64 01/26/2021     CrCl cannot be calculated (Unknown ideal weight. ).   eGFR: > 60 mL/min/1.73 m^2    Immunizations:  Immunization History   Administered Date(s) Administered    Influenza 10/22/2013    Influenza Virus Vaccine 10/20/2014, 10/14/2015, 10/14/2019, 11/05/2020    Influenza, Quadv, 6 mo and older, IM, PF (Flulaval, Fluarix) 09/22/2018    Influenza, Quadv, IM, (6 mo and older Fluzone, Flulaval, Fluarix and 3 yrs and older Afluria) 2016, 2017    Pneumococcal Polysaccharide ([id number]) 10/14/2015    Tdap (Boostrix, Adacel) 2012      Social History:  Social History     Tobacco Use    Smoking status: Former Smoker     Packs/day: 1.00     Types: Cigarettes     Quit date: 2017     Years since quittin.1    Smokeless tobacco: Never Used   Substance Use Topics    Alcohol use: Yes     Alcohol/week: 0.0 standard drinks     Frequency: Monthly or less     Binge frequency: Never     Comment: socially     ASSESSMENT:  Initial Program Requirements (Y indicates has completed for the year, N indicates needs to be completed by 2021):  Yes - Provider Visit for DM (1st)  No - A1c (1st)     Ongoing Program Requirements (Y indicates has completed for the year, N indicates needs to be completed by 2021): No - Provider Visit for DM (2nd)  No - ACC/diabetes educator visit (if A1c over 8%) - active w/ [facility] RN in   No - A1c (2nd)  Yes - Lipid panel  No - Urine microalbumin  Yes - Pneumococcal vaccination: Up-to-date, not needed again until age 72  No - Influenza vaccination for 2021  Yes - Medication adherence over 70% - n/a; prescribed insulin   Yes - On statin - rosuvastatin   Yes - On ACEi/ARB - losartan     Formulary Medication Review:  Non-formulary or medications with cost-effective alternatives: none. Current medications eligible for copay waiver, up to $600, through [employer]:  - HCTZ, Humulin U-500, losartan, metformin, pioglitazone and rosuvastatin. - Prodigy and Freestyle Chrissy   - pen needles     Diabetes Care:   - Glycemic Goal: <7.0%. Is not at blood glucose goal but insulin therapy was recently changed by endocrinologist. Patient reports that she has not yet started U-500 insulin therapy because she had an endoscopy procedure yesterday and wanted to wait until after her procedure to start.  Patient is very nervous about starting new insulin therapy - great deal of education and encouragement provided to the patient. Discussed starting new U-500 insulin at 140 units TID as prescribed and increase dose as needed based upon BG levels. Patient will start therapy tomorrow. Patient has f/u VV with endocrinologist on 2/19/21, advised patient that I will ricardo for pharmacist f/u on 2/22/21 to review 2/19/21 endo VV and outreach to patient if there are any issues/concerns identified. Patient agreeable to this plan. Type 2 DM under poor control as evidenced by A1c remained > 9% in 2020.  - Current symptoms/problems include none  - Any episodes of hypoglycemia? yes - 63 in the time leading up to endoscopy procedure where she wasn't allowed to eat. - Duplicate MOA: none  - Reduce Pill Cecil: n/a  - Appropriateness of Insulin Therapy: patient recently switched to U-500 insulin therapy - unable to assess therapy since patient has not started therapy at the time of this call. - Therapy Optimization: start new insulin as instructed by endocrinologist.   - De-escalation of Therapy: not indicated at this time. - Daily aspirin? No: not indicated  - Adherent to ACEi/ARB and/or statin: appears adherent per claims review    Other Considerations:  - Blood Pressure Goal: BP less than 140/90 mmHg due to history of DM: Is at blood pressure goal.   - Lipids: Patient is prescribed moderate-intensity statin therapy. - Smoking status: Quit 4 years ago    PLAN:  - Consideration(s) for provider:   · None at this time. Will ricardo for f/u on 2/22/21 to assess if further [facility] support is needed.    - DM program gaps identified:   · Initial requirements: A1c (1st)   · Ongoing requirements: Provider visit for DM (2nd), ACC/diabetes educator visit (if A1c over 8%), A1c (2nd), Urine microalbumin and Influenza vaccination for [id number]   - Education to patient: U-500 new start   - Follow up: Endocrinologist for identified gaps or as scheduled below  - Upcoming appointments:   Future Appointments Date Time Provider Iliana Garcia   2/19/2021  1:30 PM Dequan Tuttle MD [facility]   3/1/2021  3:30 PM Cassie Kocher, MD [facility]   4/26/2021  8:30 AM Rita Wolff MD [address] [place] [address], PharmD, [facility]  Direct: [phone]  Department, toll free [phone], option [phone]            For Pharmacy Admin Tracking Only    PHSO: Yes  Total # of Interventions Recommended: 1  - Updated Order #: 1 Updated Order Reason(s):  Other  Recommended intervention potential cost savings: 1  Total Interventions Accepted: 1  Time Spent (min): 60

## 2021-02-13 NOTE — TELEPHONE ENCOUNTER
Patient reports you have prescribed this medication for her before. She said Dr. Claudia Edward was her previous PCP and had switched to you and that's when you took over managing her meds. Patient reports being out of this medication, the last time it was prescribed was in May of 2020. Please refill.

## 2021-02-13 NOTE — TELEPHONE ENCOUNTER
Patient is correct, but because it is a controlled substance, I cannot refill without an appointment

## 2021-02-19 ENCOUNTER — VIRTUAL VISIT (OUTPATIENT)
Dept: FAMILY MEDICINE CLINIC | Age: 46
End: 2021-02-19
Payer: COMMERCIAL

## 2021-02-19 ENCOUNTER — VIRTUAL VISIT (OUTPATIENT)
Dept: ENDOCRINOLOGY | Age: 46
End: 2021-02-19
Payer: COMMERCIAL

## 2021-02-19 DIAGNOSIS — B96.89 ACUTE BACTERIAL SINUSITIS: ICD-10-CM

## 2021-02-19 DIAGNOSIS — M35.3 PMR (POLYMYALGIA RHEUMATICA) (HCC): ICD-10-CM

## 2021-02-19 DIAGNOSIS — E11.8 TYPE 2 DIABETES MELLITUS WITH COMPLICATION, WITH LONG-TERM CURRENT USE OF INSULIN (HCC): Primary | ICD-10-CM

## 2021-02-19 DIAGNOSIS — J01.90 ACUTE BACTERIAL SINUSITIS: ICD-10-CM

## 2021-02-19 DIAGNOSIS — Z79.4 TYPE 2 DIABETES MELLITUS WITH COMPLICATION, WITH LONG-TERM CURRENT USE OF INSULIN (HCC): Primary | ICD-10-CM

## 2021-02-19 DIAGNOSIS — F41.9 ANXIETY: ICD-10-CM

## 2021-02-19 DIAGNOSIS — G47.33 OBSTRUCTIVE SLEEP APNEA SYNDROME: Primary | ICD-10-CM

## 2021-02-19 PROCEDURE — 99214 OFFICE O/P EST MOD 30 MIN: CPT | Performed by: FAMILY MEDICINE

## 2021-02-19 PROCEDURE — 99442 PR PHYS/QHP TELEPHONE EVALUATION 11-20 MIN: CPT | Performed by: INTERNAL MEDICINE

## 2021-02-19 RX ORDER — INSULIN LISPRO 100 [IU]/ML
INJECTION, SOLUTION INTRAVENOUS; SUBCUTANEOUS
Qty: 90 PEN | Refills: 3 | Status: SHIPPED | OUTPATIENT
Start: 2021-02-19 | End: 2021-04-19 | Stop reason: SDUPTHER

## 2021-02-19 RX ORDER — ALPRAZOLAM 0.5 MG/1
0.5 TABLET ORAL DAILY PRN
Qty: 30 TABLET | Refills: 0 | Status: SHIPPED | OUTPATIENT
Start: 2021-02-19 | End: 2022-05-05 | Stop reason: SDUPTHER

## 2021-02-19 RX ORDER — INSULIN GLARGINE 300 U/ML
INJECTION, SOLUTION SUBCUTANEOUS
Qty: 90 PEN | Refills: 3 | Status: SHIPPED | OUTPATIENT
Start: 2021-02-19 | End: 2021-02-24

## 2021-02-19 RX ORDER — AMOXICILLIN AND CLAVULANATE POTASSIUM 875; 125 MG/1; MG/1
1 TABLET, FILM COATED ORAL 2 TIMES DAILY
Qty: 20 TABLET | Refills: 0 | Status: SHIPPED | OUTPATIENT
Start: 2021-02-19 | End: 2021-03-02 | Stop reason: ALTCHOICE

## 2021-02-19 ASSESSMENT — ENCOUNTER SYMPTOMS
CHEST TIGHTNESS: 0
SINUS PAIN: 1
SHORTNESS OF BREATH: 0
ABDOMINAL PAIN: 0
CONSTIPATION: 0
DIARRHEA: 0
APNEA: 0
RHINORRHEA: 0
VOMITING: 0
NAUSEA: 0
BLOOD IN STOOL: 0
FACIAL SWELLING: 0
COUGH: 0
SINUS PRESSURE: 1

## 2021-02-19 ASSESSMENT — PATIENT HEALTH QUESTIONNAIRE - PHQ9
1. LITTLE INTEREST OR PLEASURE IN DOING THINGS: 0
SUM OF ALL RESPONSES TO PHQ QUESTIONS 1-9: 0
SUM OF ALL RESPONSES TO PHQ QUESTIONS 1-9: 0
SUM OF ALL RESPONSES TO PHQ9 QUESTIONS 1 & 2: 0

## 2021-02-19 NOTE — PROGRESS NOTES
2021    TELEHEALTH EVALUATION -- Audio/Visual (During HKSXP-38 public health emergency)    Due to [employer] outbreak, patient's office visit was converted to a virtual visit. Patient was contacted and agreed to proceed with a virtual visit via Telephone Visit  The risks and benefits of converting to a virtual visit were discussed in light of the current infectious disease epidemic. Patient also understood that insurance coverage and co-pays are up to their individual insurance plans. HPI: Telephone visit patient was at home I was at my office    Yuridia Oden (:  1975) has requested an audio/video evaluation for the following concern(s):    Follow-up on type 2 diabetes patient was started on Humulin U 500 insulin started with 110 units of end of 260 units without any improvement patient switch back to her Toujeo and Humalog patient is also on Actos using freestyle zhao average blood sugars are between 1 50-1 80 range no hypoglycemia requesting refills of her Toujeo and Humalog no A1c have been done last A1c was done in November it was 9.2  Patient using freestyle Love Yaquelinis is testing 6 times daily    Results for [place]" (MRN [id number]) as of 2021 13:45   Ref. Range 3/19/2019 07:34 7/15/2019 07:47 2019 07:32 2020 13:03 2020 09:23   Hemoglobin A1C Latest Ref Range: 4.8 - 5.9 % 8.5 (H) 8.9 (H) 9.0 (H) 9.3 (H) 9.2 (H)       Results for [place]" (MRN [id number]) as of 2021 13:45   Ref.  Range 2021 10:21 2021 18:44 2/10/2021 08:49 2/10/2021 12:30   Sodium Latest Ref Range: 135 - 144 mEq/L 137      Potassium Latest Ref Range: 3.4 - 4.9 mEq/L 3.7      Chloride Latest Ref Range: 95 - 107 mEq/L 97      CO2 Latest Ref Range: 20 - 31 mEq/L 24      BUN Latest Ref Range: 6 - 20 mg/dL 12      Creatinine Latest Ref Range: 0.50 - 0.90 mg/dL 0.64      Anion Gap Latest Ref Range: 9 - 15 mEq/L 16 (H) GFR Non- Latest Ref Range: >60  >60.0      GFR African American Latest Ref Range: >60  >60.0      Glucose Latest Ref Range: 70 - 99 mg/dL 154 (H)      POC Glucose Latest Ref Range: 60 - 115 mg/dl    173 (H)   Calcium Latest Ref Range: 8.5 - 9.9 mg/dL 9.4      Total Protein Latest Ref Range: 6.3 - 8.0 g/dL 7.9      Cholesterol, Total Latest Ref Range: 0 - 199 mg/dL 127      HDL Cholesterol Latest Ref Range: 40 - 59 mg/dL 35 (L)      LDL Calculated Latest Ref Range: 0 - 129 mg/dL 65      Triglycerides Latest Ref Range: 0 - 150 mg/dL 137      Albumin Latest Ref Range: 3.5 - 4.6 g/dL 4.1      Globulin Latest Ref Range: 2.3 - 3.5 g/dL 3.8 (H)      Alk Phos Latest Ref Range: 40 - 130 U/L 86      ALT Latest Ref Range: 0 - 33 U/L 28      AST Latest Ref Range: 0 - 35 U/L 25      Bilirubin Latest Ref Range: 0.2 - 0.7 mg/dL 0.4        Patient Active Problem List   Diagnosis    Orthostatic hypotension    Cobalamin deficiency    HPV (human papilloma virus) anogenital infection    Cyst of ovary    RAE (obstructive sleep apnea)    Dyslipidemia    FH: premature coronary heart disease    Adult body mass index 40 and over    Pulmonary hypertension (HCC)    Herpes simplex type 1 infection    Tobacco user    Type 2 diabetes mellitus with complication, with long-term current use of insulin (HCC)    Pruritus of vagina    Vaginal irritation    Paraparesis (HCC)    Compression fracture of lumbar vertebra (HCC)    Acute thoracic back pain    Muscle weakness    Muscle pain    Fracture of first lumbar vertebra (HCC)    PMR (polymyalgia rheumatica) ([facility])    Disorder of muscle, unspecified    Diarrhea    Nausea and vomiting    Gastroesophageal reflux disease    Gastritis without bleeding    Polyp of colon         Review of Systems    Prior to Visit Medications    Medication Sig Taking?  Authorizing Provider   predniSONE (DELTASONE) 5 MG tablet Take 5 mg by mouth daily  Historical Provider, MD ALPRAZolam (XANAX) 0.5 MG tablet Take 0.5 mg by mouth 3 times daily as needed for Anxiety. Historical Provider, MD   metoprolol (LOPRESSOR) 100 MG tablet Take 1 tablet by mouth 2 times daily  Faiza Chu MD   rosuvastatin (CRESTOR) 10 MG tablet TAKE ONE TABLET BY MOUTH NIGHTLY  Rudi Corcoran MD   ondansetron (ZOFRAN ODT) 4 MG disintegrating tablet Take 1 tablet by mouth every 8 hours as needed for Nausea or Vomiting  Edgar Mora MD   insulin regular human (HUMULIN R U-500 KWIKPEN) 500 UNIT/ML SOPN concentrated injection pen 140-160 units three times a day  Edgar Mora MD   Continuous Blood Gluc Sensor (FREESTYLE JAIRO 14 DAY SENSOR) MISC Every 2 weeks  Edgar Mora MD   Continuous Blood Gluc  (FREESTYLE JAIRO 14 DAY READER) ALEXANDREA As directed  Edgar Mora MD   pioglitazone (ACTOS) 30 MG tablet Take 1 tablet by mouth daily  Edgar Mora MD   Insulin Pen Needle (PEN NEEDLES) 32G X 4 MM MISC Use as directed with insulin pens, 4 times daily  Patrick Marinelli MD   Insulin Pen Needle (PEN NEEDLES) 32G X 4 MM MISC Use as directed with insulin pens, 4 times daily  Edgar Mora MD   esomeprazole (NEXIUM) 40 MG delayed release capsule Take 1 capsule by mouth daily  SANIA Chew - CNP   baclofen (LIORESAL) 10 MG tablet Take 0.5 tablets by mouth 2 times daily  Faiza Chu MD   losartan (COZAAR) 50 MG tablet Take 1 tablet by mouth daily  Faiza Chu MD   hydroCHLOROthiazide (HYDRODIURIL) 25 MG tablet Take 1 tablet by mouth daily  Faiza Chu MD   nystatin (MYCOSTATIN) 222701 UNIT/GM powder Apply 3 times daily. Faiza Chu MD   blood glucose test strips (PRODIGY NO CODING BLOOD GLUC) strip 1 each by In Vitro route 4 times daily As needed. Faiza Chu MD   blood glucose test strips (PRODIGY NO CODING BLOOD GLUC) strip 1 each by In Vitro route 4 times daily As needed. Edgar Mora MD   clotrimazole-betamethasone (LOTRISONE) 1-0.05 % cream Apply topically 2 times daily.   Daisy Deleon, DO venlafaxine (EFFEXOR XR) 75 MG extended release capsule TAKE TWO CAPSULES BY MOUTH EVERY MORNING AND TAKE ONE CAPSULE BY MOUTH EVERY EVENING  Rudi Ward MD   solifenacin (VESICARE) 10 MG tablet TAKE 1 TABLET BY MOUTH ONE TIME A DAY  Chris Knight MD   sucralfate (CARAFATE) 1 GM tablet Take 1 tablet by mouth 4 times daily for 7 days  SANIA Emerson CNP   metFORMIN (GLUCOPHAGE) 500 MG tablet Take 1 tablet by mouth 3 times daily  Andree Jean MD   Blood Glucose Monitoring Suppl (PRODIGY AUTOCODE BLOOD GLUCOSE) w/Device KIT Use as directed to test up to 4 times daily  MD ROOSEVELT Ortiz LANCETS 28G MISC Use as directed to test up to 4 times daily  Andree Jean MD   miconazole (MICOTIN) 2 % cream Apply topically 2 times daily. Darrell Nam MD   Handicap Placard MISC Exp 5 years  Darrell Nam MD       Social History     Tobacco Use    Smoking status: Former Smoker     Packs/day: 1.00     Types: Cigarettes     Quit date: 2017     Years since quittin.1    Smokeless tobacco: Never Used   Substance Use Topics    Alcohol use: Yes     Alcohol/week: 0.0 standard drinks     Frequency: Monthly or less     Binge frequency: Never     Comment: socially    Drug use:  No            PHYSICAL EXAMINATION:  [ INSTRUCTIONS:  \"[x]\" Indicates a positive item  \"[]\" Indicates a negative item  -- DELETE ALL ITEMS NOT EXAMINED]  [] Alert  [] Oriented to person/place/time    [] No apparent distress  [] Toxic appearing    [] Face flushed appearing [] Sclera clear  [] Lips are cyanotic      [] Breathing appears normal  [] Appears tachypneic      [] Rash on visible skin    [] Cranial Nerves II-XII grossly intact    [] Motor grossly intact in visible upper extremities    [] Motor grossly intact in visible lower extremities    [] Normal Mood  [] Anxious appearing    [] Depressed appearing  [] Confused appearing      [] Poor short term memory  [] Poor long term memory    [] OTHER: Due to this being a TeleHealth encounter, evaluation of the following organ systems is limited: Vitals/Constitutional/EENT/Resp/CV/GI//MS/Neuro/Skin/Heme-Lymph-Imm. ASSESSMENT/PLAN:   Diagnosis Orders   1. Type 2 diabetes mellitus with complication, with long-term current use of insulin (HCC)  Basic Metabolic Panel    Hemoglobin A1C    Microalbumin / Creatinine Urine Ratio     Orders Placed This Encounter   Procedures    Basic Metabolic Panel     Standing Status:   Future     Standing Expiration Date:   2022    Hemoglobin A1C     Standing Status:   Future     Standing Expiration Date:   2022    Microalbumin / Creatinine Urine Ratio     Standing Status:   Future     Standing Expiration Date:   2022     Orders Placed This Encounter   Medications    Insulin Glargine, 2 Unit Dial, (TOUJEO MAX SOLOSTAR) 300 UNIT/ML SOPN     Sig: INJECT 240 UNITS SUBCUTANEOUSLY AT BEDTIME     Dispense:  90 pen     Refill:  3    insulin lispro, 1 Unit Dial, (HUMALOG KWIKPEN) 100 UNIT/ML SOPN     Si units with each meals     Dispense:  90 pen     Refill:  3     Resume Toujeo to 240 units at bedtime Humalog 80 with each meals plus Actos 30 mg daily labs to be done in 6 weeks for BMP A1c    An  electronic signature was used to authenticate this note. --Nolberto Klein MD on 2021 at 1:45 PM        Pursuant to the emergency declaration under the [facility] waiver authority and the [employer] Act, this Virtual  Visit was conducted, with patient's consent, to reduce the patient's risk of exposure to COVID-19 and provide continuity of care for an established patient. Services were provided through a video synchronous discussion virtually to substitute for in-person clinic visit.

## 2021-02-19 NOTE — PROGRESS NOTES
2021    TELEHEALTH EVALUATION -- Audio/Visual (During JHQJW-88 public health emergency)    Due to J[place] 19 outbreak, patient's office visit was converted to a virtual visit. Patient was contacted and agreed to proceed with a virtual visit via [email or web address]  The risks and benefits of converting to a virtual visit were discussed in light of the current infectious disease epidemic. Patient also understood that insurance coverage and co-pays are up to their individual insurance plans. HPI:    Darrell Snider (:  1975) has requested an audio/video evaluation for the following concern(s):    Patient is a very pleasant 71-year-old female with a history of polymyalgia rheumatica who presents today to follow-up . She has been experiencing severe sinus pressure and tenderness over the past 5 to 6 days. She feels her symptoms are stable and possibly a little worse. Sinus pressure is one-sided on the right maxillary and frontal sinus and radiates to her ear. She has been having some transient mild dizziness that has been associated with her other symptoms. She denies any fever or chills and has been using over-the-counter oral decongestants with very short-lived benefit. She has noticed bloody discharge in the mornings. Patient also has a history of anxiety that is extremely intermittent. She has previously been prescribed 45 pills of Xanax that has lasted her 1 year. She is requesting a refill today. Patient has a history of obstructive sleep apnea but was unable to have titration study performed as the full mask made her claustrophobic. She is open to seeing pulmonology for further titration of her settings    Review of Systems   Constitutional: Negative for activity change, appetite change, fatigue and fever. HENT: Positive for congestion, ear pain, nosebleeds, sinus pressure and sinus pain. Negative for dental problem, facial swelling, mouth sores, postnasal drip and rhinorrhea.     Respiratory: Negative for apnea, cough, chest tightness and shortness of breath. Cardiovascular: Negative for chest pain, palpitations and leg swelling. Gastrointestinal: Negative for abdominal pain, blood in stool, constipation, diarrhea, nausea and vomiting. Musculoskeletal: Negative for arthralgias. Neurological: Negative for seizures and headaches. Psychiatric/Behavioral: Negative for hallucinations and suicidal ideas. The patient is nervous/anxious. Prior to Visit Medications    Medication Sig Taking? Authorizing Provider   Insulin Glargine, 2 Unit Dial, (TOUJEO MAX SOLOSTAR) 300 UNIT/ML SOPN INJECT 240 UNITS SUBCUTANEOUSLY AT BEDTIME Yes Amirta Telles MD   insulin lispro, 1 Unit Dial, (HUMALOG KWIKPEN) 100 UNIT/ML SOPN 80 units with each meals Yes Patrick Marinelli MD   ALPRAZolam (XANAX) 0.5 MG tablet Take 1 tablet by mouth daily as needed for Anxiety for up to 45 days. Take 0.5 mg by mouth 3 times daily as needed for Anxiety.  Yes Tomeka Laureano MD   amoxicillin-clavulanate (AUGMENTIN) 875-125 MG per tablet Take 1 tablet by mouth 2 times daily for 10 days Yes Tomeka Laureano MD   predniSONE (DELTASONE) 5 MG tablet Take 5 mg by mouth daily Yes Historical Provider, MD   metoprolol (LOPRESSOR) 100 MG tablet Take 1 tablet by mouth 2 times daily Yes Tomeka Laureano MD   rosuvastatin (CRESTOR) 10 MG tablet TAKE ONE TABLET BY MOUTH NIGHTLY Yes Tomeka Laureano MD   ondansetron (ZOFRAN ODT) 4 MG disintegrating tablet Take 1 tablet by mouth every 8 hours as needed for Nausea or Vomiting Yes Amrita Telles MD   Continuous Blood Gluc Sensor (FREESTYLE JAIRO 14 DAY SENSOR) MISC Every 2 weeks Yes Amrita Telles MD   Continuous Blood Gluc  (FREESTYLE JAIRO 14 DAY READER) ALEXANDREA As directed Yes Amrita Telles MD   pioglitazone (ACTOS) 30 MG tablet Take 1 tablet by mouth daily Yes Amrita Telles MD   Insulin Pen Needle (PEN NEEDLES) 32G X 4 MM MISC Use as directed with insulin pens, 4 times daily Yes Amrita Telles MD   Insulin Pen Needle (PEN date: 2017     Years since quittin.1    Smokeless tobacco: Never Used   Substance Use Topics    Alcohol use: Yes     Alcohol/week: 0.0 standard drinks     Frequency: Monthly or less     Binge frequency: Never     Comment: socially    Drug use: No          PHYSICAL EXAMINATION:    [x] Alert  [x] Oriented to person/place/time    [x] No apparent distress   [x] Sclera clear  [x] Breathing appears normal   [x] Cranial Nerves II-XII grossly intact    [x] Motor grossly intact in visible upper extremities    [x] Motor grossly intact in visible lower extremities    [x] Normal Mood [] OTHER:      Due to this being a TeleHealth encounter, evaluation of the following organ systems is limited: Vitals/Constitutional/EENT/Resp/CV/GI//MS/Neuro/Skin/Heme-Lymph-Imm. ASSESSMENT/PLAN:  1. Obstructive sleep apnea syndrome  Patient referred to pulmonology for further treatment  - Ambulatory referral to Pulmonology    2. PMR (polymyalgia rheumatica) (HCC)  Continue to follow-up with rheumatology    3. Acute bacterial sinusitis  Due to severity of symptoms and unilateral nature will treat with Augmentin as there may be ear involvement as well. - amoxicillin-clavulanate (AUGMENTIN) 875-125 MG per tablet; Take 1 tablet by mouth 2 times daily for 10 days  Dispense: 20 tablet; Refill: 0    4. Anxiety  Well-controlled and will provide prescription to use only as needed  - ALPRAZolam (XANAX) 0.5 MG tablet; Take 1 tablet by mouth daily as needed for Anxiety for up to 45 days. Take 0.5 mg by mouth 3 times daily as needed for Anxiety. Dispense: 30 tablet; Refill: 0      Return if symptoms worsen or fail to improve.     An  electronic signature was used to authenticate this note.    --Valentin Campa MD on 2021 at 7:10 PM    Pursuant to the emergency declaration under the [address] [place] [address] and the [employer] and [employer] Act, this Virtual

## 2021-02-23 ENCOUNTER — TELEPHONE (OUTPATIENT)
Dept: PHARMACY | Facility: CLINIC | Age: 46
End: 2021-02-23

## 2021-02-23 NOTE — TELEPHONE ENCOUNTER
POPULATION HEALTH CLINICAL PHARMACY REVIEW - BE WELL WITH DIABETES: HIGH A1C  =============================================  Emmanuel Antonio is a 41 y.o. female enrolled in the [facility] Well with Diabetes program.    Identified care gap(s): A1c > 9%    DM Program Prescriptions:  Current Outpatient Medications   Medication Sig Dispense Refill    Insulin Glargine, 2 Unit Dial, (TOUJEO MAX SOLOSTAR) 300 UNIT/ML SOPN INJECT 240 UNITS SUBCUTANEOUSLY AT BEDTIME 90 pen 3    insulin lispro, 1 Unit Dial, (HUMALOG KWIKPEN) 100 UNIT/ML SOPN 80 units with each meals 90 pen 3    ALPRAZolam (XANAX) 0.5 MG tablet Take 1 tablet by mouth daily as needed for Anxiety for up to 45 days. Take 0.5 mg by mouth 3 times daily as needed for Anxiety.  30 tablet 0    amoxicillin-clavulanate (AUGMENTIN) 875-125 MG per tablet Take 1 tablet by mouth 2 times daily for 10 days 20 tablet 0    predniSONE (DELTASONE) 5 MG tablet Take 5 mg by mouth daily      metoprolol (LOPRESSOR) 100 MG tablet Take 1 tablet by mouth 2 times daily 180 tablet 0    rosuvastatin (CRESTOR) 10 MG tablet TAKE ONE TABLET BY MOUTH NIGHTLY 90 tablet 0    ondansetron (ZOFRAN ODT) 4 MG disintegrating tablet Take 1 tablet by mouth every 8 hours as needed for Nausea or Vomiting 30 tablet 3    Continuous Blood Gluc Sensor (FREESTYLE JAIRO 14 DAY SENSOR) MISC Every 2 weeks 6 each 03    Continuous Blood Gluc  (FREESTYLE JAIRO 14 DAY READER) ALEXANDREA As directed 1 Device 00    pioglitazone (ACTOS) 30 MG tablet Take 1 tablet by mouth daily 90 tablet 3    Insulin Pen Needle (PEN NEEDLES) 32G X 4 MM MISC Use as directed with insulin pens, 4 times daily 400 each 1    Insulin Pen Needle (PEN NEEDLES) 32G X 4 MM MISC Use as directed with insulin pens, 4 times daily 400 each 1    esomeprazole (NEXIUM) 40 MG delayed release capsule Take 1 capsule by mouth daily 30 capsule 3    baclofen (LIORESAL) 10 MG tablet Take 0.5 tablets by mouth 2 times daily 90 tablet 1    losartan (COZAAR) 50 MG tablet Take 1 tablet by mouth daily 90 tablet 1    hydroCHLOROthiazide (HYDRODIURIL) 25 MG tablet Take 1 tablet by mouth daily 90 tablet 1    nystatin (MYCOSTATIN) 140803 UNIT/GM powder Apply 3 times daily. 1 Bottle 2    blood glucose test strips (PRODIGY NO CODING BLOOD GLUC) strip 1 each by In Vitro route 4 times daily As needed. 400 each 3    blood glucose test strips (PRODIGY NO CODING BLOOD GLUC) strip 1 each by In Vitro route 4 times daily As needed. 400 each 3    clotrimazole-betamethasone (LOTRISONE) 1-0.05 % cream Apply topically 2 times daily. 1 Tube 3    venlafaxine (EFFEXOR XR) 75 MG extended release capsule TAKE TWO CAPSULES BY MOUTH EVERY MORNING AND TAKE ONE CAPSULE BY MOUTH EVERY EVENING 270 capsule 1    solifenacin (VESICARE) 10 MG tablet TAKE 1 TABLET BY MOUTH ONE TIME A DAY 90 tablet 3    metFORMIN (GLUCOPHAGE) 500 MG tablet Take 1 tablet by mouth 3 times daily 270 tablet 3    Blood Glucose Monitoring Suppl (PRODIGY AUTOCODE BLOOD GLUCOSE) w/Device KIT Use as directed to test up to 4 times daily 1 kit 0    PRODIGY LANCETS 28G MISC Use as directed to test up to 4 times daily 400 each 3    miconazole (MICOTIN) 2 % cream Apply topically 2 times daily. 141 g 3    Handicap Placard MISC Exp 5 years 1 each 0     No current facility-administered medications for this visit. Allergies: Allergies   Allergen Reactions    Morphine And Related Hives and Rash    Ace Inhibitors Other (See Comments)     Dizziness and near syncope    Bactrim [Sulfamethoxazole-Trimethoprim] Itching    Nitroglycerin Other (See Comments)     Migraine    Percocet [Oxycodone-Acetaminophen] Nausea And Vomiting    Victoza [Liraglutide]      NAUSEA        Labs:  Lab Results   Component Value Date    LABA1C 9.2 (H) 11/18/2020    LABA1C 9.3 (H) 05/19/2020    LABA1C 9.0 (H) 11/18/2019     Estimated Creatinine Clearance: 158 mL/min (based on SCr of 0.64 mg/dL).   eGFR: > 60 mL/min/1.73 m^2    Care Team:   - Physician (endocrinologist): Marco A Crocker (Last OV: 2/2021)  - ACC/RD: none (Last Encounter: none, Dana Erickson called, however that was for post ER visit FU)  - Upcoming appointments:   Future Appointments   Date Time Provider Iliana Garcia   2/24/2021  8:30 AM SANIA Richardson  Jamir Golden   3/1/2021  3:30 PM MD Preston Clark   4/19/2021 11:30 AM Nicole Crocker MD [facility]   4/26/2021  8:30 AM MD Preston Younger       Refill History: Toujeo- 3/10/20, 5/18/20, 7/24/20, 9/17/20, 11/25/20, and in process to be filled for 60ml  Humalog- 3/30/20, 6/11/20, 9/10/20, 11/24/20 for 210 mls  metformin 3/30/20, 6/30/20, 9/23/20, 12/15/20 for 270 tabs  Pioglitazone 1/27/21- did patient start this med? Per chart patient had concerns regarding bladder cancer warnings     Diabetes Care:  - Glycemic Goal: <7.0% and directed by provider. Is not at blood glucose goal but is on insulin therapy. .   - Reduce Pill [place]: metformin/pioglitazone once tolerating pioglit  - Appropriateness of Insulin Therapy: Per endo note 2/2021- \"patient was started on Humulin U 500 insulin started with 110 units of end of 260 units without any improvement patient switch back to her Toujeo and Humalog\"    - Therapy Optimization: filled freestyle zhao 2/2/21  - Medication changes since last A1c: Per Dr Marco A Crocker note 2/2021- \"Resume Toujeo to 240 units at bedtime Humalog 80 with each meals plus Actos 30 mg daily labs to be done in 6 weeks for BMP A1c\"  Increase metformin to 2000 mg/day? Currently 1500 mg- Patient is currently seeing Gastro for nausea, vomiting, colon polyp, diarrhea  Per gastro note from 2/2021- EGD was notable for mild gastritis, biopsies were negative for HP or celiac. Colonoscopy was notable for 1 colon polyp with biopsy consistent with tubular adenoma.  Patient reports a longstanding history of intermittent cyclical type of episodes since 2015.  Was previously diagnosed with diabetic gastroparesis however her gastric emptying study was normal and was previously on erythromycin as treatment with good results. Krys Hi has a longstanding history of GERD that is not well controlled on current regimen of Prilosec. Patient to get new gastric emptying study, upper quadrant US, and stool studies  - Medication Adherence Assessment: adherent per fill hx  - ACE/ARB and/or Statin Gap: on statin- adherent, losartan- adherent    Plan:    Attempt made to reach patient by telephone for diabetes review. Left voice message for patient to return clinician's phone call to direct line. Will continue to attempt to contact patient by telephone as appropriate. Would like to review: see above.       Ashley Cano, PharmD, [address] [place] [address] Clinical Pharmacist  Direct: [phone]  Department: [phone], option 7

## 2021-02-24 ENCOUNTER — VIRTUAL VISIT (OUTPATIENT)
Dept: GASTROENTEROLOGY | Age: 46
End: 2021-02-24
Payer: COMMERCIAL

## 2021-02-24 ENCOUNTER — HOSPITAL ENCOUNTER (EMERGENCY)
Age: 46
Discharge: HOME | End: 2021-02-24
Payer: COMMERCIAL

## 2021-02-24 ENCOUNTER — E-VISIT (OUTPATIENT)
Dept: FAMILY MEDICINE CLINIC | Age: 46
End: 2021-02-24
Payer: COMMERCIAL

## 2021-02-24 VITALS
HEIGHT: 66 IN | DIASTOLIC BLOOD PRESSURE: 87 MMHG | SYSTOLIC BLOOD PRESSURE: 151 MMHG | TEMPERATURE: 97.6 F | HEART RATE: 108 BPM | RESPIRATION RATE: 16 BRPM | WEIGHT: 293 LBS | BODY MASS INDEX: 47.09 KG/M2 | OXYGEN SATURATION: 95 %

## 2021-02-24 DIAGNOSIS — N39.0 ACUTE UTI: Primary | ICD-10-CM

## 2021-02-24 DIAGNOSIS — R10.11 RUQ PAIN: ICD-10-CM

## 2021-02-24 DIAGNOSIS — R11.2 NAUSEA AND VOMITING, INTRACTABILITY OF VOMITING NOT SPECIFIED, UNSPECIFIED VOMITING TYPE: Primary | ICD-10-CM

## 2021-02-24 DIAGNOSIS — K63.5 POLYP OF COLON, UNSPECIFIED PART OF COLON, UNSPECIFIED TYPE: ICD-10-CM

## 2021-02-24 DIAGNOSIS — R11.2 NON-INTRACTABLE VOMITING WITH NAUSEA, UNSPECIFIED VOMITING TYPE: Primary | ICD-10-CM

## 2021-02-24 LAB
ALBUMIN SERPL-MCNC: 4.1 G/DL (ref 3.5–4.6)
ALP BLD-CCNC: 93 U/L (ref 40–130)
ALT SERPL-CCNC: 24 U/L (ref 0–33)
ANION GAP SERPL CALCULATED.3IONS-SCNC: 14 MEQ/L (ref 9–15)
AST SERPL-CCNC: 23 U/L (ref 0–35)
BACTERIA: ABNORMAL /HPF
BASOPHILS ABSOLUTE: 0 K/UL (ref 0–0.2)
BASOPHILS RELATIVE PERCENT: 0.2 %
BILIRUB SERPL-MCNC: 0.4 MG/DL (ref 0.2–0.7)
BILIRUBIN URINE: NEGATIVE
BLOOD, URINE: NEGATIVE
BUN BLDV-MCNC: 14 MG/DL (ref 6–20)
CALCIUM SERPL-MCNC: 9.4 MG/DL (ref 8.5–9.9)
CHLORIDE BLD-SCNC: 98 MEQ/L (ref 95–107)
CLARITY: CLEAR
CO2: 27 MEQ/L (ref 20–31)
COLOR: YELLOW
CREAT SERPL-MCNC: 0.71 MG/DL (ref 0.5–0.9)
EOSINOPHILS ABSOLUTE: 0.1 K/UL (ref 0–0.7)
EOSINOPHILS RELATIVE PERCENT: 0.6 %
EPITHELIAL CELLS, UA: ABNORMAL /HPF (ref 0–5)
GFR AFRICAN AMERICAN: >60
GFR NON-AFRICAN AMERICAN: >60
GLOBULIN: 3.8 G/DL (ref 2.3–3.5)
GLUCOSE BLD-MCNC: 149 MG/DL (ref 70–99)
GLUCOSE BLD-MCNC: 167 MG/DL (ref 60–115)
GLUCOSE URINE: NEGATIVE MG/DL
HCT VFR BLD CALC: 38.5 % (ref 37–47)
HEMOGLOBIN: 12.5 G/DL (ref 12–16)
HYALINE CASTS: ABNORMAL /HPF (ref 0–5)
KETONES, URINE: ABNORMAL MG/DL
LEUKOCYTE ESTERASE, URINE: NEGATIVE
LIPASE: 29 U/L (ref 12–95)
LYMPHOCYTES ABSOLUTE: 2.5 K/UL (ref 1–4.8)
LYMPHOCYTES RELATIVE PERCENT: 26.9 %
MCH RBC QN AUTO: 26.3 PG (ref 27–31.3)
MCHC RBC AUTO-ENTMCNC: 32.5 % (ref 33–37)
MCV RBC AUTO: 81.1 FL (ref 82–100)
MONOCYTES ABSOLUTE: 0.5 K/UL (ref 0.2–0.8)
MONOCYTES RELATIVE PERCENT: 5.4 %
NEUTROPHILS ABSOLUTE: 6.2 K/UL (ref 1.4–6.5)
NEUTROPHILS RELATIVE PERCENT: 66.9 %
NITRITE, URINE: NEGATIVE
PDW BLD-RTO: 17.3 % (ref 11.5–14.5)
PERFORMED ON: ABNORMAL
PH UA: 5.5 (ref 5–9)
PLATELET # BLD: 253 K/UL (ref 130–400)
POTASSIUM SERPL-SCNC: 3.6 MEQ/L (ref 3.4–4.9)
PROTEIN UA: 30 MG/DL
RBC # BLD: 4.75 M/UL (ref 4.2–5.4)
RBC UA: ABNORMAL /HPF (ref 0–2)
SODIUM BLD-SCNC: 139 MEQ/L (ref 135–144)
SPECIFIC GRAVITY UA: 1.02 (ref 1–1.03)
TOTAL PROTEIN: 7.9 G/DL (ref 6.3–8)
URINE REFLEX TO CULTURE: ABNORMAL
UROBILINOGEN, URINE: 1 E.U./DL
WBC # BLD: 9.3 K/UL (ref 4.8–10.8)
WBC UA: ABNORMAL /HPF (ref 0–5)

## 2021-02-24 PROCEDURE — 96374 THER/PROPH/DIAG INJ IV PUSH: CPT

## 2021-02-24 PROCEDURE — 85025 COMPLETE CBC W/AUTO DIFF WBC: CPT

## 2021-02-24 PROCEDURE — 80053 COMPREHEN METABOLIC PANEL: CPT

## 2021-02-24 PROCEDURE — 96375 TX/PRO/DX INJ NEW DRUG ADDON: CPT

## 2021-02-24 PROCEDURE — 36415 COLL VENOUS BLD VENIPUNCTURE: CPT

## 2021-02-24 PROCEDURE — 96376 TX/PRO/DX INJ SAME DRUG ADON: CPT

## 2021-02-24 PROCEDURE — 99213 OFFICE O/P EST LOW 20 MIN: CPT | Performed by: NURSE PRACTITIONER

## 2021-02-24 PROCEDURE — 2580000003 HC RX 258: Performed by: PHYSICIAN ASSISTANT

## 2021-02-24 PROCEDURE — 6360000002 HC RX W HCPCS: Performed by: PHYSICIAN ASSISTANT

## 2021-02-24 PROCEDURE — 81001 URINALYSIS AUTO W/SCOPE: CPT

## 2021-02-24 PROCEDURE — 99284 EMERGENCY DEPT VISIT MOD MDM: CPT

## 2021-02-24 PROCEDURE — 98970 NQHP OL DIG ASSMT&MGMT 5-10: CPT | Performed by: NURSE PRACTITIONER

## 2021-02-24 PROCEDURE — 83690 ASSAY OF LIPASE: CPT

## 2021-02-24 RX ORDER — 0.9 % SODIUM CHLORIDE 0.9 %
1000 INTRAVENOUS SOLUTION INTRAVENOUS ONCE
Status: COMPLETED | OUTPATIENT
Start: 2021-02-24 | End: 2021-02-24

## 2021-02-24 RX ORDER — ONDANSETRON 2 MG/ML
4 INJECTION INTRAMUSCULAR; INTRAVENOUS ONCE
Status: COMPLETED | OUTPATIENT
Start: 2021-02-24 | End: 2021-02-24

## 2021-02-24 RX ORDER — CEPHALEXIN 500 MG/1
500 CAPSULE ORAL 3 TIMES DAILY
Qty: 15 CAPSULE | Refills: 0 | Status: SHIPPED | OUTPATIENT
Start: 2021-02-24 | End: 2021-03-02 | Stop reason: ALTCHOICE

## 2021-02-24 RX ORDER — INSULIN GLARGINE 300 U/ML
INJECTION, SOLUTION SUBCUTANEOUS
Qty: 90 PEN | Refills: 3 | Status: SHIPPED | OUTPATIENT
Start: 2021-02-24 | End: 2021-04-19 | Stop reason: SDUPTHER

## 2021-02-24 RX ORDER — METOCLOPRAMIDE HYDROCHLORIDE 5 MG/ML
10 INJECTION INTRAMUSCULAR; INTRAVENOUS ONCE
Status: COMPLETED | OUTPATIENT
Start: 2021-02-24 | End: 2021-02-24

## 2021-02-24 RX ORDER — PROMETHAZINE HYDROCHLORIDE 25 MG/1
25 SUPPOSITORY RECTAL EVERY 6 HOURS PRN
Qty: 8 SUPPOSITORY | Refills: 0 | Status: SHIPPED | OUTPATIENT
Start: 2021-02-24 | End: 2021-03-02

## 2021-02-24 RX ADMIN — ONDANSETRON 4 MG: 2 INJECTION INTRAMUSCULAR; INTRAVENOUS at 07:45

## 2021-02-24 RX ADMIN — ONDANSETRON 4 MG: 2 INJECTION INTRAMUSCULAR; INTRAVENOUS at 06:19

## 2021-02-24 RX ADMIN — SODIUM CHLORIDE 1000 ML: 9 INJECTION, SOLUTION INTRAVENOUS at 06:19

## 2021-02-24 RX ADMIN — METOCLOPRAMIDE HYDROCHLORIDE 10 MG: 5 INJECTION INTRAMUSCULAR; INTRAVENOUS at 07:06

## 2021-02-24 ASSESSMENT — ENCOUNTER SYMPTOMS
NAUSEA: 1
RECTAL PAIN: 0
TROUBLE SWALLOWING: 0
CHEST TIGHTNESS: 0
COLOR CHANGE: 0
WHEEZING: 0
VOMITING: 0
RHINORRHEA: 0
EYE DISCHARGE: 0
ABDOMINAL PAIN: 0
ABDOMINAL PAIN: 1
ABDOMINAL DISTENTION: 0
ABDOMINAL DISTENTION: 0
PHOTOPHOBIA: 0
EYE REDNESS: 0
COLOR CHANGE: 0
CONSTIPATION: 0
VOICE CHANGE: 0
VOMITING: 1
DIARRHEA: 0
CONSTIPATION: 0
SHORTNESS OF BREATH: 0
SHORTNESS OF BREATH: 0
EYE PAIN: 0
NAUSEA: 0
SORE THROAT: 0
BLOOD IN STOOL: 0
ANAL BLEEDING: 0

## 2021-02-24 NOTE — ED TRIAGE NOTES
Patient states that she has had vomited since midnight x 6. She was worried that her sugar dropped to 71. She has an arm  and her phone said 106. Our ER BGL accucheck is 167. Her vitals are stable. She is alert and orientated x 4. Pink, warm and dry. Abc's are intact. Respirations are equal and unlabored. Will continue to monitor.

## 2021-02-24 NOTE — PROGRESS NOTES
2021    TELEHEALTH EVALUATION -- Audio/Visual (During JQTRB-67 public health emergency)    Due to [place] 19 outbreak, patient's office visit was converted to a virtual visit. Patient was contacted and agreed to proceed with a virtual visit via Telephone Visit, 20min  The risks and benefits of converting to a virtual visit were discussed in light of the current infectious disease epidemic. Patient also understood that insurance coverage and co-pays are up to their individual insurance plans. HPI:    Arlen Carmona (:  1975) has requested an audio/video evaluation for the following concern(s): Patient came in as follow-up for nausea, vomiting, and diarrhea, recently underwent EGD and colonoscopy. Duodenal biopsies emesis, EGD was notable for mild gastritis, biopsies were negative for HP or celiac. Colonoscopy was notable for 1 colon polyp with biopsy consistent with tubular adenoma, procedure results and pathology discussed at length with patient. Patient mentioned that this morning she was in the emergency department with nausea vomiting and diarrhea that started at 12 AM, had blood work drawn which was normal, was given IV fluids and antiemetics and discharged home, reports that she is now asymptomatic. No fever no chills, no sick contacts. Had previous recommendations for stool studies for infectious pathogens this was not completed.    Endoscopic hx:   Colonoscopy 2/10/21  Colon polyp status post hot snare polypectomy  No endoscopic evidence of inflammatory bowel disease  Random colon biopsies obtained to assess for microscopic  colitis  EGD 2/10/21  Irregular Z-line with no nodularity, mild gastritis otherwise  negative EGD  Duodenal biopsies obtained to assess for celiac disease  BX:  A.  DUODENAL BIOPSIES:   DUODENAL MUCOSA, NO PATHOLOGIC DIAGNOSIS     B.  GASTRIC BIOPSIES:   GASTRIC MUCOSA WITH FOCAL MINIMAL CHRONIC INFLAMMATION   NEGATIVE FOR HELICOBACTER PYLORI Chuy Garcia BIOPSIES:   MILD TO MODERATE CHRONIC GASTROESOPHAGITIS   NO EVIDENCE OF JON'S ESOPHAGUS   NO EVIDENCE OF DYSPLASIA     D.  RANDOM COLON BIOPSIES:   COLONIC MUCOSA, NO PATHOLOGIC DIAGNOSIS     E. DESCENDING COLON POLYP:   TUBULAR ADENOMA   background  Patient came in today to establish GI care for complaints of nausea, vomiting, and diarrhea. This episode of symptoms started approximately 3 weeks ago, no sick contacts, recent travel, eating out, or fever or chills. Patient reports a longstanding history of intermittent cyclical type of episodes since 2015. Was previously diagnosed with diabetic gastroparesis however her gastric emptying study was normal and was previously on erythromycin as treatment with good results. Also has a longstanding history of GERD that is not well controlled on current regimen of Prilosec. Most recent EGD in 2015 by Dr. Jose Manuel Harris EGD noted gastritis no path is available, and colonoscopy in 2013 by Dr Luis Mistry was essentially normal.  She denies hematemesis, melena, or hematochezia. Abdominal pain, or unintentional weight loss. No family history of CRC, not on anticoagulants or antiplatelet therapy. Review of Systems   Constitutional: Negative for appetite change, chills, fever and unexpected weight change. HENT: Negative for nosebleeds, tinnitus, trouble swallowing and voice change. Eyes: Negative for photophobia, pain and redness. Respiratory: Negative for chest tightness, shortness of breath and wheezing. Cardiovascular: Negative for chest pain, palpitations and leg swelling. Gastrointestinal: Negative for abdominal distention, abdominal pain, anal bleeding, blood in stool, constipation, diarrhea, nausea, rectal pain and vomiting. Endocrine: Negative for polydipsia, polyphagia and polyuria. Genitourinary: Negative for difficulty urinating and hematuria. Skin: Negative for color change, pallor and rash. Neurological: Negative for dizziness, speech difficulty and headaches. Psychiatric/Behavioral: Negative for confusion and suicidal ideas. Prior to Visit Medications    Medication Sig Taking? Authorizing Provider   Insulin Glargine, 2 Unit Dial, (TOUJEO MAX SOLOSTAR) 300 UNIT/ML SOPN INJECT 240 UNITS SUBCUTANEOUSLY AT BEDTIME Yes Andree Jean MD   insulin lispro, 1 Unit Dial, (HUMALOG KWIKPEN) 100 UNIT/ML SOPN 80 units with each meals Yes Patrick Marinelli MD   ALPRAZolam (XANAX) 0.5 MG tablet Take 1 tablet by mouth daily as needed for Anxiety for up to 45 days. Take 0.5 mg by mouth 3 times daily as needed for Anxiety.  Yes Beth Browne MD   amoxicillin-clavulanate (AUGMENTIN) 875-125 MG per tablet Take 1 tablet by mouth 2 times daily for 10 days Yes Beth Browne MD   predniSONE (DELTASONE) 5 MG tablet Take 5 mg by mouth daily Yes Historical Provider, MD   metoprolol (LOPRESSOR) 100 MG tablet Take 1 tablet by mouth 2 times daily Yes Beth Browne MD   rosuvastatin (CRESTOR) 10 MG tablet TAKE ONE TABLET BY MOUTH NIGHTLY Yes Beth Browne MD   ondansetron (ZOFRAN ODT) 4 MG disintegrating tablet Take 1 tablet by mouth every 8 hours as needed for Nausea or Vomiting Yes Andree Jean MD   Continuous Blood Gluc Sensor (FREESTYLE JAIRO 14 DAY SENSOR) MISC Every 2 weeks Yes Andree Jean MD   Continuous Blood Gluc  (FREESTYLE JAIRO 14 DAY READER) ALEXANDREA As directed Yes Andree Jean MD   pioglitazone (ACTOS) 30 MG tablet Take 1 tablet by mouth daily Yes Andree Jean MD   Insulin Pen Needle (PEN NEEDLES) 32G X 4 MM MISC Use as directed with insulin pens, 4 times daily Yes Patrick Marinelli MD   Insulin Pen Needle (PEN NEEDLES) 32G X 4 MM MISC Use as directed with insulin pens, 4 times daily Yes Andree Jean MD   esomeprazole (NEXIUM) 40 MG delayed release capsule Take 1 capsule by mouth daily Yes SANIA Nieves CNP   baclofen (LIORESAL) 10 MG tablet Take 0.5 tablets by mouth 2 times daily Yes Beth Browne MD losartan (COZAAR) 50 MG tablet Take 1 tablet by mouth daily Yes Faiza Chu MD   hydroCHLOROthiazide (HYDRODIURIL) 25 MG tablet Take 1 tablet by mouth daily Yes Faiza Chu MD   nystatin (MYCOSTATIN) 544734 UNIT/GM powder Apply 3 times daily. Yes Faiza Chu MD   blood glucose test strips (PRODIGY NO CODING BLOOD GLUC) strip 1 each by In Vitro route 4 times daily As needed. Yes Faiza Chu MD   blood glucose test strips (PRODIGY NO CODING BLOOD GLUC) strip 1 each by In Vitro route 4 times daily As needed. Yes Edgar Mora MD   clotrimazole-betamethasone (LOTRISONE) 1-0.05 % cream Apply topically 2 times daily. Yes Daisy Deleon DO   venlafaxine (EFFEXOR XR) 75 MG extended release capsule TAKE TWO CAPSULES BY MOUTH EVERY MORNING AND TAKE ONE CAPSULE BY MOUTH EVERY EVENING Yes Faiza Chu MD   solifenacin (VESICARE) 10 MG tablet TAKE 1 TABLET BY MOUTH ONE TIME A DAY Yes Jordi Blanc MD   metFORMIN (GLUCOPHAGE) 500 MG tablet Take 1 tablet by mouth 3 times daily Yes Edgar Mora MD   Blood Glucose Monitoring Suppl (PRODIGY AUTOCODE BLOOD GLUCOSE) w/Device KIT Use as directed to test up to 4 times daily Yes MD ROOSEVELT Blanco LANCETS 28G MISC Use as directed to test up to 4 times daily Yes Edgar Mora MD   miconazole (MICOTIN) 2 % cream Apply topically 2 times daily.  Yes Sabrina Chavez MD   Handicap Placard MISC Exp 5 years Yes Sabrina Chavez MD   promethazine (PROMETHEGAN) 25 MG suppository Place 1 suppository rectally every 6 hours as needed for Nausea (or vomiting)  Patient not taking: Reported on 2021  Yifan Beaulieu PA-C   sucralfate (CARAFATE) 1 GM tablet Take 1 tablet by mouth 4 times daily for 7 days  William Choi, APRN - CNP       Social History     Tobacco Use    Smoking status: Former Smoker     Packs/day: 1.00     Types: Cigarettes     Quit date: 2017     Years since quittin.1    Smokeless tobacco: Never Used   Substance Use Topics Recent EGD noted gastritis, biopsies were negative, has persistent nausea and vomiting was in the emergency room last night for sudden onset of nausea and vomiting was given IV fluids and antiemetics and discharged home. Patient does report associated bloating, right upper quadrant pain, and postprandial fullness. Patient reports a longstanding history of intermittent cyclical type of episodes since 2015. Was previously diagnosed with diabetic gastroparesis however her gastric emptying study was normal and was previously on erythromycin as treatment with good results. Also has a longstanding history of GERD that is not well controlled on current regimen of Prilosec.  -pending US results- Obtain gastric emptying study, given her history of diabetes with most recent hemoglobin A1c of 9.1, previous normal gastric emptying study in 2015, given her current nausea and vomiting I am not sure she will be able to complete gastric emptying study.   -Obtain right upper quadrant ultrasound, given her negative EGD and persistent symptoms associated with bloating, right upper quadrant pain and postprandial fullness  -Continue antiemetics as needed  2. Diarrhea  Chronic in nature with recent normal colonoscopy and negative biopsies, symptoms do fit RomeIV criteria for IBS D , however she is not completed stool studies for infectious pathogens as previously recommended   -Obtain stool studies for infectious pathogens, patient has kit at home  -Further work-up pending stool studies may consider trial of low FODMAP diet, Bentyl, Imodium, and fiber,     3. Associated medical conditions include but are not limited to dyslipidemia, class III obesity, pulmonary hypertension, DM 2, polymyalgia rheumatica, former nicotine use. Return in about 4 weeks (around 3/24/2021), or if symptoms worsen or fail to improve. An  electronic signature was used to authenticate this note. --Elly Shaw, APRN - CNP on 2/24/2021 at 9:45 AM        Pursuant to the emergency declaration under the [address] [place] [address] waiver authority and the [employer] and [employer] Act, this Virtual  Visit was conducted, with patient's consent, to reduce the patient's risk of exposure to COVID-19 and provide continuity of care for an established patient. Services were provided through a video synchronous discussion virtually to substitute for in-person clinic visit.

## 2021-02-24 NOTE — ED PROVIDER NOTES
[address] [facility] ED  eMERGENCY dEPARTMENT eNCOUnter      Pt Name: Jerold Peabody  MRN: [id number]  [place] 1975  Date of evaluation: 2/24/2021  Provider: Fabián Beaulieu PA-C    CHIEF COMPLAINT       Chief Complaint   Patient presents with    Emesis         HISTORY OF PRESENT ILLNESS   (Location/Symptom, Timing/Onset,Context/Setting, Quality, Duration, Modifying Factors, Severity)  Note limiting factors. Jerold Peabody is a 41 y.o. female who presents to the emergency department with a complaint of abdominal cramping, nausea vomiting, which patient states started about midnight tonight. She states she has had these ongoing issues for many months, usually has 1-2 episodes per month, she states she had just been seen by Dr. Karen Hoang of GI services, had a colonoscopy and endoscopy done for these exact reasons. She states that she is meeting with him this afternoon to get the results of her testing. He states that started again last evening for her and continues to have emesis this morning. She complains of generalized abdominal cramping, which she rates as a 2 out of 10. HPI    NursingNotes were reviewed. REVIEW OF SYSTEMS    (2-9 systems for level 4, 10 or more for level 5)     Review of Systems   Constitutional: Negative for activity change and appetite change. HENT: Negative for congestion, ear discharge, ear pain, nosebleeds, rhinorrhea and sore throat. Eyes: Negative for discharge. Respiratory: Negative for shortness of breath. Cardiovascular: Negative for chest pain, palpitations and leg swelling. Gastrointestinal: Positive for abdominal pain, nausea and vomiting. Negative for abdominal distention and constipation. Genitourinary: Negative for difficulty urinating, dysuria and frequency. Musculoskeletal: Negative for arthralgias. Skin: Negative for color change, pallor, rash and wound. Neurological: Negative for dizziness, tremors, syncope, weakness, numbness and headaches. Psychiatric/Behavioral: Negative for agitation and confusion. Except as noted above the remainder of the review of systems was reviewed and negative. PAST MEDICAL HISTORY     Past Medical History:   Diagnosis Date    Acute midline thoracic back pain 9/18/2018    B12 deficiency     Family history of premature CAD 9/4/2013    Fatty liver     Gastroesophageal reflux disease 1/6/2021    HPV (human papilloma virus) anogenital infection     Hyperlipidemia     Hypertension     Liver hemangioma 2009/2015    Obesity 3/11/13    BMI 43.42    Orthostatic hypotension     Ovarian cyst     PMR (polymyalgia rheumatica) (HCC)     Rectal fissure     Tobacco abuse 9/3/2015    Tobacco abuse     Tremor     Uncontrolled diabetes mellitus with complications ([facility] [address]     Unspecified sleep apnea          SURGICALHISTORY       Past Surgical History:   Procedure Laterality Date    CARDIAC CATHETERIZATION  4-07    COLONOSCOPY  2013    for diarrhea (-)    COLONOSCOPY N/A 2/10/2021    COLONOSCOPY DIAGNOSTIC performed by Rolfe Ganser, MD at [place] [address]  12-10    [facility]  1-05    AK MUSCLE BIOPSY Left 9/21/2018    LEFT QUADRICEPS MUSCLE BIOPSY performed by Britany Stark MD at [address] [facility] UNI/BI CANNULATION N/A 9/18/2018    T 12 VERTEBRALPLASTY ROOM 278 performed by Gene Cordero MD at [address] ENDOSCOPY  10/13/15     DR. HERRERA     UPPER GASTROINTESTINAL ENDOSCOPY N/A 2/10/2021    EGD ESOPHAGOGASTRODUODENOSCOPY performed by Rolfe Ganser, MD at [facility]  6-2015         CURRENT MEDICATIONS       Discharge Medication List as of 2/24/2021  7:58 AM      CONTINUE these medications which have NOT CHANGED    Details insulin lispro, 1 Unit Dial, (HUMALOG KWIKPEN) 100 UNIT/ML SOPN 80 units with each meals, Disp-90 pen, R-3Normal      ALPRAZolam (XANAX) 0.5 MG tablet Take 1 tablet by mouth daily as needed for Anxiety for up to 45 days. Take 0.5 mg by mouth 3 times daily as needed for Anxiety. , Disp-30 tablet, R-0Normal      amoxicillin-clavulanate (AUGMENTIN) 875-125 MG per tablet Take 1 tablet by mouth 2 times daily for 10 days, Disp-20 tablet, R-0Normal      Insulin Glargine, 2 Unit Dial, (TOUJEO MAX SOLOSTAR) 300 UNIT/ML SOPN INJECT 240 UNITS SUBCUTANEOUSLY AT BEDTIME, Disp-90 pen, R-3Normal      predniSONE (DELTASONE) 5 MG tablet Take 5 mg by mouth dailyHistorical Med      metoprolol (LOPRESSOR) 100 MG tablet Take 1 tablet by mouth 2 times daily, Disp-180 tablet, R-0Normal      rosuvastatin (CRESTOR) 10 MG tablet TAKE ONE TABLET BY MOUTH NIGHTLY, Disp-90 tablet, R-0Normal      ondansetron (ZOFRAN ODT) 4 MG disintegrating tablet Take 1 tablet by mouth every 8 hours as needed for Nausea or Vomiting, Disp-30 tablet, R-3Normal      Continuous Blood Gluc Sensor (FREESTYLE JAIRO 14 DAY SENSOR) MISC Every 2 weeks, Disp-6 each, R-03Normal      Continuous Blood Gluc  (FREESTYLE JAIRO 14 DAY READER) ALEXANDREA As directed, Disp-1 Device, R-00Normal      pioglitazone (ACTOS) 30 MG tablet Take 1 tablet by mouth daily, Disp-90 tablet, R-3Normal      !! Insulin Pen Needle (PEN NEEDLES) 32G X 4 MM MISC Use as directed with insulin pens, 4 times daily, Disp-400 each, R-1Normal      !!  Insulin Pen Needle (PEN NEEDLES) 32G X 4 MM MISC Use as directed with insulin pens, 4 times daily, Disp-400 each, R-1Normal      esomeprazole (NEXIUM) 40 MG delayed release capsule Take 1 capsule by mouth daily, Disp-30 capsule, R-3Normal      baclofen (LIORESAL) 10 MG tablet Take 0.5 tablets by mouth 2 times daily, Disp-90 tablet, R-1Normal      losartan (COZAAR) 50 MG tablet Take 1 tablet by mouth daily, Disp-90 tablet, R-1Normal hydroCHLOROthiazide (HYDRODIURIL) 25 MG tablet Take 1 tablet by mouth daily, Disp-90 tablet, R-1Normal      nystatin (MYCOSTATIN) 596759 UNIT/GM powder Apply 3 times daily. , Disp-1 Bottle,R-2, Normal      !! blood glucose test strips (PRODIGY NO CODING BLOOD GLUC) strip 1 each by In Vitro route 4 times daily As needed. , Disp-400 each,R-3Normal      !! blood glucose test strips (PRODIGY NO CODING BLOOD GLUC) strip 1 each by In Vitro route 4 times daily As needed. , Disp-400 each,R-3Normal      clotrimazole-betamethasone (LOTRISONE) 1-0.05 % cream Apply topically 2 times daily. , Disp-1 Tube,R-3, Normal      venlafaxine (EFFEXOR XR) 75 MG extended release capsule TAKE TWO CAPSULES BY MOUTH EVERY MORNING AND TAKE ONE CAPSULE BY MOUTH EVERY EVENING, Disp-270 capsule,R-1Normal      solifenacin (VESICARE) 10 MG tablet TAKE 1 TABLET BY MOUTH ONE TIME A DAY, Disp-90 tablet,R-3Normal      metFORMIN (GLUCOPHAGE) 500 MG tablet Take 1 tablet by mouth 3 times daily, Disp-270 tablet, R-3Normal      Blood Glucose Monitoring Suppl (PRODIGY AUTOCODE BLOOD GLUCOSE) w/Device KIT Disp-1 kit, R-0, NormalUse as directed to test up to 4 times daily      PRODIGY LANCETS 28G MISC Disp-400 each, R-3, NormalUse as directed to test up to 4 times daily      miconazole (MICOTIN) 2 % cream Apply topically 2 times daily. , Disp-141 g, R-3, Normal      Handicap Placard [facility] Starting Tue 7/3/2018, Disp-1 each, R-0, PrintExp 5 years       !! - Potential duplicate medications found. Please discuss with provider.           ALLERGIES     Morphine and related, Ace inhibitors, Bactrim [sulfamethoxazole-trimethoprim], Nitroglycerin, Percocet [oxycodone-acetaminophen], and Victoza [liraglutide]    FAMILY HISTORY       Family History   Problem Relation Age of Onset    Diabetes Father     Heart Disease Mother     Colon Cancer Neg Hx     Cancer Neg Hx           SOCIAL HISTORY       Social History     Socioeconomic History    Marital status:  Spouse name: None    Number of children: 1    Years of education: None    Highest education level: None   Occupational History    None   Social Needs    Financial resource strain: Not very hard    Food insecurity     Worry: Never true     Inability: Never true    Transportation needs     Medical: No     Non-medical: No   Tobacco Use    Smoking status: Former Smoker     Packs/day: 1.00     Types: Cigarettes     Quit date: 2017     Years since quittin.1    Smokeless tobacco: Never Used   Substance and Sexual Activity    Alcohol use: Yes     Alcohol/week: 0.0 standard drinks     Frequency: Monthly or less     Binge frequency: Never     Comment: socially    Drug use: No    Sexual activity: Yes     Partners: Male     Comment: single   Lifestyle    Physical activity     Days per week: 1 day     Minutes per session: 10 min    Stress: To some extent   Relationships    Social connections     Talks on phone: Three times a week     Gets together: Once a week     Attends Rastafari service: More than 4 times per year     Active member of club or organization: No     Attends meetings of clubs or organizations: Never     Relationship status:     Intimate partner violence     Fear of current or ex partner: None     Emotionally abused: None     Physically abused: None     Forced sexual activity: None   Other Topics Concern    None   Social History Narrative    Social/Functional History            SCREENINGS    [place] Coma Scale  Eye Opening: Spontaneous  Best Verbal Response: Oriented  Best Motor Response: Obeys commands  [place] Coma Scale Score: 15 @FLOW([id number])@      PHYSICAL EXAM    (up to 7 for level 4, 8 or more for level 5)     ED Triage Vitals [21 0512]   BP Temp Temp Source Pulse Resp SpO2 Height Weight   (!) 157/70 97.6 °F (36.4 °C) Tympanic 95 18 97 % 5' 6\" (1.676 m) 295 lb (133.8 kg)       Physical Exam  Vitals signs and nursing note reviewed.    Constitutional: General: She is not in acute distress. Appearance: She is well-developed. She is not ill-appearing, toxic-appearing or diaphoretic. HENT:      Head: Normocephalic. Right Ear: Tympanic membrane normal.      Left Ear: Tympanic membrane normal.      Nose: No congestion. Mouth/Throat:      Mouth: Mucous membranes are moist.      Pharynx: No oropharyngeal exudate or posterior oropharyngeal erythema. Eyes:      Extraocular Movements: Extraocular movements intact. Conjunctiva/sclera: Conjunctivae normal.      Pupils: Pupils are equal, round, and reactive to light. Neck:      Musculoskeletal: Normal range of motion and neck supple. No neck rigidity. Vascular: No JVD. Trachea: No tracheal deviation. Cardiovascular:      Rate and Rhythm: Normal rate. Pulses: Normal pulses. Heart sounds: Normal heart sounds. No murmur. No friction rub. No gallop. Pulmonary:      Effort: Pulmonary effort is normal. No tachypnea, accessory muscle usage, respiratory distress or retractions. Breath sounds: No stridor. No wheezing, rhonchi or rales. Chest:      Chest wall: No tenderness. Abdominal:      General: Abdomen is flat. Bowel sounds are normal. There is no distension or abdominal bruit. Palpations: Abdomen is soft. There is no shifting dullness, fluid wave, hepatomegaly, splenomegaly, mass or pulsatile mass. Tenderness: There is no abdominal tenderness. There is no right CVA tenderness, left CVA tenderness, guarding or rebound. Negative signs include Mesa's sign, Rovsing's sign and McBurney's sign. Comments: Abdomen soft nondistended nontender no guarding mass rebound, no CVA tenderness. Musculoskeletal: Normal range of motion. General: No deformity. Skin:     General: Skin is warm and dry. Capillary Refill: Capillary refill takes less than 2 seconds. Coloration: Skin is not jaundiced.    Neurological: 1. Non-intractable vomiting with nausea, unspecified vomiting type          DISPOSITION/PLAN   DISPOSITION Decision To Discharge 02/24/2021 08:12:39 AM      PATIENT REFERRED TO:  Kristin Adams MD  [address] [facility] [address]  [phone]    In 3 days      Arely Kim MD  [address] [place] [address]  [phone]    Today  Keep scheduled appointment for 8:30 AM this morning      DISCHARGE MEDICATIONS:  Discharge Medication List as of 2/24/2021  7:58 AM      START taking these medications    Details   promethazine (PROMETHEGAN) 25 MG suppository Place 1 suppository rectally every 6 hours as needed for Nausea (or vomiting), Disp-8 suppository, R-0Print                (Please note that portions of this note were completed with a voice recognition program.  Efforts were made to edit the dictations but occasionally words are mis-transcribed.)    Carlos Enrique Tripp PA-C (electronically signed)  Attending Emergency Physician         Carlos Enrique Tripp PA-C  02/25/21 2995

## 2021-02-25 ENCOUNTER — CARE COORDINATION (OUTPATIENT)
Dept: OTHER | Facility: CLINIC | Age: 46
End: 2021-02-25

## 2021-02-25 ENCOUNTER — TELEPHONE (OUTPATIENT)
Dept: FAMILY MEDICINE CLINIC | Age: 46
End: 2021-02-25

## 2021-02-25 NOTE — CARE COORDINATION
[address] [facility] ED Follow Up Call    2021    Patient: Herminia Caban Patient : 1975   MRN: [id number]  Reason for Admission: Nausea and vomiting  Discharge Date: 21        Needs to be reviewed by the provider   Additional needs identified to be addressed with provider No  none          Ambulatory Care Manager [facility]) contacted the patient by telephone to perform post ED visit assessment. Call within 2 business days of discharge: Yes. Verified name and  with patient as identifiers. N/V- Pt states her nausea has improved. She is still concerned about her BG  Fluctuating. She had her appt to establish with GI yesterday after her ED visit. She has had previous work up for the N/V/D.  GI has ordered an [address] [place] [address] for further investigation. She also had an Evisit for concerns of a UTI and was ordered antibiotics which she did start. She states that she is not sure that it is her nausea or the Actos that is causing the fluctuations in her BG. She has a continuous monitor and finds that she is often  morning lows. Discussed taking in smaller frequent meals, avoid simple carbs and fatty/ oily foods until work up is completed. She will continue antiemetics and fluids as tolerated. Pt verbalized understanding and is agreeable to follow up. Interventions:    Counseling and education provided at today's visit on:   Red Flag symptoms to report  Self monitoring compliance  Symptom management  Medication compliance  Specialist appointment compliance  Utilization of appropriate level of care: Right Care, Right Place, Right Time    Medication reconciliation was performed with patient, who verbalizes understanding of administration of home medications. Advised obtaining a 90-day supply of all daily and as-needed medications.      Reinforced resources available to patient including: PCP, Specialist, Benefits related nurse triage line, Urgent care clinics, MyChart Messaging, Condition related references and ACM. Discussed follow-up appointments. If no appointment was previously scheduled, appointment scheduling offered:  Is follow up appointment scheduled within 7 days of discharge? Yes  [facility] follow up appointment(s):   Future Appointments   Date Time Provider Iliana Garcia   3/4/2021  9:15 AM [facility] ULTRASOUND ROOM 1 RUY  ULTRA [facility] Fac RAD   3/24/2021  9:30 AM Makayla Ricardo   3/31/2021 10:00 AM Nick Pate MD [address]   4/19/2021 11:30 AM ZEE Queen MD [facility]   4/26/2021  8:30 AM Britta Steele MD [facility] follow up appointment(s): None    Plan:  Continue weekly outreaches to provide telephonic support, education and resources as needed. Discuss / follow up on: Symptom management  Provider follow up appointment compliance  Utilization of appropriate level of care      Patient verbalized understanding and is agreeable.        Care Transitions ED Follow Up    Care Transitions Interventions    Specialty Service Referral: Completed    Schedule Follow Up Appointment with Physician: Completed  Patient-reported symptoms: Nausea, Vomiting   Did you call your PCP prior to going to the ED?: No - Did not call PCP   Do you understand what to report and when to return?: Yes   Are you following your discharge instructions?: Yes   Do you have all of your prescriptions and are they filled?: Yes                Care Transitions ED Follow Up    Care Transitions Interventions    Specialty Service Referral: Completed    Schedule Follow Up Appointment with Physician: Completed  Do you have all of your prescriptions and are they filled?: Yes

## 2021-02-25 NOTE — PROGRESS NOTES
HPI: as per patient provided history  Exam: N/A (electronic visit)  ASSESSMENT/PLAN:  1. Acute UTI  - cephALEXin (KEFLEX) 500 MG capsule; Take 1 capsule by mouth 3 times daily for 5 days  Dispense: 15 capsule; Refill: 0      Patient instructed to call the office if worsens, or fails to improve as anticipated. 5-10 minutes were spent on the digital evaluation and management of this patient.

## 2021-02-25 NOTE — TELEPHONE ENCOUNTER
[address] [facility] is asking for clarity on directions for Alprazolam.  There are 2 different directions on the order. Also they are asking how long should the medication last?  Please call [phone] with directions.  sb

## 2021-02-26 ENCOUNTER — PATIENT MESSAGE (OUTPATIENT)
Dept: GASTROENTEROLOGY | Age: 46
End: 2021-02-26

## 2021-03-01 NOTE — TELEPHONE ENCOUNTER
From: Yana Mcmillan  To: SANIA Lee - CNP  Sent: 2/26/2021 6:18 PM EST  Subject: Visit Follow-Up Question    Hi I got scheduled for the ultrasound but they said there were no orders in for the gastric emptying study or the HIDA scan. Did those orders get placed?

## 2021-03-02 NOTE — TELEPHONE ENCOUNTER
times daily.  blood glucose test strips (PRODIGY NO CODING BLOOD GLUC) strip 1 each by In Vitro route 4 times daily As needed.  blood glucose test strips (PRODIGY NO CODING BLOOD GLUC) strip 1 each by In Vitro route 4 times daily As needed.  clotrimazole-betamethasone (LOTRISONE) 1-0.05 % cream Apply topically 2 times daily.  venlafaxine (EFFEXOR XR) 75 MG extended release capsule TAKE TWO CAPSULES BY MOUTH EVERY MORNING AND TAKE ONE CAPSULE BY MOUTH EVERY EVENING    solifenacin (VESICARE) 10 MG tablet TAKE 1 TABLET BY MOUTH ONE TIME A DAY    metFORMIN (GLUCOPHAGE) 500 MG tablet Take 1 tablet by mouth 3 times daily    Blood Glucose Monitoring Suppl (PRODIGY AUTOCODE BLOOD GLUCOSE) w/Device KIT Use as directed to test up to 4 times daily    PRODIGY LANCETS 28G MISC Use as directed to test up to 4 times daily    miconazole (MICOTIN) 2 % cream Apply topically 2 times daily.  Handicap Placard MISC Exp 5 years     No current facility-administered medications for this visit. Allergies: Allergies   Allergen Reactions    Morphine And Related Hives and Rash    Ace Inhibitors Other (See Comments)     Dizziness and near syncope    Bactrim [Sulfamethoxazole-Trimethoprim] Itching    Nitroglycerin Other (See Comments)     Migraine    Percocet [Oxycodone-Acetaminophen] Nausea And Vomiting    Victoza [Liraglutide]      NAUSEA        Labs:  Lab Results   Component Value Date    LABA1C 9.2 (H) 11/18/2020    LABA1C 9.3 (H) 05/19/2020    LABA1C 9.0 (H) 11/18/2019     Estimated Creatinine Clearance: 158 mL/min (based on SCr of 0.64 mg/dL).   eGFR: > 60 mL/min/1.73 m^2    Care Team:   - Physician (endocrinologist): Martin Kothari (Last OV: 2/2021)  - ACC/RD: none (Last Encounter: none, Gordon Hayward called, however that was for post ER visit FU)  - Upcoming appointments:   Future Appointments   Date Time Provider Iliana Garcia   2/24/2021  8:30 AM Makayla Burroughs 3/1/2021  3:30 PM MD Preston Be   4/19/2021 11:30 AM Mily Singh MD [facility]   4/26/2021  8:30 AM MD Preston Hollis       Refill History: Toujeo- 3/10/20, 5/18/20, 7/24/20, 9/17/20, 11/25/20, and in process to be filled for 60ml  Humalog- 3/30/20, 6/11/20, 9/10/20, 11/24/20 for 210 mls  metformin 3/30/20, 6/30/20, 9/23/20, 12/15/20 for 270 tabs  Pioglitazone 1/27/21- did patient start this med? Per chart patient had concerns regarding bladder cancer warnings     Diabetes Care:  - Glycemic Goal: <7.0% and directed by provider. Is not at blood glucose goal but is on insulin therapy. .   - Reduce Pill [place]: metformin/pioglitazone once tolerating pioglit  - Appropriateness of Insulin Therapy: Per endo note 2/2021- \"patient was started on Humulin U 500 insulin started with 110 units of end of 260 units without any improvement patient switch back to her Toujeo and Humalog\"    - Therapy Optimization: filled freestyle zhao 2/2/21  - Medication changes since last A1c: Per Dr Jesús Singh note 2/2021- \"Resume Toujeo to 240 units at bedtime Humalog 80 with each meals plus Actos 30 mg daily labs to be done in 6 weeks for BMP A1c\"  Increase metformin to 2000 mg/day? Currently 1500 mg- Patient is currently seeing Gastro for nausea, vomiting, colon polyp, diarrhea  Per gastro note from 2/2021- EGD was notable for mild gastritis, biopsies were negative for HP or celiac. Colonoscopy was notable for 1 colon polyp with biopsy consistent with tubular adenoma. Patient reports a longstanding history of intermittent cyclical type of episodes since 2015.  Was previously diagnosed with diabetic gastroparesis however her gastric emptying study was normal and was previously on erythromycin as treatment with good results. Manfred Olmstead has a longstanding history of GERD that is not well controlled on current regimen of Prilosec.    Patient to get new gastric emptying study, upper quadrant US, and stool studies  - Medication Adherence Assessment: adherent per fill hx  - ACE/ARB and/or Statin Gap: on statin- adherent, losartan- adherent    Plan:  - patient emailed Dr Aleksander Salcedo this morning bc last several days her sugars have been very low in the morning. zhao showing 50 and finger stick showed 87. Patient states she took 160 units toujeo and it was still low. Waiting to hear back to digna. Throughout the day has been- highest 170 and has only been giving herself 40 units of humalog. Next apt with Aleksander Salcedo not until 2 months. This has been going on for about 4 days now. Patient started taking actos 30 mg about 3 weeks ago. - previous to this sugars were 120-150 in the morning- and this with giving toujeo 240 units and humalog 80 units with meals but last 4 days sugars have been low. - colonoscopy was good, is getting gallbladder check this Thursday but has not been having any more vomiting. Denies diarrhea, nausea. Discussed zhao 2 so would alert her to going low. Discussed getting free 14 day trial of zhao 2- patient will look into this. Has been doing 40 units humalog with each meal and 2 hr post meal sugars have been around 140. Call pt tomorrow- Patient skipped toujeo last night due to falling asleep and sugar this morning was 132. Educated patient to call Dr Aleksander Salcedo back again and let him know she has been giving less humaglog and only 160 units toujeo and sugar was still dropping. And try to move endo apt sooner bc it is in 2 months currently.

## 2021-03-02 NOTE — TELEPHONE ENCOUNTER
Second attempt made to contact patient. Will send mychart letter.      Daniel Patterson, PharmD, [address] [facility] Clinical Pharmacist  Direct: [phone]  Department: [phone], option 7

## 2021-03-04 NOTE — TELEPHONE ENCOUNTER
Called patient again today to further discuss and make sure pt sugar not going too low. digna said lower toujeo to 120 units daily and keep lowering by 10 units if needed and cont to to humalog 40 units TID and follow up with him in weeks. Pt gave herself toujeo 120 units last night and AM sugar was 105. Will cont this dosing regimen for now. Pharmacy to follow up in 1- 1.5 months from now. Romelia Darby, PharmD, [address] [place] [address] Clinical Pharmacist  Direct: [phone]  Department: [phone], option 7  ===================================  CLINICAL PHARMACY CONSULT: MED RECONCILIATION/REVIEW ADDENDUM    For Pharmacy Admin Tracking Only    PHSO: Yes  Total # of Interventions Recommended: 2  - Updated Order #: 2 Updated Order Reason(s):  Other  Recommended intervention potential cost savings: 1  Total Interventions Accepted: 2  Time Spent (min): 30    Nevaeh Corral, [address] [place] [address]

## 2021-03-11 ENCOUNTER — HOSPITAL ENCOUNTER (OUTPATIENT)
Dept: ULTRASOUND IMAGING | Age: 46
Discharge: HOME | End: 2021-03-13
Payer: COMMERCIAL

## 2021-03-11 DIAGNOSIS — R10.11 RUQ PAIN: ICD-10-CM

## 2021-03-11 PROCEDURE — 76705 ECHO EXAM OF ABDOMEN: CPT

## 2021-03-19 ENCOUNTER — APPOINTMENT (OUTPATIENT)
Dept: CT IMAGING | Age: 46
End: 2021-03-19
Payer: COMMERCIAL

## 2021-03-19 ENCOUNTER — HOSPITAL ENCOUNTER (EMERGENCY)
Age: 46
Discharge: HOME | End: 2021-03-19
Attending: EMERGENCY MEDICINE
Payer: COMMERCIAL

## 2021-03-19 VITALS
RESPIRATION RATE: 18 BRPM | HEART RATE: 92 BPM | SYSTOLIC BLOOD PRESSURE: 135 MMHG | DIASTOLIC BLOOD PRESSURE: 75 MMHG | HEIGHT: 66 IN | BODY MASS INDEX: 46.61 KG/M2 | WEIGHT: 290 LBS | OXYGEN SATURATION: 98 % | TEMPERATURE: 98.1 F

## 2021-03-19 DIAGNOSIS — K52.9 GASTROENTERITIS: Primary | ICD-10-CM

## 2021-03-19 LAB
ALBUMIN SERPL-MCNC: 4.9 G/DL (ref 3.5–4.6)
ALP BLD-CCNC: 97 U/L (ref 40–130)
ALT SERPL-CCNC: 27 U/L (ref 0–33)
AMYLASE: 32 U/L (ref 22–93)
ANION GAP SERPL CALCULATED.3IONS-SCNC: 14 MEQ/L (ref 9–15)
AST SERPL-CCNC: 29 U/L (ref 0–35)
BACTERIA: ABNORMAL /HPF
BASOPHILS ABSOLUTE: 0 K/UL (ref 0–0.2)
BASOPHILS RELATIVE PERCENT: 0.4 %
BILIRUB SERPL-MCNC: 0.5 MG/DL (ref 0.2–0.7)
BILIRUBIN URINE: NEGATIVE
BLOOD, URINE: ABNORMAL
BUN BLDV-MCNC: 17 MG/DL (ref 6–20)
CALCIUM SERPL-MCNC: 10 MG/DL (ref 8.5–9.9)
CHLORIDE BLD-SCNC: 96 MEQ/L (ref 95–107)
CLARITY: CLEAR
CO2: 28 MEQ/L (ref 20–31)
COLOR: YELLOW
CREAT SERPL-MCNC: 0.87 MG/DL (ref 0.5–0.9)
EOSINOPHILS ABSOLUTE: 0.1 K/UL (ref 0–0.7)
EOSINOPHILS RELATIVE PERCENT: 0.7 %
EPITHELIAL CELLS, UA: ABNORMAL /HPF
GFR AFRICAN AMERICAN: >60
GFR NON-AFRICAN AMERICAN: >60
GLOBULIN: 3.8 G/DL (ref 2.3–3.5)
GLUCOSE BLD-MCNC: 168 MG/DL (ref 70–99)
GLUCOSE URINE: NEGATIVE MG/DL
HCT VFR BLD CALC: 42.5 % (ref 37–47)
HEMOGLOBIN: 13.8 G/DL (ref 12–16)
HYPOCHROMIA: PRESENT
KETONES, URINE: NEGATIVE MG/DL
LEUKOCYTE ESTERASE, URINE: ABNORMAL
LIPASE: 30 U/L (ref 12–95)
LYMPHOCYTES ABSOLUTE: 2.5 K/UL (ref 1–4.8)
LYMPHOCYTES RELATIVE PERCENT: 20.2 %
MCH RBC QN AUTO: 26.1 PG (ref 27–31.3)
MCHC RBC AUTO-ENTMCNC: 32.5 % (ref 33–37)
MCV RBC AUTO: 80.3 FL (ref 82–100)
MONOCYTES ABSOLUTE: 0.5 K/UL (ref 0.2–0.8)
MONOCYTES RELATIVE PERCENT: 3.8 %
NEUTROPHILS ABSOLUTE: 9.1 K/UL (ref 1.4–6.5)
NEUTROPHILS RELATIVE PERCENT: 74.9 %
NITRITE, URINE: NEGATIVE
PDW BLD-RTO: 17.6 % (ref 11.5–14.5)
PH UA: 6 (ref 5–9)
PLATELET # BLD: 344 K/UL (ref 130–400)
POTASSIUM SERPL-SCNC: 4.2 MEQ/L (ref 3.4–4.9)
PROTEIN UA: 30 MG/DL
RBC # BLD: 5.29 M/UL (ref 4.2–5.4)
RBC UA: ABNORMAL /HPF (ref 0–2)
SODIUM BLD-SCNC: 138 MEQ/L (ref 135–144)
SPECIFIC GRAVITY UA: 1.02 (ref 1–1.03)
TOTAL PROTEIN: 8.7 G/DL (ref 6.3–8)
URINE REFLEX TO CULTURE: ABNORMAL
UROBILINOGEN, URINE: 0.2 E.U./DL
WBC # BLD: 12.1 K/UL (ref 4.8–10.8)
WBC UA: ABNORMAL /HPF (ref 0–5)

## 2021-03-19 PROCEDURE — 81001 URINALYSIS AUTO W/SCOPE: CPT

## 2021-03-19 PROCEDURE — 2580000003 HC RX 258: Performed by: EMERGENCY MEDICINE

## 2021-03-19 PROCEDURE — 83690 ASSAY OF LIPASE: CPT

## 2021-03-19 PROCEDURE — 74177 CT ABD & PELVIS W/CONTRAST: CPT

## 2021-03-19 PROCEDURE — 6360000004 HC RX CONTRAST MEDICATION: Performed by: EMERGENCY MEDICINE

## 2021-03-19 PROCEDURE — 82150 ASSAY OF AMYLASE: CPT

## 2021-03-19 PROCEDURE — 85025 COMPLETE CBC W/AUTO DIFF WBC: CPT

## 2021-03-19 PROCEDURE — 6360000002 HC RX W HCPCS: Performed by: EMERGENCY MEDICINE

## 2021-03-19 PROCEDURE — 99282 EMERGENCY DEPT VISIT SF MDM: CPT

## 2021-03-19 PROCEDURE — 80053 COMPREHEN METABOLIC PANEL: CPT

## 2021-03-19 PROCEDURE — 36415 COLL VENOUS BLD VENIPUNCTURE: CPT

## 2021-03-19 PROCEDURE — 96375 TX/PRO/DX INJ NEW DRUG ADDON: CPT

## 2021-03-19 PROCEDURE — 96374 THER/PROPH/DIAG INJ IV PUSH: CPT

## 2021-03-19 RX ORDER — SODIUM CHLORIDE 9 MG/ML
INJECTION, SOLUTION INTRAVENOUS CONTINUOUS
Status: DISCONTINUED | OUTPATIENT
Start: 2021-03-19 | End: 2021-03-19 | Stop reason: HOSPADM

## 2021-03-19 RX ORDER — ONDANSETRON 4 MG/1
4 TABLET, ORALLY DISINTEGRATING ORAL EVERY 8 HOURS PRN
Qty: 20 TABLET | Refills: 0 | Status: SHIPPED | OUTPATIENT
Start: 2021-03-19 | End: 2021-04-28

## 2021-03-19 RX ORDER — FAMOTIDINE 20 MG/1
20 TABLET, FILM COATED ORAL 2 TIMES DAILY
Qty: 30 TABLET | Refills: 0 | Status: SHIPPED | OUTPATIENT
Start: 2021-03-19 | End: 2021-03-19 | Stop reason: SDUPTHER

## 2021-03-19 RX ORDER — CIPROFLOXACIN 500 MG/1
500 TABLET, FILM COATED ORAL 2 TIMES DAILY
Qty: 20 TABLET | Refills: 0 | Status: SHIPPED | OUTPATIENT
Start: 2021-03-19 | End: 2021-03-29

## 2021-03-19 RX ORDER — FAMOTIDINE 20 MG/1
20 TABLET, FILM COATED ORAL 2 TIMES DAILY
Qty: 30 TABLET | Refills: 0 | Status: SHIPPED | OUTPATIENT
Start: 2021-03-19 | End: 2022-01-18 | Stop reason: ALTCHOICE

## 2021-03-19 RX ORDER — CIPROFLOXACIN 500 MG/1
500 TABLET, FILM COATED ORAL 2 TIMES DAILY
Qty: 20 TABLET | Refills: 0 | Status: SHIPPED | OUTPATIENT
Start: 2021-03-19 | End: 2021-03-19 | Stop reason: SDUPTHER

## 2021-03-19 RX ORDER — 0.9 % SODIUM CHLORIDE 0.9 %
500 INTRAVENOUS SOLUTION INTRAVENOUS ONCE
Status: COMPLETED | OUTPATIENT
Start: 2021-03-19 | End: 2021-03-19

## 2021-03-19 RX ORDER — METOCLOPRAMIDE HYDROCHLORIDE 5 MG/ML
10 INJECTION INTRAMUSCULAR; INTRAVENOUS ONCE
Status: COMPLETED | OUTPATIENT
Start: 2021-03-19 | End: 2021-03-19

## 2021-03-19 RX ORDER — ONDANSETRON 4 MG/1
4 TABLET, ORALLY DISINTEGRATING ORAL EVERY 8 HOURS PRN
Qty: 20 TABLET | Refills: 0 | Status: SHIPPED | OUTPATIENT
Start: 2021-03-19 | End: 2021-03-19 | Stop reason: SDUPTHER

## 2021-03-19 RX ORDER — ONDANSETRON 2 MG/ML
4 INJECTION INTRAMUSCULAR; INTRAVENOUS ONCE
Status: COMPLETED | OUTPATIENT
Start: 2021-03-19 | End: 2021-03-19

## 2021-03-19 RX ADMIN — SODIUM CHLORIDE 500 ML: 9 INJECTION, SOLUTION INTRAVENOUS at 18:15

## 2021-03-19 RX ADMIN — ONDANSETRON 4 MG: 2 INJECTION INTRAMUSCULAR; INTRAVENOUS at 18:15

## 2021-03-19 RX ADMIN — IOPAMIDOL 100 ML: 755 INJECTION, SOLUTION INTRAVENOUS at 18:56

## 2021-03-19 RX ADMIN — SODIUM CHLORIDE: 9 INJECTION, SOLUTION INTRAVENOUS at 18:17

## 2021-03-19 RX ADMIN — METOCLOPRAMIDE HYDROCHLORIDE 10 MG: 5 INJECTION INTRAMUSCULAR; INTRAVENOUS at 18:15

## 2021-03-19 ASSESSMENT — ENCOUNTER SYMPTOMS
COUGH: 0
NAUSEA: 1
ABDOMINAL PAIN: 1
SHORTNESS OF BREATH: 0
VOICE CHANGE: 0
FACIAL SWELLING: 0
EYE PAIN: 0
SORE THROAT: 0
BACK PAIN: 0
CHOKING: 0
EYE REDNESS: 0
BLOOD IN STOOL: 0
TROUBLE SWALLOWING: 0
DIARRHEA: 0
WHEEZING: 0
SINUS PRESSURE: 0
CHEST TIGHTNESS: 0
CONSTIPATION: 0
STRIDOR: 0
VOMITING: 1
EYE DISCHARGE: 0

## 2021-03-19 ASSESSMENT — PAIN DESCRIPTION - LOCATION: LOCATION: ABDOMEN

## 2021-03-19 ASSESSMENT — PAIN DESCRIPTION - DESCRIPTORS: DESCRIPTORS: ACHING

## 2021-03-19 ASSESSMENT — PAIN SCALES - GENERAL: PAINLEVEL_OUTOF10: 7

## 2021-03-19 NOTE — ED TRIAGE NOTES
Patient complains of middle abdominal pain that she woke up with this morning and stated she had small bile emesis this morning. Denies fever or chills.

## 2021-03-19 NOTE — ED PROVIDER NOTES
[facility] ED  eMERGENCY dEPARTMENT eNCOUnter      Pt Name: Diana Reyes  MRN: [id number]  [place] 1975  Date of evaluation: 3/19/2021  Provider: Cal Sheehan MD    [facility]       Chief Complaint   Patient presents with    Abdominal Pain    Nausea         HISTORY OF PRESENT ILLNESS   (Location/Symptom, Timing/Onset,Context/Setting, Quality, Duration, Modifying Factors, Severity)  Note limiting factors. Diana Reyes is a 41 y.o. female who presents to the emergency department patient with a history of obesity chronic abdominal discomfort with crampy feeling no clear diagnosis her colonoscopy done few months ago was normal as per patient history of tracheostomy sleep apnea hyperlipidemia pulmonary hypertension type 2 diabetes abdomen para 1 patient status post hysterectomy denies any heartburn some abdominal discomfort mostly nausea did not eat today but had a chicken sandwich last night denies any diarrhea no fever no chills any chest pain or short of breath patient refused any strong medication no history kidney stone no UTI symptoms    HPI    NursingNotes were reviewed. REVIEW OF SYSTEMS    (2-9 systems for level 4, 10 or more for level 5)     Review of Systems   Constitutional: Positive for fatigue. Negative for activity change and fever. HENT: Negative for congestion, drooling, facial swelling, mouth sores, nosebleeds, sinus pressure, sore throat, trouble swallowing and voice change. Eyes: Negative for pain, discharge, redness and visual disturbance. Respiratory: Negative for cough, choking, chest tightness, shortness of breath, wheezing and stridor. Cardiovascular: Negative for chest pain, palpitations and leg swelling. Gastrointestinal: Positive for abdominal pain, nausea and vomiting. Negative for blood in stool, constipation and diarrhea. Endocrine: Negative for cold intolerance, polyphagia and polyuria.    Genitourinary: Negative for dysuria, flank pain, frequency, genital sores and urgency. Musculoskeletal: Negative for back pain, joint swelling, neck pain and neck stiffness. Skin: Negative for pallor and rash. Neurological: Negative for tremors, seizures, syncope, weakness, numbness and headaches. Hematological: Negative for adenopathy. Does not bruise/bleed easily. Psychiatric/Behavioral: Negative for agitation, behavioral problems, hallucinations and sleep disturbance. The patient is nervous/anxious. The patient is not hyperactive. All other systems reviewed and are negative. Except as noted above the remainder of the review of systems was reviewed and negative.        PAST MEDICAL HISTORY     Past Medical History:   Diagnosis Date    Acute midline thoracic back pain 9/18/2018    B12 deficiency     Family history of premature CAD 9/4/2013    Fatty liver     Gastroesophageal reflux disease 1/6/2021    HPV (human papilloma virus) anogenital infection     Hyperlipidemia     Hypertension     Liver hemangioma 2009/2015    Obesity 3/11/13    BMI 43.42    Orthostatic hypotension     Ovarian cyst     PMR (polymyalgia rheumatica) (HCC)     Rectal fissure     Tobacco abuse 9/3/2015    Tobacco abuse     Tremor     Uncontrolled diabetes mellitus with complications ([facility] [address]     Unspecified sleep apnea          SURGICALHISTORY       Past Surgical History:   Procedure Laterality Date    CARDIAC CATHETERIZATION  4-07    COLONOSCOPY  2013    for diarrhea (-)    COLONOSCOPY N/A 2/10/2021    COLONOSCOPY DIAGNOSTIC performed by Timur Hughes MD at [facility] 96  12-10    [facility]  1-05    AL MUSCLE BIOPSY Left 9/21/2018    LEFT QUADRICEPS MUSCLE BIOPSY performed by Manfred Lyon MD at [facility] UNI/BI CANNULATION N/A 9/18/2018    T 12 VERTEBRALPLASTY ROOM 278 performed by Lars Landrum MD at [address] [place] [address] socially    Drug use: No    Sexual activity: Yes     Partners: Male     Comment: single   Lifestyle    Physical activity     Days per week: 1 day     Minutes per session: 10 min    Stress: To some extent   Relationships    Social connections     Talks on phone: Three times a week     Gets together: Once a week     Attends [employer] orthodox service: More than 4 times per year     Active member of club or organization: No     Attends meetings of clubs or organizations: Never     Relationship status:     Intimate partner violence     Fear of current or ex partner: Not on file     Emotionally abused: Not on file     Physically abused: Not on file     Forced sexual activity: Not on file   Other Topics Concern    Not on file   Social History Narrative    Social/Functional History            SCREENINGS      @FLOW([id number])@      PHYSICAL EXAM    (up to 7 for level 4, 8 or more for level 5)     ED Triage Vitals   BP Temp Temp Source Pulse Resp SpO2 Height Weight   03/19/21 1757 03/19/21 1758 03/19/21 1758 03/19/21 1757 03/19/21 1757 03/19/21 1757 03/19/21 1754 03/19/21 1754   (!) 149/73 97.8 °F (36.6 °C) Oral 98 20 98 % 5' 6\" (1.676 m) 290 lb (131.5 kg)       Physical Exam  Vitals signs and nursing note reviewed. Constitutional:       General: She is not in acute distress. Appearance: She is obese. She is not ill-appearing, toxic-appearing or diaphoretic. HENT:      Head: Normocephalic and atraumatic. Mouth/Throat:      Mouth: Mucous membranes are moist.      Pharynx: No pharyngeal swelling or oropharyngeal exudate. Eyes:      General: No scleral icterus. Extraocular Movements: Extraocular movements intact. Cardiovascular:      Rate and Rhythm: Normal rate and regular rhythm. Pulmonary:      Effort: No respiratory distress. Breath sounds: No stridor. No wheezing or rhonchi. Chest:      Chest wall: No tenderness. Abdominal:      General: Abdomen is flat.  Bowel sounds are normal. There is distension. Palpations: Abdomen is soft. There is no shifting dullness, fluid wave, hepatomegaly, splenomegaly, mass or pulsatile mass. Tenderness: There is abdominal tenderness in the epigastric area and suprapubic area. There is no right CVA tenderness or guarding. Negative signs include Emsa's sign, McBurney's sign and obturator sign. Hernia: There is no hernia in the ventral area, left inguinal area, right femoral area or right inguinal area. Comments: Patient abdomen is soft but slightly distended mostly because obesity patient has no Mesa sign minimal epigastric tenderness and mild suprapubic discomfort on palpation patient has no rigidity guarding rebound tenderness or McBurney's point tenderness   Skin:     General: Skin is warm. Capillary Refill: Capillary refill takes less than 2 seconds. Coloration: Skin is not cyanotic. Findings: No erythema. Neurological:      Mental Status: She is alert. DIAGNOSTIC RESULTS     EKG: All EKG's are interpreted by the Emergency Department Physician who either signs or Co-signsthis chart in the absence of a cardiologist.        RADIOLOGY:   Ronit Dre such as CT, Ultrasound and MRI are read by the radiologist. Catherine Stable radiographic images are visualized and preliminarily interpreted by the emergency physician with the below findings:        Interpretation per the Radiologist below, if available at the time ofthis note:    CT ABDOMEN PELVIS W IV CONTRAST Additional Contrast? None   Final Result   There is fluid throughout the small bowel and the colon without distention or bowel wall thickening indicating nonspecific gastroenteritis.                      ED BEDSIDE ULTRASOUND:   Performed by ED Physician - none    LABS:  Labs Reviewed   COMPREHENSIVE METABOLIC PANEL - Abnormal; Notable for the following components:       Result Value    Glucose 168 (*)     Calcium 10.0 (*)     Total Protein 8.7 (*)     Albumin 4.9 (*) Globulin 3.8 (*)     All other components within normal limits   CBC WITH AUTO DIFFERENTIAL - Abnormal; Notable for the following components:    WBC 12.1 (*)     MCV 80.3 (*)     MCH 26.1 (*)     MCHC 32.5 (*)     RDW 17.6 (*)     Neutrophils Absolute 9.1 (*)     All other components within normal limits   URINE RT REFLEX TO CULTURE - Abnormal; Notable for the following components:    Protein, UA 30 (*)     All other components within normal limits   MICROSCOPIC URINALYSIS - Abnormal; Notable for the following components:    Bacteria, UA RARE (*)     All other components within normal limits   LIPASE   AMYLASE       All other labs were within normal range or not returned as of this dictation. EMERGENCY DEPARTMENT COURSE and DIFFERENTIAL DIAGNOSIS/MDM:   Vitals:    Vitals:    03/19/21 1754 03/19/21 1757 03/19/21 1758   BP:  (!) 149/73    Pulse:  98    Resp:  20    Temp:   97.8 °F (36.6 °C)   TempSrc:   Oral   SpO2:  98%    Weight: 290 lb (131.5 kg)     Height: 5' 6\" (1.676 m)           MDM  Number of Diagnoses or Management Options  Diagnosis management comments: Patient has refused any pain medication nausea medication given hydration given patient states that this pain is slightly different pain than what usually she gets patient has slight leukocytosis patient agrees for the CAT scan with IV contrast to make sure there is no colitis no diverticulitis  patient be signed out to oncoming doctor at 1900 hrs. Amount and/or Complexity of Data Reviewed  Clinical lab tests: ordered and reviewed  Tests in the radiology section of CPT®: ordered and reviewed        CRITICAL CARE TIME   Total Critical Care time was  minutes, excluding separately reportableprocedures. There was a high probability of clinicallysignificant/life threatening deterioration in the patient's condition which required my urgent intervention.       CONSULTS:  None    PROCEDURES:  Unless otherwise noted below, none     Procedures    FINAL IMPRESSION      1.  Gastroenteritis          DISPOSITION/PLAN   DISPOSITION Decision To Discharge 03/19/2021 07:49:41 PM      PATIENT REFERRED TO:  Marcie Staley MD  [facility] [place] [address]  [phone]    In 3 days        DISCHARGE MEDICATIONS:  New Prescriptions    CIPROFLOXACIN (CIPRO) 500 MG TABLET    Take 1 tablet by mouth 2 times daily for 10 days    FAMOTIDINE (PEPCID) 20 MG TABLET    Take 1 tablet by mouth 2 times daily    ONDANSETRON (ZOFRAN ODT) 4 MG DISINTEGRATING TABLET    Take 1 tablet by mouth every 8 hours as needed for Nausea          (Please note that portions of this note were completed with a voice recognition program.  Efforts were made to edit the dictations but occasionally words are mis-transcribed.)    Sarmad Jackosn MD (electronically signed)  Attending Emergency Physician       Sarmad Jackson MD  03/19/21 2095

## 2021-03-20 ENCOUNTER — CARE COORDINATION (OUTPATIENT)
Dept: OTHER | Facility: CLINIC | Age: 46
End: 2021-03-20

## 2021-03-20 NOTE — CARE COORDINATION
[facility] ED Follow Up Call    3/20/2021    Patient: Rod Miranda Patient : 1975   MRN: [id number]  Reason for Admission: Gastroenteritis  Discharge Date: 3/19/21    Ambulatory Care Manager (ACM) attempted to contact the patient by telephone to perform post ED visit assessment. Left HIPPA compliant message providing introduction to self and requested a call back. Will continue to outreach to patient.         Care Transitions ED Follow Up    Care Transitions Interventions    Specialty Service Referral: Completed    Do you have all of your prescriptions and are they filled?: Yes

## 2021-03-23 RX ORDER — BACLOFEN 10 MG/1
5 TABLET ORAL 2 TIMES DAILY
Qty: 90 TABLET | Refills: 0 | Status: SHIPPED | OUTPATIENT
Start: 2021-03-23 | End: 2021-04-14 | Stop reason: SDUPTHER

## 2021-03-23 NOTE — TELEPHONE ENCOUNTER
Pharmacy  requesting medication refill.  Please approve or deny this request.    Rx requested:  Requested Prescriptions     Pending Prescriptions Disp Refills    baclofen (LIORESAL) 10 MG tablet 90 tablet 1     Sig: Take 0.5 tablets by mouth 2 times daily         Last Office Visit:   2/19/2021      Next Visit Date:  Future Appointments   Date Time Provider Iliana Garcia   3/24/2021  9:30 AM Makayla Muhammad   3/31/2021 10:00 AM Mykel Starr MD [facility]   4/19/2021 11:30 AM Fransisco Prado MD [address] [place] [address]   4/26/2021  8:30 AM Harjinder Hollins MD [facility]

## 2021-03-24 ENCOUNTER — VIRTUAL VISIT (OUTPATIENT)
Dept: GASTROENTEROLOGY | Age: 46
End: 2021-03-24
Payer: COMMERCIAL

## 2021-03-24 ENCOUNTER — CARE COORDINATION (OUTPATIENT)
Dept: OTHER | Facility: CLINIC | Age: 46
End: 2021-03-24

## 2021-03-24 DIAGNOSIS — R11.2 NAUSEA AND VOMITING, INTRACTABILITY OF VOMITING NOT SPECIFIED, UNSPECIFIED VOMITING TYPE: Primary | ICD-10-CM

## 2021-03-24 PROCEDURE — 99443 PR PHYS/QHP TELEPHONE EVALUATION 21-30 MIN: CPT | Performed by: NURSE PRACTITIONER

## 2021-03-24 ASSESSMENT — ENCOUNTER SYMPTOMS
VOMITING: 0
SHORTNESS OF BREATH: 0
NAUSEA: 0
WHEEZING: 0
DIARRHEA: 0
EYE PAIN: 0
BLOOD IN STOOL: 0
CHEST TIGHTNESS: 0
PHOTOPHOBIA: 0
ABDOMINAL PAIN: 0
ABDOMINAL DISTENTION: 0
ANAL BLEEDING: 0
RECTAL PAIN: 0
COLOR CHANGE: 0
EYE REDNESS: 0
TROUBLE SWALLOWING: 0
CONSTIPATION: 0
VOICE CHANGE: 0

## 2021-03-24 NOTE — PROGRESS NOTES
3/24/2021    TELEHEALTH EVALUATION -- Audio/Visual (During MJXEU-81 public health emergency)    Due to [place] outbreak, patient's office visit was converted to a virtual visit. Patient was contacted and agreed to proceed with a virtual visit via Telephone Visit, 22 minutes  The risks and benefits of converting to a virtual visit were discussed in light of the current infectious disease epidemic. Patient also understood that insurance coverage and co-pays are up to their individual insurance plans. HPI:    Jonathan Rush (:  1975) has requested an audio/video evaluation for the following concern(s):  Patient had telephone follow-up to nausea vomiting and diarrhea, recent EGD and colonoscopy noted. Has persistent nausea vomiting, no further diarrhea, was recently seen in the emergency department for similar symptoms had CT of the abdomen and pelvis that was consistent with gastroenteritis, was given oral antibiotics for questionable bacterial gastroenteritis, patient reports feeling better since starting oral antibiotics. Had previous ultrasound which noted stable right hepatic lobe hemangioma notable since 2015. Patient mentions that in 2015 she had previous normal gastric emptying study but was put on erythromycin and noted improvement in her symptoms. She denies hematemesis, melena, or hematochezia. Patient came in as follow-up for nausea, vomiting, and diarrhea, recently underwent EGD and colonoscopy. Duodenal biopsies emesis, EGD was notable for mild gastritis, biopsies were negative for HP or celiac. Colonoscopy was notable for 1 colon polyp with biopsy consistent with tubular adenoma, procedure results and pathology discussed at length with patient.   Patient mentioned that this morning she was in the emergency department with nausea vomiting and diarrhea that started at 12 AM, had blood work drawn which was normal, was given IV fluids and antiemetics and discharged home, reports that she is now asymptomatic. No fever no chills, no sick contacts. Had previous recommendations for stool studies for infectious pathogens this was not completed.    Endoscopic hx:   Colonoscopy 2/10/21  Colon polyp status post hot snare polypectomy  No endoscopic evidence of inflammatory bowel disease  Random colon biopsies obtained to assess for microscopic  colitis  EGD 2/10/21  Irregular Z-line with no nodularity, mild gastritis otherwise  negative EGD  Duodenal biopsies obtained to assess for celiac disease  BX:  A.  DUODENAL BIOPSIES:   DUODENAL MUCOSA, NO PATHOLOGIC DIAGNOSIS     B.  GASTRIC BIOPSIES:   GASTRIC MUCOSA WITH FOCAL MINIMAL CHRONIC INFLAMMATION   NEGATIVE FOR HELICOBACTER PYLORI     C.  ESOPHAGEAL BIOPSIES:   MILD TO MODERATE CHRONIC GASTROESOPHAGITIS   NO EVIDENCE OF JON'S ESOPHAGUS   NO EVIDENCE OF DYSPLASIA     D.  RANDOM COLON BIOPSIES:   COLONIC MUCOSA, NO PATHOLOGIC DIAGNOSIS     E. DESCENDING COLON POLYP:   TUBULAR ADENOMA   background  Patient came in today to establish GI care for complaints of nausea, vomiting, and diarrhea.  This episode of symptoms started approximately 3 weeks ago, no sick contacts, recent travel, eating out, or fever or chills.  Patient reports a longstanding history of intermittent cyclical type of episodes since 2015.  Was previously diagnosed with diabetic gastroparesis however her gastric emptying study was normal and was previously on erythromycin as treatment with good results.  Also has a longstanding history of GERD that is not well controlled on current regimen of Prilosec.  Most recent EGD in 2015 by Dr. Filomena Thompson noted gastritis no path is available, and colonoscopy in 2013 by Dr Grazyna Agrawal essentially normal.  She denies hematemesis, melena, or hematochezia.  Abdominal pain, or unintentional weight loss.  No family history of CRC, not on anticoagulants or antiplatelet   Review of Systems   Constitutional: Negative for appetite change, chills, fever and unexpected weight change. HENT: Negative for nosebleeds, tinnitus, trouble swallowing and voice change. Eyes: Negative for photophobia, pain and redness. Respiratory: Negative for chest tightness, shortness of breath and wheezing. Cardiovascular: Negative for chest pain, palpitations and leg swelling. Gastrointestinal: Negative for abdominal distention, abdominal pain, anal bleeding, blood in stool, constipation, diarrhea, nausea, rectal pain and vomiting. Endocrine: Negative for polydipsia, polyphagia and polyuria. Genitourinary: Negative for difficulty urinating and hematuria. Skin: Negative for color change, pallor and rash. Neurological: Negative for dizziness, speech difficulty and headaches. Psychiatric/Behavioral: Negative for confusion and suicidal ideas. Prior to Visit Medications    Medication Sig Taking? Authorizing Provider   baclofen (LIORESAL) 10 MG tablet Take 0.5 tablets by mouth 2 times daily Yes SANIA Coronado CNP   ciprofloxacin (CIPRO) 500 MG tablet Take 1 tablet by mouth 2 times daily for 10 days Yes Henrik Holland MD   famotidine (PEPCID) 20 MG tablet Take 1 tablet by mouth 2 times daily Yes Henrik Holland MD   ondansetron (ZOFRAN ODT) 4 MG disintegrating tablet Take 1 tablet by mouth every 8 hours as needed for Nausea Yes Henrik Holland MD   Insulin Glargine, 2 Unit Dial, (TOUJEO MAX SOLOSTAR) 300 UNIT/ML SOPN INJECT 240 UNITS UNDER THE SKIN AT BEDTIME  Patient taking differently: Inject 120 Units into the skin nightly  Yes Karlos Mccann MD   insulin lispro, 1 Unit Dial, (HUMALOG KWIKPEN) 100 UNIT/ML SOPN 80 units with each meals  Patient taking differently: Inject 40 Units into the skin 3 times daily (before meals)  Yes Patrick Marinelli MD   ALPRAZolam (XANAX) 0.5 MG tablet Take 1 tablet by mouth daily as needed for Anxiety for up to 45 days. Take 0.5 mg by mouth 3 times daily as needed for Anxiety.  Yes Faye Cooper MD   predniSONE (Faustine Belden) 5 MG tablet Take 5 mg by mouth daily Yes Jose Fitzpatrick MD   metoprolol (LOPRESSOR) 100 MG tablet Take 1 tablet by mouth 2 times daily Yes Diana Hurt MD   rosuvastatin (CRESTOR) 10 MG tablet TAKE ONE TABLET BY MOUTH NIGHTLY Yes Diana Hurt MD   ondansetron (ZOFRAN ODT) 4 MG disintegrating tablet Take 1 tablet by mouth every 8 hours as needed for Nausea or Vomiting Yes Jayson Oropeza MD   Continuous Blood Gluc Sensor (FREESTYLE JAIRO 14 DAY SENSOR) MISC Every 2 weeks Yes Jayson Oropeza MD   Continuous Blood Gluc  (FREESTYLE JAIRO 14 DAY READER) ALEXANDREA As directed Yes Jayson Oropeza MD   pioglitazone (ACTOS) 30 MG tablet Take 1 tablet by mouth daily Yes Jayson Oropeza MD   Insulin Pen Needle (PEN NEEDLES) 32G X 4 MM MISC Use as directed with insulin pens, 4 times daily Yes Jayson Oropeza MD   Insulin Pen Needle (PEN NEEDLES) 32G X 4 MM MISC Use as directed with insulin pens, 4 times daily Yes Jayson Oropeza MD   esomeprazole (NEXIUM) 40 MG delayed release capsule Take 1 capsule by mouth daily Yes SANIA Coleman - CNP   losartan (COZAAR) 50 MG tablet Take 1 tablet by mouth daily Yes Diana Hurt MD   hydroCHLOROthiazide (HYDRODIURIL) 25 MG tablet Take 1 tablet by mouth daily Yes Diana Hurt MD   nystatin (MYCOSTATIN) 702966 UNIT/GM powder Apply 3 times daily. Yes Diana Hurt MD   blood glucose test strips (PRODIGY NO CODING BLOOD GLUC) strip 1 each by In Vitro route 4 times daily As needed. Yes Diana Hurt MD   blood glucose test strips (PRODIGY NO CODING BLOOD GLUC) strip 1 each by In Vitro route 4 times daily As needed. Yes Jayson Oropeza MD   clotrimazole-betamethasone (LOTRISONE) 1-0.05 % cream Apply topically 2 times daily.  Yes Oli Akins DO   venlafaxine (EFFEXOR XR) 75 MG extended release capsule TAKE TWO CAPSULES BY MOUTH EVERY MORNING AND TAKE ONE CAPSULE BY MOUTH EVERY EVENING Yes Diana Hurt MD   solifenacin (VESICARE) 10 MG tablet TAKE 1 TABLET BY MOUTH ONE TIME A DAY Yes Lucia Claire Lloyd Flores MD   metFORMIN (GLUCOPHAGE) 500 MG tablet Take 1 tablet by mouth 3 times daily Yes Jose Guadalupe Liz MD   Blood Glucose Monitoring Suppl (PRODIGY AUTOCODE BLOOD GLUCOSE) w/Device KIT Use as directed to test up to 4 times daily Yes Jose Guadalupe Liz MD   PRODIGY LANCETS 28G MISC Use as directed to test up to 4 times daily Yes Jose Guadalupe Liz MD   miconazole (MICOTIN) 2 % cream Apply topically 2 times daily. Yes Carolynn Healy MD   Handicap Placard MISC Exp 5 years Yes Carolynn Healy MD   sucralfate (CARAFATE) 1 GM tablet Take 1 tablet by mouth 4 times daily for 7 days  Lala Thomas, APRN - CNP       Social History     Tobacco Use    Smoking status: Former Smoker     Packs/day: 1.00     Types: Cigarettes     Quit date: 2017     Years since quittin.2    Smokeless tobacco: Never Used   Substance Use Topics    Alcohol use:  Yes     Alcohol/week: 0.0 standard drinks     Frequency: Monthly or less     Binge frequency: Never     Comment: socially    Drug use: No        Allergies   Allergen Reactions    Morphine And Related Hives and Rash    Ace Inhibitors Other (See Comments)     Dizziness and near syncope    Bactrim [Sulfamethoxazole-Trimethoprim] Itching    Nitroglycerin Other (See Comments)     Migraine    Percocet [Oxycodone-Acetaminophen] Nausea And Vomiting    Victoza [Liraglutide]      NAUSEA   ,   Past Medical History:   Diagnosis Date    Acute midline thoracic back pain 2018    B12 deficiency     Family history of premature CAD 2013    Fatty liver     Gastroesophageal reflux disease 2021    HPV (human papilloma virus) anogenital infection     Hyperlipidemia     Hypertension     Liver hemangioma     Obesity 3/11/13    BMI 43.42    Orthostatic hypotension     Ovarian cyst     PMR (polymyalgia rheumatica) (HCC)     Rectal fissure     Tobacco abuse 9/3/2015    Tobacco abuse     Tremor     Uncontrolled diabetes mellitus with complications ([facility] [address]     Unspecified sleep apnea    ,   Past Surgical History:   Procedure Laterality Date    CARDIAC CATHETERIZATION      COLONOSCOPY      for diarrhea (-)    COLONOSCOPY N/A 2/10/2021    COLONOSCOPY DIAGNOSTIC performed by Florentin Stevens MD at [facility] 96  12-10    [facility]  1-05    NV MUSCLE BIOPSY Left 2018    LEFT QUADRICEPS MUSCLE BIOPSY performed by Radha Mcgowan MD at [facility] UNI/BI CANNULATION N/A 2018    T 12 VERTEBRALPLASTY ROOM 278 performed by Ingris Garcia MD at [address] [facility] ENDOSCOPY  10/13/15     DR. HERRERA     UPPER GASTROINTESTINAL ENDOSCOPY N/A 2/10/2021    EGD ESOPHAGOGASTRODUODENOSCOPY performed by Florentin Stevens MD at [facility]     ,   Social History     Tobacco Use    Smoking status: Former Smoker     Packs/day: 1.00     Types: Cigarettes     Quit date: 2017     Years since quittin.2    Smokeless tobacco: Never Used   Substance Use Topics    Alcohol use:  Yes     Alcohol/week: 0.0 standard drinks     Frequency: Monthly or less     Binge frequency: Never     Comment: socially    Drug use: No   ,   Family History   Problem Relation Age of Onset    Diabetes Father     Heart Disease Mother     Colon Cancer Neg Hx     Cancer Neg Hx        PHYSICAL EXAMINATION: Limited due to telephone exam  [ INSTRUCTIONS:  \"[x]\" Indicates a positive item  \"[]\" Indicates a negative item  -- DELETE ALL ITEMS NOT EXAMINED]  [x] Alert  [x] Oriented to person/place/time    [x] No apparent distress  [] Toxic appearing    [] Face flushed appearing [] Sclera clear  [] Lips are cyanotic      [] Breathing appears normal  [] Appears tachypneic      [] Rash on visible skin    [] Cranial Nerves II-XII grossly intact    [] Motor grossly intact in visible upper extremities    [] Motor grossly intact in visible lower extremities    [] Normal Mood  [] Anxious appearing    [] Depressed appearing  [] Confused appearing      [] Poor short term memory  [] Poor long term memory    [] OTHER:      Due to this being a TeleHealth encounter, evaluation of the following organ systems is limited: Vitals/Constitutional/EENT/Resp/CV/GI//MS/Neuro/Skin/Heme-Lymph-Imm. ASSESSMENT/PLAN:  1. Nausea and vomiting  Persistent nausea vomiting, recent ED visit for similar symptoms, CT of the abdomen noted gastroenteritis, was given oral antibiotics and feels better. Previous EGD and colonoscopy noted, symptoms are out of proportion to results. Has persistent associated bloating, right upper quadrant pain and postprandial fullness, previous ultrasound of the abdomen noted liver hemangioma which is been stable since 2015 otherwise no evidence of cholecystitis or cholelithiasis. Mentions that she was previously diagnosed with gastroparesis however gastric emptying study was normal and she was previously on erythromycin with good results  - NM GASTRIC EMPTYING; given her history of diabetes, most recent hemoglobin A1c of 9.1, previous normal gastric emptying study in 2015  - NM HEPATOBILIARY SCAN W EJECTION FRACTION  -Antiemetics as needed  -H2 blocker as needed  2. Diarrhea  Chronic in nature, currently asymptomatic, with recent normal colonoscopy and negative biopsies, symptoms do fit [place] for criteria for IBS-D, has stool studies ordered but not completed as of yet. -Recommend completing stool studies for completeness  3. Associated medical conditions include but are not limited to class III obesity, dyslipidemia, pulmonary hypertension, DM 2, polymyalgia rheumatica, and former nicotine use. 4.  Preventative health  Repeat screening colonoscopy for history of polyps in 5 years, 2025  Return in about 4 weeks (around 4/21/2021), or if symptoms worsen or fail to improve. An  electronic signature was used to authenticate this note.     --Maria E Medina, APRN - CNP on 3/24/2021 at 9:49 AM        Pursuant to the emergency declaration under the [address] [place] [address] and the [employer] and [employer] Act, this Virtual  Visit was conducted, with patient's consent, to reduce the patient's risk of exposure to COVID-19 and provide continuity of care for an established patient. Services were provided through a video synchronous discussion virtually to substitute for in-person clinic visit.

## 2021-03-24 NOTE — CARE COORDINATION
[facility] ED Follow Up Call    3/24/2021    Patient: Teri Vee Patient : 1975   MRN: [id number]  Reason for Admission: Gastroenteritis  Discharge Date: 3/19/21    Ambulatory Care Manager (ACM) attempted to contact the patient again by telephone to perform post ED visit assessment. Left HIPPA compliant message providing introduction to self and requested a call back. Will continue to outreach to patient. Will send NJOY message in the meantime.        Care Transitions ED Follow Up    Care Transitions Interventions    Specialty Service Referral: Completed    Schedule Follow Up Appointment with Physician: Completed  Do you have all of your prescriptions and are they filled?: Yes

## 2021-04-13 ENCOUNTER — HOSPITAL ENCOUNTER (OUTPATIENT)
Dept: NUCLEAR MEDICINE | Age: 46
Discharge: HOME | End: 2021-04-15
Payer: COMMERCIAL

## 2021-04-13 DIAGNOSIS — R11.2 NAUSEA AND VOMITING, INTRACTABILITY OF VOMITING NOT SPECIFIED, UNSPECIFIED VOMITING TYPE: ICD-10-CM

## 2021-04-13 PROCEDURE — 78227 HEPATOBIL SYST IMAGE W/DRUG: CPT

## 2021-04-13 PROCEDURE — A9537 TC99M MEBROFENIN: HCPCS | Performed by: FAMILY MEDICINE

## 2021-04-13 PROCEDURE — 3430000000 HC RX DIAGNOSTIC RADIOPHARMACEUTICAL

## 2021-04-13 PROCEDURE — 3430000000 HC RX DIAGNOSTIC RADIOPHARMACEUTICAL: Performed by: FAMILY MEDICINE

## 2021-04-13 PROCEDURE — A9537 TC99M MEBROFENIN: HCPCS

## 2021-04-13 RX ADMIN — Medication 8 MILLICURIE: at 07:26

## 2021-04-14 RX ORDER — BACLOFEN 10 MG/1
10 TABLET ORAL 2 TIMES DAILY
Qty: 90 TABLET | Refills: 0 | Status: SHIPPED | OUTPATIENT
Start: 2021-04-14 | End: 2021-06-28

## 2021-04-14 NOTE — TELEPHONE ENCOUNTER
Pharmacy requesting medication refill.  Please approve or deny this request.    Rx requested:  Requested Prescriptions     Pending Prescriptions Disp Refills    baclofen (LIORESAL) 10 MG tablet 90 tablet 0     Sig: Take 1 tablet by mouth 2 times daily         Last Office Visit:   2/19/2021      Next Visit Date:  Future Appointments   Date Time Provider Iliana Garcia   4/16/2021  8:30 AM [facility]  [facility] Fac RAD   4/19/2021 11:30 AM ZEE Queen  [address]   4/26/2021  8:30 AM Cheryl Brownlee MD [facility]   4/26/2021 11:00 AM [address] [place] [address]

## 2021-04-16 ENCOUNTER — HOSPITAL ENCOUNTER (OUTPATIENT)
Dept: NUCLEAR MEDICINE | Age: 46
Discharge: HOME | End: 2021-04-18
Payer: COMMERCIAL

## 2021-04-16 ENCOUNTER — HOSPITAL ENCOUNTER (OUTPATIENT)
Dept: LAB | Age: 46
Discharge: HOME | End: 2021-04-16
Payer: COMMERCIAL

## 2021-04-16 DIAGNOSIS — R11.2 NAUSEA AND VOMITING, INTRACTABILITY OF VOMITING NOT SPECIFIED, UNSPECIFIED VOMITING TYPE: ICD-10-CM

## 2021-04-16 LAB
ANION GAP SERPL CALCULATED.3IONS-SCNC: 16 MEQ/L (ref 9–15)
BUN BLDV-MCNC: 13 MG/DL (ref 6–20)
CALCIUM SERPL-MCNC: 8.7 MG/DL (ref 8.5–9.9)
CHLORIDE BLD-SCNC: 98 MEQ/L (ref 95–107)
CO2: 25 MEQ/L (ref 20–31)
CREAT SERPL-MCNC: 0.83 MG/DL (ref 0.5–0.9)
CREATININE URINE: 255.2 MG/DL
GFR AFRICAN AMERICAN: >60
GFR NON-AFRICAN AMERICAN: >60
GLUCOSE BLD-MCNC: 172 MG/DL (ref 70–99)
HBA1C MFR BLD: 7.5 % (ref 4.8–5.9)
MICROALBUMIN UR-MCNC: 10 MG/DL
MICROALBUMIN/CREAT UR-RTO: 39.2 MG/G (ref 0–30)
POTASSIUM SERPL-SCNC: 4 MEQ/L (ref 3.4–4.9)
SODIUM BLD-SCNC: 139 MEQ/L (ref 135–144)

## 2021-04-16 PROCEDURE — 82570 ASSAY OF URINE CREATININE: CPT

## 2021-04-16 PROCEDURE — 82043 UR ALBUMIN QUANTITATIVE: CPT

## 2021-04-16 PROCEDURE — 78264 GASTRIC EMPTYING IMG STUDY: CPT

## 2021-04-16 PROCEDURE — 3430000000 HC RX DIAGNOSTIC RADIOPHARMACEUTICAL: Performed by: NURSE PRACTITIONER

## 2021-04-16 PROCEDURE — 83036 HEMOGLOBIN GLYCOSYLATED A1C: CPT

## 2021-04-16 PROCEDURE — 80048 BASIC METABOLIC PNL TOTAL CA: CPT

## 2021-04-16 PROCEDURE — A9541 TC99M SULFUR COLLOID: HCPCS | Performed by: NURSE PRACTITIONER

## 2021-04-16 PROCEDURE — 36415 COLL VENOUS BLD VENIPUNCTURE: CPT

## 2021-04-16 RX ADMIN — Medication 1.9 MILLICURIE: at 08:45

## 2021-04-17 DIAGNOSIS — K21.9 GASTROESOPHAGEAL REFLUX DISEASE, UNSPECIFIED WHETHER ESOPHAGITIS PRESENT: ICD-10-CM

## 2021-04-18 NOTE — TELEPHONE ENCOUNTER
Pharmacy is  requesting medication refill.  Please approve or deny this request.    Rx requested:  Requested Prescriptions     Pending Prescriptions Disp Refills    venlafaxine (EFFEXOR XR) 75 MG extended release capsule 270 capsule 1     Sig: TAKE TWO CAPSULES BY MOUTH EVERY MORNING AND TAKE ONE CAPSULE BY MOUTH EVERY EVENING         Last Office Visit:   2/19/2021      Next Visit Date:  Future Appointments   Date Time Provider [facility]   4/19/2021 11:30 AM Donna Singh  [address]   4/26/2021  8:30 AM Jen Sims MD [facility]   [facility]/26[facility]2021 [facility]

## 2021-04-19 ENCOUNTER — VIRTUAL VISIT (OUTPATIENT)
Dept: ENDOCRINOLOGY | Age: 46
End: 2021-04-19
Payer: COMMERCIAL

## 2021-04-19 DIAGNOSIS — E11.43 DIABETIC GASTROPARESIS ASSOCIATED WITH TYPE 2 DIABETES MELLITUS (HCC): ICD-10-CM

## 2021-04-19 DIAGNOSIS — Z79.4 TYPE 2 DIABETES MELLITUS WITH COMPLICATION, WITH LONG-TERM CURRENT USE OF INSULIN (HCC): Primary | ICD-10-CM

## 2021-04-19 DIAGNOSIS — E11.8 TYPE 2 DIABETES MELLITUS WITH COMPLICATION, WITH LONG-TERM CURRENT USE OF INSULIN (HCC): Primary | ICD-10-CM

## 2021-04-19 DIAGNOSIS — K31.84 DIABETIC GASTROPARESIS ASSOCIATED WITH TYPE 2 DIABETES MELLITUS (HCC): ICD-10-CM

## 2021-04-19 PROCEDURE — 99442 PR PHYS/QHP TELEPHONE EVALUATION 11-20 MIN: CPT | Performed by: INTERNAL MEDICINE

## 2021-04-19 RX ORDER — METOCLOPRAMIDE 10 MG/1
10 TABLET ORAL
Qty: 360 TABLET | Refills: 3 | Status: SHIPPED | OUTPATIENT
Start: 2021-04-19

## 2021-04-19 RX ORDER — INSULIN LISPRO 100 [IU]/ML
INJECTION, SOLUTION INTRAVENOUS; SUBCUTANEOUS
Qty: 60 PEN | Refills: 3
Start: 2021-04-19 | End: 2021-08-16

## 2021-04-19 RX ORDER — INSULIN GLARGINE 300 U/ML
INJECTION, SOLUTION SUBCUTANEOUS
Qty: 90 PEN | Refills: 3
Start: 2021-04-19 | End: 2021-12-14 | Stop reason: SDUPTHER

## 2021-04-19 RX ORDER — ESOMEPRAZOLE MAGNESIUM 40 MG/1
CAPSULE, DELAYED RELEASE ORAL
Qty: 30 CAPSULE | Refills: 3 | Status: SHIPPED | OUTPATIENT
Start: 2021-04-19 | End: 2021-07-26

## 2021-04-19 NOTE — PROGRESS NOTES
2021    TELEHEALTH EVALUATION -- Audio/Visual (During PFUKB-12 public health emergency)    Due to Jasmeet 19 outbreak, patient's office visit was converted to a virtual visit. Patient was contacted and agreed to proceed with a virtual visit via Telephone Visit  The risks and benefits of converting to a virtual visit were discussed in light of the current infectious disease epidemic. Patient also understood that insurance coverage and co-pays are up to their individual insurance plans. HPI: Follow-up on type 2 diabetes recent labs were reviewed A1c is down to 7.5 patient does get low blood sugars early in the morning and has lowered her basal insulin dose denies any swelling of her ankles  Symptoms improved after Actos currently on Toujeo 130 units at bedtime Humalog 50 units with each meals    Complains of gastroparesis symptoms have  Had gastric emptying scan done showing gastroparesis    COMPARISON: No prior studies available for comparison.       HISTORY: Nausea and vomiting, intractable stability of vomiting       TECHNIQUE:NM GASTRIC EMPTYING 1.9 mCi technetium 99m sulphur colloid was given orally as a solid phase meal. Continuous imaging of the abdomen was performed for 60 minutes, with additional imaging at 90 minutes.        FINDINGS:     There was no evidence of gastro-esophageal reflux during 60 minutes of continuous observation. Gastric emptying at 30 minutes measured -11%. At 60 minutes there was only 2% emptying and at 2 hours 69% emptying occurred. The T1 half measured 70.39    minutes.           Impression   There is delay in emptying during the first hour of the examination. This is suggestive of gastroparesis in the absence of mechanical obstruction. Severiano Chatterjee (:  1975) has requested an audio/video evaluation for the following concern(s):        Results for [employer] [facility]jay" (MRN [id number]) as of 2021 11:56   Ref.  Range 2021 10:13 4/16/2021 07:51 4/16/2021 11:08   Sodium Latest Ref Range: 135 - 144 mEq/L  139    Potassium Latest Ref Range: 3.4 - 4.9 mEq/L  4.0    Chloride Latest Ref Range: 95 - 107 mEq/L  98    CO2 Latest Ref Range: 20 - 31 mEq/L  25    BUN Latest Ref Range: 6 - 20 mg/dL  13    Creatinine Latest Ref Range: 0.50 - 0.90 mg/dL  0.83    Anion Gap Latest Ref Range: 9 - 15 mEq/L  16 (H)    GFR Non- Latest Ref Range: >60   >60.0    GFR African American Latest Ref Range: >60   >60.0    Glucose Latest Ref Range: 70 - 99 mg/dL  172 (H)    Calcium Latest Ref Range: 8.5 - 9.9 mg/dL  8.7    Hemoglobin A1C Latest Ref Range: 4.8 - 5.9 %  7.5 (H)    Creatinine, Ur Latest Ref Range: Not Established mg/dL  255.2    Microalbumin Creatinine Ratio Latest Ref Range: 0.0 - 30.0 mg/G  39.2 (H)    Microalbumin, Random Urine Latest Ref Range: Not Established mg/dL  10.00 (H)        Review of Systems   Gastrointestinal: Positive for nausea. Prior to Visit Medications    Medication Sig Taking?  Authorizing Provider   esomeprazole (NEXIUM) 40 MG delayed release capsule TAKE 1 CAPSULE BY MOUTH ONE TIME A DAY  SANIA Chiang - CNP   baclofen (LIORESAL) 10 MG tablet Take 1 tablet by mouth 2 times daily  Sandrita Parmar MD   famotidine (PEPCID) 20 MG tablet Take 1 tablet by mouth 2 times daily  Cuate Garcia MD   ondansetron (ZOFRAN ODT) 4 MG disintegrating tablet Take 1 tablet by mouth every 8 hours as needed for Nausea  Cuate Garcia MD   Insulin Glargine, 2 Unit Dial, (TOUJEO MAX SOLOSTAR) 300 UNIT/ML SOPN INJECT 240 UNITS UNDER THE SKIN AT BEDTIME  Patient taking differently: Inject 120 Units into the skin nightly   Katalina Paul MD   insulin lispro, 1 Unit Dial, (HUMALOG KWIKPEN) 100 UNIT/ML SOPN 80 units with each meals  Patient taking differently: Inject 40 Units into the skin 3 times daily (before meals)   Patrick Marinelli MD   predniSONE (DELTASONE) 5 MG tablet Take 5 mg by mouth daily  Historical Provider, MD metoprolol (LOPRESSOR) 100 MG tablet Take 1 tablet by mouth 2 times daily  Sincere Welch MD   rosuvastatin (CRESTOR) 10 MG tablet TAKE ONE TABLET BY MOUTH NIGHTLY  Rudi Araujo MD   ondansetron (ZOFRAN ODT) 4 MG disintegrating tablet Take 1 tablet by mouth every 8 hours as needed for Nausea or Vomiting  Patrick Marinelli MD   Continuous Blood Gluc Sensor (FREESTYLE JAIRO 14 DAY SENSOR) MISC Every 2 weeks  Darshan Ibarra MD   Continuous Blood Gluc  (FREESTYLE JAIRO 14 DAY READER) ALEXANDREA As directed  Darshan Ibarra MD   pioglitazone (ACTOS) 30 MG tablet Take 1 tablet by mouth daily  Darshan Ibarra MD   Insulin Pen Needle (PEN NEEDLES) 32G X 4 MM MISC Use as directed with insulin pens, 4 times daily  Patrick Marinelli MD   Insulin Pen Needle (PEN NEEDLES) 32G X 4 MM MISC Use as directed with insulin pens, 4 times daily  Darshan Ibarra MD   losartan (COZAAR) 50 MG tablet Take 1 tablet by mouth daily  Sincere Welch MD   hydroCHLOROthiazide (HYDRODIURIL) 25 MG tablet Take 1 tablet by mouth daily  Sincere Welch MD   nystatin (MYCOSTATIN) 860117 UNIT/GM powder Apply 3 times daily. Sincere Welch MD   blood glucose test strips (PRODIGY NO CODING BLOOD GLUC) strip 1 each by In Vitro route 4 times daily As needed. Sincere Welch MD   blood glucose test strips (PRODIGY NO CODING BLOOD GLUC) strip 1 each by In Vitro route 4 times daily As needed. Darshan Ibarra MD   clotrimazole-betamethasone (LOTRISONE) 1-0.05 % cream Apply topically 2 times daily.   Maribell Nice DO   venlafaxine (EFFEXOR XR) 75 MG extended release capsule TAKE TWO CAPSULES BY MOUTH EVERY MORNING AND TAKE ONE CAPSULE BY MOUTH EVERY EVENING  Rudi Araujo MD   solifenacin (VESICARE) 10 MG tablet TAKE 1 TABLET BY MOUTH ONE TIME A DAY  Daisha Miranda MD   sucralfate (CARAFATE) 1 GM tablet Take 1 tablet by mouth 4 times daily for 7 days  Jareth No, APRN - CNP   metFORMIN (GLUCOPHAGE) 500 MG tablet Take 1 tablet by mouth 3 times daily  Darshan Ibarra MD   Blood Glucose Monitoring Suppl (PRODIGY AUTOCODE BLOOD GLUCOSE) w/Device KIT Use as directed to test up to 4 times daily  MD ROOSEVELT Aguirre LANCETS 28G MISC Use as directed to test up to 4 times daily  Darshan Ibarra MD   miconazole (MICOTIN) 2 % cream Apply topically 2 times daily. Joseph Alanis MD   Handicap Placard MISC Exp 5 years  Joseph Alanis MD       Social History     Tobacco Use    Smoking status: Former Smoker     Packs/day: 1.00     Types: Cigarettes     Quit date: 2017     Years since quittin.2    Smokeless tobacco: Never Used   Substance Use Topics    Alcohol use: Yes     Alcohol/week: 0.0 standard drinks     Frequency: Monthly or less     Binge frequency: Never     Comment: socially    Drug use: No            PHYSICAL EXAMINATION:  [ INSTRUCTIONS:  \"[x]\" Indicates a positive item  \"[]\" Indicates a negative item  -- DELETE ALL ITEMS NOT EXAMINED]  [] Alert  [] Oriented to person/place/time    [] No apparent distress  [] Toxic appearing    [] Face flushed appearing [] Sclera clear  [] Lips are cyanotic      [] Breathing appears normal  [] Appears tachypneic      [] Rash on visible skin    [] Cranial Nerves II-XII grossly intact    [] Motor grossly intact in visible upper extremities    [] Motor grossly intact in visible lower extremities    [] Normal Mood  [] Anxious appearing    [] Depressed appearing  [] Confused appearing      [] Poor short term memory  [] Poor long term memory    [] OTHER:      Due to this being a TeleHealth encounter, evaluation of the following organ systems is limited: Vitals/Constitutional/EENT/Resp/CV/GI//MS/Neuro/Skin/Heme-Lymph-Imm. ASSESSMENT/PLAN:     Diagnosis Orders   1. Type 2 diabetes mellitus with complication, with long-term current use of insulin ([address]     2.  Diabetic gastroparesis associated with type 2 diabetes mellitus (HCC)  Basic Metabolic Panel    Hemoglobin A1C    Microalbumin / Creatinine Urine Ratio     Orders Placed This Encounter   Procedures    Basic Metabolic Panel     Standing Status:   Future     Standing Expiration Date:   2022    Hemoglobin A1C     Standing Status:   Future     Standing Expiration Date:   2022    Microalbumin / Creatinine Urine Ratio     Standing Status:   Future     Standing Expiration Date:   2022     Orders Placed This Encounter   Medications    insulin lispro, 1 Unit Dial, (HUMALOG KWIKPEN) 100 UNIT/ML SOPN     Si-60 units at each meals     Dispense:  60 pen     Refill:  3    Insulin Glargine, 2 Unit Dial, (TOUJEO MAX SOLOSTAR) 300 UNIT/ML SOPN     Si units at bedtime     Dispense:  90 pen     Refill:  3    metoclopramide (REGLAN) 10 MG tablet     Sig: Take 1 tablet by mouth 3 times daily (with meals)     Dispense:  360 tablet     Refill:  3     Continue Actos continue Toujeo 130 at bedtime Humalog 50 to 60 units each meals add Reglan for gastroparesis follow-up in 3 months      An  electronic signature was used to authenticate this note. --Hui Ballard MD on 2021 at 11:56 AM        Pursuant to the emergency declaration under the [facility] waiver authority and the [employer] Act, this Virtual  Visit was conducted, with patient's consent, to reduce the patient's risk of exposure to COVID-19 and provide continuity of care for an established patient. Services were provided through a video synchronous discussion virtually to substitute for in-person clinic visit.

## 2021-04-20 ENCOUNTER — CARE COORDINATION (OUTPATIENT)
Dept: OTHER | Facility: CLINIC | Age: 46
End: 2021-04-20

## 2021-04-20 RX ORDER — VENLAFAXINE HYDROCHLORIDE 75 MG/1
CAPSULE, EXTENDED RELEASE ORAL
Qty: 270 CAPSULE | Refills: 1 | Status: SHIPPED | OUTPATIENT
Start: 2021-04-20 | End: 2021-04-23 | Stop reason: SDUPTHER

## 2021-04-20 RX ORDER — BACLOFEN 10 MG/1
10 TABLET ORAL 2 TIMES DAILY
Qty: 90 TABLET | Refills: 0 | OUTPATIENT
Start: 2021-04-20

## 2021-04-20 NOTE — TELEPHONE ENCOUNTER
Requested Prescriptions     Pending Prescriptions Disp Refills    baclofen (LIORESAL) 10 MG tablet 90 tablet 0     Sig: Take 1 tablet by mouth 2 times daily

## 2021-04-23 NOTE — TELEPHONE ENCOUNTER
Pharmacy requesting medication refill.  Please approve or deny this request.    Rx requested:  Requested Prescriptions     Pending Prescriptions Disp Refills    venlafaxine (EFFEXOR XR) 75 MG extended release capsule 270 capsule 1     Sig: Take 1 capsule by mouth daily TAKE TWO CAPSULES BY MOUTH EVERY MORNING AND TAKE ONE CAPSULE BY MOUTH EVERY EVENING         Last Office Visit:   2/19/2021      Next Visit Date:  Future Appointments   Date Time Provider Iliana Garcia   4/26/2021 11:00 AM Makayla Sinclair

## 2021-04-24 RX ORDER — VENLAFAXINE HYDROCHLORIDE 75 MG/1
75 CAPSULE, EXTENDED RELEASE ORAL DAILY
Qty: 270 CAPSULE | Refills: 1 | Status: SHIPPED | OUTPATIENT
Start: 2021-04-24 | End: 2021-11-02 | Stop reason: SDUPTHER

## 2021-04-25 ASSESSMENT — ENCOUNTER SYMPTOMS: NAUSEA: 1

## 2021-04-26 ENCOUNTER — VIRTUAL VISIT (OUTPATIENT)
Dept: GASTROENTEROLOGY | Age: 46
End: 2021-04-26
Payer: COMMERCIAL

## 2021-04-26 DIAGNOSIS — K58.0 IRRITABLE BOWEL SYNDROME WITH DIARRHEA: ICD-10-CM

## 2021-04-26 DIAGNOSIS — K31.84 GASTROPARESIS: Primary | ICD-10-CM

## 2021-04-26 DIAGNOSIS — K63.5 POLYP OF COLON, UNSPECIFIED PART OF COLON, UNSPECIFIED TYPE: ICD-10-CM

## 2021-04-26 PROCEDURE — 99442 PR PHYS/QHP TELEPHONE EVALUATION 11-20 MIN: CPT | Performed by: NURSE PRACTITIONER

## 2021-04-26 ASSESSMENT — ENCOUNTER SYMPTOMS
DIARRHEA: 0
ABDOMINAL PAIN: 0
VOICE CHANGE: 0
NAUSEA: 0
EYE REDNESS: 0
EYE PAIN: 0
ABDOMINAL DISTENTION: 0
VOMITING: 0
SHORTNESS OF BREATH: 0
PHOTOPHOBIA: 0
CHEST TIGHTNESS: 0
TROUBLE SWALLOWING: 0
ANAL BLEEDING: 0
WHEEZING: 0
RECTAL PAIN: 0
CONSTIPATION: 0
BLOOD IN STOOL: 0
COLOR CHANGE: 0

## 2021-04-27 ENCOUNTER — PATIENT MESSAGE (OUTPATIENT)
Dept: GASTROENTEROLOGY | Age: 46
End: 2021-04-27

## 2021-04-27 DIAGNOSIS — R11.0 NAUSEA: ICD-10-CM

## 2021-04-27 DIAGNOSIS — K31.84 GASTROPARESIS: Primary | ICD-10-CM

## 2021-04-28 RX ORDER — ERYTHROMYCIN 250 MG/1
250 TABLET, COATED ORAL 3 TIMES DAILY
Qty: 90 TABLET | Refills: 3 | Status: SHIPPED | OUTPATIENT
Start: 2021-04-28 | End: 2021-04-29

## 2021-04-28 RX ORDER — ONDANSETRON 4 MG/1
4 TABLET, ORALLY DISINTEGRATING ORAL EVERY 8 HOURS PRN
Qty: 30 TABLET | Refills: 3 | Status: SHIPPED | OUTPATIENT
Start: 2021-04-28 | End: 2021-04-29

## 2021-04-28 NOTE — TELEPHONE ENCOUNTER
From: Fabian Carbajal  To: SANIA Dubon CNP  Sent: 4/27/2021 4:38 PM EDT  Subject: Visit Follow-Up Question    Can you go ahead and prescribe erythromycin and promethazine and zofran

## 2021-04-29 ENCOUNTER — TELEPHONE (OUTPATIENT)
Dept: GASTROENTEROLOGY | Age: 46
End: 2021-04-29

## 2021-04-29 RX ORDER — ERYTHROMYCIN 250 MG/1
250 TABLET, COATED ORAL 3 TIMES DAILY
Qty: 90 TABLET | Refills: 3 | Status: SHIPPED | OUTPATIENT
Start: 2021-04-29 | End: 2021-10-04

## 2021-04-29 RX ORDER — ONDANSETRON 4 MG/1
4 TABLET, ORALLY DISINTEGRATING ORAL EVERY 8 HOURS PRN
Qty: 30 TABLET | Refills: 3 | Status: SHIPPED | OUTPATIENT
Start: 2021-04-29 | End: 2021-06-04 | Stop reason: SDUPTHER

## 2021-04-29 NOTE — TELEPHONE ENCOUNTER
Patient requested refill of promethazine. Per pt's last [employer] message, two of her meds were approved, but she wants refill of promethazine. I didn't see med in her chart.

## 2021-05-04 DIAGNOSIS — E78.5 DYSLIPIDEMIA: ICD-10-CM

## 2021-05-04 RX ORDER — PROMETHAZINE HYDROCHLORIDE 25 MG/1
25 TABLET ORAL EVERY 8 HOURS
COMMUNITY
End: 2021-09-21

## 2021-05-04 NOTE — TELEPHONE ENCOUNTER
Requesting new prescriptions for Promethazine and Rosuvastatin for Bertha. Please send 90 day supply to [facility]    [phone]  Please call with any questions  Thank you!

## 2021-05-05 RX ORDER — ROSUVASTATIN CALCIUM 10 MG/1
TABLET, COATED ORAL
Qty: 90 TABLET | Refills: 0 | Status: SHIPPED | OUTPATIENT
Start: 2021-05-05 | End: 2021-07-28 | Stop reason: SDUPTHER

## 2021-05-05 RX ORDER — PROMETHAZINE HYDROCHLORIDE 25 MG/1
25 TABLET ORAL EVERY 8 HOURS
Qty: 270 TABLET | OUTPATIENT
Start: 2021-05-05

## 2021-05-11 NOTE — TELEPHONE ENCOUNTER
Requesting new rx for Sandra Mckeon. We have not received any new rx for her yet for the promethazine. Please send to [facility] delivery asap. [phone]  Call with any questions  Thank you!

## 2021-05-12 ENCOUNTER — HOSPITAL ENCOUNTER (EMERGENCY)
Age: 46
Discharge: HOME | End: 2021-05-12
Attending: EMERGENCY MEDICINE
Payer: COMMERCIAL

## 2021-05-12 ENCOUNTER — NURSE TRIAGE (OUTPATIENT)
Dept: OTHER | Facility: CLINIC | Age: 46
End: 2021-05-12

## 2021-05-12 VITALS
TEMPERATURE: 97.9 F | SYSTOLIC BLOOD PRESSURE: 120 MMHG | RESPIRATION RATE: 16 BRPM | DIASTOLIC BLOOD PRESSURE: 81 MMHG | WEIGHT: 280 LBS | BODY MASS INDEX: 45 KG/M2 | OXYGEN SATURATION: 96 % | HEIGHT: 66 IN | HEART RATE: 77 BPM

## 2021-05-12 DIAGNOSIS — T14.8XXA SKIN AVULSION: Primary | ICD-10-CM

## 2021-05-12 PROCEDURE — 99284 EMERGENCY DEPT VISIT MOD MDM: CPT

## 2021-05-12 RX ORDER — PROMETHAZINE HYDROCHLORIDE 25 MG/1
25 TABLET ORAL EVERY 8 HOURS
Qty: 270 TABLET | OUTPATIENT
Start: 2021-05-12

## 2021-05-12 ASSESSMENT — ENCOUNTER SYMPTOMS
SORE THROAT: 0
EYE PAIN: 0
VOMITING: 0
NAUSEA: 0
SHORTNESS OF BREATH: 0
ANAL BLEEDING: 1
CHEST TIGHTNESS: 0
ABDOMINAL PAIN: 0

## 2021-05-12 ASSESSMENT — PAIN DESCRIPTION - PAIN TYPE: TYPE: ACUTE PAIN

## 2021-05-12 ASSESSMENT — PAIN DESCRIPTION - DESCRIPTORS: DESCRIPTORS: ACHING

## 2021-05-12 ASSESSMENT — PAIN DESCRIPTION - LOCATION: LOCATION: RECTUM

## 2021-05-12 NOTE — TELEPHONE ENCOUNTER
Reason for Disposition   MODERATE rectal bleeding (small blood clots, passing blood without stool, or toilet water turns red) more than once a day    Answer Assessment - Initial Assessment Questions  1. APPEARANCE of BLOOD: \"What color is it? \" \"Is it passed separately, on the surface of the stool, or mixed in with the stool? \"       Bright red    2. AMOUNT: \"How much blood was passed? \"       \"a lot\" and filled toilet and changed water    3. FREQUENCY: \"How many times has blood been passed with the stools? \"       2 weeks intermittently- multiple times    4. ONSET: \"When was the blood first seen in the stools? \" (Days or weeks)       2 weeks    5. DIARRHEA: \"Is there also some diarrhea? \" If so, ask: \"How many diarrhea stools were passed in past 24 hours? \"       No diarrhea    6. CONSTIPATION: \"Do you have constipation? \" If so, \"How bad is it? \"      No    7. RECURRENT SYMPTOMS: \"Have you had blood in your stools before? \" If so, ask: \"When was the last time? \" and \"What happened that time? \"       Yes with the fissure    8. BLOOD THINNERS: \"Do you take any blood thinners? \" (e.g., Coumadin/warfarin, Pradaxa/dabigatran, aspirin)      No    9. OTHER SYMPTOMS: \"Do you have any other symptoms? \"  (e.g., abdominal pain, vomiting, dizziness, fever)      Hx fissure, feels like \"skin is hanging\"     10. PREGNANCY: \"Is there any chance you are pregnant? \" \"When was your last menstrual period? \"        No    Protocols used: RECTAL BLEEDING-ADULT-[place]        Brief description of triage: rectal bleeding x 2 week and worse today. Feels like something is \"torn\". Triage indicates for patient to go to ED- Will go to [facility] advice provided, patient verbalizes understanding; denies any other questions or concerns; instructed to call back for any new or worsening symptoms. Attention Provider: Thank you for allowing me to participate in the care of your patient.   The patient was connected to triage in response to symptoms provided. Please do not respond through this encounter as the response is not directed to a shared pool.

## 2021-05-12 NOTE — ED TRIAGE NOTES
Pt c/o rectal bleeding for the past two weeks that increased today, Pt is A&OX3, calm, ambulatory, afebrile, breathes are equal and unlabored.

## 2021-05-12 NOTE — ED PROVIDER NOTES
[address] [facility]  EMERGENCY DEPARTMENT ENCOUNTER      Pt Name: Severiano Chatterjee  MRN: [id number]  [place] 1975  Date of evaluation: 5/12/2021  Provider: Crista Lundborg, DO    CHIEF COMPLAINT       Chief Complaint   Patient presents with    Rectal Bleeding     pt c/o rectal bleeding for the past two weeks         HISTORY OF PRESENT ILLNESS   (Location/Symptom, Timing/Onset, Context/Setting, Quality, Duration, Modifying Factors, Severity)  Note limiting factors. Severiano Chatterjee is a 40 y.o. female who presents to the emergency department . Patient noticed bright red blood in toilet bowel on and off for a couple weeks. No abd pain. No weakness or dizziness. Ha colonoscopy in February and had polyp removed. No issues since. No diarrhea. No thinners. HPI    Nursing Notes were reviewed. REVIEW OF SYSTEMS    (2-9 systems for level 4, 10 or more for level 5)     Review of Systems   Constitutional: Negative for activity change, appetite change and fatigue. HENT: Negative for congestion and sore throat. Eyes: Negative for pain and visual disturbance. Respiratory: Negative for chest tightness and shortness of breath. Cardiovascular: Negative for chest pain. Gastrointestinal: Positive for anal bleeding. Negative for abdominal pain, nausea and vomiting. Endocrine: Negative for polydipsia. Genitourinary: Negative for flank pain and urgency. Musculoskeletal: Negative for gait problem and neck stiffness. Skin: Negative for rash. Neurological: Negative for weakness, light-headedness and headaches. Psychiatric/Behavioral: Negative for confusion and sleep disturbance. Except as noted above the remainder of the review of systems was reviewed and negative.        PAST MEDICAL HISTORY     Past Medical History:   Diagnosis Date    Acute midline thoracic back pain 9/18/2018    B12 deficiency     Family history of premature CAD 9/4/2013    Fatty liver     Gastroesophageal reflux disease 1/6/2021    HPV (human papilloma virus) anogenital infection     Hyperlipidemia     Hypertension     Irritable bowel syndrome with diarrhea 4/26/2021    Liver hemangioma 2009/2015    Obesity 3/11/13    BMI 43.42    Orthostatic hypotension     Ovarian cyst     PMR (polymyalgia rheumatica) (HCC)     Rectal fissure     Tobacco abuse 9/3/2015    Tobacco abuse     Tremor     Uncontrolled diabetes mellitus with complications ([facility] [address]     Unspecified sleep apnea          SURGICAL HISTORY       Past Surgical History:   Procedure Laterality Date    CARDIAC CATHETERIZATION  4-07    COLONOSCOPY  2013    for diarrhea (-)    COLONOSCOPY N/A 2/10/2021    COLONOSCOPY DIAGNOSTIC performed by Sharri Smith MD at [address]  12-10    [facility]  1-05    MN MUSCLE BIOPSY Left 9/21/2018    LEFT QUADRICEPS MUSCLE BIOPSY performed by Georgianne Claude, MD at [address] UNI/BI CANNULATION N/A 9/18/2018    T 12 VERTEBRALPLASTY ROOM 278 performed by Conrad Wilks MD at [address] ENDOSCOPY  10/13/15     DR. HERRERA     UPPER GASTROINTESTINAL ENDOSCOPY N/A 2/10/2021    EGD ESOPHAGOGASTRODUODENOSCOPY performed by Sharri Smith MD at [facility]  6-2015         CURRENT MEDICATIONS       Previous Medications    BACLOFEN (LIORESAL) 10 MG TABLET    Take 1 tablet by mouth 2 times daily    BLOOD GLUCOSE MONITORING SUPPL (PRODIGY AUTOCODE BLOOD GLUCOSE) W/DEVICE KIT    Use as directed to test up to 4 times daily    BLOOD GLUCOSE TEST STRIPS (PRODIGY NO CODING BLOOD GLUC) STRIP    1 each by In Vitro route 4 times daily As needed. BLOOD GLUCOSE TEST STRIPS (PRODIGY NO CODING BLOOD GLUC) STRIP    1 each by In Vitro route 4 times daily As needed. CLOTRIMAZOLE-BETAMETHASONE (LOTRISONE) 1-0.05 % CREAM    Apply topically 2 times daily.     CONTINUOUS BLOOD GLUC  (FREESTYLE JAIRO 14 DAY READER) ALEXANDREA    As directed    CONTINUOUS BLOOD GLUC SENSOR (FREESTYLE JAIRO 14 DAY SENSOR) MISC    Every 2 weeks    ERYTHROMYCIN BASE (E-MYCIN) 250 MG TABLET    Take 1 tablet by mouth 3 times daily    ESOMEPRAZOLE (NEXIUM) 40 MG DELAYED RELEASE CAPSULE    TAKE 1 CAPSULE BY MOUTH ONE TIME A DAY    FAMOTIDINE (PEPCID) 20 MG TABLET    Take 1 tablet by mouth 2 times daily    HANDICAP PLACARD MISC    Exp 5 years    HYDROCHLOROTHIAZIDE (HYDRODIURIL) 25 MG TABLET    Take 1 tablet by mouth daily    INSULIN GLARGINE, 2 UNIT DIAL, (TOUJEO MAX SOLOSTAR) 300 UNIT/ML SOPN    130 units at bedtime    INSULIN LISPRO, 1 UNIT DIAL, (HUMALOG KWIKPEN) 100 UNIT/ML SOPN    50-60 units at each meals    INSULIN PEN NEEDLE (PEN NEEDLES) 32G X 4 MM MISC    Use as directed with insulin pens, 4 times daily    INSULIN PEN NEEDLE (PEN NEEDLES) 32G X 4 MM MISC    Use as directed with insulin pens, 4 times daily    LOSARTAN (COZAAR) 50 MG TABLET    Take 1 tablet by mouth daily    METFORMIN (GLUCOPHAGE) 500 MG TABLET    Take 1 tablet by mouth 3 times daily    METOCLOPRAMIDE (REGLAN) 10 MG TABLET    Take 1 tablet by mouth 3 times daily (with meals)    METOPROLOL (LOPRESSOR) 100 MG TABLET    Take 1 tablet by mouth 2 times daily    MICONAZOLE (MICOTIN) 2 % CREAM    Apply topically 2 times daily. NYSTATIN (MYCOSTATIN) 688660 UNIT/GM POWDER    Apply 3 times daily.     ONDANSETRON (ZOFRAN ODT) 4 MG DISINTEGRATING TABLET    Take 1 tablet by mouth every 8 hours as needed for Nausea or Vomiting    PIOGLITAZONE (ACTOS) 30 MG TABLET    Take 1 tablet by mouth daily    PREDNISONE (DELTASONE) 5 MG TABLET    Take 5 mg by mouth daily    PRODIGY LANCETS 28G MISC    Use as directed to test up to 4 times daily    PROMETHAZINE (PHENERGAN) 25 MG TABLET    Take 25 mg by mouth every 8 hours    ROSUVASTATIN (CRESTOR) 10 MG TABLET    TAKE ONE TABLET BY MOUTH NIGHTLY    SOLIFENACIN (VESICARE) 10 MG TABLET    TAKE 1 TABLET BY MOUTH ONE TIME A DAY VENLAFAXINE (EFFEXOR XR) 75 MG EXTENDED RELEASE CAPSULE    Take 1 capsule by mouth daily TAKE TWO CAPSULES BY MOUTH EVERY MORNING AND TAKE ONE CAPSULE BY MOUTH EVERY EVENING       ALLERGIES     Morphine and related, Ace inhibitors, Bactrim [sulfamethoxazole-trimethoprim], Nitroglycerin, Percocet [oxycodone-acetaminophen], and Victoza [liraglutide]    FAMILY HISTORY       Family History   Problem Relation Age of Onset    Diabetes Father     Heart Disease Mother     Colon Cancer Neg Hx     Cancer Neg Hx           SOCIAL HISTORY       Social History     Socioeconomic History    Marital status:      Spouse name: None    Number of children: 1    Years of education: None    Highest education level: None   Occupational History    None   Social Needs    Financial resource strain: Not very hard    Food insecurity     Worry: Never true     Inability: Never true    Transportation needs     Medical: No     Non-medical: No   Tobacco Use    Smoking status: Former Smoker     Packs/day: 1.00     Types: Cigarettes     Quit date: 2017     Years since quittin.3    Smokeless tobacco: Never Used   Substance and Sexual Activity    Alcohol use: Yes     Alcohol/week: 0.0 standard drinks     Frequency: Monthly or less     Binge frequency: Never     Comment: socially    Drug use: No    Sexual activity: Yes     Partners: Male     Comment: single   Lifestyle    Physical activity     Days per week: 1 day     Minutes per session: 10 min    Stress: To some extent   Relationships    Social connections     Talks on phone:  Three times a week     Gets together: Once a week     Attends Buddhism service: More than 4 times per year     Active member of club or organization: No     Attends meetings of clubs or organizations: Never     Relationship status:     Intimate partner violence     Fear of current or ex partner: None     Emotionally abused: None     Physically abused: None     Forced sexual activity: None   Other Topics Concern    None   Social History Narrative    Social/Functional History            SCREENINGS                        PHYSICAL EXAM    (up to 7 for level 4, 8 or more for level 5)     ED Triage Vitals [05/12/21 1206]   BP Temp Temp Source Pulse Resp SpO2 Height Weight   120/81 97.9 °F (36.6 °C) Oral 77 16 96 % 5' 6\" (1.676 m) 280 lb (127 kg)       Physical Exam  Constitutional:       General: She is not in acute distress. Appearance: She is well-developed. She is not diaphoretic. HENT:      Head: Normocephalic and atraumatic. Right Ear: External ear normal.      Left Ear: External ear normal.      Mouth/Throat:      Pharynx: No oropharyngeal exudate. Eyes:      Conjunctiva/sclera: Conjunctivae normal.      Pupils: Pupils are equal, round, and reactive to light. Neck:      Musculoskeletal: Normal range of motion and neck supple. Thyroid: No thyromegaly. Vascular: No JVD. Trachea: No tracheal deviation. Cardiovascular:      Rate and Rhythm: Normal rate. Heart sounds: Normal heart sounds. No murmur. Pulmonary:      Effort: Pulmonary effort is normal. No respiratory distress. Breath sounds: Normal breath sounds. No wheezing. Abdominal:      General: Bowel sounds are normal.      Palpations: Abdomen is soft. Tenderness: There is no abdominal tenderness. There is no guarding. Genitourinary:     Rectum: Normal.      Comments: Small skin avulsion superior to anus. No hemorrhoids noted. No active bleeding. Musculoskeletal: Normal range of motion. Skin:     General: Skin is warm and dry. Findings: No rash. Neurological:      Mental Status: She is alert and oriented to person, place, and time. Cranial Nerves: No cranial nerve deficit.    Psychiatric:         Behavior: Behavior normal.         DIAGNOSTIC RESULTS     EKG: All EKG's are interpreted by the Emergency Department Physician who either signs or Co-signs this chart in the absence of a cardiologist.        RADIOLOGY:   Non-plain film images such as CT, Ultrasound and MRI are read by the radiologist. Plain radiographic images are visualized and preliminarily interpreted by the emergency physician with the below findings:        Interpretation per the Radiologist below, if available at the time of this note:    No orders to display         ED BEDSIDE ULTRASOUND:   Performed by ED Physician - none    LABS:  Labs Reviewed - No data to display    All other labs were within normal range or not returned as of this dictation. EMERGENCY DEPARTMENT COURSE and DIFFERENTIAL DIAGNOSIS/MDM:   Vitals:    Vitals:    05/12/21 1206   BP: 120/81   Pulse: 77   Resp: 16   Temp: 97.9 °F (36.6 °C)   TempSrc: Oral   SpO2: 96%   Weight: 280 lb (127 kg)   Height: 5' 6\" (1.676 m)       Patient comes in with concerns about bright red blood on toilet paper and toilet bowl. No blood in stool. Colonoscopy done 2 months ago which was normal.  I saw a small skin avulsion above anus. I do not think she is having GI bleeding. She looks well. She does not appear anemic and her vitals are normal.  I believe this skin avulsion should heal on its own. MDM      REASSESSMENT          CRITICAL CARE TIME   Total Critical Care time was 0 minutes, excluding separately reportable procedures. There was a high probability of clinically significant/life threatening deterioration in the patient's condition which required my urgent intervention. CONSULTS:  None    PROCEDURES:  Unless otherwise noted below, none     Procedures        FINAL IMPRESSION      1.  Skin avulsion          DISPOSITION/PLAN   DISPOSITION Decision To Discharge 05/12/2021 12:23:08 PM      PATIENT REFERRED TO:  Oscar Wiggins MD  [address] [place] [address] Road  [phone]      As needed      DISCHARGE MEDICATIONS:  New Prescriptions    No medications on file     Controlled Substances Monitoring:     RX Monitoring 7/21/2019   Attestation -   Periodic Controlled Substance Monitoring Possible medication side effects, risk of tolerance/dependence & alternative treatments discussed. ;No signs of potential drug abuse or diversion identified. Chronic Pain > 120 MEDD Obtained or confirmed \"Medication Contract\" on file.        (Please note that portions of this note were completed with a voice recognition program.  Efforts were made to edit the dictations but occasionally words are mis-transcribed.)    Radha Cano DO (electronically signed)  Attending Emergency Physician            Radha Cano DO  05/12/21 4633

## 2021-05-12 NOTE — ED NOTES
D/C instructions given. Verbalized understanding. Denies any further complaints. Ambulated out with steady gait in no distress.      Reji Klein RN  05/12/21 7433

## 2021-05-13 ENCOUNTER — E-VISIT (OUTPATIENT)
Dept: FAMILY MEDICINE CLINIC | Facility: CLINIC | Age: 46
End: 2021-05-13
Payer: COMMERCIAL

## 2021-05-13 ENCOUNTER — E-VISIT (OUTPATIENT)
Dept: FAMILY MEDICINE CLINIC | Age: 46
End: 2021-05-13

## 2021-05-13 DIAGNOSIS — N30.00 ACUTE CYSTITIS WITHOUT HEMATURIA: Primary | ICD-10-CM

## 2021-05-13 PROCEDURE — 99421 OL DIG E/M SVC 5-10 MIN: CPT | Performed by: FAMILY MEDICINE

## 2021-05-13 RX ORDER — PHENAZOPYRIDINE HYDROCHLORIDE 100 MG/1
100 TABLET, FILM COATED ORAL 3 TIMES DAILY PRN
Qty: 6 TABLET | Refills: 0 | Status: SHIPPED | OUTPATIENT
Start: 2021-05-13 | End: 2021-05-15

## 2021-05-13 RX ORDER — NITROFURANTOIN 25; 75 MG/1; MG/1
100 CAPSULE ORAL 2 TIMES DAILY
Qty: 10 CAPSULE | Refills: 0 | Status: SHIPPED | OUTPATIENT
Start: 2021-05-13 | End: 2021-06-10

## 2021-05-14 RX ORDER — PROMETHAZINE HYDROCHLORIDE 25 MG/1
25 TABLET ORAL EVERY 8 HOURS
Qty: 180 TABLET | Refills: 0 | OUTPATIENT
Start: 2021-05-14

## 2021-05-18 RX ORDER — METOPROLOL TARTRATE 100 MG/1
100 TABLET ORAL 2 TIMES DAILY
Qty: 180 TABLET | Refills: 0 | Status: SHIPPED | OUTPATIENT
Start: 2021-05-18 | End: 2021-08-11 | Stop reason: SDUPTHER

## 2021-05-22 ENCOUNTER — APPOINTMENT (OUTPATIENT)
Dept: GENERAL RADIOLOGY | Age: 46
End: 2021-05-22
Payer: COMMERCIAL

## 2021-05-22 ENCOUNTER — HOSPITAL ENCOUNTER (EMERGENCY)
Age: 46
Discharge: HOME | End: 2021-05-22
Payer: COMMERCIAL

## 2021-05-22 VITALS
HEIGHT: 66 IN | TEMPERATURE: 98 F | BODY MASS INDEX: 45 KG/M2 | WEIGHT: 280 LBS | OXYGEN SATURATION: 98 % | DIASTOLIC BLOOD PRESSURE: 56 MMHG | RESPIRATION RATE: 20 BRPM | SYSTOLIC BLOOD PRESSURE: 102 MMHG | HEART RATE: 81 BPM

## 2021-05-22 DIAGNOSIS — E11.65 TYPE 2 DIABETES MELLITUS WITH HYPERGLYCEMIA, UNSPECIFIED WHETHER LONG TERM INSULIN USE (HCC): ICD-10-CM

## 2021-05-22 DIAGNOSIS — E83.42 HYPOMAGNESEMIA: ICD-10-CM

## 2021-05-22 DIAGNOSIS — R06.02 SHORTNESS OF BREATH: Primary | ICD-10-CM

## 2021-05-22 LAB
ALBUMIN SERPL-MCNC: 4.2 G/DL (ref 3.5–4.6)
ALP BLD-CCNC: 91 U/L (ref 40–130)
ALT SERPL-CCNC: 31 U/L (ref 0–33)
ANION GAP SERPL CALCULATED.3IONS-SCNC: 14 MEQ/L (ref 9–15)
AST SERPL-CCNC: 33 U/L (ref 0–35)
BASOPHILS ABSOLUTE: 0.1 K/UL (ref 0–0.2)
BASOPHILS RELATIVE PERCENT: 0.6 %
BILIRUB SERPL-MCNC: 0.3 MG/DL (ref 0.2–0.7)
BUN BLDV-MCNC: 12 MG/DL (ref 6–20)
CALCIUM SERPL-MCNC: 9.2 MG/DL (ref 8.5–9.9)
CHLORIDE BLD-SCNC: 98 MEQ/L (ref 95–107)
CO2: 24 MEQ/L (ref 20–31)
CREAT SERPL-MCNC: 0.64 MG/DL (ref 0.5–0.9)
D DIMER: <0.27 MG/L FEU (ref 0–0.5)
EOSINOPHILS ABSOLUTE: 0.1 K/UL (ref 0–0.7)
EOSINOPHILS RELATIVE PERCENT: 0.8 %
GFR AFRICAN AMERICAN: >60
GFR NON-AFRICAN AMERICAN: >60
GLOBULIN: 3.4 G/DL (ref 2.3–3.5)
GLUCOSE BLD-MCNC: 312 MG/DL (ref 70–99)
HCT VFR BLD CALC: 35.2 % (ref 37–47)
HEMOGLOBIN: 11.7 G/DL (ref 12–16)
LYMPHOCYTES ABSOLUTE: 2.3 K/UL (ref 1–4.8)
LYMPHOCYTES RELATIVE PERCENT: 26.3 %
MAGNESIUM: 1.5 MG/DL (ref 1.7–2.4)
MCH RBC QN AUTO: 26.6 PG (ref 27–31.3)
MCHC RBC AUTO-ENTMCNC: 33.3 % (ref 33–37)
MCV RBC AUTO: 79.7 FL (ref 82–100)
MONOCYTES ABSOLUTE: 0.5 K/UL (ref 0.2–0.8)
MONOCYTES RELATIVE PERCENT: 5.7 %
NEUTROPHILS ABSOLUTE: 5.8 K/UL (ref 1.4–6.5)
NEUTROPHILS RELATIVE PERCENT: 66.6 %
PDW BLD-RTO: 17 % (ref 11.5–14.5)
PLATELET # BLD: 200 K/UL (ref 130–400)
POTASSIUM SERPL-SCNC: 3.9 MEQ/L (ref 3.4–4.9)
RBC # BLD: 4.42 M/UL (ref 4.2–5.4)
SODIUM BLD-SCNC: 136 MEQ/L (ref 135–144)
TOTAL PROTEIN: 7.6 G/DL (ref 6.3–8)
TROPONIN: <0.01 NG/ML (ref 0–0.01)
WBC # BLD: 8.7 K/UL (ref 4.8–10.8)

## 2021-05-22 PROCEDURE — 83735 ASSAY OF MAGNESIUM: CPT

## 2021-05-22 PROCEDURE — 36415 COLL VENOUS BLD VENIPUNCTURE: CPT

## 2021-05-22 PROCEDURE — 6360000002 HC RX W HCPCS: Performed by: PHYSICIAN ASSISTANT

## 2021-05-22 PROCEDURE — 94761 N-INVAS EAR/PLS OXIMETRY MLT: CPT

## 2021-05-22 PROCEDURE — 85379 FIBRIN DEGRADATION QUANT: CPT

## 2021-05-22 PROCEDURE — 99285 EMERGENCY DEPT VISIT HI MDM: CPT

## 2021-05-22 PROCEDURE — 84484 ASSAY OF TROPONIN QUANT: CPT

## 2021-05-22 PROCEDURE — 71045 X-RAY EXAM CHEST 1 VIEW: CPT

## 2021-05-22 PROCEDURE — 93005 ELECTROCARDIOGRAM TRACING: CPT | Performed by: PHYSICIAN ASSISTANT

## 2021-05-22 PROCEDURE — 80053 COMPREHEN METABOLIC PANEL: CPT

## 2021-05-22 PROCEDURE — 85025 COMPLETE CBC W/AUTO DIFF WBC: CPT

## 2021-05-22 PROCEDURE — 6370000000 HC RX 637 (ALT 250 FOR IP): Performed by: PHYSICIAN ASSISTANT

## 2021-05-22 PROCEDURE — 94640 AIRWAY INHALATION TREATMENT: CPT

## 2021-05-22 RX ORDER — LANOLIN ALCOHOL/MO/W.PET/CERES
400 CREAM (GRAM) TOPICAL DAILY
Status: DISCONTINUED | OUTPATIENT
Start: 2021-05-22 | End: 2021-05-22 | Stop reason: HOSPADM

## 2021-05-22 RX ORDER — IPRATROPIUM BROMIDE AND ALBUTEROL SULFATE 2.5; .5 MG/3ML; MG/3ML
1 SOLUTION RESPIRATORY (INHALATION) 4 TIMES DAILY
Status: DISCONTINUED | OUTPATIENT
Start: 2021-05-22 | End: 2021-05-22

## 2021-05-22 RX ORDER — ALBUTEROL SULFATE 2.5 MG/3ML
2.5 SOLUTION RESPIRATORY (INHALATION) ONCE
Status: COMPLETED | OUTPATIENT
Start: 2021-05-22 | End: 2021-05-22

## 2021-05-22 RX ORDER — IPRATROPIUM BROMIDE AND ALBUTEROL SULFATE 2.5; .5 MG/3ML; MG/3ML
1 SOLUTION RESPIRATORY (INHALATION) ONCE
Status: DISCONTINUED | OUTPATIENT
Start: 2021-05-22 | End: 2021-05-22

## 2021-05-22 RX ORDER — MAGNESIUM SULFATE IN WATER 40 MG/ML
2000 INJECTION, SOLUTION INTRAVENOUS ONCE
Status: DISCONTINUED | OUTPATIENT
Start: 2021-05-22 | End: 2021-05-22

## 2021-05-22 RX ORDER — ALBUTEROL SULFATE 2.5 MG/3ML
SOLUTION RESPIRATORY (INHALATION)
Status: DISCONTINUED
Start: 2021-05-22 | End: 2021-05-22 | Stop reason: HOSPADM

## 2021-05-22 RX ORDER — ALBUTEROL SULFATE 90 UG/1
AEROSOL, METERED RESPIRATORY (INHALATION)
Qty: 1 INHALER | Refills: 0 | Status: SHIPPED | OUTPATIENT
Start: 2021-05-22 | End: 2022-01-18 | Stop reason: ALTCHOICE

## 2021-05-22 RX ADMIN — Medication 400 MG: at 18:20

## 2021-05-22 RX ADMIN — ALBUTEROL SULFATE 2.5 MG: 2.5 SOLUTION RESPIRATORY (INHALATION) at 17:07

## 2021-05-22 ASSESSMENT — ENCOUNTER SYMPTOMS
NAUSEA: 0
ABDOMINAL DISTENTION: 0
ANAL BLEEDING: 0
VOICE CHANGE: 0
VOMITING: 0
WHEEZING: 0
BACK PAIN: 0
EYE DISCHARGE: 0
ABDOMINAL PAIN: 0
COUGH: 0
SHORTNESS OF BREATH: 1
APNEA: 0
PHOTOPHOBIA: 0

## 2021-05-22 NOTE — ED PROVIDER NOTES
[address] [facility] ED  eMERGENCY dEPARTMENT eNCOUnter      Pt Name: Yasmeen Ackerman  MRN: [id number]  [place] 1975  Date of evaluation: 5/22/2021  Provider: Justine Pérez Dr       Chief Complaint   Patient presents with    Shortness of Breath         HISTORY OF PRESENT ILLNESS   (Location/Symptom, Timing/Onset,Context/Setting, Quality, Duration, Modifying Factors, Severity)  Note limiting factors. Yasmeen Ackerman is a 40 y.o. female who presents to the emergency department presents with 2-day history of shortness of breath denies fever chills cough nausea vomiting chest pain back pain denies leg swelling denies history of cardiac disease denies history of anemia denies history of blood clots. Symptoms are mild to moderate severity worse with motion and improved with rest    HPI    NursingNotes were reviewed. REVIEW OF SYSTEMS    (2-9 systems for level 4, 10 or more for level 5)     Review of Systems   Constitutional: Negative for activity change, appetite change, fever and unexpected weight change. HENT: Negative for ear discharge, nosebleeds and voice change. Eyes: Negative for photophobia and discharge. Respiratory: Positive for shortness of breath. Negative for apnea, cough and wheezing. Cardiovascular: Negative for chest pain. Gastrointestinal: Negative for abdominal distention, abdominal pain, anal bleeding, nausea and vomiting. Endocrine: Negative for cold intolerance, heat intolerance and polyphagia. Genitourinary: Negative for dysuria, genital sores and hematuria. Musculoskeletal: Negative for back pain, joint swelling and neck pain. Skin: Negative for pallor. Allergic/Immunologic: Negative for immunocompromised state. Neurological: Negative for seizures and facial asymmetry. Hematological: Does not bruise/bleed easily. Psychiatric/Behavioral: Negative for behavioral problems, self-injury and sleep disturbance.    All other systems Vomiting    PIOGLITAZONE (ACTOS) 30 MG TABLET    Take 1 tablet by mouth daily    PREDNISONE (DELTASONE) 5 MG TABLET    Take 5 mg by mouth daily    PRODIGY LANCETS 28G MISC    Use as directed to test up to 4 times daily    PROMETHAZINE (PHENERGAN) 25 MG TABLET    Take 25 mg by mouth every 8 hours    ROSUVASTATIN (CRESTOR) 10 MG TABLET    TAKE ONE TABLET BY MOUTH NIGHTLY    SOLIFENACIN (VESICARE) 10 MG TABLET    TAKE 1 TABLET BY MOUTH ONE TIME A DAY    VENLAFAXINE (EFFEXOR XR) 75 MG EXTENDED RELEASE CAPSULE    Take 1 capsule by mouth daily TAKE TWO CAPSULES BY MOUTH EVERY MORNING AND TAKE ONE CAPSULE BY MOUTH EVERY EVENING       ALLERGIES     Morphine and related, Ace inhibitors, Bactrim [sulfamethoxazole-trimethoprim], Nitroglycerin, Percocet [oxycodone-acetaminophen], and Victoza [liraglutide]    FAMILY HISTORY       Family History   Problem Relation Age of Onset    Diabetes Father     Heart Disease Mother     Colon Cancer Neg Hx     Cancer Neg Hx           SOCIAL HISTORY       Social History     Socioeconomic History    Marital status:      Spouse name: None    Number of children: 1    Years of education: None    Highest education level: None   Occupational History    None   Tobacco Use    Smoking status: Former Smoker     Packs/day: 1.00     Types: Cigarettes     Quit date: 2017     Years since quittin.3    Smokeless tobacco: Never Used   Vaping Use    Vaping Use: Never used   Substance and Sexual Activity    Alcohol use:  Yes     Alcohol/week: 0.0 standard drinks     Comment: socially    Drug use: No    Sexual activity: Yes     Partners: Male     Comment: single   Other Topics Concern    None   Social History Narrative    Social/Functional History          Social Determinants of Health     Financial Resource Strain:     Difficulty of Paying Living Expenses:    Food Insecurity:     Worried About Running Out of Food in the Last Year:     Jung of Food in the Last Year: Tenderness: There is no right CVA tenderness or left CVA tenderness. Musculoskeletal:         General: Normal range of motion. Cervical back: Normal range of motion and neck supple. Right lower leg: No edema. Left lower leg: No edema. Skin:     General: Skin is warm. Findings: No erythema. Neurological:      Mental Status: She is alert and oriented to person, place, and time. Psychiatric:         Mood and Affect: Mood normal.         DIAGNOSTIC RESULTS     EKG: All EKG's are interpreted by the Emergency Department Physician who either signs or Co-signsthis chart in the absence of a cardiologist.    EKG normal sinus rhythm rate 83  ms QRS 74 ms  ms PRT axes positive. RADIOLOGY:   Non-plain filmimages such as CT, Ultrasound and MRI are read by the radiologist. Plain radiographic images are visualized and preliminarily interpreted by the emergency physician with the below findings:         Interpretation per the Radiologist below, if available at the time ofthis note:    XR CHEST PORTABLE   Final Result      No acute radiographic abnormality. ED BEDSIDE ULTRASOUND:   Performed by ED Physician - none    LABS:  Labs Reviewed   COMPREHENSIVE METABOLIC PANEL - Abnormal; Notable for the following components:       Result Value    Glucose 312 (*)     All other components within normal limits   CBC WITH AUTO DIFFERENTIAL - Abnormal; Notable for the following components:    Hemoglobin 11.7 (*)     Hematocrit 35.2 (*)     MCV 79.7 (*)     MCH 26.6 (*)     RDW 17.0 (*)     All other components within normal limits   MAGNESIUM - Abnormal; Notable for the following components:    Magnesium 1.5 (*)     All other components within normal limits   TROPONIN   D-DIMER, QUANTITATIVE       All other labs were within normal range or not returned as of this dictation.     EMERGENCY DEPARTMENT COURSE and DIFFERENTIAL DIAGNOSIS/MDM:   Vitals:    Vitals:    05/22/21 1626 05/22/21 1700 Franny Farfan PA-C  05/22/21 1824

## 2021-05-22 NOTE — ED NOTES
Pt states she feels a little dizzy and shaky since the breathing treatment. States her breathing is about the same, maybe a little better. Call light within reach. Aware we are waiting on results. No acute distress noted at this time.      Cleone Collet, RN  05/22/21 4721

## 2021-05-22 NOTE — ED TRIAGE NOTES
PATIENT PRESENTS TO THE ER WITH COMPLAINTS OF DIZZINESS AND SOB   PT DENIES RESP HISTORY   DENIES COUGH  DENIES CP  DENIES NVD  RESPIRATIONS EVEN AND UNLABORED

## 2021-05-22 NOTE — ED NOTES
D/C instructions and rx given along with a spacer. Pt verbalized understanding. States she feels so much better now. States her breathing is much better and dizziness is gone. No acute distress noted at this time. Ambulated out with steady gait.      Jamari Ribeiro RN  05/22/21 5037

## 2021-05-24 ENCOUNTER — CARE COORDINATION (OUTPATIENT)
Dept: OTHER | Facility: CLINIC | Age: 46
End: 2021-05-24

## 2021-05-24 NOTE — CARE COORDINATION
[facility] [facility] ED Follow Up Call    2021    Patient: Severiano Chatterjee Patient : 1975   MRN: [id number]  Reason for Admission: Shortness of breath  Discharge Date: 21      Ambulatory Care Manager [facility])  contacted the patient via [employer] message to follow up on recent ED visit. Will continue to outreach to patient with a follow up phone call.      Care Transitions ED Follow Up    Care Transitions Interventions    Specialty Service Referral: Completed    Schedule Follow Up Appointment with Physician: Completed  Do you have all of your prescriptions and are they filled?: Yes

## 2021-05-25 ENCOUNTER — OFFICE VISIT (OUTPATIENT)
Dept: FAMILY MEDICINE CLINIC | Age: 46
End: 2021-05-25
Payer: COMMERCIAL

## 2021-05-25 VITALS
HEIGHT: 66 IN | WEIGHT: 290.2 LBS | BODY MASS INDEX: 46.64 KG/M2 | DIASTOLIC BLOOD PRESSURE: 70 MMHG | HEART RATE: 81 BPM | TEMPERATURE: 98.1 F | SYSTOLIC BLOOD PRESSURE: 130 MMHG | OXYGEN SATURATION: 97 %

## 2021-05-25 DIAGNOSIS — Z79.4 TYPE 2 DIABETES MELLITUS WITH COMPLICATION, WITH LONG-TERM CURRENT USE OF INSULIN (HCC): Primary | ICD-10-CM

## 2021-05-25 DIAGNOSIS — R06.02 SHORTNESS OF BREATH: ICD-10-CM

## 2021-05-25 DIAGNOSIS — L85.3 DRY SKIN: ICD-10-CM

## 2021-05-25 DIAGNOSIS — E11.8 TYPE 2 DIABETES MELLITUS WITH COMPLICATION, WITH LONG-TERM CURRENT USE OF INSULIN (HCC): Primary | ICD-10-CM

## 2021-05-25 LAB
EKG ATRIAL RATE: 83 BPM
EKG P AXIS: 41 DEGREES
EKG P-R INTERVAL: 164 MS
EKG Q-T INTERVAL: 412 MS
EKG QRS DURATION: 74 MS
EKG QTC CALCULATION (BAZETT): 484 MS
EKG R AXIS: 26 DEGREES
EKG T AXIS: 46 DEGREES
EKG VENTRICULAR RATE: 83 BPM

## 2021-05-25 PROCEDURE — 3051F HG A1C>EQUAL 7.0%<8.0%: CPT | Performed by: FAMILY MEDICINE

## 2021-05-25 PROCEDURE — 99214 OFFICE O/P EST MOD 30 MIN: CPT | Performed by: FAMILY MEDICINE

## 2021-05-25 PROCEDURE — 93010 ELECTROCARDIOGRAM REPORT: CPT | Performed by: INTERNAL MEDICINE

## 2021-05-25 RX ORDER — PRENATAL VIT 91/IRON/FOLIC/DHA 28-975-200
COMBINATION PACKAGE (EA) ORAL
Qty: 1 TUBE | Refills: 1 | Status: SHIPPED | OUTPATIENT
Start: 2021-05-25 | End: 2021-12-02

## 2021-05-25 ASSESSMENT — ENCOUNTER SYMPTOMS
CONSTIPATION: 0
CHEST TIGHTNESS: 0
VOMITING: 0
ABDOMINAL PAIN: 0
NAUSEA: 0
SHORTNESS OF BREATH: 1
APNEA: 0
DIARRHEA: 0
COUGH: 0
BLOOD IN STOOL: 0
WHEEZING: 0

## 2021-05-25 ASSESSMENT — SOCIAL DETERMINANTS OF HEALTH (SDOH): HOW HARD IS IT FOR YOU TO PAY FOR THE VERY BASICS LIKE FOOD, HOUSING, MEDICAL CARE, AND HEATING?: NOT HARD AT ALL

## 2021-05-25 NOTE — PROGRESS NOTES
Subjective:      Patient ID: Sarthak Lee is a 40 y.o. female who presents today for:     Chief Complaint   Patient presents with    Shortness of Breath       HPI  Patient is a very pleasant 64-year-old female presents today to follow-up after being seen in the emergency room for sensation of shortness of breath. EKG was performed which showed normal sinus rhythm but there was presence of QT prolongation. Chest x-ray was normal.  patient describes a sensation as a feeling of \"forgetting to breathe\" she will take a deep breath and symptoms will resolve. Total duration is typically less than 10 seconds. She denies any significant dyspnea on exertion. She does have a history of obesity and is compliant with her CPAP. She has had a recent stress test last year and echocardiogram 2 years ago. There is a note of pulmonary hypertension in her chart described in 2013. She states that her frequency of episodes are approximately 2-3 times per day with no correlation with a specific time of day. Of note, on foot exam patient has had persistent dry skin on the soles as well as peeling and cracking.   She has tried lubricating agents multiple times but has never used an antifungal cream  Past Medical History:   Diagnosis Date    Acute midline thoracic back pain 9/18/2018    B12 deficiency     Family history of premature CAD 9/4/2013    Fatty liver     Gastroesophageal reflux disease 1/6/2021    HPV (human papilloma virus) anogenital infection     Hyperlipidemia     Hypertension     Irritable bowel syndrome with diarrhea 4/26/2021    Liver hemangioma 2009/2015    Obesity 3/11/13    BMI 43.42    Orthostatic hypotension     Ovarian cyst     PMR (polymyalgia rheumatica) (HCC)     Rectal fissure     Tobacco abuse 9/3/2015    Tobacco abuse     Tremor     Uncontrolled diabetes mellitus with complications (HCC)     Unspecified sleep apnea      Past Surgical History:   Procedure Laterality Date    CARDIAC CATHETERIZATION      COLONOSCOPY      for diarrhea (-)    COLONOSCOPY N/A 2/10/2021    COLONOSCOPY DIAGNOSTIC performed by Eliana Cano MD at [address]  12-10    [facility]  1-05    CT MUSCLE BIOPSY Left 2018    LEFT QUADRICEPS MUSCLE BIOPSY performed by Bony Hebert MD at [facility] UNI/BI CANNULATION N/A 2018    T 12 VERTEBRALPLASTY ROOM 278 performed by Libia Diaz MD at [address] ENDOSCOPY  10/13/15     DR. HERRERA     UPPER GASTROINTESTINAL ENDOSCOPY N/A 2/10/2021    EGD ESOPHAGOGASTRODUODENOSCOPY performed by Eliana Cano MD at [facility]       Family History   Problem Relation Age of Onset    Diabetes Father     Heart Disease Mother     Colon Cancer Neg Hx     Cancer Neg Hx      Social History     Socioeconomic History    Marital status:      Spouse name: Not on file    Number of children: 1    Years of education: Not on file    Highest education level: Not on file   Occupational History    Not on file   Tobacco Use    Smoking status: Former Smoker     Packs/day: 1.00     Types: Cigarettes     Quit date: 2017     Years since quittin.3    Smokeless tobacco: Never Used   Vaping Use    Vaping Use: Never used   Substance and Sexual Activity    Alcohol use:  Yes     Alcohol/week: 0.0 standard drinks     Comment: socially    Drug use: No    Sexual activity: Yes     Partners: Male     Comment: single   Other Topics Concern    Not on file   Social History Narrative    Social/Functional History          Social Determinants of Health     Financial Resource Strain: Low Risk     Difficulty of Paying Living Expenses: Not hard at all   Food Insecurity: No Food Insecurity    Worried About Running Out of Food in the Last Year: Never true    Jung of Food in the Last Year: Never true   Transportation Needs: No Transportation Needs    Lack of Transportation (Medical): No    Lack of Transportation (Non-Medical): No   Physical Activity:     Days of Exercise per Week:     Minutes of Exercise per Session:    Stress:     Feeling of Stress :    Social Connections:     Frequency of Communication with Friends and Family:     Frequency of Social Gatherings with Friends and Family:     Attends Oriental orthodox Services:     Active Member of Clubs or Organizations:     Attends Club or Organization Meetings:     Marital Status:    Intimate Partner Violence:     Fear of Current or Ex-Partner:     Emotionally Abused:     Physically Abused:     Sexually Abused:      Current Outpatient Medications on File Prior to Visit   Medication Sig Dispense Refill    albuterol sulfate HFA (PROAIR HFA) 108 (90 Base) MCG/ACT inhaler Use every 4 hours while awake for 7-10 days then PRN wheezing  Dispense with SPACER and Instruct on use. May sub Ventolin or Proventil as needed per [facility].  1 Inhaler 0    metoprolol (LOPRESSOR) 100 MG tablet Take 1 tablet by mouth 2 times daily 180 tablet 0    nitrofurantoin, macrocrystal-monohydrate, (MACROBID) 100 MG capsule Take 1 capsule by mouth 2 times daily 10 capsule 0    rosuvastatin (CRESTOR) 10 MG tablet TAKE ONE TABLET BY MOUTH NIGHTLY 90 tablet 0    promethazine (PHENERGAN) 25 MG tablet Take 25 mg by mouth every 8 hours      erythromycin base (E-MYCIN) 250 MG tablet Take 1 tablet by mouth 3 times daily 90 tablet 3    ondansetron (ZOFRAN ODT) 4 MG disintegrating tablet Take 1 tablet by mouth every 8 hours as needed for Nausea or Vomiting 30 tablet 3    venlafaxine (EFFEXOR XR) 75 MG extended release capsule Take 1 capsule by mouth daily TAKE TWO CAPSULES BY MOUTH EVERY MORNING AND TAKE ONE CAPSULE BY MOUTH EVERY EVENING 270 capsule 1    esomeprazole (NEXIUM) 40 MG delayed release capsule TAKE 1 CAPSULE BY MOUTH ONE TIME A DAY 30 capsule 3    insulin lispro, 1 Unit Dial, (HUMALOG KWIKPEN) 100 UNIT/ML SOPN 50-60 units at each meals 60 pen 3    Insulin Glargine, 2 Unit Dial, (TOUJEO MAX SOLOSTAR) 300 UNIT/ML SOPN 130 units at bedtime 90 pen 3    metoclopramide (REGLAN) 10 MG tablet Take 1 tablet by mouth 3 times daily (with meals) 360 tablet 3    baclofen (LIORESAL) 10 MG tablet Take 1 tablet by mouth 2 times daily 90 tablet 0    famotidine (PEPCID) 20 MG tablet Take 1 tablet by mouth 2 times daily 30 tablet 0    predniSONE (DELTASONE) 5 MG tablet Take 5 mg by mouth daily      Continuous Blood Gluc Sensor (FREESTYLE JAIRO 14 DAY SENSOR) MISC Every 2 weeks 6 each 03    Continuous Blood Gluc  (FREESTYLE JAIRO 14 DAY READER) ALEXANDREA As directed 1 Device 00    pioglitazone (ACTOS) 30 MG tablet Take 1 tablet by mouth daily 90 tablet 3    Insulin Pen Needle (PEN NEEDLES) 32G X 4 MM MISC Use as directed with insulin pens, 4 times daily 400 each 1    Insulin Pen Needle (PEN NEEDLES) 32G X 4 MM MISC Use as directed with insulin pens, 4 times daily 400 each 1    losartan (COZAAR) 50 MG tablet Take 1 tablet by mouth daily 90 tablet 1    hydroCHLOROthiazide (HYDRODIURIL) 25 MG tablet Take 1 tablet by mouth daily 90 tablet 1    nystatin (MYCOSTATIN) 275759 UNIT/GM powder Apply 3 times daily. 1 Bottle 2    blood glucose test strips (PRODIGY NO CODING BLOOD GLUC) strip 1 each by In Vitro route 4 times daily As needed. 400 each 3    blood glucose test strips (PRODIGY NO CODING BLOOD GLUC) strip 1 each by In Vitro route 4 times daily As needed. 400 each 3    clotrimazole-betamethasone (LOTRISONE) 1-0.05 % cream Apply topically 2 times daily.  1 Tube 3    solifenacin (VESICARE) 10 MG tablet TAKE 1 TABLET BY MOUTH ONE TIME A DAY 90 tablet 3    metFORMIN (GLUCOPHAGE) 500 MG tablet Take 1 tablet by mouth 3 times daily 270 tablet 3    Blood Glucose Monitoring Suppl (PRODIGY AUTOCODE BLOOD GLUCOSE) w/Device KIT Use as directed to test up to 4 times daily 1 kit 0    PRODIGY LANCETS 28G MISC Use as directed to test up to 4 times daily 400 each 3    miconazole (MICOTIN) 2 % cream Apply topically 2 times daily. 141 g 3    Handicap Placard MISC Exp 5 years 1 each 0    [DISCONTINUED] sucralfate (CARAFATE) 1 GM tablet Take 1 tablet by mouth 4 times daily for 7 days 28 tablet 0     No current facility-administered medications on file prior to visit. Allergies:  Morphine and related, Ace inhibitors, Bactrim [sulfamethoxazole-trimethoprim], Nitroglycerin, Percocet [oxycodone-acetaminophen], and Victoza [liraglutide]    Review of Systems   Constitutional: Negative for activity change, appetite change and fatigue. Respiratory: Positive for shortness of breath. Negative for apnea, cough, chest tightness and wheezing. Cardiovascular: Negative for chest pain, palpitations and leg swelling. Gastrointestinal: Negative for abdominal pain, blood in stool, constipation, diarrhea, nausea and vomiting. Musculoskeletal: Negative for arthralgias. Neurological: Negative for seizures and headaches. Psychiatric/Behavioral: Negative for hallucinations and suicidal ideas. Objective:   /70 (Site: Right Upper Arm, Position: Sitting, Cuff Size: Large Adult)   Pulse 81   Temp 98.1 °F (36.7 °C) (Oral)   Ht 5' 6\" (1.676 m)   Wt 290 lb 3.2 oz (131.6 kg)   LMP  (LMP Unknown)   SpO2 97%   Breastfeeding No   BMI 46.84 kg/m²     Physical Exam  Vitals and nursing note reviewed. Constitutional:       General: She is not in acute distress. Appearance: Normal appearance. She is well-developed. She is obese. She is not diaphoretic. HENT:      Head: Normocephalic and atraumatic. Nose: Nose normal.      Mouth/Throat:      Mouth: Mucous membranes are moist.      Pharynx: Oropharynx is clear. Eyes:      Conjunctiva/sclera: Conjunctivae normal.      Pupils: Pupils are equal, round, and reactive to light. Cardiovascular:      Rate and Rhythm: Normal rate and regular rhythm.       Pulses: Normal pulses. Heart sounds: Normal heart sounds. No murmur heard. No friction rub. No gallop. Pulmonary:      Effort: Pulmonary effort is normal. No respiratory distress. Breath sounds: Normal breath sounds. No wheezing or rales. Chest:      Chest wall: No tenderness. Abdominal:      General: Abdomen is flat. Bowel sounds are normal.      Palpations: Abdomen is soft. Tenderness: There is no abdominal tenderness. Musculoskeletal:      Cervical back: Normal range of motion. Skin:     General: Skin is warm and dry. Neurological:      Mental Status: She is alert and oriented to person, place, and time. Psychiatric:         Behavior: Behavior normal.         Thought Content: Thought content normal.         Judgment: Judgment normal.         Assessment & Plan:     1. Type 2 diabetes mellitus with complication, with long-term current use of insulin ([facility])  Improved hemoglobin A1c. Continue current medications and follow-up with endocrinology  -  DIABETES FOOT EXAM    2. Shortness of breath  Unclear etiology, will obtain a pulmonary function test and possibly have patient follow-up with pulmonology pending results. We will also have patient follow-up with cardiology as it has been a year since prior evaluation as well as the QT prolongation noted on EKG. Obesity may be playing a role but will need to rule out other causes first  - Full PFT Study With Bronchodilator; Future    3. Dry skin  May be secondary to fungal origin therefore will trial terbinafine and monitor for improvement  - terbinafine (LAMISIL) 1 % cream; Apply topically 2 times daily. Dispense: 1 Tube; Refill: 1      Return in about 1 month (around 6/25/2021), or if symptoms worsen or fail to improve.     Woodrow Jaime MD

## 2021-06-01 ENCOUNTER — E-VISIT (OUTPATIENT)
Dept: PRIMARY CARE CLINIC | Age: 46
End: 2021-06-01
Payer: COMMERCIAL

## 2021-06-01 DIAGNOSIS — J01.90 ACUTE BACTERIAL SINUSITIS: Primary | ICD-10-CM

## 2021-06-01 DIAGNOSIS — B96.89 ACUTE BACTERIAL SINUSITIS: Primary | ICD-10-CM

## 2021-06-01 PROCEDURE — 99421 OL DIG E/M SVC 5-10 MIN: CPT | Performed by: NURSE PRACTITIONER

## 2021-06-01 RX ORDER — AZITHROMYCIN 250 MG/1
TABLET, FILM COATED ORAL
Qty: 6 TABLET | Refills: 0 | Status: SHIPPED | OUTPATIENT
Start: 2021-06-01 | End: 2021-06-10 | Stop reason: ALTCHOICE

## 2021-06-01 ASSESSMENT — LIFESTYLE VARIABLES
SMOKING_STATUS: NO, I'M A FORMER SMOKER
SMOKING_YEARS: 20
PACKS_PER_DAY: 1

## 2021-06-01 NOTE — PROGRESS NOTES
Reviewed questionnaire    Reviewed meds/allergies    Dx Sinusitis    Plan Rx given for zpack, follow up with PCP if no improvement in symptoms with use of med    Time spent on visit 5 min

## 2021-06-04 ENCOUNTER — CARE COORDINATION (OUTPATIENT)
Dept: OTHER | Facility: CLINIC | Age: 46
End: 2021-06-04

## 2021-06-04 DIAGNOSIS — B37.2 CANDIDAL DERMATITIS: ICD-10-CM

## 2021-06-04 RX ORDER — NYSTATIN 100000 [USP'U]/G
POWDER TOPICAL
Qty: 1 BOTTLE | Refills: 2 | Status: SHIPPED | OUTPATIENT
Start: 2021-06-04 | End: 2022-01-18

## 2021-06-04 NOTE — CARE COORDINATION
capsule TAKE 1 CAPSULE BY MOUTH ONE TIME A DAY 4/19/21   Shaun Jacobs, APRN - CNP   insulin lispro, 1 Unit Dial, (HUMALOG KWIKPEN) 100 UNIT/ML SOPN 50-60 units at each meals 4/19/21   Leela Khalil MD   Insulin Glargine, 2 Unit Dial, (TOUJEO MAX SOLOSTAR) 300 UNIT/ML SOPN 130 units at bedtime 4/19/21   Leela Khalil MD   metoclopramide (REGLAN) 10 MG tablet Take 1 tablet by mouth 3 times daily (with meals) 4/19/21   Leela Khalil MD   baclofen (LIORESAL) 10 MG tablet Take 1 tablet by mouth 2 times daily 4/14/21   [address], MD   famotidine (PEPCID) 20 MG tablet Take 1 tablet by mouth 2 times daily 3/19/21   Saurabh Singletary MD   predniSONE (DELTASONE) 5 MG tablet Take 5 mg by mouth daily    Historical Provider, MD   Continuous Blood Gluc Sensor (FREESTYLE JAIRO 14 DAY SENSOR) MISC Every 2 weeks 1/22/21   Leela Khalil MD   Continuous Blood Gluc  (FREESTYLE JAIRO 14 DAY READER) ALEXANDREA As directed 1/22/21   Leela Khalil MD   pioglitazone (ACTOS) 30 MG tablet Take 1 tablet by mouth daily 1/22/21   Leela Khalil MD   Insulin Pen Needle (PEN NEEDLES) 32G X 4 MM MISC Use as directed with insulin pens, 4 times daily 1/18/21   Leela Khalil MD   Insulin Pen Needle (PEN NEEDLES) 32G X 4 MM MISC Use as directed with insulin pens, 4 times daily 1/18/21   Leela Khalil MD   losartan (COZAAR) 50 MG tablet Take 1 tablet by mouth daily 12/30/20   Aysha Muller MD   hydroCHLOROthiazide (HYDRODIURIL) 25 MG tablet Take 1 tablet by mouth daily 12/30/20   Aysha Muller MD   nystatin (MYCOSTATIN) 333538 UNIT/GM powder Apply 3 times daily. 11/11/20   [id number], MD   blood glucose test strips (PRODIGY NO CODING BLOOD GLUC) strip 1 each by In Vitro route 4 times daily As needed. 11/11/20   [address], MD   blood glucose test strips (PRODIGY NO CODING BLOOD GLUC) strip 1 each by In Vitro route 4 times daily As needed. 11/10/20   Leela Khalil MD   clotrimazole-betamethasone (LOTRISONE) 1-0.05 % cream Apply topically 2 times daily. 11/6/20   Jeremías Iyer DO   solifenacin (VESICARE) 10 MG tablet TAKE 1 TABLET BY MOUTH ONE TIME A DAY 9/25/20   Braxton Vargas MD   sucralfate (CARAFATE) 1 GM tablet Take 1 tablet by mouth 4 times daily for 7 days 8/11/20 12/16/20  Vergie Rubinstein, APRN - CNP   metFORMIN (GLUCOPHAGE) 500 MG tablet Take 1 tablet by mouth 3 times daily 3/30/20   Jacque Patterson MD   Blood Glucose Monitoring Suppl (PRODIGY AUTOCODE BLOOD GLUCOSE) w/Device KIT Use as directed to test up to 4 times daily 1/17/20   MD ROOSEVELT Dumont LANCETS 28G MISC Use as directed to test up to 4 times daily 1/17/20   Jacque Patterson MD   miconazole (MICOTIN) 2 % cream Apply topically 2 times daily.  1/21/19   Tenzin Quigley MD   Handicap Placard MISC Exp 5 years 7/3/18   Tenzin Quigley MD       Future Appointments   Date Time Provider Iliana Meadowsi   6/7/2021 10:00 AM MIKHAIL [facility]ESL[facility] ROOM [address] [place] [address]   7/12/2021 11:15 AM Jerrell Chapman MD [address] [place] [address]

## 2021-06-07 ENCOUNTER — HOSPITAL ENCOUNTER (OUTPATIENT)
Dept: PULMONOLOGY | Age: 46
Discharge: HOME | End: 2021-06-07
Payer: COMMERCIAL

## 2021-06-07 DIAGNOSIS — R06.02 SHORTNESS OF BREATH: ICD-10-CM

## 2021-06-07 PROCEDURE — 94729 DIFFUSING CAPACITY: CPT

## 2021-06-07 PROCEDURE — 94060 EVALUATION OF WHEEZING: CPT

## 2021-06-07 PROCEDURE — 94726 PLETHYSMOGRAPHY LUNG VOLUMES: CPT

## 2021-06-07 PROCEDURE — 6360000002 HC RX W HCPCS: Performed by: FAMILY MEDICINE

## 2021-06-07 RX ORDER — ALBUTEROL SULFATE 2.5 MG/3ML
2.5 SOLUTION RESPIRATORY (INHALATION) ONCE
Status: COMPLETED | OUTPATIENT
Start: 2021-06-07 | End: 2021-06-07

## 2021-06-07 RX ADMIN — ALBUTEROL SULFATE 2.5 MG: 2.5 SOLUTION RESPIRATORY (INHALATION) at 10:14

## 2021-06-08 NOTE — PROCEDURES
[address]                      [address] [place], [address] [facility]                               PULMONARY FUNCTION    PATIENT NAME: Raquel Bales               :        1975  MED REC NO:   [id number]                            ROOM:  ACCOUNT NO:   [de-identified]                           ADMIT DATE: 2021  PROVIDER:     Keiko Gray MD    DATE OF PROCEDURE:  2021    REQUESTING PROVIDER:  Joseph De La Cruz. Yasmin Wright MD    INTERPRETING PROVIDER:  Mikala Payne. Robin Block MD    REASON FOR STUDY:  Shortness of breath. INTERPRETATION:  FVC is 2.66, 68% of predicted. FEV1 is 2.30, 74% of  predicted. FEV1/FVC is 86%. FEF 25-75% is 2.98, 97% of predicted. Postbronchodilator study shows no improvement. Lung volume study shows  slow vital capacity is 2.69, 69% of predicated. Residual volume is  1.70, 95% of predicted. TLC is 4.39, 82% of predicted. RV to TLC ratio  of 38.73, 117% of predicted. Diffusion capacity is 17.19, 71% of  predicted. Airway resistance 1.74, 93% of predicted. SUMMARY:  Both FVC and FEV1 decreased with normal ratio and normal  midflow. Lung volume study slow vital capacity decreased suggests  possibility of restrictive disease. Total capacity within normal range. Residual volume and RV to TLC ratio is also normal.  Diffusion capacity  is mildly impaired. There is no response to bronchodilators. Airway  resistance is normal.  Clinical correlation is requested.         Khushi Clay MD    D: 2021 8:14:34       T: 2021 9:52:20     [email or web address]  Job#: [id number]     Doc#: [id number]    CC:

## 2021-06-09 PROCEDURE — 94726 PLETHYSMOGRAPHY LUNG VOLUMES: CPT | Performed by: INTERNAL MEDICINE

## 2021-06-09 PROCEDURE — 94729 DIFFUSING CAPACITY: CPT | Performed by: INTERNAL MEDICINE

## 2021-06-09 PROCEDURE — 94060 EVALUATION OF WHEEZING: CPT | Performed by: INTERNAL MEDICINE

## 2021-06-10 ENCOUNTER — OFFICE VISIT (OUTPATIENT)
Dept: FAMILY MEDICINE CLINIC | Age: 46
End: 2021-06-10
Payer: COMMERCIAL

## 2021-06-10 VITALS
DIASTOLIC BLOOD PRESSURE: 80 MMHG | TEMPERATURE: 97.7 F | BODY MASS INDEX: 46.61 KG/M2 | HEIGHT: 66 IN | HEART RATE: 73 BPM | SYSTOLIC BLOOD PRESSURE: 126 MMHG | OXYGEN SATURATION: 98 % | WEIGHT: 290 LBS

## 2021-06-10 DIAGNOSIS — L24.A9 WOUND DRAINAGE: ICD-10-CM

## 2021-06-10 DIAGNOSIS — L24.A9 WOUND DRAINAGE: Primary | ICD-10-CM

## 2021-06-10 DIAGNOSIS — L02.92 BOIL: ICD-10-CM

## 2021-06-10 PROBLEM — T14.8XXA WOUND DRAINAGE: Status: ACTIVE | Noted: 2021-06-10

## 2021-06-10 PROCEDURE — 99213 OFFICE O/P EST LOW 20 MIN: CPT | Performed by: NURSE PRACTITIONER

## 2021-06-10 RX ORDER — CEPHALEXIN 500 MG/1
500 CAPSULE ORAL 3 TIMES DAILY
Qty: 21 CAPSULE | Refills: 0 | Status: SHIPPED | OUTPATIENT
Start: 2021-06-10 | End: 2021-06-17

## 2021-06-10 ASSESSMENT — ENCOUNTER SYMPTOMS
ABDOMINAL PAIN: 0
SINUS PRESSURE: 0
SORE THROAT: 0
BACK PAIN: 0
DIARRHEA: 0
EYE DISCHARGE: 0
ABDOMINAL DISTENTION: 0
WHEEZING: 0
CHEST TIGHTNESS: 0
COUGH: 0
SHORTNESS OF BREATH: 0
NAUSEA: 0
EYE REDNESS: 0
CONSTIPATION: 0
FACIAL SWELLING: 0

## 2021-06-10 NOTE — PROGRESS NOTES
Subjective:      Patient ID: Yanet Young is a 40 y.o. female who presents today for:  Chief Complaint   Patient presents with    Rash     lower stomach in between the folds, open sores, x5days, tX: medicated cream, nyostatin       HPI    Past Medical History:   Diagnosis Date    Acute midline thoracic back pain 9/18/2018    B12 deficiency     Family history of premature CAD 9/4/2013    Fatty liver     Gastroesophageal reflux disease 1/6/2021    HPV (human papilloma virus) anogenital infection     Hyperlipidemia     Hypertension     Irritable bowel syndrome with diarrhea 4/26/2021    Liver hemangioma 2009/2015    Obesity 3/11/13    BMI 43.42    Orthostatic hypotension     Ovarian cyst     PMR (polymyalgia rheumatica) ([facility] [place] [address]     Rectal fissure     Tobacco abuse 9/3/2015    Tobacco abuse     Tremor     Uncontrolled diabetes mellitus with complications ([facility] [place] [address]     Unspecified sleep apnea      Past Surgical History:   Procedure Laterality Date    CARDIAC CATHETERIZATION  4-07    COLONOSCOPY  2013    for diarrhea (-)    COLONOSCOPY N/A 2/10/2021    COLONOSCOPY DIAGNOSTIC performed by Candelaria Mendoza MD at [place] [address]  12-10    [facility]  1-05    IN MUSCLE BIOPSY Left 9/21/2018    LEFT QUADRICEPS MUSCLE BIOPSY performed by Barrington Starr MD at [facility] UNI/BI CANNULATION N/A 9/18/2018    T 12 VERTEBRALPLASTY ROOM 278 performed by Khadra Pelaez MD at [address] ENDOSCOPY  10/13/15     DR. HERRERA     UPPER GASTROINTESTINAL ENDOSCOPY N/A 2/10/2021    EGD ESOPHAGOGASTRODUODENOSCOPY performed by Candelaria Mendoza MD at [facility]  6-2015     Family History   Problem Relation Age of Onset    Diabetes Father     Heart Disease Mother     Colon Cancer Neg Hx     Cancer Neg Hx      Social History     Socioeconomic History    Marital status:  Spouse name: Not on file    Number of children: 1    Years of education: Not on file    Highest education level: Not on file   Occupational History    Not on file   Tobacco Use    Smoking status: Former Smoker     Packs/day: 1.00     Types: Cigarettes     Quit date: 2017     Years since quittin.4    Smokeless tobacco: Never Used   Vaping Use    Vaping Use: Never used   Substance and Sexual Activity    Alcohol use: Yes     Alcohol/week: 0.0 standard drinks     Comment: socially    Drug use: No    Sexual activity: Yes     Partners: Male     Comment: single   Other Topics Concern    Not on file   Social History Narrative    Social/Functional History          Social Determinants of Health     Financial Resource Strain: Low Risk     Difficulty of Paying Living Expenses: Not hard at all   Food Insecurity: No Food Insecurity    Worried About Running Out of Food in the Last Year: Never true    Jung of Food in the Last Year: Never true   Transportation Needs: No Transportation Needs    Lack of Transportation (Medical): No    Lack of Transportation (Non-Medical): No   Physical Activity:     Days of Exercise per Week:     Minutes of Exercise per Session:    Stress:     Feeling of Stress :    Social Connections:     Frequency of Communication with Friends and Family:     Frequency of Social Gatherings with Friends and Family:     Attends [employer] Services:     Active Member of Clubs or Organizations:     Attends Club or Organization Meetings:     Marital Status:    Intimate Partner Violence:     Fear of Current or Ex-Partner:     Emotionally Abused:     Physically Abused:     Sexually Abused:      Current Outpatient Medications on File Prior to Visit   Medication Sig Dispense Refill    nystatin (MYCOSTATIN) 885666 UNIT/GM powder Apply 3 times daily.  1 Bottle 2    ondansetron (ZOFRAN ODT) 4 MG disintegrating tablet Take 1 tablet by mouth every 8 hours as needed for Nausea or Vomiting 60 tablet 3    terbinafine (LAMISIL) 1 % cream Apply topically 2 times daily. 1 Tube 1    albuterol sulfate HFA (PROAIR HFA) 108 (90 Base) MCG/ACT inhaler Use every 4 hours while awake for 7-10 days then PRN wheezing  Dispense with SPACER and Instruct on use. May sub Ventolin or Proventil as needed per [employer].  1 Inhaler 0    metoprolol (LOPRESSOR) 100 MG tablet Take 1 tablet by mouth 2 times daily 180 tablet 0    rosuvastatin (CRESTOR) 10 MG tablet TAKE ONE TABLET BY MOUTH NIGHTLY 90 tablet 0    promethazine (PHENERGAN) 25 MG tablet Take 25 mg by mouth every 8 hours      erythromycin base (E-MYCIN) 250 MG tablet Take 1 tablet by mouth 3 times daily 90 tablet 3    venlafaxine (EFFEXOR XR) 75 MG extended release capsule Take 1 capsule by mouth daily TAKE TWO CAPSULES BY MOUTH EVERY MORNING AND TAKE ONE CAPSULE BY MOUTH EVERY EVENING 270 capsule 1    esomeprazole (NEXIUM) 40 MG delayed release capsule TAKE 1 CAPSULE BY MOUTH ONE TIME A DAY 30 capsule 3    insulin lispro, 1 Unit Dial, (HUMALOG KWIKPEN) 100 UNIT/ML SOPN 50-60 units at each meals 60 pen 3    Insulin Glargine, 2 Unit Dial, (TOUJEO MAX SOLOSTAR) 300 UNIT/ML SOPN 130 units at bedtime 90 pen 3    metoclopramide (REGLAN) 10 MG tablet Take 1 tablet by mouth 3 times daily (with meals) 360 tablet 3    baclofen (LIORESAL) 10 MG tablet Take 1 tablet by mouth 2 times daily 90 tablet 0    predniSONE (DELTASONE) 5 MG tablet Take 5 mg by mouth daily      Continuous Blood Gluc Sensor (FREESTYLE JAIRO 14 DAY SENSOR) MISC Every 2 weeks 6 each 03    Continuous Blood Gluc  (FREESTYLE JAIRO 14 DAY READER) ALEXANDREA As directed 1 Device 00    pioglitazone (ACTOS) 30 MG tablet Take 1 tablet by mouth daily 90 tablet 3    Insulin Pen Needle (PEN NEEDLES) 32G X 4 MM MISC Use as directed with insulin pens, 4 times daily 400 each 1    Insulin Pen Needle (PEN NEEDLES) 32G X 4 MM MISC Use as directed with insulin pens, 4 times daily 400 each 1    losartan red outline with red beefy color- there is few small red areas with yellow drainage. There is a base of the wound then the drainage is whipped. No active draining   Neurological:      Mental Status: She is alert and oriented to person, place, and time. Coordination: Coordination normal.   Psychiatric:         Behavior: Behavior normal.         Thought Content: Thought content normal.         Procedures   :       Diagnosis Orders   1. Wound drainage  Culture, Aerobic and Anaerobic    cephALEXin (KEFLEX) 500 MG capsule   2. Boil  cephALEXin (KEFLEX) 500 MG capsule       :      Orders Placed This Encounter   Procedures    Culture, Aerobic and Anaerobic     Standing Status:   Future     Standing Expiration Date:   7/10/2021   pt is on a cream and nystatin powder   we spoke about holding  off on both- possibly following up with PCP to see if  Oral antifungal tx is appropriate- pt has few superficial areas with yellow drainage. The area is not painful x4- and has a good base when drainage is wiped of there is no underling hardening or cyst feeling    We will cover pt with keflex. But the areas ar at skin creases- we spoke about dressing application with cotton no elastic- and to leave area open to air at home to allow it to dry   follow up with PCP     Orders Placed This Encounter   Medications    cephALEXin (KEFLEX) 500 MG capsule     Sig: Take 1 capsule by mouth 3 times daily for 7 days     Dispense:  21 capsule     Refill:  0       Return in about 4 days (around 6/14/2021), or if symptoms worsen or fail to improve. Reviewed with the patient: current clinicalstatus, medications, activities and diet. Side effects, adverse effects of the medication prescribedtoday, as well as treatment plan/ rationale and result expectations have been discussedwith the patient who expresses understanding and desires to proceed. Close follow upto evaluate treatment results and for coordination of care.   I have reviewedthe patient's medical history in detail and updated the computerized patient record.     Amari Chavez, APRN - CNP

## 2021-06-12 DIAGNOSIS — Z79.4 TYPE 2 DIABETES MELLITUS WITH COMPLICATION, WITH LONG-TERM CURRENT USE OF INSULIN (HCC): ICD-10-CM

## 2021-06-12 DIAGNOSIS — E11.8 TYPE 2 DIABETES MELLITUS WITH COMPLICATION, WITH LONG-TERM CURRENT USE OF INSULIN (HCC): ICD-10-CM

## 2021-06-13 LAB
ANAEROBIC CULTURE: ABNORMAL
GRAM STAIN RESULT: ABNORMAL
ORGANISM: ABNORMAL
WOUND/ABSCESS: ABNORMAL
WOUND/ABSCESS: ABNORMAL

## 2021-06-14 ENCOUNTER — CARE COORDINATION (OUTPATIENT)
Dept: OTHER | Facility: CLINIC | Age: 46
End: 2021-06-14

## 2021-06-14 RX ORDER — CLOTRIMAZOLE AND BETAMETHASONE DIPROPIONATE 10; .64 MG/G; MG/G
CREAM TOPICAL
Qty: 1 TUBE | Refills: 3 | Status: SHIPPED | OUTPATIENT
Start: 2021-06-14 | End: 2021-12-27

## 2021-06-14 NOTE — CARE COORDINATION
Ambulatory Care Coordination Note  6/14/2021  CM Risk Score: 9  Charlson 10 Year Mortality Risk Score: 79%     ACC: Dee Andrade RN    ACM contacted patient by telephone to follow up on progress, reinforce previous education/ provide patient education, and discuss any new issues or concerns. HIPAA compliant message left requesting a return phone call at patients convenience to discuss. Will continue to follow. Care Coordination Interventions    Program Enrollment: Complex Care  Referral from Primary Care Provider: No  Suggested Interventions and Community Resources  Diabetes Education: Completed  Registered Dietician: Completed  Specialty Services Referral: Completed  Zone Management Tools: Completed         Prior to Admission medications    Medication Sig Start Date End Date Taking? Authorizing Provider   clotrimazole-betamethasone (LOTRISONE) 1-0.05 % cream APPLY TOPICALLY TWO TIMES A DAY 6/14/21   Jeremías Iyer DO   cephALEXin (KEFLEX) 500 MG capsule Take 1 capsule by mouth 3 times daily for 7 days 6/10/21 6/17/21  SANIA Valdivia - CNP   nystatin (MYCOSTATIN) 020461 UNIT/GM powder Apply 3 times daily. 6/4/21   Donna Chew MD   ondansetron (ZOFRAN ODT) 4 MG disintegrating tablet Take 1 tablet by mouth every 8 hours as needed for Nausea or Vomiting 6/4/21   SANIA Wheeler - CNP   terbinafine (LAMISIL) 1 % cream Apply topically 2 times daily. 5/25/21   Rudi Garcia MD   albuterol sulfate HFA (PROAIR HFA) 108 (90 Base) MCG/ACT inhaler Use every 4 hours while awake for 7-10 days then PRN wheezing  Dispense with SPACER and Instruct on use. May sub Ventolin or Proventil as needed per [employer].  5/22/21   Leela Manriquez PA-C   metoprolol (LOPRESSOR) 100 MG tablet Take 1 tablet by mouth 2 times daily 5/18/21   Donna Chew MD   rosuvastatin (CRESTOR) 10 MG tablet TAKE ONE TABLET BY MOUTH NIGHTLY 5/5/21   Donna Chew MD   promethazine (PHENERGAN) 25 MG tablet Take 25 mg by mouth every 8 hours    Historical Provider, MD   erythromycin base (E-MYCIN) 250 MG tablet Take 1 tablet by mouth 3 times daily 4/29/21 8/27/21  SANIA Castor CNP   venlafaxine (EFFEXOR XR) 75 MG extended release capsule Take 1 capsule by mouth daily TAKE TWO CAPSULES BY MOUTH EVERY MORNING AND TAKE ONE CAPSULE BY MOUTH EVERY EVENING 4/24/21   Geovani Hoang MD   esomeprazole ([employer]) 40 MG delayed release capsule TAKE 1 CAPSULE BY MOUTH ONE TIME A DAY 4/19/21   SANIA Castro CNP   insulin lispro, 1 Unit Dial, (HUMALOG KWIKPEN) 100 UNIT/ML SOPN 50-60 units at each meals 4/19/21   Thad Marshall MD   Insulin Glargine, 2 Unit Dial, (TOUJEO MAX SOLOSTAR) 300 UNIT/ML SOPN 130 units at bedtime 4/19/21   Thad Marshall MD   metoclopramide (REGLAN) 10 MG tablet Take 1 tablet by mouth 3 times daily (with meals) 4/19/21   Thad Marshall MD   baclofen (LIORESAL) 10 MG tablet Take 1 tablet by mouth 2 times daily 4/14/21   Geovani Hoang MD   famotidine (PEPCID) 20 MG tablet Take 1 tablet by mouth 2 times daily  Patient not taking: Reported on 6/10/2021 3/19/21   Alla Lee MD   predniSONE (DELTASONE) 5 MG tablet Take 5 mg by mouth daily    Historical Provider, MD   Continuous Blood Gluc Sensor (FREESTYLE JAIRO 14 DAY SENSOR) MISC Every 2 weeks 1/22/21   Thad Marshall MD   Continuous Blood Gluc  (FREESTYLE JAIRO 14 DAY READER) ALEXANDREA As directed 1/22/21   Thad Marshall MD   pioglitazone (ACTOS) 30 MG tablet Take 1 tablet by mouth daily 1/22/21   Thad Marshall MD   Insulin Pen Needle (PEN NEEDLES) 32G X 4 MM MISC Use as directed with insulin pens, 4 times daily 1/18/21   Thad Marshall MD   Insulin Pen Needle (PEN NEEDLES) 32G X 4 MM MISC Use as directed with insulin pens, 4 times daily 1/18/21   Thad Marshall MD   losartan (COZAAR) 50 MG tablet Take 1 tablet by mouth daily 12/30/20   Geovani Hoang MD   hydroCHLOROthiazide (HYDRODIURIL) 25 MG tablet Take 1 tablet by mouth daily 12/30/20   Geovani Hoang MD   blood glucose test strips (PRODIGY NO CODING BLOOD GLUC) strip 1 each by In Vitro route 4 times daily As needed. 11/11/20   Deon Guillory MD   blood glucose test strips (PRODIGY NO CODING BLOOD GLUC) strip 1 each by In Vitro route 4 times daily As needed. 11/10/20   Juni Mccauley MD   solifenacin (VESICARE) 10 MG tablet TAKE 1 TABLET BY MOUTH ONE TIME A DAY 9/25/20   Donis Pineda MD   sucralfate (CARAFATE) 1 GM tablet Take 1 tablet by mouth 4 times daily for 7 days 8/11/20 12/16/20  Saint Lipoma, APRN - CNP   metFORMIN (GLUCOPHAGE) 500 MG tablet Take 1 tablet by mouth 3 times daily 3/30/20   Juni Mccauley MD   Blood Glucose Monitoring Suppl (PRODIGY AUTOCODE BLOOD GLUCOSE) w/Device KIT Use as directed to test up to 4 times daily 1/17/20   MD ROOSEVELT Irene LANCETS 28G MISC Use as directed to test up to 4 times daily 1/17/20   Juni Mccauley MD   miconazole (MICOTIN) 2 % cream Apply topically 2 times daily.  1/21/19   Darral Lennox, MD   Handicap Placard MISC Exp 5 years 7/3/18   Darral Lennox, MD       Future Appointments   Date Time Provider Iliana Radha   7/12/2021 11:15 AM Yaquelin López MD [address] [place] [address]   8/4/2021  2:00 PM Hiren Luther MD [facility]

## 2021-06-15 DIAGNOSIS — L24.A9 WOUND DRAINAGE: Primary | ICD-10-CM

## 2021-06-15 DIAGNOSIS — L02.92 BOIL: ICD-10-CM

## 2021-06-15 RX ORDER — METRONIDAZOLE 500 MG/1
500 TABLET ORAL 2 TIMES DAILY
Qty: 14 TABLET | Refills: 0 | Status: SHIPPED | OUTPATIENT
Start: 2021-06-15 | End: 2021-06-22

## 2021-06-15 RX ORDER — LOSARTAN POTASSIUM 50 MG/1
50 TABLET ORAL DAILY
Qty: 90 TABLET | Refills: 1 | Status: SHIPPED | OUTPATIENT
Start: 2021-06-15 | End: 2021-11-16 | Stop reason: SDUPTHER

## 2021-06-21 ENCOUNTER — CARE COORDINATION (OUTPATIENT)
Dept: OTHER | Facility: CLINIC | Age: 46
End: 2021-06-21

## 2021-06-21 NOTE — CARE COORDINATION
Ambulatory Care Coordination Note  2021  CM Risk Score: 5  Charlson 10 Year Mortality Risk Score: 79%     ACC: Nestor Sargent, LANIE    Ambulatory Care Manager [facility]) contacted the patient by telephone to follow up on progress, discuss new issues or concerns, and reinforce/ provide pt education. Verified name and  with patient as identifiers. Patient reports that she feels she is managing her symptoms. She has been changing her diet as recommended, but does till have occasional nausea. She sates her medications are filled and taking as directed. She does have follow up appts scheduled and is able to describe Red Flag symptoms to report. She denies any need for further patient education or assistance with services and providers. She denies any immediate or ongoing ACM needs at this time. Interventions:    ACM reviewed and updated CC protocol, SDOH, medications, goals, education and disease specific assessments. Counseling and education provided at today's visit on:   Red Flag symptoms to report  Symptom management  Specialist appointment compliance  Utilization of appropriate level of care: Right Care, Right Place, Right Time  Disease Process and complications  Meal Planning  Small Frequent Meals    Medication reconciliation was performed with patient, who verbalizes understanding of administration of home medications. Advised obtaining a 90-day supply of all daily and as-needed medications. Reinforced resources available to patient including:   PCP, Specialist, Benefits related nurse triage line, Urgent care clinics and [facility] Messaging. Plan:  ACM will sign off as pt feels she has no Ongoing ACM needs at this time. Patient verbalized understanding and is agreeable.          Care Coordination Interventions    Program Enrollment: Complex Care  Referral from Primary Care Provider: No  Suggested Interventions and Community Resources  Diabetes Education: Completed  Registered Dietician: Completed  Specialty Services Referral: Completed  Zone Management Tools: Completed           Prior to Admission medications    Medication Sig Start Date End Date Taking? Authorizing Provider   losartan (COZAAR) 50 MG tablet Take 1 tablet by mouth daily 6/15/21   Haritha Hyatt MD   metroNIDAZOLE (FLAGYL) 500 MG tablet Take 1 tablet by mouth 2 times daily for 7 days 6/15/21 6/22/21  TONY Zuniga   metFORMIN (GLUCOPHAGE) 500 MG tablet TAKE 1 TABLET BY MOUTH 3 TIMES A DAY 6/14/21   Jacque Patterson MD   clotrimazole-betamethasone (LOTRISONE) 1-0.05 % cream APPLY TOPICALLY TWO TIMES A DAY 6/14/21   Jeremías Iyer DO   nystatin (MYCOSTATIN) 688284 UNIT/GM powder Apply 3 times daily. 6/4/21   Haritha Hyatt MD   ondansetron (ZOFRAN ODT) 4 MG disintegrating tablet Take 1 tablet by mouth every 8 hours as needed for Nausea or Vomiting 6/4/21   SANIA Rose CNP   terbinafine (LAMISIL) 1 % cream Apply topically 2 times daily. 5/25/21   Rudi Roman MD   albuterol sulfate HFA (PROAIR HFA) 108 (90 Base) MCG/ACT inhaler Use every 4 hours while awake for 7-10 days then PRN wheezing  Dispense with SPACER and Instruct on use. May sub Ventolin or Proventil as needed per [employer].  5/22/21   Sandy Hammer PA-C   metoprolol (LOPRESSOR) 100 MG tablet Take 1 tablet by mouth 2 times daily 5/18/21   Haritha Hyatt MD   rosuvastatin (CRESTOR) 10 MG tablet TAKE ONE TABLET BY MOUTH NIGHTLY 5/5/21   Haritha Hyatt MD   promethazine (PHENERGAN) 25 MG tablet Take 25 mg by mouth every 8 hours    Historical Provider, MD   erythromycin base (E-MYCIN) 250 MG tablet Take 1 tablet by mouth 3 times daily 4/29/21 8/27/21  SANIA Rose CNP   venlafaxine (EFFEXOR XR) 75 MG extended release capsule Take 1 capsule by mouth daily TAKE TWO CAPSULES BY MOUTH EVERY MORNING AND TAKE ONE CAPSULE BY MOUTH EVERY EVENING 4/24/21   Haritha Hyatt MD   esomeprazole (NEXIUM) 40 MG delayed release capsule TAKE 1 CAPSULE BY MOUTH ONE TIME A DAY 4/19/21   Warren Nova, APRN - CNP   insulin lispro, 1 Unit Dial, (HUMALOG KWIKPEN) 100 UNIT/ML SOPN 50-60 units at each meals 4/19/21   Anna Jay MD   Insulin Glargine, 2 Unit Dial, (TOUJEO MAX SOLOSTAR) 300 UNIT/ML SOPN 130 units at bedtime 4/19/21   Anna Jay MD   metoclopramide (REGLAN) 10 MG tablet Take 1 tablet by mouth 3 times daily (with meals) 4/19/21   Anna Jay MD   baclofen (LIORESAL) 10 MG tablet Take 1 tablet by mouth 2 times daily 4/14/21   Veronique Maldonado MD   famotidine (PEPCID) 20 MG tablet Take 1 tablet by mouth 2 times daily  Patient not taking: Reported on 6/10/2021 3/19/21   Larissa Teresa MD   predniSONE (DELTASONE) 5 MG tablet Take 5 mg by mouth daily    Historical Provider, MD   Continuous Blood Gluc Sensor (FREESTYLE JAIRO 14 DAY SENSOR) MISC Every 2 weeks 1/22/21   Anna Jay MD   Continuous Blood Gluc  (FREESTYLE JAIRO 14 DAY READER) ALEXANDREA As directed 1/22/21   Anna Jay MD   pioglitazone (ACTOS) 30 MG tablet Take 1 tablet by mouth daily 1/22/21   Anna Jay MD   Insulin Pen Needle (PEN NEEDLES) 32G X 4 MM MISC Use as directed with insulin pens, 4 times daily 1/18/21   Anna Jay MD   Insulin Pen Needle (PEN NEEDLES) 32G X 4 MM MISC Use as directed with insulin pens, 4 times daily 1/18/21   Anna Jay MD   hydroCHLOROthiazide (HYDRODIURIL) 25 MG tablet Take 1 tablet by mouth daily 12/30/20   Veronique Maldonado MD   blood glucose test strips (PRODIGY NO CODING BLOOD GLUC) strip 1 each by In Vitro route 4 times daily As needed. 11/11/20   Veronique Maldonado MD   blood glucose test strips (PRODIGY NO CODING BLOOD GLUC) strip 1 each by In Vitro route 4 times daily As needed.  11/10/20   Anna Jay MD   solifenacin (VESICARE) 10 MG tablet TAKE 1 TABLET BY MOUTH ONE TIME A DAY 9/25/20   Dayday Ferrer MD   sucralfate (CARAFATE) 1 GM tablet Take 1 tablet by mouth 4 times daily for 7 days 8/11/20 12/16/20  SANIA Foster - CNP   Blood Glucose Monitoring Suppl (PRODIGY AUTOCODE BLOOD GLUCOSE) w/Device KIT Use as directed to test up to 4 times daily 1/17/20   Ariana Johnson MD   PRODIGY LANCETS 28G MISC Use as directed to test up to 4 times daily 1/17/20   Ariana Johnson MD   miconazole (MICOTIN) 2 % cream Apply topically 2 times daily. 1/21/19   Alison Samano MD   Handicap Pl[address]ben[address]o [address] Exp 5 years 7/3/18   Alison Samano MD       Future Appointments   Date Time Provider Iliana Garcia   6/24/2021  8:40 AM [address]   7/12/2021 11:15 AM Gianna Eaton MD [address] [place] [address]   8/4/2021  2:00 PM Keiko Gray MD [facility]     ,   Diabetes Assessment    Medic Alert ID: No  Meal Planning: Avoidance of concentrated sweets, Carb counting   How often do you test your blood sugar?: Meals, Bedtime   Do you have barriers with adherence to non-pharmacologic self-management interventions?  (Nutrition/Exercise/Self-Monitoring): Yes   Have you ever had to go to the ED for symptoms of low blood sugar?: No       No patient-reported symptoms       and   General Assessment    Do you have any symptoms that are causing concern?: Yes  Progression since Onset: Unchanged, Intermittent - Waxing/Waning  Reported Symptoms: Nausea, Vomiting

## 2021-07-01 ENCOUNTER — HOSPITAL ENCOUNTER (OUTPATIENT)
Age: 46
Setting detail: SPECIMEN
Discharge: HOME | End: 2021-07-01
Payer: COMMERCIAL

## 2021-07-01 ENCOUNTER — OFFICE VISIT (OUTPATIENT)
Dept: FAMILY MEDICINE CLINIC | Age: 46
End: 2021-07-01
Payer: COMMERCIAL

## 2021-07-01 VITALS
DIASTOLIC BLOOD PRESSURE: 62 MMHG | RESPIRATION RATE: 18 BRPM | WEIGHT: 287.6 LBS | BODY MASS INDEX: 46.22 KG/M2 | TEMPERATURE: 96.9 F | HEIGHT: 66 IN | HEART RATE: 74 BPM | SYSTOLIC BLOOD PRESSURE: 112 MMHG | OXYGEN SATURATION: 95 %

## 2021-07-01 DIAGNOSIS — L24.A9 WOUND DRAINAGE: Primary | ICD-10-CM

## 2021-07-01 DIAGNOSIS — L24.A9 WOUND DRAINAGE: ICD-10-CM

## 2021-07-01 PROCEDURE — 87205 SMEAR GRAM STAIN: CPT

## 2021-07-01 PROCEDURE — 87070 CULTURE OTHR SPECIMN AEROBIC: CPT

## 2021-07-01 PROCEDURE — 87186 SC STD MICRODIL/AGAR DIL: CPT

## 2021-07-01 PROCEDURE — 87075 CULTR BACTERIA EXCEPT BLOOD: CPT

## 2021-07-01 PROCEDURE — 87077 CULTURE AEROBIC IDENTIFY: CPT

## 2021-07-01 PROCEDURE — 99213 OFFICE O/P EST LOW 20 MIN: CPT | Performed by: FAMILY MEDICINE

## 2021-07-01 NOTE — PROGRESS NOTES
ound Subjective:      Patient ID: Ngoc Hernández is a 40 y.o. female who presents today for:     Chief Complaint   Patient presents with    Follow-up       HPI  Patient is a very pleasant 67-year-old female presents today presents today to follow-up after recent walk-in clinic visit for drainage from abdominal wounds under pannus. She mentions that she often experiences candidal dermatitis and has applied creams but recently she noticed that she had four wounds with drainage. Cultures were obtained and patient was started and completed a course of Keflex and Flagyl. She feels as though symptoms significantly improved but have not completely resolved. She states that the areas are uncomfortable but denies any pain or spreading redness. She has been using gauze in the area to provide ventilation and to prevent increased moisture.   She denies any fever or chills  Past Medical History:   Diagnosis Date    Acute midline thoracic back pain 9/18/2018    B12 deficiency     Family history of premature CAD 9/4/2013    Fatty liver     Gastroesophageal reflux disease 1/6/2021    HPV (human papilloma virus) anogenital infection     Hyperlipidemia     Hypertension     Irritable bowel syndrome with diarrhea 4/26/2021    Liver hemangioma 2009/2015    Obesity 3/11/13    BMI 43.42    Orthostatic hypotension     Ovarian cyst     PMR (polymyalgia rheumatica) (HCC)     Rectal fissure     Tobacco abuse 9/3/2015    Tobacco abuse     Tremor     Uncontrolled diabetes mellitus with complications ([facility] [address]     Unspecified sleep apnea      Past Surgical History:   Procedure Laterality Date    CARDIAC CATHETERIZATION  4-07    COLONOSCOPY  2013    for diarrhea (-)    COLONOSCOPY N/A 2/10/2021    COLONOSCOPY DIAGNOSTIC performed by Shandra Chaudhary MD at [facility] 96  12-10    [facility]  1-05    [place] MUSCLE BIOPSY Left 9/21/2018    LEFT QUADRICEPS MUSCLE BIOPSY performed by Brenden Vidal MD at [facility]    VA PERQ VERT AGMNTJ CAVITY CRTJ UNI/BI CANNULATION N/A 2018    T 12 VERTEBRALPLASTY ROOM 278 performed by Lalo Wallis MD at [address] ENDOSCOPY  10/13/15     DR. HERRERA     UPPER GASTROINTESTINAL ENDOSCOPY N/A 2/10/2021    EGD ESOPHAGOGASTRODUODENOSCOPY performed by Aníbal Gutierrez MD at [facility]       Family History   Problem Relation Age of Onset    Diabetes Father     Heart Disease Mother     Colon Cancer Neg Hx     Cancer Neg Hx      Social History     Socioeconomic History    Marital status:      Spouse name: Not on file    Number of children: 1    Years of education: Not on file    Highest education level: Not on file   Occupational History    Not on file   Tobacco Use    Smoking status: Former Smoker     Packs/day: 1.00     Types: Cigarettes     Quit date: 2017     Years since quittin.5    Smokeless tobacco: Never Used   Vaping Use    Vaping Use: Never used   Substance and Sexual Activity    Alcohol use: Yes     Alcohol/week: 0.0 standard drinks     Comment: socially    Drug use: No    Sexual activity: Yes     Partners: Male     Comment: single   Other Topics Concern    Not on file   Social History Narrative    Social/Functional History          Social Determinants of Health     Financial Resource Strain: Low Risk     Difficulty of Paying Living Expenses: Not hard at all   Food Insecurity: No Food Insecurity    Worried About Running Out of Food in the Last Year: Never true    Jung of Food in the Last Year: Never true   Transportation Needs: No Transportation Needs    Lack of Transportation (Medical): No    Lack of Transportation (Non-Medical):  No   Physical Activity:     Days of Exercise per Week:     Minutes of Exercise per Session:    Stress:     Feeling of Stress :    Social Connections:     Frequency of Communication with Friends and Family:  Frequency of Social Gatherings with Friends and Family:     Attends Gnosticist Services:     Active Member of Clubs or Organizations:     Attends Club or Organization Meetings:     Marital Status:    Intimate Partner Violence:     Fear of Current or Ex-Partner:     Emotionally Abused:     Physically Abused:     Sexually Abused:      Current Outpatient Medications on File Prior to Visit   Medication Sig Dispense Refill    baclofen (LIORESAL) 10 MG tablet TAKE ONE-HALF TABLET BY MOUTH TWICE A DAY 90 tablet 1    losartan (COZAAR) 50 MG tablet Take 1 tablet by mouth daily 90 tablet 1    metFORMIN (GLUCOPHAGE) 500 MG tablet TAKE 1 TABLET BY MOUTH 3 TIMES A  tablet 3    clotrimazole-betamethasone (LOTRISONE) 1-0.05 % cream APPLY TOPICALLY TWO TIMES A DAY 1 Tube 3    nystatin (MYCOSTATIN) 116603 UNIT/GM powder Apply 3 times daily. 1 Bottle 2    ondansetron (ZOFRAN ODT) 4 MG disintegrating tablet Take 1 tablet by mouth every 8 hours as needed for Nausea or Vomiting 60 tablet 3    terbinafine (LAMISIL) 1 % cream Apply topically 2 times daily. 1 Tube 1    albuterol sulfate HFA (PROAIR HFA) 108 (90 Base) MCG/ACT inhaler Use every 4 hours while awake for 7-10 days then PRN wheezing  Dispense with SPACER and Instruct on use. May sub Ventolin or Proventil as needed per [employer].  1 Inhaler 0    metoprolol (LOPRESSOR) 100 MG tablet Take 1 tablet by mouth 2 times daily 180 tablet 0    rosuvastatin (CRESTOR) 10 MG tablet TAKE ONE TABLET BY MOUTH NIGHTLY 90 tablet 0    promethazine (PHENERGAN) 25 MG tablet Take 25 mg by mouth every 8 hours      erythromycin base (E-MYCIN) 250 MG tablet Take 1 tablet by mouth 3 times daily 90 tablet 3    venlafaxine (EFFEXOR XR) 75 MG extended release capsule Take 1 capsule by mouth daily TAKE TWO CAPSULES BY MOUTH EVERY MORNING AND TAKE ONE CAPSULE BY MOUTH EVERY EVENING 270 capsule 1    esomeprazole (NEXIUM) 40 MG delayed release capsule TAKE 1 CAPSULE BY MOUTH ONE TIME A DAY 30 capsule 3    insulin lispro, 1 Unit Dial, (HUMALOG KWIKPEN) 100 UNIT/ML SOPN 50-60 units at each meals 60 pen 3    Insulin Glargine, 2 Unit Dial, (TOUJEO MAX SOLOSTAR) 300 UNIT/ML SOPN 130 units at bedtime 90 pen 3    metoclopramide (REGLAN) 10 MG tablet Take 1 tablet by mouth 3 times daily (with meals) 360 tablet 3    famotidine (PEPCID) 20 MG tablet Take 1 tablet by mouth 2 times daily 30 tablet 0    predniSONE (DELTASONE) 5 MG tablet Take 5 mg by mouth daily      Continuous Blood Gluc Sensor (FREESTYLE JAIRO 14 DAY SENSOR) MISC Every 2 weeks 6 each 03    Continuous Blood Gluc  (FREESTYLE JAIRO 14 DAY READER) ALEXANDREA As directed 1 Device 00    pioglitazone (ACTOS) 30 MG tablet Take 1 tablet by mouth daily 90 tablet 3    Insulin Pen Needle (PEN NEEDLES) 32G X 4 MM MISC Use as directed with insulin pens, 4 times daily 400 each 1    Insulin Pen Needle (PEN NEEDLES) 32G X 4 MM MISC Use as directed with insulin pens, 4 times daily 400 each 1    hydroCHLOROthiazide (HYDRODIURIL) 25 MG tablet Take 1 tablet by mouth daily 90 tablet 1    blood glucose test strips (PRODIGY NO CODING BLOOD GLUC) strip 1 each by In Vitro route 4 times daily As needed. 400 each 3    blood glucose test strips (PRODIGY NO CODING BLOOD GLUC) strip 1 each by In Vitro route 4 times daily As needed. 400 each 3    solifenacin (VESICARE) 10 MG tablet TAKE 1 TABLET BY MOUTH ONE TIME A DAY 90 tablet 3    Blood Glucose Monitoring Suppl (PRODIGY AUTOCODE BLOOD GLUCOSE) w/Device KIT Use as directed to test up to 4 times daily 1 kit 0    PRODIGY LANCETS 28G MISC Use as directed to test up to 4 times daily 400 each 3    miconazole (MICOTIN) 2 % cream Apply topically 2 times daily. 141 g 3    Handicap Placard MISC Exp 5 years 1 each 0    [DISCONTINUED] sucralfate (CARAFATE) 1 GM tablet Take 1 tablet by mouth 4 times daily for 7 days 28 tablet 0     No current facility-administered medications on file prior to visit. Allergies:  Morphine and related, Ace inhibitors, Bactrim [sulfamethoxazole-trimethoprim], Nitroglycerin, Percocet [oxycodone-acetaminophen], and Victoza [liraglutide]    Review of Systems   Constitutional: Negative for activity change, appetite change and fatigue. Respiratory: Negative for apnea, cough, chest tightness and shortness of breath. Cardiovascular: Negative for chest pain, palpitations and leg swelling. Gastrointestinal: Negative for abdominal pain, blood in stool, constipation, diarrhea, nausea and vomiting. Musculoskeletal: Negative for arthralgias. Skin: Positive for wound. Neurological: Negative for seizures and headaches. Psychiatric/Behavioral: Negative for hallucinations and suicidal ideas. Objective:   /62 (Site: Right Upper Arm, Position: Sitting, Cuff Size: Large Adult)   Pulse 74   Temp 96.9 °F (36.1 °C) (Temporal)   Resp 18   Ht 5' 6\" (1.676 m)   Wt 287 lb 9.6 oz (130.5 kg)   LMP  (LMP Unknown)   SpO2 95%   BMI 46.42 kg/m²     Physical Exam  Vitals and nursing note reviewed. Constitutional:       General: She is not in acute distress. Appearance: Normal appearance. She is well-developed. She is obese. She is not diaphoretic. HENT:      Head: Normocephalic and atraumatic. Nose: Nose normal.      Mouth/Throat:      Mouth: Mucous membranes are moist.      Pharynx: Oropharynx is clear. Eyes:      Conjunctiva/sclera: Conjunctivae normal.      Pupils: Pupils are equal, round, and reactive to light. Cardiovascular:      Rate and Rhythm: Normal rate and regular rhythm. Heart sounds: Normal heart sounds. No murmur heard. No friction rub. No gallop. Pulmonary:      Effort: Pulmonary effort is normal. No respiratory distress. Breath sounds: Normal breath sounds. No wheezing or rales. Chest:      Chest wall: No tenderness. Abdominal:      General: Abdomen is flat. Bowel sounds are normal.      Palpations: Abdomen is soft. Tenderness: There is no abdominal tenderness. Musculoskeletal:      Cervical back: Normal range of motion. Skin:     General: Skin is warm and dry. Neurological:      Mental Status: She is alert and oriented to person, place, and time. Psychiatric:         Behavior: Behavior normal.         Thought Content: Thought content normal.         Judgment: Judgment normal.         Assessment & Plan:     1. Wound drainage  Shared decision making that we will await full culture results prior to antibiotics. Due to recurrence will have patient establish with wound clinic to aid in complete resolution  Advised nonadherent gauze to promote healing  - Ambulatory referral to Wound Clinic  - Culture, Aerobic and Anaerobic; Future      Return if symptoms worsen or fail to improve.     Stepan Dang MD

## 2021-07-03 RX ORDER — CEPHALEXIN 500 MG/1
500 CAPSULE ORAL 3 TIMES DAILY
Qty: 21 CAPSULE | Refills: 0 | Status: SHIPPED | OUTPATIENT
Start: 2021-07-03 | End: 2021-07-04 | Stop reason: ALTCHOICE

## 2021-07-03 ASSESSMENT — ENCOUNTER SYMPTOMS
COUGH: 0
DIARRHEA: 0
NAUSEA: 0
APNEA: 0
VOMITING: 0
SHORTNESS OF BREATH: 0
BLOOD IN STOOL: 0
CONSTIPATION: 0
CHEST TIGHTNESS: 0
ABDOMINAL PAIN: 0

## 2021-07-04 RX ORDER — CIPROFLOXACIN 250 MG/1
250 TABLET, FILM COATED ORAL 2 TIMES DAILY
Qty: 10 TABLET | Refills: 0 | Status: SHIPPED | OUTPATIENT
Start: 2021-07-04 | End: 2021-07-09

## 2021-07-06 LAB
ANAEROBIC CULTURE: ABNORMAL
GRAM STAIN RESULT: ABNORMAL
ORGANISM: ABNORMAL
ORGANISM: ABNORMAL
WOUND/ABSCESS: ABNORMAL
WOUND/ABSCESS: ABNORMAL

## 2021-07-06 RX ORDER — PREDNISONE 1 MG/1
5 TABLET ORAL DAILY
Qty: 7 TABLET | Refills: 0 | Status: SHIPPED | OUTPATIENT
Start: 2021-07-06 | End: 2021-07-15 | Stop reason: SDUPTHER

## 2021-07-06 NOTE — TELEPHONE ENCOUNTER
Last seen 3/2020, sending 1 week supply and has appt on 7/12/2021.     Requested Prescriptions     Pending Prescriptions Disp Refills    predniSONE (DELTASONE) 5 MG tablet 7 tablet 0     Sig: Take 1 tablet by mouth daily for 7 days

## 2021-07-07 PROBLEM — M54.16 LUMBAR RADICULOPATHY: Status: ACTIVE | Noted: 2021-07-07

## 2021-07-12 ENCOUNTER — TELEPHONE (OUTPATIENT)
Dept: NEUROLOGY | Age: 46
End: 2021-07-12

## 2021-07-12 NOTE — TELEPHONE ENCOUNTER
Patient was last seen 3/2020 and is rescheduled for 11/01/2021 is it ok to do her prednisone 5mg qd?     Please advise    Thanks Zafar Last mail away

## 2021-07-15 ENCOUNTER — HOSPITAL ENCOUNTER (OUTPATIENT)
Dept: WOUND CARE | Age: 46
Discharge: HOME | End: 2021-07-15
Payer: COMMERCIAL

## 2021-07-15 VITALS
DIASTOLIC BLOOD PRESSURE: 70 MMHG | RESPIRATION RATE: 18 BRPM | HEART RATE: 69 BPM | SYSTOLIC BLOOD PRESSURE: 145 MMHG | TEMPERATURE: 96.4 F

## 2021-07-15 DIAGNOSIS — L30.4 ERYTHEMA INTERTRIGO: ICD-10-CM

## 2021-07-15 DIAGNOSIS — S31.109A OPEN WOUND OF ABDOMEN, INITIAL ENCOUNTER: ICD-10-CM

## 2021-07-15 PROCEDURE — 99203 OFFICE O/P NEW LOW 30 MIN: CPT | Performed by: NURSE PRACTITIONER

## 2021-07-15 PROCEDURE — 99213 OFFICE O/P EST LOW 20 MIN: CPT

## 2021-07-15 RX ORDER — LIDOCAINE HYDROCHLORIDE 20 MG/ML
JELLY TOPICAL ONCE
Status: CANCELLED | OUTPATIENT
Start: 2021-07-15 | End: 2021-07-15

## 2021-07-15 RX ORDER — GINSENG 100 MG
CAPSULE ORAL ONCE
Status: CANCELLED | OUTPATIENT
Start: 2021-07-15 | End: 2021-07-15

## 2021-07-15 RX ORDER — GENTAMICIN SULFATE 1 MG/G
OINTMENT TOPICAL
Qty: 1 TUBE | Refills: 1 | Status: SHIPPED | OUTPATIENT
Start: 2021-07-15 | End: 2021-07-22

## 2021-07-15 RX ORDER — LIDOCAINE HYDROCHLORIDE 40 MG/ML
SOLUTION TOPICAL ONCE
Status: CANCELLED | OUTPATIENT
Start: 2021-07-15 | End: 2021-07-15

## 2021-07-15 RX ORDER — LIDOCAINE 40 MG/G
CREAM TOPICAL ONCE
Status: CANCELLED | OUTPATIENT
Start: 2021-07-15 | End: 2021-07-15

## 2021-07-15 RX ORDER — BACITRACIN ZINC AND POLYMYXIN B SULFATE 500; 1000 [USP'U]/G; [USP'U]/G
OINTMENT TOPICAL ONCE
Status: CANCELLED | OUTPATIENT
Start: 2021-07-15 | End: 2021-07-15

## 2021-07-15 RX ORDER — GENTAMICIN SULFATE 1 MG/G
OINTMENT TOPICAL ONCE
Status: CANCELLED | OUTPATIENT
Start: 2021-07-15 | End: 2021-07-15

## 2021-07-15 RX ORDER — CLOBETASOL PROPIONATE 0.5 MG/G
OINTMENT TOPICAL ONCE
Status: CANCELLED | OUTPATIENT
Start: 2021-07-15 | End: 2021-07-15

## 2021-07-15 RX ORDER — LIDOCAINE 50 MG/G
OINTMENT TOPICAL ONCE
Status: CANCELLED | OUTPATIENT
Start: 2021-07-15 | End: 2021-07-15

## 2021-07-15 RX ORDER — BETAMETHASONE DIPROPIONATE 0.05 %
OINTMENT (GRAM) TOPICAL ONCE
Status: CANCELLED | OUTPATIENT
Start: 2021-07-15 | End: 2021-07-15

## 2021-07-15 RX ORDER — BACITRACIN, NEOMYCIN, POLYMYXIN B 400; 3.5; 5 [USP'U]/G; MG/G; [USP'U]/G
OINTMENT TOPICAL ONCE
Status: CANCELLED | OUTPATIENT
Start: 2021-07-15 | End: 2021-07-15

## 2021-07-15 RX ORDER — PREDNISONE 1 MG/1
5 TABLET ORAL DAILY
Qty: 90 TABLET | Refills: 0 | Status: SHIPPED | OUTPATIENT
Start: 2021-07-15 | End: 2021-10-13

## 2021-07-15 NOTE — PROGRESS NOTES
[address]   Progress Note and Procedure Note      Shelton Emmanuel  MEDICAL RECORD NUMBER:  [id number]  AGE: 40 y.o. GENDER: female  : 1975  EPISODE DATE:  7/15/2021    Subjective:     Chief Complaint   Patient presents with    Wound Check     abdomen         HISTORY of PRESENT ILLNESS HPI     Shelton Emmanuel is a 40 y.o. female who presents today for wound/ulcer evaluation. History of Wound Context: Patient recently seen by urgent care for non-healing intertriginous wounds of left lower abdomen. Did not heal so went to PCP, Dr. Aury Sheehan who performed C & S and began antibiotics. Wound grew out multi organisms of enterobacter, enterococcus, Group B strep and bacteroides. Dr. Aury Sheehan started patient on Keflex and Flagyl, with the above cultured bacteria sensitive to the antibiotics, with the exception of the enterobacter, not sensitive to either. Today in the [address], left intertriginous area of abdomen is erythematous, appears bruised from itching, no satellite lesions noted, two open partial thickness wounds remain.    Wound/Ulcer Pain Timing/Severity: intermittent  Quality of pain: tender  Severity:  2 / 10   Modifying Factors: Pain worsens with care and subsides following completion of   Associated Signs/Symptoms: erythema, drainage and pain    Ulcer Identification:  Ulcer Type: undetermined  Contributing Factors: chronic pressure, shear force and obesity    Wound: intertrigo of pannus         PAST MEDICAL HISTORY        Diagnosis Date    Acute midline thoracic back pain 2018    B12 deficiency     Family history of premature CAD 2013    Fatty liver     Gastroesophageal reflux disease 2021    HPV (human papilloma virus) anogenital infection     Hyperlipidemia     Hypertension     Irritable bowel syndrome with diarrhea 2021    Liver hemangioma     Lumbar radiculopathy 2021    Obesity 3/11/13    BMI 43.42    Orthostatic hypotension     Ovarian cyst  PMR [facility]ym[facility]algia rheumatica) ([facility])     Rectal fissure     Tobacco abuse 9/3/2015    Tobacco abuse     Tremor     Uncontrolled diabetes mellitus with complications [facility]     Unspecified sleep apnea        Problem List:    Patient Active Problem List   Diagnosis    Orthostatic hypotension    Cobalamin deficiency    HPV (human papilloma virus) anogenital infection    Cyst of ovary    Obesity due to excess calories, unspecified obesity severity    RAE (obstructive sleep apnea)    Dyslipidemia    FH: premature coronary heart disease    Adult body mass index 40 and over    Pulmonary hypertension ([facility]    Herpes simplex type 1 infection    Tobacco user    Type 2 diabetes mellitus with complication, with long-term current use of insulin (HCC)    Pruritus of vagina    Vaginal irritation    Paraparesis ([facility])    Compression fracture of lumbar vertebra (HCC)    Acute thoracic back pain    Muscle weakness    Muscle pain    Fracture of first lumbar vertebra ([facility])    PMR (polymyalgia rheumatica) ([facility])    Disorder of muscle, unspecified    Diarrhea    Nausea and vomiting    Gastroesophageal reflux disease    Gastritis without bleeding    Polyp of colon    Gastroparesis    Irritable bowel syndrome with diarrhea    Wound drainage    Lumbar radiculopathy    Erythema intertrigo      Problem List Items Addressed This Visit     Erythema intertrigo           PAST SURGICAL HISTORY    Past Surgical History:   Procedure Laterality Date    CARDIAC CATHETERIZATION  4-07    COLONOSCOPY  2013    for diarrhea (-)    COLONOSCOPY N/A 2/10/2021    COLONOSCOPY DIAGNOSTIC performed by Rosemarie Best MD at [facility] 96  12-10    [facility]  1-05    OR MUSCLE BIOPSY Left 9/21/2018    LEFT QUADRICEPS MUSCLE BIOPSY performed by Birgit Moreno MD at [facility] UNI/BI CANNULATION N/A 9/18/2018    T 12 VERTEBRALPLASTY ROOM 278 performed by Roberth Tavarez MD at [facility]    TONSILLECTOMY AND ADENOIDECTOMY  1981    UPPER GASTROINTESTINAL ENDOSCOPY  10/13/15     DR. HERRERA     UPPER GASTROINTESTINAL ENDOSCOPY N/A 2/10/2021    EGD ESOPHAGOGASTRODUODENOSCOPY performed by Maximino Valdes MD at [facility]         FAMILY HISTORY    Family History   Problem Relation Age of Onset    Diabetes Father     Heart Disease Mother     Colon Cancer Neg Hx     Cancer Neg Hx        SOCIAL HISTORY    Social History     Tobacco Use    Smoking status: Former Smoker     Packs/day: 1.00     Types: Cigarettes     Quit date: 2017     Years since quittin.5    Smokeless tobacco: Never Used   Vaping Use    Vaping Use: Never used   Substance Use Topics    Alcohol use: Yes     Alcohol/week: 0.0 standard drinks     Comment: socially    Drug use: No       ALLERGIES    Allergies   Allergen Reactions    Morphine And Related Hives and Rash    Ace Inhibitors Other (See Comments)     Dizziness and near syncope    Bactrim [Sulfamethoxazole-Trimethoprim] Itching    Nitroglycerin Other (See Comments)     Migraine    Percocet [Oxycodone-Acetaminophen] Nausea And Vomiting    Victoza [Liraglutide]      NAUSEA       MEDICATIONS    Current Outpatient Medications on File Prior to Encounter   Medication Sig Dispense Refill    baclofen (LIORESAL) 10 MG tablet TAKE ONE-HALF TABLET BY MOUTH TWICE A DAY 90 tablet 1    losartan (COZAAR) 50 MG tablet Take 1 tablet by mouth daily 90 tablet 1    metFORMIN (GLUCOPHAGE) 500 MG tablet TAKE 1 TABLET BY MOUTH 3 TIMES A  tablet 3    clotrimazole-betamethasone (LOTRISONE) 1-0.05 % cream APPLY TOPICALLY TWO TIMES A DAY 1 Tube 3    nystatin (MYCOSTATIN) 827806 UNIT/GM powder Apply 3 times daily. 1 Bottle 2    ondansetron (ZOFRAN ODT) 4 MG disintegrating tablet Take 1 tablet by mouth every 8 hours as needed for Nausea or Vomiting 60 tablet 3    terbinafine (LAMISIL) 1 % cream Apply topically 2 times daily.  1 Tube 1  metoprolol (LOPRESSOR) 100 MG tablet Take 1 tablet by mouth 2 times daily 180 tablet 0    rosuvastatin (CRESTOR) 10 MG tablet TAKE ONE TABLET BY MOUTH NIGHTLY 90 tablet 0    promethazine (PHENERGAN) 25 MG tablet Take 25 mg by mouth every 8 hours      erythromycin base (E-MYCIN) 250 MG tablet Take 1 tablet by mouth 3 times daily 90 tablet 3    venlafaxine (EFFEXOR XR) 75 MG extended release capsule Take 1 capsule by mouth daily TAKE TWO CAPSULES BY MOUTH EVERY MORNING AND TAKE ONE CAPSULE BY MOUTH EVERY EVENING 270 capsule 1    esomeprazole (NEXIUM) 40 MG delayed release capsule TAKE 1 CAPSULE BY MOUTH ONE TIME A DAY 30 capsule 3    insulin lispro, 1 Unit Dial, (HUMALOG KWIKPEN) 100 UNIT/ML SOPN 50-60 units at each meals 60 pen 3    Insulin Glargine, 2 Unit Dial, (TOUJEO MAX SOLOSTAR) 300 UNIT/ML SOPN 130 units at bedtime 90 pen 3    metoclopramide (REGLAN) 10 MG tablet Take 1 tablet by mouth 3 times daily (with meals) 360 tablet 3    predniSONE (DELTASONE) 5 MG tablet Take 5 mg by mouth daily      Continuous Blood Gluc Sensor (FREESTYLE JAIRO 14 DAY SENSOR) MISC Every 2 weeks 6 each 03    Continuous Blood Gluc  (FREESTYLE JIARO 14 DAY READER) ALEXANDREA As directed 1 Device 00    pioglitazone (ACTOS) 30 MG tablet Take 1 tablet by mouth daily 90 tablet 3    Insulin Pen Needle (PEN NEEDLES) 32G X 4 MM MISC Use as directed with insulin pens, 4 times daily 400 each 1    Insulin Pen Needle (PEN NEEDLES) 32G X 4 MM MISC Use as directed with insulin pens, 4 times daily 400 each 1    hydroCHLOROthiazide (HYDRODIURIL) 25 MG tablet Take 1 tablet by mouth daily 90 tablet 1    blood glucose test strips (PRODIGY NO CODING BLOOD GLUC) strip 1 each by In Vitro route 4 times daily As needed. 400 each 3    blood glucose test strips (PRODIGY NO CODING BLOOD GLUC) strip 1 each by In Vitro route 4 times daily As needed.  400 each 3    solifenacin (VESICARE) 10 MG tablet TAKE 1 TABLET BY MOUTH ONE TIME A DAY 90 tablet 3    Blood Glucose Monitoring Suppl (PRODIGY AUTOCODE BLOOD GLUCOSE) w/Device KIT Use as directed to test up to 4 times daily 1 kit 0    PRODIGY LANCETS 28G MISC Use as directed to test up to 4 times daily 400 each 3    miconazole (MICOTIN) 2 % cream Apply topically 2 times daily. 141 g 3    Handicap Placard MISC Exp 5 years 1 each 0    albuterol sulfate HFA (PROAIR HFA) 108 (90 Base) MCG/ACT inhaler Use every 4 hours while awake for 7-10 days then PRN wheezing  Dispense with SPACER and Instruct on use. May sub Ventolin or Proventil as needed per [employer]. 1 Inhaler 0    famotidine (PEPCID) 20 MG tablet Take 1 tablet by mouth 2 times daily 30 tablet 0    [DISCONTINUED] sucralfate (CARAFATE) 1 GM tablet Take 1 tablet by mouth 4 times daily for 7 days 28 tablet 0     No current facility-administered medications on file prior to encounter. REVIEW OF SYSTEMS    Pertinent items are noted in HPI. Objective:      BP (!) 145/70   Pulse 69   Temp 96.4 °F (35.8 °C)   Resp 18   LMP  (LMP Unknown)     Wt Readings from Last 3 Encounters:   07/01/21 287 lb 9.6 oz (130.5 kg)   06/10/21 290 lb (131.5 kg)   05/25/21 290 lb 3.2 oz (131.6 kg)       PHYSICAL EXAM    Constitutional:   Well nourished and well developed. Appears neat and clean. Patient is alert, oriented x3, and in no apparent distress. Respiratory:  Respiratory effort is easy and symmetric bilaterally. Rate is normal at rest and on room air. Vascular:   Extremities negative for pitting edema. Neurological:  Gross and Light touch intact. Dermatological:  Wound description noted in wound assessment. Two open areas of skin loss of left intertriginous area of abdominal pannus. Surrounding ecchymosis, erythema noted, no satellite lesions. Open areas of wounds x 2 are pale pink, non granular. Psychiatric:  Judgement and insight intact. Short and long term memory intact. No evidence of depression, anxiety, or agitation. Patient is calm, cooperative, and communicative. Appropriate interactions and affect. Assessment:      Problem List Items Addressed This Visit     Erythema intertrigo           Procedure Note  Indications:  Based on my examination of this patient's wound(s)/ulcer(s) today, debridement is not required to promote healing and evaluate the wound base. Wound 07/15/21 Abdomen Left #1 lateral (Active)   Wound Image   07/15/21 1048   Wound Etiology Other 07/15/21 1048   Wound Cleansed Cleansed with saline 07/15/21 1048   Wound Length (cm) 0.5 cm 07/15/21 1048   Wound Width (cm) 1 cm 07/15/21 1048   Wound Depth (cm) 0.1 cm 07/15/21 1048   Wound Surface Area (cm^2) 0.5 cm^2 07/15/21 1048   Wound Volume (cm^3) 0.05 cm^3 07/15/21 1048   Wound Assessment Pink/red;Slough 07/15/21 1048   Drainage Amount Scant 07/15/21 1048   Drainage Description Serosanguinous 07/15/21 1048   Wound Thickness Description not for Pressure Injury Full thickness 07/15/21 1048   Number of days: 0       Wound 07/15/21 Abdomen Left;Medial #2 (Active)   Wound Image   07/15/21 1048   Wound Etiology Other 07/15/21 1048   Wound Cleansed Cleansed with saline 07/15/21 1048   Wound Length (cm) 0.5 cm 07/15/21 1048   Wound Width (cm) 0.7 cm 07/15/21 1048   Wound Depth (cm) 0.1 cm 07/15/21 1048   Wound Surface Area (cm^2) 0.35 cm^2 07/15/21 1048   Wound Volume (cm^3) 0.035 cm^3 07/15/21 1048   Wound Assessment Pink/red 07/15/21 1048   Drainage Amount Scant 07/15/21 1048   Drainage Description Serosanguinous 07/15/21 1048   Wound Thickness Description not for Pressure Injury Full thickness 07/15/21 1048   Number of days: 0         Plan:     Cleanse area twice daily with antibacterial soap and rinse well. Apply small amount of gentamicin to open areas of wounds, then inset with dry 4 x 4 gauze, or interdry fabric. Keep area dry and protect from friction from clothing or skin. Recheck in 2 weeks.        Treatment Note please see attached Discharge Instructions    Written patient dismissal instructions given to patient and signed by patient or POA. Discharge [address] and [facility]   Physician Orders and Discharge [address]  Telephone: [phone]      NAME:  Rohan Castañeda  YOB: 1975  MEDICAL RECORD NUMBER:  [id number]    Home Care/Facility:    Wound Location:  Left Abdomen    Dressing orders:  Clean with antibacterial soap and warm water; rinse and dry; Thin layer of Gentamicin; interdry or dry gauze; Change daily. May use bacitracin today    Compression:    Offloading Device:    Other Instructions:  Gentamicin ointment at pharmacy    Keep all dressings clean, dry and intact. Keep pressure off the wound(s) at all times. Follow up visit   2 Weeks  July 29, 2021 @    Please give 24 hour notice if unable to keep appointment. [phone]    If you experience any of the following, please call the Wound Care Service at  [phone] or go to the nearest emergency room. *Increase in pain *Temperature over 101 *Increase in drainage from your wound or a foul odor  *Uncontrolled swelling *Need for compression bandage changes due to slippage, breakthrough drainage       PLEASE NOTE: IF YOU ARE UNABLE TO OBTAIN WOUND SUPPLIES, CONTINUE TO USE THE SUPPLIES YOU HAVE AVAILABLE UNTIL YOU ARE ABLE TO REACH US.  IT IS MOST IMPORTANT TO KEEP THE WOUND COVERED AT ALL TIMES                    Electronically signed by SANIA Cruz NP on 7/15/2021 at 11:44 AM

## 2021-07-15 NOTE — PLAN OF CARE
[address]:     [employer] [id number] [facility], [address] [place], [place] [address]e [facility] [place] p: [phone] f: [phone]     Ordering Center:     [address] [place] [id number]  [phone]  WOUND CARE [phone]  [facility] NUMBER [phone]    Patient Information:      Minnie Cheekmarilee  [address] [place] [address] [address] [id number]   [phone]   : 1975  AGE: 40 y.o.      GENDER: female   TODAYS DATE:  7/15/2021    Insurance:      PRIMARY INSURANCE:  Plan: [employer]  EMP  Coverage: [employer]  Effective Date: 2017  [id number] - (Commercial)    SECONDARY INSURANCE:  Plan:   Coverage:   Effective Date:   [unfilled]    [unfilled]   [unfilled]     Patient Wound Information:      Problem List Items Addressed This Visit     Erythema intertrigo    Open wound of abdomen          WOUNDS REQUIRING DRESSING SUPPLIES:     Wound 07/15/21 Abdomen Left #1 lateral (Active)   Wound Image   07/15/21 1048   Wound Etiology Other 07/15/21 1048   Wound Cleansed Cleansed with saline 07/15/21 1048   Wound Length (cm) 0.5 cm 07/15/21 1048   Wound Width (cm) 1 cm 07/15/21 1048   Wound Depth (cm) 0.1 cm 07/15/21 1048   Wound Surface Area (cm^2) 0.5 cm^2 07/15/21 1048   Wound Volume (cm^3) 0.05 cm^3 07/15/21 1048   Wound Assessment Pink/red;Slough 07/15/21 1048   Drainage Amount Scant 07/15/21 1048   Drainage Description Serosanguinous 07/15/21 1048   Wound Thickness Description not for Pressure Injury Full thickness 07/15/21 1048   Number of days: 0       Wound 07/15/21 Abdomen Left;Medial #2 (Active)   Wound Image   07/15/21 1048   Wound Etiology Other 07/15/21 1048   Wound Cleansed Cleansed with saline 07/15/21 1048   Wound Length (cm) 0.5 cm 07/15/21 1048   Wound Width (cm) 0.7 cm 07/15/21 1048   Wound Depth (cm) 0.1 cm 07/15/21 1048   Wound Surface Area (cm^2) 0.35 cm^2 07/15/21 1048   Wound Volume (cm^3) 0.035 cm^3 07/15/21 1048   Wound Assessment Pink/red 07/15/21 1048   Drainage Amount Scant 07/15/21 1048   Drainage Description Serosanguinous 07/15/21 1048   Wound Thickness Description not for Pressure Injury Full thickness 07/15/21 1048   Number of days: 0          Supplies Requested :      WOUND #: 1 and 2   PRIMARY DRESSING:  Other: interdry   Cover and Secure with: None     FREQUENCY OF DRESSING CHANGES:  Daily         ADDITIONAL ITEMS:  [] Gloves Small  [] Gloves Medium [] Gloves Large [] Gloves Gina Watson  [] Tape 1\" [] Tape 2\" [] Tape 3\"  [] Medipore Tape  [] Saline  [] Skin Prep   [] Adhesive Remover   [] Cotton Tip Applicators   [] Other:    Patient Wound(s) Debrided: [x] Yes   [] No    Debribement Type: epidermis    Debridement Date: 7/15/2021    Patient currently being seen by Home Health: [] Yes   [x] No    Duration for needed supplies:  []15  [x]30  []60  []90 Days    Provider Information:      PROVIDER'S NAME: LOBO Leigh   [id number]

## 2021-07-22 ENCOUNTER — E-VISIT (OUTPATIENT)
Dept: FAMILY MEDICINE CLINIC | Age: 46
End: 2021-07-22
Payer: COMMERCIAL

## 2021-07-22 DIAGNOSIS — R39.9 UTI SYMPTOMS: Primary | ICD-10-CM

## 2021-07-22 PROCEDURE — 98970 NQHP OL DIG ASSMT&MGMT 5-10: CPT | Performed by: NURSE PRACTITIONER

## 2021-07-22 RX ORDER — NITROFURANTOIN 25; 75 MG/1; MG/1
100 CAPSULE ORAL 2 TIMES DAILY
Qty: 10 CAPSULE | Refills: 0 | Status: SHIPPED | OUTPATIENT
Start: 2021-07-22 | End: 2021-07-27

## 2021-07-24 DIAGNOSIS — E11.8 TYPE 2 DIABETES MELLITUS WITH COMPLICATION, WITH LONG-TERM CURRENT USE OF INSULIN (HCC): ICD-10-CM

## 2021-07-24 DIAGNOSIS — K21.9 GASTROESOPHAGEAL REFLUX DISEASE, UNSPECIFIED WHETHER ESOPHAGITIS PRESENT: ICD-10-CM

## 2021-07-24 DIAGNOSIS — Z79.4 TYPE 2 DIABETES MELLITUS WITH COMPLICATION, WITH LONG-TERM CURRENT USE OF INSULIN (HCC): ICD-10-CM

## 2021-07-26 RX ORDER — ESOMEPRAZOLE MAGNESIUM 40 MG/1
CAPSULE, DELAYED RELEASE ORAL
Qty: 30 CAPSULE | Refills: 3 | Status: SHIPPED | OUTPATIENT
Start: 2021-07-26 | End: 2021-08-16

## 2021-07-27 DIAGNOSIS — E78.5 DYSLIPIDEMIA: ICD-10-CM

## 2021-07-27 NOTE — TELEPHONE ENCOUNTER
Requesting medication refill.  Please approve or deny this request.    Rx requested:  Requested Prescriptions     Pending Prescriptions Disp Refills    rosuvastatin (CRESTOR) 10 MG tablet 90 tablet 0     Sig: TAKE ONE TABLET BY MOUTH NIGHTLY       Last Office Visit:   7/1/2021    Last Filled:      Last Labs:      Next Visit Date:  Future Appointments   Date Time Provider Iliana Garcia   7/29/2021  9:15 AM SANIA Moore - NP [facility] [address]   8/4/2021  2:00 PM Gurinder Covarrubias MD  [address]   8/9/2021  9:15 AM Valorie Campbell MD [address] [place] [address]   11/1/2021  3:15 PM Astrid Dumont MD [facility]

## 2021-07-28 DIAGNOSIS — E78.5 DYSLIPIDEMIA: ICD-10-CM

## 2021-07-28 RX ORDER — ROSUVASTATIN CALCIUM 10 MG/1
TABLET, COATED ORAL
Qty: 90 TABLET | Refills: 0 | Status: SHIPPED | OUTPATIENT
Start: 2021-07-28 | End: 2021-11-02 | Stop reason: SDUPTHER

## 2021-07-28 NOTE — TELEPHONE ENCOUNTER
Requesting medication refill.  Please approve or deny this request.    Rx requested:  Requested Prescriptions     Pending Prescriptions Disp Refills    rosuvastatin (CRESTOR) 10 MG tablet 90 tablet 0     Sig: TAKE ONE TABLET BY MOUTH NIGHTLY       Last Office Visit:   7/1/2021    Last Filled:      Last Labs:      Next Visit Date:  Future Appointments   Date Time Provider Iliana Garcia   7/29/2021  9:15 AM SANIA Quinones - ELI[facility] [address]   8/4/2021  2:00 PM Carlos Mello MD  [address]   8/9/2021  9:15 AM Felecia Hubbard MD [address] [place] [address]   11/1/2021  3:15 PM Jessica Kim MD [facility]

## 2021-07-29 ENCOUNTER — HOSPITAL ENCOUNTER (OUTPATIENT)
Dept: WOUND CARE | Age: 46
Discharge: HOME | End: 2021-07-29
Payer: COMMERCIAL

## 2021-07-29 ENCOUNTER — TELEPHONE (OUTPATIENT)
Dept: PHARMACY | Facility: CLINIC | Age: 46
End: 2021-07-29

## 2021-07-29 VITALS
SYSTOLIC BLOOD PRESSURE: 143 MMHG | RESPIRATION RATE: 18 BRPM | DIASTOLIC BLOOD PRESSURE: 82 MMHG | TEMPERATURE: 96.9 F | HEART RATE: 64 BPM

## 2021-07-29 DIAGNOSIS — S31.109A OPEN WOUND OF ABDOMEN, INITIAL ENCOUNTER: ICD-10-CM

## 2021-07-29 DIAGNOSIS — L30.4 ERYTHEMA INTERTRIGO: Primary | ICD-10-CM

## 2021-07-29 PROCEDURE — 99212 OFFICE O/P EST SF 10 MIN: CPT

## 2021-07-29 PROCEDURE — 99212 OFFICE O/P EST SF 10 MIN: CPT | Performed by: NURSE PRACTITIONER

## 2021-07-29 RX ORDER — LIDOCAINE HYDROCHLORIDE 20 MG/ML
JELLY TOPICAL ONCE
Status: CANCELLED | OUTPATIENT
Start: 2021-07-29 | End: 2021-07-29

## 2021-07-29 RX ORDER — LIDOCAINE HYDROCHLORIDE 40 MG/ML
SOLUTION TOPICAL ONCE
Status: CANCELLED | OUTPATIENT
Start: 2021-07-29 | End: 2021-07-29

## 2021-07-29 RX ORDER — LIDOCAINE 50 MG/G
OINTMENT TOPICAL ONCE
Status: CANCELLED | OUTPATIENT
Start: 2021-07-29 | End: 2021-07-29

## 2021-07-29 RX ORDER — LIDOCAINE 40 MG/G
CREAM TOPICAL ONCE
Status: CANCELLED | OUTPATIENT
Start: 2021-07-29 | End: 2021-07-29

## 2021-07-29 RX ORDER — GENTAMICIN SULFATE 1 MG/G
OINTMENT TOPICAL ONCE
Status: CANCELLED | OUTPATIENT
Start: 2021-07-29 | End: 2021-07-29

## 2021-07-29 RX ORDER — GINSENG 100 MG
CAPSULE ORAL ONCE
Status: CANCELLED | OUTPATIENT
Start: 2021-07-29 | End: 2021-07-29

## 2021-07-29 RX ORDER — BETAMETHASONE DIPROPIONATE 0.05 %
OINTMENT (GRAM) TOPICAL ONCE
Status: CANCELLED | OUTPATIENT
Start: 2021-07-29 | End: 2021-07-29

## 2021-07-29 RX ORDER — BACITRACIN ZINC AND POLYMYXIN B SULFATE 500; 1000 [USP'U]/G; [USP'U]/G
OINTMENT TOPICAL ONCE
Status: CANCELLED | OUTPATIENT
Start: 2021-07-29 | End: 2021-07-29

## 2021-07-29 RX ORDER — CLOBETASOL PROPIONATE 0.5 MG/G
OINTMENT TOPICAL ONCE
Status: CANCELLED | OUTPATIENT
Start: 2021-07-29 | End: 2021-07-29

## 2021-07-29 RX ORDER — BACITRACIN, NEOMYCIN, POLYMYXIN B 400; 3.5; 5 [USP'U]/G; MG/G; [USP'U]/G
OINTMENT TOPICAL ONCE
Status: CANCELLED | OUTPATIENT
Start: 2021-07-29 | End: 2021-07-29

## 2021-07-29 NOTE — PROGRESS NOTES
[facility] [address]   Progress Note and Procedure Note      Miri Agarwal  MEDICAL RECORD NUMBER:  [id number]  AGE: 40 y.o. GENDER: female  : 1975  EPISODE DATE:  2021    Subjective:     Chief Complaint   Patient presents with    Wound Check     abdominal wounds healed         HISTORY of PRESENT ILLNESS HPI      Miri Agarwal is a 40 y.o. female who presents today for wound/ulcer evaluation. History of Wound Context: Patient recently seen by urgent care for non-healing intertriginous wounds of left lower abdomen. Did not heal so went to PCP, Dr. Roge Ivey who performed C & S and began antibiotics. Wound grew out multi organisms of enterobacter, enterococcus, Group B strep and bacteroides. Dr. Roge Ivey started patient on Keflex and Flagyl, with the above cultured bacteria sensitive to the antibiotics, with the exception of the enterobacter, not sensitive to either. Two weeks ago in the [facility], left intertriginous area of abdomen is erythematous, appeared bruised from itching, no satellite lesions noted, two open partial thickness wounds remain. Added gentamicin topically and interdry.    Wound/Ulcer Pain Timing/Severity: intermittent  Quality of pain: tender  Severity:  2 / 10   Modifying Factors: Pain worsens with care and subsides following completion of   Associated Signs/Symptoms: erythema, drainage and pain     Ulcer Identification:  Ulcer Type: undetermined  Contributing Factors: chronic pressure, shear force and obesity     Wound: intertrigo of pannus           PAST MEDICAL HISTORY        Diagnosis Date    Acute midline thoracic back pain 2018    B12 deficiency     Family history of premature CAD 2013    Fatty liver     Gastroesophageal reflux disease 2021    HPV (human papilloma virus) anogenital infection     Hyperlipidemia     Hypertension     Irritable bowel syndrome with diarrhea 2021    Liver hemangioma     Lumbar radiculopathy 2021    Obesity 3/11/13    BMI 43.42    Orthostatic hypotension     Ovarian cyst     PMR [facility]ym[facility]algia rheumatica) ([facility]     Rectal fissure     Tobacco abuse 9/3/2015    Tobacco abuse     Tremor     Uncontrolled diabetes mellitus with complications ([facility]     Unspecified sleep apnea        Problem List:    Patient Active Problem List   Diagnosis    Orthostatic hypotension    Cobalamin deficiency    HPV (human papilloma virus) anogenital infection    Cyst of ovary    Obesity due to excess calories, unspecified obesity severity    RAE (obstructive sleep apnea)    Dyslipidemia    FH: premature coronary heart disease    Adult body mass index 40 and over    Pulmonary hypertension ([facility]    Herpes simplex type 1 infection    Tobacco user    Type 2 diabetes mellitus with complication, with long-term current use of insulin (HCC)    Pruritus of vagina    Vaginal irritation    Paraparesis ([facility]    Compression fracture of lumbar vertebra (HCC)    Acute thoracic back pain    Muscle weakness    Muscle pain    Fracture of first lumbar vertebra (HCC)    PMR (polymyalgia rheumatica) ([facility])    Disorder of muscle, unspecified    Diarrhea    Nausea and vomiting    Gastroesophageal reflux disease    Gastritis without bleeding    Polyp of colon    Gastroparesis    Irritable bowel syndrome with diarrhea    Wound drainage    Lumbar radiculopathy    Erythema intertrigo    Open wound of abdomen      Problem List Items Addressed This Visit     Erythema intertrigo - Primary    Relevant Orders    Initiate Outpatient Wound Care Protocol    Open wound of abdomen    Relevant Orders    Initiate Outpatient Wound Care Protocol           PAST SURGICAL HISTORY    Past Surgical History:   Procedure Laterality Date    CARDIAC CATHETERIZATION  4-07    COLONOSCOPY  2013    for diarrhea (-)    COLONOSCOPY N/A 2/10/2021    COLONOSCOPY DIAGNOSTIC performed by Jarad Beavers MD at [place] [address]  12-10 tablet TAKE ONE-HALF TABLET BY MOUTH TWICE A DAY 90 tablet 1    losartan (COZAAR) 50 MG tablet Take 1 tablet by mouth daily 90 tablet 1    clotrimazole-betamethasone (LOTRISONE) 1-0.05 % cream APPLY TOPICALLY TWO TIMES A DAY 1 Tube 3    nystatin (MYCOSTATIN) 269083 UNIT/GM powder Apply 3 times daily. 1 Bottle 2    ondansetron (ZOFRAN ODT) 4 MG disintegrating tablet Take 1 tablet by mouth every 8 hours as needed for Nausea or Vomiting 60 tablet 3    terbinafine (LAMISIL) 1 % cream Apply topically 2 times daily. 1 Tube 1    albuterol sulfate HFA (PROAIR HFA) 108 (90 Base) MCG/ACT inhaler Use every 4 hours while awake for 7-10 days then PRN wheezing  Dispense with SPACER and Instruct on use. May sub Ventolin or Proventil as needed per [employer].  1 Inhaler 0    metoprolol (LOPRESSOR) 100 MG tablet Take 1 tablet by mouth 2 times daily 180 tablet 0    promethazine (PHENERGAN) 25 MG tablet Take 25 mg by mouth every 8 hours      erythromycin base (E-MYCIN) 250 MG tablet Take 1 tablet by mouth 3 times daily 90 tablet 3    venlafaxine (EFFEXOR XR) 75 MG extended release capsule Take 1 capsule by mouth daily TAKE TWO CAPSULES BY MOUTH EVERY MORNING AND TAKE ONE CAPSULE BY MOUTH EVERY EVENING 270 capsule 1    insulin lispro, 1 Unit Dial, (HUMALOG KWIKPEN) 100 UNIT/ML SOPN 50-60 units at each meals 60 pen 3    Insulin Glargine, 2 Unit Dial, (TOUJEO MAX SOLOSTAR) 300 UNIT/ML SOPN 130 units at bedtime 90 pen 3    metoclopramide (REGLAN) 10 MG tablet Take 1 tablet by mouth 3 times daily (with meals) 360 tablet 3    famotidine (PEPCID) 20 MG tablet Take 1 tablet by mouth 2 times daily 30 tablet 0    predniSONE (DELTASONE) 5 MG tablet Take 5 mg by mouth daily      Continuous Blood Gluc Sensor (FREESTYLE JAIRO 14 DAY SENSOR) MISC Every 2 weeks 6 each 03    Continuous Blood Gluc  (FREESTYLE JAIRO 14 DAY READER) ALEXANDREA As directed 1 Device 00    pioglitazone (ACTOS) 30 MG tablet Take 1 tablet by mouth daily 90 wound assessment. On presentation originally, two open areas of skin loss of left intertriginous area of abdominal pannus. Surrounding ecchymosis, erythema noted, no satellite lesions. Open areas of wounds x 2 are now healed. Ecchymosis remains to area however, both wounds have fully epithelialized.       Psychiatric:  Judgement and insight intact. Short and long term memory intact. No evidence of depression, anxiety, or agitation. Patient is calm, cooperative, and communicative. Appropriate interactions and affect.       Assessment:      Problem List Items Addressed This Visit     Erythema intertrigo - Primary    Relevant Orders    Initiate Outpatient Wound Care Protocol    Open wound of abdomen    Relevant Orders    Initiate Outpatient Wound Care Protocol           Procedure Note  Indications:  Based on my examination of this patient's wound(s)/ulcer(s) today, debridement is not required to promote healing and evaluate the wound base. Wound 07/15/21 Abdomen Left #1 lateral (Active)   Wound Image   07/29/21 0918   Wound Etiology Other 07/29/21 0918   Wound Cleansed Cleansed with saline 07/15/21 1048   Dressing/Treatment Open to air 07/29/21 0934   Wound Length (cm) 0 cm 07/29/21 0918   Wound Width (cm) 0 cm 07/29/21 0918   Wound Depth (cm) 0 cm 07/29/21 0918   Wound Surface Area (cm^2) 0 cm^2 07/29/21 0918   Change in Wound Size % (l*w) 100 07/29/21 0918   Wound Volume (cm^3) 0 cm^3 07/29/21 0918   Wound Healing % 100 07/29/21 0918   Wound Assessment Epithelialization 07/29/21 0918   Drainage Amount None 07/29/21 0918   Drainage Description Serosanguinous 07/15/21 1048   Odor None 07/29/21 0918   Aimee-wound Assessment Fragile; Intact 07/29/21 0918   Wound Thickness Description not for Pressure Injury Full thickness 07/15/21 1048   Number of days: 14       Wound 07/15/21 Abdomen Left;Medial #2 (Active)   Wound Image   07/29/21 0918   Wound Etiology Other 07/29/21 0918   Wound Cleansed Cleansed with saline 07/15/21 1048   Dressing/Treatment Open to air 07/29/21 0934   Wound Length (cm) 0 cm 07/29/21 0918   Wound Width (cm) 0 cm 07/29/21 0918   Wound Depth (cm) 0 cm 07/29/21 0918   Wound Surface Area (cm^2) 0 cm^2 07/29/21 0918   Change in Wound Size % (l*w) 100 07/29/21 0918   Wound Volume (cm^3) 0 cm^3 07/29/21 0918   Wound Healing % 100 07/29/21 0918   Wound Assessment Epithelialization 07/29/21 0918   Drainage Amount None 07/29/21 0918   Drainage Description Serosanguinous 07/15/21 1048   Aimee-wound Assessment Intact;Fragile 07/29/21 0918   Wound Thickness Description not for Pressure Injury Full thickness 07/15/21 1048   Number of days: 14         Plan:     Continue to pad/protect area until more fully healed. If re-opens call for appt, may use gentamicin until seen if reopens. Otherwise, discharged from care. Treatment Note please see attached Discharge Instructions    Written patient dismissal instructions given to patient and signed by patient or POA. Discharge Instructions         Discharge Instructions        Wound Clinic Physician Orders and Discharge [address] [facility]  [address] [facility], [address] [place] [address]  Telephone: [phone]    NAME:  Nayla Shankar OF BIRTH:  1975  MEDICAL RECORD NUMBER:  [id number]  DATE:  7/29/2021    Congratulations!! You have completed your treatment. 1. Return to your Primary Care Physician for all your health issues. 2. Resume your ordinary activities as tolerated. 3. Take your medications as prescribed by your primary care physician. 4. Check your skin daily for cracks, bruises, sores, or dryness. Use a moisturizer as needed. 5. Clean and dry your skin, using mild soap and warm water (not hot). 6. Avoid alcohol and caffeine and do not smoke. 7. Maintain a nutritious diet. 8. Avoid pressure on your wound site. Keep your legs elevated above the level of the heart whenever possible.    9. Continue to use wraps/stockings/compression as prescribed. 10. Replace compression stockings every four to six months as needed to ensure proper fit. 11. Wear well-fitting shoes and leg garments. 12.Continue using antibacterial to wash in folds and dry well. THANK YOU FOR ALLOWING US TO SERVE YOU.  PLEASE CALL IF YOU DEVELOP ANOTHER WOUND. [phone]                        Electronically signed by SANIA Yoder NP on 7/29/2021 at 9:36 AM

## 2021-07-29 NOTE — TELEPHONE ENCOUNTER
As of 7/09/21 patient has used $580 of the maximum of $600 a year in waived co pays for specific medications and pharmacy-related supplies through the [employer] for the DM Program.    Patient currently enrolled in the DM Program and is nearing the maximum of $600 a year in waived co pays for specific medications and pharmacy-related supplies through the [employer]. Courtesy call placed to patient at home/cell number to advise them of the above information. Patient unavailable at the time of call. Message left on home/cell TAD: Maximum waived co pays for the DM Program has almost been met. Once maximum has been reached they will begin to be charged their co-payment once again. I left our number: [phone] Option #7 if patient has any questions/concerns.     Barb Allred, [address] [place] [address]   Department, toll free: [phone], option 7

## 2021-07-29 NOTE — PLAN OF CARE
Problem: Wound:  Goal: Will show signs of wound healing; wound closure and no evidence of infection  Description: Will show signs of wound healing; wound closure and no evidence of infection  Outcome: Met This Shift     Problem: Blood Glucose:  Goal: Ability to maintain appropriate glucose levels will improve  Description: Ability to maintain appropriate glucose levels will improve  Outcome: Met This Shift     Problem: Wound:  Goal: Will show signs of wound healing; wound closure and no evidence of infection  Description: Will show signs of wound healing; wound closure and no evidence of infection  Outcome: Met This Shift

## 2021-07-30 RX ORDER — ROSUVASTATIN CALCIUM 10 MG/1
TABLET, COATED ORAL
Qty: 90 TABLET | Refills: 0 | OUTPATIENT
Start: 2021-07-30

## 2021-08-02 ENCOUNTER — HOSPITAL ENCOUNTER (EMERGENCY)
Age: 46
Discharge: HOME | End: 2021-08-03
Attending: EMERGENCY MEDICINE
Payer: COMMERCIAL

## 2021-08-02 DIAGNOSIS — R10.13 ABDOMINAL PAIN, EPIGASTRIC: ICD-10-CM

## 2021-08-02 DIAGNOSIS — R16.1 SPLENOMEGALY: ICD-10-CM

## 2021-08-02 DIAGNOSIS — K31.84 GASTROPARESIS: ICD-10-CM

## 2021-08-02 DIAGNOSIS — R11.2 NON-INTRACTABLE VOMITING WITH NAUSEA, UNSPECIFIED VOMITING TYPE: Primary | ICD-10-CM

## 2021-08-02 LAB
ALBUMIN SERPL-MCNC: 4.3 G/DL (ref 3.5–4.6)
ALP BLD-CCNC: 112 U/L (ref 40–130)
ALT SERPL-CCNC: 22 U/L (ref 0–33)
ANION GAP SERPL CALCULATED.3IONS-SCNC: 18 MEQ/L (ref 9–15)
AST SERPL-CCNC: 23 U/L (ref 0–35)
BASOPHILS ABSOLUTE: 0.1 K/UL (ref 0–0.2)
BASOPHILS RELATIVE PERCENT: 0.9 %
BILIRUB SERPL-MCNC: 0.4 MG/DL (ref 0.2–0.7)
BUN BLDV-MCNC: 15 MG/DL (ref 6–20)
CALCIUM SERPL-MCNC: 9.9 MG/DL (ref 8.5–9.9)
CHLORIDE BLD-SCNC: 96 MEQ/L (ref 95–107)
CHP ED QC CHECK: NORMAL
CO2: 24 MEQ/L (ref 20–31)
CREAT SERPL-MCNC: 0.69 MG/DL (ref 0.5–0.9)
EOSINOPHILS ABSOLUTE: 0 K/UL (ref 0–0.7)
EOSINOPHILS RELATIVE PERCENT: 0.3 %
GFR AFRICAN AMERICAN: >60
GFR NON-AFRICAN AMERICAN: >60
GLOBULIN: 3.5 G/DL (ref 2.3–3.5)
GLUCOSE BLD-MCNC: 174 MG/DL
GLUCOSE BLD-MCNC: 174 MG/DL (ref 60–115)
GLUCOSE BLD-MCNC: 174 MG/DL (ref 70–99)
HCT VFR BLD CALC: 40.9 % (ref 37–47)
HEMOGLOBIN: 13.4 G/DL (ref 12–16)
LACTIC ACID: 3.4 MMOL/L (ref 0.5–2.2)
LIPASE: 54 U/L (ref 12–95)
LYMPHOCYTES ABSOLUTE: 5 K/UL (ref 1–4.8)
LYMPHOCYTES RELATIVE PERCENT: 35.7 %
MAGNESIUM: 1.7 MG/DL (ref 1.7–2.4)
MCH RBC QN AUTO: 25.8 PG (ref 27–31.3)
MCHC RBC AUTO-ENTMCNC: 32.8 % (ref 33–37)
MCV RBC AUTO: 78.5 FL (ref 82–100)
MONOCYTES ABSOLUTE: 0.5 K/UL (ref 0.2–0.8)
MONOCYTES RELATIVE PERCENT: 3.4 %
NEUTROPHILS ABSOLUTE: 8.3 K/UL (ref 1.4–6.5)
NEUTROPHILS RELATIVE PERCENT: 59.7 %
PDW BLD-RTO: 15.9 % (ref 11.5–14.5)
PERFORMED ON: ABNORMAL
PLATELET # BLD: 335 K/UL (ref 130–400)
POTASSIUM SERPL-SCNC: 3.7 MEQ/L (ref 3.4–4.9)
RBC # BLD: 5.21 M/UL (ref 4.2–5.4)
SODIUM BLD-SCNC: 138 MEQ/L (ref 135–144)
TOTAL PROTEIN: 7.8 G/DL (ref 6.3–8)
TROPONIN: <0.01 NG/ML (ref 0–0.01)
WBC # BLD: 14 K/UL (ref 4.8–10.8)

## 2021-08-02 PROCEDURE — 83690 ASSAY OF LIPASE: CPT

## 2021-08-02 PROCEDURE — 96375 TX/PRO/DX INJ NEW DRUG ADDON: CPT

## 2021-08-02 PROCEDURE — 99283 EMERGENCY DEPT VISIT LOW MDM: CPT

## 2021-08-02 PROCEDURE — 85025 COMPLETE CBC W/AUTO DIFF WBC: CPT

## 2021-08-02 PROCEDURE — 93005 ELECTROCARDIOGRAM TRACING: CPT | Performed by: STUDENT IN AN ORGANIZED HEALTH CARE EDUCATION/TRAINING PROGRAM

## 2021-08-02 PROCEDURE — 80053 COMPREHEN METABOLIC PANEL: CPT

## 2021-08-02 PROCEDURE — 83605 ASSAY OF LACTIC ACID: CPT

## 2021-08-02 PROCEDURE — 2500000003 HC RX 250 WO HCPCS: Performed by: STUDENT IN AN ORGANIZED HEALTH CARE EDUCATION/TRAINING PROGRAM

## 2021-08-02 PROCEDURE — 2580000003 HC RX 258: Performed by: STUDENT IN AN ORGANIZED HEALTH CARE EDUCATION/TRAINING PROGRAM

## 2021-08-02 PROCEDURE — 6360000002 HC RX W HCPCS: Performed by: STUDENT IN AN ORGANIZED HEALTH CARE EDUCATION/TRAINING PROGRAM

## 2021-08-02 PROCEDURE — 36415 COLL VENOUS BLD VENIPUNCTURE: CPT

## 2021-08-02 PROCEDURE — 83735 ASSAY OF MAGNESIUM: CPT

## 2021-08-02 PROCEDURE — 84484 ASSAY OF TROPONIN QUANT: CPT

## 2021-08-02 PROCEDURE — 96365 THER/PROPH/DIAG IV INF INIT: CPT

## 2021-08-02 RX ORDER — HALOPERIDOL 5 MG/ML
5 INJECTION INTRAMUSCULAR ONCE
Status: DISCONTINUED | OUTPATIENT
Start: 2021-08-02 | End: 2021-08-02

## 2021-08-02 RX ORDER — SODIUM CHLORIDE, SODIUM LACTATE, POTASSIUM CHLORIDE, AND CALCIUM CHLORIDE .6; .31; .03; .02 G/100ML; G/100ML; G/100ML; G/100ML
1000 INJECTION, SOLUTION INTRAVENOUS ONCE
Status: COMPLETED | OUTPATIENT
Start: 2021-08-02 | End: 2021-08-03

## 2021-08-02 RX ORDER — HALOPERIDOL 5 MG/ML
2.5 INJECTION INTRAMUSCULAR ONCE
Status: COMPLETED | OUTPATIENT
Start: 2021-08-02 | End: 2021-08-02

## 2021-08-02 RX ORDER — MAGNESIUM SULFATE IN WATER 40 MG/ML
2000 INJECTION, SOLUTION INTRAVENOUS ONCE
Status: COMPLETED | OUTPATIENT
Start: 2021-08-02 | End: 2021-08-03

## 2021-08-02 RX ADMIN — HALOPERIDOL LACTATE 2.5 MG: 5 INJECTION, SOLUTION INTRAMUSCULAR at 23:12

## 2021-08-02 RX ADMIN — SODIUM CHLORIDE, POTASSIUM CHLORIDE, SODIUM LACTATE AND CALCIUM CHLORIDE 1000 ML: 600; 310; 30; 20 INJECTION, SOLUTION INTRAVENOUS at 23:13

## 2021-08-02 RX ADMIN — MAGNESIUM SULFATE HEPTAHYDRATE 2000 MG: 40 INJECTION, SOLUTION INTRAVENOUS at 23:22

## 2021-08-02 RX ADMIN — FAMOTIDINE 20 MG: 10 INJECTION, SOLUTION INTRAVENOUS at 23:12

## 2021-08-02 ASSESSMENT — PAIN SCALES - GENERAL: PAINLEVEL_OUTOF10: 6

## 2021-08-02 ASSESSMENT — PAIN DESCRIPTION - FREQUENCY: FREQUENCY: CONTINUOUS

## 2021-08-02 ASSESSMENT — PAIN DESCRIPTION - ONSET: ONSET: ON-GOING

## 2021-08-02 ASSESSMENT — PAIN DESCRIPTION - PAIN TYPE: TYPE: ACUTE PAIN

## 2021-08-02 ASSESSMENT — PAIN DESCRIPTION - LOCATION: LOCATION: ABDOMEN;GENERALIZED

## 2021-08-03 ENCOUNTER — APPOINTMENT (OUTPATIENT)
Dept: CT IMAGING | Age: 46
End: 2021-08-03
Payer: COMMERCIAL

## 2021-08-03 VITALS
HEIGHT: 66 IN | WEIGHT: 280 LBS | DIASTOLIC BLOOD PRESSURE: 80 MMHG | TEMPERATURE: 97.6 F | OXYGEN SATURATION: 96 % | RESPIRATION RATE: 18 BRPM | SYSTOLIC BLOOD PRESSURE: 137 MMHG | BODY MASS INDEX: 45 KG/M2 | HEART RATE: 95 BPM

## 2021-08-03 LAB
LACTIC ACID: 4.2 MMOL/L (ref 0.5–2.2)
MONO TEST: NEGATIVE

## 2021-08-03 PROCEDURE — 86308 HETEROPHILE ANTIBODY SCREEN: CPT

## 2021-08-03 PROCEDURE — 2580000003 HC RX 258

## 2021-08-03 PROCEDURE — 36415 COLL VENOUS BLD VENIPUNCTURE: CPT

## 2021-08-03 PROCEDURE — 74177 CT ABD & PELVIS W/CONTRAST: CPT

## 2021-08-03 PROCEDURE — 6360000004 HC RX CONTRAST MEDICATION: Performed by: STUDENT IN AN ORGANIZED HEALTH CARE EDUCATION/TRAINING PROGRAM

## 2021-08-03 PROCEDURE — 83605 ASSAY OF LACTIC ACID: CPT

## 2021-08-03 RX ORDER — SODIUM CHLORIDE 9 MG/ML
INJECTION, SOLUTION INTRAVENOUS CONTINUOUS
Status: DISCONTINUED | OUTPATIENT
Start: 2021-08-03 | End: 2021-08-03 | Stop reason: HOSPADM

## 2021-08-03 RX ORDER — SODIUM CHLORIDE 9 MG/ML
INJECTION, SOLUTION INTRAVENOUS
Status: COMPLETED
Start: 2021-08-03 | End: 2021-08-03

## 2021-08-03 RX ADMIN — SODIUM CHLORIDE: 9 INJECTION, SOLUTION INTRAVENOUS at 00:28

## 2021-08-03 RX ADMIN — IOPAMIDOL 100 ML: 755 INJECTION, SOLUTION INTRAVENOUS at 03:08

## 2021-08-03 ASSESSMENT — ENCOUNTER SYMPTOMS
VOMITING: 1
COUGH: 0
ABDOMINAL PAIN: 1
SHORTNESS OF BREATH: 0
EYE PAIN: 0
CONSTIPATION: 0
NAUSEA: 1
BACK PAIN: 0
SORE THROAT: 0
DIARRHEA: 0

## 2021-08-03 NOTE — ED TRIAGE NOTES
Pt came to the ED for ABD pain and emesis. The patient states she has gastroparesis and that the ABD pain began after the emesis. The patient states she is unable to eat but continues to drink.  A&Ox4

## 2021-08-03 NOTE — ED NOTES
Patient states has a 16year old daughter at home she needs to get home to. Explained the delay and AMA if she chooses to sign out.      Diego Taylor RN  08/03/21 [id number]

## 2021-08-03 NOTE — ED NOTES
Pt up to restroom at this time. Pt has a stable gait as observed by this RN.      Jean Pierre Gabriel RN  08/02/21 [id number]

## 2021-08-03 NOTE — ED NOTES
Labs obtained by this RN, labeled and sent to lab via tube system.       Verdie Councilman, RN  08/02/21 2395

## 2021-08-03 NOTE — ED PROVIDER NOTES
[facility] ED  eMERGENCY dEPARTMENT eNCOUnter      Pt Name: Melinda Byrne  MRN: [id number]  [place] 1975  Date of evaluation: 2021  Provider: Tyson Harmon MD        HISTORY OF PRESENT ILLNESS    Melinda Byrne is a 40 y.o. female per chart review has a PMH of Morbid Obesity, HLD, Pulmonary HTN, DMII on insulin, GERD, Gastroparesis, IBS and ovarian cysts presenting to the ED c/o nausea, multiple episodes of NBNBE, fatigue, inability to tolerate PO intake and abdominal pain with initial onset this morning. States symptoms similar to previous episodes of flares associated with gastroparesis. Characterizes pain as aching, cramping, constant and moderate in severity. Denies any radiation of the pain. Also denies any associated F/C/D, CP, S OB, cough, flank pain, diarrhea, constipation, vaginal complaints or urinary symptoms. Did not take anything for relief prior to arrival.  Exacerbated with p.o. intake. States she has been taking all medications as indicated including her insulin. States blood glucose running in the 170s to 180s. Denies any recent exposures to known sick contacts. States history of hysterectomy, . Denies any chance of pregnancy at this time. No recent ED evaluations for similar complaints. Recent gastric emptying study on 21 appreciating delayed emptying without evidence of mechanical obstruction. Most recent CT imaging of the abdomen and pelvis showing nonspecific gastroenteritis. REVIEW OF SYSTEMS       Review of Systems   Constitutional: Positive for appetite change and fatigue. HENT: Negative for sore throat. Eyes: Negative for pain and visual disturbance. Respiratory: Negative for cough and shortness of breath. Cardiovascular: Negative for chest pain and palpitations. Gastrointestinal: Positive for abdominal pain, nausea and vomiting. Negative for constipation and diarrhea.    Genitourinary: Negative for difficulty urinating, dysuria, flank pain, frequency, hematuria, vaginal bleeding and vaginal discharge. Musculoskeletal: Negative for back pain and neck pain. Skin: Negative for rash and wound. Neurological: Negative for dizziness, weakness, numbness and headaches. Except as noted above the remainder of the review of systems was reviewed and negative. PAST MEDICAL HISTORY     Past Medical History:   Diagnosis Date    Acute midline thoracic back pain 9/18/2018    B12 deficiency     Family history of premature CAD 9/4/2013    Fatty liver     Gastroesophageal reflux disease 1/6/2021    HPV (human papilloma virus) anogenital infection     Hyperlipidemia     Hypertension     Irritable bowel syndrome with diarrhea 4/26/2021    Liver hemangioma 2009/2015    Lumbar radiculopathy 7/7/2021    Obesity 3/11/13    BMI 43.42    Orthostatic hypotension     Ovarian cyst     PMR (polymyalgia rheumatica) (HCC)     Rectal fissure     Tobacco abuse 9/3/2015    Tobacco abuse     Tremor     Uncontrolled diabetes mellitus with complications ([facility] [address]     Unspecified sleep apnea          SURGICAL HISTORY       Past Surgical History:   Procedure Laterality Date    CARDIAC CATHETERIZATION  4-07    COLONOSCOPY  2013    for diarrhea (-)    COLONOSCOPY N/A 2/10/2021    [facility]    [facility]  1-05    NC MUSCLE BIOPSY Left 9/21/2018    LEFT QUADRICEPS MUSCLE BIOPSY performed by Alexandro Bumpers, MD at [facility] UNI/BI CANNULATION N/A 9/18/2018    T 12 VERTEBRALPLASTY ROOM 278 performed by Nino Singh MD at [address] ENDOSCOPY  10/13/15     DR. HERRERA     UPPER GASTROINTESTINAL ENDOSCOPY N/A 2/10/2021    EGD ESOPHAGOGASTRODUODENOSCOPY performed by Dylan Hurst MD at [facility]  6-2015         CURRENT MEDICATIONS       Discharge Medication List as of 8/3/2021  4:36 AM      CONTINUE these medications which have NOT CHANGED    Details   rosuvastatin (CRESTOR) 10 MG tablet TAKE ONE TABLET BY MOUTH NIGHTLY, Disp-90 tablet, R-0Normal      esomeprazole (NEXIUM) 40 MG delayed release capsule TAKE 1 CAPSULE BY MOUTH ONE TIME A DAY, Disp-30 capsule, R-3Normal      metFORMIN (GLUCOPHAGE) 500 MG tablet TAKE 1 TABLET BY MOUTH 3 TIMES A DAY, Disp-270 tablet, R-3Normal      !! predniSONE (DELTASONE) 5 MG tablet Take 1 tablet by mouth daily, Disp-90 tablet, R-0Normal      baclofen (LIORESAL) 10 MG tablet TAKE ONE-HALF TABLET BY MOUTH TWICE A DAY, Disp-90 tablet, R-1Normal      losartan (COZAAR) 50 MG tablet Take 1 tablet by mouth daily, Disp-90 tablet, R-1Normal      clotrimazole-betamethasone (LOTRISONE) 1-0.05 % cream APPLY TOPICALLY TWO TIMES A DAY, Disp-1 Tube, R-3, Normal      nystatin (MYCOSTATIN) 290859 UNIT/GM powder Apply 3 times daily. , Disp-1 Bottle, R-2, Normal      ondansetron (ZOFRAN ODT) 4 MG disintegrating tablet Take 1 tablet by mouth every 8 hours as needed for Nausea or Vomiting, Disp-60 tablet, R-3Normal      terbinafine (LAMISIL) 1 % cream Apply topically 2 times daily. , Disp-1 Tube, R-1, Normal      albuterol sulfate HFA (PROAIR HFA) 108 (90 Base) MCG/ACT inhaler Use every 4 hours while awake for 7-10 days then PRN wheezing  Dispense with SPACER and Instruct on use.   May sub Ventolin or Proventil as needed per Insurance., Disp-1 Inhaler, R-0Print      metoprolol (LOPRESSOR) 100 MG tablet Take 1 tablet by mouth 2 times daily, Disp-180 tablet, R-0Normal      promethazine (PHENERGAN) 25 MG tablet Take 25 mg by mouth every 8 hoursHistorical Med      erythromycin base (E-MYCIN) 250 MG tablet Take 1 tablet by mouth 3 times daily, Disp-90 tablet, R-3Normal      venlafaxine (EFFEXOR XR) 75 MG extended release capsule Take 1 capsule by mouth daily TAKE TWO CAPSULES BY MOUTH EVERY MORNING AND TAKE ONE CAPSULE BY (MICOTIN) 2 % cream Apply topically 2 times daily. , Disp-141 g, R-3, Normal      Handicap Placard MISC Starting Tue 7/3/2018, Disp-1 each, R-0, PrintExp 5 years       !! - Potential duplicate medications found. Please discuss with provider. ALLERGIES     Morphine and related, Ace inhibitors, Bactrim [sulfamethoxazole-trimethoprim], Nitroglycerin, Percocet [oxycodone-acetaminophen], and Victoza [liraglutide]    FAMILY HISTORY       Family History   Problem Relation Age of Onset    Diabetes Father     Heart Disease Mother     Colon Cancer Neg Hx     Cancer Neg Hx           SOCIAL HISTORY       Social History     Socioeconomic History    Marital status:      Spouse name: Not on file    Number of children: 1    Years of education: Not on file    Highest education level: Not on file   Occupational History    Not on file   Tobacco Use    Smoking status: Former Smoker     Packs/day: 1.00     Types: Cigarettes     Quit date: 2017     Years since quittin.5    Smokeless tobacco: Never Used   Vaping Use    Vaping Use: Never used   Substance and Sexual Activity    Alcohol use: Yes     Alcohol/week: 0.0 standard drinks     Comment: socially    Drug use: No    Sexual activity: Yes     Partners: Male     Comment: single   Other Topics Concern    Not on file   Social History Narrative    Social/Functional History          Social Determinants of Health     Financial Resource Strain: Low Risk     Difficulty of Paying Living Expenses: Not hard at all   Food Insecurity: No Food Insecurity    Worried About Running Out of Food in the Last Year: Never true    Jung of Food in the Last Year: Never true   Transportation Needs: No Transportation Needs    Lack of Transportation (Medical): No    Lack of Transportation (Non-Medical):  No   Physical Activity:     Days of Exercise per Week:     Minutes of Exercise per Session:    Stress:     Feeling of Stress :    Social Connections:     Frequency of Communication with Friends and Family:     Frequency of Social Gatherings with Friends and Family:     Attends [employer] Services:     Active Member of Clubs or Organizations:     Attends Club or Organization Meetings:     Marital Status:    Intimate Partner Violence:     Fear of Current or Ex-Partner:     Emotionally Abused:     Physically Abused:     Sexually Abused:          PHYSICAL EXAM       ED Triage Vitals [08/02/21 2220]   BP Temp Temp Source Pulse Resp SpO2 Height Weight   130/87 97.6 °F (36.4 °C) Oral 108 18 94 % 5' 6\" (1.676 m) 280 lb (127 kg)       Physical Exam  Vitals and nursing note reviewed. Constitutional:       Appearance: She is obese. She is ill-appearing. She is not toxic-appearing or diaphoretic. HENT:      Head: Normocephalic and atraumatic. Mouth/Throat:      Mouth: Mucous membranes are dry. Pharynx: Oropharynx is clear. Eyes:      Extraocular Movements: Extraocular movements intact. Pupils: Pupils are equal, round, and reactive to light. Cardiovascular:      Rate and Rhythm: Regular rhythm. Tachycardia present. Pulses: Normal pulses. Heart sounds: Normal heart sounds. Pulmonary:      Effort: Pulmonary effort is normal. No respiratory distress. Breath sounds: Normal breath sounds. No wheezing or rales. Chest:      Chest wall: No tenderness. Abdominal:      General: A surgical scar is present. Palpations: Abdomen is soft. Tenderness: There is abdominal tenderness in the right upper quadrant, epigastric area and periumbilical area. There is no right CVA tenderness, left CVA tenderness, guarding or rebound. Hernia: No hernia is present. Musculoskeletal:         General: No tenderness, deformity or signs of injury. Cervical back: Normal range of motion and neck supple. Right lower leg: No edema. Left lower leg: No edema. Skin:     General: Skin is warm and dry.       Capillary Refill: Capillary refill takes less than 2 seconds. Neurological:      General: No focal deficit present. Mental Status: She is alert and oriented to person, place, and time. Motor: No weakness. Psychiatric:         Mood and Affect: Mood normal.         Behavior: Behavior normal.           LABS:  Labs Reviewed   COMPREHENSIVE METABOLIC PANEL - Abnormal; Notable for the following components:       Result Value    Anion Gap 18 (*)     Glucose 174 (*)     All other components within normal limits   CBC WITH AUTO DIFFERENTIAL - Abnormal; Notable for the following components:    WBC 14.0 (*)     MCV 78.5 (*)     MCH 25.8 (*)     MCHC 32.8 (*)     RDW 15.9 (*)     Neutrophils Absolute 8.3 (*)     Lymphocytes Absolute 5.0 (*)     All other components within normal limits   LACTIC ACID, PLASMA - Abnormal; Notable for the following components:    Lactic Acid 3.4 (*)     All other components within normal limits    Narrative:     Ena Kemp tel. [phone],  LACID results called to and read back by Weston Kim, 08/02/2021 23:35, by  Tim Pham   LACTIC ACID, PLASMA - Abnormal; Notable for the following components:    Lactic Acid 4.2 (*)     All other components within normal limits    Narrative:     Ena Kemp tel. [phone],  LACID results called to and read back by DR LEE, 08/03/2021 02:22, by  Tim Pham   POCT GLUCOSE - Abnormal; Notable for the following components:    POC Glucose 174 (*)     All other components within normal limits   POCT GLUCOSE - Normal   MAGNESIUM   LIPASE   TROPONIN   MONONUCLEOSIS SCREEN   URINE RT REFLEX TO CULTURE       ED Course as of Aug 03 0801   Mon Aug 02, 2021   2309 EG showing sinus tachycardia, rate of 107. Normal axis, borderline prolonged QTC, nonspecific ST-T wave abnormalities throughout. Previous EKG reviewed, largely unchanged. EKG 12 Lead - Chest Pain [NA]   Tue Aug 03, 2021   0230 Rising lactate s/p 2L IVF. Mildly persistent abd TTP.  CT A/P ordered   Lactic Acid(!!): 4.2 [NA]   0422 No leukocytosis, otherwise no anemia or thrombocytopenia. WBC(!): 14.0 [NA]   0423 WNL   Troponin: <0.010 [NA]   0423 WNL, lower suspicion for pancreatitis. Lipase: 54 [NA]   0423 Likely secondary to lactic acidosis. Evidence of DKA. CMP otherwise unremarkable. Anion Gap(!): 18 [NA]   0423 CT abdomen pelvis with contrast showing splenomegaly with spleen measuring 15.3 cm in craniocaudal length. No evidence of gastric distention or small bowel obstruction. No free intraperitoneal air or fluid. Previous hysterectomy. CT ABDOMEN PELVIS W IV CONTRAST Additional Contrast? None [NA]   0435 Mono Test: Negative [NA]      ED Course User Index  [NA] Alisa Meng MD         MDM:   Vitals:    Vitals:    08/02/21 2220 08/02/21 2330 08/03/21 0354   BP: 130/87 104/62 137/80   Pulse: 108  95   Resp: 18  18   Temp: 97.6 °F (36.4 °C)     TempSrc: Oral     SpO2: 94% 97% 96%   Weight: 280 lb (127 kg)     Height: 5' 6\" (1.676 m)         40 y.o. female per chart review has a PMH of Morbid Obesity, HLD, Pulmonary HTN, DMII on insulin, GERD, Gastroparesis, IBS and ovarian cysts presenting to the ED c/o nausea, multiple episodes of NBNBE, fatigue, inability to tolerate PO intake and abdominal pain with initial onset this morning. Upon initial evaluation, patient tachycardic and in mild discomfort secondary to nausea and abdominal pain. Otherwise hemodynamically stable and grossly neurovascularly intact. Given findings, obtained labs and CT imaging as noted above. Labs concerning for lactic acidosis and leukocytosis, though in the setting of active emesis. Thought likely secondary to dehydration. Therefore administered multiple liters IV fluids in addition to multiple medications for symptomatic relief. Symptoms thought likely related to gastroparesis. Significantly improved following medications in the ED.   Given rise in lactic acidosis from initial, however, obtained CT imaging which noted splenomegaly of unclear etiology. No other acute intra-abdominal findings. No gross evidence of splenic infarct. Monospot negative. Although lactate elevated, tachycardia improved and benign abdominal exam on repeat exam.  Therefore utilized shared decision making with patient who elected to pursue further evaluation as an outpatient instead of admission. Encouraged close follow-up with PCP and rheumatology given patient's history of PMR. Patient understanding and amenable to the plan of care. CRITICAL CARE TIME       PROCEDURES:  Unlessotherwise noted below, none     Procedures      FINAL IMPRESSION      1. Non-intractable vomiting with nausea, unspecified vomiting type    2. Abdominal pain, epigastric    3. Gastroparesis    4.  Splenomegaly          DISPOSITION/PLAN   DISPOSITION Decision To Discharge 08/03/2021 04:37:23 AM         Mila Michael MD  08/03/21 2341

## 2021-08-04 ENCOUNTER — CARE COORDINATION (OUTPATIENT)
Dept: OTHER | Facility: CLINIC | Age: 46
End: 2021-08-04

## 2021-08-04 LAB
EKG ATRIAL RATE: 107 BPM
EKG P AXIS: 44 DEGREES
EKG P-R INTERVAL: 162 MS
EKG Q-T INTERVAL: 360 MS
EKG QRS DURATION: 72 MS
EKG QTC CALCULATION (BAZETT): 480 MS
EKG R AXIS: 6 DEGREES
EKG T AXIS: 28 DEGREES
EKG VENTRICULAR RATE: 107 BPM

## 2021-08-04 PROCEDURE — 93010 ELECTROCARDIOGRAM REPORT: CPT | Performed by: INTERNAL MEDICINE

## 2021-08-04 NOTE — CARE COORDINATION
[facility] ED Follow Up Call    2021    Patient: Miri Agarwal Patient : 1975   MRN: [id number]  Reason for Admission: N/Vabd pain/ gastroparesis  Discharge Date: 8/3/21      Needs to be reviewed by the provider   Additional needs identified to be addressed with provider No  none         Discussed COVID-19 related testing which was not done at this time. Test results were not done. Patient informed of results, if available? N/A    Ambulatory Care Manager [facility]) contacted the patient by telephone to perform post ED visit assessment. Call within 2 business days of discharge: Yes. Verified name and  with patient as identifiers. Patient reports that she is feeling much better. The abdominal pain has resolved. She states that she was found to have an enlarged spleen and her EKG was abnormal.  She is concerned about these results. She was recommended to see her PCP and Cardiologist.  She has appts for both on 21. She denies any new or worsening symptoms, or any other new issues or concerns. Reinforced Red Flag symptoms to report and pt verbalized understanding. Interventions:    Counseling and education provided at today's visit on:   Red Flag symptoms to report  Medication compliance  Provider follow up appointment compliance  Specialist appointment compliance  Utilization of appropriate level of care: Right Care, Right Place, Right Time  Reinforced Discharge instructions    Medication reconciliation was performed with patient, who verbalizes understanding of administration of home medications. Advised obtaining a 90-day supply of all daily and as-needed medications. Reinforced resources available to patient including: PCP, Specialist, Benefits related nurse triage line, Urgent care clinics, [employer] Messaging and ACM. ACM encouraged outreach to Nurse Access Line and/or ACM for assessment and intervention guidance as needed.  ACM encouraged patient to contact 911 for life threatening emergencies and PCP office 24/7 for non life-threatening symptoms. Reminded patient of alternatives to ED such as urgent care, walk in clinics and e-visits as available. Reviewed proper ED utilization using Right Care, Right Place, Right Time Flyer. Flyer mailed with PCP name and office number. Discussed follow-up appointments. If no appointment was previously scheduled, appointment scheduling offered: N/A already scheduled. Is follow up appointment scheduled within 7 days of discharge? Yes  [address] follow up appointment(s):   Future Appointments   Date Time Provider Iliana Garcia   8/9/2021 11:00 AM [facility]   8/9/2021  5:50 PM [facility]    9/16/2021  9:15 AM Joseph Cue[place]ham, [address] [facility] [facility]   11/1/2021  3:15 PM Tiffany Mckeon MD [facility]     [facility] follow up appointment(s): None    Plan:  Continue weekly outreaches to provide telephonic support, education and resources as needed. Discuss / follow up on:   Symptom management  Provider follow up appointment compliance  Routine/ diagnostic testing compliance  Utilization of appropriate level of care  Discuss recommended plan of care      Patient verbalized understanding and is agreeable.         Care Transitions ED Follow Up    Care Transitions Interventions    Specialty Service Referral: Completed    Schedule Follow Up Appointment with Physician: Completed  Did you call your PCP prior to going to the ED?: No - Did not call PCP   Do you understand what to report and when to return?: Yes   Are you following your discharge instructions?: Yes   Do you have all of your prescriptions and are they filled?: Yes

## 2021-08-09 ENCOUNTER — HOSPITAL ENCOUNTER (OUTPATIENT)
Age: 46
Setting detail: SPECIMEN
Discharge: HOME | End: 2021-08-09
Payer: COMMERCIAL

## 2021-08-09 ENCOUNTER — OFFICE VISIT (OUTPATIENT)
Dept: CARDIOLOGY CLINIC | Age: 46
End: 2021-08-09
Payer: COMMERCIAL

## 2021-08-09 ENCOUNTER — OFFICE VISIT (OUTPATIENT)
Dept: FAMILY MEDICINE CLINIC | Age: 46
End: 2021-08-09
Payer: COMMERCIAL

## 2021-08-09 VITALS
BODY MASS INDEX: 45.48 KG/M2 | HEIGHT: 66 IN | DIASTOLIC BLOOD PRESSURE: 84 MMHG | OXYGEN SATURATION: 98 % | WEIGHT: 283 LBS | HEART RATE: 70 BPM | SYSTOLIC BLOOD PRESSURE: 120 MMHG

## 2021-08-09 VITALS
RESPIRATION RATE: 18 BRPM | OXYGEN SATURATION: 95 % | SYSTOLIC BLOOD PRESSURE: 118 MMHG | BODY MASS INDEX: 45.84 KG/M2 | TEMPERATURE: 97.9 F | HEART RATE: 98 BPM | WEIGHT: 284 LBS | DIASTOLIC BLOOD PRESSURE: 70 MMHG

## 2021-08-09 DIAGNOSIS — R16.1 SPLENOMEGALY: ICD-10-CM

## 2021-08-09 DIAGNOSIS — N20.0 NEPHROLITHIASIS: ICD-10-CM

## 2021-08-09 DIAGNOSIS — I27.20 PULMONARY HYPERTENSION (HCC): Primary | ICD-10-CM

## 2021-08-09 DIAGNOSIS — D72.829 LEUKOCYTOSIS, UNSPECIFIED TYPE: ICD-10-CM

## 2021-08-09 DIAGNOSIS — Z82.49 FH: PREMATURE CORONARY HEART DISEASE: ICD-10-CM

## 2021-08-09 DIAGNOSIS — R79.89 ELEVATED LACTIC ACID LEVEL: ICD-10-CM

## 2021-08-09 DIAGNOSIS — R10.84 GENERALIZED ABDOMINAL PAIN: Primary | ICD-10-CM

## 2021-08-09 LAB
BILIRUBIN, POC: ABNORMAL
BLOOD URINE, POC: ABNORMAL
CLARITY, POC: ABNORMAL
COLOR, POC: YELLOW
GLUCOSE URINE, POC: ABNORMAL
KETONES, POC: ABNORMAL
LEUKOCYTE EST, POC: ABNORMAL
NITRITE, POC: ABNORMAL
PH, POC: 6
PROTEIN, POC: POSITIVE
SPECIFIC GRAVITY, POC: 1.02
UROBILINOGEN, POC: 3.5

## 2021-08-09 PROCEDURE — 83550 IRON BINDING TEST: CPT

## 2021-08-09 PROCEDURE — 93000 ELECTROCARDIOGRAM COMPLETE: CPT | Performed by: INTERNAL MEDICINE

## 2021-08-09 PROCEDURE — 82728 ASSAY OF FERRITIN: CPT

## 2021-08-09 PROCEDURE — 1111F DSCHRG MED/CURRENT MED MERGE: CPT | Performed by: FAMILY MEDICINE

## 2021-08-09 PROCEDURE — 81002 URINALYSIS NONAUTO W/O SCOPE: CPT | Performed by: FAMILY MEDICINE

## 2021-08-09 PROCEDURE — 99214 OFFICE O/P EST MOD 30 MIN: CPT | Performed by: INTERNAL MEDICINE

## 2021-08-09 PROCEDURE — 99214 OFFICE O/P EST MOD 30 MIN: CPT | Performed by: FAMILY MEDICINE

## 2021-08-09 PROCEDURE — 83540 ASSAY OF IRON: CPT

## 2021-08-09 PROCEDURE — 81001 URINALYSIS AUTO W/SCOPE: CPT

## 2021-08-09 PROCEDURE — 36415 COLL VENOUS BLD VENIPUNCTURE: CPT

## 2021-08-09 ASSESSMENT — ENCOUNTER SYMPTOMS
COLOR CHANGE: 0
SHORTNESS OF BREATH: 0
CHEST TIGHTNESS: 0
APNEA: 0

## 2021-08-09 NOTE — PROGRESS NOTES
[facility] CARDIOLOGY OFFICE FOLLOW-UP      Patient: Jaelyn Joy  YOB: 1975  MRN: [id number]    Chief Complaint:  Chief Complaint   Patient presents with    Follow-up     ED    Abnormal Test Results     ekg         Subjective/HPI:  8/9/21: Patient presents today for follow-up of recent hospitalization for abdominal issues including nausea vomiting. She was diagnosed to have gastroparesis. EKG showed sinus rhythm sinus tachycardia. Today EKG shows occasional PVC's otherwise negative. Talk with her about the need to consider bariatric surgery. I told her to contact [facility], [place], operated by [facility] bariatric program which is one of the better programs in the area. She denies any chest pain congestive heart failure. She was also diagnosed to have PMR by Dr. Santy Harrell and started on steroid. Her last hemoglobin A1c was 7.8 hopefully the dose of prednisone can be reduced. No chest pain congestive heart failure. Patient is stable. Mild postural drop. She will see me in 3 months. 6/9/21 VIRTUAL : For follow-up of chest pain. Stress test was a 2-day study. Was negative. Has multiple risk factors coronary artery disease including obesity diabetes hypertension. Dyslipidemia. At this point doing well. No angina congestive heart failure. She will see me in 6 months      7/16/19: Patient presents today for follow-up of angina. No angina currently. 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  No congestive heart failure symptoms. She has dyslipidemia and hyperglycemia. Echocardiogram was noted. LV ejection fraction is normal.  See me in 1 year     1/15/19: Patient presents today for follow up of angina. She is diabetic and is getting uncontrolled. No further angina. Palpitations are better. She has dyslipidemia that is under control. She is to have an echocardiogram in June 2019 then see me.       9/26/17: Patient presents today for follow-up of angina.  She is diabetic and is getting uncontrolled. Victor Manuel Chau suggested pediatric surgery and I agree. No further angina. Palpitations are better. She has dyslipidemia that is under control. See me in 6 months.       11/15/16: For fu of angina. Obese and diabetic. Occasional palpitation. No syncopy. No CHF. See in 6M       9/3/15: Works at [employer] care. C/O L sided CP. And palpitation. Increase toprol to 100 BID. Get Thyroid profile. See me in LO.     3/10/15: Stress test and echo were both normal.Valves OK. Quit smoking for 3 weeks. On nicoderm patch. Hb 1 C 7. 5. See in 6M. No angina,CHF or syncopy.       12/9/14: Still smokes. Will start nicoderm patch. Will see me in March. Will need roxanne,Echo,YAKELIN and acomplete PFT. See me after. She works for [employer].       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 Mother had MI at 48. Father dead in his 46s. Non smoker but had DM. She stopped smoking 7 years ago but now resumed. DM. On insulin. Patient of Tanisha Wright. Works at [employer]. Had atypical CP few weeks ago that resolved. Will start altace 5 a day. For renal protection. Stop smoking. See me in the LO in 4M.                 Past Medical History:   Diagnosis Date    Acute midline thoracic back pain 9/18/2018    B12 deficiency     Family history of premature CAD 9/4/2013    Fatty liver     Gastroesophageal reflux disease 1/6/2021    HPV (human papilloma virus) anogenital infection     Hyperlipidemia     Hypertension     Irritable bowel syndrome with diarrhea 4/26/2021    Liver hemangioma 2009/2015    Lumbar radiculopathy 7/7/2021    Obesity 3/11/13    BMI 43.42    Orthostatic hypotension     Ovarian cyst     PMR (polymyalgia rheumatica) (HCC)     Rectal fissure     Tobacco abuse 9/3/2015    Tobacco abuse     Tremor     Uncontrolled diabetes mellitus with complications ([facility] [address]     Unspecified sleep apnea        Past Surgical History:   Procedure Laterality Date    CARDIAC CATHETERIZATION  4-07    COLONOSCOPY      for diarrhea (-)    COLONOSCOPY N/A 2/10/2021    COLONOSCOPY DIAGNOSTIC performed by Shaan Reynoso MD at [address]  12-10    [facility]  1-05    GA MUSCLE BIOPSY Left 2018    LEFT QUADRICEPS MUSCLE BIOPSY performed by Byron Sauceda MD at [address] UNI/BI CANNULATION N/A 2018    T 12 VERTEBRALPLASTY ROOM 278 performed by Abby Cruz MD at [address] ENDOSCOPY  10/13/15     DR. HERRERA     UPPER GASTROINTESTINAL ENDOSCOPY N/A 2/10/2021    EGD ESOPHAGOGASTRODUODENOSCOPY performed by Shaan Reynoso MD at [facility]         Family History   Problem Relation Age of Onset    Diabetes Father     Heart Disease Mother     Colon Cancer Neg Hx     Cancer Neg Hx        Social History     Socioeconomic History    Marital status:      Spouse name: None    Number of children: 1    Years of education: None    Highest education level: None   Occupational History    None   Tobacco Use    Smoking status: Former Smoker     Packs/day: 1.00     Types: Cigarettes     Quit date: 2017     Years since quittin.6    Smokeless tobacco: Never Used   Vaping Use    Vaping Use: Never used   Substance and Sexual Activity    Alcohol use:  Yes     Alcohol/week: 0.0 standard drinks     Comment: socially    Drug use: No    Sexual activity: Yes     Partners: Male     Comment: single   Other Topics Concern    None   Social History Narrative    Social/Functional History          Social Determinants of Health     Financial Resource Strain: Low Risk     Difficulty of Paying Living Expenses: Not hard at all   Food Insecurity: No Food Insecurity    Worried About Running Out of Food in the Last Year: Never true    Jung of Food in the Last Year: Never true   Transportation Needs: No Transportation Needs    Lack of Transportation (Medical): No    Lack of Transportation (Non-Medical): No   Physical Activity:     Days of Exercise per Week:     Minutes of Exercise per Session:    Stress:     Feeling of Stress :    Social Connections:     Frequency of Communication with Friends and Family:     Frequency of Social Gatherings with Friends and Family:     Attends Taoist Services:     Active Member of Clubs or Organizations:     Attends Club or Organization Meetings:     Marital Status:    Intimate Partner Violence:     Fear of Current or Ex-Partner:     Emotionally Abused:     Physically Abused:     Sexually Abused: Allergies   Allergen Reactions    Morphine And Related Hives and Rash    Ace Inhibitors Other (See Comments)     Dizziness and near syncope    Bactrim [Sulfamethoxazole-Trimethoprim] Itching    Nitroglycerin Other (See Comments)     Migraine    Percocet [Oxycodone-Acetaminophen] Nausea And Vomiting    Victoza [Liraglutide]      NAUSEA       Current Outpatient Medications   Medication Sig Dispense Refill    rosuvastatin (CRESTOR) 10 MG tablet TAKE ONE TABLET BY MOUTH NIGHTLY 90 tablet 0    esomeprazole (NEXIUM) 40 MG delayed release capsule TAKE 1 CAPSULE BY MOUTH ONE TIME A DAY 30 capsule 3    metFORMIN (GLUCOPHAGE) 500 MG tablet TAKE 1 TABLET BY MOUTH 3 TIMES A  tablet 3    predniSONE (DELTASONE) 5 MG tablet Take 1 tablet by mouth daily 90 tablet 0    baclofen (LIORESAL) 10 MG tablet TAKE ONE-HALF TABLET BY MOUTH TWICE A DAY 90 tablet 1    losartan (COZAAR) 50 MG tablet Take 1 tablet by mouth daily 90 tablet 1    clotrimazole-betamethasone (LOTRISONE) 1-0.05 % cream APPLY TOPICALLY TWO TIMES A DAY 1 Tube 3    nystatin (MYCOSTATIN) 697942 UNIT/GM powder Apply 3 times daily. 1 Bottle 2    ondansetron (ZOFRAN ODT) 4 MG disintegrating tablet Take 1 tablet by mouth every 8 hours as needed for Nausea or Vomiting 60 tablet 3    terbinafine (LAMISIL) 1 % cream Apply topically 2 times daily.  1 Tube 1    albuterol sulfate HFA (PROAIR HFA) 108 (90 Base) MCG/ACT inhaler Use every 4 hours while awake for 7-10 days then PRN wheezing  Dispense with SPACER and Instruct on use. May sub Ventolin or Proventil as needed per [employer]. 1 Inhaler 0    metoprolol (LOPRESSOR) 100 MG tablet Take 1 tablet by mouth 2 times daily 180 tablet 0    promethazine (PHENERGAN) 25 MG tablet Take 25 mg by mouth every 8 hours      erythromycin base (E-MYCIN) 250 MG tablet Take 1 tablet by mouth 3 times daily 90 tablet 3    venlafaxine (EFFEXOR XR) 75 MG extended release capsule Take 1 capsule by mouth daily TAKE TWO CAPSULES BY MOUTH EVERY MORNING AND TAKE ONE CAPSULE BY MOUTH EVERY EVENING 270 capsule 1    insulin lispro, 1 Unit Dial, (HUMALOG KWIKPEN) 100 UNIT/ML SOPN 50-60 units at each meals 60 pen 3    Insulin Glargine, 2 Unit Dial, (TOUJEO MAX SOLOSTAR) 300 UNIT/ML SOPN 130 units at bedtime 90 pen 3    metoclopramide (REGLAN) 10 MG tablet Take 1 tablet by mouth 3 times daily (with meals) 360 tablet 3    famotidine (PEPCID) 20 MG tablet Take 1 tablet by mouth 2 times daily 30 tablet 0    predniSONE (DELTASONE) 5 MG tablet Take 5 mg by mouth daily      Continuous Blood Gluc Sensor (FREESTYLE JAIRO 14 DAY SENSOR) MISC Every 2 weeks 6 each 03    Continuous Blood Gluc  (FREESTYLE JAIRO 14 DAY READER) ALEXANDREA As directed 1 Device 00    pioglitazone (ACTOS) 30 MG tablet Take 1 tablet by mouth daily 90 tablet 3    Insulin Pen Needle (PEN NEEDLES) 32G X 4 MM MISC Use as directed with insulin pens, 4 times daily 400 each 1    Insulin Pen Needle (PEN NEEDLES) 32G X 4 MM MISC Use as directed with insulin pens, 4 times daily 400 each 1    hydroCHLOROthiazide (HYDRODIURIL) 25 MG tablet Take 1 tablet by mouth daily 90 tablet 1    blood glucose test strips (PRODIGY NO CODING BLOOD GLUC) strip 1 each by In Vitro route 4 times daily As needed.  400 each 3    blood glucose test strips (PRODIGY NO CODING BLOOD GLUC) strip 1 each by In Vitro route 4 times daily As needed. 400 each 3    solifenacin (VESICARE) 10 MG tablet TAKE 1 TABLET BY MOUTH ONE TIME A DAY 90 tablet 3    Blood Glucose Monitoring Suppl (PRODIGY AUTOCODE BLOOD GLUCOSE) w/Device KIT Use as directed to test up to 4 times daily 1 kit 0    PRODIGY LANCETS 28G MISC Use as directed to test up to 4 times daily 400 each 3    miconazole (MICOTIN) 2 % cream Apply topically 2 times daily. 141 g 3    Handicap Placard MISC Exp 5 years 1 each 0     No current facility-administered medications for this visit. Review of Systems:   Review of Systems   Constitutional: Negative for appetite change, diaphoresis and fatigue. Respiratory: Negative for apnea, chest tightness and shortness of breath. Cardiovascular: Positive for palpitations. Negative for chest pain and leg swelling. Musculoskeletal: Negative for myalgias. Skin: Negative for color change, pallor, rash and wound. Neurological: Negative for dizziness, syncope, weakness, light-headedness, numbness and headaches. Hematological: Does not bruise/bleed easily. Psychiatric/Behavioral: Negative for agitation, behavioral problems and confusion. The patient is not nervous/anxious and is not hyperactive. Review of System is negative except for as mentioned above. Physical Examination:    /84 (Site: Right Upper Arm, Position: Sitting, Cuff Size: Large Adult)   Pulse 70   Ht 5' 6\" (1.676 m)   Wt 283 lb (128.4 kg)   LMP  (LMP Unknown)   SpO2 98%   BMI 45.68 kg/m²    Physical Exam   Constitutional: She appears healthy. No distress. HENT:   Nose: Nose normal.   Mouth/Throat: Dentition is normal. Oropharynx is clear. Eyes: Pupils are equal, round, and reactive to light. Conjunctivae are normal.   Neck: Thyroid normal.   Cardiovascular: Regular rhythm, S1 normal, S2 normal, normal heart sounds, intact distal pulses and normal pulses. PMI is not displaced. No murmur heard.   Pulmonary/Chest: She has no wheezes. She has no rales. She exhibits no tenderness. Abdominal: Soft. Bowel sounds are normal. She exhibits no distension and no mass. There is no splenomegaly or hepatomegaly. There is no abdominal tenderness. No hernia. Musculoskeletal:      Cervical back: Normal range of motion and neck supple. Neurological: She is alert and oriented to person, place, and time. She has normal motor skills. Gait normal.   Skin: Skin is warm and dry. No cyanosis. No jaundice. Nails show no clubbing.            Patient Active Problem List   Diagnosis    Orthostatic hypotension    Cobalamin deficiency    HPV (human papilloma virus) anogenital infection    Cyst of ovary    Obesity due to excess calories, unspecified obesity severity    RAE (obstructive sleep apnea)    Dyslipidemia    FH: premature coronary heart disease    Adult body mass index 40 and over    Pulmonary hypertension (HCC)    Herpes simplex type 1 infection    Tobacco user    Type 2 diabetes mellitus with complication, with long-term current use of insulin (HCC)    Pruritus of vagina    Vaginal irritation    [facility]aparesis ([facility])    Compression fracture of lumbar vertebra (HCC)    Acute thoracic back pain    Muscle weakness    Muscle pain    Fracture of first lumbar vertebra (HCC)    PMR (polymyalgia rheumatica) ([facility])    Disorder of muscle, unspecified    Diarrhea    Nausea and vomiting    Gastroesophageal reflux disease    Gastritis without bleeding    Polyp of colon    Gastroparesis    Irritable bowel syndrome with diarrhea    Wound drainage    Lumbar radiculopathy    Erythema intertrigo    Open wound of abdomen           Orders Placed This Encounter   Procedures    EKG 12 lead     Order Specific Question:   Reason for Exam?     Answer:   Irregular heart rate    EKG 12 lead     Order Specific Question:   Reason for Exam?     Answer:   Irregular heart rate         No orders of the defined types were placed in this encounter. Assessment/Orders:       ICD-10-CM    1. Pulmonary hypertension (HCC)  I27.20 EKG 12 lead     EKG 12 lead   2. FH: premature coronary heart disease  Z82.49 EKG 12 lead     EKG 12 lead       No orders of the defined types were placed in this encounter. There are no discontinued medications. Orders Placed This Encounter   Procedures    EKG 12 lead     Order Specific Question:   Reason for Exam?     Answer:   Irregular heart rate    EKG 12 lead     Order Specific Question:   Reason for Exam?     Answer:   Irregular heart rate         Plan:  Recommended [facility].    Stay on same medications.     See me in 3 months         Electronically signed by: Ash Villasenor MD  8/9/2021 2:08 PM

## 2021-08-09 NOTE — PROGRESS NOTES
Post-Discharge Transitional Care Management Services or Hospital Follow Up      Rohan Castañeda   YOB: 1975    Date of Office Visit:  8/9/2021  Date of Hospital Admission: 8/2/21  Date of Hospital Discharge: 8/3/21  Readmission Risk Score(high >=14%.  Medium >=10%):No data recorded    Care management risk score Rising risk (score 2-5) and Complex Care (Scores >=6): 9     Non face to face  following discharge, date last encounter closed (first attempt may have been earlier): 8/5/2021 11:33 AM 8/5/2021 11:33 AM    Call initiated 2 business days of discharge: Yes     Patient Active Problem List   Diagnosis    Orthostatic hypotension    Cobalamin deficiency    HPV (human papilloma virus) anogenital infection    Cyst of ovary    Obesity due to excess calories, unspecified obesity severity    RAE (obstructive sleep apnea)    Dyslipidemia    FH: premature coronary heart disease    Adult body mass index 40 and over    Pulmonary hypertension ([facility] [address]    Herpes simplex type 1 infection    Tobacco user    Type 2 diabetes mellitus with complication, with long-term current use of insulin (HCC)    Pruritus of vagina    Vaginal irritation    Paraparesis (HCC)    Compression fracture of lumbar vertebra (HCC)    Acute thoracic back pain    Muscle weakness    Muscle pain    Fracture of first lumbar vertebra (HCC)    PMR [facility]ym[facility]algia [facility]umatica) ([facility])    Disorder of muscle, unspecified    Diarrhea    Nausea and vomiting    Gastroesophageal reflux disease    Gastritis without bleeding    Polyp of colon    Gastroparesis    Irritable bowel syndrome with diarrhea    Wound drainage    Lumbar radiculopathy    Erythema intertrigo    Open wound of abdomen       Allergies   Allergen Reactions    Morphine And Related Hives and Rash    Ace Inhibitors Other (See Comments)     Dizziness and near syncope    Bactrim [Sulfamethoxazole-Trimethoprim] Itching    Nitroglycerin Other (See Comments) Migraine    Percocet [Oxycodone-Acetaminophen] Nausea And Vomiting    Victoza [Liraglutide]      NAUSEA       Medications listed as ordered at the time of discharge from hospital   Josedelroy Lam P[address] K[address] [facility]\"   Home Medication Instructions MILTON:    Printed on:08/13/21 7536   Medication Information                      albuterol sulfate HFA (PROAIR HFA) 108 (90 Base) MCG/ACT inhaler  Use every 4 hours while awake for 7-10 days then PRN wheezing  Dispense with SPACER and Instruct on use. May sub Ventolin or Proventil as needed per [employer]. baclofen (LIORESAL) 10 MG tablet  TAKE ONE-HALF TABLET BY MOUTH TWICE A DAY             Blood Glucose Monitoring Suppl (PRODIGY AUTOCODE BLOOD GLUCOSE) w/Device KIT  Use as directed to test up to 4 times daily             blood glucose test strips (PRODIGY NO CODING BLOOD GLUC) strip  1 each by In Vitro route 4 times daily As needed. blood glucose test strips (PRODIGY NO CODING BLOOD GLUC) strip  1 each by In Vitro route 4 times daily As needed.              clotrimazole-betamethasone (LOTRISONE) 1-0.05 % cream  APPLY TOPICALLY TWO TIMES A DAY             Continuous Blood Gluc  (FREESTYLE JAIRO 14 DAY READER) ALEXANDREA  As directed             Continuous Blood Gluc Sensor (FREESTYLE JAIRO 14 DAY SENSOR) MISC  Every 2 weeks             erythromycin base (E-MYCIN) 250 MG tablet  Take 1 tablet by mouth 3 times daily             esomeprazole (NEXIUM) 40 MG delayed release capsule  TAKE 1 CAPSULE BY MOUTH ONE TIME A DAY             famotidine (PEPCID) 20 MG tablet  Take 1 tablet by mouth 2 times daily             Handicap Placard MISC  Exp 5 years             hydroCHLOROthiazide (HYDRODIURIL) 25 MG tablet  Take 1 tablet by mouth daily             Insulin Glargine, 2 Unit Dial, (TOUJEO MAX SOLOSTAR) 300 UNIT/ML SOPN  130 units at bedtime             insulin lispro, 1 Unit Dial, (HUMALOG KWIKPEN) 100 UNIT/ML SOPN  50-60 units at each meals Insulin Pen Needle (PEN NEEDLES) 32G X 4 MM MISC  Use as directed with insulin pens, 4 times daily             Insulin Pen Needle (PEN NEEDLES) 32G X 4 MM MISC  Use as directed with insulin pens, 4 times daily             losartan (COZAAR) 50 MG tablet  Take 1 tablet by mouth daily             metFORMIN (GLUCOPHAGE) 500 MG tablet  TAKE 1 TABLET BY MOUTH 3 TIMES A DAY             metoclopramide (REGLAN) 10 MG tablet  Take 1 tablet by mouth 3 times daily (with meals)             metoprolol (LOPRESSOR) 100 MG tablet  Take 1 tablet by mouth 2 times daily             miconazole (MICOTIN) 2 % cream  Apply topically 2 times daily. nystatin (MYCOSTATIN) 305973 UNIT/GM powder  Apply 3 times daily. ondansetron (ZOFRAN ODT) 4 MG disintegrating tablet  Take 1 tablet by mouth every 8 hours as needed for Nausea or Vomiting             pioglitazone (ACTOS) 30 MG tablet  Take 1 tablet by mouth daily             predniSONE (DELTASONE) 5 MG tablet  Take 5 mg by mouth daily             predniSONE (DELTASONE) 5 MG tablet  Take 1 tablet by mouth daily             PRODIGY LANCETS 28G MISC  Use as directed to test up to 4 times daily             promethazine (PHENERGAN) 25 MG tablet  Take 25 mg by mouth every 8 hours             rosuvastatin (CRESTOR) 10 MG tablet  TAKE ONE TABLET BY MOUTH NIGHTLY             solifenacin (VESICARE) 10 MG tablet  TAKE 1 TABLET BY MOUTH ONE TIME A DAY             terbinafine (LAMISIL) 1 % cream  Apply topically 2 times daily.              venlafaxine (EFFEXOR XR) 75 MG extended release capsule  Take 1 capsule by mouth daily TAKE TWO CAPSULES BY MOUTH EVERY MORNING AND TAKE ONE CAPSULE BY MOUTH EVERY EVENING                   Medications marked \"taking\" at this time  Outpatient Medications Marked as Taking for the 8/9/21 encounter (Office Visit) with Manju Llamas MD   Medication Sig Dispense Refill    rosuvastatin (CRESTOR) 10 MG tablet TAKE ONE TABLET BY MOUTH NIGHTLY 90 Blood Gluc Sensor (FREESTYLE JAIRO 14 DAY SENSOR) MISC Every 2 weeks 6 each 03    Continuous Blood Gluc  (FREESTYLE JAIRO 14 DAY READER) ALEXANDREA As directed 1 Device 00    pioglitazone (ACTOS) 30 MG tablet Take 1 tablet by mouth daily 90 tablet 3    Insulin Pen Needle (PEN NEEDLES) 32G X 4 MM MISC Use as directed with insulin pens, 4 times daily 400 each 1    Insulin Pen Needle (PEN NEEDLES) 32G X 4 MM MISC Use as directed with insulin pens, 4 times daily 400 each 1    hydroCHLOROthiazide (HYDRODIURIL) 25 MG tablet Take 1 tablet by mouth daily 90 tablet 1    blood glucose test strips (PRODIGY NO CODING BLOOD GLUC) strip 1 each by In Vitro route 4 times daily As needed. 400 each 3    blood glucose test strips (PRODIGY NO CODING BLOOD GLUC) strip 1 each by In Vitro route 4 times daily As needed. 400 each 3    solifenacin (VESICARE) 10 MG tablet TAKE 1 TABLET BY MOUTH ONE TIME A DAY 90 tablet 3    Blood Glucose Monitoring Suppl (PRODIGY AUTOCODE BLOOD GLUCOSE) w/Device KIT Use as directed to test up to 4 times daily 1 kit 0    miconazole (MICOTIN) 2 % cream Apply topically 2 times daily. 141 g 3    Handicap Placard MISC Exp 5 years 1 each 0        Medications patient taking as of now reconciled against medications ordered at time of hospital discharge: Yes    Chief Complaint   Patient presents with    Follow-Up from Hospital     pt went to the er on monday for stomach issues and was told to follow up because her lactic acid was elevated and her spleen was enlarged--pt says she is feeling good at this time---[facility]    Inpatient course: Discharge summary reviewed- see chart. Interval history/Current status:   Patient is a very pleasant 71-year-old female presents today to follow-up after recent ER visit for nausea and vomiting. She was noted to have a gastroparesis episode and symptoms have completely resolved. She was given a dose of Haldol in the emergency room which was extremely effective. She had a noted elevation in lactic acid and was given fluid resuscitation. CT scan showed splenomegaly and possibility of nephrolithiasis. Patient denies any dysuria, hematuria or discharge. She denies any flank pain,  fever or easy bruising. SHe feels back to her baseline. The scan also mentions some soft tissue thickening but patient states that area underneath her pannus has been much improved and wounds have completely healed after consultation with wound care. Review of Systems   Constitutional: Negative for activity change, appetite change and fatigue. Respiratory: Negative for apnea, cough, chest tightness and shortness of breath. Cardiovascular: Negative for chest pain, palpitations and leg swelling. Gastrointestinal: Negative for abdominal pain, blood in stool, constipation, diarrhea, nausea and vomiting. Musculoskeletal: Negative for arthralgias. Neurological: Negative for seizures and headaches. Psychiatric/Behavioral: Negative for hallucinations and suicidal ideas. Vitals:    08/09/21 1749   BP: 118/70   Site: Right Upper Arm   Position: Sitting   Cuff Size: Large Adult   Pulse: 98   Resp: 18   Temp: 97.9 °F (36.6 °C)   TempSrc: Temporal   SpO2: 95%   Weight: 284 lb (128.8 kg)     Body mass index is 45.84 kg/m². Wt Readings from Last 3 Encounters:   08/09/21 284 lb (128.8 kg)   08/09/21 283 lb (128.4 kg)   08/02/21 280 lb (127 kg)     BP Readings from Last 3 Encounters:   08/09/21 118/70   08/09/21 120/84   08/03/21 137/80       Physical Exam  Vitals and nursing note reviewed. Constitutional:       General: She is not in acute distress. Appearance: Normal appearance. She is well-developed. She is not diaphoretic. HENT:      Head: Normocephalic and atraumatic. Nose: Nose normal.      Mouth/Throat:      Mouth: Mucous membranes are moist.      Pharynx: Oropharynx is clear.    Eyes:      Conjunctiva/sclera: Conjunctivae normal.      Pupils: Pupils are equal, round, and reactive to light. Cardiovascular:      Rate and Rhythm: Normal rate and regular rhythm. Heart sounds: Normal heart sounds. No murmur heard. No friction rub. No gallop. Pulmonary:      Effort: Pulmonary effort is normal. No respiratory distress. Breath sounds: Normal breath sounds. No wheezing or rales. Chest:      Chest wall: No tenderness. Abdominal:      General: Abdomen is flat. Bowel sounds are normal. There is no distension. Palpations: Abdomen is soft. There is no mass. Tenderness: There is no abdominal tenderness. There is no right CVA tenderness, left CVA tenderness or rebound. Hernia: No hernia is present. Musculoskeletal:      Cervical back: Normal range of motion. Skin:     General: Skin is warm and dry. Neurological:      Mental Status: She is alert and oriented to person, place, and time. Psychiatric:         Behavior: Behavior normal.         Thought Content: Thought content normal.         Judgment: Judgment normal.             Assessment/Plan:  1. Generalized abdominal pain  Resolved: Continue current management  - [place] DISCHARGE MEDS RECONCILED W/ CURRENT OUTPATIENT MED LIST    2. Nephrolithiasis  Unclear etiology. Urinalysis does show mild amount of blood. Would consider Flomax but patient has a possible allergy cross-reactivity  - POCT Urinalysis no Micro  - Urine Reflex to Culture; Future    3. Splenomegaly  Unclear etiology but will have patient follow-up with gastroenterology for further assessment  - Comprehensive Metabolic Panel; Future    4. Leukocytosis, unspecified type  Recheck CBC to ensure resolution  - CBC Auto Differential; Future    5. Elevated lactic acid level  reCheck lactic acid to ensure resolution  - Lactic Acid, Plasma;  Future        Medical Decision Making: high complexity

## 2021-08-10 LAB
BACTERIA: NEGATIVE /HPF
BILIRUBIN URINE: NEGATIVE
BLOOD, URINE: ABNORMAL
CLARITY: CLEAR
COLOR: YELLOW
EPITHELIAL CELLS, UA: ABNORMAL /HPF (ref 0–5)
GLUCOSE URINE: NEGATIVE MG/DL
HYALINE CASTS: ABNORMAL /HPF (ref 0–5)
KETONES, URINE: NEGATIVE MG/DL
LEUKOCYTE ESTERASE, URINE: ABNORMAL
NITRITE, URINE: NEGATIVE
PH UA: 6 (ref 5–9)
PROTEIN UA: ABNORMAL MG/DL
RBC UA: ABNORMAL /HPF (ref 0–5)
SPECIFIC GRAVITY UA: 1.02 (ref 1–1.03)
URINE REFLEX TO CULTURE: ABNORMAL
UROBILINOGEN, URINE: 0.2 E.U./DL
WBC UA: ABNORMAL /HPF (ref 0–5)

## 2021-08-11 DIAGNOSIS — R16.1 SPLENOMEGALY: ICD-10-CM

## 2021-08-11 DIAGNOSIS — R71.8 MICROCYTOSIS: ICD-10-CM

## 2021-08-11 DIAGNOSIS — N20.0 NEPHROLITHIASIS: Primary | ICD-10-CM

## 2021-08-11 RX ORDER — METOPROLOL TARTRATE 100 MG/1
100 TABLET ORAL 2 TIMES DAILY
Qty: 180 TABLET | Refills: 1 | Status: SHIPPED | OUTPATIENT
Start: 2021-08-11 | End: 2022-02-07

## 2021-08-11 NOTE — TELEPHONE ENCOUNTER
Pharmacy requesting medication refill.  Please approve or deny this request.    Rx requested:  Requested Prescriptions     Pending Prescriptions Disp Refills    metoprolol (LOPRESSOR) 100 MG tablet 180 tablet 1     Sig: Take 1 tablet by mouth 2 times daily         Last Office Visit:   8/9/2021      Next Visit Date:  Future Appointments   Date Time Provider Iliana Garcia   8/16/2021  8:15 AM Crissy Martinez MD [facility]   9/16/2021  9:15 AM Shantell Olivo MD [facility]   11/1/2021  3:15 PM Sawyer Yañez MD [facility]   11/15/2021  9:45 AM Raza Villasenor MD [facility]

## 2021-08-12 LAB
FERRITIN: 130.8 NG/ML (ref 13–150)
IRON SATURATION: 18 % (ref 11–46)
IRON: 50 UG/DL (ref 37–145)
TOTAL IRON BINDING CAPACITY: 283 UG/DL (ref 178–450)

## 2021-08-13 ENCOUNTER — TELEPHONE (OUTPATIENT)
Dept: UROLOGY | Age: 46
End: 2021-08-13

## 2021-08-13 ASSESSMENT — ENCOUNTER SYMPTOMS
BLOOD IN STOOL: 0
CHEST TIGHTNESS: 0
ABDOMINAL PAIN: 0
APNEA: 0
SHORTNESS OF BREATH: 0
DIARRHEA: 0
VOMITING: 0
NAUSEA: 0
CONSTIPATION: 0
COUGH: 0

## 2021-08-13 NOTE — TELEPHONE ENCOUNTER
CT ABDOMEN PELVIS W IV CONTRAST         8-3-21    Impression   1. SPLENOMEGALY. CORRELATE CLINICALLY. 2. QUESTION POSSIBLE CALCULI LEFT KIDNEY LESS THAN 2 MM. 3. OTHER FINDINGS DETAILED ABOVE         Would you like a KUB?

## 2021-08-16 ENCOUNTER — OFFICE VISIT (OUTPATIENT)
Dept: UROLOGY | Age: 46
End: 2021-08-16
Payer: COMMERCIAL

## 2021-08-16 VITALS
SYSTOLIC BLOOD PRESSURE: 110 MMHG | BODY MASS INDEX: 45.48 KG/M2 | DIASTOLIC BLOOD PRESSURE: 72 MMHG | HEART RATE: 71 BPM | WEIGHT: 283 LBS | HEIGHT: 66 IN

## 2021-08-16 DIAGNOSIS — R31.29 MICROHEMATURIA: Primary | ICD-10-CM

## 2021-08-16 DIAGNOSIS — Z79.4 TYPE 2 DIABETES MELLITUS WITH COMPLICATION, WITH LONG-TERM CURRENT USE OF INSULIN (HCC): ICD-10-CM

## 2021-08-16 DIAGNOSIS — E11.8 TYPE 2 DIABETES MELLITUS WITH COMPLICATION, WITH LONG-TERM CURRENT USE OF INSULIN (HCC): ICD-10-CM

## 2021-08-16 DIAGNOSIS — K21.9 GASTROESOPHAGEAL REFLUX DISEASE, UNSPECIFIED WHETHER ESOPHAGITIS PRESENT: ICD-10-CM

## 2021-08-16 PROCEDURE — 99213 OFFICE O/P EST LOW 20 MIN: CPT | Performed by: UROLOGY

## 2021-08-16 RX ORDER — INSULIN LISPRO 100 [IU]/ML
INJECTION, SOLUTION INTRAVENOUS; SUBCUTANEOUS
Qty: 90 PEN | Refills: 3 | Status: SHIPPED | OUTPATIENT
Start: 2021-08-16 | End: 2022-05-05 | Stop reason: SDUPTHER

## 2021-08-16 RX ORDER — ESOMEPRAZOLE MAGNESIUM 40 MG/1
CAPSULE, DELAYED RELEASE ORAL
Qty: 30 CAPSULE | Refills: 3 | Status: SHIPPED | OUTPATIENT
Start: 2021-08-16 | End: 2021-09-07

## 2021-08-16 NOTE — PROGRESS NOTES
[facility] EVALUATION NOTE                                                 H&P                                                                                                                                                 Reason for Visit  Microhematuria, nephrocalcinosis, urinary incontinence    History of Present Illness  55-year-old female was recently seen in the emergency room for gastroparesis  CT scan showed questionable left distal ureteral calculus  Patient was not having any renal colic type pain  Patient continues to take Vesicare with fair amount of success  Continues to have microhematuria most likely secondary to diabetes    Urologic Review of Systems/Symptoms  Microhematuria    Review of Systems  Hospitalization: None recent  All 14 categories of Review of Systems otherwise reviewed no other findings reported.   No change in medical history  Past Medical History:   Diagnosis Date    Acute midline thoracic back pain 9/18/2018    B12 deficiency     Family history of premature CAD 9/4/2013    Fatty liver     Gastroesophageal reflux disease 1/6/2021    HPV (human papilloma virus) anogenital infection     Hyperlipidemia     Hypertension     Irritable bowel syndrome with diarrhea 4/26/2021    Liver hemangioma 2009/2015    Lumbar radiculopathy 7/7/2021    Obesity 3/11/13    BMI 43.42    Orthostatic hypotension     Ovarian cyst     PMR (polymyalgia rheumatica) (HCC)     Rectal fissure     Tobacco abuse 9/3/2015    Tobacco abuse     Tremor     Uncontrolled diabetes mellitus with complications ([facility] [address]     Unspecified sleep apnea      Past Surgical History:   Procedure Laterality Date    CARDIAC CATHETERIZATION  4-07    COLONOSCOPY  2013    for diarrhea (-)    COLONOSCOPY N/A 2/10/2021    COLONOSCOPY DIAGNOSTIC performed [facility] at [facility] 96  12-10    [facility]  1-05    [place] MUSCLE BIOPSY Left 9/21/2018    LEFT QUADRICEPS MUSCLE BIOPSY performed Club or Organization Meetings:     Marital Status:    Intimate Partner Violence:     Fear of Current or Ex-Partner:     Emotionally Abused:     Physically Abused:     Sexually Abused:      Family History   Problem Relation Age of Onset    Diabetes Father     Heart Disease Mother     Colon Cancer Neg Hx     Cancer Neg Hx      Current Outpatient Medications   Medication Sig Dispense Refill    metoprolol (LOPRESSOR) 100 MG tablet Take 1 tablet by mouth 2 times daily 180 tablet 1    rosuvastatin (CRESTOR) 10 MG tablet TAKE ONE TABLET BY MOUTH NIGHTLY 90 tablet 0    esomeprazole (NEXIUM) 40 MG delayed release capsule TAKE 1 CAPSULE BY MOUTH ONE TIME A DAY 30 capsule 3    metFORMIN (GLUCOPHAGE) 500 MG tablet TAKE 1 TABLET BY MOUTH 3 TIMES A  tablet 3    predniSONE (DELTASONE) 5 MG tablet Take 1 tablet by mouth daily 90 tablet 0    baclofen (LIORESAL) 10 MG tablet TAKE ONE-HALF TABLET BY MOUTH TWICE A DAY 90 tablet 1    losartan (COZAAR) 50 MG tablet Take 1 tablet by mouth daily 90 tablet 1    clotrimazole-betamethasone (LOTRISONE) 1-0.05 % cream APPLY TOPICALLY TWO TIMES A DAY 1 Tube 3    nystatin (MYCOSTATIN) 153814 UNIT/GM powder Apply 3 times daily. 1 Bottle 2    ondansetron (ZOFRAN ODT) 4 MG disintegrating tablet Take 1 tablet by mouth every 8 hours as needed for Nausea or Vomiting 60 tablet 3    terbinafine (LAMISIL) 1 % cream Apply topically 2 times daily. 1 Tube 1    albuterol sulfate HFA (PROAIR HFA) 108 (90 Base) MCG/ACT inhaler Use every 4 hours while awake for 7-10 days then PRN wheezing  Dispense with SPACER and Instruct on use. May sub Ventolin or Proventil as needed per [employer].  1 Inhaler 0    promethazine (PHENERGAN) 25 MG tablet Take 25 mg by mouth every 8 hours      erythromycin base (E-MYCIN) 250 MG tablet Take 1 tablet by mouth 3 times daily 90 tablet 3    venlafaxine (EFFEXOR XR) 75 MG extended release capsule Take 1 capsule by mouth daily TAKE TWO CAPSULES BY MOUTH EVERY MORNING AND TAKE ONE CAPSULE BY MOUTH EVERY EVENING 270 capsule 1    insulin lispro, 1 Unit Dial, (HUMALOG KWIKPEN) 100 UNIT/ML SOPN 50-60 units at each meals 60 pen 3    Insulin Glargine, 2 Unit Dial, (TOUJEO MAX SOLOSTAR) 300 UNIT/ML SOPN 130 units at bedtime 90 pen 3    metoclopramide (REGLAN) 10 MG tablet Take 1 tablet by mouth 3 times daily (with meals) 360 tablet 3    famotidine (PEPCID) 20 MG tablet Take 1 tablet by mouth 2 times daily 30 tablet 0    predniSONE (DELTASONE) 5 MG tablet Take 5 mg by mouth daily      Continuous Blood Gluc Sensor (FREESTYLE JAIRO 14 DAY SENSOR) MISC Every 2 weeks 6 each 03    Continuous Blood Gluc  (FREESTYLE JAIRO 14 DAY READER) ALEXANDREA As directed 1 Device 00    pioglitazone (ACTOS) 30 MG tablet Take 1 tablet by mouth daily 90 tablet 3    Insulin Pen Needle (PEN NEEDLES) 32G X 4 MM MISC Use as directed with insulin pens, 4 times daily 400 each 1    Insulin Pen Needle (PEN NEEDLES) 32G X 4 MM MISC Use as directed with insulin pens, 4 times daily 400 each 1    hydroCHLOROthiazide (HYDRODIURIL) 25 MG tablet Take 1 tablet by mouth daily 90 tablet 1    blood glucose test strips (PRODIGY NO CODING BLOOD GLUC) strip 1 each by In Vitro route 4 times daily As needed. 400 each 3    blood glucose test strips (PRODIGY NO CODING BLOOD GLUC) strip 1 each by In Vitro route 4 times daily As needed. 400 each 3    solifenacin (VESICARE) 10 MG tablet TAKE 1 TABLET BY MOUTH ONE TIME A DAY 90 tablet 3    Blood Glucose Monitoring Suppl (PRODIGY AUTOCODE BLOOD GLUCOSE) w/Device KIT Use as directed to test up to 4 times daily 1 kit 0    PRODIGY LANCETS 28G MISC Use as directed to test up to 4 times daily 400 each 3    miconazole (MICOTIN) 2 % cream Apply topically 2 times daily. 141 g 3    Handicap Placard MISC Exp 5 years 1 each 0     No current facility-administered medications for this visit.      Morphine and related, Ace inhibitors, Bactrim [sulfamethoxazole-trimethoprim], Nitroglycerin, Percocet [oxycodone-acetaminophen], and Victoza [liraglutide]  All reviewed and verified by Dr My Malin on today's visit    No results found for: PSA, PSADIA  No results found for this visit on 08/16/21. Physical Exam  Vitals:    08/16/21 0810   BP: 110/72   Pulse: 71   Weight: 283 lb (128.4 kg)   Height: 5' 6\" (1.676 m)     Constitutional: Not in distress  X-ray reviewed  Medical history otherwise unchanged  Physical exam otherwise unchanged. Assessment/Medical Necessity-Decision Making  Question of renal calculus none noted on CT questionable nephrocalcinosis  Microhematuria in a non-smoker and a diabetic will do cystoscopy  Good success of urinary incontinence with Vesicare  History of urethral strictures which will be checked at the time of cystoscopy  Plan  Cystoscopy under local  Greater than 50% of 20 minutes spent consulting patient face-to-face  No orders of the defined types were placed in this encounter. No orders of the defined types were placed in this encounter. Cy Rivera MD       Please note this report has been partially produced using speech recognition software  And may cause contain errors related to that system including grammar, punctuation and spelling as well as words and phrases that may seem inappropriate. If there are questions or concerns please feel free to contact me to clarify.

## 2021-08-23 LAB
CHOLESTEROL, TOTAL: 117 MG/DL (ref 0–199)
GLUCOSE BLD-MCNC: 189 MG/DL (ref 70–99)
HDLC SERPL-MCNC: 34 MG/DL (ref 40–59)
LDL CHOLESTEROL CALCULATED: 56 MG/DL (ref 0–129)
TRIGL SERPL-MCNC: 134 MG/DL (ref 0–150)

## 2021-08-24 ENCOUNTER — OFFICE VISIT (OUTPATIENT)
Dept: GASTROENTEROLOGY | Age: 46
End: 2021-08-24
Payer: COMMERCIAL

## 2021-08-24 ENCOUNTER — CARE COORDINATION (OUTPATIENT)
Dept: OTHER | Facility: CLINIC | Age: 46
End: 2021-08-24

## 2021-08-24 VITALS
DIASTOLIC BLOOD PRESSURE: 70 MMHG | SYSTOLIC BLOOD PRESSURE: 120 MMHG | BODY MASS INDEX: 45.84 KG/M2 | WEIGHT: 284 LBS | OXYGEN SATURATION: 98 % | HEART RATE: 72 BPM | RESPIRATION RATE: 12 BRPM

## 2021-08-24 DIAGNOSIS — K31.84 GASTROPARESIS: Primary | ICD-10-CM

## 2021-08-24 DIAGNOSIS — R16.1 SPLENOMEGALY: ICD-10-CM

## 2021-08-24 PROCEDURE — 99213 OFFICE O/P EST LOW 20 MIN: CPT | Performed by: NURSE PRACTITIONER

## 2021-08-24 RX ORDER — GENTAMICIN SULFATE 1 MG/G
OINTMENT TOPICAL
COMMUNITY
Start: 2021-08-16 | End: 2022-02-23 | Stop reason: SDUPTHER

## 2021-08-24 ASSESSMENT — ENCOUNTER SYMPTOMS
ABDOMINAL DISTENTION: 0
ABDOMINAL PAIN: 0
PHOTOPHOBIA: 0
NAUSEA: 0
TROUBLE SWALLOWING: 0
RECTAL PAIN: 0
DIARRHEA: 0
VOMITING: 0
COLOR CHANGE: 0
VOICE CHANGE: 0
WHEEZING: 0
EYE REDNESS: 0
ANAL BLEEDING: 0
CHEST TIGHTNESS: 0
BLOOD IN STOOL: 0
CONSTIPATION: 0
SHORTNESS OF BREATH: 0
EYE PAIN: 0

## 2021-08-24 NOTE — PROGRESS NOTES
normal and was previously on erythromycin as treatment with good results. [place] has a longstanding history of GERD that is not well controlled on current regimen of Prilosec.  Most recent EGD in 2015 by Dr. Verba Lanes noted gastritis no path is available, and colonoscopy in 2013 by Dr Ding Ringer essentially normal.  She denies hematemesis, melena, or hematochezia.  Abdominal pain, or unintentional weight loss.  No family history of CRC, not on anticoagulants or antiplatelet     Past Medical History:   Diagnosis Date    Acute midline thoracic back pain 9/18/2018    B12 deficiency     Family history of premature CAD 9/4/2013    Fatty liver     Gastroesophageal reflux disease 1/6/2021    HPV (human papilloma virus) anogenital infection     Hyperlipidemia     Hypertension     Irritable bowel syndrome with diarrhea 4/26/2021    Liver hemangioma 2009/2015    Lumbar radiculopathy 7/7/2021    Obesity 3/11/13    BMI 43.42    Orthostatic hypotension     Ovarian cyst     [facility]     Rectal fissure     Tobacco abuse 9/3/2015    Tobacco abuse     Tremor     Uncontrolled diabetes mellitus with complications ([facility]     Unspecified sleep apnea      Past Surgical History:   Procedure Laterality Date    CARDIAC CATHETERIZATION  4-07    COLONOSCOPY  2013    for diarrhea (-)    COLONOSCOPY N/A 2/10/2021    COLONOSCOPY DIAGNOSTIC performed by Tri Fox MD at [place] [address]  12-10    [facility]  1-05    IL MUSCLE BIOPSY Left 9/21/2018    LEFT QUADRICEPS MUSCLE BIOPSY performed by Armani Lloyd MD at [facility] UNI/BI CANNULATION N/A 9/18/2018    T 12 VERTEBRALPLASTY ROOM 278 performed by Unknown Kehr, MD at [address] ENDOSCOPY  10/13/15     DR. HERRERA     UPPER GASTROINTESTINAL ENDOSCOPY N/A 2/10/2021    EGD ESOPHAGOGASTRODUODENOSCOPY performed by Tri Fox MD at [facility]       Social History     Socioeconomic History    Marital status:      Spouse name: Not on file    Number of children: 1    Years of education: Not on file    Highest education level: Not on file   Occupational History    Not on file   Tobacco Use    Smoking status: Former Smoker     Packs/day: 1.00     Types: Cigarettes     Quit date: 2017     Years since quittin.6    Smokeless tobacco: Never Used   Vaping Use    Vaping Use: Never used   Substance and Sexual Activity    Alcohol use: Yes     Alcohol/week: 0.0 standard drinks     Comment: socially    Drug use: No    Sexual activity: Yes     Partners: Male     Comment: single   Other Topics Concern    Not on file   Social History Narrative    Social/Functional History          Social Determinants of Health     Financial Resource Strain: Low Risk     Difficulty of Paying Living Expenses: Not hard at all   Food Insecurity: No Food Insecurity    Worried About Running Out of Food in the Last Year: Never true    Jung of Food in the Last Year: Never true   Transportation Needs: No Transportation Needs    Lack of Transportation (Medical): No    Lack of Transportation (Non-Medical):  No   Physical Activity:     Days of Exercise per Week:     Minutes of Exercise per Session:    Stress:     Feeling of Stress :    Social Connections:     Frequency of Communication with Friends and Family:     Frequency of Social Gatherings with Friends and Family:     Attends Mosque Services:     Active Member of Clubs or Organizations:     Attends Club or Organization Meetings:     Marital Status:    Intimate Partner Violence:     Fear of Current or Ex-Partner:     Emotionally Abused:     Physically Abused:     Sexually Abused:      Family History   Problem Relation Age of Onset    Diabetes Father     Heart Disease Mother     Colon Cancer Neg Hx     Cancer Neg Hx      Allergies   Allergen Reactions    Morphine And Related Hives and Rash    Ace Inhibitors Other (See Comments)     Dizziness and near syncope    Bactrim [Sulfamethoxazole-Trimethoprim] Itching    Nitroglycerin Other (See Comments)     Migraine    Percocet [Oxycodone-Acetaminophen] Nausea And Vomiting    Victoza [Liraglutide]      NAUSEA         Review of Systems   Constitutional: Negative for appetite change, chills, fever and unexpected weight change. HENT: Negative for nosebleeds, tinnitus, trouble swallowing and voice change. Eyes: Negative for photophobia, pain and redness. Respiratory: Negative for chest tightness, shortness of breath and wheezing. Cardiovascular: Negative for chest pain, palpitations and leg swelling. Gastrointestinal: Negative for abdominal distention, abdominal pain, anal bleeding, blood in stool, constipation, diarrhea, nausea, rectal pain and vomiting. Endocrine: Negative for polydipsia, polyphagia and polyuria. Genitourinary: Negative for difficulty urinating and hematuria. Skin: Negative for color change, pallor and rash. Neurological: Negative for dizziness, speech difficulty and headaches. Psychiatric/Behavioral: Negative for confusion and suicidal ideas. Objective:   /70 (Site: Right Upper Arm, Position: Sitting, Cuff Size: Large Adult)   Pulse 72   Resp 12   Wt 284 lb (128.8 kg)   LMP  (LMP Unknown)   SpO2 98%   BMI 45.84 kg/m²     Physical Exam  Vitals reviewed. Constitutional:       General: She is not in acute distress. Appearance: Normal appearance. She is well-developed and well-groomed. HENT:      Head: Normocephalic and atraumatic. Nose: Nose normal.   Eyes:      General: No scleral icterus. Extraocular Movements: Extraocular movements intact. Conjunctiva/sclera: Conjunctivae normal.      Pupils: Pupils are equal, round, and reactive to light. Cardiovascular:      Rate and Rhythm: Normal rate and regular rhythm.       Pulses: Normal pulses. Heart sounds: Normal heart sounds. Pulmonary:      Effort: Pulmonary effort is normal. No respiratory distress. Breath sounds: Normal breath sounds. No wheezing or rales. Abdominal:      General: Abdomen is flat. Bowel sounds are normal. There is no distension. Palpations: Abdomen is soft. There is no hepatomegaly, splenomegaly or mass. Tenderness: There is no abdominal tenderness. There is no guarding or rebound. Musculoskeletal:         General: No tenderness or deformity. Normal range of motion. Cervical back: Neck supple. Right lower leg: No edema. Left lower leg: No edema. Skin:     General: Skin is warm and dry. Capillary Refill: Capillary refill takes less than 2 seconds. Coloration: Skin is not jaundiced. Findings: No erythema or rash. Neurological:      General: No focal deficit present. Mental Status: She is alert and oriented to person, place, and time. Psychiatric:         Mood and Affect: Mood normal.         Behavior: Behavior normal.         Laboratory, Pathology, Radiology reviewed in detail with relevantimportant investigations summarized below:  Lab Results   Component Value Date    WBC 8.5 08/11/2021    WBC 14.0 08/02/2021    WBC 8.7 05/22/2021    WBC 12.1 03/19/2021    WBC 9.3 02/24/2021    HGB 12.2 08/11/2021    HGB 13.4 08/02/2021    HGB 11.7 05/22/2021    HGB 13.8 03/19/2021    HGB 12.5 02/24/2021    HCT 38.3 08/11/2021    HCT 40.9 08/02/2021    HCT 35.2 05/22/2021    HCT 42.5 03/19/2021    HCT 38.5 02/24/2021    MCV 80.2 08/11/2021    MCV 78.5 08/02/2021    MCV 79.7 05/22/2021    MCV 80.3 03/19/2021    MCV 81.1 02/24/2021     08/11/2021     08/02/2021     05/22/2021     03/19/2021     02/24/2021    .   Lab Results   Component Value Date    ALT 20 08/11/2021    ALT 22 08/02/2021    ALT 31 05/22/2021    AST 14 08/11/2021    AST 23 08/02/2021    AST 33 05/22/2021    ALKPHOS 88 08/11/2021    ALKPHOS 112 08/02/2021    ALKPHOS 91 05/22/2021    BILITOT 0.5 08/11/2021    BILITOT 0.4 08/02/2021    BILITOT 0.3 05/22/2021       CT ABDOMEN PELVIS W IV CONTRAST Additional Contrast? None    Result Date: 8/3/2021  Examination: CT ABDOMEN PELVIS W IV CONTRAST Indication:   abd pain, rising lactate Technique: Multiple serial axial images was performed through the abdomen and pelvis utilizing 100cc of Isovue 300. Images were reconstructed in the axial and coronal and sagittal planes. Comparison: March 19, 2021 Findings: The visualized basal lungs show no focal parenchymal abnormalities. The liver, gallbladder, , pancreas, adrenals,  are unremarkable. The spleen is enlarged measuring 14.6 cm in the cephalocaudad dimension. The right kidney shows no significant perinephric stranding. No nephrolithiasis. The left kidney shows an area of low-attenuation superior pole measuring 1 cm. Unchanged. Question is raised of possible less than 2 mm calculi in the interval region series 2 image 43. No hydronephrosis. No bladder calculi. Large and small bowel show no sign of obstruction. The appendix is visualized. No periappendiceal stranding. No diverticulitis. The uterus is surgically absent. No free air. No free fluid. The visualized abdominal aorta is of normal size and caliber. No significant retroperitoneal adenopathy. Prior kyphoplasty at T12 vertebra. There is diffuse inflammatory stranding and skin thickening along the lower anterior abdominal wall. Could represent a cellulitis. Correlate clinically. 1. SPLENOMEGALY. CORRELATE CLINICALLY. 2. QUESTION POSSIBLE CALCULI LEFT KIDNEY LESS THAN 2 MM. 3. OTHER FINDINGS DETAILED ABOVE All CT scans at this facility use dose modulation, iterative reconstruction, and/or weight based dosing when appropriate to reduce radiation dose to as low as reasonably achievable.     Lab Results   Component Value Date    IRON 50 08/11/2021    IRON 51 06/18/2018    IRON 78 10/11/2013    TIBC 283 08/11/2021    TIBC 354 06/18/2018    TIBC 369 10/11/2013    FERRITIN 130.8 08/11/2021    FERRITIN 157.1 06/18/2018     Lab Results   Component Value Date    INR 0.9 10/07/2015    INR 0.9 06/29/2015    INR 1.0 12/18/2014    INR 0.9 07/29/2014    INR 1.0 09/02/2013     No components found for: ACUTEHEPATITISSCREEN  No components found for: CELIACPANEL  No components found for: STOOLCULTURE, C.DIFF, STOOLOVAPARASITE, STOOLLEUCOCYTE        Assessment:       Diagnosis Orders   1. Gastroparesis     2. Splenomegaly           Plan:     1. Splenomegaly  Incidental finding of splenomegaly on recent CT scan while in the emergency department for nausea and vomiting, spleen is 14.6 cm, Monospot was negative, pt is asymptomatic, normal liver enzymes, no history of  liver disease. The normal range for splenic size is only an estimate, and mild deviations from this normal range do not necessarily imply a pathologic condition. The size of the spleen correlates with a person's height, weight, and sex; it is slightly larger in taller and heavier individuals. The upper limits of normal for females and males is approximately 12.3 and 14.5 cm   -Consider follow-up imaging in 6 months pending clinical course  -Consider liver Doppler ultrasound for symptomatic splenomegaly     2. Gastroparesis  Asymptomatic, recent GES notable for delayed gastric emptying.   Previously failed Reglan  -Continue erythromycin 250 mg 2-3 times daily, on 3 weeks off 1 week  -Gastroparesis diet  Discussed at length with patient that dietary modification is considered first-line therapy in patients with mild gastroparesis, although in clinical practice it is associated with only a modest improvement in symptoms.  Foods that are fatty, acidic, spicy, and roughage-based increase overall symptoms in individuals with gastroparesis, diet should be low in fat and in non-digestible (insoluble) fiber; in general, soluble fiber.   -Optimize glycemic control  last HgbA1c 7.1  Diabetes mellitus is a common cause of delayed gastric emptying. In patients with diabetes, incretin-based therapies such as pramlintide (amylin analog) and GLP1 analogues or agonists should be avoided as they can delay gastric emptying     3.  Associated medical conditions include but are not limited to class III obesity, dyslipidemia, pulmonary hypertension, DM 2, polymyalgia rheumatica, IBS-D, and former nicotine use.     4.  Preventative health  Repeat screening colonoscopy for history of polyps in 5 years, 2025    Return in about 6 months (around 2/24/2022), or if symptoms worsen or fail to improve.       Delia Price, APRN - CNP

## 2021-08-24 NOTE — CARE COORDINATION
Ambulatory Care Coordination Note  2021  CM Risk Score: 9  Charlson 10 Year Mortality Risk Score: 79%     ACC: Fernanda Villalba RN       Ambulatory Care Manager [facility]) contacted the patient by telephone to follow up on progress, discuss new issues or concerns, and reinforce/ provide pt education. Verified name and  with patient as identifiers. Patient states she is doing better. She had her follow up a ppt with GI today. No changes to the treatment plan were made. Reinforced DC and diet instructions. Pt denies any needs at this time. -    Interventions:    Counseling and education provided at today's visit on:   Red Flag symptoms to report  Symptom management  Diet education/compliance  Medication compliance  Specialist appointment compliance  Utilization of appropriate level of care: Right Care, Right Place, Right Time      Medication reconciliation was performed with patient, who verbalizes understanding of administration of home medications. Advised obtaining a 90-day supply of all daily and as-needed medications. Reinforced resources available to patient including:   PCP, Specialist, Benefits related nurse triage line, Urgent care clinics and [place] Messaging. Plan:  Continue PRN outreaches to provide telephonic support, education and resources as needed. Next outreach discuss / follow up on:  Symptom management  Provider follow up appointment compliance  Utilization of appropriate level of care    Patient verbalized understanding and is agreeable. Care Coordination Interventions    Program Enrollment: Complex Care  Referral from Primary Care Provider: No  Suggested Interventions and Community Resources  Specialty Services Referral: Completed (Comment: 21: established with/ Cardiology, neurology, Endo, and GI)  Zone Management Tools: Completed (Comment: DM)           Prior to Admission medications    Medication Sig Start Date End Date Taking?  Authorizing Provider   gentamicin (GARAMYCIN) 0.1 % ointment  8/16/21   Historical Provider, MD   esomeprazole (NEXIUM) 40 MG delayed release capsule TAKE 1 CAPSULE BY MOUTH ONE TIME A DAY 8/16/21   SANIA Gorman CNP   insulin lispro, 1 Unit Dial, (HUMALOG KWIKPEN) 100 UNIT/ML SOPN INJECT 80 UNITS UNDER THE SKIN WITH EACH MEAL 8/16/21   Casimiro Cummings MD   metFORMIN (GLUCOPHAGE) 500 MG tablet TAKE 1 TABLET BY MOUTH 3 TIMES A DAY 8/16/21   Casimiro Cummings MD   metoprolol (LOPRESSOR) 100 MG tablet Take 1 tablet by mouth 2 times daily 8/11/21   Sydney Billy, MD   rosuvastatin (CRESTOR) 10 MG tablet TAKE ONE TABLET BY MOUTH NIGHTLY 7/28/21   Sydney Billy, MD   predniSONE (DELTASONE) 5 MG tablet Take 1 tablet by mouth daily 7/15/21 10/13/21  Anderson Hickman MD   baclofen (LIORESAL) 10 MG tablet TAKE ONE-HALF TABLET BY MOUTH TWICE A DAY 6/28/21   Sydney Mariajose, MD   losartan (COZAAR) 50 MG tablet Take 1 tablet by mouth daily 6/15/21   Sydney Billy, MD   clotrimazole-betamethasone (LOTRISONE) 1-0.05 % cream APPLY TOPICALLY TWO TIMES A DAY 6/14/21   Jeremías Iyer DO   nystatin (MYCOSTATIN) 943827 UNIT/GM powder Apply 3 times daily. 6/4/21   Sydney Billy MD   ondansetron (ZOFRAN ODT) 4 MG disintegrating tablet Take 1 tablet by mouth every 8 hours as needed for Nausea or Vomiting 6/4/21   SANIA Gorman CNP   terbinafine (LAMISIL) 1 % cream Apply topically 2 times daily. 5/25/21   Rudi Davis MD   albuterol sulfate HFA (PROAIR HFA) 108 (90 Base) MCG/ACT inhaler Use every 4 hours while awake for 7-10 days then PRN wheezing  Dispense with SPACER and Instruct on use. May sub Ventolin or Proventil as needed per [employer].   Patient not taking: Reported on 8/24/2021 5/22/21   Nelson Singleton PA-C   promethazine (PHENERGAN) 25 MG tablet Take 25 mg by mouth every 8 hours    Historical Provider, MD   erythromycin base (E-MYCIN) 250 MG tablet Take 1 tablet by mouth 3 times daily 4/29/21 8/27/21  Dusty Thompson, APRN - CNP   venlafaxine (EFFEXOR XR) 75 MG extended release capsule Take 1 capsule by mouth daily TAKE TWO CAPSULES BY MOUTH EVERY MORNING AND TAKE ONE CAPSULE BY MOUTH EVERY EVENING 4/24/21   Tiesha Linda MD   Insulin Glargine, 2 Unit Dial, (TOUJEO MAX SOLOSTAR) 300 UNIT/ML SOPN 130 units at bedtime 4/19/21   Goldy Fuentes MD   metoclopramide (REGLAN) 10 MG tablet Take 1 tablet by mouth 3 times daily (with meals) 4/19/21   Goldy Fuentes MD   famotidine (PEPCID) 20 MG tablet Take 1 tablet by mouth 2 times daily  Patient not taking: Reported on 8/24/2021 3/19/21   Candice Vital MD   predniSONE (DELTASONE) 5 MG tablet Take 5 mg by mouth daily    Historical Provider, MD   Continuous Blood Gluc Sensor (FREESTYLE JAIRO 14 DAY SENSOR) MISC Every 2 weeks 1/22/21   Goldy Fuentes MD   Continuous Blood Gluc  (FREESTYLE JAIRO 14 DAY READER) ALEXANDREA As directed 1/22/21   Goldy Fuentes MD   pioglitazone (ACTOS) 30 MG tablet Take 1 tablet by mouth daily 1/22/21   Goldy Fuentes MD   Insulin Pen Needle (PEN NEEDLES) 32G X 4 MM MISC Use as directed with insulin pens, 4 times daily 1/18/21   Goldy Fuentes MD   Insulin Pen Needle (PEN NEEDLES) 32G X 4 MM MISC Use as directed with insulin pens, 4 times daily 1/18/21   Goldy Fuentes MD   hydroCHLOROthiazide (HYDRODIURIL) 25 MG tablet Take 1 tablet by mouth daily 12/30/20   Tiesha Linda MD   blood glucose test strips (PRODIGY NO CODING BLOOD GLUC) strip 1 each by In Vitro route 4 times daily As needed. 11/11/20   Tiesha Linda MD   blood glucose test strips (PRODIGY NO CODING BLOOD GLUC) strip 1 each by In Vitro route 4 times daily As needed.  11/10/20   Goldy Fuentes MD   solifenacin (VESICARE) 10 MG tablet TAKE 1 TABLET BY MOUTH ONE TIME A DAY 9/25/20   Geovani Wise MD   sucralfate (CARAFATE) 1 GM tablet Take 1 tablet by mouth 4 times daily for 7 days 8/11/20 12/16/20  Dante Kevin, APRN - CNP   Blood Glucose Monitoring Suppl (PRODIGY AUTOCODE BLOOD GLUCOSE) w/Device KIT Use as directed to test up to 4 times daily 1/17/20   Estuardo Hernandez MD   PRODIGY LANCETS 28G MISC Use as directed to test up to 4 times daily 1/17/20   Estuardo Hernandez MD   miconazole (MICOTIN) 2 % cream Apply topically 2 times daily.  1/21/19   Deonna Waite MD   Handicap Placard MISC Exp 5 years 7/3/18   Deonna Waite MD       Future Appointments   Date Time Provider Iliana Carbajalisti   9/13/2021  9:00 AM Nannette Rubio MD [facility]   9/16/2021  9:15 AM Enriqueta Champagne MD [address]   11/1/2021  3:15 PM Bull Griffin MD [facility]   11/15/2021  9:45 AM Dariel Hackett MD [facility]   2/24/2022  9:00 AM [facility]

## 2021-08-26 ENCOUNTER — TELEPHONE (OUTPATIENT)
Dept: UROLOGY | Age: 46
End: 2021-08-26

## 2021-08-26 NOTE — TELEPHONE ENCOUNTER
Pt pharmacy called and stated that Blake Smith will not be covered unless pt tries Oxybutynin and it is not effective. I explained that she has been on this medication with success and it will still not be approved.  Please advise

## 2021-09-04 DIAGNOSIS — K21.9 GASTROESOPHAGEAL REFLUX DISEASE, UNSPECIFIED WHETHER ESOPHAGITIS PRESENT: ICD-10-CM

## 2021-09-07 RX ORDER — ESOMEPRAZOLE MAGNESIUM 40 MG/1
CAPSULE, DELAYED RELEASE ORAL
Qty: 30 CAPSULE | Refills: 3 | Status: SHIPPED | OUTPATIENT
Start: 2021-09-07 | End: 2021-11-15

## 2021-09-10 ENCOUNTER — TELEPHONE (OUTPATIENT)
Dept: PHARMACY | Facility: CLINIC | Age: 46
End: 2021-09-10

## 2021-09-10 NOTE — TELEPHONE ENCOUNTER
As of 9/3/21 patient has used $760 of the maximum of $600 a year in waived co pays for specific medications and pharmacy-related supplies through the [address] for the DM Program.    Patient currently enrolled in the DM Program and has used all of the maximum of $600 a year in waived co pays for specific medications and pharmacy-related supplies through the [address]. Courtesy call placed to patient at mobile number to advise them of the above information. Patient unavailable at the time of call. Message left on mobile TAD: Maximum waived co pays for the DM Program has been met and they will begin to be charged their co-payments once again. I left our number: [phone], option #3 if patient has any questions/concerns.       Crow [place], [address]   Phone: [phone], option #3

## 2021-09-15 NOTE — TELEPHONE ENCOUNTER
Comments:     Last Office Visit (last PCP visit):   8/9/2021    Next Visit Date:  Future Appointments   Date Time Provider Iliana Garcia   10/18/2021  1:00 PM Mikki Marie MD [facility]   10/28/2021  2:15 PM Devon Macario MD [address]   11/1/2021  3:15 PM Maximo Palacios MD [facility]   11/15/2021  9:45 AM Yashira Quiros MD [facility]   2/24/2022  9:00 AM Makayla Hansen       **If hasn't been seen in over a year OR hasn't followed up according to last diabetes/ADHD visit, make appointment for patient before sending refill to provider.     Rx requested:  Requested Prescriptions     Pending Prescriptions Disp Refills    hydroCHLOROthiazide (HYDRODIURIL) 25 MG tablet 90 tablet 1     Sig: Take 1 tablet by mouth daily

## 2021-09-16 RX ORDER — HYDROCHLOROTHIAZIDE 25 MG/1
25 TABLET ORAL DAILY
Qty: 90 TABLET | Refills: 1 | Status: SHIPPED | OUTPATIENT
Start: 2021-09-16 | End: 2022-03-09 | Stop reason: SDUPTHER

## 2021-09-17 ENCOUNTER — NURSE TRIAGE (OUTPATIENT)
Dept: OTHER | Facility: CLINIC | Age: 46
End: 2021-09-17

## 2021-09-17 ENCOUNTER — HOSPITAL ENCOUNTER (EMERGENCY)
Age: 46
Discharge: HOME | End: 2021-09-17
Payer: COMMERCIAL

## 2021-09-17 VITALS
BODY MASS INDEX: 45 KG/M2 | TEMPERATURE: 98.6 F | HEART RATE: 80 BPM | OXYGEN SATURATION: 97 % | RESPIRATION RATE: 18 BRPM | WEIGHT: 280 LBS | HEIGHT: 66 IN | SYSTOLIC BLOOD PRESSURE: 118 MMHG | DIASTOLIC BLOOD PRESSURE: 84 MMHG

## 2021-09-17 DIAGNOSIS — I95.1 ORTHOSTATIC HYPOTENSION: Primary | ICD-10-CM

## 2021-09-17 LAB
ALBUMIN SERPL-MCNC: 4.3 G/DL (ref 3.5–4.6)
ALP BLD-CCNC: 105 U/L (ref 40–130)
ALT SERPL-CCNC: 21 U/L (ref 0–33)
AMPHETAMINE SCREEN, URINE: NORMAL
ANION GAP SERPL CALCULATED.3IONS-SCNC: 20 MEQ/L (ref 9–15)
AST SERPL-CCNC: 21 U/L (ref 0–35)
BACTERIA: NEGATIVE /HPF
BARBITURATE SCREEN URINE: NORMAL
BASOPHILS ABSOLUTE: 0.1 K/UL (ref 0–0.2)
BASOPHILS RELATIVE PERCENT: 0.6 %
BENZODIAZEPINE SCREEN, URINE: NORMAL
BILIRUB SERPL-MCNC: 0.5 MG/DL (ref 0.2–0.7)
BILIRUBIN URINE: NEGATIVE
BLOOD, URINE: NEGATIVE
BUN BLDV-MCNC: 14 MG/DL (ref 6–20)
CALCIUM SERPL-MCNC: 9.6 MG/DL (ref 8.5–9.9)
CANNABINOID SCREEN URINE: NORMAL
CHLORIDE BLD-SCNC: 90 MEQ/L (ref 95–107)
CLARITY: CLEAR
CO2: 25 MEQ/L (ref 20–31)
COCAINE METABOLITE SCREEN URINE: NORMAL
COLOR: YELLOW
CREAT SERPL-MCNC: 0.77 MG/DL (ref 0.5–0.9)
EKG ATRIAL RATE: 73 BPM
EKG P AXIS: 31 DEGREES
EKG P-R INTERVAL: 166 MS
EKG Q-T INTERVAL: 428 MS
EKG QRS DURATION: 74 MS
EKG QTC CALCULATION (BAZETT): 471 MS
EKG R AXIS: 14 DEGREES
EKG T AXIS: 35 DEGREES
EKG VENTRICULAR RATE: 73 BPM
EOSINOPHILS ABSOLUTE: 0.1 K/UL (ref 0–0.7)
EOSINOPHILS RELATIVE PERCENT: 0.5 %
EPITHELIAL CELLS, UA: NORMAL /HPF (ref 0–5)
GFR AFRICAN AMERICAN: >60
GFR NON-AFRICAN AMERICAN: >60
GLOBULIN: 3.4 G/DL (ref 2.3–3.5)
GLUCOSE BLD-MCNC: 170 MG/DL (ref 70–99)
GLUCOSE URINE: NEGATIVE MG/DL
HCT VFR BLD CALC: 39 % (ref 37–47)
HEMOGLOBIN: 12.8 G/DL (ref 12–16)
HYALINE CASTS: NORMAL /HPF (ref 0–5)
KETONES, URINE: NEGATIVE MG/DL
LEUKOCYTE ESTERASE, URINE: ABNORMAL
LYMPHOCYTES ABSOLUTE: 2.5 K/UL (ref 1–4.8)
LYMPHOCYTES RELATIVE PERCENT: 20.7 %
Lab: NORMAL
MCH RBC QN AUTO: 25.9 PG (ref 27–31.3)
MCHC RBC AUTO-ENTMCNC: 32.8 % (ref 33–37)
MCV RBC AUTO: 79 FL (ref 82–100)
METHADONE SCREEN, URINE: NORMAL
MONOCYTES ABSOLUTE: 0.6 K/UL (ref 0.2–0.8)
MONOCYTES RELATIVE PERCENT: 4.8 %
NEUTROPHILS ABSOLUTE: 8.7 K/UL (ref 1.4–6.5)
NEUTROPHILS RELATIVE PERCENT: 73.4 %
NITRITE, URINE: NEGATIVE
OPIATE SCREEN URINE: NORMAL
OXYCODONE URINE: NORMAL
PDW BLD-RTO: 16.5 % (ref 11.5–14.5)
PH UA: 6.5 (ref 5–9)
PHENCYCLIDINE SCREEN URINE: NORMAL
PLATELET # BLD: 284 K/UL (ref 130–400)
POTASSIUM SERPL-SCNC: 4.2 MEQ/L (ref 3.4–4.9)
PROPOXYPHENE SCREEN: NORMAL
PROTEIN UA: NEGATIVE MG/DL
RBC # BLD: 4.94 M/UL (ref 4.2–5.4)
RBC UA: NORMAL /HPF (ref 0–5)
SODIUM BLD-SCNC: 135 MEQ/L (ref 135–144)
SPECIFIC GRAVITY UA: 1.02 (ref 1–1.03)
TOTAL CK: 50 U/L (ref 0–170)
TOTAL PROTEIN: 7.7 G/DL (ref 6.3–8)
TROPONIN: <0.01 NG/ML (ref 0–0.01)
URINE REFLEX TO CULTURE: ABNORMAL
UROBILINOGEN, URINE: 0.2 E.U./DL
WBC # BLD: 11.9 K/UL (ref 4.8–10.8)
WBC UA: NORMAL /HPF (ref 0–5)

## 2021-09-17 PROCEDURE — 36415 COLL VENOUS BLD VENIPUNCTURE: CPT

## 2021-09-17 PROCEDURE — 80053 COMPREHEN METABOLIC PANEL: CPT

## 2021-09-17 PROCEDURE — 99283 EMERGENCY DEPT VISIT LOW MDM: CPT

## 2021-09-17 PROCEDURE — 80307 DRUG TEST PRSMV CHEM ANLYZR: CPT

## 2021-09-17 PROCEDURE — 93010 ELECTROCARDIOGRAM REPORT: CPT | Performed by: INTERNAL MEDICINE

## 2021-09-17 PROCEDURE — 82550 ASSAY OF CK (CPK): CPT

## 2021-09-17 PROCEDURE — 84484 ASSAY OF TROPONIN QUANT: CPT

## 2021-09-17 PROCEDURE — 85025 COMPLETE CBC W/AUTO DIFF WBC: CPT

## 2021-09-17 PROCEDURE — 2580000003 HC RX 258: Performed by: NURSE PRACTITIONER

## 2021-09-17 PROCEDURE — 93005 ELECTROCARDIOGRAM TRACING: CPT | Performed by: NURSE PRACTITIONER

## 2021-09-17 PROCEDURE — 81001 URINALYSIS AUTO W/SCOPE: CPT

## 2021-09-17 RX ORDER — 0.9 % SODIUM CHLORIDE 0.9 %
1000 INTRAVENOUS SOLUTION INTRAVENOUS ONCE
Status: COMPLETED | OUTPATIENT
Start: 2021-09-17 | End: 2021-09-17

## 2021-09-17 RX ADMIN — SODIUM CHLORIDE 1000 ML: 9 INJECTION, SOLUTION INTRAVENOUS at 14:47

## 2021-09-17 ASSESSMENT — ENCOUNTER SYMPTOMS
SORE THROAT: 0
TROUBLE SWALLOWING: 0
NAUSEA: 0
ABDOMINAL PAIN: 0
BACK PAIN: 0
SHORTNESS OF BREATH: 0
VOMITING: 0
COUGH: 0
DIARRHEA: 0

## 2021-09-17 NOTE — ED NOTES
ORTHOSTATIC VITAL SIGNS COMPLETED AND PRESENTED TO CONOR JULIAN, PT C/O DIZZINESS UPON STANDING. DIZZINESS WAS REPORTED SHORT LASTING, PT A&OX4, SKIN W/D/PINK, 0 DISTRESS, 0 PAIN.      Zoila Bledsoe RN  09/17/21 [id number]

## 2021-09-17 NOTE — TELEPHONE ENCOUNTER
Brief description of triage: dizzy upon standing like she will pass out     Triage indicates for patient to will call her PCP office if they can not get her in now she is to go to [facility] now     Care advice provided, patient verbalizes understanding; denies any other questions or concerns; instructed to call back for any new or worsening symptoms. Attention Provider: Thank you for allowing me to participate in the care of your patient. The patient was connected to triage in response to symptoms provided. Please do not respond through this encounter as the response is not directed to a shared pool. Reason for Disposition   Lightheadedness (dizziness) present now, after 2 hours of rest and fluids    Answer Assessment - Initial Assessment Questions  1. DESCRIPTION: \"Describe your dizziness. \"      When she sits it is fine when she stands up it feels like she is light headed and pass out and woozy     2. LIGHTHEADED: \"Do you feel lightheaded? \" (e.g., somewhat faint, woozy, weak upon standing)      Faint and dizzy and weak has to sit down     3. VERTIGO: \"Do you feel like either you or the room is spinning or tilting? \" (i.e. vertigo)      Na     4. SEVERITY: \"How bad is it? \"  \"Do you feel like you are going to faint? \" \"Can you stand and walk? \"    - MILD - walking normally    - MODERATE - interferes with normal activities (e.g., work, school)     - SEVERE - unable to stand, requires support to walk, feels like passing out now. Yes feels roberto an it is moderate to severe     5. ONSET:  \"When did the dizziness begin? \"      At 10 this morning     6. AGGRAVATING FACTORS: \"Does anything make it worse? \" (e.g., standing, change in head position)      Standing     7. HEART RATE: \"Can you tell me your heart rate? \" \"How many beats in 15 seconds? \"  (Note: not all patients can do this)        Na    8. CAUSE: \"What do you think is causing the dizziness? \"      She has had vertigo before this is more than passing out than dizzy     9. RECURRENT SYMPTOM: \"Have you had dizziness before? \" If so, ask: \"When was the last time? \" \"What happened that time? \"      Yes but not like this     10. OTHER SYMPTOMS: \"Do you have any other symptoms? \" (e.g., fever, chest pain, vomiting, diarrhea, bleeding)        Diarrhea all morning, no to all others     11. PREGNANCY: \"Is there any chance you are pregnant? \" \"When was your last menstrual period? \"        Na    Protocols used: [id number]-TM

## 2021-09-17 NOTE — ED PROVIDER NOTES
Unspecified sleep apnea          SURGICAL HISTORY       Past Surgical History:   Procedure Laterality Date    CARDIAC CATHETERIZATION  4-07    COLONOSCOPY  2013    for diarrhea (-)    COLONOSCOPY N/A 2/10/2021    COLONOSCOPY DIAGNOSTIC performed by Kendall Kline MD at [facility] 96  12-10    [facility]  1-05    SC MUSCLE BIOPSY Left 9/21/2018    LEFT QUADRICEPS MUSCLE BIOPSY performed by Bianka Ray MD at [address] UNI/BI CANNULATION N/A 9/18/2018    T 12 VERTEBRALPLASTY ROOM 278 performed by Priscilla Fernandez MD at [address] ENDOSCOPY  10/13/15     DR. HERRERA     UPPER GASTROINTESTINAL ENDOSCOPY N/A 2/10/2021    EGD ESOPHAGOGASTRODUODENOSCOPY performed by Kendall Kline MD at [facility]  6-2015         CURRENT MEDICATIONS       Previous Medications    ALBUTEROL SULFATE HFA (PROAIR HFA) 108 (90 BASE) MCG/ACT INHALER    Use every 4 hours while awake for 7-10 days then PRN wheezing  Dispense with SPACER and Instruct on use. May sub Ventolin or Proventil as needed per [employer]. BACLOFEN (LIORESAL) 10 MG TABLET    TAKE ONE-HALF TABLET BY MOUTH TWICE A DAY    BLOOD GLUCOSE MONITORING SUPPL (PRODIGY AUTOCODE BLOOD GLUCOSE) W/DEVICE KIT    Use as directed to test up to 4 times daily    BLOOD GLUCOSE TEST STRIPS (PRODIGY NO CODING BLOOD GLUC) STRIP    1 each by In Vitro route 4 times daily As needed. BLOOD GLUCOSE TEST STRIPS (PRODIGY NO CODING BLOOD GLUC) STRIP    1 each by In Vitro route 4 times daily As needed.     CLOTRIMAZOLE-BETAMETHASONE (LOTRISONE) 1-0.05 % CREAM    APPLY TOPICALLY TWO TIMES A DAY    CONTINUOUS BLOOD GLUC  (FREESTYLE JAIRO 14 DAY READER) ALEXANDREA    As directed    CONTINUOUS BLOOD GLUC SENSOR (FREESTYLE JAIRO 14 DAY SENSOR) MISC    Every 2 weeks    ESOMEPRAZOLE (NEXIUM) 40 MG DELAYED RELEASE CAPSULE    TAKE 1 CAPSULE BY MOUTH ONE TIME A DAY    FAMOTIDINE (PEPCID) 20 MG TABLET    Take 1 tablet by mouth 2 times daily    GENTAMICIN (GARAMYCIN) 0.1 % OINTMENT        HANDICAP PLACARD MISC    Exp 5 years    HYDROCHLOROTHIAZIDE (HYDRODIURIL) 25 MG TABLET    Take 1 tablet by mouth daily    INSULIN GLARGINE, 2 UNIT DIAL, (TOUJEO MAX SOLOSTAR) 300 UNIT/ML SOPN    130 units at bedtime    INSULIN LISPRO, 1 UNIT DIAL, (HUMALOG KWIKPEN) 100 UNIT/ML SOPN    INJECT 80 UNITS UNDER THE SKIN WITH EACH MEAL    INSULIN PEN NEEDLE (PEN NEEDLES) 32G X 4 MM MISC    Use as directed with insulin pens, 4 times daily    INSULIN PEN NEEDLE (PEN NEEDLES) 32G X 4 MM MISC    Use as directed with insulin pens, 4 times daily    LOSARTAN (COZAAR) 50 MG TABLET    Take 1 tablet by mouth daily    METFORMIN (GLUCOPHAGE) 500 MG TABLET    TAKE 1 TABLET BY MOUTH 3 TIMES A DAY    METOCLOPRAMIDE (REGLAN) 10 MG TABLET    Take 1 tablet by mouth 3 times daily (with meals)    METOPROLOL (LOPRESSOR) 100 MG TABLET    Take 1 tablet by mouth 2 times daily    MICONAZOLE (MICOTIN) 2 % CREAM    Apply topically 2 times daily. NYSTATIN (MYCOSTATIN) 578756 UNIT/GM POWDER    Apply 3 times daily. ONDANSETRON (ZOFRAN ODT) 4 MG DISINTEGRATING TABLET    Take 1 tablet by mouth every 8 hours as needed for Nausea or Vomiting    PIOGLITAZONE (ACTOS) 30 MG TABLET    Take 1 tablet by mouth daily    PREDNISONE (DELTASONE) 5 MG TABLET    Take 5 mg by mouth daily    PREDNISONE (DELTASONE) 5 MG TABLET    Take 1 tablet by mouth daily    PRODIGY LANCETS 28G MISC    Use as directed to test up to 4 times daily    PROMETHAZINE (PHENERGAN) 25 MG TABLET    Take 25 mg by mouth every 8 hours    ROSUVASTATIN (CRESTOR) 10 MG TABLET    TAKE ONE TABLET BY MOUTH NIGHTLY    SOLIFENACIN (VESICARE) 10 MG TABLET    TAKE 1 TABLET BY MOUTH ONE TIME A DAY    TERBINAFINE (LAMISIL) 1 % CREAM    Apply topically 2 times daily.     VENLAFAXINE (EFFEXOR XR) 75 MG EXTENDED RELEASE CAPSULE    Take 1 capsule by mouth daily TAKE TWO CAPSULES BY MOUTH EVERY MORNING AND TAKE ONE CAPSULE BY MOUTH EVERY EVENING       ALLERGIES     Morphine and related, Ace inhibitors, Bactrim [sulfamethoxazole-trimethoprim], Nitroglycerin, Percocet [oxycodone-acetaminophen], and Victoza [liraglutide]    FAMILY HISTORY       Family History   Problem Relation Age of Onset    Diabetes Father     Heart Disease Mother     Colon Cancer Neg Hx     Cancer Neg Hx           SOCIAL HISTORY       Social History     Socioeconomic History    Marital status:      Spouse name: None    Number of children: 1    Years of education: None    Highest education level: None   Occupational History    None   Tobacco Use    Smoking status: Former Smoker     Packs/day: 1.00     Types: Cigarettes     Quit date: 2017     Years since quittin.7    Smokeless tobacco: Never Used   Vaping Use    Vaping Use: Never used   Substance and Sexual Activity    Alcohol use: Yes     Alcohol/week: 0.0 standard drinks     Comment: socially    Drug use: No    Sexual activity: Yes     Partners: Male     Comment: single   Other Topics Concern    None   Social History Narrative    Social/Functional History          Social Determinants of Health     Financial Resource Strain: Low Risk     Difficulty of Paying Living Expenses: Not hard at all   Food Insecurity: No Food Insecurity    Worried About Running Out of Food in the Last Year: Never true    Jung of Food in the Last Year: Never true   Transportation Needs: No Transportation Needs    Lack of Transportation (Medical): No    Lack of Transportation (Non-Medical):  No   Physical Activity:     Days of Exercise per Week:     Minutes of Exercise per Session:    Stress:     Feeling of Stress :    Social Connections:     Frequency of Communication with Friends and Family:     Frequency of Social Gatherings with Friends and Family:     Attends Restoration Services:     Active Member of Clubs or Organizations:     Attends Club or Organization Meetings:     Marital Status:    Intimate Partner Violence:     Fear of Current or Ex-Partner:     Emotionally Abused:     Physically Abused:     Sexually Abused:        SCREENINGS    [place] Coma Scale  Eye Opening: Spontaneous  Best Verbal Response: Oriented  Best Motor Response: Obeys commands  [place] Coma Scale Score: 15 @FLOW([id number])@      PHYSICAL EXAM    (up to 7 for level 4, 8 or more for level 5)     ED Triage Vitals [09/17/21 1320]   BP Temp Temp src Pulse Resp SpO2 Height Weight   118/84 98.6 °F (37 °C) -- 80 16 97 % 5' 6\" (1.676 m) 280 lb (127 kg)       Physical Exam  Vitals and nursing note reviewed. Constitutional:       Appearance: She is well-developed. HENT:      Head: Normocephalic and atraumatic. Right Ear: Hearing, tympanic membrane, ear canal and external ear normal.      Left Ear: Hearing, tympanic membrane, ear canal and external ear normal.      Nose: Congestion present. Right Sinus: Maxillary sinus tenderness and frontal sinus tenderness present. Left Sinus: Maxillary sinus tenderness and frontal sinus tenderness present. Mouth/Throat:      Lips: Pink. Mouth: Mucous membranes are moist.      Pharynx: Oropharynx is clear. Uvula midline. Eyes:      Conjunctiva/sclera: Conjunctivae normal.      Pupils: Pupils are equal, round, and reactive to light. Cardiovascular:      Rate and Rhythm: Normal rate and regular rhythm. Heart sounds: Normal heart sounds. Pulmonary:      Effort: Pulmonary effort is normal. No accessory muscle usage or respiratory distress. Breath sounds: Normal breath sounds. No decreased air movement. No decreased breath sounds, wheezing or rhonchi. Abdominal:      General: Bowel sounds are normal. There is no distension. Palpations: Abdomen is soft. Tenderness: There is no abdominal tenderness. Musculoskeletal:         General: Normal range of motion.       Cervical back: Normal range of motion and neck supple. Skin:     General: Skin is warm and dry. Neurological:      General: No focal deficit present. Mental Status: She is alert and oriented to person, place, and time. GCS: GCS eye subscore is 4. GCS verbal subscore is 5. GCS motor subscore is 6. Cranial Nerves: Cranial nerves are intact. Sensory: Sensation is intact. Motor: Motor function is intact. Coordination: Coordination is intact. Gait: Gait is intact. Deep Tendon Reflexes: Reflexes are normal and symmetric. Psychiatric:         Judgment: Judgment normal.           All other labs were within normal range or not returned as of this dictation. EMERGENCY DEPARTMENT COURSE and DIFFERENTIALDIAGNOSIS/MDM:   Vitals:    Vitals:    09/17/21 1320 09/17/21 1328   BP: 118/84    Pulse: 80    Resp: 16 17   Temp: 98.6 °F (37 °C)    SpO2: 97% 95%   Weight: 280 lb (127 kg)    Height: 5' 6\" (1.676 m)             40 yr old female with orthostatic hypotension. Patient labs and EKG are unremarkable. She did receive 1L of fluids in the ED and states lightheadedness is gone with that. Her vitals are not orthostatic at discharge. patein is comfortable and verbalizes understanding. PROCEDURES:  Unless otherwise noted below, none     Procedures      FINAL IMPRESSION      1.  Orthostatic hypotension          DISPOSITION/PLAN   DISPOSITION Decision To Discharge 09/17/2021 05:19:45 PM          SANIA Singh CNP (electronically signed)  Attending Emergency Physician     SANIA Singh CNP  09/17/21 3504

## 2021-09-17 NOTE — DISCHARGE INSTR - COC
Continuity of Care Form    Patient Name: Corey Murcia   :  1975  MRN:  [id number]    Admit date:  2021  Discharge date:  ***    Code Status Order: Prior   Advance Directives:     Admitting Physician:  No admitting provider for patient encounter. PCP: Osmar Brown MD    Discharging Nurse: [facility] Unit/Room#:   Discharging Unit Phone Number: ***    Emergency Contact:   Extended Emergency Contact Information  Primary Emergency Contact: Nichole Black of [address] [place] [address] Phone: [phone]  Work Phone: [phone]  Mobile Phone: [phone]  Relation: Other  Secondary Emergency Contact: [facility] of [address] [place] [address] Phone: [phone]  Work Phone: [phone]  Mobile Phone: [phone]  Relation: Brother/Sister    Past Surgical History:  Past Surgical History:   Procedure Laterality Date    CARDIAC CATHETERIZATION      COLONOSCOPY      for diarrhea (-)    COLONOSCOPY N/A 2/10/2021    COLONOSCOPY DIAGNOSTIC performed by Shaan Reynoso MD at [place] [address]  12-10    [facility]  1-05    HI MUSCLE BIOPSY Left 2018    LEFT QUADRICEPS MUSCLE BIOPSY performed by Byron Sauceda MD at [address] [facility] UNI/BI CANNULATION N/A 2018    [place] [address] performed by Abby Cruz MD at [facility]  10/13/15     DR. HERRERA     UPPER GASTROINTESTINAL ENDOSCOPY N/A 2/10/2021    EGD ESOPHAGOGASTRODUODENOSCOPY performed by Shaan Reynoso MD at [facility]         Immunization History:   Immunization History   Administered Date(s) Administered    COVID-19, Moderna, PF, 100mcg/0.5mL 2021, 2021    Influenza 10/22/2013    Influenza Virus Vaccine 10/20/2014, 10/14/2015, 10/14/2019, 2020    Influenza, Quadv, 6 mo and older, IM, PF (Flulaval, Fluarix) 2018  Influenza, Quadv, IM, (6 mo and older Fluzone, Flulaval, Fluarix and 3 yrs and older Afluria) 09/28/2016, 09/21/2017    Pneumococcal Polysaccharide (Rbhypslii36) 10/14/2015    Tdap (Boostrix, Adacel) 03/06/2012       Active Problems:  Patient Active Problem List   Diagnosis Code    Orthostatic hypotension I95.1    Cobalamin deficiency E53.8    HPV (human papilloma virus) anogenital infection A63.0    Cyst of ovary N83.209    Obesity due to excess calories, unspecified obesity severity E66.09    RAE (obstructive sleep apnea) G47.33    Dyslipidemia E78.5    FH: premature coronary heart disease Z82.49    Adult body mass index 40 and over [id number]    Pulmonary hypertension ([facility]) I27.20    Herpes simplex type 1 infection B00.9    Tobacco user Z72.0    Type 2 diabetes mellitus with complication, with long-term current use of insulin ([facility]) E11.8, Z79.4    Pruritus of vagina N89.8    Vaginal irritation N89.8    Paraparesis ([facility]) G82.20    Compression fracture of lumbar vertebra ([facility]) S32.000A    Acute thoracic back pain M54.6    Muscle weakness M62.81    Muscle pain M79.10    Fracture of first lumbar vertebra ([facility]) S32.019A    PMR (polymyalgia rheumatica) ([facility]) M35.3    Disorder of muscle, unspecified M62.9    Diarrhea R19.7    Nausea and vomiting R11.2    Gastroesophageal reflux disease K21.9    Gastritis without bleeding K29.70    Polyp of colon K63.5    Gastroparesis K31.84    Irritable bowel syndrome with diarrhea K58.0    Wound drainage T14. 8XXA    Lumbar radiculopathy M54.16    Erythema intertrigo L30.4    Open wound of abdomen S31.109A    Splenomegaly R16.1       Isolation/Infection:   Isolation          No Isolation        Patient Infection Status     Infection Onset Added Last Indicated Last Indicated By Review Planned Expiration Resolved Resolved By    None active    Resolved    COVID-19 Rule Out 02/04/21 02/04/21 02/04/21 COVID-19 Ambulatory (Ordered)   02/05/21 Rule-Out Test Resulted          Nurse Assessment:  Last Vital Signs: /84   Pulse 80   Temp 98.6 °F (37 °C)   Resp 17   Ht 5' 6\" (1.676 m)   Wt 280 lb (127 kg)   LMP  (LMP Unknown)   SpO2 95%   BMI 45.19 kg/m²     Last documented pain score (0-10 scale):    Last Weight:   Wt Readings from Last 1 Encounters:   21 280 lb (127 kg)     Mental Status:  {IP PT MENTAL STATUS:52852}    IV Access:  { LOLIS IV ACCESS:562524805}    Nursing Mobility/ADLs:  Walking   {CHP DME SNQA:296321043}  Transfer  {CHP DME SIES:969998648}  Bathing  {CHP DME CDC}  Dressing  {CHP DME DYBQ:646059105}  Toileting  {CHP DME UAMH:438929321}  Feeding  {CHP DME QVQO:271207244}  Med Admin  {CHP DME QPBZ:729131856}  Med Delivery   { LOLIS MED Delivery:063014193}    Wound Care Documentation and Therapy:        Elimination:  Continence:   · Bowel: {YES / PE:01037}  · Bladder: {YES / HN:09431}  Urinary Catheter: {Urinary Catheter:787785969}   Colostomy/Ileostomy/Ileal Conduit: {YES / N}       Date of Last BM: ***  No intake or output data in the 24 hours ending 21 1714  No intake/output data recorded.     Safety Concerns:     [address] Safety Concerns:189338656}    Impairments/Disabilities:      [address] Impairments/Disabilities:642254380}    Nutrition Therapy:  Current Nutrition Therapy:   [address] [employer] Diet List:243418984}    Routes of Feeding: {CHP DME Other Feedings:714137814}  Liquids: {Slp liquid thickness:03161}  Daily Fluid Restriction: {CHP DME Yes amt example:315890112}  Last Modified Barium Swallow with Video (Video Swallowing Test): {Done Not Done YHVO:149725353}    Treatments at the Time of Hospital Discharge:   Respiratory Treatments: ***  Oxygen Therapy:  {Therapy; copd oxygen:67688}  Ventilator:    { CC Vent VAVT:169024830}    Rehab Therapies: {THERAPEUTIC INTERVENTION:0319680927}  Weight Bearing Status/Restrictions: [address] Weight Bearin}  Other Medical Equipment (for information only, NOT a DME order):  {EQUIPMENT:903070832}  Other Treatments: ***    Patient's personal belongings (please select all that are sent with patient):  {CHP DME Belongings:762821610}    RN SIGNATURE:  {Esignature:930957491}    CASE MANAGEMENT/SOCIAL WORK SECTION    Inpatient Status Date: ***    Readmission Risk Assessment Score:  Readmission Risk              Risk of Unplanned Readmission:  0           Discharging to Facility/ Agency   · Name:   · Address:  · Phone:  · Fax:    Dialysis Facility (if applicable)   · Name:  · Address:  · Dialysis Schedule:  · Phone:  · Fax:    / signature: {Esignature:941422437}    PHYSICIAN SECTION    Prognosis: {Prognosis:8732209981}    Condition at Discharge: [address] [place] [address] Patient Condition:780848625}    Rehab Potential (if transferring to Rehab): {Prognosis:9466138733}    Recommended Labs or Other Treatments After Discharge: ***    Physician Certification: I certify the above information and transfer of Suyapa Lima  is necessary for the continuing treatment of the diagnosis listed and that she requires {Admit to Appropriate Level of Care:06716} for {GREATER/LESS:240585390} 30 days.      Update Admission H&P: {CHP DME Changes in OPUFB:483199569}    PHYSICIAN SIGNATURE:  {Esignature:343231741}

## 2021-09-17 NOTE — ED TRIAGE NOTES
Patient A&O x3, states she gets dizzy with standing. Denies any pain. Patient states she does have a h/o vertigo but this feels different. Respirations equal and unlabored. Patient ambulated to triage without difficulties, gait is steady.

## 2021-09-18 RX ORDER — SOLIFENACIN SUCCINATE 10 MG/1
TABLET, FILM COATED ORAL
Qty: 90 TABLET | Refills: 3 | Status: SHIPPED | OUTPATIENT
Start: 2021-09-18 | End: 2022-08-24

## 2021-09-20 ENCOUNTER — CARE COORDINATION (OUTPATIENT)
Dept: OTHER | Facility: CLINIC | Age: 46
End: 2021-09-20

## 2021-09-21 ENCOUNTER — CARE COORDINATION (OUTPATIENT)
Dept: OTHER | Facility: CLINIC | Age: 46
End: 2021-09-21

## 2021-09-21 RX ORDER — ERYTHROMYCIN 250 MG/1
250 TABLET, DELAYED RELEASE ORAL 3 TIMES DAILY
COMMUNITY
End: 2021-10-18

## 2021-09-21 NOTE — CARE COORDINATION
[address] [facility] ED Follow Up Call    2021    Patient: Wilbert Vazquez Patient : 1975   MRN: [id number]  Reason for Admission: Orthostatic hypotension  Discharge Date: 2021        Care Transitions ED Follow Up    Care Transitions Interventions    Specialty Service Referral: Completed    Schedule Follow Up Appointment with Physician: Eric Fernandez you have any ongoing symptoms?: No   Do you have a copy of your discharge instructions?: Yes   Do you understand what to report and when to return?: Yes   Are you following your discharge instructions?: Yes   Do you have all of your prescriptions and are they filled?: Yes   Have you scheduled your follow up appointment?: No   Were you discharged with any Home Care or Post Acute Services or do you currently have any active services?: No              Needs to be reviewed by the provider   Additional needs identified to be addressed with provider No  none           Discussed COVID-19 related testing which was not done at this time. Test results were not done. Patient informed of results, if available? N/A    Ambulatory Care Manager [facility]) contacted the patient by telephone to perform post ED visit assessment. Call within 2 business days of discharge: Yes. Verified name and  with patient as identifiers. Patient reports no dizziness since receiving IV fluids in ED on 2021. States she called nurse access line prior to ED visit and was advised to go to PCP within 1 hour or go to ED for evaluation. Patient states went to ED and was told dizziness and hypotension related to dehydration. States she has BP monitor at home and monitors BP PRN. States she does not normally drink much water due to not liking it but states she is now trying to push water. Reports recent blood glucose running around 150. Denies needs or concerns for ACM.  Agreeable to follow up calls from [facility] team.    Interventions:    Counseling and education provided at today's visit on:   Red Flag symptoms to report  Provider follow up appointment compliance  Reinforced Discharge instructions    Medication reconciliation was performed with patient, who verbalizes understanding of administration of home medications. Advised obtaining a 90-day supply of all daily and as-needed medications. Reinforced resources available to patient including: PCP and Benefits related nurse triage line. ACM encouraged outreach to Nurse Access Line and/or ACM for assessment and intervention guidance as needed. ACM encouraged patient to contact 911 for life threatening emergencies and PCP office 24/7 for non life-threatening symptoms. Reminded patient of alternatives to ED such as urgent care, walk in clinics and e-visits as available. Reviewed proper ED utilization using Right Care, Right Place, Right Time Flyer. Discussed follow-up appointments. If no appointment was previously scheduled, appointment scheduling offered: Yes Is follow up appointment scheduled within 7 days of discharge? No and patient to call PCP to schedule  [facility] follow up appointment(s):   Future Appointments   Date Time Provider Iliana Meadowsi   10/18/2021  1:00 PM Marisa Levi MD [facility]   10/28/2021  2:15 PM Erendira Lyons MD  Hospital [facility]   11/1/2021  3:15 PM aGbo Newman MD [facility]   11/15/2021  9:45 AM Sulma Parmar MD [address] [place] [address]   2/24/2022  9:00 AM Makayla Ayala     Non[facility] follow up appointment(s): None    Plan:  Continue 2 week outreaches to provide telephonic support, education and resources as needed. Discuss / follow up on:   Red Flag symptoms to report  Diet compliance  Provider follow up appointment compliance      Patient verbalized understanding and is agreeable. Abhijit Mera RN BSN  Associate Care Manager  Phone: [phone]  Email: [email or web address]

## 2021-10-02 DIAGNOSIS — K31.84 GASTROPARESIS: ICD-10-CM

## 2021-10-04 ENCOUNTER — E-VISIT (OUTPATIENT)
Dept: PRIMARY CARE CLINIC | Age: 46
End: 2021-10-04
Payer: COMMERCIAL

## 2021-10-04 DIAGNOSIS — N30.00 ACUTE CYSTITIS WITHOUT HEMATURIA: Primary | ICD-10-CM

## 2021-10-04 PROCEDURE — 99422 OL DIG E/M SVC 11-20 MIN: CPT | Performed by: INTERNAL MEDICINE

## 2021-10-04 RX ORDER — CIPROFLOXACIN 500 MG/1
500 TABLET, FILM COATED ORAL 2 TIMES DAILY
Qty: 20 TABLET | Refills: 0 | Status: SHIPPED | OUTPATIENT
Start: 2021-10-04 | End: 2021-10-14

## 2021-10-04 RX ORDER — BACLOFEN 10 MG/1
TABLET ORAL
Qty: 90 TABLET | Refills: 0 | Status: SHIPPED | OUTPATIENT
Start: 2021-10-04 | End: 2022-01-10 | Stop reason: SDUPTHER

## 2021-10-04 RX ORDER — ERYTHROMYCIN 250 MG/1
TABLET, COATED ORAL
Qty: 90 TABLET | Refills: 3 | Status: SHIPPED | OUTPATIENT
Start: 2021-10-04 | End: 2022-01-10

## 2021-10-04 NOTE — TELEPHONE ENCOUNTER
Requesting medication refill.  Please approve or deny this request.    Rx requested:  Requested Prescriptions     Pending Prescriptions Disp Refills    baclofen (LIORESAL) 10 MG tablet [Pharmacy Med Name: BACLOFEN 10MG TABS] 90 tablet 0     Sig: TAKE ONE-HALF TABLET BY MOUTH TWICE A DAY       Last Office Visit:   Visit date not found    Last Filled:      Last Labs:      Next Visit Date:  Future Appointments   Date Time Provider Iliana Radha   10/18/2021  1:00 PM MD Preston Clancy   10/28/2021  2:15 PM Lance Hutchison MD [address]   11/1/2021  3:15 PM Latesha Reed MD [facility]   11/15/2021  9:45 AM Savana Hodge MD [address] [place] [address]   2/24/2022  9:00 AM [address] [place] [address]

## 2021-10-04 NOTE — PROGRESS NOTES
Cipro has been reordered. Take one pill twice a day for 10 days for the UTI. Follow up with your primary care provider. 11 to 20 minutes were spent on this E visit.

## 2021-10-18 ENCOUNTER — CARE COORDINATION (OUTPATIENT)
Dept: OTHER | Facility: CLINIC | Age: 46
End: 2021-10-18

## 2021-10-18 ENCOUNTER — PROCEDURE VISIT (OUTPATIENT)
Dept: UROLOGY | Age: 46
End: 2021-10-18
Payer: COMMERCIAL

## 2021-10-18 VITALS
WEIGHT: 280 LBS | OXYGEN SATURATION: 98 % | DIASTOLIC BLOOD PRESSURE: 78 MMHG | BODY MASS INDEX: 45 KG/M2 | HEART RATE: 75 BPM | SYSTOLIC BLOOD PRESSURE: 118 MMHG | HEIGHT: 66 IN

## 2021-10-18 DIAGNOSIS — R31.29 MICROHEMATURIA: ICD-10-CM

## 2021-10-18 DIAGNOSIS — Z87.448 HX OF URETHRAL STRICTURE: Primary | ICD-10-CM

## 2021-10-18 LAB
BILIRUBIN, POC: ABNORMAL
BLOOD URINE, POC: ABNORMAL
CLARITY, POC: CLEAR
COLOR, POC: YELLOW
GLUCOSE URINE, POC: ABNORMAL
KETONES, POC: ABNORMAL
LEUKOCYTE EST, POC: ABNORMAL
NITRITE, POC: ABNORMAL
PH, POC: 6
PROTEIN, POC: ABNORMAL
SPECIFIC GRAVITY, POC: 1.02
UROBILINOGEN, POC: 0.2

## 2021-10-18 PROCEDURE — 81003 URINALYSIS AUTO W/O SCOPE: CPT | Performed by: UROLOGY

## 2021-10-18 PROCEDURE — 52000 CYSTOURETHROSCOPY: CPT | Performed by: UROLOGY

## 2021-10-18 RX ORDER — CEPHALEXIN 500 MG/1
500 CAPSULE ORAL ONCE
Qty: 1 CAPSULE | Refills: 0 | COMMUNITY
Start: 2021-10-18 | End: 2021-10-18

## 2021-10-18 NOTE — PROGRESS NOTES
Urology  Patient to conclude her microhematuria work-up with cystoscopy  Upper tracts imaging was within normal limits  Patient responding well to [address] for her detrusor instability  Urinalysis clear      Procedure note  Flexible cystoscopy/local anesthetic/premedicated with antibiotic  Mildly stenotic meatus dilated with scope  Mild trabeculation most likely secondary to meatal stenosis  No evidence of stones  Clear  efflux from both ureters  Normal bladder mucosa  Bladder neck normal  Final diagnosis microhematuria secondary to diabetes  Continue Vesicare  Follow-up 1 year  Hernandez Logan MD

## 2021-10-18 NOTE — CARE COORDINATION
Ambulatory Care Coordination Note  10/18/2021  CM Risk Score: 9  Charlson 10 Year Mortality Risk Score: 79%     ACC: Angel Hernandes RN     ACM contacted patient by telephone to follow up on progress, reinforce previous education/ provide patient education, and discuss any new issues or concerns. HIPAA compliant message left requesting a return phone call at patients convenience to discuss. Will continue to follow. Care Coordination Interventions    Program Enrollment: Complex Care  Referral from Primary Care Provider: No  Suggested Interventions and Community Resources  Diabetes Education: Declined  Physical Therapy: Completed (Comment: Completed with enrollment to [facility] DM Management)  Registered Dietician: [facility]  Specialty Services Referral: Completed (Comment: 10/18/21: Pt: has appt to establishe with Pulmonary; 8/4/21: established with/ Cardiology, neurology, Endo, urology,and GI)  Zone Management Tools: Completed (Comment: DM)           Prior to Admission medications    Medication Sig Start Date End Date Taking?  Authorizing Provider   cephALEXin (KEFLEX) 500 MG capsule Take 1 capsule by mouth once for 1 dose 10/18/21 10/18/21  Kelton Gomez MD   erythromycin base (E-MYCIN) 250 MG tablet TAKE 1 TABLET BY MOUTH 3 TIMES A DAY 10/4/21   SANIA Quintanilla - CNP   baclofen (LIORESAL) 10 MG tablet TAKE ONE-HALF TABLET BY MOUTH TWICE A DAY 10/4/21   Caro Hernandez MD   solifenacin (VESICARE) 10 MG tablet TAKE 1 TABLET BY MOUTH ONE TIME A DAY 9/18/21   Kelton Gomez MD   hydroCHLOROthiazide (HYDRODIURIL) 25 MG tablet Take 1 tablet by mouth daily 9/16/21   Caro Hernandez MD   esomeprazole (NEXIUM) 40 MG delayed release capsule TAKE 1 CAPSULE BY MOUTH ONE TIME A DAY 9/7/21   SANIA Quintanilla CNP   gentamicin (GARAMYCIN) 0.1 % ointment  8/16/21   Historical Provider, MD   insulin lispro, 1 Unit Dial, (HUMALOG KWIKPEN) 100 UNIT/ML SOPN INJECT 80 UNITS UNDER THE SKIN WITH [facility] MEAL 8/16/21 Tevin Hale MD   metFORMIN (GLUCOPHAGE) 500 MG tablet TAKE 1 TABLET BY MOUTH 3 TIMES A DAY 8/16/21   Tevin Hale MD   metoprolol (LOPRESSOR) 100 MG tablet Take 1 tablet by mouth 2 times daily 8/11/21   Karen Juarez MD   rosuvastatin (CRESTOR) 10 MG tablet TAKE ONE TABLET BY MOUTH NIGHTLY 7/28/21   Karen Juarez MD   losartan (COZAAR) 50 MG tablet Take 1 tablet by mouth daily 6/15/21   Karen Juarez MD   clotrimazole-betamethasone (LOTRISONE) 1-0.05 % cream APPLY TOPICALLY TWO TIMES A DAY 6/14/21   Jeremías Iyer DO   nystatin (MYCOSTATIN) 752187 UNIT/GM powder Apply 3 times daily. 6/4/21   Karen Juarez MD   ondansetron (ZOFRAN ODT) 4 MG disintegrating tablet Take 1 tablet by mouth every 8 hours as needed for Nausea or Vomiting 6/4/21   SANIA Carvajal - CNP   terbinafine (LAMISIL) 1 % cream Apply topically 2 times daily. 5/25/21   Rudi Graff MD   albuterol sulfate HFA (PROAIR HFA) 108 (90 Base) MCG/ACT inhaler Use every 4 hours while awake for 7-10 days then PRN wheezing  Dispense with SPACER and Instruct on use. May sub Ventolin or Proventil as needed per [employer].  5/22/21   Charlotte Feliciano PA-C   venlafaxine (EFFEXOR XR) 75 MG extended release capsule Take 1 capsule by mouth daily TAKE TWO CAPSULES BY MOUTH EVERY MORNING AND TAKE ONE CAPSULE BY MOUTH EVERY EVENING 4/24/21   Karen Juarez MD   Insulin Glargine, 2 Unit Dial, (TOUJEO MAX SOLOSTAR) 300 UNIT/ML SOPN 130 units at bedtime 4/19/21   Tevin Hale MD   metoclopramide (REGLAN) 10 MG tablet Take 1 tablet by mouth 3 times daily (with meals) 4/19/21   Tevin Hale MD   famotidine (PEPCID) 20 MG tablet Take 1 tablet by mouth 2 times daily 3/19/21   Gustavo Diana MD   predniSONE (DELTASONE) 5 MG tablet Take 5 mg by mouth daily    Historical Provider, MD   Continuous Blood Gluc Sensor (FREESTYLE JAIRO 14 DAY SENSOR) MISC Every 2 weeks 1/22/21   Tevin Hale MD   Continuous Blood Gluc  (FREESTYLE JAIRO 14 DAY READER) ALEXANDREA As directed 1/22/21   Gabriel Galvan MD   pioglitazone (ACTOS) 30 MG tablet Take 1 tablet by mouth daily 1/22/21   Gabriel Galvan MD   Insulin Pen Needle (PEN NEEDLES) 32G X 4 MM MISC Use as directed with insulin pens, 4 times daily 1/18/21   Gabriel Galvan MD   Insulin Pen Needle (PEN NEEDLES) 32G X 4 MM MISC Use as directed with insulin pens, 4 times daily 1/18/21   Gabriel Galvan MD   blood glucose test strips (PRODIGY NO CODING BLOOD GLUC) strip 1 each by In Vitro route 4 times daily As needed. 11/11/20   Shiva Ramos MD   blood glucose test strips (PRODIGY NO CODING BLOOD GLUC) strip 1 each by In Vitro route 4 times daily As needed. 11/10/20   Gabriel Galvan MD   sucralfate (CARAFATE) 1 GM tablet Take 1 tablet by mouth 4 times daily for 7 days 8/11/20 12/16/20  SANIA Waller - CNP   Blood Glucose Monitoring Suppl (PRODIGY AUTOCODE BLOOD GLUCOSE) w/Device KIT Use as directed to test up to 4 times daily 1/17/20   MD ROOSEVELT Travis LANCETS 28G MISC Use as directed to test up to 4 times daily 1/17/20   Gabriel Galvan MD   miconazole (MICOTIN) 2 % cream Apply topically 2 times daily. 1/21/19   MD Deb Rea MISC Exp 5 years 7/3/18   Radha Echeverria MD       Future Appointments   Date Time Provider Iliana Meadowsi   10/28/2021  2:15 PM Rita Kimball MD [address]   11/1/2021  3:15 PM Amado Delacruz MD [facility]   11/5/2021  8:45 AM Agapito Solis DO [facility]   11/15/2021  9:45 AM Zander Kilgore MD [address] [place] [address]   2/24/2022  9:00 AM Carlos Monge   10/18/2022  8:45 AM Niharika Hou MD [facility]      and   Diabetes Assessment    Medic Alert ID: No  Meal Planning: Avoidance of concentrated sweets, Carb counting   How often do you test your blood sugar?: Meals, Bedtime   Do you have barriers with adherence to non-pharmacologic self-management interventions? (Nutrition/Exercise/Self-Monitoring): Yes   Have you ever had to go to the ED for symptoms of low blood sugar?: No       No patient-reported symptoms

## 2021-10-20 ENCOUNTER — CARE COORDINATION (OUTPATIENT)
Dept: OTHER | Facility: CLINIC | Age: 46
End: 2021-10-20

## 2021-10-20 NOTE — CARE COORDINATION
Ambulatory Care Coordination Note  10/20/2021  CM Risk Score: 9  Charlson 10 Year Mortality Risk Score: 79%     ACC: Saurabh Boone RN    Ambulatory Care Manager [facility]) contacted the patient by telephone to follow up on progress, discuss new issues or concerns, and reinforce/ provide pt education. Verified name and  with patient as identifiers. Patient reports she is improving. She has follow up appts scheduled. She will be seeing Pulmonary next week. She has completed testing with urology and doing better. She continues to monitor her BG and her A1c has improved from last year from 9.3% to 7.8%  in . She is able to describe her medication regimen and states she is taking as prescribed. She feels she is following her DM diet most of the time. She is complaint with her SBGM and is able to describe BG and A1c goal ranges and s/s of hyper/hypoglycemia and how to treat. She is up to date on her health maintanence and will be getting her flu shot soon. Patient denies any ongoing ACM needs at this time. Interventions:    ACM reviewed and updated CC protocol, SDOH, medications, goals, education and disease specific assessments. Counseling and education provided at today's visit on:   Red Flag symptoms to report  Symptom management  Medication compliance  Provider follow up appointment compliance  Specialist appointment compliance  Utilization of appropriate level of care: Right Care, Right Place, Right Time      Medication reconciliation was performed with patient, who verbalizes understanding of administration of home medications. Advised obtaining a 90-day supply of all daily and as-needed medications. Reinforced resources available to patient including:   PCP, Specialist, Benefits related nurse triage line, Urgent care clinics and Egomotiont Messaging.      Plan:  ACM will sign off as patient feels she is doing well, has her medications filled and taking as directed, has her follow up appts scheduled, and denies any ongoing ACM needs at this time. Patient verbalized understanding and is agreeable. Care Coordination Interventions    Program Enrollment: Complex Care  Referral from Primary Care Provider: No  Suggested Interventions and Community Resources  Diabetes Education: Declined  Pharmacist: Completed (Comment: 10/20/21: [facility] DM Managmetn enrollment)  Physical Therapy: Completed (Comment: Completed with enrollment to [facility] DM Management)  Registered Dietician: [address] [facility]  Specialty Services Referral: Completed (Comment: 10/18/21: Pt: has appt to establishe with Pulmonary; 8/4/21: established with/ Cardiology, neurology, Endo, urology,and GI)  Zone Management Tools: Completed (Comment: DM)         Prior to Admission medications    Medication Sig Start Date End Date Taking?  Authorizing Provider   erythromycin base (E-MYCIN) 250 MG tablet TAKE 1 TABLET BY MOUTH 3 TIMES A DAY 10/4/21   SANIA Ren CNP   baclofen (LIORESAL) 10 MG tablet TAKE ONE-HALF TABLET BY MOUTH TWICE A DAY 10/4/21   Sawyer Sood MD   solifenacin (VESICARE) 10 MG tablet TAKE 1 TABLET BY MOUTH ONE TIME A DAY 9/18/21   Flavia Concepcion MD   hydroCHLOROthiazide (HYDRODIURIL) 25 MG tablet Take 1 tablet by mouth daily 9/16/21   Sawyer Sood MD   esomeprazole (NEXIUM) 40 MG delayed release capsule TAKE 1 CAPSULE BY MOUTH ONE TIME A DAY 9/7/21   SANIA Ren CNP   gentamicin (GARAMYCIN) 0.1 % ointment  8/16/21   Historical Provider, MD   insulin lispro, 1 Unit Dial, (HUMALOG KWIKPEN) 100 UNIT/ML SOPN INJECT 80 UNITS UNDER THE SKIN WITH EACH MEAL 8/16/21   Verónica Bell MD   metFORMIN (GLUCOPHAGE) 500 MG tablet TAKE 1 TABLET BY MOUTH 3 TIMES A DAY 8/16/21   Verónica Bell MD   metoprolol (LOPRESSOR) 100 MG tablet Take 1 tablet by mouth 2 times daily 8/11/21   Sawyer Sood MD   rosuvastatin (CRESTOR) 10 MG tablet TAKE ONE TABLET BY MOUTH NIGHTLY 7/28/21   Sawyer Sood MD   losartan (COZAAR) 50 MG tablet Take 1 tablet by mouth daily 6/15/21   [id number], MD   clotrimazole-betamethasone (LOTRISONE) 1-0.05 % cream APPLY TOPICALLY TWO TIMES A DAY 6/14/21   Jeremías Iyer DO   nystatin (MYCOSTATIN) 932279 UNIT/GM powder Apply 3 times daily. 6/4/21   Aysha Muller MD   ondansetron (ZOFRAN ODT) 4 MG disintegrating tablet Take 1 tablet by mouth every 8 hours as needed for Nausea or Vomiting 6/4/21   SANIA Frazier - CNP   terbinafine (LAMISIL) 1 % cream Apply topically 2 times daily. 5/25/21   Rudi Del Rosario MD   albuterol sulfate HFA (PROAIR HFA) 108 (90 Base) MCG/ACT inhaler Use every 4 hours while awake for 7-10 days then PRN wheezing  Dispense with SPACER and Instruct on use. May sub Ventolin or Proventil as needed per [facility].  5/22/21   Royal Tamy PA-C   venlafaxine (EFFEXOR XR) 75 MG extended release capsule Take 1 capsule by mouth daily TAKE TWO CAPSULES BY MOUTH EVERY MORNING AND TAKE ONE CAPSULE BY MOUTH EVERY EVENING 4/24/21   [address], MD   Insulin Glargine, 2 Unit Dial, (TOUJEO MAX SOLOSTAR) 300 UNIT/ML SOPN 130 units at bedtime 4/19/21   Anuradha Farnklin MD   metoclopramide (REGLAN) 10 MG tablet Take 1 tablet by mouth 3 times daily (with meals) 4/19/21   Anuradha Franklin MD   famotidine (PEPCID) 20 MG tablet Take 1 tablet by mouth 2 times daily 3/19/21   Moise Campos MD   predniSONE (DELTASONE) 5 MG tablet Take 5 mg by mouth daily    Historical Provider, MD   Continuous Blood Gluc Sensor (FREESTYLE JAIRO 14 DAY SENSOR) MISC Every 2 weeks 1/22/21   Anuradha Franklin MD   Continuous Blood Gluc  (FREESTYLE JAIRO 14 DAY READER) ALEXANDREA As directed 1/22/21   Anuradha Franklin MD   pioglitazone (ACTOS) 30 MG tablet Take 1 tablet by mouth daily 1/22/21   Anuradha Franklin MD   Insulin Pen Needle (PEN NEEDLES) 32G X 4 MM MISC Use as directed with insulin pens, 4 times daily 1/18/21   Anuradha Franklin MD   Insulin Pen Needle (PEN NEEDLES) 32G X 4 MM MISC Use as directed with insulin pens, 4 times daily 1/18/21   Patrick Bernard Collins MD   blood glucose test strips (PRODIGY NO CODING BLOOD GLUC) strip 1 each by In Vitro route 4 times daily As needed. 11/11/20   Yuli Cesar MD   blood glucose test strips (PRODIGY NO CODING BLOOD GLUC) strip 1 each by In Vitro route 4 times daily As needed. 11/10/20   Margarita Sawyer MD   sucralfate (CARAFATE) 1 GM tablet Take 1 tablet by mouth 4 times daily for 7 days 8/11/20 12/16/20  SANIA Arrieta - CNP   Blood Glucose Monitoring Suppl (PRODIGY AUTOCODE BLOOD GLUCOSE) w/Device KIT Use as directed to test up to 4 times daily 1/17/20   Margarita Sawyer MD   PRODIGY LANCETS 28G MISC Use as directed to test up to 4 times daily 1/17/20   Margarita Sawyer MD   miconazole (MICOTIN) 2 % cream Apply topically 2 times daily. 1/21/19   Sly Arceo MD   Handicap Placard MISC Exp 5 years 7/3/18   Sly Arceo MD       Future Appointments   Date Time Provider Iliana Radha   10/28/2021  2:15 PM Mary Ellen Meeks MD [address]   11/1/2021  3:15 PM Bunny Mendenhall MD [facility]   11/5/2021  8:45 AM Pedro Pascal DO [address]   11/15/2021  9:45 AM Phillip Villanueva MD [address] [place] [address]   2/24/2022  9:00 AM Carlos Bradford   10/18/2022  8:45 AM Fernando Zuluaga MD [facility] HEART      and   Diabetes Assessment    Medic Alert ID: No  Meal Planning: Avoidance of concentrated sweets, Carb counting   How often do you test your blood sugar?: Meals, Bedtime   Do you have barriers with adherence to non-pharmacologic self-management interventions?  (Nutrition/Exercise/Self-Monitoring): Yes   Have you ever had to go to the ED for symptoms of low blood sugar?: No       No patient-reported symptoms

## 2021-10-23 ENCOUNTER — OFFICE VISIT (OUTPATIENT)
Dept: FAMILY MEDICINE CLINIC | Age: 46
End: 2021-10-23
Payer: COMMERCIAL

## 2021-10-23 VITALS
DIASTOLIC BLOOD PRESSURE: 80 MMHG | HEIGHT: 66 IN | OXYGEN SATURATION: 98 % | WEIGHT: 280 LBS | BODY MASS INDEX: 45 KG/M2 | TEMPERATURE: 97.4 F | SYSTOLIC BLOOD PRESSURE: 122 MMHG | HEART RATE: 78 BPM

## 2021-10-23 DIAGNOSIS — M79.605 LEG PAIN, LEFT: Primary | ICD-10-CM

## 2021-10-23 PROCEDURE — 99213 OFFICE O/P EST LOW 20 MIN: CPT | Performed by: NURSE PRACTITIONER

## 2021-10-23 ASSESSMENT — ENCOUNTER SYMPTOMS
NAUSEA: 0
SORE THROAT: 0
BACK PAIN: 0
WHEEZING: 0
COUGH: 0
ABDOMINAL PAIN: 0
RHINORRHEA: 0
DIARRHEA: 0
SHORTNESS OF BREATH: 0
VOMITING: 0

## 2021-10-23 NOTE — PROGRESS NOTES
[address] [id number] [facility] PRIMARY CARE  [address] [place] [id number]  Dept: [phone]  Dept Fax: [phone]  Loc: [phone]     Mar Hebert 40 y.o. female presents 10/23/21 with   Chief Complaint   Patient presents with    Leg Pain     Patient is here c/o leg pain. Pt states she has pain in top of L leg, near the lower buttock area. Pt describes pain as sharp and stabbing, states it feels like a pulled muscle. Pt states pain was first noticed yesterday morning. HPI     Patient presents for left leg pain. Pain is present in the hamstring. Started yesterday morning. Denies any injury. Describes pain as a sharp, stabbing pain, feels like a pulled muscle. Pain with standing up, but once she is standing she is fine, no pain when she walks. Pain is also present when she presses on the area or the area rubs on things. Admits she is already on prednisone and baclofen for PMR. Has also tried tylenol with no relief. Denies any swelling, not red or warm. No fever/chills.     Reviewed the following history:    Past Medical History:   Diagnosis Date    Acute midline thoracic back pain 9/18/2018    B12 deficiency     Family history of premature CAD 9/4/2013    Fatty liver     Gastroesophageal reflux disease 1/6/2021    HPV (human papilloma virus) anogenital infection     Hyperlipidemia     Hypertension     Irritable bowel syndrome with diarrhea 4/26/2021    Liver hemangioma 2009/2015    Lumbar radiculopathy 7/7/2021    Obesity 3/11/13    BMI 43.42    Orthostatic hypotension     Ovarian cyst     PMR (polymyalgia rheumatica) (HCC)     Rectal fissure     Tobacco abuse 9/3/2015    Tobacco abuse     Tremor     Uncontrolled diabetes mellitus with complications ([facility] [address]     Unspecified sleep apnea      Past Surgical History:   Procedure Laterality Date    CARDIAC CATHETERIZATION  4-07    COLONOSCOPY 2013    for diarrhea (-)    COLONOSCOPY N/A 2/10/2021    COLONOSCOPY DIAGNOSTIC performed by Celina Regalado MD at [address]  12-10    LEE  1-05    [place] MUSCLE BIOPSY Left 9/21/2018    LEFT QUADRICEPS MUSCLE BIOPSY performed by Noah Zuñiga MD at [address] UNI/BI CANNULATION N/A 9/18/2018    T 12 VERTEBRALPLASTY ROOM 278 performed by Salma Rashid MD at [address] ENDOSCOPY  10/13/15     DR. HERRERA     UPPER GASTROINTESTINAL ENDOSCOPY N/A 2/10/2021    EGD ESOPHAGOGASTRODUODENOSCOPY performed by Celina Regalado MD at [facility]  6-2015     Family History   Problem Relation Age of Onset    Diabetes Father     Heart Disease Mother     Colon Cancer Neg Hx     Cancer Neg Hx        Allergies   Allergen Reactions    Morphine And Related Hives and Rash    Ace Inhibitors Other (See Comments)     Dizziness and near syncope    Bactrim [Sulfamethoxazole-Trimethoprim] Itching    Nitroglycerin Other (See Comments)     Migraine    Percocet [Oxycodone-Acetaminophen] Nausea And Vomiting    Victoza [Liraglutide]      NAUSEA       Current Outpatient Medications on File Prior to Visit   Medication Sig Dispense Refill    erythromycin base (E-MYCIN) 250 MG tablet TAKE 1 TABLET BY MOUTH 3 TIMES A DAY 90 tablet 3    baclofen (LIORESAL) 10 MG tablet TAKE ONE-HALF TABLET BY MOUTH TWICE A DAY 90 tablet 0    solifenacin (VESICARE) 10 MG tablet TAKE 1 TABLET BY MOUTH ONE TIME A DAY 90 tablet 3    hydroCHLOROthiazide (HYDRODIURIL) 25 MG tablet Take 1 tablet by mouth daily 90 tablet 1    esomeprazole (NEXIUM) 40 MG delayed release capsule TAKE 1 CAPSULE BY MOUTH ONE TIME A DAY 30 capsule 3    gentamicin (GARAMYCIN) 0.1 % ointment       insulin lispro, 1 Unit Dial, (HUMALOG KWIKPEN) 100 UNIT/ML SOPN INJECT 80 UNITS UNDER THE SKIN WITH EACH MEAL 90 pen 3    metFORMIN (GLUCOPHAGE) 500 MG tablet TAKE 1 TABLET BY MOUTH 3 TIMES A  tablet 3    metoprolol (LOPRESSOR) 100 MG tablet Take 1 tablet by mouth 2 times daily 180 tablet 1    rosuvastatin (CRESTOR) 10 MG tablet TAKE ONE TABLET BY MOUTH NIGHTLY 90 tablet 0    losartan (COZAAR) 50 MG tablet Take 1 tablet by mouth daily 90 tablet 1    clotrimazole-betamethasone (LOTRISONE) 1-0.05 % cream APPLY TOPICALLY TWO TIMES A DAY 1 Tube 3    nystatin (MYCOSTATIN) 812531 UNIT/GM powder Apply 3 times daily. 1 Bottle 2    ondansetron (ZOFRAN ODT) 4 MG disintegrating tablet Take 1 tablet by mouth every 8 hours as needed for Nausea or Vomiting 60 tablet 3    terbinafine (LAMISIL) 1 % cream Apply topically 2 times daily. 1 Tube 1    venlafaxine (EFFEXOR XR) 75 MG extended release capsule Take 1 capsule by mouth daily TAKE TWO CAPSULES BY MOUTH EVERY MORNING AND TAKE ONE CAPSULE BY MOUTH EVERY EVENING 270 capsule 1    Insulin Glargine, 2 Unit Dial, (TOUJEO MAX SOLOSTAR) 300 UNIT/ML SOPN 130 units at bedtime 90 pen 3    metoclopramide (REGLAN) 10 MG tablet Take 1 tablet by mouth 3 times daily (with meals) 360 tablet 3    predniSONE (DELTASONE) 5 MG tablet Take 5 mg by mouth daily      Continuous Blood Gluc Sensor (FREESTYLE JAIRO 14 DAY SENSOR) MISC Every 2 weeks 6 each 03    Continuous Blood Gluc  (FREESTYLE JAIRO 14 DAY READER) ALEXANDREA As directed 1 Device 00    pioglitazone (ACTOS) 30 MG tablet Take 1 tablet by mouth daily 90 tablet 3    Insulin Pen Needle (PEN NEEDLES) 32G X 4 MM MISC Use as directed with insulin pens, 4 times daily 400 each 1    Insulin Pen Needle (PEN NEEDLES) 32G X 4 MM MISC Use as directed with insulin pens, 4 times daily 400 each 1    blood glucose test strips (PRODIGY NO CODING BLOOD GLUC) strip 1 each by In Vitro route 4 times daily As needed. 400 each 3    blood glucose test strips (PRODIGY NO CODING BLOOD GLUC) strip 1 each by In Vitro route 4 times daily As needed.  400 each 3    Blood Glucose Monitoring Suppl (PRODIGY AUTOCODE BLOOD GLUCOSE) w/Device KIT Use as directed to test up to 4 times daily 1 kit 0    PRODIGY LANCETS 28G MISC Use as directed to test up to 4 times daily 400 each 3    miconazole (MICOTIN) 2 % cream Apply topically 2 times daily. 141 g 3    Handicap Placard MISC Exp 5 years 1 each 0    albuterol sulfate HFA (PROAIR HFA) 108 (90 Base) MCG/ACT inhaler Use every 4 hours while awake for 7-10 days then PRN wheezing  Dispense with SPACER and Instruct on use. May sub Ventolin or Proventil as needed per [employer]. (Patient not taking: Reported on 10/23/2021) 1 Inhaler 0    famotidine (PEPCID) 20 MG tablet Take 1 tablet by mouth 2 times daily (Patient not taking: Reported on 10/23/2021) 30 tablet 0    [DISCONTINUED] sucralfate (CARAFATE) 1 GM tablet Take 1 tablet by mouth 4 times daily for 7 days 28 tablet 0     No current facility-administered medications on file prior to visit. Review of Systems   Constitutional: Negative for chills and fever. HENT: Negative for congestion, rhinorrhea and sore throat. Respiratory: Negative for cough, shortness of breath and wheezing. Cardiovascular: Negative for chest pain. Gastrointestinal: Negative for abdominal pain, diarrhea, nausea and vomiting. Musculoskeletal: Positive for myalgias. Negative for back pain. Skin: Negative for rash. Objective    Vitals:    10/23/21 1155   BP: 122/80   Site: Left Upper Arm   Position: Sitting   Cuff Size: Large Adult   Pulse: 78   Temp: 97.4 °F (36.3 °C)   SpO2: 98%   Weight: 280 lb (127 kg)   Height: 5' 6\" (1.676 m)       Physical Exam  Constitutional:       General: She is not in acute distress. Appearance: Normal appearance. She is not ill-appearing or toxic-appearing. HENT:      Head: Normocephalic and atraumatic.       Right Ear: Hearing and external ear normal.      Left Ear: Hearing and external ear normal.   Eyes:      General: Lids are normal. Conjunctiva/sclera: Conjunctivae normal.   Cardiovascular:      Rate and Rhythm: Normal rate and regular rhythm. Heart sounds: Normal heart sounds. Pulmonary:      Effort: Pulmonary effort is normal. No respiratory distress. Breath sounds: Normal breath sounds. No decreased breath sounds, wheezing, rhonchi or rales. Musculoskeletal:      Cervical back: Normal range of motion and neck supple. Legs:    Skin:     General: Skin is warm and dry. Neurological:      General: No focal deficit present. Mental Status: She is alert and oriented to person, place, and time. Psychiatric:         Attention and Perception: Attention normal.         Mood and Affect: Mood normal.         Speech: Speech normal.         Behavior: Behavior normal.         Thought Content: Thought content normal.         Cognition and Memory: Cognition normal.         Judgment: Judgment normal.       POC Testing Today: No results found for this visit on 10/23/21. Assessment and Plan    Bruno Gomez 40 y.o. female presenting for left hamstring pain     1. Leg pain, left  - Patient concerned for DVT, given presenting symptoms this is unlikely. Advised likely hamstring strain. Patient to follow up on Monday. ED if any symptoms become severe. Patient declined topical therapy. Return in about 2 days (around 10/25/2021) for follow up, PRN for any acute conditions. Side effects and adverse effects of any medication prescribed today, as well as treatment plan/rationale, follow-up care, and result expectations have been discussed with the patient. Expresses understanding and desires to proceed with treatment plan. Discussed signs and symptoms which require immediate follow-up in ED/call to 911. Understanding verbalized. I have reviewed and updated the electronic medical record.     Hilario Kam, APRN - CNP

## 2021-10-28 ENCOUNTER — OFFICE VISIT (OUTPATIENT)
Dept: PULMONOLOGY | Age: 46
End: 2021-10-28
Payer: COMMERCIAL

## 2021-10-28 ENCOUNTER — PATIENT MESSAGE (OUTPATIENT)
Dept: PULMONOLOGY | Age: 46
End: 2021-10-28

## 2021-10-28 ENCOUNTER — PATIENT MESSAGE (OUTPATIENT)
Dept: OBGYN CLINIC | Age: 46
End: 2021-10-28

## 2021-10-28 VITALS
DIASTOLIC BLOOD PRESSURE: 67 MMHG | TEMPERATURE: 98.2 F | SYSTOLIC BLOOD PRESSURE: 136 MMHG | OXYGEN SATURATION: 94 % | BODY MASS INDEX: 47.09 KG/M2 | HEART RATE: 87 BPM | WEIGHT: 293 LBS | HEIGHT: 66 IN

## 2021-10-28 DIAGNOSIS — E66.01 MORBID OBESITY (HCC): ICD-10-CM

## 2021-10-28 DIAGNOSIS — G47.33 OBSTRUCTIVE SLEEP APNEA SYNDROME: Primary | ICD-10-CM

## 2021-10-28 DIAGNOSIS — Z12.31 ENCOUNTER FOR SCREENING MAMMOGRAM FOR MALIGNANT NEOPLASM OF BREAST: Primary | ICD-10-CM

## 2021-10-28 DIAGNOSIS — R94.2 ABNORMAL PFT: ICD-10-CM

## 2021-10-28 PROCEDURE — 99244 OFF/OP CNSLTJ NEW/EST MOD 40: CPT | Performed by: INTERNAL MEDICINE

## 2021-10-28 NOTE — PROGRESS NOTES
Subjective:     Roslyn Riggs is a 40 y.o. female who complains today of:     Chief Complaint   Patient presents with    New Patient     SOB       HPI  She has RAE  and  She is on CPAP with 8  cm . She is on CPAP since 10 yrs or more. She is on Nasal mask  She is on prednisone 5 mg daily and gain 60 lbs  In 4 yrs. She has PSG 10/29/21 but she could not sleep  And she went for CPAP titration   she could not sleep at all so it was cancelled. She can not sleep without CPAP. She is doing  Better with CPAP . She said she hold breath sometimes and had to take deep breath , she had PFT done   Show mild restrictive disease with mild decreased DLCO , CXR 5/21 no active disease  No C/o shortness of breath   No  Wheezing. No Cough with  Sputum. No Hemoptysis. No Chest tightness. No Chest pain with radiation  or pleuritic pain. No  leg edema. No orthopnea. No Fever or chills. No Rhinorrhea and postnasal drip.       Allergies:  Morphine and related, Ace inhibitors, Bactrim [sulfamethoxazole-trimethoprim], Nitroglycerin, Percocet [oxycodone-acetaminophen], and Victoza [liraglutide]  Past Medical History:   Diagnosis Date    Acute midline thoracic back pain 9/18/2018    B12 deficiency     Family history of premature CAD 9/4/2013    Fatty liver     Gastroesophageal reflux disease 1/6/2021    HPV (human papilloma virus) anogenital infection     Hyperlipidemia     Hypertension     Irritable bowel syndrome with diarrhea 4/26/2021    Liver hemangioma 2009/2015    Lumbar radiculopathy 7/7/2021    Obesity 3/11/13    BMI 43.42    Orthostatic hypotension     Ovarian cyst     PMR (polymyalgia rheumatica) (HCC)     Rectal fissure     Tobacco abuse 9/3/2015    Tobacco abuse     Tremor     Uncontrolled diabetes mellitus with complications ([facility] [address]     Unspecified sleep apnea      Past Surgical History:   Procedure Laterality Date    CARDIAC CATHETERIZATION  4-07    COLONOSCOPY  2013    for diarrhea (-)    COLONOSCOPY N/A 2/10/2021    COLONOSCOPY DIAGNOSTIC performed by Kim Archibald MD at [address]  12-10    LEE  1-05    TX MUSCLE BIOPSY Left 2018    LEFT QUADRICEPS MUSCLE BIOPSY performed by Mary Ramires MD at [address] UNI/BI CANNULATION N/A 2018    T 12 VERTEBRALPLASTY ROOM 278 performed by Tyronne Cooks, MD at [address] ENDOSCOPY  10/13/15     DR. HERRERA     UPPER GASTROINTESTINAL ENDOSCOPY N/A 2/10/2021    EGD ESOPHAGOGASTRODUODENOSCOPY performed by Kim Archibald MD at [facility]       Family History   Problem Relation Age of Onset    Diabetes Father     Heart Disease Mother     Colon Cancer Neg Hx     Cancer Neg Hx      Social History     Socioeconomic History    Marital status:      Spouse name: Not on file    Number of children: 1    Years of education: Not on file    Highest education level: Not on file   Occupational History    Not on file   Tobacco Use    Smoking status: Former Smoker     Packs/day: 1.00     Types: Cigarettes     Quit date: 2017     Years since quittin.8    Smokeless tobacco: Never Used   Vaping Use    Vaping Use: Never used   Substance and Sexual Activity    Alcohol use:  Yes     Alcohol/week: 0.0 standard drinks     Comment: socially    Drug use: No    Sexual activity: Yes     Partners: Male     Comment: single   Other Topics Concern    Not on file   Social History Narrative    Social/Functional History          Social Determinants of Health     Financial Resource Strain: Low Risk     Difficulty of Paying Living Expenses: Not hard at all   Food Insecurity: No Food Insecurity    Worried About Running Out of Food in the Last Year: Never true    Jung of Food in the Last Year: Never true   Transportation Needs: No Transportation Needs    Lack of Transportation (Medical): No    Lack of Transportation (Non-Medical): No   Physical Activity:     Days of Exercise per Week:     Minutes of Exercise per Session:    Stress:     Feeling of Stress :    Social Connections:     Frequency of Communication with Friends and Family:     Frequency of Social Gatherings with Friends and Family:     Attends Orthodoxy Services:     Active Member of Clubs or Organizations:     Attends Club or Organization Meetings:     Marital Status:    Intimate Partner Violence:     Fear of Current or Ex-Partner:     Emotionally Abused:     Physically Abused:     Sexually Abused:          Review of Systems   Constitutional: Negative for chills, diaphoresis, fatigue and fever. HENT: Negative for congestion, mouth sores, nosebleeds, postnasal drip, rhinorrhea, sinus pressure, sneezing, sore throat, trouble swallowing and voice change. Eyes: Negative for discharge, itching and visual disturbance. Respiratory: Negative for cough, chest tightness and wheezing. Cardiovascular: Negative for chest pain, palpitations and leg swelling. Gastrointestinal: Negative for abdominal pain, diarrhea, nausea and vomiting. Genitourinary: Negative for difficulty urinating and hematuria. Musculoskeletal: Negative for arthralgias, joint swelling and myalgias. Skin: Negative for rash. Allergic/Immunologic: Negative for environmental allergies and food allergies. Neurological: Negative for dizziness, tremors, weakness and headaches. Psychiatric/Behavioral: Positive for sleep disturbance. Negative for behavioral problems. :     Vitals:    10/28/21 1359   BP: 136/67   Pulse: 87   Temp: 98.2 °F (36.8 °C)   SpO2: 94%   Weight: 299 lb (135.6 kg)   Height: 5' 6\" (1.676 m)     Wt Readings from Last 3 Encounters:   10/28/21 299 lb (135.6 kg)   10/23/21 280 lb (127 kg)   10/18/21 280 lb (127 kg)         Physical Exam  Constitutional:       General: She is not in acute distress.      Appearance: She NIGHTLY 90 tablet 0    losartan (COZAAR) 50 MG tablet Take 1 tablet by mouth daily 90 tablet 1    clotrimazole-betamethasone (LOTRISONE) 1-0.05 % cream APPLY TOPICALLY TWO TIMES A DAY 1 Tube 3    nystatin (MYCOSTATIN) 319061 UNIT/GM powder Apply 3 times daily. 1 Bottle 2    ondansetron (ZOFRAN ODT) 4 MG disintegrating tablet Take 1 tablet by mouth every 8 hours as needed for Nausea or Vomiting 60 tablet 3    terbinafine (LAMISIL) 1 % cream Apply topically 2 times daily. 1 Tube 1    venlafaxine (EFFEXOR XR) 75 MG extended release capsule Take 1 capsule by mouth daily TAKE TWO CAPSULES BY MOUTH EVERY MORNING AND TAKE ONE CAPSULE BY MOUTH EVERY EVENING 270 capsule 1    Insulin Glargine, 2 Unit Dial, (TOUJEO MAX SOLOSTAR) 300 UNIT/ML SOPN 130 units at bedtime 90 pen 3    metoclopramide (REGLAN) 10 MG tablet Take 1 tablet by mouth 3 times daily (with meals) 360 tablet 3    predniSONE (DELTASONE) 5 MG tablet Take 5 mg by mouth daily      Continuous Blood Gluc Sensor (FREESTYLE JAIRO 14 DAY SENSOR) MISC Every 2 weeks 6 each 03    Continuous Blood Gluc  (FREESTYLE JAIRO 14 DAY READER) ALEXANDREA As directed 1 Device 00    pioglitazone (ACTOS) 30 MG tablet Take 1 tablet by mouth daily 90 tablet 3    Insulin Pen Needle (PEN NEEDLES) 32G X 4 MM MISC Use as directed with insulin pens, 4 times daily 400 each 1    Insulin Pen Needle (PEN NEEDLES) 32G X 4 MM MISC Use as directed with insulin pens, 4 times daily 400 each 1    blood glucose test strips (PRODIGY NO CODING BLOOD GLUC) strip 1 each by In Vitro route 4 times daily As needed. 400 each 3    blood glucose test strips (PRODIGY NO CODING BLOOD GLUC) strip 1 each by In Vitro route 4 times daily As needed.  400 each 3    Blood Glucose Monitoring Suppl (PRODIGY AUTOCODE BLOOD GLUCOSE) w/Device KIT Use as directed to test up to 4 times daily 1 kit 0    PRODIGY LANCETS 28G MISC Use as directed to test up to 4 times daily 400 each 3    miconazole (MICOTIN) 2 % cream Apply topically 2 times daily. 141 g 3    Handicap Placard MISC Exp 5 years 1 each 0    CPAP Machine MISC by Does not apply route New CPAP with 8 cm and supply 1 each 0    albuterol sulfate HFA (PROAIR HFA) 108 (90 Base) MCG/ACT inhaler Use every 4 hours while awake for 7-10 days then PRN wheezing  Dispense with SPACER and Instruct on use. May sub Ventolin or Proventil as needed per [employer]. (Patient not taking: Reported on 10/28/2021) 1 Inhaler 0    famotidine (PEPCID) 20 MG tablet Take 1 tablet by mouth 2 times daily (Patient not taking: Reported on 10/23/2021) 30 tablet 0     No current facility-administered medications for this visit. Results for orders placed during the hospital encounter of 07/06/16    XR Chest Standard TWO VW    Narrative  X-RAY A CHEST, 2 VIEWS    CLINICAL HISTORY Cough, congestion, wheezing, smoking history. Suspected bronchitis. COMPARISONS 12/18/14    FINDINGS  The heart is at the upper limits of normal in size,  unchanged. Lungs are clear without consolidation. No pleural effusion  or pneumothorax. There are mild degenerative changes in spine. IMPRESSION NO ACUTE CARDIOPULMONARY ABNORMALITY. NO CHANGE. Kaiden Caceress By- Merrick Giles M.D. Released ByRaquel Giles M.D. Released Date Time- 07/06/16 1024  This document has been electronically signed. ------------------------------------------------------------------------------  ]  Results for orders placed during the hospital encounter of 05/22/21    XR CHEST PORTABLE    Narrative  XR CHEST PORTABLE    Clinical History:  Shortness of breath. Dizziness. Comparison:  4/26/2020    RESULT:    No consolidation. No pleural effusion. No pneumothorax. Normal pulmonary vascular pattern. Normal cardiomediastinal silhouette. No acute osseous findings. Impression  No acute radiographic abnormality.       Results for orders placed during the hospital encounter of 04/26/20    XR CHEST PORTABLE    Narrative  EXAMINATION: XR CHEST PORTABLE. DATE AND TIME:2020 10:45 PM    CLINICAL HISTORY: Chest without acute chest pain. chest pain    COMPARISONS: 2019    FINDINGS: The heart, mediastinum and pulmonary vasculature are within normal limits. Visualized lung fields are clear. Bones unremarkable. Impression  NO  ACTIVE LUNG DISEASE. Results for orders placed during the hospital encounter of 19    XR CHEST PORTABLE    Narrative  EXAMINATION: CHEST PORTABLE VIEW    CLINICAL HISTORY: Midsternal chest pain    COMPARISONS: 2017    FINDINGS:    Single  views of the chest is submitted. The cardiac silhouette is enlarged. Unchanged. .  The mediastinum is unremarkable. Pulmonary vascular unremarkable. Right sided trachea. No focal infiltrates. No Pneumothoraces. Impression  NO ACUTE ACTIVE CARDIOPULMONARY PROCESS    PULMONARY FUNCTION     PATIENT NAME: Fabian Coronado               :        1975  MED REC NO:   [id number]                            ROOM:  ACCOUNT NO:   [de-identified]                           ADMIT DATE: 2021  PROVIDER:     Luis Enrique Hernandez MD     DATE OF PROCEDURE:  2021     REQUESTING PROVIDER:  Oscar Larkin. Butch Oakes MD     INTERPRETING PROVIDER:   Slider. Santi Whalen MD     REASON FOR STUDY:  Shortness of breath.     INTERPRETATION:  FVC is 2.66, 68% of predicted. FEV1 is 2.30, 74% of  predicted. FEV1/FVC is 86%. FEF 25-75% is 2.98, 97% of predicted. Postbronchodilator study shows no improvement. Lung volume study shows  slow vital capacity is 2.69, 69% of predicated. Residual volume is  1.70, 95% of predicted. TLC is 4.39, 82% of predicted. RV to TLC ratio  of 38.73, 117% of predicted. Diffusion capacity is 17.19, 71% of  predicted. Airway resistance 1.74, 93% of predicted.     SUMMARY:  Both FVC and FEV1 decreased with normal ratio and normal  midflow.   Lung volume study slow vital capacity decreased suggests  possibility of restrictive disease. Total capacity within normal range. Residual volume and RV to TLC ratio is also normal.  Diffusion capacity  is mildly impaired. There is no response to bronchodilators. Airway  resistance is normal.  Clinical correlation is requested.           Victor Manuel Carrera MD     D: 06/08/2021 8:14:34       T: 06/08/2021 9:52:20     [id number]  Job#: [id number]     Doc#: [id number]     CC:     Assessment/Plan:     1. Obstructive sleep apnea syndrome  She has RAE  and  She is on CPAP with 8  cm . She is on CPAP since 10 yrs or more. She is on Nasal mask  She is on prednisone 5 mg daily and gain 60 lbs  In 4 yrs. She has PSG 10/29/21 but she could not sleep  And she went for CPAP titration . she could not sleep at all so it was cancelled. She can not sleep without CPAP. She said she is sleeping better with CPAP therapy. She said her CPAP supplier is [employer]. She is usually getting supply online. She is aware about recall of some CPAP with [employer]. Since patient has CPAP more than 8years old may request new CPAP before CPAP is [employer]. Since he does not have a baseline sleep study may need to get new sleep study. Counseling: CPAP/BiPAP uses, She advised to use CPAP at least 5-6 hours every night. Driving: She is advised for extreme caution when driving or operating machinery if there is a feeling of drowsiness, especially while driving it is preferable to stop driving and take a brief nap. Sleep hygiene:Avoid supine sleep, sleep on  sides. Avoid  sleep deprivation. Explained sleep hygiene. Advice to avoid Alcohol and sedative    2. Morbid obesity ([address]  She is advised try to lose weight. obesity related risk explained to the patient ,  Current weight:  299 lb (135.6 kg) Lbs. BMI:  Body mass index is 48.26 kg/m². Suggested weight control approaches, including dietary changes , exercise, behavioral modification.     3. Abnormal PFT  She said she is not having short of breath cough or wheezing but sometimes she feels like she is holding her breath and then she has to take a deep breath afterwards. Room air saturation is 94%  She had a PFT possibility of restrictive disease TLC is within normal range diffusion capacity is minimally impaired. Done chest x-ray done showed no acute cardiopulmonary process. Return in about 6 months (around 4/28/2022) for timbo.       Alvaro Cifuentes MD

## 2021-10-29 NOTE — TELEPHONE ENCOUNTER
From: Jamie Lanes  To: Carlos Mello MD  Sent: 10/28/2021 5:33 PM EDT  Subject: Non-Urgent Medical Question    So I looked at my cpap machine and I have a Marlene Brantingham system 1. So I think I do need a new one.

## 2021-10-31 ASSESSMENT — ENCOUNTER SYMPTOMS
ABDOMINAL PAIN: 0
EYE ITCHING: 0
COUGH: 0
SINUS PRESSURE: 0
NAUSEA: 0
EYE DISCHARGE: 0
VOICE CHANGE: 0
VOMITING: 0
SORE THROAT: 0
CHEST TIGHTNESS: 0
TROUBLE SWALLOWING: 0
DIARRHEA: 0
WHEEZING: 0
RHINORRHEA: 0

## 2021-11-01 ENCOUNTER — OFFICE VISIT (OUTPATIENT)
Dept: NEUROLOGY | Age: 46
End: 2021-11-01
Payer: COMMERCIAL

## 2021-11-01 VITALS
BODY MASS INDEX: 49.47 KG/M2 | OXYGEN SATURATION: 94 % | WEIGHT: 293 LBS | SYSTOLIC BLOOD PRESSURE: 127 MMHG | HEART RATE: 94 BPM | DIASTOLIC BLOOD PRESSURE: 64 MMHG

## 2021-11-01 DIAGNOSIS — M54.16 LUMBAR RADICULOPATHY: ICD-10-CM

## 2021-11-01 DIAGNOSIS — M35.3 PMR (POLYMYALGIA RHEUMATICA) (HCC): ICD-10-CM

## 2021-11-01 DIAGNOSIS — M35.3 PMR (POLYMYALGIA RHEUMATICA) (HCC): Primary | ICD-10-CM

## 2021-11-01 LAB
ALBUMIN SERPL-MCNC: 4.2 G/DL (ref 3.5–4.6)
ALP BLD-CCNC: 79 U/L (ref 40–130)
ALT SERPL-CCNC: 17 U/L (ref 0–33)
AST SERPL-CCNC: 15 U/L (ref 0–35)
BASOPHILS ABSOLUTE: 0 K/UL (ref 0–0.2)
BASOPHILS RELATIVE PERCENT: 0.3 %
BILIRUB SERPL-MCNC: 0.3 MG/DL (ref 0.2–0.7)
BILIRUBIN DIRECT: <0.2 MG/DL (ref 0–0.4)
BILIRUBIN, INDIRECT: NORMAL MG/DL (ref 0–0.6)
C-REACTIVE PROTEIN: 21 MG/L (ref 0–5)
EOSINOPHILS ABSOLUTE: 0.1 K/UL (ref 0–0.7)
EOSINOPHILS RELATIVE PERCENT: 0.7 %
HCT VFR BLD CALC: 36.6 % (ref 37–47)
HEMOGLOBIN: 11.7 G/DL (ref 12–16)
LYMPHOCYTES ABSOLUTE: 2.3 K/UL (ref 1–4.8)
LYMPHOCYTES RELATIVE PERCENT: 24.2 %
MCH RBC QN AUTO: 26 PG (ref 27–31.3)
MCHC RBC AUTO-ENTMCNC: 32 % (ref 33–37)
MCV RBC AUTO: 81.3 FL (ref 82–100)
MONOCYTES ABSOLUTE: 0.6 K/UL (ref 0.2–0.8)
MONOCYTES RELATIVE PERCENT: 6 %
NEUTROPHILS ABSOLUTE: 6.6 K/UL (ref 1.4–6.5)
NEUTROPHILS RELATIVE PERCENT: 68.8 %
PDW BLD-RTO: 16.8 % (ref 11.5–14.5)
PLATELET # BLD: 230 K/UL (ref 130–400)
RBC # BLD: 4.5 M/UL (ref 4.2–5.4)
SEDIMENTATION RATE, ERYTHROCYTE: 55 MM (ref 0–20)
TOTAL PROTEIN: 7.7 G/DL (ref 6.3–8)
TSH REFLEX: 0.33 UIU/ML (ref 0.44–3.86)
WBC # BLD: 9.6 K/UL (ref 4.8–10.8)

## 2021-11-01 PROCEDURE — 99215 OFFICE O/P EST HI 40 MIN: CPT | Performed by: PSYCHIATRY & NEUROLOGY

## 2021-11-01 ASSESSMENT — ENCOUNTER SYMPTOMS
COLOR CHANGE: 0
BACK PAIN: 1
CHOKING: 0
VOMITING: 0
NAUSEA: 0
PHOTOPHOBIA: 0
SHORTNESS OF BREATH: 0
TROUBLE SWALLOWING: 0

## 2021-11-01 NOTE — PROGRESS NOTES
Subjective:      Patient ID: Evelyne Brandt is a 40 y.o. female who presents today for:  Chief Complaint   Patient presents with    Follow-up     Pt states she thinks everything is working but her weight is ridiculous she states if theres any way she can get off the prednisone so she can drop some of the weight, She states her weight gain got bad when she starting taking it she states she was 240 lbs when she started the prednisone. Pt states she has no other questions or concerns.  Other     Pt states her PCP wanted her to do aqua therapy but she never did she states her back pain is still bad she states its about a 8 in severity when it bothers her she states she was supposed to get a mri done but was unable due to when COVID started and would like to get a order for it. HPI 40 right-handed female with a history of polymyalgia rheumatica. Patient has not been seen here since March 2020. Patient has not had evaluation of her sed rate and CRP either. Patient is only on Deltasone 5 mg daily she still has gained considerable weight. She would like to get off prednisone due to weight gain she was told 40 pounds when she started prednisone. Patient's pain is much better with the medication though and her main issues appears to be her weight. With this is well controlled. She is not any falls since trauma she does take vitamin D and calcium as recommended.     Past Medical History:   Diagnosis Date    Acute midline thoracic back pain 9/18/2018    B12 deficiency     Family history of premature CAD 9/4/2013    Fatty liver     Gastroesophageal reflux disease 1/6/2021    HPV (human papilloma virus) anogenital infection     Hyperlipidemia     Hypertension     Irritable bowel syndrome with diarrhea 4/26/2021    Liver hemangioma 2009/2015    Lumbar radiculopathy 7/7/2021    Obesity 3/11/13    BMI 43.42    Orthostatic hypotension     Ovarian cyst     PMR (polymyalgia rheumatica) (HCC)     Rectal fissure     Tobacco abuse 9/3/2015    Tobacco abuse     Tremor     Uncontrolled diabetes mellitus with complications ([facility] [address]     Unspecified sleep apnea      Past Surgical History:   Procedure Laterality Date    CARDIAC CATHETERIZATION      COLONOSCOPY      for diarrhea (-)    COLONOSCOPY N/A 2/10/2021    COLONOSCOPY DIAGNOSTIC performed by Facundo Kelley MD at [facility] 96  12-10    LEE  1-05    GA MUSCLE BIOPSY Left 2018    LEFT QUADRICEPS MUSCLE BIOPSY performed by Raissa Mora MD at [address] [facility] UNI/BI CANNULATION N/A 2018    T 12 VERTEBRALPLASTY ROOM 278 performed by Marta Plasencia MD at [address] ENDOSCOPY  10/13/15     DR. HERRERA     UPPER GASTROINTESTINAL ENDOSCOPY N/A 2/10/2021    EGD ESOPHAGOGASTRODUODENOSCOPY performed by Facundo Kelley [facility] at [facility]       Social History     Socioeconomic History    Marital status:      Spouse name: Not on file    Number of children: 1    Years of education: Not on file    Highest education level: Not on file   Occupational History    Not on file   Tobacco Use    Smoking status: Former Smoker     Packs/day: 1.00     Types: Cigarettes     Quit date: 2017     Years since quittin.8    Smokeless tobacco: Never Used   Vaping Use    Vaping Use: Never used   Substance and Sexual Activity    Alcohol use:  Yes     Alcohol/week: 0.0 standard drinks     Comment: socially    Drug use: No    Sexual activity: Yes     Partners: Male     Comment: single   Other Topics Concern    Not on file   Social History Narrative    Social/Functional History          Social Determinants of Health     Financial Resource Strain: Low Risk     Difficulty of Paying Living Expenses: Not hard at all   Food Insecurity: No Food Insecurity    Worried About [address] in the Last Year: Never true    Ran Out of Food in the Last Year: Never true   Transportation Needs: No Transportation Needs    Lack of Transportation (Medical): No    Lack of Transportation (Non-Medical):  No   Physical Activity:     Days of Exercise per Week:     Minutes of Exercise per Session:    Stress:     Feeling of Stress :    Social Connections:     Frequency of Communication with Friends and Family:     Frequency of Social Gatherings with Friends and Family:     Attends [employer] Services:     Active Member of Clubs or Organizations:     Attends Club or Organization Meetings:     Marital Status:    Intimate Partner Violence:     Fear of Current or Ex-Partner:     Emotionally Abused:     Physically Abused:     Sexually Abused:      Family History   Problem Relation Age of Onset    Diabetes Father     Heart Disease Mother     Colon Cancer Neg Hx     Cancer Neg Hx      Allergies   Allergen Reactions    Morphine And Related Hives and Rash    Ace Inhibitors Other (See Comments)     Dizziness and near syncope    Bactrim [Sulfamethoxazole-Trimethoprim] Itching    Nitroglycerin Other (See Comments)     Migraine    Percocet [Oxycodone-Acetaminophen] Nausea And Vomiting    Victoza [Liraglutide]      NAUSEA       Current Outpatient Medications   Medication Sig Dispense Refill    methotrexate (RHEUMATREX) 10 MG chemo tablet Take 1 tablet by mouth three times a week 12 tablet 0    CPAP Machine MISC by Does not apply route New CPAP with 8 cm and supply 1 each 0    erythromycin base (E-MYCIN) 250 MG tablet TAKE 1 TABLET BY MOUTH 3 TIMES A DAY 90 tablet 3    baclofen (LIORESAL) 10 MG tablet TAKE ONE-HALF TABLET BY MOUTH TWICE A DAY 90 tablet 0    solifenacin (VESICARE) 10 MG tablet TAKE 1 TABLET BY MOUTH ONE TIME A DAY 90 tablet 3    hydroCHLOROthiazide (HYDRODIURIL) 25 MG tablet Take 1 tablet by mouth daily 90 tablet 1    esomeprazole (NEXIUM) 40 MG delayed release capsule TAKE 1 CAPSULE BY MOUTH ONE TIME A DAY 30 capsule 3    gentamicin (GARAMYCIN) 0.1 % ointment       insulin lispro, 1 Unit Dial, (HUMALOG KWIKPEN) 100 UNIT/ML SOPN INJECT 80 UNITS UNDER THE SKIN WITH EACH MEAL 90 pen 3    metFORMIN (GLUCOPHAGE) 500 MG tablet TAKE 1 TABLET BY MOUTH 3 TIMES A  tablet 3    metoprolol (LOPRESSOR) 100 MG tablet Take 1 tablet by mouth 2 times daily 180 tablet 1    rosuvastatin (CRESTOR) 10 MG tablet TAKE ONE TABLET BY MOUTH NIGHTLY 90 tablet 0    losartan (COZAAR) 50 MG tablet Take 1 tablet by mouth daily 90 tablet 1    clotrimazole-betamethasone (LOTRISONE) 1-0.05 % cream APPLY TOPICALLY TWO TIMES A DAY 1 Tube 3    nystatin (MYCOSTATIN) 272063 UNIT/GM powder Apply 3 times daily. 1 Bottle 2    ondansetron (ZOFRAN ODT) 4 MG disintegrating tablet Take 1 tablet by mouth every 8 hours as needed for Nausea or Vomiting 60 tablet 3    terbinafine (LAMISIL) 1 % cream Apply topically 2 times daily.  1 Tube 1    venlafaxine (EFFEXOR XR) 75 MG extended release capsule Take 1 capsule by mouth daily TAKE TWO CAPSULES BY MOUTH EVERY MORNING AND TAKE ONE CAPSULE BY MOUTH EVERY EVENING 270 capsule 1    Insulin Glargine, 2 Unit Dial, (TOUJEO MAX SOLOSTAR) 300 UNIT/ML SOPN 130 units at bedtime 90 pen 3    metoclopramide (REGLAN) 10 MG tablet Take 1 tablet by mouth 3 times daily (with meals) 360 tablet 3    predniSONE (DELTASONE) 5 MG tablet Take 5 mg by mouth daily      Continuous Blood Gluc Sensor (FREESTYLE JAIRO 14 DAY SENSOR) MISC Every 2 weeks 6 each 03    Continuous Blood Gluc  (FREESTYLE JAIRO 14 DAY READER) ALEXANDREA As directed 1 Device 00    pioglitazone (ACTOS) 30 MG tablet Take 1 tablet by mouth daily 90 tablet 3    Insulin Pen Needle (PEN NEEDLES) 32G X 4 MM MISC Use as directed with insulin pens, 4 times daily 400 each 1    Insulin Pen Needle (PEN NEEDLES) 32G X 4 MM MISC Use as directed with insulin pens, 4 times daily 400 each 1    blood glucose test strips (PRODIGY NO CODING BLOOD GLUC) strip 1 each by In Vitro route 4 times daily As needed. 400 each 3    blood glucose test strips (PRODIGY NO CODING BLOOD GLUC) strip 1 each by In Vitro route 4 times daily As needed. 400 each 3    Blood Glucose Monitoring Suppl (PRODIGY AUTOCODE BLOOD GLUCOSE) w/Device KIT Use as directed to test up to 4 times daily 1 kit 0    PRODIGY LANCETS 28G MISC Use as directed to test up to 4 times daily 400 each 3    miconazole (MICOTIN) 2 % cream Apply topically 2 times daily. 141 g 3    Handicap Placard MISC Exp 5 years 1 each 0    albuterol sulfate HFA (PROAIR HFA) 108 (90 Base) MCG/ACT inhaler Use every 4 hours while awake for 7-10 days then PRN wheezing  Dispense with SPACER and Instruct on use. May sub Ventolin or Proventil as needed per [employer]. (Patient not taking: Reported on 10/28/2021) 1 Inhaler 0    famotidine (PEPCID) 20 MG tablet Take 1 tablet by mouth 2 times daily (Patient not taking: Reported on 10/23/2021) 30 tablet 0     No current facility-administered medications for this visit. Review of Systems   Constitutional: Negative for fever. HENT: Negative for ear pain, tinnitus and trouble swallowing. Eyes: Negative for photophobia and visual disturbance. Respiratory: Negative for choking and shortness of breath. Cardiovascular: Negative for chest pain and palpitations. Gastrointestinal: Negative for nausea and vomiting. Musculoskeletal: Positive for arthralgias, back pain, joint swelling, myalgias and neck pain. Negative for gait problem and neck stiffness. Skin: Negative for color change. Allergic/Immunologic: Negative for food allergies. Neurological: Negative for dizziness, tremors, seizures, syncope, facial asymmetry, speech difficulty, weakness, light-headedness, numbness and headaches. Psychiatric/Behavioral: Negative for behavioral problems, confusion, hallucinations and sleep disturbance.        Objective:   /64 (Site: Left Lower Arm, Position: Sitting, Cuff Size: Medium Adult)   Pulse 94   Wt (!) 306 lb 8 oz (139 kg)   LMP  (LMP Unknown)   SpO2 94%   BMI 49.47 kg/m²     Physical Exam  Vitals reviewed. Eyes:      Pupils: Pupils are equal, round, and reactive to light. Cardiovascular:      Rate and Rhythm: Normal rate and regular rhythm. Heart sounds: No murmur heard. Pulmonary:      Effort: Pulmonary effort is normal.      Breath sounds: Normal breath sounds. Abdominal:      General: Bowel sounds are normal.   Musculoskeletal:         General: Normal range of motion. Cervical back: Normal range of motion. Skin:     General: Skin is warm. Neurological:      Mental Status: She is alert and oriented to person, place, and time. Cranial Nerves: No cranial nerve deficit. Sensory: No sensory deficit. Motor: No abnormal muscle tone. Coordination: Coordination normal.      Deep Tendon Reflexes: Reflexes are normal and symmetric. Babinski sign absent on the right side. Babinski sign absent on the left side. Comments: Patient has a double chin and has gained considerable weight she is areflexic in the lower extremities. Multiple tender points are notable   Psychiatric:         Mood and Affect: Mood normal.         No results found.     Lab Results   Component Value Date    WBC 11.9 09/17/2021    RBC 4.94 09/17/2021    RBC 4.57 02/22/2012    HGB 12.8 09/17/2021    HCT 39.0 09/17/2021    MCV 79.0 09/17/2021    MCH 25.9 09/17/2021    MCHC 32.8 09/17/2021    RDW 16.5 09/17/2021     09/17/2021    MPV 8.8 10/13/2015     Lab Results   Component Value Date     09/17/2021    K 4.2 09/17/2021    K 3.7 09/18/2018    CL 90 09/17/2021    CO2 25 09/17/2021    BUN 14 09/17/2021    CREATININE 0.77 09/17/2021    GFRAA >60.0 09/17/2021    LABGLOM >60.0 09/17/2021    GLUCOSE 170 09/17/2021    GLUCOSE 262 05/31/2012    PROT 7.7 09/17/2021    LABALBU 4.3 09/17/2021    LABALBU 4.4 05/31/2012    CALCIUM 9.6 09/17/2021    BILITOT 0.5 09/17/2021    ALKPHOS 105 09/17/2021    AST 21 09/17/2021    ALT 21 09/17/2021     Lab Results   Component Value Date    PROTIME 10.0 10/07/2015    PROTIME 10.0 11/21/2011    INR 0.9 10/07/2015     Lab Results   Component Value Date    TSH 1.760 06/18/2018    IEXZYREF16 625 08/22/2018    FOLATE 5.3 08/22/2018    FERRITIN 130.8 08/11/2021    IRON 50 08/11/2021    TIBC 283 08/11/2021     Lab Results   Component Value Date    TRIG 134 08/23/2021    HDL 34 08/23/2021    HDL 34 02/23/2011    LDLCALC 56 08/23/2021     Lab Results   Component Value Date    LABAMPH Neg 09/17/2021    BARBSCNU Neg 09/17/2021    LABBENZ Neg 09/17/2021    LABMETH Neg 09/17/2021    OPIATESCREENURINE Neg 09/17/2021    PHENCYCLIDINESCREENURINE Neg 09/17/2021     No results found for: LITHIUM, DILFRTOT, VALPROATE    Assessment:       Diagnosis Orders   1. PMR (polymyalgia rheumatica) (HCC)  Sedimentation Rate    C-Reactive Protein    CBC Auto Differential    Hepatic Function Panel    TSH with Reflex    C-Reactive Protein    Sedimentation Rate    Hepatic Function Panel    CBC Auto Differential   2. Lumbar radiculopathy     Intractable polymyalgia rheumatica which is responsive to prednisone. We have not seen her over 1-1/2 years and therefore x-ray evaluation. Patient has gained considerable weight about 50 pounds at least and she feels this is from prednisone as her sugars are well controlled. She is now developing moon face and some double chin as well. This is of concern. Therefore recommend that we discontinue Deltasone and consider methotrexate. The new studies notable are encouraging. This though does come with the price of side effects which we'll monitor very closely which include skin reactions as well as infective process and immunosuppression and bone marrow suppression. We had a lengthy discussion for a very close follow-up.  Today will arrange for sed rate CRP TSH and complete blood counts with her liver function tests and repeat this in

## 2021-11-02 DIAGNOSIS — E78.5 DYSLIPIDEMIA: ICD-10-CM

## 2021-11-02 RX ORDER — VENLAFAXINE HYDROCHLORIDE 75 MG/1
75 CAPSULE, EXTENDED RELEASE ORAL DAILY
Qty: 270 CAPSULE | Refills: 1 | Status: SHIPPED | OUTPATIENT
Start: 2021-11-02 | End: 2022-04-15

## 2021-11-02 RX ORDER — ROSUVASTATIN CALCIUM 10 MG/1
TABLET, COATED ORAL
Qty: 90 TABLET | Refills: 0 | Status: SHIPPED | OUTPATIENT
Start: 2021-11-02 | End: 2022-01-10 | Stop reason: SDUPTHER

## 2021-11-02 NOTE — TELEPHONE ENCOUNTER
From: Alcon Hughes  To: Demar Allred DO  Sent: 10/28/2021 10:15 AM EDT  Subject: Non-Urgent Medical Question    Can I get a referral to get my mammogram

## 2021-11-02 NOTE — TELEPHONE ENCOUNTER
Requesting medication refill.  Please approve or deny this request.    Rx requested:  Requested Prescriptions     Pending Prescriptions Disp Refills    rosuvastatin (CRESTOR) 10 MG tablet 90 tablet 0     Sig: TAKE ONE TABLET BY MOUTH NIGHTLY    venlafaxine (EFFEXOR XR) 75 MG extended release capsule 270 capsule 1     Sig: Take 1 capsule by mouth daily TAKE TWO CAPSULES BY MOUTH EVERY MORNING AND TAKE ONE CAPSULE BY MOUTH EVERY EVENING       Last Office Visit:   8/9/2021    Last Filled:      Last Labs:      Next Visit Date:  Future Appointments   Date Time Provider [facility]   11/15/2021  9:45 AM Dhara Kumar MD [address] [place] [address]   12/13/2021  3:00 PM MD Preston Conti   2/24/2022  9:00 AM Makayla Elizabeth   4/28/2022  1:30 PM Maya Baldwin, [facility]   10/18/2022  8:45 AM MD Preston Rose

## 2021-11-09 ENCOUNTER — TELEPHONE (OUTPATIENT)
Dept: NEUROLOGY | Age: 46
End: 2021-11-09

## 2021-11-09 NOTE — TELEPHONE ENCOUNTER
Methotrexate PA was denied by insurance and patient stated she would like to go back on Prednisone.   Please advise  Thanks  Eloisa Juarez

## 2021-11-11 DIAGNOSIS — G47.33 OBSTRUCTIVE SLEEP APNEA SYNDROME: Primary | ICD-10-CM

## 2021-11-12 RX ORDER — FLASH GLUCOSE SENSOR
KIT MISCELLANEOUS
Qty: 6 EACH | Refills: 3 | Status: SHIPPED | OUTPATIENT
Start: 2021-11-12 | End: 2022-08-24

## 2021-11-13 ENCOUNTER — OFFICE VISIT (OUTPATIENT)
Dept: FAMILY MEDICINE CLINIC | Age: 46
End: 2021-11-13
Payer: COMMERCIAL

## 2021-11-13 VITALS
TEMPERATURE: 98.2 F | OXYGEN SATURATION: 96 % | DIASTOLIC BLOOD PRESSURE: 72 MMHG | BODY MASS INDEX: 47.09 KG/M2 | HEIGHT: 66 IN | HEART RATE: 85 BPM | SYSTOLIC BLOOD PRESSURE: 128 MMHG | WEIGHT: 293 LBS

## 2021-11-13 DIAGNOSIS — K21.9 GASTROESOPHAGEAL REFLUX DISEASE, UNSPECIFIED WHETHER ESOPHAGITIS PRESENT: ICD-10-CM

## 2021-11-13 DIAGNOSIS — H61.21 IMPACTED CERUMEN OF RIGHT EAR: Primary | ICD-10-CM

## 2021-11-13 PROCEDURE — 69209 REMOVE IMPACTED EAR WAX UNI: CPT | Performed by: NURSE PRACTITIONER

## 2021-11-13 PROCEDURE — 99213 OFFICE O/P EST LOW 20 MIN: CPT | Performed by: NURSE PRACTITIONER

## 2021-11-13 ASSESSMENT — ENCOUNTER SYMPTOMS
SHORTNESS OF BREATH: 0
VOMITING: 0
WHEEZING: 0
COUGH: 0
SORE THROAT: 0
RHINORRHEA: 0
NAUSEA: 0
DIARRHEA: 0

## 2021-11-13 NOTE — PATIENT INSTRUCTIONS
Patient Education        Earwax Blockage: Care Instructions  Your Care Instructions     Earwax is a natural substance that protects the ear canal. Normally, earwax drains from the ears and does not cause problems. Sometimes earwax builds up and hardens. Earwax blockage (also called cerumen impaction) can cause some loss of hearing and pain. When wax is tightly packed, you will need to have your doctor remove it. Follow-up care is a key part of your treatment and safety. Be sure to make and go to all appointments, and call your doctor if you are having problems. It's also a good idea to know your test results and keep a list of the medicines you take. How can you care for yourself at home? · Do not try to remove earwax with cotton swabs, fingers, or other objects. This can make the blockage worse and damage the eardrum. · If your doctor recommends that you try to remove earwax at home:  ? Soften and loosen the earwax with warm mineral oil. You also can try hydrogen peroxide mixed with an equal amount of room temperature water. Place 2 drops of the fluid, warmed to body temperature, in the ear two times a day for up to 5 days. ? Once the wax is loose and soft, all that is usually needed to remove it from the ear canal is a gentle, warm shower. Direct the water into the ear, then tip your head to let the earwax drain out. Dry your ear thoroughly with a hair dryer set on low. Hold the dryer several inches from your ear. ? If the warm mineral oil and shower do not work, use an over-the-counter wax softener. Read and follow all instructions on the label. After using the wax softener, use an ear syringe to gently flush the ear. Make sure the flushing solution is body temperature. Cool or hot fluids in the ear can cause dizziness. When should you call for help?    Call your doctor now or seek immediate medical care if:    · Pus or blood drains from your ear.     · Your ears are ringing or feel full.     · You have a loss of hearing. Watch closely for changes in your health, and be sure to contact your doctor if:    · You have pain or reduced hearing after 1 week of home treatment.     · You have any new symptoms, such as nausea or balance problems. Where can you learn more? Go to [email or web address]://[email or web address] org and sign in to your [employer] account. Enter O808 in the [employer] box to learn more about \"Earwax Blockage: Care Instructions. \"     If you do not have an account, please click on the \"Sign Up Now\" link. Current as of: July 1, 2021               Content Version: 13.0  © 6935-0936 [employer]. Care instructions adapted under license by [facility] ([facility]). If you have questions about a medical condition or this instruction, always ask your healthcare professional. [address] any warranty or liability for your use of this information.

## 2021-11-13 NOTE — PROGRESS NOTES
Subjective:      Patient ID: Hilary Alegria is a 40 y.o. female who presents today for:  Chief Complaint   Patient presents with    Otalgia     right side ear pan        HPI      Right ear feels clogged   Today started to hurt a little   No fever or chills   Like a muffled sound when hearing         Past Medical History:   Diagnosis Date    Acute midline thoracic back pain 9/18/2018    B12 deficiency     Family history of premature CAD 9/4/2013    Fatty liver     Gastroesophageal reflux disease 1/6/2021    HPV (human papilloma virus) anogenital infection     Hyperlipidemia     Hypertension     Irritable bowel syndrome with diarrhea 4/26/2021    Liver hemangioma 2009/2015    Lumbar radiculopathy 7/7/2021    Obesity 3/11/13    BMI 43.42    Orthostatic hypotension     Ovarian cyst     PMR (polymyalgia rheumatica) (HCC)     Rectal fissure     Tobacco abuse 9/3/2015    Tobacco abuse     Tremor     Uncontrolled diabetes mellitus with complications ([facility] [address]     Unspecified sleep apnea      Past Surgical History:   Procedure Laterality Date    CARDIAC CATHETERIZATION  4-07    COLONOSCOPY  2013    for diarrhea (-)    COLONOSCOPY N/A 2/10/2021    COLONOSCOPY DIAGNOSTIC performed by Brent Phalen, MD at [facility] 96  12-10    [facility]  1-05    MA MUSCLE BIOPSY Left 9/21/2018    LEFT QUADRICEPS MUSCLE BIOPSY performed by Jeaneth Christianson MD at [facility] UNI/BI CANNULATION N/A 9/18/2018    T 12 VERTEBRALPLASTY ROOM 278 performed by Ilene Romero MD at [address] ENDOSCOPY  10/13/15     DR. HERRERA     UPPER GASTROINTESTINAL ENDOSCOPY N/A 2/10/2021    EGD ESOPHAGOGASTRODUODENOSCOPY performed by Brent Phalen, MD at [facility]  6-2015     Social History     Socioeconomic History    Marital status:      Spouse name: Not on file    Number of Family History   Problem Relation Age of Onset    Diabetes Father     Heart Disease Mother     Colon Cancer Neg Hx     Cancer Neg Hx      Allergies   Allergen Reactions    Morphine And Related Hives and Rash    Ace Inhibitors Other (See Comments)     Dizziness and near syncope    Bactrim [Sulfamethoxazole-Trimethoprim] Itching    Nitroglycerin Other (See Comments)     Migraine    Percocet [Oxycodone-Acetaminophen] Nausea And Vomiting    Victoza [Liraglutide]      NAUSEA     Current Outpatient Medications   Medication Sig Dispense Refill    Continuous Blood Gluc Sensor ([employer] JAIRO 14 DAY SENSOR) MISC CHANGE EVERY 14 DAYS 6 each 3    CPAP Machine MISC New CPAP with 8 cm and supply 1 each 0    rosuvastatin (CRESTOR) 10 MG tablet TAKE ONE TABLET BY MOUTH NIGHTLY 90 tablet 0    venlafaxine (EFFEXOR XR) 75 MG extended release capsule Take 1 capsule by mouth daily TAKE TWO CAPSULES BY MOUTH EVERY MORNING AND TAKE ONE CAPSULE BY MOUTH EVERY EVENING 270 capsule 1    methotrexate (RHEUMATREX) 10 MG chemo tablet Take 1 tablet by mouth three times a week 12 tablet 0    erythromycin base (E-MYCIN) 250 MG tablet TAKE 1 TABLET BY MOUTH 3 TIMES A DAY 90 tablet 3    baclofen (LIORESAL) 10 MG tablet TAKE ONE-HALF TABLET BY MOUTH TWICE A DAY 90 tablet 0    solifenacin (VESICARE) 10 MG tablet TAKE 1 TABLET BY MOUTH ONE TIME A DAY 90 tablet 3    hydroCHLOROthiazide (HYDRODIURIL) 25 MG tablet Take 1 tablet by mouth daily 90 tablet 1    esomeprazole (NEXIUM) 40 MG delayed release capsule TAKE 1 CAPSULE BY MOUTH ONE TIME A DAY 30 capsule 3    gentamicin (GARAMYCIN) 0.1 % ointment       insulin lispro, 1 Unit Dial, (HUMALOG KWIKPEN) 100 UNIT/ML SOPN INJECT 80 UNITS UNDER THE SKIN WITH EACH MEAL 90 pen 3    metFORMIN (GLUCOPHAGE) 500 MG tablet TAKE 1 TABLET BY MOUTH 3 TIMES A  tablet 3    metoprolol (LOPRESSOR) 100 MG tablet Take 1 tablet by mouth 2 times daily 180 tablet 1    losartan (COZAAR) 50 MG tablet Take 1 tablet by mouth daily 90 tablet 1    clotrimazole-betamethasone (LOTRISONE) 1-0.05 % cream APPLY TOPICALLY TWO TIMES A DAY 1 Tube 3    nystatin (MYCOSTATIN) 904662 UNIT/GM powder Apply 3 times daily. 1 Bottle 2    ondansetron (ZOFRAN ODT) 4 MG disintegrating tablet Take 1 tablet by mouth every 8 hours as needed for Nausea or Vomiting 60 tablet 3    terbinafine (LAMISIL) 1 % cream Apply topically 2 times daily. 1 Tube 1    albuterol sulfate HFA (PROAIR HFA) 108 (90 Base) MCG/ACT inhaler Use every 4 hours while awake for 7-10 days then PRN wheezing  Dispense with SPACER and Instruct on use. May sub Ventolin or Proventil as needed per [employer]. 1 Inhaler 0    Insulin Glargine, 2 Unit Dial, (TOUJEO MAX SOLOSTAR) 300 UNIT/ML SOPN 130 units at bedtime 90 pen 3    metoclopramide (REGLAN) 10 MG tablet Take 1 tablet by mouth 3 times daily (with meals) 360 tablet 3    famotidine (PEPCID) 20 MG tablet Take 1 tablet by mouth 2 times daily 30 tablet 0    predniSONE (DELTASONE) 5 MG tablet Take 5 mg by mouth daily      Continuous Blood Gluc  (FREESTYLE JAIRO 14 DAY READER) ALEXANDREA As directed 1 Device 00    pioglitazone (ACTOS) 30 MG tablet Take 1 tablet by mouth daily 90 tablet 3    Insulin Pen Needle (PEN NEEDLES) 32G X 4 MM MISC Use as directed with insulin pens, 4 times daily 400 each 1    Insulin Pen Needle (PEN NEEDLES) 32G X 4 MM MISC Use as directed with insulin pens, 4 times daily 400 each 1    blood glucose test strips (PRODIGY NO CODING BLOOD GLUC) strip 1 each by In Vitro route 4 times daily As needed. 400 each 3    blood glucose test strips (PRODIGY NO CODING BLOOD GLUC) strip 1 each by In Vitro route 4 times daily As needed.  400 each 3    Blood Glucose Monitoring Suppl (PRODIGY AUTOCODE BLOOD GLUCOSE) w/Device KIT Use as directed to test up to 4 times daily 1 kit 0    PRODIGY LANCETS 28G MISC Use as directed to test up to 4 times daily 400 each 3    miconazole (MICOTIN) 2 % cream Apply topically 2 times daily. 141 g 3    Handicap Placard MISC Exp 5 years 1 each 0     No current facility-administered medications for this visit. Review of Systems   Constitutional: Negative for chills, fatigue and fever. HENT: Positive for ear pain. Negative for congestion, rhinorrhea and sore throat. Respiratory: Negative for cough, shortness of breath and wheezing. Gastrointestinal: Negative for diarrhea, nausea and vomiting. Musculoskeletal: Negative for myalgias. Skin: Negative for rash. Neurological: Negative for dizziness, light-headedness and headaches. Psychiatric/Behavioral: Negative for agitation, confusion and hallucinations. Objective:   /72   Pulse 85   Temp 98.2 °F (36.8 °C) (Temporal)   Ht 5' 6\" (1.676 m)   Wt (!) 306 lb (138.8 kg)   LMP  (LMP Unknown)   SpO2 96%   BMI 49.39 kg/m²     Physical Exam  Vitals reviewed. Constitutional:       Appearance: Normal appearance. She is obese. HENT:      Head: Normocephalic and atraumatic. Right Ear: No tenderness. No middle ear effusion. There is impacted cerumen. No mastoid tenderness. Tympanic membrane is not injected, erythematous or bulging. Left Ear: No tenderness. No middle ear effusion. No mastoid tenderness. Tympanic membrane is not injected, erythematous or bulging. Ears:      Comments: There was impacted wax very close if not on the drum. After flushing it out TM appears normal.      Nose: Nose normal.      Mouth/Throat:      Lips: Pink. Eyes:      General: Lids are normal.      Conjunctiva/sclera: Conjunctivae normal.   Cardiovascular:      Rate and Rhythm: Normal rate. Pulmonary:      Effort: Pulmonary effort is normal.   Musculoskeletal:      Cervical back: Normal range of motion. Skin:     General: Skin is warm and dry. Findings: No rash. Neurological:      General: No focal deficit present.       Mental Status: She is alert and oriented to person, place, and time.   Psychiatric:         Mood and Affect: Mood normal.         Behavior: Behavior normal. Behavior is cooperative. Assessment:       Diagnosis Orders   1. Impacted cerumen of right ear  ID REMOVAL IMPACTED CERUMEN IRRIGATION/LVG UNILAT     No results found for this visit on 11/13/21. Plan:   Flushed ear, pt tolerated well. She did feel relief afterwards. Assessment & Plan   Allyson Gonzalez was seen today for otalgia. Diagnoses and all orders for this visit:    Impacted cerumen of right ear  -     ID REMOVAL IMPACTED CERUMEN IRRIGATION/LVG UNILAT      Orders Placed This Encounter   Procedures    ID REMOVAL IMPACTED CERUMEN IRRIGATION/LVG UNILAT     No orders of the defined types were placed in this encounter. There are no discontinued medications. Return if symptoms worsen or fail to improve. Reviewed with the patient/family: current clinical status & medications. Side effects of the medication prescribed today, as well as treatment plan/rationale and result expectations have been discussed with the patient/family who expresses understanding. Patient will be discharged home in stable condition. Follow up with PCP to evaluate treatment results or return if symptoms worsen or fail to improve. Discussed signs and symptoms which require immediate follow-up in ED/call to 911. Understanding verbalized. I have reviewed the patient's medical history in detail and updated the computerized patient record.     Gustavo Levi, SANIA - CNP

## 2021-11-15 RX ORDER — ESOMEPRAZOLE MAGNESIUM 40 MG/1
CAPSULE, DELAYED RELEASE ORAL
Qty: 30 CAPSULE | Refills: 3 | Status: SHIPPED | OUTPATIENT
Start: 2021-11-15 | End: 2022-03-07

## 2021-11-15 RX ORDER — PREDNISONE 1 MG/1
5 TABLET ORAL DAILY
Qty: 30 TABLET | Refills: 3 | Status: SHIPPED | OUTPATIENT
Start: 2021-11-15 | End: 2022-01-10

## 2021-11-15 NOTE — TELEPHONE ENCOUNTER
PA was done and denied because of it having to be brand name because it was the only thing that her pharmacy carried I tried to call them even and was unable to get anywhere with them.

## 2021-11-15 NOTE — TELEPHONE ENCOUNTER
Patient is requesting medication refill.  Please approve or deny this request.    Rx requested:  Requested Prescriptions     Pending Prescriptions Disp Refills    predniSONE (DELTASONE) 5 MG tablet 30 tablet 3     Sig: Take 1 tablet by mouth daily         Last Office Visit:   11/1/2021      Next Visit Date:  Future Appointments   Date Time Provider Iliana Garcia   12/6/2021  8:00 AM [place] [facility] 1 [address]   12/13/2021  3:00 PM Micaela Doyle MD [facility]   2/24/2022  9:00 AM Makayla Frazier   4/28/2022  1:30 PM Kerri Lovell, [address] [facility] [facility]   10/18/2022  8:45 AM David Beckman MD [facility]

## 2021-11-16 RX ORDER — LOSARTAN POTASSIUM 50 MG/1
50 TABLET ORAL DAILY
Qty: 90 TABLET | Refills: 1 | Status: SHIPPED | OUTPATIENT
Start: 2021-11-16 | End: 2022-06-13

## 2021-12-02 ENCOUNTER — OFFICE VISIT (OUTPATIENT)
Dept: OBGYN CLINIC | Age: 46
End: 2021-12-02
Payer: COMMERCIAL

## 2021-12-02 VITALS
SYSTOLIC BLOOD PRESSURE: 128 MMHG | BODY MASS INDEX: 46.93 KG/M2 | WEIGHT: 292 LBS | DIASTOLIC BLOOD PRESSURE: 76 MMHG | HEIGHT: 66 IN

## 2021-12-02 DIAGNOSIS — L29.2 VULVAR ITCHING: ICD-10-CM

## 2021-12-02 DIAGNOSIS — N94.89 VULVAR BURNING: ICD-10-CM

## 2021-12-02 DIAGNOSIS — N89.8 VAGINAL DISCHARGE: ICD-10-CM

## 2021-12-02 DIAGNOSIS — N89.8 VAGINAL DISCHARGE: Primary | ICD-10-CM

## 2021-12-02 PROCEDURE — 99213 OFFICE O/P EST LOW 20 MIN: CPT | Performed by: OBSTETRICS & GYNECOLOGY

## 2021-12-02 ASSESSMENT — ENCOUNTER SYMPTOMS
BLOOD IN STOOL: 0
RESPIRATORY NEGATIVE: 1
EYES NEGATIVE: 1
ANAL BLEEDING: 0
DIARRHEA: 0
ALLERGIC/IMMUNOLOGIC NEGATIVE: 1
ABDOMINAL DISTENTION: 0
ABDOMINAL PAIN: 0
CONSTIPATION: 0
VOMITING: 0
RECTAL PAIN: 0
NAUSEA: 0

## 2021-12-02 NOTE — PROGRESS NOTES
Patient here c/o vaginal discharge, itching and burning. Sx x 1 week. Recent HbA1C 7. SSE with white discharge. Sx more external.  Sx most c/w yeast.  Rx:  Terazol 7 Cream and Mycolog for external use. Discussed decreasing carbs and sugars in diet. Daily probiotic ( Acidophilus ). Instructed to keep perineal and vaginal area clean and dry. Cotton underwear and loose fitting clothing. No douching. No bubble baths or bath bombs. All questions answered.         Vitals:  /76   Ht 5' 6\" (1.676 m)   Wt 292 lb (132.5 kg)   LMP  (LMP Unknown)   BMI 47.13 kg/m²   Past Medical History:   Diagnosis Date    Abnormal Pap smear of cervix     Acute midline thoracic back pain 9/18/2018    B12 deficiency     Family history of premature CAD 9/4/2013    Fatty liver     Gastroesophageal reflux disease 1/6/2021    Gastroparesis     HPV (human papilloma virus) anogenital infection     Hyperlipidemia     Hypertension     Irritable bowel syndrome with diarrhea 4/26/2021    Liver hemangioma 2009/2015    Lumbar radiculopathy 7/7/2021    Obesity 3/11/13    BMI 43.42    Orthostatic hypotension     Ovarian cyst     PMR (polymyalgia rheumatica) (HCC)     Rectal fissure     Tobacco abuse 9/3/2015    Tobacco abuse     Tremor     Uncontrolled diabetes mellitus with complications ([facility] [address]     Unspecified sleep apnea      Past Surgical History:   Procedure Laterality Date    CARDIAC CATHETERIZATION  4-07    COLONOSCOPY  2013    for diarrhea (-)    COLONOSCOPY N/A 2/10/2021    COLONOSCOPY DIAGNOSTIC performed by Jenna Becerra MD at [facility] 96  12-10    [facility]  1-05    NC MUSCLE BIOPSY Left 9/21/2018    LEFT QUADRICEPS MUSCLE BIOPSY performed by Montrell Hidalgo MD at [facility] UNI/BI CANNULATION N/A 9/18/2018    T 12 VERTEBRALPLASTY ROOM 278 performed by Andrea Stapleton MD at [address] [place] [address] GASTROINTESTINAL ENDOSCOPY  10/13/15     DR. HERRERA     UPPER GASTROINTESTINAL ENDOSCOPY N/A 2/10/2021    EGD ESOPHAGOGASTRODUODENOSCOPY performed by Tree Camargo MD at [facility]  6-2015     Allergies:  Morphine and related, Ace inhibitors, Bactrim [sulfamethoxazole-trimethoprim], Nitroglycerin, Percocet [oxycodone-acetaminophen], and Victoza [liraglutide]  Current Outpatient Medications   Medication Sig Dispense Refill    losartan (COZAAR) 50 MG tablet Take 1 tablet by mouth daily 90 tablet 1    pioglitazone (ACTOS) 30 MG tablet TAKE 1 TABLET BY MOUTH ONE TIME A DAY 90 tablet 0    esomeprazole (NEXIUM) 40 MG delayed release capsule TAKE 1 CAPSULE BY MOUTH ONE TIME A DAY 30 capsule 3    predniSONE (DELTASONE) 5 MG tablet Take 1 tablet by mouth daily 30 tablet 3    Continuous Blood Gluc Sensor (FREESTYLE JAIRO 14 DAY SENSOR) MISC CHANGE EVERY 14 DAYS 6 each 3    CPAP Machine MISC New CPAP with 8 cm and supply 1 each 0    rosuvastatin (CRESTOR) 10 MG tablet TAKE ONE TABLET BY MOUTH NIGHTLY 90 tablet 0    venlafaxine (EFFEXOR XR) 75 MG extended release capsule Take 1 capsule by mouth daily TAKE TWO CAPSULES BY MOUTH EVERY MORNING AND TAKE ONE CAPSULE BY MOUTH EVERY EVENING 270 capsule 1    erythromycin base (E-MYCIN) 250 MG tablet TAKE 1 TABLET BY MOUTH 3 TIMES A DAY 90 tablet 3    baclofen (LIORESAL) 10 MG tablet TAKE ONE-HALF TABLET BY MOUTH TWICE A DAY 90 tablet 0    solifenacin (VESICARE) 10 MG tablet TAKE 1 TABLET BY MOUTH ONE TIME A DAY 90 tablet 3    hydroCHLOROthiazide (HYDRODIURIL) 25 MG tablet Take 1 tablet by mouth daily 90 tablet 1    gentamicin (GARAMYCIN) 0.1 % ointment       insulin lispro, 1 Unit Dial, (HUMALOG KWIKPEN) 100 UNIT/ML SOPN INJECT 80 UNITS UNDER THE SKIN WITH EACH MEAL 90 pen 3    metFORMIN (GLUCOPHAGE) 500 MG tablet TAKE 1 TABLET BY MOUTH 3 TIMES A  tablet 3    metoprolol (LOPRESSOR) 100 MG tablet Take 1 tablet by mouth 2 times daily 180 tablet 1    clotrimazole-betamethasone (LOTRISONE) 1-0.05 % cream APPLY TOPICALLY TWO TIMES A DAY 1 Tube 3    nystatin (MYCOSTATIN) 380958 UNIT/GM powder Apply 3 times daily. 1 Bottle 2    ondansetron (ZOFRAN ODT) 4 MG disintegrating tablet Take 1 tablet by mouth every 8 hours as needed for Nausea or Vomiting 60 tablet 3    albuterol sulfate HFA (PROAIR HFA) 108 (90 Base) MCG/ACT inhaler Use every 4 hours while awake for 7-10 days then PRN wheezing  Dispense with SPACER and Instruct on use. May sub Ventolin or Proventil as needed per [employer]. 1 Inhaler 0    Insulin Glargine, 2 Unit Dial, (TOUJEO MAX SOLOSTAR) 300 UNIT/ML SOPN 130 units at bedtime 90 pen 3    metoclopramide (REGLAN) 10 MG tablet Take 1 tablet by mouth 3 times daily (with meals) 360 tablet 3    famotidine (PEPCID) 20 MG tablet Take 1 tablet by mouth 2 times daily 30 tablet 0    Continuous Blood Gluc  (FREESTYLE JAIRO 14 DAY READER) ALEXANDREA As directed 1 Device 00    Insulin Pen Needle (PEN NEEDLES) 32G X 4 MM MISC Use as directed with insulin pens, 4 times daily 400 each 1    Insulin Pen Needle (PEN NEEDLES) 32G X 4 MM MISC Use as directed with insulin pens, 4 times daily 400 each 1    blood glucose test strips (PRODIGY NO CODING BLOOD GLUC) strip 1 each by In Vitro route 4 times daily As needed. 400 each 3    blood glucose test strips (PRODIGY NO CODING BLOOD GLUC) strip 1 each by In Vitro route 4 times daily As needed. 400 each 3    Blood Glucose Monitoring Suppl (PRODIGY AUTOCODE BLOOD GLUCOSE) w/Device KIT Use as directed to test up to 4 times daily 1 kit 0    PRODIGY LANCETS 28G MISC Use as directed to test up to 4 times daily 400 each 3    miconazole (MICOTIN) 2 % cream Apply topically 2 times daily. 141 g 3    Handicap Placard MISC Exp 5 years 1 each 0     No current facility-administered medications for this visit.      Social History     Socioeconomic History    Marital status:      Spouse name: Not on file  Number of children: 1    Years of education: Not on file    Highest education level: Not on file   Occupational History    Not on file   Tobacco Use    Smoking status: Former Smoker     Packs/day: 1.00     Types: Cigarettes     Quit date: 2017     Years since quittin.9    Smokeless tobacco: Never Used   Vaping Use    Vaping Use: Never used   Substance and Sexual Activity    Alcohol use: Yes     Alcohol/week: 0.0 standard drinks     Comment: socially    Drug use: No    Sexual activity: Yes     Partners: Male     Birth control/protection: Surgical     Comment: single   Other Topics Concern    Not on file   Social History Narrative    Social/Functional History          Social Determinants of Health     Financial Resource Strain: Low Risk     Difficulty of Paying Living Expenses: Not hard at all   Food Insecurity: No Food Insecurity    Worried About Running Out of Food in the Last Year: Never true    Jung of Food in the Last Year: Never true   Transportation Needs: No Transportation Needs    Lack of Transportation (Medical): No    Lack of Transportation (Non-Medical):  No   Physical Activity:     Days of Exercise per Week: Not on file    Minutes of Exercise per Session: Not on file   Stress:     Feeling of Stress : Not on file   Social Connections:     Frequency of Communication with Friends and Family: Not on file    Frequency of Social Gatherings with Friends and Family: Not on file    Attends [employer] Services: Not on file    Active Member of Clubs or Organizations: Not on file    Attends Club or Organization Meetings: Not on file    Marital Status: Not on file   Intimate Partner Violence:     Fear of Current or Ex-Partner: Not on file    Emotionally Abused: Not on file    Physically Abused: Not on file    Sexually Abused: Not on file   Housing Stability:     Unable to Pay for Housing in the Last Year: Not on file    Number of [place] in the Last Year: Not on file    Unstable Housing in the Last Year: Not on file        Family History   Problem Relation Age of Onset    Diabetes Father     Heart Disease Mother     Colon Cancer Neg Hx     Cancer Neg Hx        Review of Systems   Constitutional: Negative. Negative for activity change, appetite change, chills, diaphoresis, fatigue, fever and unexpected weight change. HENT: Negative. Eyes: Negative. Respiratory: Negative. Cardiovascular: Negative. Gastrointestinal: Negative for abdominal distention, abdominal pain, anal bleeding, blood in stool, constipation, diarrhea, nausea, rectal pain and vomiting. Endocrine: Negative. Genitourinary: Negative for decreased urine volume, difficulty urinating, dyspareunia, dysuria, enuresis, flank pain, frequency, genital sores, hematuria, menstrual problem, pelvic pain, urgency, vaginal bleeding, vaginal discharge and vaginal pain. Musculoskeletal: Negative. Skin: Negative. Allergic/Immunologic: Negative. Neurological: Negative. Hematological: Negative. Psychiatric/Behavioral: Negative. Objective:     Physical Exam  Constitutional:       General: She is not in acute distress. Appearance: She is well-developed. She is not diaphoretic. HENT:      Head: Normocephalic and atraumatic. Eyes:      Conjunctiva/sclera: Conjunctivae normal.   Cardiovascular:      Rate and Rhythm: Normal rate and regular rhythm. Pulmonary:      Effort: Pulmonary effort is normal. No respiratory distress. Genitourinary:     Labia:         Right: No rash, tenderness, lesion or injury. Left: No rash, tenderness, lesion or injury. Vagina: Normal. No signs of injury and foreign body. No vaginal discharge, erythema, tenderness or bleeding. Comments: No abnormal discharge. No cervical or vaginal lesions. Normal external genitalia. Musculoskeletal:         General: No tenderness or deformity. Normal range of motion.       Cervical back: Normal range of motion and neck supple. Skin:     General: Skin is warm and dry. Coloration: Skin is not pale. Neurological:      Mental Status: She is alert and oriented to person, place, and time. Motor: No abnormal muscle tone. Coordination: Coordination normal.   Psychiatric:         Behavior: Behavior normal.         Thought Content: Thought content normal.         Judgment: Judgment normal.         Assessment:          Diagnosis Orders   1. Vaginal discharge  Wet prep, genital    C.trachomatis N.gonorrhoeae DNA   2. Vulvar burning  Wet prep, genital    C.trachomatis N.gonorrhoeae DNA   3. Vulvar itching  Wet prep, genital    C.trachomatis N.gonorrhoeae DNA        Plan:      Medications placedthis encounter:  No orders of the defined types were placed in this encounter.         Orders placedthis encounter:  Orders Placed This Encounter   Procedures    Wet prep, genital     Standing Status:   Future     Standing Expiration Date:   12/2/2022    C.trachomatis N.gonorrhoeae DNA     Standing Status:   Future     Standing Expiration Date:   12/2/2022         Follow up:  Return for Annual.

## 2021-12-02 NOTE — PROGRESS NOTES
The patient was asked if she would like a chaperone present for her intimate exam. She  Declined the chaperone.  Kristine Garcia CMA ([address] [place] [address])

## 2021-12-06 ENCOUNTER — HOSPITAL ENCOUNTER (OUTPATIENT)
Dept: WOMENS IMAGING | Age: 46
Discharge: HOME | End: 2021-12-08
Payer: COMMERCIAL

## 2021-12-06 DIAGNOSIS — Z12.31 ENCOUNTER FOR SCREENING MAMMOGRAM FOR MALIGNANT NEOPLASM OF BREAST: ICD-10-CM

## 2021-12-06 PROCEDURE — 77063 BREAST TOMOSYNTHESIS BI: CPT

## 2021-12-13 DIAGNOSIS — Z79.4 TYPE 2 DIABETES MELLITUS WITH COMPLICATION, WITH LONG-TERM CURRENT USE OF INSULIN (HCC): ICD-10-CM

## 2021-12-13 DIAGNOSIS — E11.8 TYPE 2 DIABETES MELLITUS WITH COMPLICATION, WITH LONG-TERM CURRENT USE OF INSULIN (HCC): ICD-10-CM

## 2021-12-13 LAB
ANION GAP SERPL CALCULATED.3IONS-SCNC: 15 MEQ/L (ref 9–15)
BUN BLDV-MCNC: 13 MG/DL (ref 6–20)
CALCIUM SERPL-MCNC: 9.4 MG/DL (ref 8.5–9.9)
CHLORIDE BLD-SCNC: 96 MEQ/L (ref 95–107)
CO2: 26 MEQ/L (ref 20–31)
CREAT SERPL-MCNC: 0.71 MG/DL (ref 0.5–0.9)
CREATININE URINE: 279.4 MG/DL
GFR AFRICAN AMERICAN: >60
GFR NON-AFRICAN AMERICAN: >60
GLUCOSE BLD-MCNC: 156 MG/DL (ref 70–99)
HBA1C MFR BLD: 8.1 % (ref 4.8–5.9)
MICROALBUMIN UR-MCNC: 7.2 MG/DL
MICROALBUMIN/CREAT UR-RTO: 25.8 MG/G (ref 0–30)
POTASSIUM SERPL-SCNC: 3.9 MEQ/L (ref 3.4–4.9)
SODIUM BLD-SCNC: 137 MEQ/L (ref 135–144)

## 2021-12-14 ENCOUNTER — VIRTUAL VISIT (OUTPATIENT)
Dept: ENDOCRINOLOGY | Age: 46
End: 2021-12-14
Payer: COMMERCIAL

## 2021-12-14 DIAGNOSIS — Z79.4 TYPE 2 DIABETES MELLITUS WITH COMPLICATION, WITH LONG-TERM CURRENT USE OF INSULIN (HCC): Primary | ICD-10-CM

## 2021-12-14 DIAGNOSIS — E11.43 DIABETIC GASTROPARESIS ASSOCIATED WITH TYPE 2 DIABETES MELLITUS (HCC): ICD-10-CM

## 2021-12-14 DIAGNOSIS — K31.84 DIABETIC GASTROPARESIS ASSOCIATED WITH TYPE 2 DIABETES MELLITUS (HCC): ICD-10-CM

## 2021-12-14 DIAGNOSIS — E11.8 TYPE 2 DIABETES MELLITUS WITH COMPLICATION, WITH LONG-TERM CURRENT USE OF INSULIN (HCC): Primary | ICD-10-CM

## 2021-12-14 PROCEDURE — 99442 PR PHYS/QHP TELEPHONE EVALUATION 11-20 MIN: CPT | Performed by: INTERNAL MEDICINE

## 2021-12-14 RX ORDER — INSULIN GLARGINE 300 U/ML
INJECTION, SOLUTION SUBCUTANEOUS
Qty: 90 PEN | Refills: 3 | Status: SHIPPED | OUTPATIENT
Start: 2021-12-14 | End: 2022-10-03 | Stop reason: SDUPTHER

## 2021-12-14 NOTE — PROGRESS NOTES
2021    TELEHEALTH EVALUATION -- Audio/Visual (During KPQCQ-39 public health emergency)    Due to COVID 19 outbreak, patient's office visit was converted to a virtual visit. Patient was contacted and agreed to proceed with a virtual visit via Telephone Visit  The risks and benefits of converting to a virtual visit were discussed in light of the current infectious disease epidemic. Patient also understood that insurance coverage and co-pays are up to their individual insurance plans. HPI: Telephone visit patient was at home I was at my office    Segundo Curiel (:  1975) has requested an audio/video evaluation for the following concern(s):    #1 type 2 diabetes with gastroparesis history of obesity patient is on high-dose insulin pioglitazone Metformin  History of gastroparesis on Reglan occasionally patient notices higher blood sugar about 4 hours after meals  A1c is has been mostly between upper sevens to low 8  Patient on Toujeo 130 units at bedtime Humalog 8 units with each meals  Plus hemoglobin A1c was 8.1      Results for Tania Bosch (MRN [id number]) as of 2021 16:58   Ref. Range 2021 08:26   Sodium Latest Ref Range: 135 - 144 mEq/L 137   Potassium Latest Ref Range: 3.4 - 4.9 mEq/L 3.9   Chloride Latest Ref Range: 95 - 107 mEq/L 96   CO2 Latest Ref Range: 20 - 31 mEq/L 26   BUN Latest Ref Range: 6 - 20 mg/dL 13   Creatinine Latest Ref Range: 0.50 - 0.90 mg/dL 0.71   Anion Gap Latest Ref Range: 9 - 15 mEq/L 15   GFR Non- Latest Ref Range: >60  >60.0   GFR African American Latest Ref Range: >60  >60.0   Glucose Latest Ref Range: 70 - 99 mg/dL 156 (H)   Calcium Latest Ref Range: 8.5 - 9.9 mg/dL 9.4   Hemoglobin A1C Latest Ref Range: 4.8 - 5.9 % 8.1 (H)       Review of Systems   Gastrointestinal:        Gastroparesis    Endocrine: Negative. All other systems reviewed and are negative. Prior to Visit Medications    Medication Sig Taking?  Authorizing Provider   nystatin-triamcinolone [facility] II) 010521-0.1 UNIT/GM-% cream Apply topically 2 times daily. Hailey Reddy MD   losartan (COZAAR) 50 MG tablet Take 1 tablet by mouth daily  [address], MD   pioglitazone (ACTOS) 30 MG tablet TAKE 1 TABLET BY MOUTH ONE TIME A DAY  TONY Gaitan   esomeprazole (NEXIUM) 40 MG delayed release capsule TAKE 1 CAPSULE BY MOUTH ONE TIME A DAY  SANIA Carvajal CNP   predniSONE (DELTASONE) 5 MG tablet Take 1 tablet by mouth daily  [facility] Tameka Spain MD   Continuous Blood Gluc Sensor (FREESTYLE JAIRO 14 DAY SENSOR) MISC CHANGE EVERY [address] [place] TONY Mendez   CPAP Machine MISC New CPAP with 8 cm and supply  Rajesh Dill MD   rosuvastatin (CRESTOR) 10 MG tablet TAKE ONE TABLET BY MOUTH NIGHTLY  Aysha Muller MD   venlafaxine (EFFEXOR XR) 75 MG extended release capsule Take 1 capsule by mouth daily TAKE TWO CAPSULES BY MOUTH EVERY MORNING AND TAKE ONE CAPSULE BY MOUTH EVERY EVENING  Aysha Muller MD   erythromycin base (E-MYCIN) 250 MG tablet TAKE 1 TABLET BY MOUTH 3 TIMES A DAY  SANIA Carvajal CNP   baclofen (LIORESAL) 10 MG tablet TAKE ONE-HALF TABLET BY MOUTH TWICE A DAY  [address], MD   solifenacin (VESICARE) 10 MG tablet TAKE 1 TABLET BY MOUTH ONE TIME A DAY  Ayana Benedict MD   hydroCHLOROthiazide (HYDRODIURIL) 25 MG tablet Take 1 tablet by mouth daily  [address], MD   gentamicin (GARAMYCIN) 0.1 % ointment   Historical Provider, MD   insulin lispro, 1 Unit Dial, (HUMALOG KWIKPEN) 100 UNIT/ML SOPN INJECT 80 UNITS UNDER THE SKIN WITH EACH MEAL  Tevin Hale MD   metFORMIN (GLUCOPHAGE) 500 MG tablet TAKE 1 TABLET BY MOUTH 3 TIMES A DAY  Patrick Marinelli MD   metoprolol (LOPRESSOR) 100 MG tablet Take 1 tablet by mouth 2 times daily  [address], MD   clotrimazole-betamethasone (LOTRISONE) 1-0.05 % cream APPLY TOPICALLY TWO TIMES A DAY  Jeremías Iyer DO   nystatin (MYCOSTATIN) 026357 UNIT/GM powder Apply 3 times daily.   [address], MD Years since quittin.9    Smokeless tobacco: Never Used   Vaping Use    Vaping Use: Never used   Substance Use Topics    Alcohol use: Yes     Alcohol/week: 0.0 standard drinks     Comment: socially    Drug use: No            PHYSICAL EXAMINATION:  [ INSTRUCTIONS:  \"[x]\" Indicates a positive item  \"[]\" Indicates a negative item  -- DELETE ALL ITEMS NOT EXAMINED]  [] Alert  [] Oriented to person/place/time    [] No apparent distress  [] Toxic appearing    [] Face flushed appearing [] Sclera clear  [] Lips are cyanotic      [] Breathing appears normal  [] Appears tachypneic      [] Rash on visible skin    [] Cranial Nerves II-XII grossly intact    [] Motor grossly intact in visible upper extremities    [] Motor grossly intact in visible lower extremities    [] Normal Mood  [] Anxious appearing    [] Depressed appearing  [] Confused appearing      [] Poor short term memory  [] Poor long term memory    [] OTHER:      Due to this being a TeleHealth encounter, evaluation of the following organ systems is limited: Vitals/Constitutional/EENT/Resp/CV/GI//MS/Neuro/Skin/Heme-Lymph-Imm. ASSESSMENT/PLAN:     Diagnosis Orders   1. Type 2 diabetes mellitus with complication, with long-term current use of insulin ([facility] [address] [address]     2.  Diabetic gastroparesis associated with type 2 diabetes mellitus (HCC)  Basic Metabolic Panel    Hemoglobin A1C     Orders Placed This Encounter   Procedures    Basic Metabolic Panel     Standing Status:   Future     Standing Expiration Date:   2022    Hemoglobin A1C     Standing Status:   Future     Standing Expiration Date:   2022     Increase Toujeo 150 units at bedtime  Continue Humalog 8 units with meals continue Metformin pioglitazone  Discuss pump therapy for better control  Orders Placed This Encounter   Medications    Insulin Glargine, 2 Unit Dial, (TOUJEO MAX SOLOSTAR) 300 UNIT/ML SOPN     Si units at bedtime     Dispense:  90 pen     Refill:  3       Total time spent was 15 minutes    An  electronic signature was used to authenticate this note. --Bubba Mayorga MD on 12/14/2021 at 4:58 PM        Pursuant to the emergency declaration under the [address] [place] [address], [facility] waiver authority and the [employer] Act, this Virtual  Visit was conducted, with patient's consent, to reduce the patient's risk of exposure to COVID-19 and provide continuity of care for an established patient. Services were provided through a video synchronous discussion virtually to substitute for in-person clinic visit.

## 2021-12-28 ENCOUNTER — E-VISIT (OUTPATIENT)
Dept: PRIMARY CARE CLINIC | Age: 46
End: 2021-12-28
Payer: COMMERCIAL

## 2021-12-28 DIAGNOSIS — R30.0 DYSURIA: Primary | ICD-10-CM

## 2021-12-28 PROCEDURE — 99421 OL DIG E/M SVC 5-10 MIN: CPT | Performed by: NURSE PRACTITIONER

## 2021-12-28 RX ORDER — CIPROFLOXACIN 500 MG/1
500 TABLET, FILM COATED ORAL 2 TIMES DAILY
Qty: 20 TABLET | Refills: 0 | Status: SHIPPED | OUTPATIENT
Start: 2021-12-28 | End: 2022-01-07

## 2021-12-28 NOTE — PROGRESS NOTES
Reviewed questionnaire    Reviewed meds/allergies    Dx Dysuria    Plan Rx given for cipro, follow up with PCP if no improvement in symptoms with use of med    Time spent on visit 5 min

## 2022-01-03 ENCOUNTER — TELEPHONE (OUTPATIENT)
Dept: PHARMACY | Facility: CLINIC | Age: 47
End: 2022-01-03

## 2022-01-03 NOTE — TELEPHONE ENCOUNTER
2022 Annual Pharmacist Visit    Called patient to schedule 2022 yearly pharmacist appointment to discuss medications for Diabetes Management Program.    No answer. Left VM on home TAD: Please call back at [phone] Option #3. [address]   Department, toll free: [phone] Option #3

## 2022-01-07 DIAGNOSIS — K31.84 GASTROPARESIS: ICD-10-CM

## 2022-01-07 NOTE — TELEPHONE ENCOUNTER
Second attempt made to contact patient to schedule 2022 yearly pharmacist appointment to discuss medications for Diabetes Management Program.    OfficialVirtualDJ message sent    Kaylyn Like.   [address]   Department, toll free: [phone] Option #3      For Pharmacy Admin Tracking Only     CPA in place:  No   Recommendation Provided To: Patient/Caregiver: 1 via Chris Snell 20 Intervention Detail: Scheduled Appointment   Gap Closed?: No    Intervention Accepted By: Patient/Caregiver: 0   Time Spent (min): 10

## 2022-01-07 NOTE — TELEPHONE ENCOUNTER
Patient returned call and scheduled visit for 1/18/21 at 26am    W.  Brandon Cabral, JulesD, [address] [facility], toll free: [phone]

## 2022-01-10 DIAGNOSIS — E78.5 DYSLIPIDEMIA: ICD-10-CM

## 2022-01-10 RX ORDER — ERYTHROMYCIN 250 MG/1
TABLET, COATED ORAL
Qty: 90 TABLET | Refills: 3 | Status: SHIPPED | OUTPATIENT
Start: 2022-01-10 | End: 2022-04-15

## 2022-01-10 RX ORDER — BACLOFEN 10 MG/1
TABLET ORAL
Qty: 90 TABLET | Refills: 1 | Status: SHIPPED | OUTPATIENT
Start: 2022-01-10 | End: 2022-06-28

## 2022-01-10 RX ORDER — ROSUVASTATIN CALCIUM 10 MG/1
TABLET, COATED ORAL
Qty: 90 TABLET | Refills: 1 | Status: SHIPPED | OUTPATIENT
Start: 2022-01-10 | End: 2022-07-25

## 2022-01-10 RX ORDER — PREDNISONE 1 MG/1
TABLET ORAL
Qty: 30 TABLET | Refills: 3 | Status: SHIPPED | OUTPATIENT
Start: 2022-01-10 | End: 2022-04-04

## 2022-01-10 NOTE — TELEPHONE ENCOUNTER
Comments:     Last Office Visit (last PCP visit):   8/9/2021    Next Visit Date:  Future Appointments   Date Time Provider Iliana Garcia   1/18/2022  8:30 AM SCHEDULE, S CLINICAL PHARMACY S Clin Rx None   3/3/2022  9:00 AM Rommel ClifCarlos   4/28/2022  1:30 PM Nancy Greenberg, [address] [facility],[address] [facility]   5/2/2022  1:00 PM Lang Thomas MD [facility]   10/18/2022  8:45 AM Luis F Quiros MD [facility]       **If hasn't been seen in over a year OR hasn't followed up according to last diabetes/ADHD visit, make appointment for patient before sending refill to provider.     Rx requested:  Requested Prescriptions     Pending Prescriptions Disp Refills    baclofen (LIORESAL) 10 MG tablet 90 tablet 1     Sig: TAKE ONE-HALF TABLET BY MOUTH TWICE A DAY    rosuvastatin (CRESTOR) 10 MG tablet 90 tablet 1     Sig: TAKE ONE TABLET BY MOUTH NIGHTLY

## 2022-01-17 DIAGNOSIS — B37.2 CANDIDAL DERMATITIS: ICD-10-CM

## 2022-01-17 NOTE — TELEPHONE ENCOUNTER
[facility] CLINICAL PHARMACY REVIEW - Be Well with Diabetes    Eliceo Ferraro is a 40 y.o. female enrolled in the [address] [place] [address],[address] Floor Employee Diabetes Program. Patient provided Angela Blackman with verbal consent to remain in the program for this year. Medications:  Medication Sig    erythromycin base (E-MYCIN) 250 MG tablet TAKE 1 TABLET BY MOUTH 3 TIMES A DAY    predniSONE (DELTASONE) 5 MG tablet TAKE 1 TABLET BY MOUTH ONE TIME A DAY    baclofen (LIORESAL) 10 MG tablet TAKE ONE-HALF TABLET BY MOUTH TWICE A DAY    rosuvastatin (CRESTOR) 10 MG tablet TAKE ONE TABLET BY MOUTH NIGHTLY    clotrimazole-betamethasone (LOTRISONE) 1-0.05 % cream APPLY TOPICALLY TWO TIMES A DAY    Insulin Glargine, 2 Unit Dial, (TOUJEO MAX SOLOSTAR) 300 UNIT/ML SOPN 150 units at bedtime  - confirms dosing    nystatin-triamcinolone (MYCOLOG II) 365127-3.1 UNIT/GM-% cream Apply topically 2 times daily.     losartan (COZAAR) 50 MG tablet Take 1 tablet by mouth daily    pioglitazone (ACTOS) 30 MG tablet TAKE 1 TABLET BY MOUTH ONE TIME A DAY    esomeprazole (NEXIUM) 40 MG delayed release capsule TAKE 1 CAPSULE BY MOUTH ONE TIME A DAY    Continuous Blood Gluc Sensor (FREESTYLE JAIRO 14 DAY SENSOR) MISC CHANGE EVERY 14 DAYS    CPAP Machine MISC New CPAP with 8 cm and supply    venlafaxine (EFFEXOR XR) 75 MG extended release capsule Take 1 capsule by mouth daily TAKE TWO CAPSULES BY MOUTH EVERY MORNING AND TAKE ONE CAPSULE BY MOUTH EVERY EVENING    solifenacin (VESICARE) 10 MG tablet TAKE 1 TABLET BY MOUTH ONE TIME A DAY    hydroCHLOROthiazide (HYDRODIURIL) 25 MG tablet Take 1 tablet by mouth daily    gentamicin (GARAMYCIN) 0.1 % ointment     insulin lispro, 1 Unit Dial, (HUMALOG KWIKPEN) 100 UNIT/ML SOPN INJECT 80 UNITS UNDER THE SKIN WITH EACH MEAL  - usually using 40 units with meals, discussed with provider    metFORMIN (GLUCOPHAGE) 500 MG tablet TAKE 1 TABLET BY MOUTH 3 TIMES A DAY    metoprolol (LOPRESSOR) 100 MG tablet Take 1 tablet by mouth 2 times daily    nystatin (MYCOSTATIN) 006025 UNIT/GM powder Apply 3 times daily.  ondansetron (ZOFRAN ODT) 4 MG disintegrating tablet Take 1 tablet by mouth every 8 hours as needed for Nausea or Vomiting    albuterol sulfate HFA (PROAIR HFA) 108 (90 Base) MCG/ACT inhaler Use every 4 hours while awake for 7-10 days then PRN wheezing  Dispense with SPACER and Instruct on use. May sub Ventolin or Proventil as needed per [employer]. - was for acute respiratory infection, no longer using    metoclopramide (REGLAN) 10 MG tablet Take 1 tablet by mouth 3 times daily (with meals)    famotidine (PEPCID) 20 MG tablet Take 1 tablet by mouth 2 times daily  - no longer using (is still on esomeprazole)  - gastroparesis sx OK    Continuous Blood Gluc  (FREESTYLE JAIRO 14 DAY READER) ALEXANDREA As directed    Insulin Pen Needle (PEN NEEDLES) 32G X 4 MM MISC Use as directed with insulin pens, 4 times daily    Insulin Pen Needle (PEN NEEDLES) 32G X 4 MM MISC Use as directed with insulin pens, 4 times daily    blood glucose test strips (PRODIGY NO CODING BLOOD GLUC) strip 1 each by In Vitro route 4 times daily As needed.  blood glucose test strips (PRODIGY NO CODING BLOOD GLUC) strip 1 each by In Vitro route 4 times daily As needed.  Blood Glucose Monitoring Suppl (PRODIGY AUTOCODE BLOOD GLUCOSE) w/Device KIT Use as directed to test up to 4 times daily    PRODIGY LANCETS 28G MISC Use as directed to test up to 4 times daily    miconazole (MICOTIN) 2 % cream Apply topically 2 times daily.     Handicap Placard MISC Exp 5 years      Current Pharmacy: [facility]  Current testing supplies/frequency: Freestyle Jairo 14 day, Prodigy; \"absolutely love Jairo\"; does get a discrepancy with fingerstick; last 2 sensors completely off, called company and was sent 2 free sensors  Pen needles/syringes: Leader pen needles 32G x 4mm, qty 400/90 days (Toujeo qHS, Humalog TID); size OK    Would be interested in Beavers 2 if compatible, believes alarms would be helpful. Gasngo version 12. IPDIA S21 5G - prefers to use phone to scan. Allergies: Allergies   Allergen Reactions    Morphine And Related Hives and Rash    Ace Inhibitors Other (See Comments)     Dizziness and near syncope    Bactrim [Sulfamethoxazole-Trimethoprim] Itching    Nitroglycerin Other (See Comments)     Migraine    Percocet [Oxycodone-Acetaminophen] Nausea And Vomiting    Victoza [Liraglutide]      NAUSEA      Vitals/Labs:  BP Readings from Last 3 Encounters:   12/02/21 128/76   11/13/21 128/72   11/01/21 127/64     Lab Results   Component Value Date    LABMICR 7.20 (H) 12/13/2021     Lab Results   Component Value Date    LABA1C 8.1 (H) 12/13/2021    LABA1C 7.8 (H) 08/11/2021    LABA1C 7.5 (H) 04/16/2021     Lab Results   Component Value Date    CHOL 117 08/23/2021    TRIG 134 08/23/2021    HDL 34 (L) 08/23/2021    LDLCALC 56 08/23/2021     ALT   Date Value Ref Range Status   11/01/2021 17 0 - 33 U/L Final     AST   Date Value Ref Range Status   11/01/2021 15 0 - 35 U/L Final     The ASCVD Risk score (Annette Walter., et al., 2013) failed to calculate for the following reasons: The valid total cholesterol range is 130 to 320 mg/dL     Lab Results   Component Value Date    CREATININE 0.71 12/13/2021     CrCl cannot be calculated (Unknown ideal weight.).     Lab Results   Component Value Date    LABGLOM >60.0 12/13/2021    LABGLOM >60.0 09/17/2021    LABGLOM >60.0 08/11/2021    LABGLOM >60.0 08/11/2021       Immunizations:  Immunization History   Administered Date(s) Administered    COVID-19, Moderna, Primary or Immunocompromised, PF, 100mcg/0.5mL 01/07/2021, 02/04/2021    Influenza 10/22/2013    Influenza Virus Vaccine 10/20/2014, 10/14/2015, 10/14/2019, 11/05/2020    Influenza, Quadv, 6 mo and older, IM, PF (Flulaval, Fluarix) 09/22/2018    Influenza, Quadv, IM, (6 mo and older Fluzone, Flulaval, Fluarix and 3 yrs and older Afluria) 2016, 2017    Pneumococcal Polysaccharide ([id number]) 10/14/2015    Tdap (Boostrix, Adacel) 2012      Social History:  Social History     Tobacco Use    Smoking status: Former Smoker     Packs/day: 1.00     Types: Cigarettes     Quit date: 2017     Years since quittin.0    Smokeless tobacco: Never Used   Substance Use Topics    Alcohol use: Yes     Alcohol/week: 0.0 standard drinks     Comment: socially     ASSESSMENT:  Initial Program Requirements (Y indicates has completed for the year, N indicates needs to be completed by 2022): No - Provider Visit for DM (1st)  No - A1c (1st)     Ongoing Program Requirements (Y indicates has completed for the year, N indicates needs to be completed by 2022): No - Provider Visit for DM (2nd)  No - ACC/diabetes educator visit (if A1c over 8%)  No - A1c (2nd)  No - Lipid panel  No - Urine microalbumin  Yes - Pneumococcal vaccination: Up-to-date, not needed again until age 72  No - Influenza vaccination for 2022  Yes - Medication adherence over 70% - [facility] n/a since is on insulin  Yes - On statin or contraindication(s) rosuvastatin  Yes - On ACEi/ARB or contraindication(s) losartan     Formulary Medication Review:  Non-formulary or medications with cost-effective alternatives: none identified    Current medications eligible for copay waiver, up to $600, through [employer] [place]:  - hydrochlorothiazide, Toujeo, Humalog, losartan, metformin, pioglitazone, rosuvastatin  - Prodigy and Freestyle Chrissy blood sugar monitoring systems  - Dexcom G6 (instead of Chrissy) if desired  - generic pen needles     Diabetes Care:   - Glycemic Goal: <7.0%. Is not at blood glucose goal. Current regimen metformin 500 mg TID, pioglitazone 30 mg once daily, Toujeo Max 150 units qHS, Humalog U100 40 units TID. F/b endocrinology (Dr. Oleg Irizarry) - last OV 21, Toujeo dose increased from 130 units qHS. Considering pump therapy for better control. Using Jacqulynn Candle 14 day, interested in Jacqulynn Candle 2 but her device does not appear to currently be compatible with Jacqulynn Candle 2 [place] and she prefers to use smartphone   · Metformin titration limited by GI ADRs. Has been prescribed U500 in the past per endo  · Medication adherence: appears adherent per refill history    Other Considerations:  - Blood Pressure Goal: BP less than 140/90 mmHg due to history of DM: Is at blood pressure goal. On ARB - appears adherent  - Lipids: LDL less than 70 mg/dL on current statin regimen (rosuvastatin 10 mg - moderate intensity). Appears adherent  - Smoking status: former smoker    PLAN:  - Consideration(s) for provider:   · None at this time  - DM program gaps identified:   · Initial requirements: provider visit for diabetes (1st) and A1c (1st)   · Ongoing requirements: provider visit for diabetes (2nd), diabetes education/care coordination (if A1c over 8%), A1c (2nd), Lipid panel, Urine microalbumin and Influenza vaccination for [id number]   - Education to patient: as above and:   · General information that pump supplies typically covered under medical benefit via medical supplier  · Edu on covered CGMs.  Continue to monitor Jacqulynn Candle 2 compatibility, or perhaps future consideration for Dexcom G6 (although more costly and would use up program money quicker)  - Follow up: PCP for identified gaps or as scheduled below, Endocrinologist for identified gaps or as scheduled below and Diabetes Educator or [address] [place] [address] Coordinator for identified gaps or as scheduled below  - Upcoming appointments:   Future Appointments   Date Time Provider Iliana Garcia   1/18/2022  8:30 AM SCHEDULE, S CLINICAL PHARMACY S Clin Rx None   1/31/2022  8:45 AM Spencer Dobbins MD [facility]   2/17/2022  9:00 AM Jose Elliott, [address] [place] [address]   3/3/2022  9:00 AM Makayla Rodriguez   4/28/2022  1:30 PM Nick KULKARNI Hay Jake, [address] [facility],4[address] Floor   5/2/2022  1:00 PM Vianey Melo MD [facility]   10/18/2022  8:45 AM Margo Brunner MD [facility]     OK to [facility] AT [facility] with PHI - left detailed message notifying Yoli De Guzman 2 not compatible with her device after my discussion with Lynch to confirm.     Robinson Cordova PharmD, [facility]  Department, toll free: [phone]     For Pharmacy Admin Tracking Only     Gap Closed?: Yes    Time Spent (min): 45

## 2022-01-17 NOTE — TELEPHONE ENCOUNTER
pharmacy requesting medication refill.  Please approve or deny this request.    Rx requested:  Requested Prescriptions     Pending Prescriptions Disp Refills    nystatin ([address]) 071252 UNIT/GM powder [Pharmacy Med Name: [address] [id number]/GM POWD] 15 g 2     Sig: APPLY  [address] Office Visit:   8/9/2021      Next Visit Date:  Future Appointments   Date Time Provider Iliana Garcia   1/18/2022  8:30 AM SCHEDULE, S CLINICAL PHARMACY S Clin Rx None   1/31/2022  8:45 AM Spencer Dobbins MD [facility]   2/17/2022  9:00 AM Jose Elliott DO OB/GYN [address] [facility]   3/3/2022  9:00 AM Makayla Rodriguez   4/28/2022  1:30 PM Brinda Mancilla, [address] [facility],4[address] [facility]   5/2/2022  1:00 PM Suzanne Miranda MD [facility]   10/18/2022  8:45 AM Bowen Cantu MD [facility]

## 2022-01-18 ENCOUNTER — SCHEDULED TELEPHONE ENCOUNTER (OUTPATIENT)
Dept: PHARMACY | Facility: CLINIC | Age: 47
End: 2022-01-18

## 2022-01-18 RX ORDER — BLOOD-GLUCOSE CONTROL, LOW
EACH MISCELLANEOUS
Qty: 400 EACH | Refills: 3 | Status: SHIPPED | OUTPATIENT
Start: 2022-01-18

## 2022-01-18 RX ORDER — NYSTATIN 100000 [USP'U]/G
POWDER TOPICAL
Qty: 15 G | Refills: 2 | Status: SHIPPED | OUTPATIENT
Start: 2022-01-18

## 2022-01-18 NOTE — TELEPHONE ENCOUNTER
Comments:     Last Office Visit (last PCP visit):   8/9/2021    Next Visit Date:  Future Appointments   Date Time Provider Iliana Garcia   1/31/2022  8:45 AM Spencer Dobbins, [id number] [place] [address] 15   2/17/2022  9:00 AM Jose Elliott, [address] [facility]   3/3/2022  9:00 AM Fred Singh [facility]   4/28/2022  1:30 PM Brinda Mancilla, [address] [facility],4[address] [facility]   5/2/2022  1:00 PM Suzanne Miranda MD [facility]   10/18/2022  8:45 AM Bowen Cantu MD [facility]       **If hasn't been seen in over a year OR hasn't followed up according to last diabetes/ADHD visit, make appointment for patient before sending refill to provider.     Rx requested:  Requested Prescriptions     Pending Prescriptions Disp Refills    Prodigy Lancets 28G MISC 400 each 3     Sig: Use as directed to test up to 4 times daily

## 2022-01-20 NOTE — TELEPHONE ENCOUNTER
Pharmacy is requesting medication refill. Please approve or deny this request.    Rx requested:  Requested Prescriptions     Pending Prescriptions Disp Refills    blood glucose test strips (PRODIGY NO CODING BLOOD GLUC) strip 400 each 3     Si each by In Vitro route 4 times daily As needed.          Last Office Visit:   2021      Next Visit Date:  Future Appointments   Date Time Provider Iliana Garcia   2022  8:45 AM Rekha Su MD [address] [place] [address]   2022  9:00 AM Luisa Wang DO OB/GYN [facility]   3/3/2022  9:00 AM Makayla Strickland   2022  1:30 PM Tyson Morejon, [address] [facility],4Th [facility]   2022  1:00 PM Carin Irby MD [facility]   10/18/2022  8:45 AM Alon Torres MD [facility]

## 2022-01-21 ENCOUNTER — E-VISIT (OUTPATIENT)
Dept: PRIMARY CARE CLINIC | Age: 47
End: 2022-01-21
Payer: COMMERCIAL

## 2022-01-21 DIAGNOSIS — J01.90 ACUTE NON-RECURRENT SINUSITIS, UNSPECIFIED LOCATION: Primary | ICD-10-CM

## 2022-01-21 PROCEDURE — 99422 OL DIG E/M SVC 11-20 MIN: CPT | Performed by: NURSE PRACTITIONER

## 2022-01-21 RX ORDER — BLOOD SUGAR DIAGNOSTIC
1 STRIP MISCELLANEOUS 4 TIMES DAILY
Qty: 400 EACH | Refills: 3 | Status: SHIPPED | OUTPATIENT
Start: 2022-01-21

## 2022-01-21 RX ORDER — AMOXICILLIN AND CLAVULANATE POTASSIUM 875; 125 MG/1; MG/1
1 TABLET, FILM COATED ORAL 2 TIMES DAILY
Qty: 20 TABLET | Refills: 0 | Status: SHIPPED | OUTPATIENT
Start: 2022-01-21 | End: 2022-01-31

## 2022-01-21 ASSESSMENT — LIFESTYLE VARIABLES
SMOKING_YEARS: 15
SMOKING_STATUS: NO, I'M A FORMER SMOKER
PACKS_PER_DAY: 1

## 2022-01-21 NOTE — PROGRESS NOTES
Reviewed questionnaire  Reviewed previous encounters, medications, allergies and history     Dx sinusitis     Plan  rx for augmentin     1. Water: Drink 1/2 of body weight (lb) in ounces,  Up to 90 Ounces of water per day. This will loosen mucus in the head and chest & improve the weak feeling of dehydration, allow the body to get germ fighting resources to the infection. Half can be juice or sugar free powerade or garorate. Don't count drinks with caffeine or carbonation. Infants can have Pedialyte liquid or freezer pops. Avoid salt if you have high Blood Pressure, swelling in the feet or ankles or have heart problems. 2. Humidity: Humidify the air to 35-50% ( or until the windows fog over slightly).  Summer use of an air conditioner turned down too far and can result in dry air. Can use a humidifier, vaporizer, boil water on the stove or put a coffee can full of water on the heater vents. This will loosen mucus from infections and allergies. 3. Sleep: Get 8-10 hours a night and rest during the evening after work or school. If you have trouble sleeping, adults can take Melatonin 5mg up to 2 tabs at bedtime ( not for children or pregnant women). If Mono is suspected then sleep during 9PM to 9AM time span (if possible.)  4. Cough: Take cough medicines with Guaifenesin ( to loosen chest or head congestion) and Dextromethorphan ( to decrease excess cough). Robitussin D.M. Syrup every 4-6 hrs or Mucinex D. M. pills twice a day. Use the pediatric formulations for children over 6 months making sure they are alcohol & sugar free for children, pregnant women, and diabetics. 5. Pain And Fevers: Take Acetaminophen ( Tylenol) for fevers, aches, and headaches. 2-500 mg every 8 hours for adults. Appropriate doses at bedtime for children may help them sleep better. Ibuprofen may be used if not pregnant, but should be given with food to avoid nausea.  Avoid Ibuprofen if you have high blood pressure, CHF, or kidney problems. 6.Gargle: (DAY ONE OF SYMPTOMS) Gargle in the back of the throat with the head tilted back and to the sides with a strong mouthwash  ( Listerine or Scope) after meals and at bedtime at least 4 -5 times a day. This helps kill bacteria and viruses in the back of the throat and will shorten the duration and decrease the severity of your symptoms: sore throat, cough, ear popping,/ear pain, and possibly dizziness. 7. Smoking: Avoid smoking or exposure to second hand smoke. 8. Zinc: (DAY ONE OF SYMPTOMS)  Zinc lozenges such as Cold Don (available most stores), or Basic ([employer] brand) will help shorten the duration and lessen symptoms such as sore throat, cough, nasal congestion, runny nose, and post nasal drip. Use 1 lozenge every 2-4 hours ( after meals if stomach is sensitive). Children can use 10-15 mg or less 3-4 times a day or Zinc lollypops. In pregnancy limit to 50-60 mg a day for 7 days as prenatals have Zinc also.   With diarrhea use zinc pills 50 mg 1/2 to 1 pill 2x/day. 9. Vitamins: Vitamin C 500 mg with breakfast and dinner. Children and pregnant women should drink citrus juices. This speeds healing and strengthens immune system. 10. Chest Symptoms: Vicks Vapor rub to the chest at bedtime. 11. Decongestants: Avoid all decongestants if you have high blood pressure. Safe to take if you do not have high blood pressure. Try all of the above starting with day 1 of symptoms. If Strep throat symptoms appear call to be seen in the office as soon as possible and don't gargle on that day. Newborns, infants, or anyone with earaches or influenza may need to be seen quickly. Adults with fevers over 103 degrees or shortness of breath should call the office immediately.   12. Nasal saline spray or rinse. There are many different ways to do this OTC.     I hope that you feel better.  If you do not feel better after treatment, please f/u with pcp.     Time spent 11-20 minutes

## 2022-01-28 ENCOUNTER — TELEPHONE (OUTPATIENT)
Dept: PRIMARY CARE CLINIC | Age: 47
End: 2022-01-28

## 2022-02-06 NOTE — TELEPHONE ENCOUNTER
Comments:     Last Office Visit (last PCP visit):   8/9/2021    Next Visit Date:  Future Appointments   Date Time Provider Iliana Radha   2/17/2022  9:00 AM Blayne Rosenberg, [address] [facility]   3/3/2022  9:00 AM Makayla Mesa   3/21/2022  3:00 PM Beatris Carmona  [facility]   4/28/2022  1:30 PM London Anton, [address] [facility],[address] [facility]   5/2/2022  1:00 PM Long Rogers MD [facility]   10/18/2022  8:45 AM Janell Ling MD [facility]       **If hasn't been seen in over a year OR hasn't followed up according to last diabetes/ADHD visit, make appointment for patient before sending refill to provider.     Rx requested:  Requested Prescriptions     Pending Prescriptions Disp Refills    metoprolol (LOPRESSOR) 100 MG tablet [Pharmacy Med Name: METOPROLOL TARTRATE 100MG TABS] 180 tablet 1     Sig: TAKE 1 TABLET BY MOUTH 2 TIMES A DAY

## 2022-02-07 RX ORDER — METOPROLOL TARTRATE 100 MG/1
TABLET ORAL
Qty: 180 TABLET | Refills: 1 | Status: SHIPPED | OUTPATIENT
Start: 2022-02-07 | End: 2022-07-25

## 2022-02-16 RX ORDER — GENTAMICIN SULFATE 1 MG/G
OINTMENT TOPICAL
Qty: 30 G | Refills: 1 | OUTPATIENT
Start: 2022-02-16

## 2022-02-22 ENCOUNTER — TELEPHONE (OUTPATIENT)
Dept: GERIATRIC MEDICINE | Age: 47
End: 2022-02-22

## 2022-02-22 ENCOUNTER — PATIENT MESSAGE (OUTPATIENT)
Dept: FAMILY MEDICINE CLINIC | Age: 47
End: 2022-02-22

## 2022-02-23 RX ORDER — GENTAMICIN SULFATE 1 MG/G
OINTMENT TOPICAL
Qty: 30 G | Refills: 1 | Status: SHIPPED | OUTPATIENT
Start: 2022-02-23 | End: 2022-05-02

## 2022-02-23 RX ORDER — GENTAMICIN SULFATE 1 MG/G
OINTMENT TOPICAL DAILY
Qty: 1 EACH | Refills: 0 | Status: SHIPPED | OUTPATIENT
Start: 2022-02-23

## 2022-02-23 NOTE — TELEPHONE ENCOUNTER
From: Eliceo Raygoza  To: Dr. Jara Marrow: 2/22/2022 11:19 AM EST  Subject: Deborah Marva Dr. Moshe Sanon, I need a refill on Gentamacin. Since this was prescribed at the wound center, can you refill the script for me?  Thanks    Ganesh Collado

## 2022-02-23 NOTE — TELEPHONE ENCOUNTER
Patient is requesting medication refill.  Please approve or deny this request.    Rx requested:  Requested Prescriptions     Pending Prescriptions Disp Refills    gentamicin (GARAMYCIN) 0.1 % ointment 1 each 1         Last Office Visit:   8/9/2021      Next Visit Date:  Future Appointments   Date Time Provider Iliana Garcia   3/10/2022 10:45 AM Aron Lilly DO OB/GYN [id number] [facility]   3/21/2022  3:00 PM Suad Hernandez MD [facility]   3/23/2022  9:30 AM Tin Cespedes [facility]   4/28/2022  1:30 PM Socorro Villavicencio, [address] [facility],4[address] [facility]   5/2/2022  1:00 PM Verónica Norman MD [facility]   10/18/2022  8:45 AM Brittany Borrego MD [facility]

## 2022-02-24 NOTE — TELEPHONE ENCOUNTER
[address] Delivery needs to know how many grams the tube of cream should be? If available order 30 grams.    Thanks,   Dee Ho NP-C

## 2022-03-05 DIAGNOSIS — K21.9 GASTROESOPHAGEAL REFLUX DISEASE, UNSPECIFIED WHETHER ESOPHAGITIS PRESENT: ICD-10-CM

## 2022-03-07 RX ORDER — PIOGLITAZONEHYDROCHLORIDE 30 MG/1
TABLET ORAL
Qty: 90 TABLET | Refills: 0 | Status: SHIPPED | OUTPATIENT
Start: 2022-03-07 | End: 2022-06-27

## 2022-03-07 RX ORDER — ESOMEPRAZOLE MAGNESIUM 40 MG/1
CAPSULE, DELAYED RELEASE ORAL
Qty: 30 CAPSULE | Refills: 3 | Status: SHIPPED | OUTPATIENT
Start: 2022-03-07 | End: 2022-05-02

## 2022-03-07 RX ORDER — ESOMEPRAZOLE MAGNESIUM 40 MG/1
CAPSULE, DELAYED RELEASE ORAL
Qty: 30 CAPSULE | Refills: 3 | Status: SHIPPED | OUTPATIENT
Start: 2022-03-07 | End: 2022-07-25

## 2022-03-07 RX ORDER — HYDROCHLOROTHIAZIDE 25 MG/1
TABLET ORAL
Qty: 90 TABLET | Refills: 1 | OUTPATIENT
Start: 2022-03-07

## 2022-03-09 ENCOUNTER — TELEPHONE (OUTPATIENT)
Dept: FAMILY MEDICINE CLINIC | Age: 47
End: 2022-03-09

## 2022-03-09 RX ORDER — HYDROCHLOROTHIAZIDE 25 MG/1
25 TABLET ORAL DAILY
Qty: 90 TABLET | Refills: 1 | Status: SHIPPED | OUTPATIENT
Start: 2022-03-09 | End: 2022-03-11 | Stop reason: SDUPTHER

## 2022-03-09 NOTE — TELEPHONE ENCOUNTER
Pt called to have Hydrodiuril refilled and was told the office refused to refill since patient wasn't here since December.     LOV 8/9/2021  Nov 3/16/2022

## 2022-03-11 RX ORDER — HYDROCHLOROTHIAZIDE 25 MG/1
25 TABLET ORAL DAILY
Qty: 90 TABLET | Refills: 1 | Status: SHIPPED | OUTPATIENT
Start: 2022-03-11

## 2022-03-11 NOTE — TELEPHONE ENCOUNTER
patient requesting medication refill. Please approve or deny this request. Patient has appt on 3/16/2022.  Patient needs her medication    Rx requested:  Requested Prescriptions     Pending Prescriptions Disp Refills    hydroCHLOROthiazide (HYDRODIURIL) 25 MG tablet 90 tablet 1     Sig: Take 1 tablet by mouth daily         Last Office Visit:   8/9/2021      Next Visit Date:  Future Appointments   Date Time Provider Iliana Garcia   3/16/2022  9:40 AM [id number] [place] [address] [address], MD [place] [address]   3/21/2022  3:00 PM Yin Hoff MD [address] [place] [address]   4/18/2022  8:30 AM Moo [facility]   4/28/2022  1:30 PM Arianna Zepeda, [address] [facility],4[address] Floor   5/2/2022  1:00 PM Eloisa Campos MD [facility]   10/18/2022  8:45 AM Kely Calzada MD [facility]

## 2022-03-16 ENCOUNTER — OFFICE VISIT (OUTPATIENT)
Dept: FAMILY MEDICINE CLINIC | Age: 47
End: 2022-03-16
Payer: COMMERCIAL

## 2022-03-16 VITALS
BODY MASS INDEX: 49.07 KG/M2 | DIASTOLIC BLOOD PRESSURE: 74 MMHG | HEART RATE: 71 BPM | TEMPERATURE: 96.9 F | SYSTOLIC BLOOD PRESSURE: 122 MMHG | OXYGEN SATURATION: 99 % | WEIGHT: 293 LBS

## 2022-03-16 DIAGNOSIS — I10 HYPERTENSION, UNSPECIFIED TYPE: ICD-10-CM

## 2022-03-16 DIAGNOSIS — D50.9 MICROCYTIC ANEMIA: ICD-10-CM

## 2022-03-16 DIAGNOSIS — B37.2 CANDIDAL DERMATITIS: Primary | ICD-10-CM

## 2022-03-16 PROCEDURE — 99214 OFFICE O/P EST MOD 30 MIN: CPT | Performed by: FAMILY MEDICINE

## 2022-03-16 RX ORDER — MICONAZOLE
POWDER (GRAM) MISCELLANEOUS
Qty: 3 EACH | Refills: 1 | Status: SHIPPED | OUTPATIENT
Start: 2022-03-16

## 2022-03-16 ASSESSMENT — PATIENT HEALTH QUESTIONNAIRE - PHQ9
SUM OF ALL RESPONSES TO PHQ QUESTIONS 1-9: 0
2. FEELING DOWN, DEPRESSED OR HOPELESS: 0
SUM OF ALL RESPONSES TO PHQ9 QUESTIONS 1 & 2: 0
1. LITTLE INTEREST OR PLEASURE IN DOING THINGS: 0
SUM OF ALL RESPONSES TO PHQ QUESTIONS 1-9: 0

## 2022-03-16 ASSESSMENT — ENCOUNTER SYMPTOMS
COUGH: 0
ABDOMINAL PAIN: 0
VOMITING: 0
DIARRHEA: 0
BLOOD IN STOOL: 0
CONSTIPATION: 0
NAUSEA: 0
APNEA: 0
CHEST TIGHTNESS: 0
SHORTNESS OF BREATH: 0

## 2022-03-16 NOTE — PROGRESS NOTES
Subjective:      Patient ID: Anna Alvarez is a 40 y.o. female who presents today for:     Chief Complaint   Patient presents with    Hypertension     follow up,        HPI  Patient is a very pleasant 50-year-old female presents today to follow-up on hypertension. She has been on hydrochlorothiazide for many years and blood pressure has remained controlled. She denies any chest discomfort, shortness of breath or lower extremity edema. 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 She has seen wound care in the past and states that she uses the cream provided which helps but wounds still return. She does recall that a powder provided 3 years ago was more helpful than the nystatin she is currently using.   Upon further review it was miconazole powder  Past Medical History:   Diagnosis Date    Abnormal Pap smear of cervix     Acute midline thoracic back pain 9/18/2018    B12 deficiency     Family history of premature CAD 9/4/2013    Fatty liver     Gastroesophageal reflux disease 1/6/2021    Gastroparesis     HPV (human papilloma virus) anogenital infection     Hyperlipidemia     Hypertension     Irritable bowel syndrome with diarrhea 4/26/2021    Liver hemangioma 2009/2015    Lumbar radiculopathy 7/7/2021    Obesity 3/11/13    BMI 43.42    Orthostatic hypotension     Ovarian cyst     PMR (polymyalgia rheumatica) (HCC)     Rectal fissure     Tobacco abuse 9/3/2015    Tobacco abuse     Tremor     Uncontrolled diabetes mellitus with complications ([address]     Unspecified sleep apnea      Past Surgical History:   Procedure Laterality Date    CARDIAC CATHETERIZATION  4-07    COLONOSCOPY  2013    for diarrhea (-)    COLONOSCOPY N/A 2/10/2021    COLONOSCOPY DIAGNOSTIC performed by Cornell Preston MD at [facility]  12-10    [facility]  1-05    [place] MUSCLE BIOPSY Left 9/21/2018    LEFT QUADRICEPS MUSCLE BIOPSY performed by Brianda Grier MD at [address] [place] [address] PERQ VERT AGMNTJ CAVITY CRTJ UNI/BI CANNULATION N/A 2018    T 12 VERTEBRALPLASTY ROOM 278 performed by Zac Card MD at [facility] ENDOSCOPY  10/13/15     DR. HERRERA     UPPER GASTROINTESTINAL ENDOSCOPY N/A 2/10/2021    EGD ESOPHAGOGASTRODUODENOSCOPY performed by Artem Arriaga MD at [facility]       Family History   Problem Relation Age of Onset    Diabetes Father     Heart Disease Mother     Colon Cancer Neg Hx     Cancer Neg Hx      Social History     Socioeconomic History    Marital status:      Spouse name: Not on file    Number of children: 1    Years of education: Not on file    Highest education level: Not on file   Occupational History    Not on file   Tobacco Use    Smoking status: Former Smoker     Packs/day: 1.00     Types: Cigarettes     Quit date: 2017     Years since quittin.2    Smokeless tobacco: Never Used   Vaping Use    Vaping Use: Never used   Substance and Sexual Activity    Alcohol use: Yes     Alcohol/week: 0.0 standard drinks     Comment: socially    Drug use: No    Sexual activity: Yes     Partners: Male     Birth control/protection: Surgical     Comment: single   Other Topics Concern    Not on file   Social History Narrative    Social/Functional History          Social Determinants of Health     Financial Resource Strain: Low Risk     Difficulty of Paying Living Expenses: Not hard at all   Food Insecurity: No Food Insecurity    Worried About Running Out of Food in the Last Year: Never true    Jung of Food in the Last Year: Never true   Transportation Needs: No Transportation Needs    Lack of Transportation (Medical): No    Lack of Transportation (Non-Medical):  No   Physical Activity:     Days of Exercise per Week: Not on file    Minutes of Exercise per Session: Not on file   Stress:     Feeling of Stress : Not on file Social Connections:     Frequency of Communication with Friends and Family: Not on file    Frequency of Social Gatherings with Friends and Family: Not on file    Attends Caodaism Services: Not on file    Active Member of Clubs or Organizations: Not on file    Attends Club or Organization Meetings: Not on file    Marital Status: Not on file   Intimate Partner Violence:     Fear of Current or Ex-Partner: Not on file    Emotionally Abused: Not on file    Physically Abused: Not on file    Sexually Abused: Not on file   Housing Stability:     Unable to Pay for Housing in the Last Year: Not on file    Number of Places Lived in the Last Year: Not on file    Unstable Housing in the Last Year: Not on file     Current Outpatient Medications on File Prior to Visit   Medication Sig Dispense Refill    hydroCHLOROthiazide (HYDRODIURIL) 25 MG tablet Take 1 tablet by mouth daily 90 tablet 1    esomeprazole (NEXIUM) 40 MG delayed release capsule TAKE 1 CAPSULE BY MOUTH ONE TIME A DAY 30 capsule 3    esomeprazole (NEXIUM) 40 MG delayed release capsule TAKE 1 CAPSULE BY MOUTH ONE TIME A DAY 30 capsule 3    pioglitazone (ACTOS) 30 MG tablet TAKE 1 TABLET BY MOUTH ONE TIME A DAY 90 tablet 0    gentamicin (GARAMYCIN) 0.1 % ointment APPLY TOPICALLY TWO TIMES A DAY TO OPEN AREA OF LOWER ABDOMEN, THEN INSET DRY GAUZE 4 X 4 OVER OINTMENT. DO NOT USE FOR LONGER THAN 2 WEEKS. 30 g 1    gentamicin (GARAMYCIN) 0.1 % ointment Apply topically daily 1 each 0    CPAP Machine MISC by Does not apply route Change CPAP pressure to 14 cm 1 each 0    metoprolol (LOPRESSOR) 100 MG tablet TAKE 1 TABLET BY MOUTH 2 TIMES A  tablet 1    blood glucose test strips (PRODIGY NO CODING BLOOD GLUC) strip 1 each by In Vitro route 4 times daily As needed.  400 each 3    nystatin (NYAMYC) 001603 UNIT/GM powder APPLY TOPICALLY THREE TIMES A DAY 15 g 2    Prodigy Lancets 28G MISC Use as directed to test up to 4 times daily 400 each 3    erythromycin base (E-MYCIN) 250 MG tablet TAKE 1 TABLET BY MOUTH 3 TIMES A DAY 90 tablet 3    predniSONE (DELTASONE) 5 MG tablet TAKE 1 TABLET BY MOUTH ONE TIME A DAY 30 tablet 3    baclofen (LIORESAL) 10 MG tablet TAKE ONE-HALF TABLET BY MOUTH TWICE A DAY 90 tablet 1    rosuvastatin (CRESTOR) 10 MG tablet TAKE ONE TABLET BY MOUTH NIGHTLY 90 tablet 1    clotrimazole-betamethasone (LOTRISONE) 1-0.05 % cream APPLY TOPICALLY TWO TIMES A DAY 45 g 3    Insulin Glargine, 2 Unit Dial, (TOUJEO MAX SOLOSTAR) 300 UNIT/ML SOPN 150 units at bedtime 90 pen 3    nystatin-triamcinolone (MYCOLOG II) 703715-2.1 UNIT/GM-% cream Apply topically 2 times daily.  30 g 0    losartan (COZAAR) 50 MG tablet Take 1 tablet by mouth daily 90 tablet 1    Continuous Blood Gluc Sensor (FREESTYLE JAIRO 14 DAY SENSOR) MISC CHANGE EVERY 14 DAYS 6 each 3    CPAP Machine MISC New CPAP with 8 cm and supply 1 each 0    venlafaxine (EFFEXOR XR) 75 MG extended release capsule Take 1 capsule by mouth daily TAKE TWO CAPSULES BY MOUTH EVERY MORNING AND TAKE ONE CAPSULE BY MOUTH EVERY EVENING 270 capsule 1    solifenacin (VESICARE) 10 MG tablet TAKE 1 TABLET BY MOUTH ONE TIME A DAY 90 tablet 3    insulin lispro, 1 Unit Dial, (HUMALOG KWIKPEN) 100 UNIT/ML SOPN INJECT 80 UNITS UNDER THE SKIN WITH EACH MEAL (Patient taking differently: INJECT 40 UNITS UNDER THE SKIN WITH EACH MEAL) 90 pen 3    metFORMIN (GLUCOPHAGE) 500 MG tablet TAKE 1 TABLET BY MOUTH 3 TIMES A  tablet 3    ondansetron (ZOFRAN ODT) 4 MG disintegrating tablet Take 1 tablet by mouth every 8 hours as needed for Nausea or Vomiting 60 tablet 3    metoclopramide (REGLAN) 10 MG tablet Take 1 tablet by mouth 3 times daily (with meals) 360 tablet 3    Continuous Blood Gluc  (FREESTYLE JAIRO 14 DAY READER) ALEXANDREA As directed 1 Device 00    Insulin Pen Needle (PEN NEEDLES) 32G X 4 MM MISC Use as directed with insulin pens, 4 times daily 400 each 1    blood glucose test strips (PRODIGY NO CODING BLOOD GLUC) strip 1 each by In Vitro route 4 times daily As needed. 400 each 3    [DISCONTINUED] sucralfate (CARAFATE) 1 GM tablet Take 1 tablet by mouth 4 times daily for 7 days 28 tablet 0    Blood Glucose Monitoring Suppl (PRODIGY AUTOCODE BLOOD GLUCOSE) w/Device KIT Use as directed to test up to 4 times daily 1 kit 0    miconazole (MICOTIN) 2 % cream Apply topically 2 times daily. 141 g 3    Handicap Placard MISC Exp 5 years 1 each 0     No current facility-administered medications on file prior to visit. Allergies:  Morphine and related, Ace inhibitors, Bactrim [sulfamethoxazole-trimethoprim], Nitroglycerin, Percocet [oxycodone-acetaminophen], and Victoza [liraglutide]    Review of Systems   Constitutional: Negative for activity change, appetite change, fatigue and fever. Respiratory: Negative for apnea, cough, chest tightness and shortness of breath. Cardiovascular: Negative for chest pain, palpitations and leg swelling. Gastrointestinal: Negative for abdominal pain, blood in stool, constipation, diarrhea, nausea and vomiting. Musculoskeletal: Negative for arthralgias. Skin: Positive for rash and wound. Neurological: Negative for seizures and headaches. Psychiatric/Behavioral: Negative for hallucinations and suicidal ideas. Objective:   /74   Pulse 71   Temp 96.9 °F (36.1 °C) (Infrared)   Wt (!) 304 lb (137.9 kg)   LMP  (LMP Unknown)   SpO2 99%   BMI 49.07 kg/m²     Physical Exam  Vitals and nursing note reviewed. Constitutional:       General: She is not in acute distress. Appearance: Normal appearance. She is well-developed. She is obese. She is not diaphoretic. HENT:      Head: Normocephalic and atraumatic. Nose: Nose normal.      Mouth/Throat:      Mouth: Mucous membranes are moist.      Pharynx: Oropharynx is clear. Eyes:      Conjunctiva/sclera: Conjunctivae normal.      Pupils: Pupils are equal, round, and reactive to light. Cardiovascular:      Rate and Rhythm: Normal rate and regular rhythm. Heart sounds: Normal heart sounds. No murmur heard. No friction rub. No gallop. Pulmonary:      Effort: Pulmonary effort is normal. No respiratory distress. Breath sounds: Normal breath sounds. No wheezing or rales. Chest:      Chest wall: No tenderness. Abdominal:      General: Abdomen is flat. Bowel sounds are normal.      Palpations: Abdomen is soft. Tenderness: There is no abdominal tenderness. Musculoskeletal:      Cervical back: Normal range of motion. Skin:     General: Skin is warm and dry. Neurological:      Mental Status: She is alert and oriented to person, place, and time. Psychiatric:         Behavior: Behavior normal.         Thought Content: Thought content normal.         Judgment: Judgment normal.         Assessment & Plan:     1. Candidal dermatitis  Will provide miconazole powder as it has been helpful in the past  - Miconazole POWD; Apply topically BID  Dispense: 3 each; Refill: 1    2. Hypertension, unspecified type  Controlled: Continue current medication    3. Microcytic anemia  Reviewed past labs ordered by specialist.  We will investigate for iron deficiency  - Iron and TIBC; Future  - Ferritin; Future  - CBC with Auto Differential; Future      Return in about 6 months (around 9/16/2022), or if symptoms worsen or fail to improve.     Estuardo Morrison MD

## 2022-03-22 ENCOUNTER — E-VISIT (OUTPATIENT)
Dept: PRIMARY CARE CLINIC | Age: 47
End: 2022-03-22
Payer: COMMERCIAL

## 2022-03-22 ENCOUNTER — TELEPHONE (OUTPATIENT)
Dept: FAMILY MEDICINE CLINIC | Age: 47
End: 2022-03-22

## 2022-03-22 DIAGNOSIS — R30.0 DYSURIA: Primary | ICD-10-CM

## 2022-03-22 PROCEDURE — 99422 OL DIG E/M SVC 11-20 MIN: CPT | Performed by: NURSE PRACTITIONER

## 2022-03-22 RX ORDER — CIPROFLOXACIN 500 MG/1
500 TABLET, FILM COATED ORAL 2 TIMES DAILY
Qty: 14 TABLET | Refills: 0 | Status: SHIPPED | OUTPATIENT
Start: 2022-03-22 | End: 2022-03-29

## 2022-03-22 NOTE — TELEPHONE ENCOUNTER
Medication PA has been denied for miconazole powder. Please send alternative.  Please see media scan

## 2022-03-22 NOTE — PROGRESS NOTES
Reviewed questionnaire     Reviewed meds/allergies    Dx Dysuria    Plan Rx given for cipro, follow up with PCP if no improvement    Time spent on visit 11 min

## 2022-03-22 NOTE — TELEPHONE ENCOUNTER
Please advise patient that the RX was denied because Miconazole Powder can be purchased over the counter (brand name Lotrimin Powder) . She can just go to her local pharmacy and buy it without the prescription.

## 2022-04-04 RX ORDER — PREDNISONE 1 MG/1
TABLET ORAL
Qty: 30 TABLET | Refills: 3 | Status: SHIPPED | OUTPATIENT
Start: 2022-04-04 | End: 2022-07-25

## 2022-04-07 ENCOUNTER — OFFICE VISIT (OUTPATIENT)
Dept: CARDIOLOGY CLINIC | Age: 47
End: 2022-04-07
Payer: COMMERCIAL

## 2022-04-07 VITALS
HEART RATE: 78 BPM | DIASTOLIC BLOOD PRESSURE: 82 MMHG | HEIGHT: 66 IN | OXYGEN SATURATION: 98 % | BODY MASS INDEX: 47.09 KG/M2 | RESPIRATION RATE: 18 BRPM | SYSTOLIC BLOOD PRESSURE: 124 MMHG | WEIGHT: 293 LBS

## 2022-04-07 DIAGNOSIS — E78.5 DYSLIPIDEMIA: Primary | ICD-10-CM

## 2022-04-07 DIAGNOSIS — E11.8 TYPE 2 DIABETES MELLITUS WITH COMPLICATION, WITH LONG-TERM CURRENT USE OF INSULIN (HCC): ICD-10-CM

## 2022-04-07 DIAGNOSIS — R06.09 DOE (DYSPNEA ON EXERTION): ICD-10-CM

## 2022-04-07 DIAGNOSIS — R60.0 LEG EDEMA: ICD-10-CM

## 2022-04-07 DIAGNOSIS — I10 HYPERTENSION, UNSPECIFIED TYPE: ICD-10-CM

## 2022-04-07 DIAGNOSIS — Z79.4 TYPE 2 DIABETES MELLITUS WITH COMPLICATION, WITH LONG-TERM CURRENT USE OF INSULIN (HCC): ICD-10-CM

## 2022-04-07 DIAGNOSIS — M54.6 ACUTE THORACIC BACK PAIN, UNSPECIFIED BACK PAIN LATERALITY: ICD-10-CM

## 2022-04-07 DIAGNOSIS — I27.20 PULMONARY HYPERTENSION (HCC): ICD-10-CM

## 2022-04-07 PROCEDURE — 99214 OFFICE O/P EST MOD 30 MIN: CPT | Performed by: INTERNAL MEDICINE

## 2022-04-07 ASSESSMENT — ENCOUNTER SYMPTOMS
NAUSEA: 0
STRIDOR: 0
CHEST TIGHTNESS: 0
GASTROINTESTINAL NEGATIVE: 1
BLOOD IN STOOL: 0
COUGH: 0
EYES NEGATIVE: 1
SHORTNESS OF BREATH: 1
WHEEZING: 0

## 2022-04-07 NOTE — PROGRESS NOTES
Subsequent Progress Note  Patient: Ngoc Hernández  YOB: 1975  MRN: [id number]    Chief Complaint: Pulm HTN PMR RAE  Chief Complaint   Patient presents with    6 Month Follow-Up    Hypertension     pulm HTN    Shortness of Breath       CV Data:  Remote cath - told negaitve  5/20 spect negative  6/2019 echo EF 60 1+    Subjective/HPI:  Dr. Carmenza French pt. Pt has chronic SOB and weakness. SHe has PMR that limits her. She is minimally active.  She can take 1 flgiht of stairs    Daughter lives with her  Work-  for [employer]  No ETOH.     EKG:    Past Medical History:   Diagnosis Date    Abnormal Pap smear of cervix     Acute midline thoracic back pain 9/18/2018    B12 deficiency     Family history of premature CAD 9/4/2013    Fatty liver     Gastroesophageal reflux disease 1/6/2021    Gastroparesis     HPV (human papilloma virus) anogenital infection     Hyperlipidemia     Hypertension     Irritable bowel syndrome with diarrhea 4/26/2021    Liver hemangioma 2009/2015    Lumbar radiculopathy 7/7/2021    Obesity 3/11/13    BMI 43.42    Orthostatic hypotension     Ovarian cyst     PMR (polymyalgia rheumatica) (HCC)     Rectal fissure     Tobacco abuse 9/3/2015    Tobacco abuse     Tremor     Uncontrolled diabetes mellitus with complications ([facility] [address]     Unspecified sleep apnea        Past Surgical History:   Procedure Laterality Date    CARDIAC CATHETERIZATION  4-07    COLONOSCOPY  2013    for diarrhea (-)    COLONOSCOPY N/A 2/10/2021    COLONOSCOPY DIAGNOSTIC performed by Shandra Chaudhary MD at [facility] 96  12-10    [facility]  1-05    NC MUSCLE BIOPSY Left 9/21/2018    LEFT QUADRICEPS MUSCLE BIOPSY performed by Brenden Vidal MD at [address] UNI/BI CANNULATION N/A 9/18/2018    T 12 VERTEBRALPLASTY ROOM 278 performed by Hiren Hernandez MD at [address] UPPER GASTROINTESTINAL ENDOSCOPY  10/13/15     DR. HERRERA     UPPER GASTROINTESTINAL ENDOSCOPY N/A 2/10/2021    EGD ESOPHAGOGASTRODUODENOSCOPY performed by Raven Hull MD at [facility]         Family History   Problem Relation Age of Onset    Diabetes Father     Heart Disease Mother     Colon Cancer Neg Hx     Cancer Neg Hx        Social History     Socioeconomic History    Marital status:      Spouse name: None    Number of children: 1    Years of education: None    Highest education level: None   Occupational History    None   Tobacco Use    Smoking status: Former Smoker     Packs/day: 1.00     Types: Cigarettes     Quit date: 2017     Years since quittin.2    Smokeless tobacco: Never Used   Vaping Use    Vaping Use: Never used   Substance and Sexual Activity    Alcohol use: Yes     Alcohol/week: 0.0 standard drinks     Comment: socially    Drug use: No    Sexual activity: Yes     Partners: Male     Birth control/protection: Surgical     Comment: single   Other Topics Concern    None   Social History Narrative    Social/Functional History          Social Determinants of Health     Financial Resource Strain: Low Risk     Difficulty of Paying Living Expenses: Not hard at all   Food Insecurity: No Food Insecurity    Worried About Running Out of Food in the Last Year: Never true    Jung of Food in the Last Year: Never true   Transportation Needs: No Transportation Needs    Lack of Transportation (Medical): No    Lack of Transportation (Non-Medical):  No   Physical Activity:     Days of Exercise per Week: Not on file    Minutes of Exercise per Session: Not on file   Stress:     Feeling of Stress : Not on file   Social Connections:     Frequency of Communication with Friends and Family: Not on file    Frequency of Social Gatherings with Friends and Family: Not on file    Attends Judaism Services: Not on file   [employer]s or Organizations: Not on file    Attends Club or Organization Meetings: Not on file    Marital Status: Not on file   Intimate Partner Violence:     Fear of Current or Ex-Partner: Not on file    Emotionally Abused: Not on file    Physically Abused: Not on file    Sexually Abused: Not on file   Housing Stability:     Unable to Pay for Housing in the Last Year: Not on file    Number of Places Lived in the Last Year: Not on file    Unstable Housing in the Last Year: Not on file       Allergies   Allergen Reactions    Morphine And Related Hives and Rash    Ace Inhibitors Other (See Comments)     Dizziness and near syncope    Bactrim [Sulfamethoxazole-Trimethoprim] Itching    Nitroglycerin Other (See Comments)     Migraine    Percocet [Oxycodone-Acetaminophen] Nausea And Vomiting    Victoza [Liraglutide]      NAUSEA       Current Outpatient Medications   Medication Sig Dispense Refill    predniSONE (DELTASONE) 5 MG tablet TAKE 1 TABLET BY MOUTH ONE TIME A DAY 30 tablet 3    Miconazole POWD Apply topically BID 3 each 1    hydroCHLOROthiazide (HYDRODIURIL) 25 MG tablet Take 1 tablet by mouth daily 90 tablet 1    esomeprazole (NEXIUM) 40 MG delayed release capsule TAKE 1 CAPSULE BY MOUTH ONE TIME A DAY 30 capsule 3    esomeprazole (NEXIUM) 40 MG delayed release capsule TAKE 1 CAPSULE BY MOUTH ONE TIME A DAY 30 capsule 3    pioglitazone (ACTOS) 30 MG tablet TAKE 1 TABLET BY MOUTH ONE TIME A DAY 90 tablet 0    gentamicin (GARAMYCIN) 0.1 % ointment APPLY TOPICALLY TWO TIMES A DAY TO OPEN AREA OF LOWER ABDOMEN, THEN INSET DRY GAUZE 4 X 4 OVER OINTMENT. DO NOT USE FOR LONGER THAN 2 WEEKS.  30 g 1    gentamicin (GARAMYCIN) 0.1 % ointment Apply topically daily 1 each 0    CPAP Machine MISC by Does not apply route Change CPAP pressure to 14 cm 1 each 0    metoprolol (LOPRESSOR) 100 MG tablet TAKE 1 TABLET BY MOUTH 2 TIMES A  tablet 1    blood glucose test strips (PRODIGY NO CODING BLOOD GLUC) strip 1  Insulin Pen Needle (PEN NEEDLES) 32G X 4 MM MISC Use as directed with insulin pens, 4 times daily 400 each 1    blood glucose test strips (PRODIGY NO CODING BLOOD GLUC) strip 1 each by In Vitro route 4 times daily As needed. 400 each 3    Blood Glucose Monitoring Suppl (PRODIGY AUTOCODE BLOOD GLUCOSE) w/Device KIT Use as directed to test up to 4 times daily 1 kit 0    miconazole (MICOTIN) 2 % cream Apply topically 2 times daily. 141 g 3    Handicap Placard MISC Exp 5 years 1 each 0     No current facility-administered medications for this visit. Review of Systems:   Review of Systems   Constitutional: Negative. Negative for diaphoresis and fatigue. HENT: Negative. Eyes: Negative. Respiratory: Positive for shortness of breath. Negative for cough, chest tightness, wheezing and stridor. Cardiovascular: Positive for leg swelling. Negative for chest pain and palpitations. Gastrointestinal: Negative. Negative for blood in stool and nausea. Genitourinary: Negative. Musculoskeletal: Negative. Skin: Negative. Neurological: Negative. Negative for dizziness, syncope, weakness and light-headedness. Hematological: Negative. Psychiatric/Behavioral: Negative. Physical Examination:    /82 (Site: Right Upper Arm, Position: Sitting, Cuff Size: Large Adult)   Pulse 78   Resp 18   Ht 5' 6\" (1.676 m)   Wt (!) 308 lb (139.7 kg)   LMP  (LMP Unknown)   SpO2 98%   BMI 49.71 kg/m²    Physical Exam   Constitutional: She appears healthy. No distress. HENT:   Normal cephalic and Atraumatic   Eyes: Pupils are equal, round, and reactive to light. Neck: Thyroid normal. No JVD present. No neck adenopathy. No thyromegaly present. Cardiovascular: Normal rate, regular rhythm, intact distal pulses and normal pulses. Murmur heard. Pulmonary/Chest: Effort normal and breath sounds normal. She has no wheezes. She has no rales. She exhibits no tenderness. Abdominal: Soft.  Bowel sounds are normal. There is no abdominal tenderness. Musculoskeletal:         General: No tenderness or edema. Normal range of motion. Cervical back: Normal range of motion and neck supple. Neurological: She is alert and oriented to person, place, and time. Skin: Skin is warm. No cyanosis. Nails show no clubbing.        LABS:  CBC:   Lab Results   Component Value Date    WBC 9.6 11/01/2021    RBC 4.50 11/01/2021    RBC 4.57 02/22/2012    HGB 11.7 11/01/2021    HCT 36.6 11/01/2021    MCV 81.3 11/01/2021    MCH 26.0 11/01/2021    MCHC 32.0 11/01/2021    RDW 16.8 11/01/2021     11/01/2021    MPV 8.8 10/13/2015     Lipids:  Lab Results   Component Value Date    CHOL 117 08/23/2021    CHOL 127 01/26/2021    CHOL 128 03/19/2019     Lab Results   Component Value Date    TRIG 134 08/23/2021    TRIG 137 01/26/2021    TRIG 252 (H) 03/19/2019     Lab Results   Component Value Date    HDL 34 (L) 08/23/2021    HDL 35 (L) 01/26/2021    HDL 33 (L) 03/19/2019     Lab Results   Component Value Date    LDLCALC 56 08/23/2021    LDLCALC 65 01/26/2021    LDLCALC 45 03/19/2019     No results found for: LABVLDL, VLDL  Lab Results   Component Value Date    CHOLHDLRATIO 4.7 02/25/2013    CHOLHDLRATIO 5.1 08/15/2012    CHOLHDLRATIO 5.3 05/31/2012     CMP:    Lab Results   Component Value Date     12/13/2021    K 3.9 12/13/2021    K 3.7 09/18/2018    CL 96 12/13/2021    CO2 26 12/13/2021    BUN 13 12/13/2021    CREATININE 0.71 12/13/2021    GFRAA >60.0 12/13/2021    LABGLOM >60.0 12/13/2021    GLUCOSE 156 12/13/2021    GLUCOSE 262 05/31/2012    PROT 7.7 11/01/2021    LABALBU 4.2 11/01/2021    LABALBU 4.4 05/31/2012    CALCIUM 9.4 12/13/2021    BILITOT 0.3 11/01/2021    ALKPHOS 79 11/01/2021    AST 15 11/01/2021    ALT 17 11/01/2021     BMP:    Lab Results   Component Value Date     12/13/2021    K 3.9 12/13/2021    K 3.7 09/18/2018    CL 96 12/13/2021    CO2 26 12/13/2021    BUN 13 12/13/2021    LABALBU 4.2 11/01/2021    LABALBU 4.4 05/31/2012    CREATININE 0.71 12/13/2021    CALCIUM 9.4 12/13/2021    GFRAA >60.0 12/13/2021    LABGLOM >60.0 12/13/2021    GLUCOSE 156 12/13/2021    GLUCOSE 262 05/31/2012     Magnesium:    Lab Results   Component Value Date    MG 1.7 08/02/2021    MG 1.6 11/21/2011     TSH:  Lab Results   Component Value Date    TSH 1.760 06/18/2018       Patient Active Problem List   Diagnosis    Orthostatic hypotension    Cobalamin deficiency    HPV (human papilloma virus) anogenital infection    Cyst of ovary    Obesity due to excess calories, unspecified obesity severity    RAE (obstructive sleep apnea)    Dyslipidemia    FH: premature coronary heart disease    Adult body mass index 40 and over    Pulmonary hypertension (HCC)    Herpes simplex type 1 infection    Tobacco user    Type 2 diabetes mellitus with complication, with long-term current use of insulin (HCC)    Pruritus of vagina    Vaginal irritation    Paraparesis (HCC)    Compression fracture of lumbar vertebra (HCC)    Acute thoracic back pain    Muscle weakness    Muscle pain    Fracture of first lumbar vertebra (HCC)    PMR (polymyalgia rheumatica) (HCC)    Disorder of muscle, unspecified    Diarrhea    Nausea and vomiting    Gastroesophageal reflux disease    Gastritis without bleeding    Polyp of colon    Gastroparesis    Irritable bowel syndrome with diarrhea    Wound drainage    Lumbar radiculopathy    Erythema intertrigo    Open wound of abdomen    Splenomegaly       There are no discontinued medications. Modified Medications    No medications on file       No orders of the defined types were placed in this encounter. Assessment/Plan:    1. Dyslipidemia  Statin. Low fat diet. 2. Pulmonary hypertension ([address]   recheck Echo     3. Acute thoracic back pain, unspecified back pain laterality       4.  Type 2 diabetes mellitus with complication, with long-term current use of insulin ([address] 5. Hypertension, unspecified type  Continue meds. Low salt diet     6. NEWMAN (dyspnea on exertion)       7. Leg edema   compression stockings. No edema today. Counseling:  Heart Healthy Lifestyle, Improve BMI, Low Salt Diet, Take Precautions to Prevent Falls and Walk Daily    Return in about 6 months (around 10/7/2022).       Electronically signed by France Carl MD on 4/7/2022 at 11:48 AM

## 2022-04-12 ENCOUNTER — HOSPITAL ENCOUNTER (OUTPATIENT)
Dept: NON INVASIVE DIAGNOSTICS | Age: 47
Discharge: HOME | End: 2022-04-12
Payer: COMMERCIAL

## 2022-04-12 DIAGNOSIS — R06.09 DOE (DYSPNEA ON EXERTION): ICD-10-CM

## 2022-04-12 DIAGNOSIS — I27.20 PULMONARY HYPERTENSION (HCC): ICD-10-CM

## 2022-04-12 LAB
LV EF: 50 %
LVEF MODALITY: NORMAL

## 2022-04-12 PROCEDURE — 93306 TTE W/DOPPLER COMPLETE: CPT

## 2022-04-13 ENCOUNTER — OFFICE VISIT (OUTPATIENT)
Dept: PULMONOLOGY | Age: 47
End: 2022-04-13
Payer: COMMERCIAL

## 2022-04-13 VITALS
DIASTOLIC BLOOD PRESSURE: 70 MMHG | TEMPERATURE: 98.2 F | WEIGHT: 293 LBS | HEIGHT: 66 IN | HEART RATE: 69 BPM | OXYGEN SATURATION: 95 % | BODY MASS INDEX: 47.09 KG/M2 | SYSTOLIC BLOOD PRESSURE: 128 MMHG

## 2022-04-13 DIAGNOSIS — G47.33 OBSTRUCTIVE SLEEP APNEA SYNDROME: Primary | ICD-10-CM

## 2022-04-13 DIAGNOSIS — E66.01 MORBID OBESITY (HCC): ICD-10-CM

## 2022-04-13 PROCEDURE — 99214 OFFICE O/P EST MOD 30 MIN: CPT | Performed by: INTERNAL MEDICINE

## 2022-04-13 ASSESSMENT — ENCOUNTER SYMPTOMS
NAUSEA: 0
EYE ITCHING: 0
WHEEZING: 0
EYE DISCHARGE: 0
ABDOMINAL PAIN: 0
SORE THROAT: 0
TROUBLE SWALLOWING: 0
RHINORRHEA: 0
SINUS PRESSURE: 0
COUGH: 0
VOICE CHANGE: 0
DIARRHEA: 0
SHORTNESS OF BREATH: 0
CHEST TIGHTNESS: 0
VOMITING: 0

## 2022-04-13 NOTE — PROGRESS NOTES
Subjective:     Ladonna Ramey is a 40 y.o. female who complains today of:     Chief Complaint   Patient presents with    Sleep Apnea     6 month f/u       HPI  She is using CPAP with  8 centimeters of H2O with heated humidity. She is using CPAP for about  7-8  hours every night. She is using CPAP with  Nasal  Mask. She said  sleep is restful with the CPAP use. She is compliant with CPAP therapy and benefiting with CPAP use. No snoring with CPAP use. No complaint of daytime sleepiness or tiredness with CPAP use. She denies taking naps. No sleepiness with driving. She denies difficulty falling asleep or staying asleep. I reviewed compliance report with patient regarding CPAP therapy. She is using  CPAP for 30 days out of 30 days. Average usage of days used is 7  hours and 30  min , average AHI 0.6 with CPAP use. She is on chronic prednisone 5 mg daily for polymyalgia rheumatica.     Allergies:  Morphine and related, Ace inhibitors, Bactrim [sulfamethoxazole-trimethoprim], Nitroglycerin, Percocet [oxycodone-acetaminophen], and Victoza [liraglutide]  Past Medical History:   Diagnosis Date    Abnormal Pap smear of cervix     Acute midline thoracic back pain 9/18/2018    B12 deficiency     Family history of premature CAD 9/4/2013    Fatty liver     Gastroesophageal reflux disease 1/6/2021    Gastroparesis     HPV (human papilloma virus) anogenital infection     Hyperlipidemia     Hypertension     Irritable bowel syndrome with diarrhea 4/26/2021    Liver hemangioma 2009/2015    Lumbar radiculopathy 7/7/2021    Obesity 3/11/13    BMI 43.42    Orthostatic hypotension     Ovarian cyst     PMR (polymyalgia rheumatica) (HCC)     Rectal fissure     Tobacco abuse 9/3/2015    Tobacco abuse     Tremor     Uncontrolled diabetes mellitus with complications ([facility] [address]     Unspecified sleep apnea      Past Surgical History:   Procedure Laterality Date    CARDIAC CATHETERIZATION  4-07    COLONOSCOPY      for diarrhea (-)    COLONOSCOPY N/A 2/10/2021    COLONOSCOPY DIAGNOSTIC performed by Raven Hull MD at [facility] 96  12-10    [facility]  1-05    [place] MUSCLE BIOPSY Left 2018    LEFT QUADRICEPS MUSCLE BIOPSY performed by Candee Riedel, MD at [facility] UNI/BI CANNULATION N/A 2018    T 12 VERTEBRALPLASTY ROOM 278 performed by Dyanne Rubinstein, MD at [facility] ENDOSCOPY  10/13/15     DR. HERRERA     UPPER GASTROINTESTINAL ENDOSCOPY N/A 2/10/2021    EGD ESOPHAGOGASTRODUODENOSCOPY performed by Raven Hull [facility] at [facility]       Family History   Problem Relation Age of Onset    Diabetes Father     Heart Disease Mother     Colon Cancer Neg Hx     Cancer Neg Hx      Social History     Socioeconomic History    Marital status:      Spouse name: Not on file    Number of children: 1    Years of education: Not on file    Highest education level: Not on file   Occupational History    Not on file   Tobacco Use    Smoking status: Former Smoker     Packs/day: 1.00     Types: Cigarettes     Quit date: 2017     Years since quittin.2    Smokeless tobacco: Never Used   Vaping Use    Vaping Use: Never used   Substance and Sexual Activity    Alcohol use:  Yes     Alcohol/week: 0.0 standard drinks     Comment: socially    Drug use: No    Sexual activity: Yes     Partners: Male     Birth control/protection: Surgical     Comment: single   Other Topics Concern    Not on file   Social History Narrative    Social/Functional History          Social Determinants of Health     Financial Resource Strain: Low Risk     Difficulty of Paying Living Expenses: Not hard at all   Food Insecurity: No Food Insecurity    Worried About [address] in the Last Year: Never true    [address] in the Last Year: Never true   Transportation Needs: No Transportation Needs    Lack of Transportation (Medical): No    Lack of Transportation (Non-Medical): No   Physical Activity:     Days of Exercise per Week: Not on file    Minutes of Exercise per Session: Not on file   Stress:     Feeling of Stress : Not on file   Social Connections:     Frequency of Communication with Friends and Family: Not on file    Frequency of Social Gatherings with Friends and Family: Not on file    Attends Zoroastrian Services: Not on file    Active Member of [address] [place] [address] or Organizations: Not on file    Attends Club or Organization Meetings: Not on file    Marital Status: Not on file   Intimate Partner Violence:     Fear of Current or Ex-Partner: Not on file    Emotionally Abused: Not on file    Physically Abused: Not on file    Sexually Abused: Not on file   Housing Stability:     Unable to Pay for Housing in the Last Year: Not on file    Number of [place] in the Last Year: Not on file    Unstable Housing in the Last Year: Not on file         Review of Systems   Constitutional: Negative for chills, diaphoresis, fatigue and fever. HENT: Negative for congestion, mouth sores, nosebleeds, postnasal drip, rhinorrhea, sinus pressure, sneezing, sore throat, trouble swallowing and voice change. Eyes: Negative for discharge, itching and visual disturbance. Respiratory: Negative for cough, chest tightness, shortness of breath and wheezing. Cardiovascular: Negative for chest pain, palpitations and leg swelling. Gastrointestinal: Negative for abdominal pain, diarrhea, nausea and vomiting. Genitourinary: Negative for difficulty urinating and hematuria. Musculoskeletal: Negative for arthralgias, joint swelling and myalgias. Skin: Negative for rash. Allergic/Immunologic: Negative for environmental allergies and food allergies. Neurological: Negative for dizziness, tremors, weakness and headaches.    Psychiatric/Behavioral: Positive for sleep disturbance. Negative for behavioral problems. :     Vitals:    04/13/22 1141   BP: 128/70   Pulse: 69   Temp: 98.2 °F (36.8 °C)   SpO2: 95%   Weight: (!) 309 lb (140.2 kg)   Height: 5' 6\" (1.676 m)     Wt Readings from Last 3 Encounters:   04/13/22 (!) 309 lb (140.2 kg)   04/07/22 (!) 308 lb (139.7 kg)   03/16/22 (!) 304 lb (137.9 kg)         Physical Exam  Constitutional:       General: She is not in acute distress. Appearance: She is well-developed. She is obese. She is not diaphoretic. HENT:      Head: Normocephalic and atraumatic. Nose: Nose normal.   Eyes:      Pupils: Pupils are equal, round, and reactive to light. Neck:      Thyroid: No thyromegaly. Vascular: No JVD. Trachea: No tracheal deviation. Cardiovascular:      Rate and Rhythm: Normal rate and regular rhythm. Heart sounds: No murmur heard. No friction rub. No gallop. Pulmonary:      Effort: No respiratory distress. Breath sounds: No wheezing or rales. Chest:      Chest wall: No tenderness. Abdominal:      General: There is no distension. Tenderness: There is no abdominal tenderness. There is no rebound. Musculoskeletal:         General: Normal range of motion. Lymphadenopathy:      Cervical: No cervical adenopathy. Skin:     General: Skin is warm and dry. Neurological:      Mental Status: She is alert and oriented to person, place, and time.       Coordination: Coordination normal.         Current Outpatient Medications   Medication Sig Dispense Refill    predniSONE (DELTASONE) 5 MG tablet TAKE 1 TABLET BY MOUTH ONE TIME A DAY 30 tablet 3    Miconazole POWD Apply topically BID 3 each 1    hydroCHLOROthiazide (HYDRODIURIL) 25 MG tablet Take 1 tablet by mouth daily 90 tablet 1    esomeprazole (NEXIUM) 40 MG delayed release capsule TAKE 1 CAPSULE BY MOUTH ONE TIME A DAY 30 capsule 3    esomeprazole (NEXIUM) 40 MG delayed release capsule TAKE 1 CAPSULE BY MOUTH ONE TIME A DAY 30 capsule 3    pioglitazone (ACTOS) 30 MG tablet TAKE 1 TABLET BY MOUTH ONE TIME A DAY 90 tablet 0    gentamicin (GARAMYCIN) 0.1 % ointment APPLY TOPICALLY TWO TIMES A DAY TO OPEN AREA OF LOWER ABDOMEN, THEN INSET DRY GAUZE 4 X 4 OVER OINTMENT. DO NOT USE FOR LONGER THAN 2 WEEKS. 30 g 1    gentamicin (GARAMYCIN) 0.1 % ointment Apply topically daily 1 each 0    CPAP Machine MISC by Does not apply route Change CPAP pressure to 14 cm 1 each 0    metoprolol (LOPRESSOR) 100 MG tablet TAKE 1 TABLET BY MOUTH 2 TIMES A  tablet 1    blood glucose test strips (PRODIGY NO CODING BLOOD GLUC) strip 1 each by In Vitro route 4 times daily As needed. 400 each 3    nystatin (NYAMYC) 239506 UNIT/GM powder APPLY TOPICALLY THREE TIMES A DAY 15 g 2    Prodigy Lancets 28G MISC Use as directed to test up to 4 times daily 400 each 3    erythromycin base (E-MYCIN) 250 MG tablet TAKE 1 TABLET BY MOUTH 3 TIMES A DAY 90 tablet 3    baclofen (LIORESAL) 10 MG tablet TAKE ONE-HALF TABLET BY MOUTH TWICE A DAY 90 tablet 1    rosuvastatin (CRESTOR) 10 MG tablet TAKE ONE TABLET BY MOUTH NIGHTLY 90 tablet 1    clotrimazole-betamethasone (LOTRISONE) 1-0.05 % cream APPLY TOPICALLY TWO TIMES A DAY 45 g 3    Insulin Glargine, 2 Unit Dial, (TOUJEO MAX SOLOSTAR) 300 UNIT/ML SOPN 150 units at bedtime 90 pen 3    nystatin-triamcinolone (MYCOLOG II) 752638-6.1 UNIT/GM-% cream Apply topically 2 times daily.  30 g 0    losartan (COZAAR) 50 MG tablet Take 1 tablet by mouth daily 90 tablet 1    Continuous Blood Gluc Sensor (FREESTYLE JAIRO 14 DAY SENSOR) MISC CHANGE EVERY 14 DAYS 6 each 3    CPAP Machine MISC New CPAP with 8 cm and supply 1 each 0    venlafaxine (EFFEXOR XR) 75 MG extended release capsule Take 1 capsule by mouth daily TAKE TWO CAPSULES BY MOUTH EVERY MORNING AND TAKE ONE CAPSULE BY MOUTH EVERY EVENING 270 capsule 1    solifenacin (VESICARE) 10 MG tablet TAKE 1 TABLET BY MOUTH ONE TIME A DAY 90 tablet 3    insulin lispro, 1 Unit Dial, (HUMALOG KWIKPEN) 100 UNIT/ML SOPN INJECT 80 UNITS UNDER THE SKIN WITH EACH MEAL (Patient taking differently: INJECT 40 UNITS UNDER THE SKIN WITH EACH MEAL) 90 pen 3    metFORMIN (GLUCOPHAGE) 500 MG tablet TAKE 1 TABLET BY MOUTH 3 TIMES A  tablet 3    ondansetron (ZOFRAN ODT) 4 MG disintegrating tablet Take 1 tablet by mouth every 8 hours as needed for Nausea or Vomiting 60 tablet 3    metoclopramide (REGLAN) 10 MG tablet Take 1 tablet by mouth 3 times daily (with meals) 360 tablet 3    Continuous Blood Gluc  (FREESTYLE JAIRO 14 DAY READER) ALEXANDREA As directed 1 Device 00    Insulin Pen Needle (PEN NEEDLES) 32G X 4 MM MISC Use as directed with insulin pens, 4 times daily 400 each 1    blood glucose test strips (PRODIGY NO CODING BLOOD GLUC) strip 1 each by In Vitro route 4 times daily As needed. 400 each 3    Blood Glucose Monitoring Suppl (PRODIGY AUTOCODE BLOOD GLUCOSE) w/Device KIT Use as directed to test up to 4 times daily 1 kit 0    miconazole (MICOTIN) 2 % cream Apply topically 2 times daily. 141 g 3    Handicap Placard MISC Exp 5 years 1 each 0     No current facility-administered medications for this visit. No results found for this or any previous visit.  ]  Results for orders placed during the hospital encounter of 05/22/21    XR CHEST PORTABLE    Narrative  XR CHEST PORTABLE    Clinical History:  Shortness of breath. Dizziness. Comparison:  4/26/2020    RESULT:    No consolidation. No pleural effusion. No pneumothorax. Normal pulmonary vascular pattern. Normal cardiomediastinal silhouette. No acute osseous findings. Impression  No acute radiographic abnormality. Results for orders placed during the hospital encounter of 04/26/20    XR CHEST PORTABLE    Narrative  EXAMINATION: XR CHEST PORTABLE. DATE AND TIME:4/26/2020 10:45 PM    CLINICAL HISTORY: Chest without acute chest pain.    chest pain    COMPARISONS: February 9, 2019    FINDINGS: The heart, mediastinum and pulmonary vasculature are within normal limits. Visualized lung fields are clear. Bones unremarkable. Impression  NO  ACTIVE LUNG DISEASE. Results for orders placed during the hospital encounter of 02/09/19    XR CHEST PORTABLE    Narrative  EXAMINATION: CHEST PORTABLE VIEW    CLINICAL HISTORY: Midsternal chest pain    COMPARISONS: January 22, 2017    FINDINGS:    Single  views of the chest is submitted. The cardiac silhouette is enlarged. Unchanged. .  The mediastinum is unremarkable. Pulmonary vascular unremarkable. Right sided trachea. No focal infiltrates. No Pneumothoraces. Impression  NO ACUTE ACTIVE CARDIOPULMONARY PROCESS      Assessment/Plan:     1. Obstructive sleep apnea syndrome  She is using CPAP with  8 centimeters of H2O with heated humidity. She is using CPAP for about  7-8  hours every night. She is using CPAP with  Nasal  Mask. She said  sleep is restful with the CPAP use. She is compliant with CPAP therapy and benefiting with CPAP use. No snoring with CPAP use. No complaint of daytime sleepiness or tiredness with CPAP use. I reviewed compliance report with patient regarding CPAP therapy. She is using  CPAP for 30 days out of 30 days. Average usage of days used is 7  hours and 30  min , average AHI 0.6 with CPAP use. Counseling: CPAP/BiPAP uses, She advised to use CPAP at least 5-6 hours every night. Driving: She is advised for extreme caution when driving or operating machinery if there is a feeling of drowsiness, especially while driving it is preferable to stop driving and take a brief nap. Sleep hygiene:Avoid supine sleep, sleep on  sides. Avoid  sleep deprivation. Explained sleep hygiene. Advice to avoid Alcohol and sedative    Time spend over 30 min. Face to face. with greater than 50 % time with CPAP therapy including review compliance, counseling and advised regarding CPAP therapy. 2. Morbid obesity ([address]  She is advised try to lose weight.  obesity related risk explained to the patient ,  Current weight:  (!) 309 lb (140.2 kg) Lbs. BMI:  Body mass index is 49.87 kg/m². Suggested weight control approaches, including dietary changes , exercise, behavioral modification. Return in about 6 months (around 10/13/2022) for timbo.       Keesha Anthony MD

## 2022-04-14 DIAGNOSIS — R79.89 LOW TSH LEVEL: Primary | ICD-10-CM

## 2022-04-14 DIAGNOSIS — R11.0 NAUSEA: ICD-10-CM

## 2022-04-14 DIAGNOSIS — K31.84 GASTROPARESIS: ICD-10-CM

## 2022-04-15 RX ORDER — ERYTHROMYCIN 250 MG/1
TABLET, COATED ORAL
Qty: 90 TABLET | Refills: 3 | Status: SHIPPED | OUTPATIENT
Start: 2022-04-15 | End: 2022-07-25

## 2022-04-15 RX ORDER — VENLAFAXINE HYDROCHLORIDE 75 MG/1
CAPSULE, EXTENDED RELEASE ORAL
Qty: 270 CAPSULE | Refills: 1 | Status: SHIPPED | OUTPATIENT
Start: 2022-04-15 | End: 2022-08-25

## 2022-04-15 RX ORDER — ONDANSETRON 4 MG/1
TABLET, ORALLY DISINTEGRATING ORAL
Qty: 60 TABLET | Refills: 3 | Status: SHIPPED | OUTPATIENT
Start: 2022-04-15

## 2022-04-20 NOTE — TELEPHONE ENCOUNTER
Requested Prescriptions     Pending Prescriptions Disp Refills    clotrimazole-betamethasone (LOTRISONE) 1-0.05 % cream [Pharmacy Med Name: Radha Don 1-0.05 CREA] 45 g 3     Sig: APPLY TO AFFECTED AREA(S) TOPICALLY TWO TIMES A DAY

## 2022-04-22 RX ORDER — CLOTRIMAZOLE AND BETAMETHASONE DIPROPIONATE 10; .64 MG/G; MG/G
CREAM TOPICAL
Qty: 45 G | Refills: 3 | Status: SHIPPED | OUTPATIENT
Start: 2022-04-22 | End: 2022-07-27

## 2022-04-25 RX ORDER — CLOTRIMAZOLE AND BETAMETHASONE DIPROPIONATE 10; .64 MG/G; MG/G
CREAM TOPICAL
Qty: 45 G | Refills: 3 | OUTPATIENT
Start: 2022-04-25

## 2022-04-26 LAB
CHOLESTEROL, TOTAL: 136 MG/DL (ref 0–199)
GLUCOSE BLD-MCNC: 207 MG/DL (ref 70–99)
HBA1C MFR BLD: 8.2 % (ref 4.8–5.9)
HDLC SERPL-MCNC: 38 MG/DL (ref 40–59)
LDL CHOLESTEROL CALCULATED: 66 MG/DL (ref 0–129)
TRIGL SERPL-MCNC: 162 MG/DL (ref 0–150)

## 2022-04-27 ENCOUNTER — OFFICE VISIT (OUTPATIENT)
Dept: GASTROENTEROLOGY | Age: 47
End: 2022-04-27
Payer: COMMERCIAL

## 2022-04-27 VITALS — DIASTOLIC BLOOD PRESSURE: 70 MMHG | HEART RATE: 91 BPM | OXYGEN SATURATION: 96 % | SYSTOLIC BLOOD PRESSURE: 126 MMHG

## 2022-04-27 DIAGNOSIS — K31.84 GASTROPARESIS: Primary | ICD-10-CM

## 2022-04-27 PROCEDURE — 99213 OFFICE O/P EST LOW 20 MIN: CPT | Performed by: NURSE PRACTITIONER

## 2022-04-27 ASSESSMENT — ENCOUNTER SYMPTOMS
NAUSEA: 1
ANAL BLEEDING: 0
CHEST TIGHTNESS: 0
VOMITING: 0
COLOR CHANGE: 0
CONSTIPATION: 0
ABDOMINAL DISTENTION: 0
RECTAL PAIN: 0
PHOTOPHOBIA: 0
SHORTNESS OF BREATH: 0
WHEEZING: 0
DIARRHEA: 0
BLOOD IN STOOL: 0
EYE REDNESS: 0
TROUBLE SWALLOWING: 0
VOICE CHANGE: 0
EYE PAIN: 0
ABDOMINAL PAIN: 0

## 2022-04-27 NOTE — PROGRESS NOTES
Subjective:      Patient ID: Salud Gray is a 40 y.o. female who presents today for:  Chief Complaint   Patient presents with    Follow-up     Gastroparesis        HPI   Patient came in as follow-up and further management of gastroparesis, at baseline has been maintained on erythromycin 2-3 times a day on 3 weeks off for 2 weeks, has previously failed Reglan. Uses Zofran as needed averages 1 to 2 days a month. Symptoms are well controlled, no recent nausea, vomiting, or hospitalizations. Recent EGD noted mild gastritis otherwise normal, biopsies were negative for HP. Has no new complaints or concerns, no melena or hematochezia. OV 8/24/21  Patient came in today as follow-up and further evaluation of gastroparesis, was recently in the emergency department for exacerbated symptoms including nausea vomiting and dehydration, currently she is asymptomatic. Patient carries a diagnosis of diabetic gastroparesis well controlled with erythromycin 2-3 times a day. Has previously failed Reglan. While hospitalized had CT of the abdomen and pelvis that noted splenomegaly, spleen is 14.6 cm. Patient is asymptomatic, has normal liver enzymes, no history of chronic liver disease.   Monospot was negative  Background  Patient came in today to establish GI care for complaints of nausea, vomiting, and diarrhea.  This episode of symptoms started approximately 3 weeks ago, no sick contacts, recent travel, eating out, or fever or chills.  Patient reports a longstanding history of intermittent cyclical type of episodes since 2015.  Was previously diagnosed with diabetic gastroparesis however her gastric emptying study was normal and was previously on erythromycin as treatment with good results. Nadya Robins has a longstanding history of GERD that is not well controlled on current regimen of Prilosec.  Most recent EGD in 2015 by Dr. Harpal Heath noted gastritis no path is available, and colonoscopy in 2013 by Dr Olman Corey essentially normal.  She denies hematemesis, melena, or hematochezia.  Abdominal pain, or unintentional weight loss.  No family history of CRC, not on anticoagulants or antiplatelet        Past Medical History:   Diagnosis Date    Abnormal Pap smear of cervix     Acute midline thoracic back pain 9/18/2018    B12 deficiency     Family history of premature CAD 9/4/2013    Fatty liver     Gastroesophageal reflux disease 1/6/2021    Gastroparesis     HPV (human papilloma virus) anogenital infection     Hyperlipidemia     Hypertension     Irritable bowel syndrome with diarrhea 4/26/2021    Liver hemangioma 2009/2015    Lumbar radiculopathy 7/7/2021    Obesity 3/11/13    BMI 43.42    Orthostatic hypotension     Ovarian cyst     PMR (polymyalgia rheumatica) (HCC)     Rectal fissure     Tobacco abuse 9/3/2015    Tobacco abuse     Tremor     Uncontrolled diabetes mellitus with complications ([facility] [address]     Unspecified sleep apnea      Past Surgical History:   Procedure Laterality Date    CARDIAC CATHETERIZATION  4-07    COLONOSCOPY  2013    for diarrhea (-)    COLONOSCOPY N/A 2/10/2021    COLONOSCOPY DIAGNOSTIC performed by Rosemarie Best MD at [place] [id number]  12-10    [facility]  1-05    IA MUSCLE BIOPSY Left 9/21/2018    LEFT QUADRICEPS MUSCLE BIOPSY performed by Birgit Moreno MD at [facility] UNI/BI CANNULATION N/A 9/18/2018    T 12 VERTEBRALPLASTY ROOM 278 performed by Roberth Tavarez MD at [facility] ENDOSCOPY  10/13/15     DR. HERRERA     UPPER GASTROINTESTINAL ENDOSCOPY N/A 2/10/2021    EGD ESOPHAGOGASTRODUODENOSCOPY performed by Rosemarie Best MD at [facility]  6-2015     Social History     Socioeconomic History    Marital status:      Spouse name: Not on file    Number of children: 1    Years of education: Not on file    Highest education level: Not on file   Occupational History    Not on file   Tobacco Use    Smoking status: Former Smoker     Packs/day: 1.00     Types: Cigarettes     Quit date: 2017     Years since quittin.3    Smokeless tobacco: Never Used   Vaping Use    Vaping Use: Never used   Substance and Sexual Activity    Alcohol use: Yes     Alcohol/week: 0.0 standard drinks     Comment: socially    Drug use: No    Sexual activity: Yes     Partners: Male     Birth control/protection: Surgical     Comment: single   Other Topics Concern    Not on file   Social History Narrative    Social/Functional History          Social Determinants of Health     Financial Resource Strain: Low Risk     Difficulty of Paying Living Expenses: Not hard at all   Food Insecurity: No Food Insecurity    Worried About Running Out of Food in the Last Year: Never true    Jung of Food in the Last Year: Never true   Transportation Needs: No Transportation Needs    Lack of Transportation (Medical): No    Lack of Transportation (Non-Medical):  No   Physical Activity:     Days of Exercise per Week: Not on file    Minutes of Exercise per Session: Not on file   Stress:     Feeling of Stress : Not on file   Social Connections:     Frequency of Communication with Friends and Family: Not on file    Frequency of Social Gatherings with Friends and Family: Not on file    Attends Hoahaoism Services: Not on file    Active Member of Clubs or Organizations: Not on file    Attends Club or Organization Meetings: Not on file    Marital Status: Not on file   Intimate Partner Violence:     Fear of Current or Ex-Partner: Not on file    Emotionally Abused: Not on file    Physically Abused: Not on file    Sexually Abused: Not on file   Housing Stability:     Unable to Pay for Housing in the Last Year: Not on file    Number of [place] in the Last Year: Not on file    Unstable Housing in the Last Year: Not on file     Family History Problem Relation Age of Onset    Diabetes Father     Heart Disease Mother     Colon Cancer Neg Hx     Cancer Neg Hx      Allergies   Allergen Reactions    Morphine And Related Hives and Rash    Ace Inhibitors Other (See Comments)     Dizziness and near syncope    Bactrim [Sulfamethoxazole-Trimethoprim] Itching    Nitroglycerin Other (See Comments)     Migraine    Percocet [Oxycodone-Acetaminophen] Nausea And Vomiting    Victoza [Liraglutide]      NAUSEA         Review of Systems   Constitutional: Negative for appetite change, chills, fever and unexpected weight change. HENT: Negative for nosebleeds, tinnitus, trouble swallowing and voice change. Eyes: Negative for photophobia, pain and redness. Respiratory: Negative for chest tightness, shortness of breath and wheezing. Cardiovascular: Negative for chest pain, palpitations and leg swelling. Gastrointestinal: Positive for nausea. Negative for abdominal distention, abdominal pain, anal bleeding, blood in stool, constipation, diarrhea, rectal pain and vomiting. Endocrine: Negative for polydipsia, polyphagia and polyuria. Genitourinary: Negative for difficulty urinating and hematuria. Skin: Negative for color change, pallor and rash. Neurological: Negative for dizziness, speech difficulty and headaches. Psychiatric/Behavioral: Negative for confusion and suicidal ideas. Objective:   /70 (Site: Right Upper Arm, Position: Sitting, Cuff Size: Large Adult)   Pulse 91   LMP  (LMP Unknown)   SpO2 96%     Physical Exam  Vitals reviewed. Constitutional:       General: She is not in acute distress. Appearance: Normal appearance. She is well-developed and well-groomed. HENT:      Head: Normocephalic and atraumatic. Nose: Nose normal.   Eyes:      General: No scleral icterus. Extraocular Movements: Extraocular movements intact.       Conjunctiva/sclera: Conjunctivae normal.      Pupils: Pupils are equal, round, and reactive to light. Cardiovascular:      Rate and Rhythm: Normal rate and regular rhythm. Pulses: Normal pulses. Heart sounds: Normal heart sounds. Pulmonary:      Effort: Pulmonary effort is normal. No respiratory distress. Breath sounds: Normal breath sounds. No wheezing or rales. Abdominal:      General: Abdomen is flat. Bowel sounds are normal. There is no distension. Palpations: Abdomen is soft. There is no hepatomegaly, splenomegaly or mass. Tenderness: There is no abdominal tenderness. There is no guarding or rebound. Musculoskeletal:         General: No tenderness or deformity. Normal range of motion. Cervical back: Neck supple. Right lower leg: No edema. Left lower leg: No edema. Skin:     General: Skin is warm and dry. Capillary Refill: Capillary refill takes less than 2 seconds. Coloration: Skin is not jaundiced. Findings: No erythema or rash. Neurological:      General: No focal deficit present. Mental Status: She is alert and oriented to person, place, and time. Psychiatric:         Mood and Affect: Mood normal.         Behavior: Behavior normal.         Laboratory, Pathology, Radiology reviewed in detail with relevantimportant investigations summarized below:  Lab Results   Component Value Date    WBC 9.6 11/01/2021    WBC 11.9 09/17/2021    WBC 8.5 08/11/2021    WBC 14.0 08/02/2021    WBC 8.7 05/22/2021    HGB 11.7 11/01/2021    HGB 12.8 09/17/2021    HGB 12.2 08/11/2021    HGB 13.4 08/02/2021    HGB 11.7 05/22/2021    HCT 36.6 11/01/2021    HCT 39.0 09/17/2021    HCT 38.3 08/11/2021    HCT 40.9 08/02/2021    HCT 35.2 05/22/2021    MCV 81.3 11/01/2021    MCV 79.0 09/17/2021    MCV 80.2 08/11/2021    MCV 78.5 08/02/2021    MCV 79.7 05/22/2021     11/01/2021     09/17/2021     08/11/2021     08/02/2021     05/22/2021    .   Lab Results   Component Value Date    ALT 17 11/01/2021    ALT 21 09/17/2021    ALT 20 08/11/2021    AST 15 11/01/2021    AST 21 09/17/2021    AST 14 08/11/2021    ALKPHOS 79 11/01/2021    ALKPHOS 105 09/17/2021    ALKPHOS 88 08/11/2021    BILITOT 0.3 11/01/2021    BILITOT 0.5 09/17/2021    BILITOT 0.5 08/11/2021       Echo 2D w doppler w color complete    Result Date: 4/12/2022  Transthoracic Echocardiography Report (TTE)  Demographics   Patient Name  [facility]          Gender             Female                Wil Guadarrama   Patient       [id number]            Race                 Number                                   Ethnicity   Visit Number  [id number]         Room Number        OP   Corporate ID                    Date of Study      04/12/2022   Accession     [id number]        Referring          Tonya Breaux MD  Number                          Physician   Date of Birth 1975        Sonographer        Josue Tijerina LPN   Age           40 year(s)        Interpreting       [facility]) Cardiology                                  Physician          Sharon Shine  Procedure Type of Study   TTE procedure:ECHO COMPLETE 2D W/DOP W/COLOR. Procedure Date Date: 04/12/2022 Start: 02:34 PM Study Location: [facility] Technical Quality: Adequate visualization Indications:Shortness of breath. Patient Status: Routine Height: 66 inches Weight: 308 pounds BSA: 2.4 m^2 BMI: 49.71 kg/m^2 BP: 138/66 mmHg  Conclusions   Summary  Left ventricular ejection fraction is estimated at 50%. Mild concentric left venticular hypertrophy noted. No hemodynamic evidence of significant valve disease   Signature   ----------------------------------------------------------------  Electronically signed by Hillary Moser(Interpreting physician)  on 04/12/2022 03:45 PM  ----------------------------------------------------------------   Findings  Left Ventricle Left ventricular ejection fraction is estimated at 50%.  Left ventricular size is normal . Mild concentric left venticular hypertrophy noted. Right Ventricle Normal right ventricle structure and function. Left Atrium Normal left atrium. Right Atrium Normal right atrium. Mitral Valve Normal mitral valve structure and function. Tricuspid Valve Normal tricuspid valve structure and function. Aortic Valve Normal aortic valve structure and function. Pulmonic Valve Normal pulmonic valve structure and function. Aorta \ Miscellaneous The aorta is within normal limits. M-Mode Measurements (cm)   LVIDd: 4.09 cm                         LVIDs: 2.98 cm  IVSd: 1.38 cm                          IVSs: 1.34 cm  LVPWd: 1.81 cm                         LVPWs: 1.31 cm  Rt. Vent.  Dimension: 2.32 cm           AO Root Dimension: 2.96 cm                                         ACS: 1.9 cm                                         LA: 4.38 cm                                         LVOT: 2.15 cm  Doppler Measurements:   AV Velocity:0.02 m/s                   MV Peak E-Wave: 1.21 m/s  AV Peak Gradient: 17.86 mmHg           MV Peak A-Wave: 1.13 m/s  AV Mean Gradient: 10.71 mmHg  AV Area (Continuity):2.07 cm^2         MV P1/2t: 69.5 msec  TR Velocity:1.94 m/s                   MVA by PHT3.17 cm^2  TR Gradient:15.1 mmHg  Valves  Mitral Valve   Peak E-Wave: 1.21 m/s                 Peak A-Wave: 1.13 m/s  P1/2t: 69.5 msec                      E/A Ratio: 1.07                                        Peak Gradient: 5.84 mmHg  Area (PHT): 3.17 cm^2                 Deceleration Time: 263.6 msec   Tissue Doppler   E' Septal Velocity: 0.09 m/s  E' Lateral Velocity: 0.09 m/s   Aortic Valve   Peak Velocity: 2.11 m/s               Mean Velocity: 1.6 m/s  Peak Gradient: 17.86 mmHg             Mean Gradient: 10.71 mmHg  Area (continuity): 2.07 cm^2  AV VTI: 45.06 cm   Cusp Separation: 1.9 cm   Tricuspid Valve   TR Velocity: 1.94 m/s               TR Gradient: 15.1 mmHg   Pulmonic Valve   Peak Velocity: 1.34 m/s             Peak Gradient: 7.15 mmHg   LVOT   Peak Velocity: 1.11 m/s              Mean Velocity: 0.76 m/s  Peak Gradient: 4.85 mmHg             Mean Gradient: 2.65 mmHg  LVOT Diameter: 2.15 cm               LVOT VTI: 25.72 cm  Structures  Left Atrium   LA Dimension: 4.38 cm                        LA Area: 14.29 cm^2  LA/Aorta: 1.48  LA Volume/Index: 55.36 ml /23 m^2   Left Ventricle   Diastolic Dimension: 1.26 cm         Systolic Dimension: 8.05 cm  Septum Diastolic: 3.80 cm            Septum Systolic: 1.26 cm  PW Diastolic: 2.95 cm                PW Systolic: 6.08 cm                                       FS: 27.1 %  LV EDV/LV EDV Index: 73.93 ml/31 m^2 LV ESV/LV ESV Index: 34.56 ml/14 m^2  EF Calculated: 53.3 %                LV Length: 7.72 cm   LVOT Diameter: 2.15 cm   Right Ventricle   Diastolic Dimension: 7.28 cm  Aorta/ Miscellaneous Aorta   Aortic Root: 2.96 cm  LVOT Diameter: 2.15 cm      Lab Results   Component Value Date    IRON 50 08/11/2021    IRON 51 06/18/2018    IRON 78 10/11/2013    TIBC 283 08/11/2021    TIBC 354 06/18/2018    TIBC 369 10/11/2013    FERRITIN 130.8 08/11/2021    FERRITIN 157.1 06/18/2018     Lab Results   Component Value Date    INR 0.9 10/07/2015    INR 0.9 06/29/2015    INR 1.0 12/18/2014    INR 0.9 07/29/2014    INR 1.0 09/02/2013     No components found for: ACUTEHEPATITISSCREEN  No components found for: CELIACPANEL  No components found for: STOOLCULTURE, C.DIFF, STOOLOVAPARASITE, STOOLLEUCOCYTE    Endoscopic hx:   Colonoscopy 2/10/21  Colon polyp status post hot snare polypectomy  No endoscopic evidence of inflammatory bowel disease  Random colon biopsies obtained to assess for microscopic  colitis  EGD 2/10/21  Irregular Z-line with no nodularity, mild gastritis otherwise  negative EGD  Duodenal biopsies obtained to assess for celiac disease  BX:  A.  DUODENAL BIOPSIES:   DUODENAL MUCOSA, NO PATHOLOGIC DIAGNOSIS     B.  GASTRIC BIOPSIES:   GASTRIC MUCOSA WITH FOCAL MINIMAL CHRONIC INFLAMMATION   NEGATIVE FOR [facility] BIOPSIES:   MILD TO MODERATE CHRONIC GASTROESOPHAGITIS   NO EVIDENCE OF JON'S ESOPHAGUS   NO EVIDENCE OF DYSPLASIA     D.  RANDOM COLON BIOPSIES:   COLONIC MUCOSA, NO PATHOLOGIC DIAGNOSIS     E. DESCENDING COLON POLYP:   TUBULAR ADENOMA     Assessment:       Diagnosis Orders   1. Gastroparesis           Plan:      1. Gastroparesis  Asymptomatic, recent GES notable for delayed gastric emptying.   Previously failed Reglan  -Continue erythromycin 250 mg 2-3 times daily, on 3 weeks off 2 week  -continue Zofran as needed  -Gastroparesis diet  Discussed at length with patient that dietary modification is considered first-line therapy in patients with mild gastroparesis, although in clinical practice it is associated with only a modest improvement in symptoms. Foods that are fatty, acidic, spicy, and roughage-based increase overall symptoms in individuals with gastroparesis, diet should be low in fat and in non-digestible (insoluble) fiber; in general, soluble fiber.   -Optimize glycemic control  last HgbA1c 7.1  Diabetes mellitus is a common cause of delayed gastric emptying. In patients with diabetes, incretin-based therapies such as pramlintide (amylin analog) and GLP1 analogues or agonists should be avoided as they can delay gastric emptying    2. Splenomegaly on prior imaging  Pt is asymptomatic, Incidental finding of splenomegaly on recent CT scan while in the emergency department for nausea and vomiting, spleen is 14.6 cm, Monospot was negative, pt is asymptomatic, normal liver enzymes, no history of  liver disease. The normal range for splenic size is only an estimate, and mild deviations from this normal range do not necessarily imply a pathologic condition. The size of the spleen correlates with a person's height, weight, and sex; it is slightly larger in taller and heavier individuals.   The upper limits of normal for females and males is approximately 12.3 and 14.5 cm 3.  Associated medical conditions include but are not limited to class III obesity, dyslipidemia, pulmonary hypertension, DM 2, polymyalgia rheumatica, IBS-D, and former nicotine use.     4.  Preventative health  Repeat screening colonoscopy for history of polyps in 5 years, 2025    Return in about 6 months (around 10/27/2022), or if symptoms worsen or fail to improve.       Leola Olmos, APRN - CNP

## 2022-04-29 ENCOUNTER — HOSPITAL ENCOUNTER (OUTPATIENT)
Dept: LAB | Age: 47
Discharge: HOME | End: 2022-04-29
Payer: COMMERCIAL

## 2022-04-29 DIAGNOSIS — D50.9 MICROCYTIC ANEMIA: ICD-10-CM

## 2022-04-29 DIAGNOSIS — R79.89 LOW TSH LEVEL: ICD-10-CM

## 2022-04-29 LAB
BASOPHILS ABSOLUTE: 0 K/UL (ref 0–0.2)
BASOPHILS RELATIVE PERCENT: 0.2 %
EOSINOPHILS ABSOLUTE: 0.1 K/UL (ref 0–0.7)
EOSINOPHILS RELATIVE PERCENT: 1 %
HCT VFR BLD CALC: 36.7 % (ref 37–47)
HEMOGLOBIN: 11.8 G/DL (ref 12–16)
LYMPHOCYTES ABSOLUTE: 2.4 K/UL (ref 1–4.8)
LYMPHOCYTES RELATIVE PERCENT: 24.1 %
MCH RBC QN AUTO: 25.7 PG (ref 27–31.3)
MCHC RBC AUTO-ENTMCNC: 32.2 % (ref 33–37)
MCV RBC AUTO: 79.8 FL (ref 82–100)
MONOCYTES ABSOLUTE: 0.5 K/UL (ref 0.2–0.8)
MONOCYTES RELATIVE PERCENT: 5.5 %
NEUTROPHILS ABSOLUTE: 6.9 K/UL (ref 1.4–6.5)
NEUTROPHILS RELATIVE PERCENT: 69.2 %
PDW BLD-RTO: 16.8 % (ref 11.5–14.5)
PLATELET # BLD: 255 K/UL (ref 130–400)
RBC # BLD: 4.6 M/UL (ref 4.2–5.4)
T4 FREE: 1.44 NG/DL (ref 0.84–1.68)
TSH SERPL DL<=0.05 MIU/L-ACNC: 0.95 UIU/ML (ref 0.44–3.86)
WBC # BLD: 9.9 K/UL (ref 4.8–10.8)

## 2022-04-29 PROCEDURE — 36415 COLL VENOUS BLD VENIPUNCTURE: CPT

## 2022-04-29 PROCEDURE — 84443 ASSAY THYROID STIM HORMONE: CPT

## 2022-04-29 PROCEDURE — 83540 ASSAY OF IRON: CPT

## 2022-04-29 PROCEDURE — 82728 ASSAY OF FERRITIN: CPT

## 2022-04-29 PROCEDURE — 84439 ASSAY OF FREE THYROXINE: CPT

## 2022-04-29 PROCEDURE — 86376 MICROSOMAL ANTIBODY EACH: CPT

## 2022-04-29 PROCEDURE — 85025 COMPLETE CBC W/AUTO DIFF WBC: CPT

## 2022-04-29 PROCEDURE — 83550 IRON BINDING TEST: CPT

## 2022-04-30 LAB
FERRITIN: 119 NG/ML (ref 13–150)
IRON SATURATION: 22 % (ref 20–55)
IRON: 67 UG/DL (ref 37–145)
TOTAL IRON BINDING CAPACITY: 311 UG/DL (ref 250–450)
UNSATURATED IRON BINDING CAPACITY: 244 UG/DL (ref 112–347)

## 2022-05-02 ENCOUNTER — OFFICE VISIT (OUTPATIENT)
Dept: NEUROLOGY | Age: 47
End: 2022-05-02
Payer: COMMERCIAL

## 2022-05-02 VITALS — HEART RATE: 77 BPM | SYSTOLIC BLOOD PRESSURE: 128 MMHG | DIASTOLIC BLOOD PRESSURE: 86 MMHG

## 2022-05-02 DIAGNOSIS — R29.2 AREFLEXIA: ICD-10-CM

## 2022-05-02 DIAGNOSIS — M54.16 LUMBAR RADICULOPATHY: ICD-10-CM

## 2022-05-02 DIAGNOSIS — M35.3 PMR (POLYMYALGIA RHEUMATICA) (HCC): Primary | ICD-10-CM

## 2022-05-02 DIAGNOSIS — G82.20 PARAPARESIS (HCC): ICD-10-CM

## 2022-05-02 DIAGNOSIS — E66.01 MORBID OBESITY (HCC): ICD-10-CM

## 2022-05-02 LAB — THYROID PEROXIDASE (TPO) ABS: 4.7 IU/ML (ref 0–25)

## 2022-05-02 PROCEDURE — 99214 OFFICE O/P EST MOD 30 MIN: CPT | Performed by: PSYCHIATRY & NEUROLOGY

## 2022-05-02 ASSESSMENT — ENCOUNTER SYMPTOMS
BACK PAIN: 1
VOMITING: 0
CHOKING: 0
PHOTOPHOBIA: 0
SHORTNESS OF BREATH: 0
TROUBLE SWALLOWING: 0
COLOR CHANGE: 0
NAUSEA: 0

## 2022-05-02 NOTE — PROGRESS NOTES
Subjective:      Patient ID: Jacquie Smiley is a 56 y.o. female who presents today for:  Chief Complaint   Patient presents with    Follow-up     Pt states that things have been going okay. She says still same things as usual.  She says that she was never able to have the lumbar spine mri done due to covid in 2019 and wants to have a new order placed to have it done now. HPI 43-year-old right-handed female with history of polymyalgia rheumatica. Patient also has low radiculopathy with myalgic pain. We will treat this with prednisone at the lowest possible dose and she is now on 5 mg. When last seen we had recommended MRI of the lumbar spine which has not been done. This was secondary to COVID and we will rearrange this. Her last sed rate was 55 with a CRP of 21. We will try and keep it at this range as we are not able to increase her prednisone further due to significant weight gain and side effects. Patient is awake is been an issue and we had continue to discuss gastric bypass for her in the long-term this is what is likely to help as we are still like to continue her prednisone. We will look into other therapies but there are no good studies on Imuran either. Patient continues on vitamin D and calcium as recommended.     Past Medical History:   Diagnosis Date    Abnormal Pap smear of cervix     Acute midline thoracic back pain 9/18/2018    B12 deficiency     Family history of premature CAD 9/4/2013    Fatty liver     Gastroesophageal reflux disease 1/6/2021    Gastroparesis     HPV (human papilloma virus) anogenital infection     Hyperlipidemia     Hypertension     Irritable bowel syndrome with diarrhea 4/26/2021    Liver hemangioma 2009/2015    Lumbar radiculopathy 7/7/2021    Obesity 3/11/13    BMI 43.42    Orthostatic hypotension     Ovarian cyst     PMR (polymyalgia rheumatica) (HCC)     Rectal fissure     Tobacco abuse 9/3/2015    Tobacco abuse     Tremor     Uncontrolled diabetes mellitus with complications ([facility] [address]     Unspecified sleep apnea      Past Surgical History:   Procedure Laterality Date    CARDIAC CATHETERIZATION      COLONOSCOPY      for diarrhea (-)    COLONOSCOPY N/A 2/10/2021    COLONOSCOPY DIAGNOSTIC performed by Aníbal Gutierrez MD at [place] [address]  12-10    [facility]  1-05    [place] MUSCLE BIOPSY Left 2018    LEFT QUADRICEPS MUSCLE BIOPSY performed by Esther Mccullough MD at [address] [facility]"" UNI/BI CANNULATION N/A 2018    T 12 VERTEBRALPLASTY ROOM 278 performed by Lalo Wallis MD at [facility]  10/13/15     DR. HERRERA     UPPER GASTROINTESTINAL ENDOSCOPY N/A 2/10/2021    EGD ESOPHAGOGASTRODUODENOSCOPY performed by Aníbal Gutierrez MD at [facility]       Social History     Socioeconomic History    Marital status:      Spouse name: Not on file    Number of children: 1    Years of education: Not on file    Highest education level: Not on file   Occupational History    Not on file   Tobacco Use    Smoking status: Former Smoker     Packs/day: 1.00     Types: Cigarettes     Quit date: 2017     Years since quittin.3    Smokeless tobacco: Never Used   Vaping Use    Vaping Use: Never used   Substance and Sexual Activity    Alcohol use:  Yes     Alcohol/week: 0.0 standard drinks     Comment: socially    Drug use: No    Sexual activity: Yes     Partners: Male     Birth control/protection: Surgical     Comment: single   Other Topics Concern    Not on file   Social History Narrative    Social/Functional History          Social Determinants of Health     Financial Resource Strain: Low Risk     Difficulty of Paying Living Expenses: Not hard at all   Food Insecurity: No Food Insecurity    Worried About [facility] in the Last Year: Never true   Yvonne Flores Ran Out of Food in the Last Year: Never true   Transportation Needs: No Transportation Needs    Lack of Transportation (Medical): No    Lack of Transportation (Non-Medical):  No   Physical Activity:     Days of Exercise per Week: Not on file    Minutes of Exercise per Session: Not on file   Stress:     Feeling of Stress : Not on file   Social Connections:     Frequency of Communication with Friends and Family: Not on file    Frequency of Social Gatherings with Friends and Family: Not on file    Attends [employer]ism Services: Not on file    Active Member of Clubs or Organizations: Not on file    Attends Club or Organization Meetings: Not on file    Marital Status: Not on file   Intimate Partner Violence:     Fear of Current or Ex-Partner: Not on file    Emotionally Abused: Not on file    Physically Abused: Not on file    Sexually Abused: Not on file   Housing Stability:     Unable to Pay for Housing in the Last Year: Not on file    Number of Places Lived in the Last Year: Not on file    Unstable Housing in the Last Year: Not on file     Family History   Problem Relation Age of Onset    Diabetes Father     Heart Disease Mother     Colon Cancer Neg Hx     Cancer Neg Hx      Allergies   Allergen Reactions    Morphine And Related Hives and Rash    Ace Inhibitors Other (See Comments)     Dizziness and near syncope    Bactrim [Sulfamethoxazole-Trimethoprim] Itching    Nitroglycerin Other (See Comments)     Migraine    Percocet [Oxycodone-Acetaminophen] Nausea And Vomiting    Victoza [Liraglutide]      NAUSEA       Current Outpatient Medications   Medication Sig Dispense Refill    clotrimazole-betamethasone (LOTRISONE) 1-0.05 % cream APPLY TO AFFECTED AREA(S) TOPICALLY TWO TIMES A DAY 45 g 3    erythromycin base (E-MYCIN) 250 MG tablet TAKE 1 TABLET BY MOUTH 3 TIMES A DAY 90 tablet 3    venlafaxine (EFFEXOR XR) 75 MG extended release capsule TAKE TWO CAPSULES BY MOUTH EVERY MORNING AND ONE CAPSULE BY MOUTH EVERY EVENING 270 capsule 1    ondansetron (ZOFRAN-ODT) 4 MG disintegrating tablet DISSOLVE ONE TABLET BY MOUTH EVERY 8 HOURS AS NEEDED FOR NAUSEA OR VOMITING 60 tablet 3    predniSONE (DELTASONE) 5 MG tablet TAKE 1 TABLET BY MOUTH ONE TIME A DAY 30 tablet 3    Miconazole POWD Apply topically BID 3 each 1    hydroCHLOROthiazide (HYDRODIURIL) 25 MG tablet Take 1 tablet by mouth daily 90 tablet 1    esomeprazole (NEXIUM) 40 MG delayed release capsule TAKE 1 CAPSULE BY MOUTH ONE TIME A DAY 30 capsule 3    pioglitazone (ACTOS) 30 MG tablet TAKE 1 TABLET BY MOUTH ONE TIME A DAY 90 tablet 0    gentamicin (GARAMYCIN) 0.1 % ointment Apply topically daily 1 each 0    CPAP Machine MISC by Does not apply route Change CPAP pressure to 14 cm 1 each 0    metoprolol (LOPRESSOR) 100 MG tablet TAKE 1 TABLET BY MOUTH 2 TIMES A  tablet 1    blood glucose test strips (PRODIGY NO CODING BLOOD GLUC) strip 1 each by In Vitro route 4 times daily As needed. 400 each 3    nystatin (NYAMYC) 455378 UNIT/GM powder APPLY TOPICALLY THREE TIMES A DAY 15 g 2    Prodigy Lancets 28G MISC Use as directed to test up to 4 times daily 400 each 3    baclofen (LIORESAL) 10 MG tablet TAKE ONE-HALF TABLET BY MOUTH TWICE A DAY 90 tablet 1    rosuvastatin (CRESTOR) 10 MG tablet TAKE ONE TABLET BY MOUTH NIGHTLY 90 tablet 1    Insulin Glargine, 2 Unit Dial, (TOUJEO MAX SOLOSTAR) 300 UNIT/ML SOPN 150 units at bedtime 90 pen 3    nystatin-triamcinolone (MYCOLOG II) 409553-1.1 UNIT/GM-% cream Apply topically 2 times daily.  30 g 0    losartan (COZAAR) 50 MG tablet Take 1 tablet by mouth daily 90 tablet 1    Continuous Blood Gluc Sensor (FREESTYLE JAIRO 14 DAY SENSOR) MISC CHANGE EVERY 14 DAYS 6 each 3    CPAP Machine MISC New CPAP with 8 cm and supply 1 each 0    solifenacin (VESICARE) 10 MG tablet TAKE 1 TABLET BY MOUTH ONE TIME A DAY 90 tablet 3    insulin lispro, 1 Unit Dial, (HUMALOG KWIKPEN) 100 UNIT/ML SOPN INJECT 80 UNITS UNDER THE SKIN WITH EACH MEAL (Patient taking differently: INJECT 40 UNITS UNDER THE SKIN WITH EACH MEAL) 90 pen 3    metFORMIN (GLUCOPHAGE) 500 MG tablet TAKE 1 TABLET BY MOUTH 3 TIMES A  tablet 3    metoclopramide (REGLAN) 10 MG tablet Take 1 tablet by mouth 3 times daily (with meals) 360 tablet 3    Continuous Blood Gluc  (FREESTYLE JAIRO 14 DAY READER) ALEXANDREA As directed 1 Device 00    Insulin Pen Needle (PEN NEEDLES) 32G X 4 MM MISC Use as directed with insulin pens, 4 times daily 400 each 1    blood glucose test strips (PRODIGY NO CODING BLOOD GLUC) strip 1 each by In Vitro route 4 times daily As needed. 400 each 3    Blood Glucose Monitoring Suppl (PRODIGY AUTOCODE BLOOD GLUCOSE) w/Device KIT Use as directed to test up to 4 times daily 1 kit 0    miconazole (MICOTIN) 2 % cream Apply topically 2 times daily. 141 g 3    Handicap Placard MISC Exp 5 years 1 each 0     No current facility-administered medications for this visit. Review of Systems   Constitutional: Negative for fever. HENT: Negative for ear pain, tinnitus and trouble swallowing. Eyes: Negative for photophobia and visual disturbance. Respiratory: Negative for choking and shortness of breath. Cardiovascular: Negative for chest pain and palpitations. Gastrointestinal: Negative for nausea and vomiting. Musculoskeletal: Positive for back pain, gait problem and myalgias. Negative for joint swelling, neck pain and neck stiffness. Skin: Negative for color change. Allergic/Immunologic: Negative for food allergies. Neurological: Negative for dizziness, tremors, seizures, syncope, facial asymmetry, speech difficulty, weakness, light-headedness, numbness and headaches. Psychiatric/Behavioral: Negative for behavioral problems, confusion, hallucinations and sleep disturbance.        Objective:   /86 (Site: Left Upper Arm, Position: Sitting, Cuff Size: Medium Adult)   Pulse 77   LMP  (LMP Unknown) Physical Exam  Vitals reviewed. Eyes:      Pupils: Pupils are equal, round, and reactive to light. Cardiovascular:      Rate and Rhythm: Normal rate and regular rhythm. Heart sounds: No murmur heard. Pulmonary:      Effort: Pulmonary effort is normal.      Breath sounds: Normal breath sounds. Abdominal:      General: Bowel sounds are normal.   Musculoskeletal:         General: Normal range of motion. Cervical back: Normal range of motion. Skin:     General: Skin is warm. Neurological:      Mental Status: She is alert and oriented to person, place, and time. Cranial Nerves: No cranial nerve deficit. Sensory: No sensory deficit. Motor: No abnormal muscle tone. Coordination: Coordination normal.      Deep Tendon Reflexes: Reflexes are normal and symmetric. Babinski sign absent on the right side. Babinski sign absent on the left side. Psychiatric:         Mood and Affect: Mood normal.     Patient does not have any true weakness. Morbid obesity is notable and she is areflexic in the lower extremities. No results found.     Lab Results   Component Value Date    WBC 9.9 04/29/2022    RBC 4.60 04/29/2022    RBC 4.57 02/22/2012    HGB 11.8 04/29/2022    HCT 36.7 04/29/2022    MCV 79.8 04/29/2022    MCH 25.7 04/29/2022    MCHC 32.2 04/29/2022    RDW 16.8 04/29/2022     04/29/2022    MPV 8.8 10/13/2015     Lab Results   Component Value Date     12/13/2021    K 3.9 12/13/2021    K 3.7 09/18/2018    CL 96 12/13/2021    CO2 26 12/13/2021    BUN 13 12/13/2021    CREATININE 0.71 12/13/2021    GFRAA >60.0 12/13/2021    LABGLOM >60.0 12/13/2021    GLUCOSE 207 04/26/2022    GLUCOSE 262 05/31/2012    PROT 7.7 11/01/2021    LABALBU 4.2 11/01/2021    LABALBU 4.4 05/31/2012    CALCIUM 9.4 12/13/2021    BILITOT 0.3 11/01/2021    ALKPHOS 79 11/01/2021    AST 15 11/01/2021    ALT 17 11/01/2021     Lab Results   Component Value Date    PROTIME 10.0 10/07/2015    PROTIME 10.0 11/21/2011    INR 0.9 10/07/2015     Lab Results   Component Value Date    TSH 0.946 04/29/2022    CLCAACVM05 625 08/22/2018    FOLATE 5.3 08/22/2018    FERRITIN 119 04/29/2022    IRON 67 04/29/2022    TIBC 311 04/29/2022     Lab Results   Component Value Date    TRIG 162 04/26/2022    HDL 38 04/26/2022    HDL 34 02/23/2011    LDLCALC 66 04/26/2022     Lab Results   Component Value Date    LABAMPH Neg 09/17/2021    BARBSCNU Neg 09/17/2021    LABBENZ Neg 09/17/2021    LABMETH Neg 09/17/2021    OPIATESCREENURINE Neg 09/17/2021    PHENCYCLIDINESCREENURINE Neg 09/17/2021     No results found for: LITHIUM, DILFRTOT, VALPROATE    Assessment:       Diagnosis Orders   1. PMR (polymyalgia rheumatica) ([facility])     2. Paraparesis ([facility])     3. Lumbar radiculopathy     4. Morbid obesity ([address]     5. Areflexia     Polymyalgia rheumatica with inflammatory markers. Patient's sed rate is around 50 and CRP 22. We will try and keep at the lowest possible dose to keep the sed rate around 50 and less. This is to avoid increased weight worsening of diabetes and other side effects. She is already on vitamin D and calcium. So we discussed gastric bypass for her as patient would likely require prednisone long-term for polymyalgia rheumatica and her weight continues to increase. There is an indication for gastric bypass in this patient to avoid further complications of her weight which may include fractures and joint issues. Patient has considerable lumbar issues and she is having more issues we had recommended a lumbar spine MRI though due to COVID this was not done we will arrange for the same this is affecting her legs and weakness. She is areflexic in the lower extremities. Patient currently is on prednisone 5 mg only with optimal control. We are looking into other studies of use of other medications to avoid steroids though we have not had any success with Imuran either. Duran Muhammad MD, [place] Board of Psychiatry & Neurology  Board Certified in Vascular Neurology  Board Certified in Neuromuscular Medicine  Certified in [facility]:      No orders of the defined types were placed in this encounter. No orders of the defined types were placed in this encounter. Return in about 6 months (around 11/2/2022).       Shama Pearce MD

## 2022-05-05 ENCOUNTER — TELEMEDICINE (OUTPATIENT)
Dept: ENDOCRINOLOGY | Age: 47
End: 2022-05-05
Payer: COMMERCIAL

## 2022-05-05 DIAGNOSIS — E11.43 DIABETIC GASTROPARESIS ASSOCIATED WITH TYPE 2 DIABETES MELLITUS (HCC): ICD-10-CM

## 2022-05-05 DIAGNOSIS — K31.84 DIABETIC GASTROPARESIS ASSOCIATED WITH TYPE 2 DIABETES MELLITUS (HCC): ICD-10-CM

## 2022-05-05 DIAGNOSIS — Z79.4 TYPE 2 DIABETES MELLITUS WITH COMPLICATION, WITH LONG-TERM CURRENT USE OF INSULIN (HCC): Primary | ICD-10-CM

## 2022-05-05 DIAGNOSIS — E11.8 TYPE 2 DIABETES MELLITUS WITH COMPLICATION, WITH LONG-TERM CURRENT USE OF INSULIN (HCC): Primary | ICD-10-CM

## 2022-05-05 PROCEDURE — 99442 PR PHYS/QHP TELEPHONE EVALUATION 11-20 MIN: CPT | Performed by: INTERNAL MEDICINE

## 2022-05-05 RX ORDER — INSULIN LISPRO 100 [IU]/ML
INJECTION, SOLUTION INTRAVENOUS; SUBCUTANEOUS
Qty: 90 PEN | Refills: 3
Start: 2022-05-05 | End: 2022-06-13 | Stop reason: SDUPTHER

## 2022-05-05 ASSESSMENT — ENCOUNTER SYMPTOMS
EYES NEGATIVE: 1
GASTROINTESTINAL NEGATIVE: 1

## 2022-05-05 NOTE — PROGRESS NOTES
2022    TELEHEALTH EVALUATION -- Audio/Visual (During UFSID-79 public health emergency)    Due to COVID 19 outbreak, patient's office visit was converted to a virtual visit. Patient was contacted and agreed to proceed with a virtual visit via Telephone Visit  The risks and benefits of converting to a virtual visit were discussed in light of the current infectious disease epidemic. Patient also understood that insurance coverage and co-pays are up to their individual insurance plans. HPI: Telephone visit patient was at home I was at my office follow-up on type 2 diabetes history of obesity insulin resistance history of gastroparesis patient's labs were done recently reviewed  Patient is taking low-dose of Humalog  Continues to take Toujeo plus Actos Toujeo dose insulin to 50 units at bedtime denies any hypoglycemia  Overall blood sugars close to upper 100s low 200 range  Denies any hypoglycemia    Last hemoglobin A1c was 8.2  History of obesity BMI [address] (:  1975) has requested an audio/video evaluation for the following concern(s):      Results for Daniel Abad (MRN [id number]) as of 2022 16:56   Ref.  Range 2021 08:26 2022 14:34 2022 06:24 2022 07:40   Sodium Latest Ref Range: 135 - 144 mEq/L 137      Potassium Latest Ref Range: 3.4 - 4.9 mEq/L 3.9      Chloride Latest Ref Range: 95 - 107 mEq/L 96      CO2 Latest Ref Range: 20 - 31 mEq/L 26      BUN,BUNPL Latest Ref Range: 6 - 20 mg/dL 13      Creatinine Latest Ref Range: 0.50 - 0.90 mg/dL 0.71      Anion Gap Latest Ref Range: 9 - 15 mEq/L 15      GFR Non- Latest Ref Range: >60  >60.0      GFR African American Latest Ref Range: >60  >60.0      GLUCOSE, FASTING,GF Latest Ref Range: 70 - 99 mg/dL 156 (H)  207 (H)    CALCIUM, SERUM, 667423 Latest Ref Range: 8.5 - 9.9 mg/dL 9.4      CHOLESTEROL, TOTAL, [id number] Latest Ref Range: 0 - 199 mg/dL   136    HDL Cholesterol Latest Ref Range: 40 - 59 mg/dL   38 (L)    LDL Calculated Latest Ref Range: 0 - 129 mg/dL   66    Triglycerides Latest Ref Range: 0 - 150 mg/dL   162 (H)    Hemoglobin A1C Latest Ref Range: 4.8 - 5.9 % 8.1 (H)  8.2 (H)    TSH Latest Ref Range: 0.440 - 3.860 uIU/mL    0.946   THYROID PEROXIDASE (TPO) ABS Latest Ref Range: 0.0 - 25.0 IU/mL    4.7   T4 Free Latest Ref Range: 0.84 - 1.68 ng/dL    1.44     Review of Systems   Eyes: Negative. Gastrointestinal: Negative. Endocrine: Negative. Neurological: Negative. All other systems reviewed and are negative. Prior to Visit Medications    Medication Sig Taking? Authorizing Provider   ALPRAZolam [place]) 0.5 MG tablet Take 1 tablet by mouth daily as needed for Anxiety for up to 45 days. Take 0.5 mg by mouth 3 times daily as needed for Anxiety.   [address], MD   clotrimazole-betamethasone (LOTRISONE) 1-0.05 % cream APPLY TO AFFECTED AREA(S) TOPICALLY TWO TIMES A DAY  SANIA Nance CNM   erythromycin base (E-MYCIN) 250 MG tablet TAKE 1 TABLET BY MOUTH 3 TIMES A DAY  SANIA Jauregui CNP   venlafaxine (EFFEXOR XR) 75 MG extended release capsule TAKE TWO CAPSULES BY MOUTH EVERY MORNING AND ONE CAPSULE BY MOUTH EVERY EVENING  Rudi Jain MD   ondansetron (ZOFRAN-ODT) 4 MG disintegrating tablet DISSOLVE ONE TABLET BY MOUTH EVERY 8 HOURS AS NEEDED FOR NAUSEA OR VOMITING  SANIA Jauregui CNP   predniSONE (DELTASONE) 5 MG tablet TAKE 1 TABLET BY MOUTH ONE TIME A DAY  Sandy Mondragon MD   Miconazole POWD Apply topically BID  Aysha Muller MD   hydroCHLOROthiazide (HYDRODIURIL) 25 MG tablet Take 1 tablet by mouth daily  [address], MD   esomeprazole (NEXIUM) 40 MG delayed release capsule TAKE 1 CAPSULE BY MOUTH ONE TIME A DAY  Fletcher Jo MD   pioglitazone (ACTOS) 30 MG tablet TAKE 1 TABLET BY MOUTH ONE TIME A DAY  Patrick Marinelli MD   gentamicin (GARAMYCIN) 0.1 % ointment Apply topically daily  [address], MD   CPAP Machine MISC by Does not apply route Change CPAP pressure to 14 cm  Aretha Ortega MD   metoprolol (LOPRESSOR) 100 MG tablet TAKE 1 TABLET BY MOUTH 2 TIMES A DAY  Rudi Gonzales MD   blood glucose test strips (PRODIGY NO CODING BLOOD GLUC) strip 1 each by In Vitro route 4 times daily As needed. Mateo Davies MD   nystatin ([address]) 526798 UNIT/GM powder APPLY TOPICALLY THREE TIMES A DAY  MD Shaquille Mcclain Lancets 28G MISC Use as directed to test up to 4 times daily  Mateo Davies MD   baclofen (LIORESAL) 10 MG tablet TAKE ONE-HALF TABLET BY MOUTH TWICE A DAY  Rudi Jain MD   rosuvastatin (CRESTOR) 10 MG tablet TAKE ONE TABLET BY MOUTH NIGHTLY  Rudi Jain MD   Insulin Glargine, 2 Unit Dial, (TOUJEO MAX SOLOSTAR) 300 UNIT/ML SOPN 150 units at bedtime  Yumiko Gaffney MD   nystatin-triamcinolone (MYCOLOG II) 859514-4.1 UNIT/GM-% cream Apply topically 2 times daily.   Eron Hylton MD   losartan (COZAAR) 50 MG tablet Take 1 tablet by mouth daily  Mateo Davies MD   Continuous Blood Gluc Sensor (FREESTYLE JAIRO 14 DAY SENSOR) MISC CHANGE EVERY 14 DAYS  TONY Lopez   CPAP Machine MISC New CPAP with 8 cm and supply  Aretha Ortega MD   solifenacin (VESICARE) 10 MG tablet TAKE 1 TABLET BY MOUTH ONE TIME A DAY  Brianne Terry MD   insulin lispro, 1 Unit Dial, (HUMALOG KWIKPEN) 100 UNIT/ML SOPN INJECT 80 UNITS UNDER THE SKIN WITH EACH MEAL  Patient taking differently: INJECT 40 UNITS UNDER THE SKIN WITH EACH MEAL  Yumiko Gaffney MD   metFORMIN (GLUCOPHAGE) 500 MG tablet TAKE 1 TABLET BY MOUTH 3 TIMES A DAY  Patrick Marinelli MD   metoclopramide (REGLAN) 10 MG tablet Take 1 tablet by mouth 3 times daily (with meals)  Yumiko Gaffney MD   Continuous Blood Gluc  (FREESTYLE JAIRO 14 DAY READER) ALEXANDREA As directed  Yumiko Gaffney MD   Insulin Pen Needle (PEN NEEDLES) 32G X 4 MM MISC Use as directed with insulin pens, 4 times daily  Yumiko Gaffney MD   blood glucose test strips (PRODIGY NO CODING BLOOD GLUC) strip 1 each by In Vitro route 4 times daily As needed. Gallo Aguero MD   sucralfate (CARAFATE) 1 GM tablet Take 1 tablet by mouth 4 times daily for 7 days  Liz Thompson APRN - CNP   Blood Glucose Monitoring Suppl (PRODIGY AUTOCODE BLOOD GLUCOSE) w/Device KIT Use as directed to test up to 4 times daily  Gallo Aguero MD   miconazole (MICOTIN) 2 % cream Apply topically 2 times daily. Angelica Avendano MD   Handicap Placard MISC Exp 5 years  Angelica Avendano MD       Social History     Tobacco Use    Smoking status: Former Smoker     Packs/day: 1.00     Types: Cigarettes     Quit date: 2017     Years since quittin.3    Smokeless tobacco: Never Used   Vaping Use    Vaping Use: Never used   Substance Use Topics    Alcohol use: Yes     Alcohol/week: 0.0 standard drinks     Comment: socially    Drug use: No            PHYSICAL EXAMINATION:  [ INSTRUCTIONS:  \"[x]\" Indicates a positive item  \"[]\" Indicates a negative item  -- DELETE ALL ITEMS NOT EXAMINED]  [] Alert  [] Oriented to person/place/time    [] No apparent distress  [] Toxic appearing    [] Face flushed appearing [] Sclera clear  [] Lips are cyanotic      [] Breathing appears normal  [] Appears tachypneic      [] Rash on visible skin    [] Cranial Nerves II-XII grossly intact    [] Motor grossly intact in visible upper extremities    [] Motor grossly intact in visible lower extremities    [] Normal Mood  [] Anxious appearing    [] Depressed appearing  [] Confused appearing      [] Poor short term memory  [] Poor long term memory    [] OTHER:      Due to this being a TeleHealth encounter, evaluation of the following organ systems is limited: Vitals/Constitutional/EENT/Resp/CV/GI//MS/Neuro/Skin/Heme-Lymph-Imm. ASSESSMENT/PLAN:     Diagnosis Orders   1. Type 2 diabetes mellitus with complication, with long-term current use of insulin ([facility])  Basic Metabolic Panel    Hemoglobin A1C   2.  Diabetic gastroparesis associated with type 2 diabetes mellitus ([facility] [address]       Orders Placed This Encounter   Procedures    Basic Metabolic Panel     Standing Status:   Future     Standing Expiration Date:   5/5/2023    Hemoglobin A1C     Standing Status:   Future     Standing Expiration Date:   5/5/2023     Update dose of Humalog  Continue current dose of Toujeo 150 units at bedtime continue Actos  A1c goal of 7 or lower  Orders Placed This Encounter   Medications    insulin lispro, 1 Unit Dial, (HUMALOG KWIKPEN) 100 UNIT/ML SOPN     Sig: INJECT 20-40 UNITS UNDER THE SKIN WITH EACH MEAL     Dispense:  90 pen     Refill:  3     Total time spent was 15 minutes      An  electronic signature was used to authenticate this note. --Song Pedroza MD on 5/5/2022 at 4:56 PM        Pursuant to the emergency declaration under the [facility], [facility] waiver authority and the [employer] Act, this Virtual  Visit was conducted, with patient's consent, to reduce the patient's risk of exposure to COVID-19 and provide continuity of care for an established patient. Services were provided through a video synchronous discussion virtually to substitute for in-person clinic visit.

## 2022-05-09 DIAGNOSIS — F41.9 ANXIETY: ICD-10-CM

## 2022-05-09 NOTE — TELEPHONE ENCOUNTER
Pt called in stating she's been communicating with several MA's about this medication and would like a refill

## 2022-05-09 NOTE — TELEPHONE ENCOUNTER
Refill request was addressed 4 days ago.   Please let me know if I need to send him to an alternate pharmacy

## 2022-05-10 ENCOUNTER — HOSPITAL ENCOUNTER (OUTPATIENT)
Dept: MRI IMAGING | Age: 47
Discharge: HOME | End: 2022-05-12
Payer: COMMERCIAL

## 2022-05-10 ENCOUNTER — TELEPHONE (OUTPATIENT)
Dept: CARDIOLOGY CLINIC | Age: 47
End: 2022-05-10

## 2022-05-10 DIAGNOSIS — M54.16 LUMBAR RADICULOPATHY: ICD-10-CM

## 2022-05-10 PROCEDURE — 72148 MRI LUMBAR SPINE W/O DYE: CPT

## 2022-05-10 RX ORDER — ALPRAZOLAM 0.5 MG/1
0.5 TABLET ORAL DAILY PRN
Qty: 30 TABLET | Refills: 0 | OUTPATIENT
Start: 2022-05-10 | End: 2022-06-24

## 2022-05-19 ENCOUNTER — HOSPITAL ENCOUNTER (OUTPATIENT)
Dept: WOUND CARE | Age: 47
Discharge: HOME | End: 2022-05-19
Payer: COMMERCIAL

## 2022-05-19 VITALS
HEART RATE: 66 BPM | DIASTOLIC BLOOD PRESSURE: 55 MMHG | SYSTOLIC BLOOD PRESSURE: 116 MMHG | RESPIRATION RATE: 18 BRPM | TEMPERATURE: 97.3 F

## 2022-05-19 PROCEDURE — 99213 OFFICE O/P EST LOW 20 MIN: CPT

## 2022-05-19 PROCEDURE — 99203 OFFICE O/P NEW LOW 30 MIN: CPT | Performed by: NURSE PRACTITIONER

## 2022-05-19 PROCEDURE — 11042 DBRDMT SUBQ TIS 1ST 20SQCM/<: CPT

## 2022-05-19 PROCEDURE — 11042 DBRDMT SUBQ TIS 1ST 20SQCM/<: CPT | Performed by: NURSE PRACTITIONER

## 2022-05-19 RX ORDER — CLOBETASOL PROPIONATE 0.5 MG/G
OINTMENT TOPICAL ONCE
Status: CANCELLED | OUTPATIENT
Start: 2022-05-19 | End: 2022-05-19

## 2022-05-19 RX ORDER — LIDOCAINE HYDROCHLORIDE 40 MG/ML
SOLUTION TOPICAL ONCE
Status: CANCELLED | OUTPATIENT
Start: 2022-05-19 | End: 2022-05-19

## 2022-05-19 RX ORDER — GINSENG 100 MG
CAPSULE ORAL ONCE
Status: CANCELLED | OUTPATIENT
Start: 2022-05-19 | End: 2022-05-19

## 2022-05-19 RX ORDER — LIDOCAINE HYDROCHLORIDE 20 MG/ML
JELLY TOPICAL ONCE
Status: CANCELLED | OUTPATIENT
Start: 2022-05-19 | End: 2022-05-19

## 2022-05-19 RX ORDER — LIDOCAINE 40 MG/G
CREAM TOPICAL ONCE
Status: CANCELLED | OUTPATIENT
Start: 2022-05-19 | End: 2022-05-19

## 2022-05-19 RX ORDER — BETAMETHASONE DIPROPIONATE 0.05 %
OINTMENT (GRAM) TOPICAL ONCE
Status: CANCELLED | OUTPATIENT
Start: 2022-05-19 | End: 2022-05-19

## 2022-05-19 RX ORDER — GENTAMICIN SULFATE 1 MG/G
OINTMENT TOPICAL ONCE
Status: CANCELLED | OUTPATIENT
Start: 2022-05-19 | End: 2022-05-19

## 2022-05-19 RX ORDER — BACITRACIN, NEOMYCIN, POLYMYXIN B 400; 3.5; 5 [USP'U]/G; MG/G; [USP'U]/G
OINTMENT TOPICAL ONCE
Status: CANCELLED | OUTPATIENT
Start: 2022-05-19 | End: 2022-05-19

## 2022-05-19 RX ORDER — LIDOCAINE 50 MG/G
OINTMENT TOPICAL ONCE
Status: CANCELLED | OUTPATIENT
Start: 2022-05-19 | End: 2022-05-19

## 2022-05-19 RX ORDER — BACITRACIN ZINC AND POLYMYXIN B SULFATE 500; 1000 [USP'U]/G; [USP'U]/G
OINTMENT TOPICAL ONCE
Status: CANCELLED | OUTPATIENT
Start: 2022-05-19 | End: 2022-05-19

## 2022-05-19 NOTE — PROGRESS NOTES
[address]                                                   Progress Note and Procedure Note      Garrett Wise  MEDICAL RECORD NUMBER:  [id number]  AGE: 56 y.o. GENDER: female  : 1975  EPISODE DATE:  2022    Subjective:     Chief Complaint   Patient presents with    Wound Check     abdomen         HISTORY of PRESENT ILLNESS HPI     Garrett Wise is a 56 y.o. female who presents today for wound/ulcer evaluation. History of Wound Context: Patient has lengthy history of non-healing intertriginous wounds of abdomen. PCP treated previous outbreaks with oral antibiotics. Not seen in the wound center since 2021. Today presents with right side abdomen open wound with fibrotic slough covering, and left abdomen with widespread erythema to left intertriginous fold of abdomen. Has co-morbidities of: obesity (BMI 50.04) polymyalgia rheumatica, CAD, pulmonary hypertension, IDDM2, IBS, and GERD. Patient is on prednisone 5 mg for polymyaligia which will likely slow/complicate the healing process. Last HgA1C 8.2 on 22, TSH 0.946 (WNL).     Wound/Ulcer Pain Timing/Severity: intermittent  Quality of pain: tender  Severity:  2 / 10   Modifying Factors: Pain worsens with care and subsides following completion of   Associated Signs/Symptoms: erythema, drainage and pain    Ulcer Identification:  Ulcer Type: undetermined-  Likely shear in intertriginous areas   Contributing Factors: diabetes, poor glucose control, chronic pressure, obesity and immunosuppression    Wound: N/A        PAST MEDICAL HISTORY        Diagnosis Date    Abnormal Pap smear of cervix     Acute midline thoracic back pain 2018    B12 deficiency     Family history of premature CAD 2013    Fatty liver     Gastroesophageal reflux disease 2021    Gastroparesis     HPV (human papilloma virus) anogenital infection     Hyperlipidemia     Hypertension     Irritable bowel syndrome with diarrhea 2021  Liver hemangioma 2009/2015    Lumbar radiculopathy 7/7/2021    Obesity 3/11/13    BMI 43.42    Orthostatic hypotension     Ovarian cyst     PMR (polymyalgia rheumatica) (HCC)     Rectal fissure     Tobacco abuse 9/3/2015    Tobacco abuse     Tremor     Uncontrolled diabetes mellitus with complications ([address]     Unspecified sleep apnea        PAST SURGICAL HISTORY    Past Surgical History:   Procedure Laterality Date    CARDIAC CATHETERIZATION  4-07    COLONOSCOPY  2013    for diarrhea (-)    COLONOSCOPY N/A 2/10/2021    COLONOSCOPY DIAGNOSTIC performed by Jarad Beavers [facility] at [facility]  12-10    [facility]  1-05    IN MUSCLE BIOPSY Left 9/21/2018    LEFT QUADRICEPS MUSCLE BIOPSY performed by Diego Whitmore MD at [address] UNI/BI CANNULATION N/A 9/18/2018    T 12 VERTEBRALPLASTY ROOM 278 performed by Uri Yung MD at [facility]  10/13/15     DR. HERRERA     UPPER GASTROINTESTINAL ENDOSCOPY N/A 2/10/2021    EGD ESOPHAGOGASTRODUODENOSCOPY performed by Jarad Beavers MD at [facility]  6-2015       Problem List:    Patient Active Problem List   Diagnosis    Orthostatic hypotension    Cobalamin deficiency    HPV (human papilloma virus) anogenital infection    Cyst of ovary    Morbid obesity ([address]    RAE (obstructive sleep apnea)    Dyslipidemia    FH: premature coronary heart disease    Adult body mass index 40 and over    Pulmonary hypertension ([address]    Herpes simplex type 1 infection    Tobacco user    Type 2 diabetes mellitus with complication, with long-term current use of insulin (HCC)    Pruritus of vagina    Vaginal irritation    Paraparesis (HCC)    Compression fracture of lumbar vertebra (HCC)    Acute thoracic back pain    Muscle weakness    Muscle pain    Fracture of first lumbar vertebra [facility]    PMR [facility]polym[facility]algia rheumatica) ([facility])    Disorder of muscle, unspecified    Diarrhea    Nausea and vomiting    Gastroesophageal reflux disease    Gastritis without bleeding    Polyp of colon    Gastroparesis    Irritable bowel syndrome with diarrhea    Wound drainage    Lumbar radiculopathy    Erythema intertrigo    Open wound of abdomen    Splenomegaly    Areflexia        FAMILY HISTORY    Family History   Problem Relation Age of Onset    Diabetes Father     Heart Disease Mother     Colon Cancer Neg Hx     Cancer Neg Hx        SOCIAL HISTORY    Social History     Tobacco Use    Smoking status: Former Smoker     Packs/day: 1.00     Types: Cigarettes     Quit date: 2017     Years since quittin.3    Smokeless tobacco: Never Used   Vaping Use    Vaping Use: Never used   Substance Use Topics    Alcohol use: Yes     Alcohol/week: 0.0 standard drinks     Comment: socially    Drug use: No       ALLERGIES    Allergies   Allergen Reactions    Morphine And Related Hives and Rash    Ace Inhibitors Other (See Comments)     Dizziness and near syncope    Bactrim [Sulfamethoxazole-Trimethoprim] Itching    Nitroglycerin Other (See Comments)     Migraine    Percocet [Oxycodone-Acetaminophen] Nausea And Vomiting    Victoza [Liraglutide]      NAUSEA       MEDICATIONS    Current Outpatient Medications on File Prior to Encounter   Medication Sig Dispense Refill    ALPRAZolam (XANAX) 0.5 MG tablet Take 1 tablet by mouth daily as needed for Anxiety for up to 45 days. Take 0.5 mg by mouth 3 times daily as needed for Anxiety.  30 tablet 0    insulin lispro, 1 Unit Dial, (HUMALOG KWIKPEN) 100 UNIT/ML SOPN INJECT 20-40 UNITS UNDER THE SKIN WITH EACH MEAL 90 pen 3    clotrimazole-betamethasone (LOTRISONE) 1-0.05 % cream APPLY TO AFFECTED AREA(S) TOPICALLY TWO TIMES A DAY 45 g 3    erythromycin base (E-MYCIN) 250 MG tablet TAKE 1 TABLET BY MOUTH 3 TIMES A DAY 90 tablet 3    venlafaxine (EFFEXOR XR) 75 MG extended release capsule TAKE TWO CAPSULES BY MOUTH EVERY MORNING AND ONE CAPSULE BY MOUTH EVERY EVENING 270 capsule 1    ondansetron (ZOFRAN-ODT) 4 MG disintegrating tablet DISSOLVE ONE TABLET BY MOUTH EVERY 8 HOURS AS NEEDED FOR NAUSEA OR VOMITING 60 tablet 3    predniSONE (DELTASONE) 5 MG tablet TAKE 1 TABLET BY MOUTH ONE TIME A DAY 30 tablet 3    Miconazole POWD Apply topically BID 3 each 1    hydroCHLOROthiazide (HYDRODIURIL) 25 MG tablet Take 1 tablet by mouth daily 90 tablet 1    esomeprazole (NEXIUM) 40 MG delayed release capsule TAKE 1 CAPSULE BY MOUTH ONE TIME A DAY 30 capsule 3    pioglitazone (ACTOS) 30 MG tablet TAKE 1 TABLET BY MOUTH ONE TIME A DAY 90 tablet 0    gentamicin (GARAMYCIN) 0.1 % ointment Apply topically daily 1 each 0    CPAP Machine MISC by Does not apply route Change CPAP pressure to 14 cm 1 each 0    metoprolol (LOPRESSOR) 100 MG tablet TAKE 1 TABLET BY MOUTH 2 TIMES A  tablet 1    blood glucose test strips (PRODIGY NO CODING BLOOD GLUC) strip 1 each by In Vitro route 4 times daily As needed. 400 each 3    nystatin (NYAMYC) 617278 UNIT/GM powder APPLY TOPICALLY THREE TIMES A DAY 15 g 2    Prodigy Lancets 28G MISC Use as directed to test up to 4 times daily 400 each 3    baclofen (LIORESAL) 10 MG tablet TAKE ONE-HALF TABLET BY MOUTH TWICE A DAY 90 tablet 1    rosuvastatin (CRESTOR) 10 MG tablet TAKE ONE TABLET BY MOUTH NIGHTLY 90 tablet 1    Insulin Glargine, 2 Unit Dial, (TOUJEO MAX SOLOSTAR) 300 UNIT/ML SOPN 150 units at bedtime 90 pen 3    nystatin-triamcinolone (MYCOLOG II) 369506-3.1 UNIT/GM-% cream Apply topically 2 times daily.  (Patient not taking: Reported on 5/19/2022) 30 g 0    losartan (COZAAR) 50 MG tablet Take 1 tablet by mouth daily 90 tablet 1    Continuous Blood Gluc Sensor (FREESTYLE JAIRO 14 DAY SENSOR) MISC CHANGE EVERY 14 DAYS 6 each 3    CPAP Machine MISC New CPAP with 8 cm and supply 1 each 0    solifenacin (VESICARE) 10 MG tablet TAKE 1 TABLET BY MOUTH ONE TIME A DAY 90 tablet 3    metFORMIN (GLUCOPHAGE) 500 MG tablet TAKE 1 TABLET BY MOUTH 3 TIMES A  tablet 3    metoclopramide (REGLAN) 10 MG tablet Take 1 tablet by mouth 3 times daily (with meals) 360 tablet 3    Continuous Blood Gluc  (FREESTYLE JAIRO 14 DAY READER) ALEXANDREA As directed 1 Device 00    Insulin Pen Needle (PEN NEEDLES) 32G X 4 MM MISC Use as directed with insulin pens, 4 times daily 400 each 1    blood glucose test strips (PRODIGY NO CODING BLOOD GLUC) strip 1 each by In Vitro route 4 times daily As needed. 400 each 3    [DISCONTINUED] sucralfate (CARAFATE) 1 GM tablet Take 1 tablet by mouth 4 times daily for 7 days 28 tablet 0    Blood Glucose Monitoring Suppl (PRODIGY AUTOCODE BLOOD GLUCOSE) w/Device KIT Use as directed to test up to 4 times daily 1 kit 0    miconazole (MICOTIN) 2 % cream Apply topically 2 times daily. 141 g 3    Handicap Placard MISC Exp 5 years 1 each 0     No current facility-administered medications on file prior to encounter. REVIEW OF SYSTEMS    Pertinent items are noted in HPI. Objective:      BP (!) 116/55   Pulse 66   Temp 97.3 °F (36.3 °C) (Temporal)   Resp 18   LMP  (LMP Unknown)     Wt Readings from Last 3 Encounters:   05/10/22 (!) 310 lb (140.6 kg)   04/13/22 (!) 309 lb (140.2 kg)   04/07/22 (!) 308 lb (139.7 kg)       PHYSICAL EXAM    Constitutional:   Well nourished and well developed. Appears neat and clean. Patient is alert, oriented x3, and in no apparent distress. Respiratory:  Respiratory effort is easy and symmetric bilaterally. Rate is normal at rest and on room air. Vascular:  Capillary refill is <3 sec to digits bilateral.  Extremities negative for pitting edema. Neurological:   Sensation is  to lower extremities. Dermatological:  Wound description noted in wound assessment.   The wound to the right abdomen is full thickness and covered with very fibrotic slough- will debride as able. The left side of the abdomen, intertriginous area has a widespread area of erythema with partial thickness wounds. Moisture along entire intertriginous fold. Psychiatric:  Judgement and insight intact. Short and long term memory intact. No evidence of depression, anxiety, or agitation. Patient is calm, cooperative, and communicative. Appropriate interactions and affect. Assessment:      Problem List Items Addressed This Visit     None           Procedure Note  Indications:  Based on my examination of this patient's wound(s)/ulcer(s) today, debridement is required to promote healing and evaluate the wound base. Performed by: SANIA Clemente NP    Consent obtained:  Yes    Time out taken:  Yes    Pain Control:   lidocaine 4% cream        Debridement:Excisional Debridement    Using curette the wound(s)/ulcer(s) was/were sharply debrided down through and including the removal of epidermis, dermis and subcutaneous tissue. Devitalized Tissue Debrided:  fibrin, biofilm and slough    Pre Debridement Measurements:  Are located in the [place]  Documentation Flow Sheet    Wound/Ulcer #: 1    Post Debridement Measurements:  Wound/Ulcer Descriptions are Pre Debridement except measurements:        Wound 05/19/22 Abdomen Right; Lower #1 (Active)   Wound Image   05/19/22 0923   Wound Cleansed Cleansed with saline 05/19/22 0923   Dressing/Treatment Antibacterial ointment;Alginate with Ag;Dry dressing 05/19/22 1003   Wound Length (cm) 1.9 cm 05/19/22 0923   Wound Width (cm) 2 cm 05/19/22 0923   Wound Depth (cm) 0.1 cm 05/19/22 0923   Wound Surface Area (cm^2) 3.8 cm^2 05/19/22 0923   Wound Volume (cm^3) 0.38 cm^3 05/19/22 0923   Post-Procedure Length (cm) 1.9 cm 05/19/22 0943   Post-Procedure Width (cm) 2 cm 05/19/22 0943   Post-Procedure Depth (cm) 0.1 cm 05/19/22 0943   Post-Procedure Surface Area (cm^2) 3.8 cm^2 05/19/22 0943   Post-Procedure Volume (cm^3) 0.38 cm^3 05/19/22 0943   Wound Assessment Pink/red;Slough 05/19/22 0923   Drainage Amount Moderate  05/19/22 0923   Drainage Description Serosanguinous 05/19/22 0923   Odor None 05/19/22 0923   Aimee-wound Assessment Intact;Fragile 05/19/22 0923   Margins Defined edges 05/19/22 0923   Wound Thickness Description not for Pressure Injury Full thickness 05/19/22 0923   Number of days: 0       Wound 05/19/22 Abdomen Left; Lower #2 (Active)   Wound Image   05/19/22 0923   Wound Etiology Other 05/19/22 0923   Wound Cleansed Cleansed with saline 05/19/22 0923   Dressing/Treatment Antibacterial ointment;Alginate with Ag;Dry dressing 05/19/22 1003   Wound Length (cm) 5 cm 05/19/22 0923   Wound Width (cm) 9.5 cm 05/19/22 0923   Wound Depth (cm) 0.1 cm 05/19/22 0923   Wound Surface Area (cm^2) 47.5 cm^2 05/19/22 0923   Wound Volume (cm^3) 4.75 cm^3 05/19/22 0923   Wound Assessment Cunard/red;Slough 05/19/22 0923   Drainage Amount moderate 05/19/22 0923   Drainage Description Serous 05/19/22 0923   Odor None 05/19/22 0923   Aimee-wound Assessment Fragile; Intact 05/19/22 0923   Margins Undefined edges 05/19/22 0923   Wound Thickness Description not for Pressure Injury Full thickness 05/19/22 0923   Number of days: 0               Percent of Wound/Ulcer Debrided: 100%    Total Surface Area Debrided:  3.8  sq cm     Diabetic/Pressure/Non Pressure Ulcers:  Ulcer: N/A      Bleeding:  Minimal    Hemostasis Achieved:  not needed    Procedural Pain:  1  / 10     Post Procedural Pain:  0 / 10     Response to treatment:  Well tolerated by patient. Plan:     Cleanse open wound areas daily with saline, dry well, then apply mupirocin ointment to right wound bed with silvercell over. To intact non open areas of skin folds, cleanse, dry well then apply miconazole (or nystatin) powder lightly (applied to a 4 x 4 gauze). Do not apply tape to skin. Recheck in 2 weeks.        Treatment Note please see attached Discharge Instructions    Written patient dismissal instructions given to patient and signed by patient or POA. Discharge [address] and Hyper[facility]ic Medicine   Physician Orders and Discharge Instructions  [facility]  [address] [facility] [address] [facility]  [facility]  Telephone: [phone]      NAME:  Ladonna Ramey          YOB: 1975  MEDICAL RECORD NUMBER:  [id number]    Your  is:  Adán Penny    Home Care/Facility:    Wound Location: Right and Left Lower Abdomen    Dressing orders: 1. Cleanse wound(s) with normal saline. THIN LAYER OF MUPIROCIN  2. Apply dry SILVERCEL OR CALCIUM ALGINATE WITH Ag or eqivalent to wound bed. 3. DRY GAUZE OR ABD and light tape if needed  4. Change daily     Compression:    Offloading Device:    Other Instructions: use nystatin powder to other rash areas;     Mupirocin at pharmacy    Keep all dressings clean, dry and intact. Keep pressure off the wound(s) at all times. Follow up visit   2 Weeks  June 2, 2022 @  11:15    Please give 24 hour notice if unable to keep appointment. [phone]    If you experience any of the following, please call the Wound Care Service at  [phone] or go to the nearest emergency room. *Increase in pain *Temperature over 101 *Increase in drainage from your wound or a foul odor  *Uncontrolled swelling *Need for compression bandage changes due to slippage, breakthrough drainage       PLEASE NOTE: IF YOU ARE UNABLE TO OBTAIN WOUND SUPPLIES, CONTINUE TO USE THE SUPPLIES YOU HAVE AVAILABLE UNTIL YOU ARE ABLE TO REACH US.  IT IS MOST IMPORTANT TO KEEP THE WOUND COVERED AT ALL TIMES                    Electronically signed by SANIA Alejo NP on 5/19/2022 at 11:44 AM

## 2022-05-19 NOTE — PLAN OF CARE
Problem: Chronic Conditions and Co-morbidities  Goal: Patient's chronic conditions and co-morbidity symptoms are monitored and maintained or improved  Outcome: Progressing     Problem: Wound:  Goal: Will show signs of wound healing; wound closure and no evidence of infection  Description: Will show signs of wound healing; wound closure and no evidence of infection  Outcome: Progressing

## 2022-05-20 ENCOUNTER — NURSE TRIAGE (OUTPATIENT)
Dept: OTHER | Facility: CLINIC | Age: 47
End: 2022-05-20

## 2022-05-20 ENCOUNTER — APPOINTMENT (OUTPATIENT)
Dept: CT IMAGING | Age: 47
End: 2022-05-20
Payer: COMMERCIAL

## 2022-05-20 ENCOUNTER — APPOINTMENT (OUTPATIENT)
Dept: GENERAL RADIOLOGY | Age: 47
End: 2022-05-20
Payer: COMMERCIAL

## 2022-05-20 ENCOUNTER — HOSPITAL ENCOUNTER (EMERGENCY)
Age: 47
Discharge: HOME | End: 2022-05-20
Attending: EMERGENCY MEDICINE
Payer: COMMERCIAL

## 2022-05-20 VITALS
HEART RATE: 80 BPM | RESPIRATION RATE: 20 BRPM | OXYGEN SATURATION: 95 % | WEIGHT: 293 LBS | SYSTOLIC BLOOD PRESSURE: 142 MMHG | DIASTOLIC BLOOD PRESSURE: 78 MMHG | TEMPERATURE: 98.7 F | HEIGHT: 66 IN | BODY MASS INDEX: 47.09 KG/M2

## 2022-05-20 DIAGNOSIS — R20.0 LEFT FACIAL NUMBNESS: Primary | ICD-10-CM

## 2022-05-20 LAB
ALBUMIN SERPL-MCNC: 4 G/DL (ref 3.5–4.6)
ALP BLD-CCNC: 93 U/L (ref 40–130)
ALT SERPL-CCNC: 25 U/L (ref 0–33)
ANION GAP SERPL CALCULATED.3IONS-SCNC: 13 MEQ/L (ref 9–15)
APTT: 27.9 SEC (ref 24.4–36.8)
AST SERPL-CCNC: 26 U/L (ref 0–35)
BASOPHILS ABSOLUTE: 0.1 K/UL (ref 0–0.2)
BASOPHILS RELATIVE PERCENT: 0.9 %
BILIRUB SERPL-MCNC: 0.3 MG/DL (ref 0.2–0.7)
BUN BLDV-MCNC: 19 MG/DL (ref 6–20)
CALCIUM SERPL-MCNC: 9.8 MG/DL (ref 8.5–9.9)
CHLORIDE BLD-SCNC: 98 MEQ/L (ref 95–107)
CO2: 27 MEQ/L (ref 20–31)
CREAT SERPL-MCNC: 0.79 MG/DL (ref 0.5–0.9)
EOSINOPHILS ABSOLUTE: 0.1 K/UL (ref 0–0.7)
EOSINOPHILS RELATIVE PERCENT: 0.6 %
GFR AFRICAN AMERICAN: >60
GFR NON-AFRICAN AMERICAN: >60
GLOBULIN: 3.6 G/DL (ref 2.3–3.5)
GLUCOSE BLD-MCNC: 206 MG/DL (ref 70–99)
HCT VFR BLD CALC: 37 % (ref 37–47)
HEMOGLOBIN: 11.9 G/DL (ref 12–16)
INR BLD: 1
LYMPHOCYTES ABSOLUTE: 2.9 K/UL (ref 1–4.8)
LYMPHOCYTES RELATIVE PERCENT: 25.1 %
MAGNESIUM: 1.6 MG/DL (ref 1.7–2.4)
MCH RBC QN AUTO: 25.5 PG (ref 27–31.3)
MCHC RBC AUTO-ENTMCNC: 32.1 % (ref 33–37)
MCV RBC AUTO: 79.5 FL (ref 82–100)
MONOCYTES ABSOLUTE: 0.6 K/UL (ref 0.2–0.8)
MONOCYTES RELATIVE PERCENT: 4.9 %
NEUTROPHILS ABSOLUTE: 7.9 K/UL (ref 1.4–6.5)
NEUTROPHILS RELATIVE PERCENT: 68.5 %
PDW BLD-RTO: 17.2 % (ref 11.5–14.5)
PLATELET # BLD: 238 K/UL (ref 130–400)
POTASSIUM SERPL-SCNC: 3.9 MEQ/L (ref 3.4–4.9)
PROTHROMBIN TIME: 13 SEC (ref 12.3–14.9)
RBC # BLD: 4.65 M/UL (ref 4.2–5.4)
SODIUM BLD-SCNC: 138 MEQ/L (ref 135–144)
TOTAL PROTEIN: 7.6 G/DL (ref 6.3–8)
TROPONIN: <0.01 NG/ML (ref 0–0.01)
WBC # BLD: 11.5 K/UL (ref 4.8–10.8)

## 2022-05-20 PROCEDURE — 70450 CT HEAD/BRAIN W/O DYE: CPT

## 2022-05-20 PROCEDURE — 71045 X-RAY EXAM CHEST 1 VIEW: CPT

## 2022-05-20 PROCEDURE — 99285 EMERGENCY DEPT VISIT HI MDM: CPT

## 2022-05-20 PROCEDURE — 2580000003 HC RX 258: Performed by: EMERGENCY MEDICINE

## 2022-05-20 PROCEDURE — 80053 COMPREHEN METABOLIC PANEL: CPT

## 2022-05-20 PROCEDURE — 85025 COMPLETE CBC W/AUTO DIFF WBC: CPT

## 2022-05-20 PROCEDURE — 93005 ELECTROCARDIOGRAM TRACING: CPT | Performed by: EMERGENCY MEDICINE

## 2022-05-20 PROCEDURE — 83735 ASSAY OF MAGNESIUM: CPT

## 2022-05-20 PROCEDURE — 85730 THROMBOPLASTIN TIME PARTIAL: CPT

## 2022-05-20 PROCEDURE — 84484 ASSAY OF TROPONIN QUANT: CPT

## 2022-05-20 PROCEDURE — 6370000000 HC RX 637 (ALT 250 FOR IP): Performed by: EMERGENCY MEDICINE

## 2022-05-20 PROCEDURE — 85610 PROTHROMBIN TIME: CPT

## 2022-05-20 PROCEDURE — 36415 COLL VENOUS BLD VENIPUNCTURE: CPT

## 2022-05-20 RX ORDER — 0.9 % SODIUM CHLORIDE 0.9 %
1000 INTRAVENOUS SOLUTION INTRAVENOUS ONCE
Status: COMPLETED | OUTPATIENT
Start: 2022-05-20 | End: 2022-05-20

## 2022-05-20 RX ORDER — ASPIRIN 81 MG/1
324 TABLET, CHEWABLE ORAL ONCE
Status: COMPLETED | OUTPATIENT
Start: 2022-05-20 | End: 2022-05-20

## 2022-05-20 RX ORDER — ASPIRIN 325 MG
325 TABLET, DELAYED RELEASE (ENTERIC COATED) ORAL EVERY 6 HOURS PRN
Qty: 30 TABLET | Refills: 3 | Status: SHIPPED | OUTPATIENT
Start: 2022-05-20

## 2022-05-20 RX ADMIN — SODIUM CHLORIDE 1000 ML: 9 INJECTION, SOLUTION INTRAVENOUS at 15:57

## 2022-05-20 RX ADMIN — ASPIRIN 81 MG 324 MG: 81 TABLET ORAL at 17:52

## 2022-05-20 ASSESSMENT — ENCOUNTER SYMPTOMS
COUGH: 0
BACK PAIN: 0
SORE THROAT: 0
SHORTNESS OF BREATH: 0
DIARRHEA: 0
VOMITING: 0
ABDOMINAL PAIN: 0
NAUSEA: 0

## 2022-05-20 ASSESSMENT — PAIN - FUNCTIONAL ASSESSMENT
PAIN_FUNCTIONAL_ASSESSMENT: NONE - DENIES PAIN
PAIN_FUNCTIONAL_ASSESSMENT: NONE - DENIES PAIN

## 2022-05-20 NOTE — ED PROVIDER NOTES
[address] [facility] ED  eMERGENCYdEPARTMENT eNCOUnter      Pt Name: Suyapa Lima  MRN: [id number]  [place] 1975  Date of evaluation: 5/20/2022  Tran Krishnamurthy MD    CHIEF COMPLAINT           HPI  Suyapa Lima is a 56 y.o. female per chart review has a h/o HTN, hpl, low back pain, polymyalgia, GERD, DM II presents to the ED with L facial numbness. Pt notes gradual onset, moderate, constant, L sided facial numbness since 5:30 am.  Pt denies fever, headache, cp, sob, ab pain, dysuria, diarrhea. ROS  Review of Systems   Constitutional: Negative for activity change, chills and fever. HENT: Negative for ear pain and sore throat. Eyes: Negative for visual disturbance. Respiratory: Negative for cough and shortness of breath. Cardiovascular: Negative for chest pain, palpitations and leg swelling. Gastrointestinal: Negative for abdominal pain, diarrhea, nausea and vomiting. Genitourinary: Negative for dysuria. Musculoskeletal: Negative for back pain. Skin: Negative for rash. Neurological: Positive for numbness. Negative for dizziness and weakness. Except as noted above the remainder of the review of systems was reviewed and negative.        PAST MEDICAL HISTORY     Past Medical History:   Diagnosis Date    Abnormal Pap smear of cervix     Acute midline thoracic back pain 9/18/2018    B12 deficiency     Family history of premature CAD 9/4/2013    Fatty liver     Gastroesophageal reflux disease 1/6/2021    Gastroparesis     HPV (human papilloma virus) anogenital infection     Hyperlipidemia     Hypertension     Irritable bowel syndrome with diarrhea 4/26/2021    Liver hemangioma 2009/2015    Lumbar radiculopathy 7/7/2021    Obesity 3/11/13    BMI 43.42    Orthostatic hypotension     Ovarian cyst     PMR (polymyalgia rheumatica) (HCC)     Rectal fissure     Tobacco abuse 9/3/2015    Tobacco abuse     Tremor     Uncontrolled diabetes mellitus with complications ([address]     Unspecified sleep apnea          SURGICAL HISTORY       Past Surgical History:   Procedure Laterality Date    CARDIAC CATHETERIZATION  4-07    COLONOSCOPY  2013    for diarrhea (-)    COLONOSCOPY N/A 2/10/2021    COLONOSCOPY DIAGNOSTIC performed by Tiera Cuba MD at [facility] 96  12-10    [facility]  1-05    IN MUSCLE BIOPSY Left 9/21/2018    LEFT QUADRICEPS MUSCLE BIOPSY performed by Jerome Boyle MD at [facility] UNI/BI CANNULATION N/A 9/18/2018    T 12 VERTEBRALPLASTY ROOM 278 performed by Sarah Park MD at [facility]  10/13/15     DR. HERRERA     UPPER GASTROINTESTINAL ENDOSCOPY N/A 2/10/2021    EGD ESOPHAGOGASTRODUODENOSCOPY performed by Tiera Cuba MD at [facility]  6-2015         CURRENTMEDICATIONS       Previous Medications    ALPRAZOLAM (XANAX) 0.5 MG TABLET    Take 1 tablet by mouth daily as needed for Anxiety for up to 45 days. Take 0.5 mg by mouth 3 times daily as needed for Anxiety. BACLOFEN (LIORESAL) 10 MG TABLET    TAKE ONE-HALF TABLET BY MOUTH TWICE A DAY    BLOOD GLUCOSE MONITORING SUPPL (PRODIGY AUTOCODE BLOOD GLUCOSE) W/DEVICE KIT    Use as directed to test up to 4 times daily    BLOOD GLUCOSE TEST STRIPS (PRODIGY NO CODING BLOOD GLUC) STRIP    1 each by In Vitro route 4 times daily As needed. BLOOD GLUCOSE TEST STRIPS (PRODIGY NO CODING BLOOD GLUC) STRIP    1 each by In Vitro route 4 times daily As needed.     CLOTRIMAZOLE-BETAMETHASONE (LOTRISONE) 1-0.05 % CREAM    APPLY TO AFFECTED AREA(S) TOPICALLY TWO TIMES A DAY    CONTINUOUS BLOOD GLUC  (FREESTYLE JAIRO 14 DAY READER) ALEXANDREA    As directed    CONTINUOUS BLOOD GLUC SENSOR (FREESTYLE JAIRO 14 DAY SENSOR) MISC    CHANGE EVERY 14 DAYS    CPAP MACHINE MISC    New CPAP with 8 cm and supply    CPAP MACHINE MISC    by Does not apply route Change CPAP pressure to 14 cm    ERYTHROMYCIN BASE (E-MYCIN) 250 MG TABLET    TAKE 1 TABLET BY MOUTH 3 TIMES A DAY    ESOMEPRAZOLE (NEXIUM) 40 MG DELAYED RELEASE CAPSULE    TAKE 1 CAPSULE BY MOUTH ONE TIME A DAY    GENTAMICIN (GARAMYCIN) 0.1 % OINTMENT    Apply topically daily    HANDICAP PLACARD MISC    Exp 5 years    HYDROCHLOROTHIAZIDE (HYDRODIURIL) 25 MG TABLET    Take 1 tablet by mouth daily    INSULIN GLARGINE, 2 UNIT DIAL, (TOUJEO MAX SOLOSTAR) 300 UNIT/ML SOPN    150 units at bedtime    INSULIN LISPRO, 1 UNIT DIAL, (HUMALOG KWIKPEN) 100 UNIT/ML SOPN    INJECT 20-40 UNITS UNDER THE SKIN WITH EACH MEAL    INSULIN PEN NEEDLE (PEN NEEDLES) 32G X 4 MM MISC    Use as directed with insulin pens, 4 times daily    LOSARTAN (COZAAR) 50 MG TABLET    Take 1 tablet by mouth daily    METFORMIN (GLUCOPHAGE) 500 MG TABLET    TAKE 1 TABLET BY MOUTH 3 TIMES A DAY    METOCLOPRAMIDE (REGLAN) 10 MG TABLET    Take 1 tablet by mouth 3 times daily (with meals)    METOPROLOL (LOPRESSOR) 100 MG TABLET    TAKE 1 TABLET BY MOUTH 2 TIMES A DAY    MICONAZOLE (MICOTIN) 2 % CREAM    Apply topically 2 times daily. MICONAZOLE POWD    Apply topically BID    MUPIROCIN (BACTROBAN) 2 % OINTMENT    Apply topically daily to open wound areas after cleansing with saline. NYSTATIN (NYAMYC) 989864 UNIT/GM POWDER    APPLY TOPICALLY THREE TIMES A DAY    NYSTATIN-TRIAMCINOLONE (MYCOLOG II) 130437-2.1 UNIT/GM-% CREAM    Apply topically 2 times daily.     ONDANSETRON (ZOFRAN-ODT) 4 MG DISINTEGRATING TABLET    DISSOLVE ONE TABLET BY MOUTH EVERY 8 HOURS AS NEEDED FOR NAUSEA OR VOMITING    PIOGLITAZONE (ACTOS) 30 MG TABLET    TAKE 1 TABLET BY MOUTH ONE TIME A DAY    PREDNISONE (DELTASONE) 5 MG TABLET    TAKE 1 TABLET BY MOUTH ONE TIME A DAY    PRODIGY LANCETS 28G MISC    Use as directed to test up to 4 times daily    ROSUVASTATIN (CRESTOR) 10 MG TABLET    TAKE ONE TABLET BY MOUTH NIGHTLY    SOLIFENACIN (VESICARE) 10 MG TABLET    TAKE 1 TABLET BY MOUTH ONE TIME A DAY    VENLAFAXINE (EFFEXOR XR) 75 MG EXTENDED RELEASE CAPSULE    TAKE TWO CAPSULES BY MOUTH EVERY MORNING AND ONE CAPSULE BY MOUTH EVERY EVENING       ALLERGIES     Morphine and related, Ace inhibitors, Bactrim [sulfamethoxazole-trimethoprim], Nitroglycerin, Percocet [oxycodone-acetaminophen], and Victoza [liraglutide]    FAMILY HISTORY       Family History   Problem Relation Age of Onset    Diabetes Father     Heart Disease Mother     Colon Cancer Neg Hx     Cancer Neg Hx           SOCIAL HISTORY       Social History     Socioeconomic History    Marital status:      Spouse name: Not on file    Number of children: 1    Years of education: Not on file    Highest education level: Not on file   Occupational History    Not on file   Tobacco Use    Smoking status: Former Smoker     Packs/day: 1.00     Types: Cigarettes     Quit date: 2017     Years since quittin.3    Smokeless tobacco: Never Used   Vaping Use    Vaping Use: Never used   Substance and Sexual Activity    Alcohol use: Yes     Alcohol/week: 0.0 standard drinks     Comment: socially    Drug use: No    Sexual activity: Yes     Partners: Male     Birth control/protection: Surgical     Comment: single   Other Topics Concern    Not on file   Social History Narrative    Social/Functional History          Social Determinants of Health     Financial Resource Strain: Low Risk     Difficulty of Paying Living Expenses: Not hard at all   Food Insecurity: No Food Insecurity    Worried About Running Out of Food in the Last Year: Never true    Jung of Food in the Last Year: Never true   Transportation Needs: No Transportation Needs    Lack of Transportation (Medical): No    Lack of Transportation (Non-Medical):  No   Physical Activity:     Days of Exercise per Week: Not on file    Minutes of Exercise per Session: Not on file   Stress:     Feeling of Stress : Not on file   Social Connections:     Frequency of Communication with Friends and Family: Not on file    Frequency of Social Gatherings with Friends and Family: Not on file    Attends Congregation Services: Not on file    Active Member of Clubs or Organizations: Not on file    Attends Club or Organization Meetings: Not on file    Marital Status: Not on file   Intimate Partner Violence:     Fear of Current or Ex-Partner: Not on file    Emotionally Abused: Not on file    Physically Abused: Not on file    Sexually Abused: Not on file   Housing Stability:     Unable to Pay for Housing in the Last Year: Not on file    Number of [place] in the Last Year: Not on file    Unstable Housing in the Last Year: Not on file         PHYSICAL EXAM       ED Triage Vitals [05/20/22 1514]   BP Temp Temp Source Pulse Resp SpO2 Height Weight   (!) 137/108 98.7 °F (37.1 °C) Oral 80 20 100 % 5' 6\" (1.676 m) --       Physical Exam  Vitals and nursing note reviewed. Constitutional:       Appearance: She is well-developed. HENT:      Head: Normocephalic. Right Ear: External ear normal.      Left Ear: External ear normal.   Eyes:      Conjunctiva/sclera: Conjunctivae normal.      Pupils: Pupils are equal, round, and reactive to light. Cardiovascular:      Rate and Rhythm: Normal rate and regular rhythm. Heart sounds: Normal heart sounds. Pulmonary:      Effort: Pulmonary effort is normal.      Breath sounds: Normal breath sounds. Abdominal:      General: Bowel sounds are normal. There is no distension. Palpations: Abdomen is soft. Tenderness: There is no abdominal tenderness. Musculoskeletal:         General: Normal range of motion. Cervical back: Normal range of motion and neck supple. Skin:     General: Skin is warm and dry. Neurological:      Mental Status: She is alert and oriented to person, place, and time.       Comments: NIHSS 0   Psychiatric:         Mood and Affect: Mood normal.           MDM  55 yo female presents to the ED with L sided facial numbness. Pt is afebrile, hemodynamically stable. Pt given 1 L NS in the ED. EKG shows NSR with HR 78, normal axis, normal intervals, no ST changes. Labs remarkable for glucose 206, WBC 11.  CT head negative. CXR negative. Suspect possible thalamic CVA given L sided facial numbness/tingling. Plan was to admit the patient however pt refuses admission as pt's daughter's graduation is tomorrow. I spoke to the pt extensively and thoroughly about the possible diagnosis and possible CVA. Pt given PO asa in the ED. Pt states she will return tomorrow after graduation if her symptoms persistent. Pt understood and is axox3. Pt will be started on asa. Pt and I state that if pt's symptoms disappear she will continue asa and f/u with neurology outpatient. Pt discharged with prescription for asa and will f/u with pcp. Pt understands plan. FINAL IMPRESSION      1.  Left facial numbness          DISPOSITION/PLAN   DISPOSITION Decision To Discharge 05/20/2022 05:31:46 PM        DISCHARGE MEDICATIONS:  [unfilled]         Krista Cintron MD(electronically signed)  Attending Emergency Physician            Krista Cintron MD  05/20/22 [id number]

## 2022-05-20 NOTE — ED TRIAGE NOTES
Pt states numbness on left side face since about 0530  Mainly left eye, nose, and cheek. Denies pain weakness. Called nurse access line and they recommended she get check out.

## 2022-05-20 NOTE — PLAN OF CARE
[address] f: [phone] f: [phone] p: [phone] [email or web address]      Ordering Center:     [address] [place] [address]  [address] [address]  [phone]  WOUND CARE [address] NUMBER [phone]    Patient Information:      Rohan Castañeda  [address] [facility] [address] [id number]   [phone]   : 1975  AGE: 56 y.o. GENDER: female   TODAYS DATE:  2022    Insurance:      PRIMARY INSURANCE:  Plan: [facility]  Coverage: BCBS  Effective Date: 2021  [id number] - ([facility])    SECONDARY INSURANCE:  Plan:   Coverage:   Effective Date:   [unfilled]    [unfilled]   [unfilled]     Patient Wound Information:      Problem List Items Addressed This Visit     None          WOUNDS REQUIRING DRESSING SUPPLIES:     Wound 22 Abdomen Right; Lower #1 (Active)   Wound Image   22   Wound Cleansed Cleansed with saline 22   Dressing/Treatment Antibacterial ointment;Alginate with Ag;Dry dressing 22 1003   Wound Length (cm) 1.9 cm 22 0923   Wound Width (cm) 2 cm 22 0923   Wound Depth (cm) 0.1 cm 22 0923   Wound Surface Area (cm^2) 3.8 cm^2 22 0923   Wound Volume (cm^3) 0.38 cm^3 22 0923   Post-Procedure Length (cm) 1.9 cm 22 0943   Post-Procedure Width (cm) 2 cm 22 0943   Post-Procedure Depth (cm) 0.1 cm 22 0943   Post-Procedure Surface Area (cm^2) 3.8 cm^2 22 0943   Post-Procedure Volume (cm^3) 0.38 cm^3 22 0943   Wound Assessment [place]/red;Slough 22   Drainage Amount Moderate 22   Drainage Description Serosanguinous 22   Odor None 22   Aimee-wound Assessment Intact;Fragile 22   Margins Defined edges 22   Wound Thickness Description not for Pressure Injury Full thickness 22 3606 Number of days: 1       Wound 05/19/22 Abdomen Left; Lower #2 (Active)   Wound Image   05/19/22 0923   Wound Etiology Other 05/19/22 0923   Wound Cleansed Cleansed with saline 05/19/22 0923   Dressing/Treatment Antibacterial ointment;Alginate with Ag;Dry dressing 05/19/22 1003   Wound Length (cm) 5 cm 05/19/22 0923   Wound Width (cm) 9.5 cm 05/19/22 0923   Wound Depth (cm) 0.1 cm 05/19/22 0923   Wound Surface Area (cm^2) 47.5 cm^2 05/19/22 0923   Wound Volume (cm^3) 4.75 cm^3 05/19/22 0923   Wound Assessment [place]/red;Slough 05/19/22 0923   Drainage Amount Moderate 05/19/22 0923   Drainage Description Serous 05/19/22 0923   Odor None 05/19/22 0923   Aimee-wound Assessment Fragile; Intact 05/19/22 0923   Margins Undefined edges 05/19/22 0923   Wound Thickness Description not for Pressure Injury Full thickness 05/19/22 0923   Number of days: 1          Supplies Requested :      WOUND #: 1 and 2   PRIMARY DRESSING:  Alginate with silver pad   Cover and Secure with: 4X4 gauze pad  ABD pad     FREQUENCY OF DRESSING CHANGES:  Daily   ADDITIONAL ITEMS:  [] Gloves Small  [] Gloves Medium [] Gloves Large [] Gloves XLarge  [] Tape 1\" [] Tape 2\" [] Tape 3\"  [x] Medipore Tape  [x] Saline  [] Skin Prep   [] Adhesive Remover   [] Cotton Tip Applicators   [] Other:    Patient Wound(s) Debrided: [x] Yes   [] No    Debribement Type: subcutaneous tissue    Debridement Date: 5/19/2022    Patient currently being seen by Home Health: [] Yes   [x] No    Duration for needed supplies:  []15  [x]30  []60  []90 Days    Provider Information:      PROVIDER'S NAMEFeSANIA Vazquez CNP  NPI:  [id number]

## 2022-05-23 ENCOUNTER — CARE COORDINATION (OUTPATIENT)
Dept: OTHER | Facility: CLINIC | Age: 47
End: 2022-05-23

## 2022-05-23 LAB
EKG ATRIAL RATE: 78 BPM
EKG P AXIS: 35 DEGREES
EKG P-R INTERVAL: 154 MS
EKG Q-T INTERVAL: 410 MS
EKG QRS DURATION: 78 MS
EKG QTC CALCULATION (BAZETT): 467 MS
EKG R AXIS: 18 DEGREES
EKG T AXIS: 38 DEGREES
EKG VENTRICULAR RATE: 78 BPM

## 2022-05-23 NOTE — CARE COORDINATION
[facility] ED Follow Up Call    2022    Patient: Jacquie Smiley Patient : 1975   MRN: [id number]  Reason for Admission: Facial numbness  Discharge Date: 22        Care Transitions ED Follow Up    Care Transitions Interventions    Specialty Service Referral: Completed    Do you have all of your prescriptions and are they filled?: Yes              Ambulatory Care Manager (ACM) attempted to contact the patient by telephone to perform post ED visit assessment. Left HIPPA compliant message providing introduction to self and requested a call back. Will continue to outreach to patient.    Harini Suggs

## 2022-05-24 ENCOUNTER — CARE COORDINATION (OUTPATIENT)
Dept: OTHER | Facility: CLINIC | Age: 47
End: 2022-05-24

## 2022-05-24 NOTE — CARE COORDINATION
[address] [facility] ED Follow Up Call    2022    Patient: Jacquie Smiley Patient : 1975   MRN: [id number]  Reason for Admission: Facial numbness  Discharge Date: 2022        Care Transitions ED Follow Up    Care Transitions Interventions  No Identified Needs    Specialty Service Referral: Completed    Do you have any ongoing symptoms?: No   Did you call your PCP prior to going to the ED?: No - Did not call PCP   Do you have a copy of your discharge instructions?: Yes   Do you understand what to report and when to return?: Yes   Are you following your discharge instructions?: Yes   Do you have all of your prescriptions and are they filled?: Yes   Have you scheduled your follow up appointment?: No   Were you discharged with any Home Care or Post Acute Services or do you currently have any active services?: No         Do you have any needs or concerns that I can assist you with?: No   Identified Barriers: None        Needs to be reviewed by the provider   Additional needs identified to be addressed with provider No  none           Ambulatory Care Manager (ACM) contacted the patient by telephone to perform post ED visit assessment. Call within 2 business days of discharge: Yes. Verified name and  with patient as identifiers. Patient reports that her symptoms have resolved. She denies any changes in her medications or pending diagnostic testing. She states she is otherwise doing well and no immediate or ongoing needs at this time. Interventions:    Counseling and education provided at today's visit on:   Red Flag symptoms to report  Symptom management  Provider follow up appointment compliance  Utilization of appropriate level of care: Right Care, Right Place, Right Time  Reinforced Discharge instructions    Medication reconciliation was performed with patient, who verbalizes understanding of administration of home medications.  Advised obtaining a 90-day supply of all daily and as-needed medications. Reinforced resources available to patient including: PCP  Specialist  Benefits related nurse triage line  Urgent care clinics  MyChart Messaging. ACM encouraged outreach to Nurse Access Line and/or ACM for assessment and intervention guidance as needed. ACM encouraged patient to contact 911 for life threatening emergencies and PCP office 24/7 for non life-threatening symptoms. Reminded patient of alternatives to ED such as urgent care, walk in clinics and e-visits as available. Reviewed proper ED utilization using Right Care, Right Place, Right Time Flyer. Flyer mailed with PCP name and office number. Discussed follow-up appointments. If no appointment was previously scheduled, appointment scheduling offered: Yes, declined  Is follow up appointment scheduled within 7 days of discharge? No  [facility] follow up appointment(s):   Future Appointments   Date Time Provider Iliana Garcia   6/2/2022  9:30 AM Jose R Sanchez, [address] [place] [address]   6/2/2022 11:15 AM SANIA Barnes - ELIEL [place] [address]   10/13/2022 12:15 PM Luzmaria Robert MD [facility]   10/17/2022 11:30 AM Aretha Ortega, [address] [facility]   10/18/2022  8:45 AM MD Preston Zayas   10/31/2022  1:15 PM MD Preston Duncan   10/31/2022  4:00 PM SANIA Jauregui [facility] follow up appointment(s): None    Plan:  ACM will sign off as patient states her symtpoms have resolved and denies any ongoing ACM needs at this time. Patient verbalized understanding and is agreeable.

## 2022-05-25 ENCOUNTER — HOSPITAL ENCOUNTER (EMERGENCY)
Age: 47
Discharge: HOME | End: 2022-05-26
Payer: COMMERCIAL

## 2022-05-25 DIAGNOSIS — M79.10 MYALGIA: Primary | ICD-10-CM

## 2022-05-25 PROCEDURE — 96365 THER/PROPH/DIAG IV INF INIT: CPT

## 2022-05-25 PROCEDURE — 87502 INFLUENZA DNA AMP PROBE: CPT

## 2022-05-25 PROCEDURE — 87635 SARS-COV-2 COVID-19 AMP PRB: CPT

## 2022-05-25 PROCEDURE — 96361 HYDRATE IV INFUSION ADD-ON: CPT

## 2022-05-25 PROCEDURE — 96375 TX/PRO/DX INJ NEW DRUG ADDON: CPT

## 2022-05-25 PROCEDURE — 99285 EMERGENCY DEPT VISIT HI MDM: CPT

## 2022-05-25 ASSESSMENT — PAIN DESCRIPTION - PAIN TYPE: TYPE: ACUTE PAIN

## 2022-05-25 ASSESSMENT — PAIN - FUNCTIONAL ASSESSMENT
PAIN_FUNCTIONAL_ASSESSMENT: 0-10
PAIN_FUNCTIONAL_ASSESSMENT: PREVENTS OR INTERFERES SOME ACTIVE ACTIVITIES AND ADLS

## 2022-05-25 ASSESSMENT — PAIN SCALES - GENERAL: PAINLEVEL_OUTOF10: 8

## 2022-05-25 ASSESSMENT — PAIN DESCRIPTION - DESCRIPTORS: DESCRIPTORS: ACHING

## 2022-05-25 ASSESSMENT — PAIN DESCRIPTION - ONSET: ONSET: ON-GOING

## 2022-05-25 ASSESSMENT — PAIN DESCRIPTION - LOCATION: LOCATION: HEAD;GENERALIZED

## 2022-05-25 ASSESSMENT — PAIN DESCRIPTION - FREQUENCY: FREQUENCY: CONTINUOUS

## 2022-05-26 ENCOUNTER — APPOINTMENT (OUTPATIENT)
Dept: GENERAL RADIOLOGY | Age: 47
End: 2022-05-26
Payer: COMMERCIAL

## 2022-05-26 ENCOUNTER — APPOINTMENT (OUTPATIENT)
Dept: CT IMAGING | Age: 47
End: 2022-05-26
Payer: COMMERCIAL

## 2022-05-26 VITALS
SYSTOLIC BLOOD PRESSURE: 114 MMHG | HEART RATE: 95 BPM | DIASTOLIC BLOOD PRESSURE: 51 MMHG | BODY MASS INDEX: 47.09 KG/M2 | OXYGEN SATURATION: 96 % | RESPIRATION RATE: 30 BRPM | TEMPERATURE: 98.6 F | WEIGHT: 293 LBS | HEIGHT: 66 IN

## 2022-05-26 LAB
ADENOVIRUS BY PCR: NOT DETECTED
ALBUMIN SERPL-MCNC: 3.7 G/DL (ref 3.5–4.6)
ALP BLD-CCNC: 91 U/L (ref 40–130)
ALT SERPL-CCNC: 27 U/L (ref 0–33)
ANION GAP SERPL CALCULATED.3IONS-SCNC: 15 MEQ/L (ref 9–15)
AST SERPL-CCNC: 24 U/L (ref 0–35)
ATYPICAL LYMPHOCYTE RELATIVE PERCENT: 3 %
BACTERIA: NEGATIVE /HPF
BANDED NEUTROPHILS RELATIVE PERCENT: 4 % (ref 5–11)
BASOPHILS ABSOLUTE: 0 K/UL (ref 0–0.2)
BASOPHILS RELATIVE PERCENT: 0.3 %
BILIRUB SERPL-MCNC: 0.3 MG/DL (ref 0.2–0.7)
BILIRUBIN URINE: NEGATIVE
BLOOD, URINE: NEGATIVE
BORDETELLA PARAPERTUSSIS BY PCR: NOT DETECTED
BORDETELLA PERTUSSIS BY PCR: NOT DETECTED
BUN BLDV-MCNC: 20 MG/DL (ref 6–20)
CALCIUM SERPL-MCNC: 8.8 MG/DL (ref 8.5–9.9)
CHLAMYDOPHILIA PNEUMONIAE BY PCR: NOT DETECTED
CHLORIDE BLD-SCNC: 95 MEQ/L (ref 95–107)
CLARITY: CLEAR
CO2: 22 MEQ/L (ref 20–31)
COLOR: YELLOW
CORONAVIRUS 229E BY PCR: NOT DETECTED
CORONAVIRUS HKU1 BY PCR: NOT DETECTED
CORONAVIRUS NL63 BY PCR: NOT DETECTED
CORONAVIRUS OC43 BY PCR: NOT DETECTED
CREAT SERPL-MCNC: 0.78 MG/DL (ref 0.5–0.9)
D DIMER: 1.49 MG/L FEU (ref 0–0.5)
EKG ATRIAL RATE: 100 BPM
EKG P AXIS: 40 DEGREES
EKG P-R INTERVAL: 166 MS
EKG Q-T INTERVAL: 366 MS
EKG QRS DURATION: 80 MS
EKG QTC CALCULATION (BAZETT): 472 MS
EKG R AXIS: 10 DEGREES
EKG T AXIS: 26 DEGREES
EKG VENTRICULAR RATE: 100 BPM
EOSINOPHILS ABSOLUTE: 0 K/UL (ref 0–0.7)
EOSINOPHILS RELATIVE PERCENT: 0.7 %
EPITHELIAL CELLS, UA: ABNORMAL /HPF (ref 0–5)
GFR AFRICAN AMERICAN: >60
GFR NON-AFRICAN AMERICAN: >60
GLOBULIN: 3.4 G/DL (ref 2.3–3.5)
GLUCOSE BLD-MCNC: 324 MG/DL (ref 70–99)
GLUCOSE URINE: NEGATIVE MG/DL
HCT VFR BLD CALC: 35.1 % (ref 37–47)
HEMOGLOBIN: 11.6 G/DL (ref 12–16)
HUMAN METAPNEUMOVIRUS BY PCR: NOT DETECTED
HUMAN RHINOVIRUS/ENTEROVIRUS BY PCR: NOT DETECTED
HYALINE CASTS: ABNORMAL /HPF (ref 0–5)
INFLUENZA A BY PCR: NEGATIVE
INFLUENZA A BY PCR: NOT DETECTED
INFLUENZA B BY PCR: NEGATIVE
INFLUENZA B BY PCR: NOT DETECTED
KETONES, URINE: NEGATIVE MG/DL
LACTIC ACID: 2.6 MMOL/L (ref 0.5–2.2)
LACTIC ACID: 3.4 MMOL/L (ref 0.5–2.2)
LEUKOCYTE ESTERASE, URINE: ABNORMAL
LYMPHOCYTES ABSOLUTE: 1.2 K/UL (ref 1–4.8)
LYMPHOCYTES RELATIVE PERCENT: 33 %
MAGNESIUM: 1.4 MG/DL (ref 1.7–2.4)
MCH RBC QN AUTO: 25.9 PG (ref 27–31.3)
MCHC RBC AUTO-ENTMCNC: 33.1 % (ref 33–37)
MCV RBC AUTO: 78.3 FL (ref 82–100)
MONOCYTES ABSOLUTE: 0.2 K/UL (ref 0.2–0.8)
MONOCYTES RELATIVE PERCENT: 5.9 %
MYCOPLASMA PNEUMONIAE BY PCR: NOT DETECTED
NEUTROPHILS ABSOLUTE: 1.9 K/UL (ref 1.4–6.5)
NEUTROPHILS RELATIVE PERCENT: 54 %
NITRITE, URINE: NEGATIVE
PARAINFLUENZA VIRUS 1 BY PCR: NOT DETECTED
PARAINFLUENZA VIRUS 2 BY PCR: NOT DETECTED
PARAINFLUENZA VIRUS 3 BY PCR: NOT DETECTED
PARAINFLUENZA VIRUS 4 BY PCR: NOT DETECTED
PDW BLD-RTO: 17.2 % (ref 11.5–14.5)
PH UA: 5.5 (ref 5–9)
PLATELET # BLD: 195 K/UL (ref 130–400)
PLATELET SLIDE REVIEW: NORMAL
POTASSIUM SERPL-SCNC: 4 MEQ/L (ref 3.4–4.9)
PROTEIN UA: NEGATIVE MG/DL
RBC # BLD: 4.49 M/UL (ref 4.2–5.4)
RBC # BLD: NORMAL 10*6/UL
RBC UA: ABNORMAL /HPF (ref 0–5)
RESPIRATORY SYNCYTIAL VIRUS BY PCR: NOT DETECTED
SARS-COV-2, NAAT: NOT DETECTED
SARS-COV-2, PCR: NOT DETECTED
SODIUM BLD-SCNC: 132 MEQ/L (ref 135–144)
SPECIFIC GRAVITY UA: 1.06 (ref 1–1.03)
TOTAL PROTEIN: 7.1 G/DL (ref 6.3–8)
TROPONIN: <0.01 NG/ML (ref 0–0.01)
URINE REFLEX TO CULTURE: YES
UROBILINOGEN, URINE: 0.2 E.U./DL
WBC # BLD: 3.2 K/UL (ref 4.8–10.8)
WBC UA: ABNORMAL /HPF (ref 0–5)

## 2022-05-26 PROCEDURE — 83735 ASSAY OF MAGNESIUM: CPT

## 2022-05-26 PROCEDURE — 2580000003 HC RX 258: Performed by: PHYSICIAN ASSISTANT

## 2022-05-26 PROCEDURE — 85025 COMPLETE CBC W/AUTO DIFF WBC: CPT

## 2022-05-26 PROCEDURE — 85379 FIBRIN DEGRADATION QUANT: CPT

## 2022-05-26 PROCEDURE — 81001 URINALYSIS AUTO W/SCOPE: CPT

## 2022-05-26 PROCEDURE — 71045 X-RAY EXAM CHEST 1 VIEW: CPT

## 2022-05-26 PROCEDURE — 6360000004 HC RX CONTRAST MEDICATION: Performed by: PHYSICIAN ASSISTANT

## 2022-05-26 PROCEDURE — 84484 ASSAY OF TROPONIN QUANT: CPT

## 2022-05-26 PROCEDURE — 83605 ASSAY OF LACTIC ACID: CPT

## 2022-05-26 PROCEDURE — 87040 BLOOD CULTURE FOR BACTERIA: CPT

## 2022-05-26 PROCEDURE — 36415 COLL VENOUS BLD VENIPUNCTURE: CPT

## 2022-05-26 PROCEDURE — 71275 CT ANGIOGRAPHY CHEST: CPT

## 2022-05-26 PROCEDURE — 6370000000 HC RX 637 (ALT 250 FOR IP): Performed by: PHYSICIAN ASSISTANT

## 2022-05-26 PROCEDURE — 6360000002 HC RX W HCPCS: Performed by: PERSONAL EMERGENCY RESPONSE ATTENDANT

## 2022-05-26 PROCEDURE — 87086 URINE CULTURE/COLONY COUNT: CPT

## 2022-05-26 PROCEDURE — 93005 ELECTROCARDIOGRAM TRACING: CPT | Performed by: PHYSICIAN ASSISTANT

## 2022-05-26 PROCEDURE — 0202U NFCT DS 22 TRGT SARS-COV-2: CPT

## 2022-05-26 PROCEDURE — 80053 COMPREHEN METABOLIC PANEL: CPT

## 2022-05-26 RX ORDER — KETOROLAC TROMETHAMINE 30 MG/ML
30 INJECTION, SOLUTION INTRAMUSCULAR; INTRAVENOUS ONCE
Status: COMPLETED | OUTPATIENT
Start: 2022-05-26 | End: 2022-05-26

## 2022-05-26 RX ORDER — MAGNESIUM SULFATE IN WATER 40 MG/ML
2000 INJECTION, SOLUTION INTRAVENOUS ONCE
Status: COMPLETED | OUTPATIENT
Start: 2022-05-26 | End: 2022-05-26

## 2022-05-26 RX ORDER — ACETAMINOPHEN 500 MG
1000 TABLET ORAL ONCE
Status: COMPLETED | OUTPATIENT
Start: 2022-05-26 | End: 2022-05-26

## 2022-05-26 RX ORDER — 0.9 % SODIUM CHLORIDE 0.9 %
1000 INTRAVENOUS SOLUTION INTRAVENOUS ONCE
Status: COMPLETED | OUTPATIENT
Start: 2022-05-26 | End: 2022-05-26

## 2022-05-26 RX ADMIN — KETOROLAC TROMETHAMINE 30 MG: 30 INJECTION, SOLUTION INTRAMUSCULAR; INTRAVENOUS at 03:19

## 2022-05-26 RX ADMIN — SODIUM CHLORIDE 1000 ML: 9 INJECTION, SOLUTION INTRAVENOUS at 00:10

## 2022-05-26 RX ADMIN — IOPAMIDOL 100 ML: 612 INJECTION, SOLUTION INTRAVENOUS at 02:40

## 2022-05-26 RX ADMIN — MAGNESIUM SULFATE HEPTAHYDRATE 2000 MG: 40 INJECTION, SOLUTION INTRAVENOUS at 02:07

## 2022-05-26 RX ADMIN — SODIUM CHLORIDE 1000 ML: 9 INJECTION, SOLUTION INTRAVENOUS at 02:00

## 2022-05-26 RX ADMIN — ACETAMINOPHEN 1000 MG: 500 TABLET ORAL at 00:08

## 2022-05-26 ASSESSMENT — PAIN - FUNCTIONAL ASSESSMENT
PAIN_FUNCTIONAL_ASSESSMENT: 0-10
PAIN_FUNCTIONAL_ASSESSMENT: NONE - DENIES PAIN

## 2022-05-26 ASSESSMENT — ENCOUNTER SYMPTOMS
TROUBLE SWALLOWING: 0
ALLERGIC/IMMUNOLOGIC NEGATIVE: 1
NAUSEA: 1
EYE PAIN: 0
SHORTNESS OF BREATH: 1
CHEST TIGHTNESS: 1
COLOR CHANGE: 0
APNEA: 0
ABDOMINAL PAIN: 0

## 2022-05-26 ASSESSMENT — PAIN SCALES - GENERAL
PAINLEVEL_OUTOF10: 8
PAINLEVEL_OUTOF10: 6

## 2022-05-26 ASSESSMENT — PAIN DESCRIPTION - LOCATION: LOCATION: HEAD

## 2022-05-26 ASSESSMENT — PAIN DESCRIPTION - FREQUENCY: FREQUENCY: CONTINUOUS

## 2022-05-26 NOTE — ED NOTES
Patient given dc instructions education   Up with steady gait  Iv discontinued  Denies questions      Wilfredo Quiles RN  05/26/22 1909

## 2022-05-26 NOTE — ED TRIAGE NOTES
Pt c/o chills, a headache, body aches, and SOB with exertion that started today, Pt is A&OX3, calm, ambulatory, afebrile, breathes are equal and unlabored.

## 2022-05-26 NOTE — ED PROVIDER NOTES
[address] [facility] ED  EMERGENCY DEPARTMENT ENCOUNTER      Pt Name: Corey Murcia  MRN: [id number]  [place] 1975  Date of evaluation: 5/25/2022  Provider: Justine Malagon Dr       Chief Complaint   Patient presents with    Illness     pt c/o a headahce, chills, body aches, and SOB with exertion that started today         HISTORY OF PRESENT ILLNESS   (Location/Symptom, Timing/Onset, Context/Setting, Quality, Duration, Modifying Factors, Severity)  Note limiting factors. Corey Murcia is a 56 y.o. female who presents to the emergency department with complaints of fever, chills, myalgias, exertional shortness of breath and pain with deep breaths. Patient states that the symptoms began suddenly this afternoon. Patient took 800 mg of ibuprofen without improvement. Patient denies any vomiting or diarrhea but does state that she has had some nausea. No abdominal pain. HPI    Nursing Notes were reviewed. REVIEW OF SYSTEMS    (2-9 systems for level 4, 10 or more for level 5)     Review of Systems   Constitutional: Positive for chills, fatigue and fever. Negative for diaphoresis. HENT: Negative for hearing loss and trouble swallowing. Eyes: Negative for pain. Respiratory: Positive for chest tightness and shortness of breath. Negative for apnea. Cardiovascular: Negative for chest pain. Gastrointestinal: Positive for nausea. Negative for abdominal pain. Endocrine: Negative. Genitourinary: Negative for hematuria. Musculoskeletal: Positive for myalgias. Negative for neck pain and neck stiffness. Skin: Negative for color change. Allergic/Immunologic: Negative. Neurological: Negative for dizziness and numbness. Hematological: Negative. Psychiatric/Behavioral: Negative. All other systems reviewed and are negative. Except as noted above the remainder of the review of systems was reviewed and negative.        PAST MEDICAL HISTORY     Past Medical History:   Diagnosis Date    Abnormal Pap smear of cervix     Acute midline thoracic back pain 9/18/2018    B12 deficiency     Family history of premature CAD 9/4/2013    Fatty liver     Gastroesophageal reflux disease 1/6/2021    Gastroparesis     HPV (human papilloma virus) anogenital infection     Hyperlipidemia     Hypertension     Irritable bowel syndrome with diarrhea 4/26/2021    Liver hemangioma 2009/2015    Lumbar radiculopathy 7/7/2021    Obesity 3/11/13    BMI 43.42    Orthostatic hypotension     Ovarian cyst     PMR (polymyalgia rheumatica) (HCC)     Rectal fissure     Tobacco abuse 9/3/2015    Tobacco abuse     Tremor     Uncontrolled diabetes mellitus with complications ([facility]     Unspecified sleep apnea          SURGICAL HISTORY       Past Surgical History:   Procedure Laterality Date    CARDIAC CATHETERIZATION  4-07    COLONOSCOPY  2013    for diarrhea (-)    COLONOSCOPY N/A 2/10/2021    COLONOSCOPY DIAGNOSTIC performed by Maximino Valdes MD at [place] [address]  12-10    [facility]  1-05    IA MUSCLE BIOPSY Left 9/21/2018    LEFT QUADRICEPS MUSCLE BIOPSY performed by [facility]keon [facility] UNI/BI CANNULATION N/A 9/18/2018    T 12 VERTEBRALPLASTY ROOM 278 performed by Elida Cook MD at [facility] ENDOSCOPY  10/13/15     DR. HERRERA     UPPER GASTROINTESTINAL ENDOSCOPY N/A 2/10/2021    EGD ESOPHAGOGASTRODUODENOSCOPY performed by Maximino Valdes MD at [facility]  6-2015         CURRENT MEDICATIONS       Previous Medications    ALPRAZOLAM (XANAX) 0.5 MG TABLET    Take 1 tablet by mouth daily as needed for Anxiety for up to 45 days. Take 0.5 mg by mouth 3 times daily as needed for Anxiety.     ASPIRIN 325 MG EC TABLET    Take 1 tablet by mouth every 6 hours as needed for Pain    BACLOFEN (LIORESAL) 10 MG TABLET    TAKE ONE-HALF TABLET BY MOUTH TWICE A DAY    BLOOD GLUCOSE MONITORING SUPPL (PRODIGY AUTOCODE BLOOD GLUCOSE) W/DEVICE KIT    Use as directed to test up to 4 times daily    BLOOD GLUCOSE TEST STRIPS (PRODIGY NO CODING BLOOD GLUC) STRIP    1 each by In Vitro route 4 times daily As needed. BLOOD GLUCOSE TEST STRIPS (PRODIGY NO CODING BLOOD GLUC) STRIP    1 each by In Vitro route 4 times daily As needed. CLOTRIMAZOLE-BETAMETHASONE (LOTRISONE) 1-0.05 % CREAM    APPLY TO AFFECTED AREA(S) TOPICALLY TWO TIMES A DAY    CONTINUOUS BLOOD GLUC  (FREESTYLE JAIRO 14 DAY READER) ALEXANDREA    As directed    CONTINUOUS BLOOD GLUC SENSOR (FREESTYLE JAIRO 14 DAY SENSOR) MISC    CHANGE EVERY 14 DAYS    CPAP MACHINE MISC    New CPAP with 8 cm and supply    CPAP MACHINE MISC    by Does not apply route Change CPAP pressure to 14 cm    ERYTHROMYCIN BASE (E-MYCIN) 250 MG TABLET    TAKE 1 TABLET BY MOUTH 3 TIMES A DAY    ESOMEPRAZOLE (NEXIUM) 40 MG DELAYED RELEASE CAPSULE    TAKE 1 CAPSULE BY MOUTH ONE TIME A DAY    GENTAMICIN (GARAMYCIN) 0.1 % OINTMENT    Apply topically daily    HANDICAP PLACARD MISC    Exp 5 years    HYDROCHLOROTHIAZIDE (HYDRODIURIL) 25 MG TABLET    Take 1 tablet by mouth daily    INSULIN GLARGINE, 2 UNIT DIAL, (TOUJEO MAX SOLOSTAR) 300 UNIT/ML SOPN    150 units at bedtime    INSULIN LISPRO, 1 UNIT DIAL, (HUMALOG KWIKPEN) 100 UNIT/ML SOPN    INJECT 20-40 UNITS UNDER THE SKIN WITH EACH MEAL    INSULIN PEN NEEDLE (PEN NEEDLES) 32G X 4 MM MISC    Use as directed with insulin pens, 4 times daily    LOSARTAN (COZAAR) 50 MG TABLET    Take 1 tablet by mouth daily    METFORMIN (GLUCOPHAGE) 500 MG TABLET    TAKE 1 TABLET BY MOUTH 3 TIMES A DAY    METOCLOPRAMIDE (REGLAN) 10 MG TABLET    Take 1 tablet by mouth 3 times daily (with meals)    METOPROLOL (LOPRESSOR) 100 MG TABLET    TAKE 1 TABLET BY MOUTH 2 TIMES A DAY    MICONAZOLE (MICOTIN) 2 % CREAM    Apply topically 2 times daily.     MICONAZOLE POWD    Apply topically BID    MUPIROCIN (BACTROBAN) 2 % OINTMENT    Apply topically daily to open wound areas after cleansing with saline. NYSTATIN (NYAMYC) 780557 UNIT/GM POWDER    APPLY TOPICALLY THREE TIMES A DAY    NYSTATIN-TRIAMCINOLONE (MYCOLOG II) 724307-3.1 UNIT/GM-% CREAM    Apply topically 2 times daily. ONDANSETRON (ZOFRAN-ODT) 4 MG DISINTEGRATING TABLET    DISSOLVE ONE TABLET BY MOUTH EVERY 8 HOURS AS NEEDED FOR NAUSEA OR VOMITING    PIOGLITAZONE (ACTOS) 30 MG TABLET    TAKE 1 TABLET BY MOUTH ONE TIME A DAY    PREDNISONE (DELTASONE) 5 MG TABLET    TAKE 1 TABLET BY MOUTH ONE TIME A DAY    PRODIGY LANCETS 28G MISC    Use as directed to test up to 4 times daily    ROSUVASTATIN (CRESTOR) 10 MG TABLET    TAKE ONE TABLET BY MOUTH NIGHTLY    SOLIFENACIN (VESICARE) 10 MG TABLET    TAKE 1 TABLET BY MOUTH ONE TIME A DAY    VENLAFAXINE (EFFEXOR XR) 75 MG EXTENDED RELEASE CAPSULE    TAKE TWO CAPSULES BY MOUTH EVERY MORNING AND ONE CAPSULE BY MOUTH EVERY EVENING       ALLERGIES     Morphine and related, Ace inhibitors, Bactrim [sulfamethoxazole-trimethoprim], Nitroglycerin, Percocet [oxycodone-acetaminophen], and Victoza [liraglutide]    FAMILY HISTORY       Family History   Problem Relation Age of Onset    Diabetes Father     Heart Disease Mother     Colon Cancer Neg Hx     Cancer Neg Hx           SOCIAL HISTORY       Social History     Socioeconomic History    Marital status:      Spouse name: None    Number of children: 1    Years of education: None    Highest education level: None   Occupational History    None   Tobacco Use    Smoking status: Former Smoker     Packs/day: 1.00     Types: Cigarettes     Quit date: 2017     Years since quittin.4    Smokeless tobacco: Never Used   Vaping Use    Vaping Use: Never used   Substance and Sexual Activity    Alcohol use:  Yes     Alcohol/week: 0.0 standard drinks     Comment: socially    Drug use: No    Sexual activity: Yes     Partners: Male Birth control/protection: Surgical     Comment: single   Other Topics Concern    None   Social History Narrative    Social/Functional History          Social Determinants of Health     Financial Resource Strain:     Difficulty of Paying Living Expenses: Not on file   Food Insecurity:     Worried About Running Out of Food in the Last Year: Not on file    Jung of Food in the Last Year: Not on file   Transportation Needs:     Lack of Transportation (Medical): Not on file    Lack of Transportation (Non-Medical):  Not on file   Physical Activity:     Days of Exercise per Week: Not on file    Minutes of Exercise per Session: Not on file   Stress:     Feeling of Stress : Not on file   Social Connections:     Frequency of Communication with Friends and Family: Not on file    Frequency of Social Gatherings with Friends and Family: Not on file    Attends Zoroastrianism Services: Not on file    Active Member of [address] [place] [address] [employer] or Organizations: Not on file    Attends Club or Organization Meetings: Not on file    Marital Status: Not on file   Intimate Partner Violence:     Fear of Current or Ex-Partner: Not on file    Emotionally Abused: Not on file    Physically Abused: Not on file    Sexually Abused: Not on file   Housing Stability:     Unable to Pay for Housing in the Last Year: Not on file    Number of [place] in the Last Year: Not on file    Unstable Housing in the Last Year: Not on file       SCREENINGS        Neel Coma Scale  Eye Opening: Spontaneous  Best Verbal Response: Oriented  Best Motor Response: Obeys commands  Neel Coma Scale Score: 15               PHYSICAL EXAM    (up to 7 for level 4, 8 or more for level 5)     ED Triage Vitals   BP Temp Temp Source Heart Rate Resp SpO2 Height Weight   05/25/22 2302 05/25/22 2302 05/25/22 2302 05/25/22 2302 05/25/22 2302 05/25/22 2303 05/25/22 2302 05/25/22 2302   112/60 98.8 °F (37.1 °C) Oral (!) 126 20 94 % 5' 6\" (1.676 m) 300 lb (136.1 kg) Physical Exam  Vitals and nursing note reviewed. Constitutional:       General: She is not in acute distress. Appearance: She is well-developed. She is not diaphoretic. HENT:      Head: Normocephalic and atraumatic. Mouth/Throat:      Pharynx: No oropharyngeal exudate. Eyes:      General: No scleral icterus. Conjunctiva/sclera: Conjunctivae normal.      Pupils: Pupils are equal, round, and reactive to light. Neck:      Trachea: No tracheal deviation. Cardiovascular:      Rate and Rhythm: Tachycardia present. Heart sounds: Normal heart sounds. Pulmonary:      Effort: Pulmonary effort is normal. No respiratory distress. Breath sounds: Normal breath sounds. Abdominal:      General: Bowel sounds are normal. There is no distension. Palpations: Abdomen is soft. Musculoskeletal:         General: Normal range of motion. Cervical back: Normal range of motion and neck supple. Skin:     General: Skin is warm and dry. Findings: No erythema or rash. Neurological:      Mental Status: She is alert and oriented to person, place, and time. Cranial Nerves: No cranial nerve deficit. Motor: No abnormal muscle tone. Psychiatric:         Behavior: Behavior normal.         Thought Content:  Thought content normal.         Judgment: Judgment normal.         DIAGNOSTIC RESULTS     EKG: All EKG's are interpreted by the Emergency Department Physician who either signs or Co-signs this chart in the absence of a cardiologist.    Normal sinus rhythm, rate 100 bpm, no acute ST elevation or ischemic changes    RADIOLOGY:   Non-plain film images such as CT, Ultrasound and MRI are read by the radiologist. Plain radiographic images are visualized and preliminarily interpreted by the emergency physician with the below findings:      Interpretation per the Radiologist below, if available at the time of this note:    XR CHEST PORTABLE    (Results Pending)   CTA CHEST W WO CONTRAST (Results Pending)         ED BEDSIDE ULTRASOUND:   Performed by ED Physician - none    LABS:  Labs Reviewed   COMPREHENSIVE METABOLIC PANEL - Abnormal; Notable for the following components:       Result Value    Sodium 132 (*)     Glucose 324 (*)     All other components within normal limits   CBC WITH AUTO DIFFERENTIAL - Abnormal; Notable for the following components:    WBC 3.2 (*)     Hemoglobin 11.6 (*)     Hematocrit 35.1 (*)     MCV 78.3 (*)     MCH 25.9 (*)     RDW 17.2 (*)     Bands Relative 4 (*)     All other components within normal limits    Narrative:     Ena Kemp tel. [phone],  Coag results called to and read back by Colleen Franco, 05/26/2022 01:27, by  LILI   MAGNESIUM - Abnormal; Notable for the following components:    Magnesium 1.4 (*)     All other components within normal limits   LACTIC ACID - Abnormal; Notable for the following components:    Lactic Acid 3.4 (*)     All other components within normal limits    Narrative:     CALL  Lui  [facility] tel. [phone],  Lactate results called to and read back by Pranav Carreon, 05/26/2022 01:13,  by RAYO   D-DIMER, QUANTITATIVE - Abnormal; Notable for the following components:    D-Dimer, Quant 1.49 (*)     All other components within normal limits    Narrative:     Enavernon Kemp tel. [phone],  Coag results called to and read back by, 05/26/2022 01:25, by [address] TO CULTURE - Abnormal; Notable for the following components:    Leukocyte Esterase, Urine TRACE (*)     All other components within normal limits   LACTIC ACID - Abnormal; Notable for the following components:    Lactic Acid 2.6 (*)     All other components within normal limits   MICROSCOPIC URINALYSIS - Abnormal; Notable for the following components:    WBC, UA 10-20 (*)     All other components within normal limits   COVID-19, RAPID   RAPID INFLUENZA A/B ANTIGENS   RESPIRATORY PANEL, MOLECULAR, WITH COVID-19   CULTURE, BLOOD 1   CULTURE, BLOOD 2 CULTURE, URINE   TROPONIN       All other labs were within normal range or not returned as of this dictation. EMERGENCY DEPARTMENT COURSE and DIFFERENTIAL DIAGNOSIS/MDM:   Vitals:    Vitals:    05/26/22 0030 05/26/22 0200 05/26/22 0245 05/26/22 0322   BP: (!) 107/52 103/60 (!) 121/49 (!) 140/73   Pulse: (!) 109 (!) 116 (!) 105 94   Resp: 26 24 16   Temp:  98.6 °F (37 °C)     TempSrc:  Oral     SpO2: 94% 93% 96% 97%   Weight:       Height:               MDM    Sodium 132, Mg 1.4, lactic acid 3.4, glucose 324, Ddimer 1.49. CTA chest shows no PE, lung opacities, pneumothorax. Heart is enlarged. Patient given 2 L IV fluid, Tylenol, magnesium IV. Respiratory panel added. Lactic acid repeated which has improved. Patient reassessment states symptoms are much improved, slight headache is starting to return. Given Toradol. Urine shows no infection. Patient appears nontoxic in no apparent distress, eager to leave. Aware to keep her self hydrated at home. States she has inhaler at home if needed. Standard anticipatory guidance given to patient upon discharge. Have given them a specific time frame in which to follow-up and who to follow-up with. I have also advised them that they should return to the emergency department if they get worse, or not getting better or develop any new or concerning symptoms. Patient demonstrates understanding. REASSESSMENT          CRITICAL CARE TIME   Total Critical Care time was 0 minutes, excluding separately reportable procedures. There was a high probability of clinically significant/life threatening deterioration in the patient's condition which required my urgent intervention. CONSULTS:  None    PROCEDURES:  Unless otherwise noted below, none     Procedures        FINAL IMPRESSION      1.  Myalgia          DISPOSITION/PLAN   DISPOSITION        PATIENT REFERRED TO:  Jamie Calvin MD  [address] [place] [address]  [phone]            [address] [place] [address] MEDICATIONS:  New Prescriptions    No medications on file     Controlled Substances Monitoring:     RX Monitoring 7/21/2019   Attestation -   Periodic Controlled Substance Monitoring Possible medication side effects, risk of tolerance/dependence & alternative treatments discussed. ;No signs of potential drug abuse or diversion identified. Chronic Pain > 120 MEDD Obtained or confirmed \"Medication Contract\" on file. (Please note that portions of this note were completed with a voice recognition program.  Efforts were made to edit the dictations but occasionally words are mis-transcribed. )    TONY Milan (electronically signed)  Attending Emergency Physician            TONY Jackman  05/26/22 [id number]

## 2022-05-27 ENCOUNTER — CARE COORDINATION (OUTPATIENT)
Dept: OTHER | Facility: CLINIC | Age: 47
End: 2022-05-27

## 2022-05-27 LAB — URINE CULTURE, ROUTINE: NORMAL

## 2022-05-27 NOTE — CARE COORDINATION
[facility] ED Follow Up Call    2022    Patient: Aj Deleon Patient : 1975   MRN: [id number]  Reason for Admission: Myalgia  Discharge Date: 22        Care Transitions ED Follow Up    Care Transitions Interventions    Specialty Service Referral: Completed    Schedule Follow Up Appointment with Physician: Completed  Do you have all of your prescriptions and are they filled?: Yes

## 2022-05-31 ENCOUNTER — OFFICE VISIT (OUTPATIENT)
Dept: FAMILY MEDICINE CLINIC | Age: 47
End: 2022-05-31
Payer: COMMERCIAL

## 2022-05-31 VITALS
OXYGEN SATURATION: 96 % | BODY MASS INDEX: 47.09 KG/M2 | RESPIRATION RATE: 18 BRPM | TEMPERATURE: 97.4 F | HEIGHT: 66 IN | SYSTOLIC BLOOD PRESSURE: 130 MMHG | WEIGHT: 293 LBS | DIASTOLIC BLOOD PRESSURE: 76 MMHG | HEART RATE: 86 BPM

## 2022-05-31 DIAGNOSIS — H10.32 ACUTE BACTERIAL CONJUNCTIVITIS OF LEFT EYE: Primary | ICD-10-CM

## 2022-05-31 LAB
BLOOD CULTURE, ROUTINE: NORMAL
CULTURE, BLOOD 2: NORMAL

## 2022-05-31 PROCEDURE — 99213 OFFICE O/P EST LOW 20 MIN: CPT

## 2022-05-31 RX ORDER — LEVOFLOXACIN 5 MG/ML
SOLUTION/ DROPS TOPICAL
Qty: 5 ML | Refills: 1 | Status: SHIPPED | OUTPATIENT
Start: 2022-05-31

## 2022-05-31 SDOH — ECONOMIC STABILITY: FOOD INSECURITY: WITHIN THE PAST 12 MONTHS, YOU WORRIED THAT YOUR FOOD WOULD RUN OUT BEFORE YOU GOT MONEY TO BUY MORE.: NEVER TRUE

## 2022-05-31 SDOH — ECONOMIC STABILITY: FOOD INSECURITY: WITHIN THE PAST 12 MONTHS, THE FOOD YOU BOUGHT JUST DIDN'T LAST AND YOU DIDN'T HAVE MONEY TO GET MORE.: NEVER TRUE

## 2022-05-31 ASSESSMENT — ENCOUNTER SYMPTOMS
PHOTOPHOBIA: 0
EYE PAIN: 1
RESPIRATORY NEGATIVE: 1
EYE DISCHARGE: 1
EYE ITCHING: 0
RHINORRHEA: 0
SINUS PAIN: 0
SINUS PRESSURE: 0
SORE THROAT: 0
EYE REDNESS: 1

## 2022-05-31 ASSESSMENT — VISUAL ACUITY: OU: 1

## 2022-05-31 ASSESSMENT — SOCIAL DETERMINANTS OF HEALTH (SDOH): HOW HARD IS IT FOR YOU TO PAY FOR THE VERY BASICS LIKE FOOD, HOUSING, MEDICAL CARE, AND HEATING?: NOT HARD AT ALL

## 2022-05-31 NOTE — PROGRESS NOTES
[facility] [address] Encounter        ASSESSMENT/PLAN     Sherri Morse is a 56 y.o. female who presents with:  Reporting pain redness and crusting discharge to left eye beginning this morning. She denies any change in vision. She denies any trauma or likelihood of foreign body in the eye. On examination the conjunctive is pink in color lids are inverted there are no foreign bodies or hordeolum's noted. 1. Acute bacterial conjunctivitis of left eye      Due to allergy to Bactrim levofloxacin ophthalmic drops are ordered for patient. She prefers not to use an ointment. Instructions for use reviewed with patient    PATIENT REFERRED TO:  Return if symptoms worsen or fail to improve. DISCHARGE MEDICATIONS:  New Prescriptions    LEVOFLOXACIN (QUIXIN) 0.5 % OPHTHALMIC SOLUTION    1 to 2 drops in left eye every 2 hours while awake for 2 days then you may reduce dosing to 1 to 2 drops in left eye every 4 hours while awake for 5 days     Cannot display discharge medications since this is not an admission. SANIA Rangel - CNP    CHIEF COMPLAINT       Chief Complaint   Patient presents with    Eye Problem     left eye redness and itching. pt woke up with this problem this morning. she reports her eye feels \"gritty\"          SUBJECTIVE/REVIEW OF SYSTEMS     Review of Systems   Constitutional: Negative for chills, diaphoresis, fatigue and fever. HENT: Negative for congestion, ear pain, postnasal drip, rhinorrhea, sinus pressure, sinus pain and sore throat. Eyes: Positive for pain, discharge and redness. Negative for photophobia, itching and visual disturbance. Respiratory: Negative. Cardiovascular: Negative. Skin: Negative. Neurological: Negative for dizziness, light-headedness, numbness and headaches. Psychiatric/Behavioral: Negative for agitation, confusion and self-injury. The patient is not nervous/anxious.         OBJECTIVE/PHYSICAL EXAM Physical Exam  Constitutional:       General: She is not in acute distress. Appearance: She is not ill-appearing or diaphoretic. HENT:      Head: Normocephalic. Right Ear: External ear normal.      Left Ear: External ear normal.      Nose: Nose normal. No congestion or rhinorrhea. Mouth/Throat:      Mouth: Mucous membranes are moist.   Eyes:      General: Lids are normal. Lids are everted, no foreign bodies appreciated. Vision grossly intact. Gaze aligned appropriately. No visual field deficit or scleral icterus. Right eye: No foreign body, discharge or hordeolum. Left eye: No foreign body, discharge or hordeolum. Extraocular Movements:      Right eye: Normal extraocular motion and no nystagmus. Left eye: Normal extraocular motion and no nystagmus. Conjunctiva/sclera:      Right eye: Right conjunctiva is not injected. No chemosis, exudate or hemorrhage. Left eye: Left conjunctiva is injected. No chemosis, exudate or hemorrhage. Cardiovascular:      Rate and Rhythm: Normal rate and regular rhythm. Pulses: Normal pulses. Pulmonary:      Effort: Pulmonary effort is normal. No respiratory distress. Musculoskeletal:      Cervical back: No rigidity or tenderness. Lymphadenopathy:      Cervical: No cervical adenopathy. Skin:     General: Skin is warm and dry. Capillary Refill: Capillary refill takes less than 2 seconds. Coloration: Skin is not jaundiced or pale. Findings: No bruising, erythema, lesion or rash. Neurological:      Mental Status: She is alert and oriented to person, place, and time.    Psychiatric:         Mood and Affect: Mood normal.         Behavior: Behavior normal.         VITALS  BP: 130/76, Temp: 97.4 °F (36.3 °C), Temp Source: Temporal, Heart Rate: 86, Resp: 18, SpO2: 96 %      PAST MEDICAL HISTORY         Diagnosis Date    Abnormal Pap smear of cervix     Acute midline thoracic back pain 9/18/2018    B12 deficiency  Family history of premature CAD 9/4/2013    Fatty liver     Gastroesophageal reflux disease 1/6/2021    Gastroparesis     HPV (human papilloma virus) anogenital infection     Hyperlipidemia     Hypertension     Irritable bowel syndrome with diarrhea 4/26/2021    Liver hemangioma 2009/2015    Lumbar radiculopathy 7/7/2021    Obesity 3/11/13    BMI 43.42    Orthostatic hypotension     Ovarian cyst     PMR (polymyalgia rheumatica) (HCC)     Rectal fissure     Tobacco abuse 9/3/2015    Tobacco abuse     Tremor     Uncontrolled diabetes mellitus with complications ([facility] [address]     Unspecified sleep apnea      SURGICAL HISTORY     Patient  has a past surgical history that includes Tonsillectomy and adenoidectomy (1981); LEEP (1-05); Hysterectomy (12-10); Colonoscopy (2013); Urethra surgery (6-2015); Upper gastrointestinal endoscopy (10/13/15 ); pr muscle biopsy (Left, 9/21/2018); pr perq vert agmntj cavity crtj uni/bi cannulation (N/A, 9/18/2018); Cardiac catheterization (4-07); Upper gastrointestinal endoscopy (N/A, 2/10/2021); Colonoscopy (N/A, 2/10/2021); and amos and bso (cervix removed). CURRENT MEDICATIONS       Previous Medications    ALPRAZOLAM (XANAX) 0.5 MG TABLET    Take 1 tablet by mouth daily as needed for Anxiety for up to 45 days. Take 0.5 mg by mouth 3 times daily as needed for Anxiety. ASPIRIN 325 MG EC TABLET    Take 1 tablet by mouth every 6 hours as needed for Pain    BACLOFEN (LIORESAL) 10 MG TABLET    TAKE ONE-HALF TABLET BY MOUTH TWICE A DAY    BLOOD GLUCOSE MONITORING SUPPL (PRODIGY AUTOCODE BLOOD GLUCOSE) W/DEVICE KIT    Use as directed to test up to 4 times daily    BLOOD GLUCOSE TEST STRIPS (PRODIGY NO CODING BLOOD GLUC) STRIP    1 each by In Vitro route 4 times daily As needed. BLOOD GLUCOSE TEST STRIPS (PRODIGY NO CODING BLOOD GLUC) STRIP    1 each by In Vitro route 4 times daily As needed.     CLOTRIMAZOLE-BETAMETHASONE (LOTRISONE) 1-0.05 % CREAM    APPLY TO AFFECTED AREA(S) TOPICALLY TWO TIMES A DAY    CONTINUOUS BLOOD GLUC  (FREESTYLE JAIRO 14 DAY READER) ALEXANDREA    As directed    CONTINUOUS BLOOD GLUC SENSOR (FREESTYLE JAIRO 14 DAY SENSOR) MISC    CHANGE EVERY 14 DAYS    CPAP MACHINE MISC    New CPAP with 8 cm and supply    CPAP MACHINE MISC    by Does not apply route Change CPAP pressure to 14 cm    ERYTHROMYCIN BASE (E-MYCIN) 250 MG TABLET    TAKE 1 TABLET BY MOUTH 3 TIMES A DAY    ESOMEPRAZOLE (NEXIUM) 40 MG DELAYED RELEASE CAPSULE    TAKE 1 CAPSULE BY MOUTH ONE TIME A DAY    GENTAMICIN (GARAMYCIN) 0.1 % OINTMENT    Apply topically daily    HANDICAP PLACARD MISC    Exp 5 years    HYDROCHLOROTHIAZIDE (HYDRODIURIL) 25 MG TABLET    Take 1 tablet by mouth daily    INSULIN GLARGINE, 2 UNIT DIAL, (TOUJEO MAX SOLOSTAR) 300 UNIT/ML SOPN    150 units at bedtime    INSULIN LISPRO, 1 UNIT DIAL, (HUMALOG KWIKPEN) 100 UNIT/ML SOPN    INJECT 20-40 UNITS UNDER THE SKIN WITH EACH MEAL    INSULIN PEN NEEDLE (PEN NEEDLES) 32G X 4 MM MISC    Use as directed with insulin pens, 4 times daily    LOSARTAN (COZAAR) 50 MG TABLET    Take 1 tablet by mouth daily    METFORMIN (GLUCOPHAGE) 500 MG TABLET    TAKE 1 TABLET BY MOUTH 3 TIMES A DAY    METOCLOPRAMIDE (REGLAN) 10 MG TABLET    Take 1 tablet by mouth 3 times daily (with meals)    METOPROLOL (LOPRESSOR) 100 MG TABLET    TAKE 1 TABLET BY MOUTH 2 TIMES A DAY    MICONAZOLE (MICOTIN) 2 % CREAM    Apply topically 2 times daily. MICONAZOLE POWD    Apply topically BID    NYSTATIN (NYAMYC) 381925 UNIT/GM POWDER    APPLY TOPICALLY THREE TIMES A DAY    NYSTATIN-TRIAMCINOLONE (MYCOLOG II) 442049-4.1 UNIT/GM-% CREAM    Apply topically 2 times daily.     ONDANSETRON (ZOFRAN-ODT) 4 MG DISINTEGRATING TABLET    DISSOLVE ONE TABLET BY MOUTH EVERY 8 HOURS AS NEEDED FOR NAUSEA OR VOMITING    PIOGLITAZONE (ACTOS) 30 MG TABLET    TAKE 1 TABLET BY MOUTH ONE TIME A DAY    PREDNISONE (DELTASONE) 5 MG TABLET    TAKE 1 TABLET BY MOUTH ONE TIME A DAY    PRODIGY

## 2022-05-31 NOTE — PATIENT INSTRUCTIONS
Patient Education        Pinkeye: Care Instructions  Overview     Pinkeye is redness and swelling of the eye surface and the conjunctiva (the lining of the eyelid and the covering of the white part of the eye). Pinkeye is also called conjunctivitis. Pinkeye is often caused by infection with bacteriaor a virus. Dry air, allergies, smoke, and chemicals are other common causes. Pinkeye often gets better on its own in 7 to 10 days. Antibiotics only help if the pinkeye is caused by bacteria. Pinkeye caused by infection spreads easily. If an allergy or chemical is causing pinkeye, it will not go away unless youcan avoid whatever is causing it. Follow-up care is a key part of your treatment and safety. Be sure to make and go to all appointments, and call your doctor if you are having problems. It's also a good idea to know your test results and keep alist of the medicines you take. How can you care for yourself at home?  Wash your hands often. Always wash them before and after you treat pinkeye or touch your eyes or face.  Use moist cotton or a clean, wet cloth to remove crust. Wipe from the inside corner of the eye to the outside. Use a clean part of the cloth for each wipe.  Put cold or warm wet cloths on your eye a few times a day if the eye hurts.  Do not wear contact lenses or eye makeup until the pinkeye is gone. Throw away any eye makeup you were using when you got pinkeye. Clean your contacts and storage case. If you wear disposable contacts, use a new pair when your eye has cleared and it is safe to wear contacts again.  If the doctor gave you antibiotic ointment or eyedrops, use them as directed. Use the medicine for as long as instructed, even if your eye starts looking better soon. Keep the bottle tip clean, and do not let it touch the eye area.  To put in eyedrops or ointment:  ? Tilt your head back, and pull your lower eyelid down with one finger. ?  Drop or squirt the medicine inside the lower lid.  ? Close your eye for 30 to 60 seconds to let the drops or ointment move around. ? Do not touch the ointment or dropper tip to your eyelashes or any other surface.  Do not share towels, pillows, or washcloths while you have pinkeye. When should you call for help? Call your doctor now or seek immediate medical care if:     You have pain in your eye, not just irritation on the surface.      You have a change in vision or loss of vision.      You have an increase in discharge from the eye.      Your eye has not started to improve or begins to get worse within 48 hours after you start using antibiotics.      Pinkeye lasts longer than 7 days. Watch closely for changes in your health, and be sure to contact your doctor ifyou have any problems. Where can you learn more? Go to [email or web address] and sign in to your [employer] account. Enter [id number] in the [employer] box to learn more about \"Pinkeye: Care Instructions. \"     If you do not have an account, please click on the \"Sign Up Now\" link. Current as of: July 1, 2021               Content Version: 13.2  © 1087-5311 [employer]. Care instructions adapted under license by [facility] ([facility]). If you have questions about a medical condition or this instruction, always ask your healthcare professional. [address] any warranty or liability for your use of this information.

## 2022-06-02 ENCOUNTER — HOSPITAL ENCOUNTER (OUTPATIENT)
Dept: WOUND CARE | Age: 47
Discharge: HOME | End: 2022-06-02
Payer: COMMERCIAL

## 2022-06-02 VITALS
RESPIRATION RATE: 18 BRPM | TEMPERATURE: 98.1 F | DIASTOLIC BLOOD PRESSURE: 54 MMHG | HEART RATE: 71 BPM | SYSTOLIC BLOOD PRESSURE: 111 MMHG

## 2022-06-02 DIAGNOSIS — S31.109A OPEN WOUND OF ABDOMEN, INITIAL ENCOUNTER: ICD-10-CM

## 2022-06-02 DIAGNOSIS — L30.4 ERYTHEMA INTERTRIGO: Primary | ICD-10-CM

## 2022-06-02 PROCEDURE — 99213 OFFICE O/P EST LOW 20 MIN: CPT | Performed by: NURSE PRACTITIONER

## 2022-06-02 PROCEDURE — 6370000000 HC RX 637 (ALT 250 FOR IP): Performed by: NURSE PRACTITIONER

## 2022-06-02 PROCEDURE — 99213 OFFICE O/P EST LOW 20 MIN: CPT

## 2022-06-02 RX ORDER — LIDOCAINE 50 MG/G
OINTMENT TOPICAL ONCE
OUTPATIENT
Start: 2022-06-02 | End: 2022-06-02

## 2022-06-02 RX ORDER — LIDOCAINE HYDROCHLORIDE 20 MG/ML
JELLY TOPICAL ONCE
OUTPATIENT
Start: 2022-06-02 | End: 2022-06-02

## 2022-06-02 RX ORDER — BETAMETHASONE DIPROPIONATE 0.05 %
OINTMENT (GRAM) TOPICAL ONCE
OUTPATIENT
Start: 2022-06-02 | End: 2022-06-02

## 2022-06-02 RX ORDER — GENTAMICIN SULFATE 1 MG/G
OINTMENT TOPICAL ONCE
OUTPATIENT
Start: 2022-06-02 | End: 2022-06-02

## 2022-06-02 RX ORDER — BACITRACIN ZINC AND POLYMYXIN B SULFATE 500; 1000 [USP'U]/G; [USP'U]/G
OINTMENT TOPICAL ONCE
OUTPATIENT
Start: 2022-06-02 | End: 2022-06-02

## 2022-06-02 RX ORDER — BACITRACIN, NEOMYCIN, POLYMYXIN B 400; 3.5; 5 [USP'U]/G; MG/G; [USP'U]/G
OINTMENT TOPICAL ONCE
OUTPATIENT
Start: 2022-06-02 | End: 2022-06-02

## 2022-06-02 RX ORDER — LIDOCAINE HYDROCHLORIDE 40 MG/ML
SOLUTION TOPICAL ONCE
OUTPATIENT
Start: 2022-06-02 | End: 2022-06-02

## 2022-06-02 RX ORDER — CLOBETASOL PROPIONATE 0.5 MG/G
OINTMENT TOPICAL ONCE
OUTPATIENT
Start: 2022-06-02 | End: 2022-06-02

## 2022-06-02 RX ORDER — LIDOCAINE 40 MG/G
CREAM TOPICAL ONCE
OUTPATIENT
Start: 2022-06-02 | End: 2022-06-02

## 2022-06-02 RX ORDER — GINSENG 100 MG
CAPSULE ORAL ONCE
OUTPATIENT
Start: 2022-06-02 | End: 2022-06-02

## 2022-06-02 RX ADMIN — MUPIROCIN: 20 OINTMENT TOPICAL at 11:18

## 2022-06-02 NOTE — PROGRESS NOTES
[address]   Progress Note and Procedure Note      Michelle Matamoros  MEDICAL RECORD NUMBER:  [id number]  AGE: 56 y.o. GENDER: female  : 1975  EPISODE DATE:  2022    Subjective:     Chief Complaint   Patient presents with    Wound Check     right and left lower abdomen         HISTORY of PRESENT ILLNESS HPI    Michelle Matamoros is a 56 y.o. female who presents today for wound/ulcer evaluation. History of Wound Context: Patient has lengthy history of non-healing intertriginous wounds of abdomen. PCP treated previous outbreaks with oral antibiotics. Not seen in the wound center since 2021. On  presents to wound center with right side abdomen open wound with fibrotic slough covering, and left abdomen with widespread erythema to left intertriginous fold of abdomen. Has co-morbidities of: obesity (BMI 50.04) polymyalgia rheumatica, CAD, pulmonary hypertension, IDDM2, IBS, and GERD. Patient is on prednisone 5 mg for polymyaligia which will likely slow/complicate the healing process. Today, the wound to the right abdomen is clean, with improved dimensions,  left abdomen discoloration general area is improved with one partial thickness area remaining. Last HgA1C 8.2 on 22, TSH 0.946 (WNL). Since last WC vs has also been in ER for left facial numbness on , then again on 22 for fever, chills, myalgias, and dyspnea. Glucose in ER on that date was 324. To outpatient clinic on 22 dx with conjunctivitis of left eye. Today in wound center denies needs, in good spirits and reports wounds are improving and treatment going well.    Wound/Ulcer Pain Timing/Severity: intermittent  Quality of pain: tender  Severity:  2 / 10   Modifying Factors: Pain worsens with care and subsides following completion of   Associated Signs/Symptoms: erythema, drainage and pain     Ulcer Identification:  Ulcer Type: undetermined-  Likely shear in intertriginous areas   Contributing Factors: diabetes, poor glucose control, chronic pressure, obesity and immunosuppression     Wound: N/A        PAST MEDICAL HISTORY        Diagnosis Date    Abnormal Pap smear of cervix     Acute midline thoracic back pain 9/18/2018    B12 deficiency     Family history of premature CAD 9/4/2013    Fatty liver     Gastroesophageal reflux disease 1/6/2021    Gastroparesis     HPV (human papilloma virus) anogenital infection     Hyperlipidemia     Hypertension     Irritable bowel syndrome with diarrhea 4/26/2021    Liver hemangioma 2009/2015    Lumbar radiculopathy 7/7/2021    Obesity 3/11/13    BMI 43.42    Orthostatic hypotension     Ovarian cyst     PMR (polymyalgia rheumatica) (HCC)     Rectal fissure     Tobacco abuse 9/3/2015    Tobacco abuse     Tremor     Uncontrolled diabetes mellitus with complications ([address]     Unspecified sleep apnea        Problem List:    Patient Active Problem List   Diagnosis    Orthostatic hypotension    Cobalamin deficiency    HPV (human papilloma virus) anogenital infection    Cyst of ovary    Morbid obesity ([address]    RAE (obstructive sleep apnea)    Dyslipidemia    FH: premature coronary heart disease    Adult body mass index 40 and over    Pulmonary hypertension ([address]    Herpes simplex type 1 infection    Tobacco user    Type 2 diabetes mellitus with complication, with long-term current use of insulin (HCC)    Pruritus of vagina    Vaginal irritation    Paraparesis (HCC)    Compression fracture of lumbar vertebra (HCC)    Acute thoracic back pain    Muscle weakness    Muscle pain    Fracture of first lumbar vertebra (HCC)    PMR (polymyalgia rheumatica) (HCC)    Disorder of muscle, unspecified    Diarrhea    Nausea and vomiting    Gastroesophageal reflux disease    Gastritis without bleeding    Polyp of colon    Gastroparesis    Irritable bowel syndrome with diarrhea    Wound drainage    Lumbar radiculopathy    Erythema intertrigo    Open wound of abdomen    Splenomegaly    Areflexia      Problem List Items Addressed This Visit        Other    Erythema intertrigo - Primary    Relevant Orders    Initiate Outpatient Wound Care Protocol    Open wound of abdomen    Relevant Orders    Initiate Outpatient Wound Care Protocol           PAST SURGICAL HISTORY    Past Surgical History:   Procedure Laterality Date    CARDIAC CATHETERIZATION      COLONOSCOPY      for diarrhea (-)    COLONOSCOPY N/A 2/10/2021    COLONOSCOPY DIAGNOSTIC performed by Aníbal Gutierrez MD at [place] [address]  12-10    [facility]  1-05    FL MUSCLE BIOPSY Left 2018    LEFT QUADRICEPS MUSCLE BIOPSY performed by Esther Mccullough MD at [facility] UNI/BI CANNULATION N/A 2018    T 12 VERTEBRALPLASTY ROOM 278 performed by Lalo Wallis MD at [facility]  10/13/15     DR. HERRERA     UPPER GASTROINTESTINAL ENDOSCOPY N/A 2/10/2021    EGD ESOPHAGOGASTRODUODENOSCOPY performed by An[facility]bal [facility]rez MD at [facility]         FAMILY HISTORY    Family History   Problem Relation Age of Onset    Diabetes Father     Heart Disease Mother     Colon Cancer Neg Hx     Cancer Neg Hx        SOCIAL HISTORY    Social History     Tobacco Use    Smoking status: Former Smoker     Packs/day: 1.00     Types: Cigarettes     Quit date: 2017     Years since quittin.4    Smokeless tobacco: Never Used   Vaping Use    Vaping Use: Never used   Substance Use Topics    Alcohol use:  Yes     Alcohol/week: 0.0 standard drinks     Comment: socially    Drug use: No       ALLERGIES    Allergies   Allergen Reactions    Morphine And Related Hives and Rash    Ace Inhibitors Other (See Comments)     Dizziness and near syncope    Bactrim [Sulfamethoxazole-Trimethoprim] Itching    Nitroglycerin Other (See Comments) Migraine    Percocet [Oxycodone-Acetaminophen] Nausea And Vomiting    Victoza [Liraglutide]      NAUSEA       MEDICATIONS    Current Outpatient Medications on File Prior to Encounter   Medication Sig Dispense Refill    levoFLOXacin (QUIXIN) 0.5 % ophthalmic solution 1 to 2 drops in left eye every 2 hours while awake for 2 days then you may reduce dosing to 1 to 2 drops in left eye every 4 hours while awake for 5 days 5 mL 1    aspirin 325 MG EC tablet Take 1 tablet by mouth every 6 hours as needed for Pain 30 tablet 3    ALPRAZolam (XANAX) 0.5 MG tablet Take 1 tablet by mouth daily as needed for Anxiety for up to 45 days. Take 0.5 mg by mouth 3 times daily as needed for Anxiety.  30 tablet 0    insulin lispro, 1 Unit Dial, (HUMALOG KWIKPEN) 100 UNIT/ML SOPN INJECT 20-40 UNITS UNDER THE SKIN WITH EACH MEAL 90 pen 3    clotrimazole-betamethasone (LOTRISONE) 1-0.05 % cream APPLY TO AFFECTED AREA(S) TOPICALLY TWO TIMES A DAY 45 g 3    erythromycin base (E-MYCIN) 250 MG tablet TAKE 1 TABLET BY MOUTH 3 TIMES A DAY 90 tablet 3    venlafaxine (EFFEXOR XR) 75 MG extended release capsule TAKE TWO CAPSULES BY MOUTH EVERY MORNING AND ONE CAPSULE BY MOUTH EVERY EVENING 270 capsule 1    ondansetron (ZOFRAN-ODT) 4 MG disintegrating tablet DISSOLVE ONE TABLET BY MOUTH EVERY 8 HOURS AS NEEDED FOR NAUSEA OR VOMITING 60 tablet 3    predniSONE (DELTASONE) 5 MG tablet TAKE 1 TABLET BY MOUTH ONE TIME A DAY 30 tablet 3    Miconazole POWD Apply topically BID 3 each 1    hydroCHLOROthiazide (HYDRODIURIL) 25 MG tablet Take 1 tablet by mouth daily 90 tablet 1    esomeprazole (NEXIUM) 40 MG delayed release capsule TAKE 1 CAPSULE BY MOUTH ONE TIME A DAY 30 capsule 3    pioglitazone (ACTOS) 30 MG tablet TAKE 1 TABLET BY MOUTH ONE TIME A DAY 90 tablet 0    gentamicin (GARAMYCIN) 0.1 % ointment Apply topically daily 1 each 0    CPAP Machine MISC by Does not apply route Change CPAP pressure to 14 cm 1 each 0    metoprolol (LOPRESSOR) 100 MG tablet TAKE 1 TABLET BY MOUTH 2 TIMES A  tablet 1    blood glucose test strips (PRODIGY NO CODING BLOOD GLUC) strip 1 each by In Vitro route 4 times daily As needed. 400 each 3    nystatin (NYAMYC) 070562 UNIT/GM powder APPLY TOPICALLY THREE TIMES A DAY 15 g 2    Prodigy Lancets 28G MISC Use as directed to test up to 4 times daily 400 each 3    baclofen (LIORESAL) 10 MG tablet TAKE ONE-HALF TABLET BY MOUTH TWICE A DAY 90 tablet 1    rosuvastatin (CRESTOR) 10 MG tablet TAKE ONE TABLET BY MOUTH NIGHTLY 90 tablet 1    Insulin Glargine, 2 Unit Dial, (TOUJEO MAX SOLOSTAR) 300 UNIT/ML SOPN 150 units at bedtime 90 pen 3    nystatin-triamcinolone (MYCOLOG II) 479008-2.1 UNIT/GM-% cream Apply topically 2 times daily. 30 g 0    losartan (COZAAR) 50 MG tablet Take 1 tablet by mouth daily 90 tablet 1    Continuous Blood Gluc Sensor (FREESTYLE JAIRO 14 DAY SENSOR) [facility] CHANGE EVERY 14 DAYS 6 each 3    CPAP Machine MISC New CPAP with 8 cm and supply 1 each 0    solifenacin (VESICARE) 10 MG tablet TAKE 1 TABLET BY MOUTH ONE TIME A DAY 90 tablet 3    metFORMIN (GLUCOPHAGE) 500 MG tablet TAKE 1 TABLET BY MOUTH 3 TIMES A  tablet 3    metoclopramide (REGLAN) 10 MG tablet Take 1 tablet by mouth 3 times daily (with meals) 360 tablet 3    Continuous Blood Gluc  (FREESTYLE JAIRO 14 DAY READER) ALEXANDREA As directed 1 Device 00    Insulin Pen Needle (PEN NEEDLES) 32G X 4 MM MISC Use as directed with insulin pens, 4 times daily 400 each 1    blood glucose test strips (PRODIGY NO CODING BLOOD GLUC) strip 1 each by In Vitro route 4 times daily As needed. 400 each 3    [DISCONTINUED] sucralfate (CARAFATE) 1 GM tablet Take 1 tablet by mouth 4 times daily for 7 days 28 tablet 0    Blood Glucose Monitoring Suppl (PRODIGY AUTOCODE BLOOD GLUCOSE) w/Device KIT Use as directed to test up to 4 times daily 1 kit 0    miconazole (MICOTIN) 2 % cream Apply topically 2 times daily.  141 g 3    Handicap Amy MISC Exp 5 years 1 each 0     No current facility-administered medications on file prior to encounter. REVIEW OF SYSTEMS    Pertinent items are noted in HPI. Objective:      BP (!) 111/54   Pulse 71   Temp 98.1 °F (36.7 °C) (Temporal)   Resp 18   LMP  (LMP Unknown)     Wt Readings from Last 3 Encounters:   05/31/22 (!) 315 lb (142.9 kg)   05/25/22 300 lb (136.1 kg)   05/20/22 300 lb (136.1 kg)       PHYSICAL EXAM    Constitutional:   Well nourished and well developed. Appears neat and clean. Patient is alert, oriented x3, and in no apparent distress. Respiratory:  Respiratory effort is easy and symmetric bilaterally. Rate is normal at rest and on room air. Vascular:   Extremities negative for pitting edema. Neurological:  Gross and Light touch intact. Dermatological:  Wound description noted in wound assessment. Right abdomen wound is clean, pink/red and smooth- no slough. Left abdomen generalized area of discoloration remains but is much improved, with one superficial open area noted. Will continue same tx plan since showing improvement. Psychiatric:  Judgement and insight intact. Short and long term memory intact. No evidence of depression, anxiety, or agitation. Patient is calm, cooperative, and communicative. Appropriate interactions and affect. Assessment:      Problem List Items Addressed This Visit        Other    Erythema intertrigo - Primary    Relevant Orders    Initiate Outpatient Wound Care Protocol    Open wound of abdomen    Relevant Orders    Initiate Outpatient Wound Care Protocol             Wound 05/19/22 Abdomen Right; Lower #1 (Active)   Wound Image   06/02/22 1029   Wound Cleansed Cleansed with saline 06/02/22 1029   Dressing/Treatment Antibacterial ointment;Alginate with Ag 06/02/22 1117   Wound Length (cm) 1.5 cm 06/02/22 1029   Wound Width (cm) 1.5 cm 06/02/22 1029   Wound Depth (cm) 0.1 cm 06/02/22 1029   Wound Surface Area (cm^2) 2.25 cm^2 06/02/22 1029   Change in Wound Size % (l*w) 40.79 06/02/22 1029   Wound Volume (cm^3) 0.225 cm^3 06/02/22 1029   Wound Healing % 41 06/02/22 1029   Post-Procedure Length (cm) 1.9 cm 05/19/22 0943   Post-Procedure Width (cm) 2 cm 05/19/22 0943   Post-Procedure Depth (cm) 0.1 cm 05/19/22 0943   Post-Procedure Surface Area (cm^2) 3.8 cm^2 05/19/22 0943   Post-Procedure Volume (cm^3) 0.38 cm^3 05/19/22 0943   Wound Assessment Pink/red 06/02/22 1029   Drainage Amount Small 06/02/22 1029   Drainage Description Serosanguinous 06/02/22 1029   Odor None 06/02/22 1029   Aimee-wound Assessment Intact 06/02/22 1029   Margins Defined edges 06/02/22 1029   Wound Thickness Description not for Pressure Injury Full thickness 06/02/22 1029   Number of days: 14       Wound 05/19/22 Abdomen Left; Lower #2 (Active)   Wound Image   06/02/22 1029   Wound Etiology Other 06/02/22 1029   Wound Cleansed Cleansed with saline 06/02/22 1029   Dressing/Treatment Antibacterial ointment;Alginate with Ag 06/02/22 1117   Wound Length (cm) 1 cm 06/02/22 1029   Wound Width (cm) 1 cm 06/02/22 1029   Wound Depth (cm) 0.1 cm 06/02/22 1029   Wound Surface Area (cm^2) 1 cm^2 06/02/22 1029   Change in Wound Size % (l*w) 97.89 06/02/22 1029   Wound Volume (cm^3) 0.1 cm^3 06/02/22 1029   Wound Healing % 98 06/02/22 1029   Wound Assessment Pink/red 06/02/22 1029   Drainage Amount Small 06/02/22 1029   Drainage Description Serosanguinous 06/02/22 1029   Odor None 06/02/22 1029   Aimee-wound Assessment Fragile; Intact 06/02/22 1029   Margins Defined edges 06/02/22 1029   Wound Thickness Description not for Pressure Injury Full thickness 06/02/22 1029   Number of days: 14         Plan:     Continue daily application of silvercell after showering, and then cleansing with normal saline. DSD after- do not apply tape to skin. Recheck in 2 weeks with Dr. Molly Joseph.          Treatment Note please see attached Discharge Instructions    Written patient dismissal instructions given to patient and signed by patient or POA. Discharge [address] and [facility]   Physician Orders and Discharge Instructions  [address]  Telephone: [phone]        NAME:  [address]:  1975  MEDICAL RECORD NUMBER:  [id number]     Your  is:  Ramona Jovalerie     Home Care/Facility: None     Wound Location: Right and Left Lower Abdomen     Dressing orders:  1 After showering. Cleanse wound(s) with normal saline. THIN LAYER OF Mupirocin ointment  2. Apply dry SILVERCEL OR CALCIUM ALGINATE WITH Ag or eqivalent to wound bed. 3. DRY GAUZE OR ABD and light tape if needed  4. Change daily      Compression: None     Offloading Device:     Other Instructions: use nystatin powder to other rash areas;        Keep all dressings clean, dry and intact. Keep pressure off the wound(s) at all times.      Follow up visit   3 Weeks  June 22, 2022 @    1:15 pm          With Dr. Giovanna Mcguire     Please give 24 hour notice if unable to keep appointment. [phone]     If you experience any of the following, please call the Wound Care Service at  [phone] or go to the nearest emergency room. *Increase in pain         *Temperature over 101           *Increase in drainage from your wound or a foul odor  *Uncontrolled swelling            *Need for compression bandage changes due to slippage, breakthrough drainage       PLEASE NOTE: IF YOU ARE UNABLE TO OBTAIN WOUND SUPPLIES, CONTINUE TO USE THE SUPPLIES YOU HAVE AVAILABLE UNTIL YOU ARE ABLE TO REACH US.  IT IS MOST IMPORTANT TO KEEP THE WOUND COVERED AT ALL TIMES                                Electronically signed by SANIA López NP on 6/2/2022 at 2:17 PM

## 2022-06-03 NOTE — TELEPHONE ENCOUNTER
Luz Nicholas MD,      Patient is a [facility] employee or spouse participating in a two month diabetes management  program through [email or web address]. Your patient is eligible to receive a 2 month free trial of Dexcom G6. Order is pended for your convenience. Please feel free to reach out with any questions or concerns. Thank you,  W. Maude Brunner, PharmD, [address] [place] [address], toll free: [phone]    =======================================================================    [facility]  Program    SUBJECTIVE  Keisha Smith is a 56 y.o. female currently identified being enrolled in the [address] [place] [address] diabetes program  through [facility]. All patients will be connected with a personalized health  thorough the [email or web address] [place]. By participating in the  program, the patient will:   Create daily habits that improve life and flourishing   Have access to personalized content, insights, and their own certified diabetes educator    Earn rewards for feedback    This patient is also eligible to receive a 60 day supply of [address] and transmitter for 0$ with a prescription. Prescription orders will be pended for:   Dexcom G6 Sensors: Qty-6 with no refill   Dexcom G6 Transmitter: Qty-1 with no refill   Dexcom receivers will not be provided. Patient will be expected to use their cell phone and Dexcom [place] in place of a    o At this time the program is limited to iPhone users only      Thank you  W. Maude Brunner, PharmD, [address] [place] [address], toll free: [phone]    For Pharmacy [address] in place:  No   Recommendation Provided To: Provider: 1 via Note to Provider   Intervention Detail: New Rx: 1, reason: Patient Preference   Gap Closed?: Yes    Intervention Accepted By: Provider: 1   Time Spent (min): 10

## 2022-06-05 ENCOUNTER — E-VISIT (OUTPATIENT)
Dept: PRIMARY CARE CLINIC | Age: 47
End: 2022-06-05
Payer: COMMERCIAL

## 2022-06-05 DIAGNOSIS — R30.0 DYSURIA: Primary | ICD-10-CM

## 2022-06-05 PROCEDURE — 99422 OL DIG E/M SVC 11-20 MIN: CPT | Performed by: NURSE PRACTITIONER

## 2022-06-05 RX ORDER — CIPROFLOXACIN 500 MG/1
500 TABLET, FILM COATED ORAL 2 TIMES DAILY
Qty: 14 TABLET | Refills: 0 | Status: SHIPPED | OUTPATIENT
Start: 2022-06-05 | End: 2022-06-05 | Stop reason: SDUPTHER

## 2022-06-05 RX ORDER — CIPROFLOXACIN 500 MG/1
500 TABLET, FILM COATED ORAL 2 TIMES DAILY
Qty: 14 TABLET | Refills: 0 | Status: SHIPPED | OUTPATIENT
Start: 2022-06-05 | End: 2022-06-12

## 2022-06-05 NOTE — PROGRESS NOTES
Reviewed questionnaire  Reviewed previous encounters, labs, meds, allergies and history     Dx dysuria     Plan  rx for cipro 500mg BID x 7 days     Increase oral fluids. Tylenol or motrin for pain.   May take azo over the counter     Please f/u with pcp if symptoms do not improve for further evaluation and possible urine culture     See educational handout     Time spent 14 min

## 2022-06-05 NOTE — PATIENT INSTRUCTIONS
Patient Education        Painful Urination (Dysuria): Care Instructions  Your Care Instructions  Burning pain with urination (dysuria) is a common symptom of a urinary tract infection or other urinary problems. The bladder may become inflamed. This can cause pain when the bladder fills and empties. You may also feel pain if the tube that carries urine from the bladder to the outside of the body (urethra)gets irritated or infected. Sexually transmitted infections (STIs) also may cause pain when you urinate. Sometimes the pain can be caused by things other than an infection. The urethra can be irritated by soaps, perfumes, or foreign objects in the urethra. Zuri Brayden can cause pain when they pass through the urethra. The cause may be hard to find. You may need tests. Treatment for painfulurination depends on the cause. Follow-up care is a key part of your treatment and safety. Be sure to make and go to all appointments, and call your doctor if you are having problems. It's also a good idea to know your test results and keep alist of the medicines you take. How can you care for yourself at home?  Drink extra water for the next day or two. This will help make the urine less concentrated. (If you have kidney, heart, or liver disease and have to limit fluids, talk with your doctor before you increase the amount of fluids you drink.)   Avoid drinks that are carbonated or have caffeine. They can irritate the bladder.  Urinate often. Try to empty your bladder each time. For women:   Urinate right after you have sex.  After going to the bathroom, wipe from front to back.  Avoid douches, bubble baths, and feminine hygiene sprays. And avoid other feminine hygiene products that have deodorants. When should you call for help? Call your doctor now or seek immediate medical care if:     You have new symptoms, such as fever, nausea, or vomiting.      You have new or worse symptoms of a urinary problem.  For example:  ? You have blood or pus in your urine. ? You have chills or body aches. ? It hurts worse to urinate. ? You have groin or belly pain. ? You have pain in your back just below your rib cage (the flank area). Watch closely for changes in your health, and be sure to contact your doctor ifyou have any problems. Where can you learn more? Go to [email or web address] org and sign in to your [employer] account. Enter L899 in the "[employer] box to learn more about \"Painful Urination (Dysuria): Care Instructions. \"     If you do not have an account, please click on the \"Sign Up Now\" link. Current as of: October 18, 2021               Content Version: 13.2  © 2006-2022 [employer]. Care instructions adapted under license by [facility] [address] If you have questions about a medical condition or this instruction, always ask your healthcare professional. [place] [address] any warranty or liability for your use of this information.

## 2022-06-09 ENCOUNTER — CARE COORDINATION (OUTPATIENT)
Dept: OTHER | Facility: CLINIC | Age: 47
End: 2022-06-09

## 2022-06-09 NOTE — CARE COORDINATION
Ambulatory Care Coordination Note  6/9/2022  CM Risk Score: 5  Charlson 10 Year Mortality Risk Score: 79%     ACM contacted patient for introduction to Associate Care Management Program. Patient declines care management at this time as pt states they feel they have no ACM needs at this time. ACM provided contact information for self referral if she would need care management in the future. ACM will sign off at this time. Prior to Admission medications    Medication Sig Start Date End Date Taking? Authorizing Provider   Misc. Devices KIT Use as directed-  Dexcom Aduro Box- NDC [address]-38 Contains: Dexcom G6 sensors- qty 6; Dexcom G6 transmitter- qty 1 6/5/22   Odin Pastrana MD   ciprofloxacin (CIPRO) 500 mg tablet Take 1 tablet by mouth 2 times daily for 7 days 6/5/22 6/12/22  Jayme Apley, APRN - CNP   levoFLOXacin Raeann Galvan) 0.5 % ophthalmic solution 1 to 2 drops in left eye every 2 hours while awake for 2 days then you may reduce dosing to 1 to 2 drops in left eye every 4 hours while awake for 5 days 5/31/22   SANIA Reid CNP   aspirin 325 MG EC tablet Take 1 tablet by mouth every 6 hours as needed for Pain 5/20/22   Js Castillo MD   ALPRAZolam Chryl Mancuso) 0.5 MG tablet Take 1 tablet by mouth daily as needed for Anxiety for up to 45 days. Take 0.5 mg by mouth 3 times daily as needed for Anxiety.  5/5/22 6/19/22  Rudi Cruz MD   insulin lispro, 1 Unit Dial, (HUMALOG KWIKPEN) 100 UNIT/ML SOPN INJECT 20-40 UNITS UNDER THE SKIN WITH EACH MEAL 5/5/22   Odin Pastrana MD   clotrimazole-betamethasone (LOTRISONE) 1-0.05 % cream APPLY TO AFFECTED AREA(S) TOPICALLY TWO TIMES A DAY 4/22/22   SANIA Baltazar CNM   erythromycin base (E-MYCIN) 250 MG tablet TAKE 1 TABLET BY MOUTH 3 TIMES A DAY 4/15/22   SANIA Sims - CNP   venlafaxine (EFFEXOR XR) 75 MG extended release capsule TAKE TWO CAPSULES BY MOUTH EVERY MORNING AND ONE CAPSULE BY MOUTH EVERY EVENING 4/15/22   Sanjana Garcia MD ondansetron (ZOFRAN-ODT) 4 MG disintegrating tablet DISSOLVE ONE TABLET BY MOUTH EVERY 8 HOURS AS NEEDED FOR NAUSEA OR VOMITING 4/15/22   SANIA Stacy - CNP   predniSONE (DELTASONE) 5 MG tablet TAKE 1 TABLET BY MOUTH ONE TIME A DAY 4/4/22   Yadira Harmon MD   Miconazole POWD Apply topically BID 3/16/22   Buchanan January, MD   hydroCHLOROthiazide (HYDRODIURIL) 25 MG tablet Take 1 tablet by mouth daily 3/11/22   Cecile MD January   esomeprazole (NEXIUM) 40 MG delayed release capsule TAKE 1 CAPSULE BY MOUTH ONE TIME A DAY 3/7/22   Bettina Castaneda MD   pioglitazone (ACTOS) 30 MG tablet TAKE 1 TABLET BY MOUTH ONE TIME A DAY 3/7/22   ZEE Queen MD   gentamicin (GARAMYCIN) 0.1 % ointment Apply topically daily 2/23/22   Cecile MD January   CPAP Machine MISC by Does not apply route Change CPAP pressure to 14 cm 2/9/22   Robles Tate MD   metoprolol (LOPRESSOR) 100 MG tablet TAKE 1 TABLET BY MOUTH 2 TIMES A DAY 2/7/22   Cecile MD January   blood glucose test strips (PRODIGY NO CODING BLOOD GLUC) strip 1 each by In Vitro route 4 times daily As needed. 1/21/22   Cecile MD January   nystatin ([address]) 338206 UNIT/GM powder APPLY TOPICALLY THREE TIMES A DAY 1/18/22   [facility]   Prodigy Lancets 28G MISC Use as directed to test up to 4 times daily 1/18/22   Buchanan MD January   baclofen (LIORESAL) 10 MG tablet TAKE ONE-HALF TABLET BY MOUTH TWICE A DAY 1/10/22   [facility] MD   rosuvastatin (CRESTOR) 10 MG tablet TAKE ONE TABLET BY MOUTH NIGHTLY 1/10/22   Cecile January, MD   Insulin Glargine, 2 Unit Dial, (TOUJEO MAX SOLOSTAR) 300 UNIT/ML SOPN 150 units at bedtime 12/14/21   ZEE Queen MD   nystatin-triamcinolone (MYCOLOG II) 914713-6.1 UNIT/GM-% cream Apply topically 2 times daily.  12/3/21   Gera Orourke MD   losartan (COZAAR) 50 MG tablet Take 1 tablet by mouth daily 11/16/21   Cecile Sin MD   Continuous Blood Gluc Sensor (FREESTYLE JAIRO 14 DAY SENSOR) MISC CHANGE EVERY 14 DAYS 11/12/21   Elen Jaimes [address]   CPAP Machine MISC New CPAP with 8 cm and supply 11/11/21   Kenyon Jaime MD   solifenacin (VESICARE) 10 MG tablet TAKE 1 TABLET BY MOUTH ONE TIME A DAY 9/18/21   Nicole Ching MD   metFORMIN (GLUCOPHAGE) 500 MG tablet TAKE 1 TABLET BY MOUTH 3 TIMES A DAY 8/16/21   Celestino Arevalo MD   metoclopramide (REGLAN) 10 MG tablet Take 1 tablet by mouth 3 times daily (with meals) 4/19/21   Celestino Arevalo MD   Continuous Blood Gluc  (FREESTYLE JAIRO 14 DAY READER) ALEXANDREA As directed 1/22/21   Celestino Arevalo MD   Insulin Pen Needle (PEN NEEDLES) 32G X 4 MM MISC Use as directed with insulin pens, 4 times daily 1/18/21   Celestino Arevalo MD   blood glucose test strips (PRODIGY NO CODING BLOOD GLUC) strip 1 each by In Vitro route 4 times daily As needed. 11/10/20   Celestino Arevalo MD   sucralfate (CARAFATE) 1 GM tablet Take 1 tablet by mouth 4 times daily for 7 days 8/11/20 12/16/20  Kathie Blair, APRN - CNP   Blood Glucose Monitoring Suppl (PRODIGY AUTOCODE BLOOD GLUCOSE) w/Device KIT Use as directed to test up to 4 times daily 1/17/20   Celestino Arevalo MD   miconazole (MICOTIN) 2 % cream Apply topically 2 times daily.  1/21/19   Ursula Ely MD   [facility] 7[facility]18   Ursula Ely MD       Future Appointments   Date Time Provider Iliana Garcia   6/16/2022 10:15 AM Thedore Hamman, DO OB/GYN [facility] [place]   6/22/2022  8:00 AM Yany Jerry MD [address]   6/22/2022  1:15 PM MD Michael [address]   10/13/2022 12:15 PM Yudith Caban, [address]   10/17/2022 11:30 AM Kenyon Jaime, [address] [facility],4[address] Floor   10/18/2022  8:45 AM Nicole Ching MD [facility]   10/31/2022  1:15 PM Jose Davidson MD [facility] [place]   10/31/2022  4:00 PM Makayla Jha

## 2022-06-13 RX ORDER — LOSARTAN POTASSIUM 50 MG/1
50 TABLET ORAL DAILY
Qty: 90 TABLET | Refills: 1 | Status: SHIPPED | OUTPATIENT
Start: 2022-06-13 | End: 2022-09-26

## 2022-06-13 RX ORDER — INSULIN LISPRO 100 [IU]/ML
INJECTION, SOLUTION INTRAVENOUS; SUBCUTANEOUS
Qty: 90 PEN | Refills: 3 | Status: SHIPPED | OUTPATIENT
Start: 2022-06-13 | End: 2022-10-03 | Stop reason: SDUPTHER

## 2022-06-13 NOTE — TELEPHONE ENCOUNTER
Pharmacy is requesting medication refill.  Please approve or deny this request.    Rx requested:  Requested Prescriptions     Pending Prescriptions Disp Refills    losartan (COZAAR) 50 mg tablet [Pharmacy Med Name: LOSARTAN POTASSIUM 50MG TABS] 90 tablet 1     Sig: Take 1 tablet by mouth daily         Last Office Visit:   3/16/2022      Next Visit Date:  Future Appointments   Date Time Provider Iliana Garcia   6/16/2022 10:15 AM [facility], [address] [place] [address]   6/22/2022  8:00 AM [address] [place] [address] [address], MD [place] [address]   6/22/2022  1:15 PM MD Michael [address]   10/13/2022 12:15 PM Jose Do MD [address] [place] [address]   10/17/2022 11:30 AM Gila Hernandez, [address] [facility] Floor   10/18/2022  8:45 AM Sonali Daigle MD [facility]   10/31/2022  1:15 PM Lul Hernández MD [facility]   10/31/2022  4:00 PM Makayla Montez

## 2022-06-16 ENCOUNTER — OFFICE VISIT (OUTPATIENT)
Dept: OBGYN CLINIC | Age: 47
End: 2022-06-16
Payer: COMMERCIAL

## 2022-06-16 ENCOUNTER — HOSPITAL ENCOUNTER (OUTPATIENT)
Age: 47
Setting detail: SPECIMEN
Discharge: HOME | End: 2022-06-16
Payer: COMMERCIAL

## 2022-06-16 VITALS
DIASTOLIC BLOOD PRESSURE: 62 MMHG | BODY MASS INDEX: 47.09 KG/M2 | WEIGHT: 293 LBS | HEIGHT: 66 IN | SYSTOLIC BLOOD PRESSURE: 110 MMHG

## 2022-06-16 DIAGNOSIS — Z90.711 H/O ABDOMINAL SUPRACERVICAL SUBTOTAL HYSTERECTOMY: ICD-10-CM

## 2022-06-16 DIAGNOSIS — Z01.419 VISIT FOR GYNECOLOGIC EXAMINATION: ICD-10-CM

## 2022-06-16 DIAGNOSIS — Z11.51 SCREENING FOR HPV (HUMAN PAPILLOMAVIRUS): ICD-10-CM

## 2022-06-16 DIAGNOSIS — Z01.419 VISIT FOR GYNECOLOGIC EXAMINATION: Primary | ICD-10-CM

## 2022-06-16 LAB
REASON FOR REJECTION: NORMAL
REJECTED TEST: NORMAL

## 2022-06-16 PROCEDURE — 87210 SMEAR WET MOUNT SALINE/INK: CPT

## 2022-06-16 PROCEDURE — 87624 HPV HI-RISK TYP POOLED RSLT: CPT

## 2022-06-16 PROCEDURE — 87808 TRICHOMONAS ASSAY W/OPTIC: CPT

## 2022-06-16 PROCEDURE — 88175 CYTOPATH C/V AUTO FLUID REDO: CPT

## 2022-06-16 PROCEDURE — 99396 PREV VISIT EST AGE 40-64: CPT | Performed by: OBSTETRICS & GYNECOLOGY

## 2022-06-16 ASSESSMENT — ENCOUNTER SYMPTOMS
DIARRHEA: 0
ABDOMINAL PAIN: 0
ABDOMINAL DISTENTION: 0
BLOOD IN STOOL: 0
NAUSEA: 0
WHEEZING: 0
VOMITING: 0
SHORTNESS OF BREATH: 0
COUGH: 0
CONSTIPATION: 0
SORE THROAT: 0

## 2022-06-16 NOTE — PROGRESS NOTES
Subjective:      Michelle rodriguez 56 y.o. female  here for routine exam.  Current Complaints: no breast pain or new or enlarging lumps on self exam, no discharge or pelvic pain.     Menstrual history:   Absent hysterectomy  Sexual activity:  yes, denies knowledge of risky exposure  Abnormalvaginal discharge:  No  Contraceptive method:  status post hysterectomy    Vitals:  /62   Ht 5' 6\" (1.676 m)   Wt (!) 318 lb (144.2 kg)   LMP  (LMP Unknown)   BMI 51.33 kg/m²   Allergies:Morphine and related, Ace inhibitors, Bactrim [sulfamethoxazole-trimethoprim], Nitroglycerin, Percocet [oxycodone-acetaminophen], and Victoza [liraglutide]  Past Medical History:   Diagnosis Date    Abnormal Pap smear of cervix     Acute midline thoracic back pain 2018    B12 deficiency     Family history of premature CAD 2013    Fatty liver     Gastroesophageal reflux disease 2021    Gastroparesis     HPV (human papilloma virus) anogenital infection     Hyperlipidemia     Hypertension     Irritable bowel syndrome with diarrhea 2021    Liver hemangioma     Lumbar radiculopathy 2021    Obesity 3/11/13    BMI 43.42    Orthostatic hypotension     Ovarian cyst     PMR (polymyalgia rheumatica) (HCC)     Rectal fissure     Tobacco abuse 9/3/2015    Tobacco abuse     Tremor     Uncontrolled diabetes mellitus with complications ([facility] [address]     Unspecified sleep apnea      Past Surgical History:   Procedure Laterality Date    CARDIAC CATHETERIZATION      COLONOSCOPY      for diarrhea (-)    COLONOSCOPY N/A 2/10/2021    COLONOSCOPY DIAGNOSTIC performed by Rosemarie Best MD at [place] [address] (CERVIX STATUS UNKNOWN)  12-10    LEE  1-05    DC MUSCLE BIOPSY Left 2018    LEFT QUADRICEPS MUSCLE BIOPSY performed by Birgit Moreno MD at [facility] UNI/BI CANNULATION N/A 2018    T 12 VERTEBRALPLASTY ROOM 278 performed by Keyona Ann MD at [address] [place] [address] (CERVIX REMOVED)     [facility]    UPPER GASTROINTESTINAL ENDOSCOPY  10/13/15     DR. HERRERA     UPPER GASTROINTESTINAL ENDOSCOPY N/A 2/10/2021    EGD ESOPHAGOGASTRODUODENOSCOPY performed by Robert Pichardo MD at [facility]       Family History   Problem Relation Age of Onset    Diabetes Father     Heart Disease Mother     Colon Cancer Neg Hx     Cancer Neg Hx      Social History     Socioeconomic History    Marital status:      Spouse name: Not on file    Number of children: 1    Years of education: Not on file    Highest education level: Not on file   Occupational History    Not on file   Tobacco Use    Smoking status: Former Smoker     Packs/day: 1.00     Types: Cigarettes     Quit date: 2017     Years since quittin.4    Smokeless tobacco: Never Used   Vaping Use    Vaping Use: Never used   Substance and Sexual Activity    Alcohol use: Yes     Alcohol/week: 0.0 standard drinks     Comment: socially    Drug use: No    Sexual activity: Yes     Partners: Male     Birth control/protection: Surgical     Comment: single   Other Topics Concern    Not on file   Social History Narrative    Social/Functional History          Social Determinants of Health     Financial Resource Strain: Low Risk     Difficulty of Paying Living Expenses: Not hard at all   Food Insecurity: No Food Insecurity    Worried About Running Out of Food in the Last Year: Never true    [address] in the Last Year: Never true   Transportation Needs:     Lack of Transportation (Medical): Not on file    Lack of Transportation (Non-Medical):  Not on file   Physical Activity:     Days of Exercise per Week: Not on file    Minutes of Exercise per Session: Not on file   Stress:     Feeling of Stress : Not on file   Social Connections:     Frequency of Communication with Friends and Family: Not on file    Frequency of Social Gatherings with Friends and Family: Not on file    Attends Temple Services: Not on file    Active Member of Clubs or Organizations: Not on file    Attends Club or Organization Meetings: Not on file    Marital Status: Not on file   Intimate Partner Violence:     Fear of Current or Ex-Partner: Not on file    Emotionally Abused: Not on file    Physically Abused: Not on file    Sexually Abused: Not on file   Housing Stability:     Unable to Pay for Housing in the Last Year: Not on file    Number of [place] in the Last Year: Not on file    Unstable Housing in the Last Year: Not on file       GynecologicHistory  No LMP recorded (lmp unknown). Patient has had a hysterectomy. Last Pap:  Results: normal  Last Mammogram: Yes Results: normal  no fmhx cancer  OB History        2    Para   1    Term   1            AB        Living   1       SAB        IAB        Ectopic        Molar        Multiple        Live Births   1              Patient's medications, allergies, past medical, surgical, social and family histories were reviewed and updated as appropriate. Review of Systems  Review of Systems   Constitutional: Negative for activity change, appetite change, fatigue and unexpected weight change. HENT: Negative for nosebleeds and sore throat. Eyes: Negative for visual disturbance. Respiratory: Negative for cough, shortness of breath and wheezing. Cardiovascular: Negative for chest pain, palpitations and leg swelling. Gastrointestinal: Negative for abdominal distention, abdominal pain, blood in stool, constipation, diarrhea, nausea and vomiting. Endocrine: Negative for cold intolerance, heat intolerance, polydipsia and polyuria. Genitourinary: Negative for difficulty urinating, dyspareunia, dysuria, frequency, genital sores, hematuria, pelvic pain, urgency, vaginal bleeding, vaginal discharge and vaginal pain.    Musculoskeletal: Negative for arthralgias. Skin: Negative for rash. Neurological: Negative for dizziness, weakness, light-headedness and headaches. Hematological: Negative for adenopathy. Does not bruise/bleed easily. Psychiatric/Behavioral: Negative for confusion and sleep disturbance. Objective:     Vitals:  /62   Ht 5' 6\" (1.676 m)   Wt (!) 318 lb (144.2 kg)   LMP  (LMP Unknown)   BMI 51.33 kg/m²     Physical Exam  Constitutional:       General: She is not in acute distress. Appearance: She is well-developed. She is not diaphoretic. HENT:      Head: Normocephalic. Nose: Nose normal.   Eyes:      Conjunctiva/sclera: Conjunctivae normal.      Pupils: Pupils are equal, round, and reactive to light. Neck:      Thyroid: No thyromegaly. Trachea: No tracheal deviation. Cardiovascular:      Rate and Rhythm: Normal rate and regular rhythm. Heart sounds: Normal heart sounds. No murmur heard. No friction rub. No gallop. Pulmonary:      Effort: Pulmonary effort is normal. No respiratory distress. Breath sounds: Normal breath sounds. No wheezing or rales. Chest:      Chest wall: No tenderness. Breasts:      Right: No mass, nipple discharge, skin change, tenderness, axillary adenopathy or supraclavicular adenopathy. Left: No mass, nipple discharge, skin change, tenderness, axillary adenopathy or supraclavicular adenopathy. Abdominal:      General: Bowel sounds are normal. There is no distension. Palpations: Abdomen is soft. There is no mass. Tenderness: There is no abdominal tenderness. There is no guarding or rebound. Genitourinary:     Labia:         Right: No rash, tenderness or lesion. Left: No rash, tenderness or lesion. Vagina: Vaginal discharge present. No erythema, tenderness or bleeding. Cervix: No cervical motion tenderness, discharge or friability. Adnexa:         Right: No mass, tenderness or fullness.           Left: No mass, tenderness or fullness. Comments: Uterus is not prolapsed and there is not a cystocele or rectocele noted  Lymphadenopathy:      Upper Body:      Right upper body: No supraclavicular or axillary adenopathy. Left upper body: No supraclavicular or axillary adenopathy. Skin:     General: Skin is warm and dry. Neurological:      Mental Status: She is alert and oriented to person, place, and time. Cranial Nerves: No cranial nerve deficit. Deep Tendon Reflexes: Reflexes normal.   Psychiatric:         Behavior: Behavior normal.         Judgment: Judgment normal.         Assessment:      Diagnosis Orders   1. Visit for gynecologic examination  PAP SMEAR    Wet Prep, Genital   2. Screening for HPV (human papillomavirus)  PAP SMEAR   3. H/O abdominal supracervical subtotal hysterectomy         Body mass index is 51.33 kg/m². Obesity:  Class III        Plan:   Pap smear : indicated:  performed. Breast exam :Normal  STD work up : As appropriate    Obesity Counseling:  Given  Smoking Counseling:  N/A  STD counseling: Will call pt with results    Orders Placed This Encounter   Procedures    Wet Prep, Genital     Standing Status:   Future     Number of Occurrences:   1     Standing Expiration Date:   6/16/2023    PAP SMEAR     Standing Status:   Future     Standing Expiration Date:   6/16/2023     Order Specific Question:   Collection Type     Answer: Thin Prep     Order Specific Question:   Prior Abnormal Pap Test     Answer:   No     Order Specific Question:   Screening or Diagnostic     Answer:   Screening     Order Specific Question:   HPV Requested? Answer:   Yes     Comments:   16/18     Order Specific Question:   High Risk Patient     Answer:   N/A     No orders of the defined types were placed in this encounter. Follow up:  No follow-ups on file.       Brionna Berrios DO

## 2022-06-20 ENCOUNTER — CARE COORDINATION (OUTPATIENT)
Dept: OTHER | Facility: CLINIC | Age: 47
End: 2022-06-20

## 2022-06-20 NOTE — CARE COORDINATION
Ambulatory Care Coordination Note  6/20/2022  CM Risk Score: 9  Charlson 10 Year Mortality Risk Score: 79%     ACC: Robson Mendez RN    Summary Note: Pt assigned to ACM from the [facility] team, due to A1C >8. Chart review completed. Pt currently in  for [facility]. Pt also declined ACM services on 6/9/22. Will plan to complete chart review in 2 months and assess for Care Management needs at that time. Future Appointments   Date Time Provider Iliana Garcia   6/22/2022  8:00 AM [id number] Mendez [address]   10/13/2022 12:15 PM Angelica Dueñas  [facility]   10/17/2022 11:30 AM [address], [address] [facility]   10/18/2022  8:45 AM Marisa Levi MD [facility]   10/31/2022  1:15 PM Gabo Newman MD [facility]   10/31/2022  4:00 PM Octavia Crouch, [address] [place] [address] Emi Martel MSN, RN   Ambulatory Care Manager  Associate Care Management  Cell [phone]  [email or web address]

## 2022-06-22 ENCOUNTER — OFFICE VISIT (OUTPATIENT)
Dept: FAMILY MEDICINE CLINIC | Age: 47
End: 2022-06-22
Payer: COMMERCIAL

## 2022-06-22 ENCOUNTER — TELEPHONE (OUTPATIENT)
Dept: WOUND CARE | Age: 47
End: 2022-06-22

## 2022-06-22 VITALS
WEIGHT: 293 LBS | OXYGEN SATURATION: 99 % | SYSTOLIC BLOOD PRESSURE: 110 MMHG | TEMPERATURE: 97.5 F | BODY MASS INDEX: 50.84 KG/M2 | DIASTOLIC BLOOD PRESSURE: 66 MMHG | HEART RATE: 75 BPM

## 2022-06-22 DIAGNOSIS — R60.0 BILATERAL LOWER EXTREMITY EDEMA: ICD-10-CM

## 2022-06-22 DIAGNOSIS — E66.01 MORBID OBESITY (HCC): Primary | ICD-10-CM

## 2022-06-22 DIAGNOSIS — I10 HYPERTENSION, UNSPECIFIED TYPE: ICD-10-CM

## 2022-06-22 PROCEDURE — 99214 OFFICE O/P EST MOD 30 MIN: CPT | Performed by: FAMILY MEDICINE

## 2022-06-22 ASSESSMENT — ENCOUNTER SYMPTOMS
DIARRHEA: 0
CHEST TIGHTNESS: 0
SHORTNESS OF BREATH: 0
COUGH: 0
CONSTIPATION: 0
VOMITING: 0
ABDOMINAL PAIN: 0
APNEA: 0
NAUSEA: 0
BLOOD IN STOOL: 0

## 2022-06-22 NOTE — TELEPHONE ENCOUNTER
Patient called in and stated \"my wounds are healed\". Discharged patient from the [facility] wound center.

## 2022-06-22 NOTE — PROGRESS NOTES
Subjective:      Patient ID: Melinda Byrne is a 56 y.o. female who presents today for:     Chief Complaint   Patient presents with    Edema     b/l ankles x4 months getting worse     Other     pt would like to discuss weight gain x3 months        HPI  Patient is a very pleasant 79-year-old female presents today to follow-up. She has been having swelling in her ankles over the past 3 to 4 months. She states that this has become worse since she has returned to work. Previously she was working from home and she was able to have her feet elevated in a recliner. She has bought a pair of compression stockings which she states are helpful. She denies any chest discomfort, shortness of breath, and is adherent to her antihypertensive medications which has a diuretic and it. Patient also states that she has gained a significant amount of weight in the past 3 months over 20 pounds. She states that she is sedentary but is also been on prednisone for many years. She states that she has gotten approval from her rheumatologist and endocrinologist to consider bariatric surgery. She has downloaded an application called my fitness pal that monitors her calorie intake, but she is still gained 5 pounds while using this application.   She would like to consider establishing with a dietitian for further assistance until she can see or be enrolled in the bariatric program  Past Medical History:   Diagnosis Date    Abnormal Pap smear of cervix     Acute midline thoracic back pain 9/18/2018    B12 deficiency     Family history of premature CAD 9/4/2013    Fatty liver     Gastroesophageal reflux disease 1/6/2021    Gastroparesis     HPV (human papilloma virus) anogenital infection     Hyperlipidemia     Hypertension     Irritable bowel syndrome with diarrhea 4/26/2021    Liver hemangioma 2009/2015    Lumbar radiculopathy 7/7/2021    Obesity 3/11/13    BMI 43.42    Orthostatic hypotension     Ovarian cyst  PMR (polymyalgia rheumatica) (HCC)     Rectal fissure     Tobacco abuse 9/3/2015    Tobacco abuse     Tremor     Uncontrolled diabetes mellitus with complications ([facility] [address]     Unspecified sleep apnea      Past Surgical History:   Procedure Laterality Date    CARDIAC CATHETERIZATION      COLONOSCOPY      for diarrhea (-)    COLONOSCOPY N/A 2/10/2021    COLONOSCOPY DIAGNOSTIC performed by Newton Vences MD at [facility] (CERVIX STATUS UNKNOWN)  12-10    LEEP  1-05    [place] MUSCLE BIOPSY Left 2018    LEFT QUADRICEPS MUSCLE BIOPSY performed by Rekha Chapa MD at [facility] UNI/BI CANNULATION N/A 2018    T 12 VERTEBRALPLASTY ROOM 278 performed by Shellie Smiley MD at [facility] (CERVIX REMOVED)     [facility]    UPPER GASTROINTESTINAL ENDOSCOPY  10/13/15     DR. HERRERA     UPPER GASTROINTESTINAL ENDOSCOPY N/A 2/10/2021    EGD ESOPHAGOGASTRODUODENOSCOPY performed by [facility]ences MD at [facility]       Family History   Problem Relation Age of Onset    Diabetes Father     Heart Disease Mother     Colon Cancer Neg Hx     Cancer Neg Hx      Social History     Socioeconomic History    Marital status:      Spouse name: Not on file    Number of children: 1    Years of education: Not on file    Highest education level: Not on file   Occupational History    Not on file   Tobacco Use    Smoking status: Former Smoker     Packs/day: 1.00     Types: Cigarettes     Quit date: 2017     Years since quittin.4    Smokeless tobacco: Never Used   Vaping Use    Vaping Use: Never used   Substance and Sexual Activity    Alcohol use:  Yes     Alcohol/week: 0.0 standard drinks     Comment: socially    Drug use: No    Sexual activity: Yes     Partners: Male     Birth control/protection: Surgical     Comment: single   Other Topics Concern    Not on file Social History Narrative    Social/Functional History          Social Determinants of Health     Financial Resource Strain: Low Risk     Difficulty of Paying Living Expenses: Not hard at all   Food Insecurity: No Food Insecurity    Worried About Running Out of Food in the Last Year: Never true    [address] in the Last Year: Never true   Transportation Needs:     Lack of Transportation (Medical): Not on file    Lack of Transportation (Non-Medical): Not on file   Physical Activity:     Days of Exercise per Week: Not on file    Minutes of Exercise per Session: Not on file   Stress:     Feeling of Stress : Not on file   Social Connections:     Frequency of Communication with Friends and Family: Not on file    Frequency of Social Gatherings with Friends and Family: Not on file    Attends Judaism Services: Not on file    Active Member of [address] [place] [address] or Organizations: Not on file    Attends Club or Organization Meetings: Not on file    Marital Status: Not on file   Intimate Partner Violence:     Fear of Current or Ex-Partner: Not on file    Emotionally Abused: Not on file    Physically Abused: Not on file    Sexually Abused: Not on file   Housing Stability:     Unable to Pay for Housing in the Last Year: Not on file    Number of [place] in the Last Year: Not on file    Unstable Housing in the Last Year: Not on file     Current Outpatient Medications on File Prior to Visit   Medication Sig Dispense Refill    losartan (COZAAR) 50 mg tablet Take 1 tablet by mouth daily 90 tablet 1    insulin lispro, 1 Unit Dial, (HUMALOG KWIKPEN) 100 UNIT/ML SOPN INJECT 20-40 UNITS UNDER THE SKIN WITH EACH MEAL 90 pen 3    Misc.  Devices KIT Use as directed-  Dexcom Aduro Box- NDC [address]-94 Contains: Dexcom G6 sensors- qty 6; Dexcom G6 transmitter- qty 1 1 kit 0    levoFLOXacin (QUIXIN) 0.5 % ophthalmic solution 1 to 2 drops in left eye every 2 hours while awake for 2 days then you may reduce dosing to 1 to 2 drops in left eye every 4 hours while awake for 5 days 5 mL 1    aspirin 325 MG EC tablet Take 1 tablet by mouth every 6 hours as needed for Pain 30 tablet 3    clotrimazole-betamethasone (LOTRISONE) 1-0.05 % cream APPLY TO AFFECTED AREA(S) TOPICALLY TWO TIMES A DAY 45 g 3    erythromycin base (E-MYCIN) 250 MG tablet TAKE 1 TABLET BY MOUTH 3 TIMES A DAY 90 tablet 3    venlafaxine (EFFEXOR XR) 75 MG extended release capsule TAKE TWO CAPSULES BY MOUTH EVERY MORNING AND ONE CAPSULE BY MOUTH EVERY EVENING 270 capsule 1    ondansetron (ZOFRAN-ODT) 4 MG disintegrating tablet DISSOLVE ONE TABLET BY MOUTH EVERY 8 HOURS AS NEEDED FOR NAUSEA OR VOMITING 60 tablet 3    predniSONE (DELTASONE) 5 MG tablet TAKE 1 TABLET BY MOUTH ONE TIME A DAY 30 tablet 3    Miconazole POWD Apply topically BID 3 each 1    hydroCHLOROthiazide (HYDRODIURIL) 25 MG tablet Take 1 tablet by mouth daily 90 tablet 1    esomeprazole (NEXIUM) 40 MG delayed release capsule TAKE 1 CAPSULE BY MOUTH ONE TIME A DAY 30 capsule 3    pioglitazone (ACTOS) 30 MG tablet TAKE 1 TABLET BY MOUTH ONE TIME A DAY 90 tablet 0    gentamicin (GARAMYCIN) 0.1 % ointment Apply topically daily 1 each 0    CPAP Machine MISC by Does not apply route Change CPAP pressure to 14 cm 1 each 0    metoprolol (LOPRESSOR) 100 MG tablet TAKE 1 TABLET BY MOUTH 2 TIMES A  tablet 1    blood glucose test strips (PRODIGY NO CODING BLOOD GLUC) strip 1 each by In Vitro route 4 times daily As needed.  400 each 3    nystatin (NYAMYC) 398049 UNIT/GM powder APPLY TOPICALLY THREE TIMES A DAY 15 g 2    Prodigy Lancets 28G MISC Use as directed to test up to 4 times daily 400 each 3    baclofen (LIORESAL) 10 MG tablet TAKE ONE-HALF TABLET BY MOUTH TWICE A DAY 90 tablet 1    rosuvastatin (CRESTOR) 10 MG tablet TAKE ONE TABLET BY MOUTH NIGHTLY 90 tablet 1    Insulin Glargine, 2 Unit Dial, (TOUJEO MAX SOLOSTAR) 300 UNIT/ML SOPN 150 units at bedtime 90 pen 3    nystatin-triamcinolone (MYCOLOG II) 987521-8.1 UNIT/GM-% cream Apply topically 2 times daily. 30 g 0    Continuous Blood Gluc Sensor (FREESTYLE JAIRO 14 DAY SENSOR) MISC CHANGE EVERY 14 DAYS 6 each 3    CPAP Machine MISC New CPAP with 8 cm and supply 1 each 0    solifenacin (VESICARE) 10 MG tablet TAKE 1 TABLET BY MOUTH ONE TIME A DAY 90 tablet 3    metFORMIN (GLUCOPHAGE) 500 MG tablet TAKE 1 TABLET BY MOUTH 3 TIMES A  tablet 3    metoclopramide (REGLAN) 10 MG tablet Take 1 tablet by mouth 3 times daily (with meals) 360 tablet 3    Continuous Blood Gluc  (FREESTYLE JAIRO 14 DAY READER) ALEXANDREA As directed 1 Device 00    Insulin Pen Needle (PEN NEEDLES) 32G X 4 MM MISC Use as directed with insulin pens, 4 times daily 400 each 1    blood glucose test strips (PRODIGY NO CODING BLOOD GLUC) strip 1 each by In Vitro route 4 times daily As needed. 400 each 3    Blood Glucose Monitoring Suppl (PRODIGY AUTOCODE BLOOD GLUCOSE) w/Device KIT Use as directed to test up to 4 times daily 1 kit 0    miconazole (MICOTIN) 2 % cream Apply topically 2 times daily. 141 g 3    Handicap Placard MISC Exp 5 years 1 each 0    [DISCONTINUED] sucralfate (CARAFATE) 1 GM tablet Take 1 tablet by mouth 4 times daily for 7 days 28 tablet 0     No current facility-administered medications on file prior to visit. Allergies:  Morphine and related, Ace inhibitors, Bactrim [sulfamethoxazole-trimethoprim], Nitroglycerin, Percocet [oxycodone-acetaminophen], and Victoza [liraglutide]    Review of Systems   Constitutional: Positive for unexpected weight change (Gain). Negative for activity change, appetite change, fatigue and fever. Respiratory: Negative for apnea, cough, chest tightness and shortness of breath. Cardiovascular: Positive for leg swelling. Negative for chest pain and palpitations. Gastrointestinal: Negative for abdominal pain, blood in stool, constipation, diarrhea, nausea and vomiting.    Musculoskeletal: Negative for arthralgias. Neurological: Negative for seizures and headaches. Psychiatric/Behavioral: Negative for hallucinations and suicidal ideas. Objective:   /66   Pulse 75   Temp 97.5 °F (36.4 °C) (Infrared)   Wt (!) 315 lb (142.9 kg)   LMP  (LMP Unknown)   SpO2 99%   BMI 50.84 kg/m²     Physical Exam  Vitals and nursing note reviewed. Constitutional:       General: She is not in acute distress. Appearance: Normal appearance. She is well-developed. She is obese. She is not diaphoretic. HENT:      Head: Normocephalic and atraumatic. Nose: Nose normal.      Mouth/Throat:      Mouth: Mucous membranes are moist.      Pharynx: Oropharynx is clear. Eyes:      Conjunctiva/sclera: Conjunctivae normal.      Pupils: Pupils are equal, round, and reactive to light. Cardiovascular:      Rate and Rhythm: Normal rate and regular rhythm. Heart sounds: Normal heart sounds. No murmur heard. No friction rub. No gallop. Comments: Edema only at the ankles  Pulmonary:      Effort: Pulmonary effort is normal. No respiratory distress. Breath sounds: Normal breath sounds. No wheezing or rales. Chest:      Chest wall: No tenderness. Abdominal:      General: Abdomen is flat. Bowel sounds are normal.      Palpations: Abdomen is soft. Tenderness: There is no abdominal tenderness. Musculoskeletal:      Cervical back: Normal range of motion. Right lower le+ Edema present. Left lower le+ Edema present. Skin:     General: Skin is warm and dry. Neurological:      Mental Status: She is alert and oriented to person, place, and time. Psychiatric:         Behavior: Behavior normal.         Thought Content: Thought content normal.         Judgment: Judgment normal.         Assessment & Plan:     1.  Morbid obesity ([facility] [address]  Will refer patient to a dietitian  Follow-up with bariatric program  Patient will follow-up with neurology to determine an alternative to prednisone as it has contributed to weight gain, poor glycemic control, and lower extremity edema  - Amb Referral to Nutrition Services    2. Bilateral lower extremity edema  Likely dependent and related to recent weight gain. As compression stockings have been effective, will advise continuation      3. Hypertension, unspecified type  Controlled: Continue current medication      Return in about 6 months (around 12/22/2022), or if symptoms worsen or fail to improve.     Kari Harris MD

## 2022-06-28 RX ORDER — BACLOFEN 10 MG/1
TABLET ORAL
Qty: 90 TABLET | Refills: 1 | Status: SHIPPED | OUTPATIENT
Start: 2022-06-28

## 2022-06-28 RX ORDER — PIOGLITAZONEHYDROCHLORIDE 30 MG/1
TABLET ORAL
Qty: 90 TABLET | Refills: 1 | Status: SHIPPED | OUTPATIENT
Start: 2022-06-28 | End: 2022-09-26

## 2022-06-28 NOTE — TELEPHONE ENCOUNTER
Pharmacy is requesting medication refill.  Please approve or deny this request.    Rx requested:  Requested Prescriptions     Pending Prescriptions Disp Refills    baclofen (LIORESAL) 10 MG tablet [Pharmacy Med Name: BACLOFEN 10MG TABS] 90 tablet 1     Sig: TAKE 1/2 TABLET BY MOUTH TWO TIMES A DAY         Last Office Visit:   6/22/2022      Next Visit Date:  Future Appointments   Date Time Provider Iliana Garcia   10/13/2022 12:15 PM Norma Mathur MD [address] [place] [address]   10/17/2022 11:30 AM Chase Stewart, [facility] Floor   10/18/2022  8:45 AM Hamida Rush MD [facility]   10/31/2022  1:15 PM Vincent Patel MD [facility]   10/31/2022  4:00 PM Makayla Ann

## 2022-06-29 ENCOUNTER — TELEPHONE (OUTPATIENT)
Dept: OBGYN CLINIC | Age: 47
End: 2022-06-29

## 2022-06-29 RX ORDER — FLUCONAZOLE 150 MG/1
TABLET ORAL
Qty: 3 TABLET | Refills: 0 | Status: SHIPPED | OUTPATIENT
Start: 2022-06-29

## 2022-06-29 NOTE — TELEPHONE ENCOUNTER
I have already sent the patient medication for yeast infection I told her that earlier  There is no concern over her Pap if there was I will have already informed her

## 2022-06-29 NOTE — TELEPHONE ENCOUNTER
Patient called in wanting to know if the Atypical squamous cells of undetermined significance, on her PAP was related to the + yeast . Please advise

## 2022-07-23 DIAGNOSIS — E78.5 DYSLIPIDEMIA: ICD-10-CM

## 2022-07-23 DIAGNOSIS — K31.84 GASTROPARESIS: ICD-10-CM

## 2022-07-23 DIAGNOSIS — K21.9 GASTROESOPHAGEAL REFLUX DISEASE, UNSPECIFIED WHETHER ESOPHAGITIS PRESENT: ICD-10-CM

## 2022-07-25 RX ORDER — ESOMEPRAZOLE MAGNESIUM 40 MG/1
CAPSULE, DELAYED RELEASE ORAL
Qty: 30 CAPSULE | Refills: 3 | Status: SHIPPED | OUTPATIENT
Start: 2022-07-25 | End: 2022-08-24

## 2022-07-25 RX ORDER — METOPROLOL TARTRATE 100 MG/1
100 TABLET ORAL 2 TIMES DAILY
Qty: 180 TABLET | Refills: 0 | Status: SHIPPED | OUTPATIENT
Start: 2022-07-25 | End: 2022-08-25

## 2022-07-25 RX ORDER — ERYTHROMYCIN 250 MG/1
TABLET, COATED ORAL
Qty: 90 TABLET | Refills: 3 | Status: SHIPPED | OUTPATIENT
Start: 2022-07-25 | End: 2022-10-28

## 2022-07-25 RX ORDER — ROSUVASTATIN CALCIUM 10 MG/1
10 TABLET, COATED ORAL NIGHTLY
Qty: 90 TABLET | Refills: 0 | Status: SHIPPED | OUTPATIENT
Start: 2022-07-25 | End: 2022-08-25

## 2022-07-25 RX ORDER — PREDNISONE 1 MG/1
TABLET ORAL
Qty: 30 TABLET | Refills: 3 | Status: SHIPPED | OUTPATIENT
Start: 2022-07-25 | End: 2022-08-24

## 2022-07-25 NOTE — TELEPHONE ENCOUNTER
Comments:     Last Office Visit (last PCP visit):   6/22/2022    Next Visit Date:  Future Appointments   Date Time Provider Iliana Garcia   8/1/2022  4:00 PM Ida Homans, MD [address] [place] [address]   9/7/2022  3:30 PM [facility] DI[facility] ED Kulwant Lalit DIAB ED [address] [place] [address]   10/13/2022 12:15 PM Ida Homans, MD [address] [place] [address]   10/17/2022 11:30 AM Sheree Fung, [facility],4Th Floor   10/18/2022  8:45 AM Flavia Concepcion MD [facility]   10/31/2022  1:15 PM Toña Dunaway MD [facility]   10/31/2022  4:00 PM SANIA Ren [facility]       **If hasn't been seen in over a year OR hasn't followed up according to last diabetes/ADHD visit, make appointment for patient before sending refill to provider.     Rx requested:  Requested Prescriptions     Pending Prescriptions Disp Refills    rosuvastatin (CRESTOR) 10 MG tablet [Pharmacy Med Name: ROSUVASTATIN CALCIUM 10MG TABS] 90 tablet 1     Sig: TAKE ONE TABLET BY MOUTH NIGHTLY    metoprolol (LOPRESSOR) 100 MG tablet [Pharmacy Med Name: METOPROLOL TARTRATE 100MG TABS] 180 tablet 1     Sig: TAKE 1 TABLET BY MOUTH 2 TIMES A DAY

## 2022-07-27 RX ORDER — CLOTRIMAZOLE AND BETAMETHASONE DIPROPIONATE 10; .64 MG/G; MG/G
CREAM TOPICAL
Qty: 45 G | Refills: 3 | Status: SHIPPED | OUTPATIENT
Start: 2022-07-27

## 2022-07-29 ENCOUNTER — TELEPHONE (OUTPATIENT)
Dept: OBGYN CLINIC | Age: 47
End: 2022-07-29

## 2022-07-29 NOTE — TELEPHONE ENCOUNTER
Received call from Nilesh dixon at [facility] Delivery who stated that the pharmacy tech had gotten the ok to dispense 45 g 30 days of Rx clotrimazole-betamethasone (LOTRISONE) 1-0.05 % cream.Caller would like to know if ok to do 210 and leave refills in order for patient to get to the 30 days.     Tel# [phone]

## 2022-08-01 ENCOUNTER — E-VISIT (OUTPATIENT)
Dept: PRIMARY CARE CLINIC | Age: 47
End: 2022-08-01
Payer: COMMERCIAL

## 2022-08-01 DIAGNOSIS — R30.0 DYSURIA: Primary | ICD-10-CM

## 2022-08-01 PROCEDURE — 99422 OL DIG E/M SVC 11-20 MIN: CPT | Performed by: NURSE PRACTITIONER

## 2022-08-08 ENCOUNTER — E-VISIT (OUTPATIENT)
Dept: PRIMARY CARE CLINIC | Age: 47
End: 2022-08-08
Payer: COMMERCIAL

## 2022-08-08 DIAGNOSIS — R30.0 DYSURIA: ICD-10-CM

## 2022-08-08 DIAGNOSIS — J06.9 UPPER RESPIRATORY TRACT INFECTION, UNSPECIFIED TYPE: Primary | ICD-10-CM

## 2022-08-08 PROCEDURE — 99422 OL DIG E/M SVC 11-20 MIN: CPT | Performed by: NURSE PRACTITIONER

## 2022-08-08 RX ORDER — CLINDAMYCIN HYDROCHLORIDE 300 MG/1
300 CAPSULE ORAL 3 TIMES DAILY
Qty: 21 CAPSULE | Refills: 0 | Status: SHIPPED | OUTPATIENT
Start: 2022-08-08 | End: 2022-08-08

## 2022-08-08 RX ORDER — CLINDAMYCIN HYDROCHLORIDE 300 MG/1
300 CAPSULE ORAL 3 TIMES DAILY
Qty: 21 CAPSULE | Refills: 0 | Status: SHIPPED | OUTPATIENT
Start: 2022-08-08 | End: 2022-08-15

## 2022-08-08 NOTE — PROGRESS NOTES
Reviewed questionnaire     Reviewed meds/allergies    Dx URI/dysuria    Plan Rx given for clindamycin, follow up with PCP if symptoms do not resolve with use of the antibiotic    Time spent on visit 15 min

## 2022-08-24 DIAGNOSIS — K31.84 GASTROPARESIS: ICD-10-CM

## 2022-08-24 DIAGNOSIS — K21.9 GASTROESOPHAGEAL REFLUX DISEASE, UNSPECIFIED WHETHER ESOPHAGITIS PRESENT: ICD-10-CM

## 2022-08-24 DIAGNOSIS — E78.5 DYSLIPIDEMIA: ICD-10-CM

## 2022-08-24 RX ORDER — SOLIFENACIN SUCCINATE 10 MG/1
TABLET, FILM COATED ORAL
Qty: 90 TABLET | Refills: 3 | Status: SHIPPED | OUTPATIENT
Start: 2022-08-24

## 2022-08-24 RX ORDER — PREDNISONE 1 MG/1
TABLET ORAL
Qty: 30 TABLET | Refills: 3 | Status: SHIPPED | OUTPATIENT
Start: 2022-08-24 | End: 2022-10-17

## 2022-08-24 RX ORDER — ESOMEPRAZOLE MAGNESIUM 40 MG/1
CAPSULE, DELAYED RELEASE ORAL
Qty: 30 CAPSULE | Refills: 3 | Status: SHIPPED | OUTPATIENT
Start: 2022-08-24

## 2022-08-24 RX ORDER — FLASH GLUCOSE SENSOR
KIT MISCELLANEOUS
Qty: 6 EACH | Refills: 3 | Status: SHIPPED | OUTPATIENT
Start: 2022-08-24

## 2022-08-24 NOTE — TELEPHONE ENCOUNTER
Pharmacy is requesting medication refill.  Please approve or deny this request.    Rx requested:  Requested Prescriptions     Pending Prescriptions Disp Refills    predniSONE (DELTASONE) 5 MG tablet [Pharmacy Med Name: PREDNISONE 5MG TAB] 30 tablet 3     Sig: TAKE 1 TABLET BY MOUTH ONE TIME A DAY         Last Office Visit:   5/2/2022      Next Visit Date:  Future Appointments   Date Time Provider Iliana Garcia   9/7/2022  3:30 PM [address] ([facility]) ED [facility] ED [address] [place] [address]   10/13/2022 12:15 PM Nikolai Drake MD [address] [place] [address]   10/17/2022 11:30 AM Raghuiss Call, [facility],4Th Floor   10/18/2022  8:45 AM Ernie Rodriguez MD [facility]   10/31/2022  1:15 PM Bernard Salamanca MD [facility]   10/31/2022  4:00 PM Makayla Moreno

## 2022-08-25 RX ORDER — ROSUVASTATIN CALCIUM 10 MG/1
10 TABLET, COATED ORAL DAILY
Qty: 90 TABLET | Refills: 0 | Status: SHIPPED | OUTPATIENT
Start: 2022-08-25

## 2022-08-25 RX ORDER — ERYTHROMYCIN 250 MG/1
TABLET, COATED ORAL
Qty: 90 TABLET | Refills: 3 | OUTPATIENT
Start: 2022-08-25

## 2022-08-25 RX ORDER — METOPROLOL TARTRATE 100 MG/1
100 TABLET ORAL 2 TIMES DAILY
Qty: 180 TABLET | Refills: 0 | Status: SHIPPED | OUTPATIENT
Start: 2022-08-25

## 2022-08-25 RX ORDER — VENLAFAXINE HYDROCHLORIDE 75 MG/1
CAPSULE, EXTENDED RELEASE ORAL
Qty: 270 CAPSULE | Refills: 1 | Status: SHIPPED | OUTPATIENT
Start: 2022-08-25

## 2022-08-25 NOTE — TELEPHONE ENCOUNTER
Pharmacy is requesting medication refill.  Please approve or deny this request.    Rx requested:  Requested Prescriptions     Pending Prescriptions Disp Refills    venlafaxine (EFFEXOR XR) 75 MG extended release capsule [Pharmacy Med Name: VENLAFAXINE HCL ER 75MG CP24] 270 capsule 1     Sig: TAKE TWO CAPSULES BY MOUTH EVERY MORNING AND TAKE ONE CAPSULE BY MOUTH EVERY EVENING         Last Office Visit:   6/22/2022      Next Visit Date:  Future Appointments   Date Time Provider Iliana Garcia   9/7/2022  3:30 PM [address] ([facility]) ED Stephanie Hurley [facility] ED [address] [place] [address]   10/13/2022 12:15 PM Annie Greenfield MD [address] [place] [address]   10/17/2022 11:30 AM Deandra Rodriguez, [facility] [place],4Th Floor   10/18/2022  8:45 AM Maritza Tong MD [facility]   10/31/2022  1:15 PM Shira Perales MD [facility]   10/31/2022  4:00 PM Makayla Polanco

## 2022-09-16 ENCOUNTER — TELEPHONE (OUTPATIENT)
Dept: PHARMACY | Facility: CLINIC | Age: 47
End: 2022-09-16

## 2022-09-16 NOTE — TELEPHONE ENCOUNTER
[facility]  Program     SUBJECTIVE  Delos Sacks is a 56 y.o. female currently identified being enrolled in the [facility] diabetes program  through [employer]. All patients will be connected with a personalized health  thorough the [employer] [place]. This patient received a 60 day supply of Dexcom G6 Sensors and transmitter for $0 with a prescription. Dexcom Aduro box was shipped by [facility] 8/4/22. Pt likely now using up last few sensors since received 60 day supply. Qualifies for PA approval long term if she is interested. She was previously on [place] [employer] 14 day. Of note, [employer] 14 day sensors last filled 8/25/22 x 6. Attempting to reach patient to follow up on Dexcom and if she is interested in continuing long term. If yes, we can assist with PA and getting Rxs to [facility]. No answer, LM to please call back to [phone] option 3.     Christophe Landa, PharmD, [facility]  Department, toll free: [phone], option 3

## 2022-09-19 DIAGNOSIS — Z79.4 TYPE 2 DIABETES MELLITUS WITH COMPLICATION, WITH LONG-TERM CURRENT USE OF INSULIN (HCC): ICD-10-CM

## 2022-09-19 DIAGNOSIS — E11.8 TYPE 2 DIABETES MELLITUS WITH COMPLICATION, WITH LONG-TERM CURRENT USE OF INSULIN (HCC): ICD-10-CM

## 2022-09-21 ENCOUNTER — TELEPHONE (OUTPATIENT)
Dept: PHARMACY | Facility: CLINIC | Age: 47
End: 2022-09-21

## 2022-09-21 NOTE — TELEPHONE ENCOUNTER
See pt Tierra responses - has decided to continue with Jamila Sommers for now.     For Pharmacy Admin Tracking Only    Time Spent (min): 15

## 2022-09-21 NOTE — TELEPHONE ENCOUNTER
As of 9/7/22 patient has used $650 of the maximum of $600 a year in waived co pays for specific medications and pharmacy-related supplies through the [address] [employer] [place] for the DM Program.    Patient currently enrolled in the DM Program and has used all of the maximum of $600 a year in waived co pays for specific medications and pharmacy-related supplies through the [address] [employer] [place]. Courtesy call placed to patient to advise them of the above information. Patient unavailable at the time of call. Message left on TAD: Maximum waived co pays for the DM Program has been met and they will begin to be charged their co-payments once again. I left our number: [phone], option #3 if patient has any questions/concerns.       Brenden Healy, [address]   Phone: [phone], option #3       For Pharmacy Admin Tracking Only    CPA in place:  No  Time Spent (min): 10

## 2022-09-26 ENCOUNTER — HOSPITAL ENCOUNTER (OUTPATIENT)
Dept: LAB | Age: 47
Discharge: HOME | End: 2022-09-26
Payer: COMMERCIAL

## 2022-09-26 DIAGNOSIS — E11.8 TYPE 2 DIABETES MELLITUS WITH COMPLICATION, WITH LONG-TERM CURRENT USE OF INSULIN (HCC): ICD-10-CM

## 2022-09-26 DIAGNOSIS — Z79.4 TYPE 2 DIABETES MELLITUS WITH COMPLICATION, WITH LONG-TERM CURRENT USE OF INSULIN (HCC): ICD-10-CM

## 2022-09-26 DIAGNOSIS — M35.3 PMR (POLYMYALGIA RHEUMATICA) (HCC): ICD-10-CM

## 2022-09-26 LAB
ANION GAP SERPL CALCULATED.3IONS-SCNC: 14 MEQ/L (ref 9–15)
BUN BLDV-MCNC: 14 MG/DL (ref 6–20)
C-REACTIVE PROTEIN: 52.5 MG/L (ref 0–5)
CALCIUM SERPL-MCNC: 9.5 MG/DL (ref 8.5–9.9)
CHLORIDE BLD-SCNC: 95 MEQ/L (ref 95–107)
CO2: 25 MEQ/L (ref 20–31)
CREAT SERPL-MCNC: 0.75 MG/DL (ref 0.5–0.9)
GFR AFRICAN AMERICAN: >60
GFR NON-AFRICAN AMERICAN: >60
GLUCOSE BLD-MCNC: 145 MG/DL (ref 70–99)
POTASSIUM SERPL-SCNC: 4 MEQ/L (ref 3.4–4.9)
SODIUM BLD-SCNC: 134 MEQ/L (ref 135–144)

## 2022-09-26 PROCEDURE — 36415 COLL VENOUS BLD VENIPUNCTURE: CPT

## 2022-09-26 PROCEDURE — 85652 RBC SED RATE AUTOMATED: CPT

## 2022-09-26 PROCEDURE — 80048 BASIC METABOLIC PNL TOTAL CA: CPT

## 2022-09-26 PROCEDURE — 83036 HEMOGLOBIN GLYCOSYLATED A1C: CPT

## 2022-09-26 PROCEDURE — 86140 C-REACTIVE PROTEIN: CPT

## 2022-09-26 RX ORDER — LOSARTAN POTASSIUM 50 MG/1
TABLET ORAL
Qty: 90 TABLET | Refills: 1 | Status: SHIPPED | OUTPATIENT
Start: 2022-09-26

## 2022-09-26 NOTE — TELEPHONE ENCOUNTER
Patient is requesting medication refill.  Please approve or deny this request.    Rx requested:  Requested Prescriptions     Pending Prescriptions Disp Refills    losartan (COZAAR) 50 MG tablet [Pharmacy Med Name: LOSARTAN POTASSIUM 50MG TABS] 90 tablet 1     Sig: TAKE 1 TABLET BY MOUTH ONE TIME A DAY         Last Office Visit:   6/22/2022      Next Visit Date:  Future Appointments   Date Time Provider Iliana Garcia   10/12/2022  3:45 PM Ida Homans, MD [address] [place] [address]   10/14/2022 11:15 AM Sheree Fung, [facility] Floor   10/18/2022  8:45 AM Flavia Concepcion MD [facility]   11/9/2022  4:00 PM SANIA Ren [facility]   1/25/2023  3:45 PM Toña Dunaway MD [facility]

## 2022-09-27 ENCOUNTER — TELEPHONE (OUTPATIENT)
Dept: NEUROLOGY | Age: 47
End: 2022-09-27

## 2022-09-27 LAB
HBA1C MFR BLD: 8.5 % (ref 4.8–5.9)
SEDIMENTATION RATE, ERYTHROCYTE: 85 MM (ref 0–20)

## 2022-09-27 RX ORDER — PIOGLITAZONEHYDROCHLORIDE 30 MG/1
TABLET ORAL
Qty: 90 TABLET | Refills: 3 | Status: SHIPPED | OUTPATIENT
Start: 2022-09-27

## 2022-09-27 NOTE — TELEPHONE ENCOUNTER
Patient had labs done yesterday and has concerns with the results. She is not scheduled for a follow up until January and would like to know how to proceed.   Please advise

## 2022-09-29 ENCOUNTER — PATIENT MESSAGE (OUTPATIENT)
Dept: NEUROLOGY | Age: 47
End: 2022-09-29

## 2022-10-03 ENCOUNTER — TELEMEDICINE (OUTPATIENT)
Dept: ENDOCRINOLOGY | Age: 47
End: 2022-10-03
Payer: COMMERCIAL

## 2022-10-03 DIAGNOSIS — E11.8 TYPE 2 DIABETES MELLITUS WITH COMPLICATION, WITH LONG-TERM CURRENT USE OF INSULIN (HCC): Primary | ICD-10-CM

## 2022-10-03 DIAGNOSIS — E11.43 DIABETIC GASTROPARESIS ASSOCIATED WITH TYPE 2 DIABETES MELLITUS (HCC): ICD-10-CM

## 2022-10-03 DIAGNOSIS — Z79.4 TYPE 2 DIABETES MELLITUS WITH COMPLICATION, WITH LONG-TERM CURRENT USE OF INSULIN (HCC): Primary | ICD-10-CM

## 2022-10-03 DIAGNOSIS — K31.84 DIABETIC GASTROPARESIS ASSOCIATED WITH TYPE 2 DIABETES MELLITUS (HCC): ICD-10-CM

## 2022-10-03 PROCEDURE — 99442 PR PHYS/QHP TELEPHONE EVALUATION 11-20 MIN: CPT | Performed by: INTERNAL MEDICINE

## 2022-10-03 RX ORDER — INSULIN LISPRO 100 [IU]/ML
INJECTION, SOLUTION INTRAVENOUS; SUBCUTANEOUS
Qty: 20 ADJUSTABLE DOSE PRE-FILLED PEN SYRINGE | Refills: 2
Start: 2022-10-03 | End: 2022-10-14 | Stop reason: SDUPTHER

## 2022-10-03 RX ORDER — BLOOD-GLUCOSE METER
EACH MISCELLANEOUS
Qty: 1 EACH | Refills: 0 | Status: SHIPPED | OUTPATIENT
Start: 2022-10-03

## 2022-10-03 RX ORDER — INSULIN GLARGINE 300 U/ML
INJECTION, SOLUTION SUBCUTANEOUS
Qty: 30 ADJUSTABLE DOSE PRE-FILLED PEN SYRINGE | Refills: 3
Start: 2022-10-03

## 2022-10-03 NOTE — PROGRESS NOTES
10/3/2022    TELEHEALTH EVALUATION -- Audio/Visual (During RNWKT-19 public health emergency)    Due to COVID 19 outbreak, patient's office visit was converted to a virtual visit. Patient was contacted and agreed to proceed with a virtual visit via Telephone Visit  The risks and benefits of converting to a virtual visit were discussed in light of the current infectious disease epidemic. Patient also understood that insurance coverage and co-pays are up to their individual insurance plans. HPI: Follow-up on type 2 diabetes obesity patient is on high-dose of insulin Actos metformin labs were reviewed recently  Patient on Toujeo 150 units at bedtime Humalog 20 to 40 units with each meals plus Actos history of gastroparesis A1c is up to 8.5  History of gastroparesis patient on erythromycin  Overall blood sugars close to 200 denies any hypoglycemia  Obesity BMI at [address] [place] [address] (:  1975) has requested an audio/video evaluation for the following concern(s):     Latest Reference Range & Units 22 15:21   Sodium 135 - 144 mEq/L 134 (L)   Potassium 3.4 - 4.9 mEq/L 4.0   Chloride 95 - 107 mEq/L 95   CO2 20 - 31 mEq/L 25   BUN,BUNPL 6 - 20 mg/dL 14   Creatinine 0.50 - 0.90 mg/dL 0.75   Anion Gap 9 - 15 mEq/L 14   GFR Non- >60  >60.0   GFR  >60  >60.0   Glucose, Random 70 - 99 mg/dL 145 (H)   CALCIUM, SERUM, 284505 8.5 - 9.9 mg/dL 9.5   Hemoglobin A1C 4.8 - 5.9 % 8.5 (H)   (L): Data is abnormally low  (H): Data is abnormally high    Review of Systems   Eyes: Negative. Gastrointestinal: Negative. Endocrine: Negative. All other systems reviewed and are negative. Prior to Visit Medications    Medication Sig Taking?  Authorizing Provider   pioglitazone (ACTOS) 30 MG tablet TAKE 1 TABLET BY MOUTH ONE TIME A DAY  Sawyer Chao MD   losartan (COZAAR) 50 MG tablet TAKE 1 TABLET BY MOUTH ONE TIME A DAY  Rudi Benson MD   metFORMIN (GLUCOPHAGE) 500 MG tablet TAKE 1 TABLET BY MOUTH 3 Roman Tijerina MD   rosuvastatin (CRESTOR) 10 MG tablet Take 1 tablet by mouth daily  [address], MD   metoprolol (LOPRESSOR) 100 MG tablet Take 1 tablet by mouth 2 times daily  [address], MD   venlafaxine (EFFEXOR XR) 75 MG extended release capsule TAKE TWO CAPSULES BY MOUTH EVERY MORNING AND TAKE ONE CAPSULE BY MOUTH EVERY EVENING  Aysha Muller MD   predniSONE (DELTASONE) 5 MG tablet TAKE 1 TABLET BY MOUTH ONE TIME A DAY  Jessica Lopez MD   esomeprazole ([address]) 40 MG delayed release capsule TAKE 1 CAPSULE BY MOUTH ONE TIME A DAY  SANIA Dillard CNP   solifenacin (VESICARE) 10 MG tablet TAKE 1 TABLET BY MOUTH ONE TIME A DAY  Sachin Herring MD   Continuous Blood Gluc Sensor (FREESTYLE JAIRO 14 DAY SENSOR) MIS CHANGE EVERY 14 DAYS  Patrick Marinelli MD   clotrimazole-betamethasone (LOTRISONE) 1-0.05 % cream APPLY TO AFFECTED AREAS TOPICALLY TWICE DAILY  SANIA Raines CNM   erythromycin base (E-MYCIN) 250 MG tablet TAKE 1 TABLET BY MOUTH 3 TIMES A DAY  SANIA Dillard CNP   fluconazole (DIFLUCAN) 150 MG tablet Take 1 tablet every 2 days for 3 doses. Amalia Youngblood DO   baclofen (LIORESAL) 10 MG tablet TAKE 1/2 TABLET BY MOUTH TWO TIMES A DAY  Rudi Gonzales MD   insulin lispro, 1 Unit Dial, (HUMALOG KWIKPEN) 100 UNIT/ML SOPN INJECT 20-40 UNITS UNDER THE SKIN WITH EACH MEAL  Dave Burgess MD   [facility].  Devices KIT Use as directed-  Dexcom Aduro Box- NDC [address]-43 Contains: Dexcom G6 sensors- qty 6; Dexcom G6 transmitter- qty 1  Patrick Marinelli MD   levoFLOXacin (QUIXIN) 0.5 % ophthalmic solution 1 to 2 drops in left eye every 2 hours while awake for 2 days then you may reduce dosing to 1 to 2 drops in left eye every 4 hours while awake for 5 days  SANIA Sandoval CNP   aspirin 325 MG EC tablet Take 1 tablet by mouth every 6 hours as needed for Pain  Gabriele Lizama MD   ondansetron (ZOFRAN-ODT) 4 MG disintegrating tablet DISSOLVE ONE TABLET BY MOUTH EVERY 8 HOURS AS NEEDED FOR NAUSEA OR VOMITING  SANIA Schaffer - CNP   Miconazole POWD Apply topically BID  Rudi Soto MD   hydroCHLOROthiazide (HYDRODIURIL) 25 MG tablet Take 1 tablet by mouth daily  Miley Hernandez MD   gentamicin (GARAMYCIN) 0.1 % ointment Apply topically daily  Miley Hernandez MD   CPAP Machine MISC by Does not apply route Change CPAP pressure to 14 cm  Phyllistine Romberg, MD   blood glucose test strips (PRODIGY NO CODING BLOOD GLUC) strip 1 each by In Vitro route 4 times daily As needed. Miley Hernandez MD   nystatin ([address]) 715428 UNIT/GM powder APPLY TOPICALLY THREE TIMES A DAY  MD Shaquille Lyn Lancets 28G MISC Use as directed to test up to 4 times daily  Miley Hernandez MD   Insulin Glargine, 2 Unit Dial, (TOUJEO MAX SOLOSTAR) 300 UNIT/ML SOPN 150 units at bedtime  William Oseguera MD   nystatin-triamcinolone (MYCOLOG II) 203846-0.1 UNIT/GM-% cream Apply topically 2 times daily. Diaz Canela MD   CPAP Machine MISC New CPAP with 8 cm and supply  Phyllistine Romberg, MD   metoclopramide (REGLAN) 10 MG tablet Take 1 tablet by mouth 3 times daily (with meals)  William Oseguera MD   Continuous Blood Gluc  (FREESTYLE JAIRO 14 DAY READER) ALEXANDREA As directed  William Oseguera MD   Insulin Pen Needle (PEN NEEDLES) 32G X 4 MM MISC Use as directed with insulin pens, 4 times daily  William Oseguera MD   blood glucose test strips (PRODIGY NO CODING BLOOD GLUC) strip 1 each by In Vitro route 4 times daily As needed. William Oseguera MD   sucralfate (CARAFATE) 1 GM tablet Take 1 tablet by mouth 4 times daily for 7 days  SANIA Medina CNP   Blood Glucose Monitoring Suppl (PRODIGY AUTOCODE BLOOD GLUCOSE) w/Device KIT Use as directed to test up to 4 times daily  William Oseguera MD   miconazole (MICOTIN) 2 % cream Apply topically 2 times daily.   Sol Bumpers, MD   Handicap Placard MISC Exp 5 years  Sol Bumpers, MD       Social History     Tobacco Use    Smoking status: Former Packs/day: 1.00     Types: Cigarettes     Quit date: 2017     Years since quittin.7    Smokeless tobacco: Never   Vaping Use    Vaping Use: Never used   Substance Use Topics    Alcohol use: Yes     Alcohol/week: 0.0 standard drinks     Comment: socially    Drug use: No            PHYSICAL EXAMINATION:  [ INSTRUCTIONS:  \"[x]\" Indicates a positive item  \"[]\" Indicates a negative item  -- DELETE ALL ITEMS NOT EXAMINED]  [] Alert  [] Oriented to person/place/time    [] No apparent distress  [] Toxic appearing    [] Face flushed appearing [] Sclera clear  [] Lips are cyanotic      [] Breathing appears normal  [] Appears tachypneic      [] Rash on visible skin    [] Cranial Nerves II-XII grossly intact    [] Motor grossly intact in visible upper extremities    [] Motor grossly intact in visible lower extremities    [] Normal Mood  [] Anxious appearing    [] Depressed appearing  [] Confused appearing      [] Poor short term memory  [] Poor long term memory    [] OTHER:      Due to this being a TeleHealth encounter, evaluation of the following organ systems is limited: Vitals/Constitutional/EENT/Resp/CV/GI//MS/Neuro/Skin/Heme-Lymph-Imm. ASSESSMENT/PLAN:     Diagnosis Orders   1. Type 2 diabetes mellitus with complication, with long-term current use of insulin ([facility]        2.  Diabetic gastroparesis associated with type 2 diabetes mellitus ([facility]          Orders Placed This Encounter   Procedures    Basic Metabolic Panel     Standing Status:   Future     Standing Expiration Date:   10/3/2023    Hemoglobin A1C     Standing Status:   Future     Standing Expiration Date:   10/3/2023     Orders Placed This Encounter   Medications    insulin lispro, 1 Unit Dial, (HUMALOG KWIKPEN) 100 UNIT/ML SOPN     Sig: INJECT 20-40 UNITS UNDER THE SKIN WITH EACH MEAL     Dispense:  20 Adjustable Dose Pre-filled Pen Syringe     Refill:  2    Insulin Glargine, 2 Unit Dial, (TOUJEO MAX SOLOSTAR) 300 UNIT/ML SOPN     Sig: 160 units at bedtime     Dispense:  30 Adjustable Dose Pre-filled Pen Syringe     Refill:  3    Blood Glucose Monitoring Suppl (PRODIGY AUTOCODE BLOOD GLUCOSE) ALEXANDREA     Sig: As directed     Dispense:  1 each     Refill:  0   Metformin Actos  Continue current dose of insulin  Repeat labs  A1c goal of 7 or lower  Total time spent 15 minutes    An  electronic signature was used to authenticate this note. --Ede Ochoa MD on 10/3/2022 at 4:58 PM        Pursuant to the emergency declaration under the [facility], On license of [facility] waiver authority and the [employer] Act, this Virtual  Visit was conducted, with patient's consent, to reduce the patient's risk of exposure to COVID-19 and provide continuity of care for an established patient. Services were provided through a video synchronous discussion virtually to substitute for in-person clinic visit.

## 2022-10-08 ASSESSMENT — ENCOUNTER SYMPTOMS
EYES NEGATIVE: 1
GASTROINTESTINAL NEGATIVE: 1

## 2022-10-11 ENCOUNTER — E-VISIT (OUTPATIENT)
Dept: PRIMARY CARE CLINIC | Age: 47
End: 2022-10-11
Payer: COMMERCIAL

## 2022-10-11 DIAGNOSIS — R30.0 DYSURIA: Primary | ICD-10-CM

## 2022-10-11 PROCEDURE — 99423 OL DIG E/M SVC 21+ MIN: CPT | Performed by: NURSE PRACTITIONER

## 2022-10-11 RX ORDER — NITROFURANTOIN 25; 75 MG/1; MG/1
100 CAPSULE ORAL 2 TIMES DAILY
Qty: 10 CAPSULE | Refills: 0 | Status: SHIPPED | OUTPATIENT
Start: 2022-10-11 | End: 2022-10-16

## 2022-10-11 NOTE — PROGRESS NOTES
Reviewed questionnaire  Reviewed previous encounters, labs, meds, allergies and history     Dx dysuria     Plan  rx for macrobid 100 mg BID x 5 days  Rx for urine culture      Increase oral fluids. Tylenol or motrin for pain.   May take azo over the counter     Please f/u with pcp if symptoms do not improve for further evaluation and possible urine culture     See educational handout     Time spent 22 min

## 2022-10-14 RX ORDER — INSULIN LISPRO 100 [IU]/ML
INJECTION, SOLUTION INTRAVENOUS; SUBCUTANEOUS
Qty: 5 ADJUSTABLE DOSE PRE-FILLED PEN SYRINGE | Refills: 0 | Status: SHIPPED | OUTPATIENT
Start: 2022-10-14

## 2022-10-17 RX ORDER — PREDNISONE 1 MG/1
TABLET ORAL
Qty: 30 TABLET | Refills: 3 | Status: SHIPPED | OUTPATIENT
Start: 2022-10-17

## 2022-10-27 DIAGNOSIS — K31.84 GASTROPARESIS: ICD-10-CM

## 2022-10-27 RX ORDER — PREDNISONE 1 MG/1
TABLET ORAL
Qty: 30 TABLET | Refills: 3 | OUTPATIENT
Start: 2022-10-27

## 2022-10-28 RX ORDER — ERYTHROMYCIN 250 MG/1
TABLET, COATED ORAL
Qty: 90 TABLET | Refills: 3 | Status: SHIPPED | OUTPATIENT
Start: 2022-10-28

## 2022-11-08 ENCOUNTER — TELEPHONE (OUTPATIENT)
Dept: CARDIOLOGY CLINIC | Age: 47
End: 2022-11-08

## 2022-11-08 NOTE — TELEPHONE ENCOUNTER
Appointment canceled for eDnise Sainz ([id number])   Visit Type: OFFICE VISIT   Date        Time      Length    Provider                  Department   11/15/2022   3:00 PM  15 mins. MD Doretha Gomez 93      Reason for Cancellation: Financial     Upcoming appointment at  [facility] on 11/15/22  ([place])  Everton Mayo  Patient Appointment Cancel Request Pool 17 minutes ago (8:09 AM)     Appointment canceled for Denise Sainz ([id number])  Visit Type: OFFICE VISIT  Date        Time      Length    Provider                  Department  11/15/2022   3:00 PM  15 mins. MD Doretha Gomez 93     Reason for Cancellation: Financial       MD Everton Beltran 1 hour ago (6:39 AM)     Dallas Herrera,     We're looking forward to seeing you at your upcoming appointment on 11/15/22 at 3:00 PM.     Now you can save time and skip the clipboard! Visit Pre-Check lets you complete your appointment paperwork and pay your copay in advance of your appointment. Then, when you arrive for your appointment, simply stop at the  to let them know you're there. Please complete your Visit Pre-Check before you arrive. Click [email or web address] to view more details about your appointment and complete your Visit Pre-Check.              Batch Job User Kimi  Everton Mayo

## 2022-11-17 ENCOUNTER — TELEPHONE (OUTPATIENT)
Dept: PHARMACY | Facility: CLINIC | Age: 47
End: 2022-11-17

## 2022-11-17 DIAGNOSIS — E78.5 DYSLIPIDEMIA: ICD-10-CM

## 2022-11-17 NOTE — TELEPHONE ENCOUNTER
Pharmacy is requesting medication refill.  Please approve or deny this request.    Rx requested:  Requested Prescriptions     Pending Prescriptions Disp Refills    metoprolol (LOPRESSOR) 100 MG tablet [Pharmacy Med Name: METOPROLOL TARTRATE 100MG TABS] 180 tablet 0     Sig: Take 1 tablet by mouth 2 times daily    rosuvastatin (CRESTOR) 10 MG tablet [Pharmacy Med Name: ROSUVASTATIN CALCIUM 10MG TABS] 90 tablet 0     Sig: Take 1 tablet by mouth daily         Last Office Visit:   6/22/2022      Next Visit Date:  Future Appointments   Date Time Provider Iliana Garcia   1/25/2023  3:45 PM MD [facility] -

## 2022-11-18 RX ORDER — METOPROLOL TARTRATE 100 MG/1
100 TABLET ORAL 2 TIMES DAILY
Qty: 180 TABLET | Refills: 1 | Status: SHIPPED | OUTPATIENT
Start: 2022-11-18

## 2022-11-18 RX ORDER — ROSUVASTATIN CALCIUM 10 MG/1
10 TABLET, COATED ORAL DAILY
Qty: 90 TABLET | Refills: 1 | Status: SHIPPED | OUTPATIENT
Start: 2022-11-18

## 2022-11-28 RX ORDER — HYDROCHLOROTHIAZIDE 25 MG/1
25 TABLET ORAL DAILY
Qty: 90 TABLET | Refills: 1 | Status: SHIPPED | OUTPATIENT
Start: 2022-11-28

## 2022-11-28 NOTE — TELEPHONE ENCOUNTER
Comments:     Last Office Visit (last PCP visit):   6/22/2022    Next Visit Date:  Future Appointments   Date Time Provider Iliana Carbajalisti   1/25/2023  3:45 PM Duran Trivedi MD [facility] -       **If hasn't been seen in over a year OR hasn't followed up according to last diabetes/ADHD visit, make appointment for patient before sending refill to provider.     Rx requested:  Requested Prescriptions     Pending Prescriptions Disp Refills    hydroCHLOROthiazide (HYDRODIURIL) 25 MG tablet 90 tablet 1     Sig: Take 1 tablet by mouth daily

## 2022-11-29 ENCOUNTER — CLINICAL DOCUMENTATION (OUTPATIENT)
Dept: PHARMACY | Facility: CLINIC | Age: 47
End: 2022-11-29

## 2022-11-29 NOTE — LETTER
Elyse [address]  [address] [place] [address], [address]  Phone: toll free [phone] option 222 [phone]           11/29/22     Dear Jose Sykes,    We regret to inform you that you have been disqualified from The [address] [place] [address] With Diabetes Program because the following requirements were not met by the stated deadline:     Maintaining [address] - Per our conversation on 11/17/22 you advised you no longer have [address] [place] [address] benefits. You will not receive the copay waiver up to $600 towards your diabetic medications and supplies in 2022. If you qualify once again, you will be eligible to reapply to The [address] [place] [address] Diabetes Management Program the following calendar year. Elyse 2 Team  [phone] Option #3  Email: [email or web address]  Fax Number: [phone]

## 2022-11-29 NOTE — PROGRESS NOTES
Pharmacy Pop Care Documentation:     Alice Davila is being removed from the diabetes management program for the following reason(s):  11/17/22: Per patient she no longer has REHABILITATION [facility]

## 2022-12-11 DIAGNOSIS — K21.9 GASTROESOPHAGEAL REFLUX DISEASE, UNSPECIFIED WHETHER ESOPHAGITIS PRESENT: ICD-10-CM

## 2022-12-12 RX ORDER — ESOMEPRAZOLE MAGNESIUM 40 MG/1
CAPSULE, DELAYED RELEASE ORAL
Qty: 30 CAPSULE | Refills: 3 | Status: SHIPPED | OUTPATIENT
Start: 2022-12-12

## 2022-12-22 ENCOUNTER — E-VISIT (OUTPATIENT)
Dept: PRIMARY CARE CLINIC | Age: 47
End: 2022-12-22
Payer: COMMERCIAL

## 2022-12-22 DIAGNOSIS — J02.9 PHARYNGITIS, UNSPECIFIED ETIOLOGY: Primary | ICD-10-CM

## 2022-12-22 PROCEDURE — 99422 OL DIG E/M SVC 11-20 MIN: CPT | Performed by: NURSE PRACTITIONER

## 2022-12-22 RX ORDER — AMOXICILLIN AND CLAVULANATE POTASSIUM 875; 125 MG/1; MG/1
1 TABLET, FILM COATED ORAL 2 TIMES DAILY
Qty: 14 TABLET | Refills: 0 | Status: SHIPPED | OUTPATIENT
Start: 2022-12-22 | End: 2022-12-29

## 2022-12-22 ASSESSMENT — LIFESTYLE VARIABLES
PACKS_PER_DAY: 1
SMOKING_STATUS: NO, I'M A FORMER SMOKER
SMOKING_YEARS: 15

## 2022-12-22 NOTE — PROGRESS NOTES
Selina Deal (1975) initiated an asynchronous digital communication through [address] [place] [address]. HPI: per patient questionnaire     Exam: not applicable    Diagnoses and all orders for this visit:  Diagnoses and all orders for this visit:    Pharyngitis, unspecified etiology  New, rx for augmentin BID x 7 days and continue supportive care. F/u with PCP if symptoms persists or worsen. -     amoxicillin-clavulanate (AUGMENTIN) 875-125 MG per tablet; Take 1 tablet by mouth 2 times daily for 7 days    Time: EV2 - 11-20 minutes were spent on the digital evaluation and management of this patient.      SANIA Luque - CNP

## 2022-12-27 NOTE — TELEPHONE ENCOUNTER
Comments:     Last Office Visit (last PCP visit):   6/22/2022    Next Visit Date:  Future Appointments   Date Time Provider Iliana Radha   1/25/2023  3:45 PM Duran Chavarria MD [facility] -       **If hasn't been seen in over a year OR hasn't followed up according to last diabetes/ADHD visit, make appointment for patient before sending refill to provider.     Rx requested:  Requested Prescriptions     Pending Prescriptions Disp Refills    baclofen (LIORESAL) 10 MG tablet [Pharmacy Med Name: BACLOFEN 10MG TABS] 90 tablet 1     Sig: TAKE ONE-HALF TABLET BY MOUTH TWICE A DAY

## 2022-12-28 RX ORDER — BACLOFEN 10 MG/1
TABLET ORAL
Qty: 90 TABLET | Refills: 1 | Status: SHIPPED | OUTPATIENT
Start: 2022-12-28

## 2022-12-29 ENCOUNTER — OFFICE VISIT (OUTPATIENT)
Dept: FAMILY MEDICINE CLINIC | Age: 47
End: 2022-12-29
Payer: COMMERCIAL

## 2022-12-29 ENCOUNTER — E-VISIT (OUTPATIENT)
Dept: PRIMARY CARE CLINIC | Age: 47
End: 2022-12-29
Payer: COMMERCIAL

## 2022-12-29 VITALS
BODY MASS INDEX: 47.09 KG/M2 | OXYGEN SATURATION: 96 % | SYSTOLIC BLOOD PRESSURE: 132 MMHG | HEART RATE: 77 BPM | HEIGHT: 66 IN | WEIGHT: 293 LBS | DIASTOLIC BLOOD PRESSURE: 74 MMHG | TEMPERATURE: 98.8 F

## 2022-12-29 DIAGNOSIS — R21 RASH: Primary | ICD-10-CM

## 2022-12-29 DIAGNOSIS — B37.2 YEAST DERMATITIS: Primary | ICD-10-CM

## 2022-12-29 PROCEDURE — 99422 OL DIG E/M SVC 11-20 MIN: CPT | Performed by: NURSE PRACTITIONER

## 2022-12-29 PROCEDURE — 99213 OFFICE O/P EST LOW 20 MIN: CPT | Performed by: NURSE PRACTITIONER

## 2022-12-29 RX ORDER — CLOTRIMAZOLE AND BETAMETHASONE DIPROPIONATE 10; .64 MG/G; MG/G
CREAM TOPICAL
Qty: 45 G | Refills: 1 | Status: SHIPPED | OUTPATIENT
Start: 2022-12-29

## 2022-12-29 ASSESSMENT — ENCOUNTER SYMPTOMS
SHORTNESS OF BREATH: 0
BACK PAIN: 0
CHEST TIGHTNESS: 0
NAUSEA: 0
RHINORRHEA: 0
EYE PAIN: 0
DIARRHEA: 0
COUGH: 0
SINUS PRESSURE: 0
VOMITING: 0
SINUS PAIN: 0
SORE THROAT: 0
TROUBLE SWALLOWING: 0
COLOR CHANGE: 0
ABDOMINAL PAIN: 0
CONSTIPATION: 0
NAIL CHANGES: 0
WHEEZING: 0

## 2022-12-29 NOTE — PROGRESS NOTES
[address] Encounter      [facility]       Chief Complaint   Patient presents with    Rash     Rash in both hip, redness and dots in one side start 12/28       Nurses Notes reviewed and I agree except as noted in the HPI. HISTORY OF PRESENT ILLNESS   Giovanna Aponte is a 56 y.o. female who presents   Rash  This is a new problem. The current episode started yesterday. The problem has been gradually worsening since onset. The affected locations include the left hip and right hip. The rash is characterized by itchiness (intermittent itching irritation). Pertinent negatives include no anorexia, congestion, cough, diarrhea, eye pain, facial edema, fatigue, fever, joint pain, nail changes, rhinorrhea, shortness of breath, sore throat or vomiting. Past treatments include nothing. There is no history of allergies, asthma, eczema or varicella. REVIEW OF SYSTEMS     Review of Systems   Constitutional:  Negative for activity change, appetite change, chills, diaphoresis, fatigue and fever. HENT:  Negative for congestion, ear pain, postnasal drip, rhinorrhea, sinus pressure, sinus pain, sore throat and trouble swallowing. Eyes:  Negative for pain and visual disturbance. Respiratory:  Negative for cough, chest tightness, shortness of breath and wheezing. Cardiovascular:  Negative for chest pain and palpitations. Gastrointestinal:  Negative for abdominal pain, anorexia, constipation, diarrhea, nausea and vomiting. Genitourinary:  Negative for difficulty urinating, dysuria, flank pain, frequency and urgency. Musculoskeletal:  Negative for arthralgias, back pain, joint pain, myalgias, neck pain and neck stiffness. Skin:  Positive for rash. Negative for color change and nail changes. Neurological:  Negative for dizziness, tremors, seizures, syncope, speech difficulty, weakness, light-headedness, numbness and headaches.      PAST MEDICAL HISTORY         Diagnosis Date BLOOD GLUC) STRIP    1 each by In Vitro route 4 times daily As needed. CONTINUOUS BLOOD GLUC  (FREESTYLE JAIRO 14 DAY READER) ALEXANDREA    As directed    CONTINUOUS BLOOD GLUC SENSOR (FREESTYLE JAIRO 14 DAY SENSOR) MISC    CHANGE EVERY 14 DAYS    CPAP MACHINE MISC    New CPAP with 8 cm and supply    CPAP MACHINE MISC    by Does not apply route Change CPAP pressure to 14 cm    ERYTHROMYCIN BASE (E-MYCIN) 250 MG TABLET    TAKE 1 TABLET BY MOUTH 3 TIMES A DAY    ESOMEPRAZOLE (NEXIUM) 40 MG DELAYED RELEASE CAPSULE    TAKE 1 CAPSULE BY MOUTH ONE TIME A DAY    FLUCONAZOLE (DIFLUCAN) 150 MG TABLET    Take 1 tablet every 2 days for 3 doses. GENTAMICIN (GARAMYCIN) 0.1 % OINTMENT    Apply topically daily    HANDICAP PLACARD MISC    Exp 5 years    HYDROCHLOROTHIAZIDE (HYDRODIURIL) 25 MG TABLET    Take 1 tablet by mouth daily    INSULIN GLARGINE, 2 UNIT DIAL, (TOUJEO MAX SOLOSTAR) 300 UNIT/ML SOPN    160 units at bedtime    INSULIN LISPRO, 1 UNIT DIAL, (HUMALOG KWIKPEN) 100 UNIT/ML SOPN    INJECT 20-40 UNITS UNDER THE SKIN WITH EACH MEAL    INSULIN PEN NEEDLE (PEN NEEDLES) 32G X 4 MM MISC    Use as directed with insulin pens, 4 times daily    LEVOFLOXACIN (QUIXIN) 0.5 % OPHTHALMIC SOLUTION    1 to 2 drops in left eye every 2 hours while awake for 2 days then you may reduce dosing to 1 to 2 drops in left eye every 4 hours while awake for 5 days    LOSARTAN (COZAAR) 50 MG TABLET    TAKE 1 TABLET BY MOUTH ONE TIME A DAY    METFORMIN (GLUCOPHAGE) 500 MG TABLET    TAKE 1 TABLET BY MOUTH 3 TIMES A DAY    METOCLOPRAMIDE (REGLAN) 10 MG TABLET    Take 1 tablet by mouth 3 times daily (with meals)    METOPROLOL (LOPRESSOR) 100 MG TABLET    Take 1 tablet by mouth 2 times daily    MISC.  DEVICES KIT    Use as directed-  Dexcom Aduro Box- NDC [address]-33 Contains: Dexcom G6 sensors- qty 6; Dexcom G6 transmitter- qty 1    ONDANSETRON (ZOFRAN-ODT) 4 MG DISINTEGRATING TABLET    DISSOLVE ONE TABLET BY MOUTH EVERY 8 HOURS AS NEEDED FOR NAUSEA OR VOMITING    PIOGLITAZONE (ACTOS) 30 MG TABLET    TAKE 1 TABLET BY MOUTH ONE TIME A DAY    PREDNISONE (DELTASONE) 5 MG TABLET    TAKE 1 TABLET BY MOUTH ONE TIME A DAY    PRODIGY LANCETS 28G MISC    Use as directed to test up to 4 times daily    ROSUVASTATIN (CRESTOR) 10 MG TABLET    Take 1 tablet by mouth daily    SOLIFENACIN (VESICARE) 10 MG TABLET    TAKE 1 TABLET BY MOUTH ONE TIME A DAY    VENLAFAXINE (EFFEXOR XR) 75 MG EXTENDED RELEASE CAPSULE    TAKE TWO CAPSULES BY MOUTH EVERY MORNING AND TAKE ONE CAPSULE BY MOUTH EVERY EVENING       ALLERGIES     Patientis is allergic to morphine and related, ace inhibitors, bactrim [sulfamethoxazole-trimethoprim], nitroglycerin, percocet [oxycodone-acetaminophen], and victoza [liraglutide]. FAMILY HISTORY     Patient's family history includes Diabetes in her father; Heart Disease in her mother. SOCIAL HISTORY     Patient  reports that she quit smoking about 5 years ago. Her smoking use included cigarettes. She smoked an average of 1 pack per day. She has never used smokeless tobacco. She reports current alcohol use. She reports that she does not use drugs. PHYSICAL EXAM     ED TRIAGE VITALS  BP: 132/74, Temp: 98.8 °F (37.1 °C), Heart Rate: 77,  , SpO2: 96 %  Physical Exam  Constitutional:       General: She is not in acute distress. Appearance: Normal appearance. She is obese. She is not ill-appearing, toxic-appearing or diaphoretic. HENT:      Head: Normocephalic and atraumatic. Right Ear: External ear normal.      Left Ear: External ear normal.   Eyes:      General:         Right eye: No discharge. Left eye: No discharge. Conjunctiva/sclera: Conjunctivae normal.      Pupils: Pupils are equal, round, and reactive to light. Cardiovascular:      Rate and Rhythm: Normal rate and regular rhythm. Pulses: Normal pulses. Pulmonary:      Effort: Pulmonary effort is normal.   Abdominal:      General: Abdomen is flat.  Bowel sounds are normal.   Musculoskeletal:         General: No tenderness or signs of injury. Normal range of motion. Cervical back: Normal range of motion and neck supple. No rigidity or tenderness. Skin:     General: Skin is warm and dry. Capillary Refill: Capillary refill takes less than 2 seconds. Findings: Rash present. Rash is macular. Comments: Skin intact, no blisters, bleeding or drainage, signs of excoriation present, no hives, suggestive of yeast dermatitis after using augmentin in combination with daily prednisone 5mg and emycin 250mg   Neurological:      General: No focal deficit present. Mental Status: She is alert and oriented to person, place, and time. Mental status is at baseline. Cranial Nerves: No cranial nerve deficit. Sensory: No sensory deficit. Motor: No weakness. Coordination: Coordination normal.       DIAGNOSTICRESULTS   Labs:       IMAGING:    URGENT CARE COURSE:     Vitals:    12/29/22 1152   BP: 132/74   Pulse: 77   Temp: 98.8 °F (37.1 °C)   SpO2: 96%   Weight: 297 lb (134.7 kg)   Height: 5' 6\" (1.676 m)         PROCEDURES:  None  FINAL IMPRESSION      1. Yeast dermatitis        DISPOSITION/PLAN   DISPOSITION      PATIENT REFERRED TO:  Return in about 1 week (around 1/5/2023) for follow up with PCP. DISCHARGE MEDICATIONS:  New Prescriptions    CLOTRIMAZOLE-BETAMETHASONE (LOTRISONE) 1-0.05 % CREAM    Apply topically 2 times daily. Cannot display discharge medications since this is not an admission.       Ankit Maurice, SANIA - CNP

## 2022-12-29 NOTE — PROGRESS NOTES
Nely Grey (1975) initiated an asynchronous digital communication through [address] [place] [address]. HPI: per patient questionnaire     Exam: not applicable    Diagnoses and all orders for this visit:  Diagnoses and all orders for this visit:    Rash  Reviewed photos. Very difficult to assess rash. Recommend in person evaluation      Time: EV2 - 11-20 minutes were spent on the digital evaluation and management of this patient.  13 min     Bryn Velazco, APRN - CNP

## 2023-01-11 ENCOUNTER — E-VISIT (OUTPATIENT)
Dept: PRIMARY CARE CLINIC | Age: 48
End: 2023-01-11
Payer: COMMERCIAL

## 2023-01-11 DIAGNOSIS — R30.0 DYSURIA: Primary | ICD-10-CM

## 2023-01-11 PROCEDURE — 99422 OL DIG E/M SVC 11-20 MIN: CPT | Performed by: NURSE PRACTITIONER

## 2023-01-11 RX ORDER — CIPROFLOXACIN 500 MG/1
500 TABLET, FILM COATED ORAL 2 TIMES DAILY
Qty: 14 TABLET | Refills: 0 | Status: SHIPPED | OUTPATIENT
Start: 2023-01-11 | End: 2023-01-18

## 2023-01-15 NOTE — TELEPHONE ENCOUNTER
Subjective: Caller states \"the left side of my face, left eye, cheek, nose is tingling. I started two new medication yesterday\"     Current Symptoms: left facial tingling. No facial droop. No speech difficulties. No difficulty eating. Onset: woke up normal and then onset s/s 0500 this am ago; sudden    Associated Symptoms: Bactroban on abd and Cipro    Pain Severity: 0/10; N/A; none    Temperature: denies    What has been tried: nothing    LMP: NA Pregnant: No    Recommended disposition: Go to ED Now    Care advice provided, patient verbalizes understanding; denies any other questions or concerns; instructed to call back for any new or worsening symptoms. Patient/caller agrees to proceed to nearest Emergency Department    Attention Provider: Thank you for allowing me to participate in the care of your patient. The patient was connected to triage in response to symptoms provided. Please do not respond through this encounter as the response is not directed to a shared pool.       Reason for Disposition   Bell's palsy suspected (i.e., weakness on only one side of the face, developing over hours to days, no other symptoms)    Protocols used: NEUROLOGIC DEFICIT-ADULT-AH Thank you for coming to the emergency room. We have ruled out any heart attack, or pulmonary embolism. You do have a left bundle branch block. I have given you the name of Dr. Cruz Lawler please give his office a call in the morning. We believe you will need a Holter monitor. Your potassium was a little low we are giving you potassium. We have also given you 1 L of fluid. Your flu was normal your thyroid was normal all of your other lab work looked good. Please check the [employer] katelyn for your COVID results. Please drink plenty of fluids.

## 2023-01-24 ENCOUNTER — TELEPHONE (OUTPATIENT)
Dept: CARDIOLOGY CLINIC | Age: 48
End: 2023-01-24

## 2023-01-24 NOTE — TELEPHONE ENCOUNTER
Appointment For: Odalys Honeycutt ([id number])   Visit Type: Alley Officer FOLLOW-UP ([id number])      2/16/2023    3:15 PM  15 mins. MD Hugh Barlow [facility] CARDIOLOGY      Patient Comments: Follow up     Appointment Scheduled  (Newest Message First)  Celina Weeks 16 minutes ago (12:41 PM)     PM  Appointment Information:      Visit Type: Follow Up Appointment          Date: 2/16/2023                  Dept: [facility] Cardiology                  Provider: Joseph VALLE                  Time: 3:15 PM                  Length: 15 min     Appt Status: Scheduled        Appt Instructions:      Please arrive 15 minutes prior to appointment time, bring insurance card, photo   ID and copay. This [employer] message has not been read. Edgardo Griffin  Patient Appointment Schedule Request Pool 16 minutes ago (12:41 PM)     Appointment For: Odalys Honeycutt ([id number])  Visit Type: Alley Officer FOLLOW-UP ([id number])     2/16/2023    3:15 PM  15 mins. MD Hugh Barlow 425 CARDIOLOGY     Patient Comments:   Follow up

## 2023-01-25 ENCOUNTER — OFFICE VISIT (OUTPATIENT)
Dept: NEUROLOGY | Age: 48
End: 2023-01-25
Payer: COMMERCIAL

## 2023-01-25 VITALS
WEIGHT: 287 LBS | SYSTOLIC BLOOD PRESSURE: 130 MMHG | HEART RATE: 88 BPM | BODY MASS INDEX: 46.32 KG/M2 | DIASTOLIC BLOOD PRESSURE: 76 MMHG

## 2023-01-25 DIAGNOSIS — E66.01 MORBID OBESITY (HCC): ICD-10-CM

## 2023-01-25 DIAGNOSIS — G82.20 PARAPARESIS (HCC): ICD-10-CM

## 2023-01-25 DIAGNOSIS — M35.3 PMR (POLYMYALGIA RHEUMATICA) (HCC): ICD-10-CM

## 2023-01-25 DIAGNOSIS — M35.3 PMR (POLYMYALGIA RHEUMATICA) (HCC): Primary | ICD-10-CM

## 2023-01-25 LAB
C-REACTIVE PROTEIN: 32.1 MG/L (ref 0–5)
SEDIMENTATION RATE, ERYTHROCYTE: 79 MM (ref 0–20)

## 2023-01-25 PROCEDURE — 99214 OFFICE O/P EST MOD 30 MIN: CPT | Performed by: PSYCHIATRY & NEUROLOGY

## 2023-01-25 ASSESSMENT — ENCOUNTER SYMPTOMS
BACK PAIN: 0
PHOTOPHOBIA: 0
SHORTNESS OF BREATH: 0
NAUSEA: 0
TROUBLE SWALLOWING: 0
VOMITING: 0
CHOKING: 0
COLOR CHANGE: 0

## 2023-01-25 NOTE — PROGRESS NOTES
Subjective:      Patient ID: Perez Morales is a 56 y.o. female who presents today for:  Chief Complaint   Patient presents with    6 Month Follow-Up     Pt states things are about the same. Pt would like to discuss the blood work that was ordered on 9/26/22. HPI 45-year-old right-handed female with a history of significant polymyalgia rheumatica with response to prednisone. The only downside here is her blood sugars and weight and we have continued to discuss this. Patient is on vitamin D and calcium. Last hemoglobin A1c was 8.5 patient is only on 5 mg of Biden his own. When last seen we had also discussed gastric bypass to avoid the complication of weight gain and weight on her joints and also pathological fractures. September she had elevated sed rate and CRP. Patient is on 5 mg of prednisone and has no pain. We therefore were not quite running after the numbers as far as she has good control on pain. She is also lost 35 pounds in weight.     Past Medical History:   Diagnosis Date    Abnormal Pap smear of cervix     Acute midline thoracic back pain 9/18/2018    B12 deficiency     Family history of premature CAD 9/4/2013    Fatty liver     Gastroesophageal reflux disease 1/6/2021    Gastroparesis     HPV (human papilloma virus) anogenital infection     Hyperlipidemia     Hypertension     Irritable bowel syndrome with diarrhea 4/26/2021    Liver hemangioma 2009/2015    Lumbar radiculopathy 7/7/2021    Obesity 3/11/13    BMI 43.42    Orthostatic hypotension     Ovarian cyst     PMR (polymyalgia rheumatica) (HCC)     Rectal fissure     Tobacco abuse 9/3/2015    Tobacco abuse     Tremor     Uncontrolled diabetes mellitus with complications     Unspecified sleep apnea      Past Surgical History:   Procedure Laterality Date    CARDIAC CATHETERIZATION  4-07    COLONOSCOPY  2013    for diarrhea (-)    COLONOSCOPY N/A 2/10/2021    COLONOSCOPY DIAGNOSTIC performed by Radu Braden MD at [facility]    HYSTERECTOMY (CERVIX STATUS UNKNOWN)  12-10    LEEP  1-05    DE BIOPSY MUSCLE SUPERFICIAL Left 2018    LEFT QUADRICEPS MUSCLE BIOPSY performed by Kristyn Patel MD at [address] UNI/BI CANNULATION N/A 2018    T 12 VERTEBRALPLASTY ROOM 278 performed by Chacorta Yarbrough MD at [facility] (CERVIX REMOVED)      [address]    UPPER GASTROINTESTINAL ENDOSCOPY  10/13/15     DR. HERRERA     UPPER GASTROINTESTINAL ENDOSCOPY N/A 2/10/2021    EGD ESOPHAGOGASTRODUODENOSCOPY performed by Alexis Smiley MD at [facility]       Social History     Socioeconomic History    Marital status:      Spouse name: Not on file    Number of children: 1    Years of education: Not on file    Highest education level: Not on file   Occupational History    Not on file   Tobacco Use    Smoking status: Former     Packs/day: 1.00     Types: Cigarettes     Quit date: 2017     Years since quittin.0    Smokeless tobacco: Never   Vaping Use    Vaping Use: Never used   Substance and Sexual Activity    Alcohol use:  Yes     Alcohol/week: 0.0 standard drinks     Comment: socially    Drug use: No    Sexual activity: Yes     Partners: Male     Birth control/protection: Surgical     Comment: single   Other Topics Concern    Not on file   Social History Narrative    Social/Functional History          Social Determinants of Health     Financial Resource Strain: Low Risk     Difficulty of Paying Living Expenses: Not hard at all   Food Insecurity: No Food Insecurity    Worried About Running Out of Food in the Last Year: Never true    Ran Out of Food in the Last Year: Never true   Transportation Needs: Not on file   Physical Activity: Not on file   Stress: Not on file   Social Connections: Not on file   Intimate Partner Violence: Not on file   Housing Stability: Not on file     Family History   Problem Relation Age of Onset Diabetes Father     Heart Disease Mother     Colon Cancer Neg Hx     Cancer Neg Hx      Allergies   Allergen Reactions    Morphine And Related Hives and Rash    Ace Inhibitors Other (See Comments)     Dizziness and near syncope    Bactrim [Sulfamethoxazole-Trimethoprim] Itching    Nitroglycerin Other (See Comments)     Migraine    Percocet [Oxycodone-Acetaminophen] Nausea And Vomiting    Victoza [Liraglutide]      NAUSEA       Current Outpatient Medications   Medication Sig Dispense Refill    clotrimazole-betamethasone (LOTRISONE) 1-0.05 % cream Apply topically 2 times daily.  45 g 1    baclofen (LIORESAL) 10 MG tablet TAKE ONE-HALF TABLET BY MOUTH TWICE A DAY 90 tablet 1    esomeprazole (NEXIUM) 40 MG delayed release capsule TAKE 1 CAPSULE BY MOUTH ONE TIME A DAY 30 capsule 3    hydroCHLOROthiazide (HYDRODIURIL) 25 MG tablet Take 1 tablet by mouth daily 90 tablet 1    metoprolol (LOPRESSOR) 100 MG tablet Take 1 tablet by mouth 2 times daily 180 tablet 1    rosuvastatin (CRESTOR) 10 MG tablet Take 1 tablet by mouth daily 90 tablet 1    erythromycin base (E-MYCIN) 250 MG tablet TAKE 1 TABLET BY MOUTH 3 TIMES A DAY 90 tablet 3    predniSONE (DELTASONE) 5 MG tablet TAKE 1 TABLET BY MOUTH ONE TIME A DAY 30 tablet 3    insulin lispro, 1 Unit Dial, (HUMALOG KWIKPEN) 100 UNIT/ML SOPN INJECT 20-40 UNITS UNDER THE SKIN WITH EACH MEAL 5 Adjustable Dose Pre-filled Pen Syringe 0    Insulin Glargine, 2 Unit Dial, (TOUJEO MAX SOLOSTAR) 300 UNIT/ML SOPN 160 units at bedtime 30 Adjustable Dose Pre-filled Pen Syringe 3    Blood Glucose Monitoring Suppl (PRODIGY AUTOCODE BLOOD GLUCOSE) ALEXANDREA As directed 1 each 0    pioglitazone (ACTOS) 30 MG tablet TAKE 1 TABLET BY MOUTH ONE TIME A DAY 90 tablet 3    losartan (COZAAR) 50 MG tablet TAKE 1 TABLET BY MOUTH ONE TIME A DAY 90 tablet 1    metFORMIN (GLUCOPHAGE) 500 MG tablet TAKE 1 TABLET BY MOUTH 3 TIMES A  tablet 3    venlafaxine (EFFEXOR XR) 75 MG extended release capsule TAKE TWO CAPSULES BY MOUTH EVERY MORNING AND TAKE ONE CAPSULE BY MOUTH EVERY EVENING 270 capsule 1    solifenacin (VESICARE) 10 MG tablet TAKE 1 TABLET BY MOUTH ONE TIME A DAY 90 tablet 3    Continuous Blood Gluc Sensor (FREESTYLE JAIRO 14 DAY SENSOR) MISC CHANGE EVERY 14 DAYS 6 each 3    Misc. Devices KIT Use as directed-  Dexcom Mannie Box- NDC [address]-24 Contains: Dexcom G6 sensors- qty 6; Dexcom G6 transmitter- qty 1 1 kit 0    levoFLOXacin (QUIXIN) 0.5 % ophthalmic solution 1 to 2 drops in left eye every 2 hours while awake for 2 days then you may reduce dosing to 1 to 2 drops in left eye every 4 hours while awake for 5 days 5 mL 1    ondansetron (ZOFRAN-ODT) 4 MG disintegrating tablet DISSOLVE ONE TABLET BY MOUTH EVERY 8 HOURS AS NEEDED FOR NAUSEA OR VOMITING 60 tablet 3    CPAP Machine MISC by Does not apply route Change CPAP pressure to 14 cm 1 each 0    blood glucose test strips (PRODIGY NO CODING BLOOD GLUC) strip 1 each by In Vitro route 4 times daily As needed. 400 each 3    Prodigy Lancets 28G MISC Use as directed to test up to 4 times daily 400 each 3    CPAP Machine MISC New CPAP with 8 cm and supply 1 each 0    metoclopramide (REGLAN) 10 MG tablet Take 1 tablet by mouth 3 times daily (with meals) 360 tablet 3    Continuous Blood Gluc  (FREESTYLE JAIRO 14 DAY READER) ALEXANDREA As directed 1 Device 00    Insulin Pen Needle (PEN NEEDLES) 32G X 4 MM MISC Use as directed with insulin pens, 4 times daily 400 each 1    blood glucose test strips (PRODIGY NO CODING BLOOD GLUC) strip 1 each by In Vitro route 4 times daily As needed. 400 each 3    Blood Glucose Monitoring Suppl (PRODIGY AUTOCODE BLOOD GLUCOSE) w/Device KIT Use as directed to test up to 4 times daily 1 kit 0    Handicap Placard MISC Exp 5 years 1 each 0    fluconazole (DIFLUCAN) 150 MG tablet Take 1 tablet every 2 days for 3 doses.  (Patient not taking: No sig reported) 3 tablet 0    aspirin 325 MG EC tablet Take 1 tablet by mouth every 6 hours as needed for Pain (Patient not taking: No sig reported) 30 tablet 3    gentamicin (GARAMYCIN) 0.1 % ointment Apply topically daily (Patient not taking: No sig reported) 1 each 0     No current facility-administered medications for this visit. Review of Systems   Constitutional:  Negative for fever. HENT:  Negative for ear pain, tinnitus and trouble swallowing. Eyes:  Negative for photophobia and visual disturbance. Respiratory:  Negative for choking and shortness of breath. Cardiovascular:  Negative for chest pain and palpitations. Gastrointestinal:  Negative for nausea and vomiting. Musculoskeletal:  Positive for myalgias. Negative for back pain, gait problem, joint swelling, neck pain and neck stiffness. Skin:  Negative for color change. Allergic/Immunologic: Negative for food allergies. Neurological:  Negative for dizziness, tremors, seizures, syncope, facial asymmetry, speech difficulty, weakness, light-headedness, numbness and headaches. Psychiatric/Behavioral:  Negative for behavioral problems, confusion, hallucinations and sleep disturbance. Objective:   /76 (Site: Right Upper Arm, Position: Sitting, Cuff Size: Medium Adult)   Pulse 88   Wt 287 lb (130.2 kg)   LMP  (LMP Unknown)   BMI 46.32 kg/m²     Physical Exam  Vitals reviewed. Eyes:      Pupils: Pupils are equal, round, and reactive to light. Cardiovascular:      Rate and Rhythm: Normal rate and regular rhythm. Heart sounds: No murmur heard. Pulmonary:      Effort: Pulmonary effort is normal.      Breath sounds: Normal breath sounds. Abdominal:      General: Bowel sounds are normal.   Musculoskeletal:         General: Normal range of motion. Cervical back: Normal range of motion. Skin:     General: Skin is warm. Neurological:      Mental Status: She is alert and oriented to person, place, and time. Cranial Nerves: No cranial nerve deficit. Sensory: No sensory deficit.       Motor: No abnormal muscle tone. Coordination: Coordination normal.      Deep Tendon Reflexes: Reflexes are normal and symmetric. Babinski sign absent on the right side. Babinski sign absent on the left side. Psychiatric:         Mood and Affect: Mood normal.       No results found.     Lab Results   Component Value Date/Time    WBC 3.2 05/26/2022 12:15 AM    RBC 4.49 05/26/2022 12:15 AM    RBC 4.57 02/22/2012 07:03 AM    HGB 11.6 05/26/2022 12:15 AM    HCT 35.1 05/26/2022 12:15 AM    MCV 78.3 05/26/2022 12:15 AM    MCH 25.9 05/26/2022 12:15 AM    MCHC 33.1 05/26/2022 12:15 AM    RDW 17.2 05/26/2022 12:15 AM     05/26/2022 12:15 AM    MPV 8.8 10/13/2015 05:20 AM     Lab Results   Component Value Date/Time     09/26/2022 03:21 PM    K 4.0 09/26/2022 03:21 PM    K 3.7 09/18/2018 05:27 AM    CL 95 09/26/2022 03:21 PM    CO2 25 09/26/2022 03:21 PM    BUN 14 09/26/2022 03:21 PM    CREATININE 0.75 09/26/2022 03:21 PM    GFRAA >60.0 09/26/2022 03:21 PM    LABGLOM >60.0 09/26/2022 03:21 PM    GLUCOSE 145 09/26/2022 03:21 PM    GLUCOSE 262 05/31/2012 06:42 AM    PROT 7.1 05/26/2022 12:15 AM    LABALBU 3.7 05/26/2022 12:15 AM    LABALBU 4.4 05/31/2012 06:42 AM    CALCIUM 9.5 09/26/2022 03:21 PM    BILITOT 0.3 05/26/2022 12:15 AM    ALKPHOS 91 05/26/2022 12:15 AM    AST 24 05/26/2022 12:15 AM    ALT 27 05/26/2022 12:15 AM     Lab Results   Component Value Date/Time    PROTIME 13.0 05/20/2022 03:30 PM    PROTIME 10.0 11/21/2011 08:42 AM    INR 1.0 05/20/2022 03:30 PM     Lab Results   Component Value Date/Time    TSH 0.946 04/29/2022 07:40 AM    DUFFGRSX03 625 08/22/2018 04:11 PM    FOLATE 5.3 08/22/2018 04:11 PM    FERRITIN 119 04/29/2022 07:40 AM    IRON 67 04/29/2022 07:40 AM    TIBC 311 04/29/2022 07:40 AM     Lab Results   Component Value Date/Time    TRIG 162 04/26/2022 06:24 AM    HDL 38 04/26/2022 06:24 AM    HDL 34 02/23/2011 12:00 AM    LDLCALC 66 04/26/2022 06:24 AM     Lab Results   Component Value Date/Time    LABAMPH Neg 09/17/2021 02:15 PM    BARBSCNU Neg 09/17/2021 02:15 PM    LABBENZ Neg 09/17/2021 02:15 PM    LABMETH Neg 09/17/2021 02:15 PM    OPIATESCREENURINE Neg 09/17/2021 02:15 PM    PHENCYCLIDINESCREENURINE Neg 09/17/2021 02:15 PM     No results found for: LITHIUM, DILFRTOT, VALPROATE    Assessment:       Diagnosis Orders   1. PMR (polymyalgia rheumatica) (HCC)  C-Reactive Protein    Sedimentation Rate    Mpo/Pr-3(Anca) Abs      2. Paraparesis ([facility]        3. Morbid obesity ([facility]        Polymyalgia rheumatica diagnosed at least 4 years ago. Patient has severe elevated CRP and sed rate and we have started on prednisone which we have now tapered down to 5 mg daily. We will still see that her sed rate and CRP are somewhat elevated though we are not running after the numbers as she is completely pain-free. We will repeat the numbers as sometimes pulsed prednisone therapy may be required. Our other option is to change her to Imuran though again this comes with some other side effects. She is already on vitamin D and calcium replacements. She is lost 35 pounds in weight. We had a lengthy discussion regarding bariatric surgery though given that she is losing weight we will wait on this. This may help her other symptoms and prevent joint issues as well as fractures. Patient has not any headaches to suggest giant cell arteritis. We will arrange for repeat MPO levels to make sure we are not dealing with anything else besides polymyalgia rheumatica. In the event that she has increased pain she will increase her prednisone to 7.5 mg and call me      Duran Buckley MD, Suraj Correa, American Board of Psychiatry & Neurology  Board Certified in Vascular Neurology  Board Certified in Neuromuscular Medicine  Certified in [facility]:      Orders Placed This Encounter   Procedures    C-Reactive Protein     Standing Status:   Future     Standing Expiration Date:   1/25/2024 Sedimentation Rate     Standing Status:   Future     Standing Expiration Date:   1/25/2024    Mpo/Pr-3(Anca) Abs     Standing Status:   Future     Standing Expiration Date:   1/25/2024     No orders of the defined types were placed in this encounter. Return in about 6 months (around 7/25/2023).       Patty Blakely MD

## 2023-01-27 LAB
MYELOPEROXIDASE AB: 0 AU/ML (ref 0–19)
SERINE PROTEASE 3 AB: 0 AU/ML (ref 0–19)

## 2023-03-04 DIAGNOSIS — K31.84 GASTROPARESIS: ICD-10-CM

## 2023-03-16 RX ORDER — ERYTHROMYCIN 250 MG/1
TABLET, COATED ORAL
Qty: 90 TABLET | Refills: 3 | Status: SHIPPED | OUTPATIENT
Start: 2023-03-16

## 2023-03-20 RX ORDER — PREDNISONE 1 MG/1
TABLET ORAL
Qty: 30 TABLET | Refills: 3 | Status: SHIPPED | OUTPATIENT
Start: 2023-03-20

## 2023-03-20 NOTE — TELEPHONE ENCOUNTER
Pharmacy is requesting medication refill.  Please approve or deny this request.    Rx requested:  Requested Prescriptions     Pending Prescriptions Disp Refills    predniSONE (DELTASONE) 5 MG tablet [Pharmacy Med Name: PREDNISONE 5MG TABS] 30 tablet 3     Sig: TAKE ONE TABLET BY MOUTH ONE TIME A DAY         Last Office Visit:   1/25/2023      Next Visit Date:  Future Appointments   Date Time Provider Iliana Garcia   8/3/2023  1:30 PM MD Caroline Lainez Neurology -

## 2023-03-27 RX ORDER — INSULIN GLARGINE 300 U/ML
INJECTION, SOLUTION SUBCUTANEOUS
Qty: 270 ML | Refills: 3 | Status: SHIPPED | OUTPATIENT
Start: 2023-03-27

## 2023-05-01 RX ORDER — LOSARTAN POTASSIUM 50 MG/1
TABLET ORAL
Qty: 90 TABLET | Refills: 1 | Status: SHIPPED | OUTPATIENT
Start: 2023-05-01

## 2023-05-01 RX ORDER — VENLAFAXINE HYDROCHLORIDE 75 MG/1
CAPSULE, EXTENDED RELEASE ORAL
Qty: 270 CAPSULE | Refills: 1 | Status: SHIPPED | OUTPATIENT
Start: 2023-05-01

## 2023-05-08 SDOH — HEALTH STABILITY: PHYSICAL HEALTH: ON AVERAGE, HOW MANY MINUTES DO YOU ENGAGE IN EXERCISE AT THIS LEVEL?: 0 MIN

## 2023-05-08 SDOH — HEALTH STABILITY: PHYSICAL HEALTH: ON AVERAGE, HOW MANY DAYS PER WEEK DO YOU ENGAGE IN MODERATE TO STRENUOUS EXERCISE (LIKE A BRISK WALK)?: 0 DAYS

## 2023-05-11 ENCOUNTER — OFFICE VISIT (OUTPATIENT)
Dept: FAMILY MEDICINE CLINIC | Age: 48
End: 2023-05-11
Payer: COMMERCIAL

## 2023-05-11 VITALS
WEIGHT: 293 LBS | BODY MASS INDEX: 47.09 KG/M2 | SYSTOLIC BLOOD PRESSURE: 128 MMHG | HEIGHT: 66 IN | DIASTOLIC BLOOD PRESSURE: 84 MMHG | OXYGEN SATURATION: 97 % | HEART RATE: 65 BPM | TEMPERATURE: 97.2 F

## 2023-05-11 DIAGNOSIS — K31.84 GASTROPARESIS: ICD-10-CM

## 2023-05-11 DIAGNOSIS — E11.43 DIABETIC GASTROPARESIS ASSOCIATED WITH TYPE 2 DIABETES MELLITUS (HCC): ICD-10-CM

## 2023-05-11 DIAGNOSIS — M54.16 LUMBAR RADICULOPATHY: ICD-10-CM

## 2023-05-11 DIAGNOSIS — E66.01 MORBID OBESITY (HCC): Primary | ICD-10-CM

## 2023-05-11 DIAGNOSIS — Z79.4 TYPE 2 DIABETES MELLITUS WITH COMPLICATION, WITH LONG-TERM CURRENT USE OF INSULIN (HCC): ICD-10-CM

## 2023-05-11 DIAGNOSIS — E11.8 TYPE 2 DIABETES MELLITUS WITH COMPLICATION, WITH LONG-TERM CURRENT USE OF INSULIN (HCC): ICD-10-CM

## 2023-05-11 DIAGNOSIS — B96.89 ACUTE BACTERIAL SINUSITIS: Primary | ICD-10-CM

## 2023-05-11 DIAGNOSIS — I27.20 PULMONARY HYPERTENSION (HCC): ICD-10-CM

## 2023-05-11 DIAGNOSIS — K31.84 DIABETIC GASTROPARESIS ASSOCIATED WITH TYPE 2 DIABETES MELLITUS (HCC): ICD-10-CM

## 2023-05-11 DIAGNOSIS — Z12.31 ENCOUNTER FOR SCREENING MAMMOGRAM FOR MALIGNANT NEOPLASM OF BREAST: ICD-10-CM

## 2023-05-11 DIAGNOSIS — J01.90 ACUTE BACTERIAL SINUSITIS: Primary | ICD-10-CM

## 2023-05-11 DIAGNOSIS — R29.898 WEAKNESS OF BOTH LOWER EXTREMITIES: ICD-10-CM

## 2023-05-11 DIAGNOSIS — M35.3 PMR (POLYMYALGIA RHEUMATICA) (HCC): ICD-10-CM

## 2023-05-11 DIAGNOSIS — E66.01 MORBID OBESITY (HCC): ICD-10-CM

## 2023-05-11 DIAGNOSIS — E78.5 DYSLIPIDEMIA: ICD-10-CM

## 2023-05-11 DIAGNOSIS — I10 HYPERTENSION, UNSPECIFIED TYPE: ICD-10-CM

## 2023-05-11 PROCEDURE — 3078F DIAST BP <80 MM HG: CPT | Performed by: FAMILY MEDICINE

## 2023-05-11 PROCEDURE — 3074F SYST BP LT 130 MM HG: CPT | Performed by: FAMILY MEDICINE

## 2023-05-11 PROCEDURE — 99214 OFFICE O/P EST MOD 30 MIN: CPT | Performed by: FAMILY MEDICINE

## 2023-05-11 RX ORDER — AMOXICILLIN AND CLAVULANATE POTASSIUM 875; 125 MG/1; MG/1
1 TABLET, FILM COATED ORAL 2 TIMES DAILY
Qty: 20 TABLET | Refills: 0 | Status: SHIPPED | OUTPATIENT
Start: 2023-05-11 | End: 2023-05-21

## 2023-05-11 SDOH — ECONOMIC STABILITY: FOOD INSECURITY: WITHIN THE PAST 12 MONTHS, THE FOOD YOU BOUGHT JUST DIDN'T LAST AND YOU DIDN'T HAVE MONEY TO GET MORE.: NEVER TRUE

## 2023-05-11 SDOH — ECONOMIC STABILITY: INCOME INSECURITY: HOW HARD IS IT FOR YOU TO PAY FOR THE VERY BASICS LIKE FOOD, HOUSING, MEDICAL CARE, AND HEATING?: NOT HARD AT ALL

## 2023-05-11 SDOH — ECONOMIC STABILITY: HOUSING INSECURITY
IN THE LAST 12 MONTHS, WAS THERE A TIME WHEN YOU DID NOT HAVE A STEADY PLACE TO SLEEP OR SLEPT IN A SHELTER (INCLUDING NOW)?: NO

## 2023-05-11 SDOH — ECONOMIC STABILITY: FOOD INSECURITY: WITHIN THE PAST 12 MONTHS, YOU WORRIED THAT YOUR FOOD WOULD RUN OUT BEFORE YOU GOT MONEY TO BUY MORE.: NEVER TRUE

## 2023-05-11 ASSESSMENT — PATIENT HEALTH QUESTIONNAIRE - PHQ9
SUM OF ALL RESPONSES TO PHQ QUESTIONS 1-9: 0
SUM OF ALL RESPONSES TO PHQ QUESTIONS 1-9: 0
SUM OF ALL RESPONSES TO PHQ9 QUESTIONS 1 & 2: 0
1. LITTLE INTEREST OR PLEASURE IN DOING THINGS: 0
SUM OF ALL RESPONSES TO PHQ QUESTIONS 1-9: 0
SUM OF ALL RESPONSES TO PHQ QUESTIONS 1-9: 0
2. FEELING DOWN, DEPRESSED OR HOPELESS: 0

## 2023-05-15 ENCOUNTER — TELEPHONE (OUTPATIENT)
Dept: CARDIOLOGY CLINIC | Age: 48
End: 2023-05-15

## 2023-05-15 NOTE — TELEPHONE ENCOUNTER
Appointment For: Leslie Burton ([id number])   Visit Type: [id number] [employer]  ([id number])      6/13/2023    3:00 PM  15 mins. MD Hugh Latham [facility] CARDIOLOGY      Patient Comments:   just follow up     Appointment Scheduled  (Newest Message First)  Celina Weeks 27 minutes ago (11:11 AM)     PM  Appointment Information:      Visit Type: Follow Up Appointment          Date: 6/13/2023                  Dept: [facility]                  Provider: Ruth VALLE                  Time: 3:00 PM                  Length: 15 min     Appt Status: Scheduled        Appt Instructions:      Please arrive 15 minutes prior to appointment time, bring insurance card, photo   ID and copay. Leslie Burton  Patient Appointment Schedule Request Pool 27 minutes ago (11:11 AM)       Appointment For: Leslie Burton ([id number])  Visit Type: [facility]  ([id number])     6/13/2023    3:00 PM  15 mins.   MD Hugh Latham 425 CARDIOLOGY     Patient Comments:  just follow up

## 2023-05-16 NOTE — TELEPHONE ENCOUNTER
[facility] Employee Diabetes Program    Remigio Franklin is a 56 y.o. female enrolled in the [facility] with Diabetes Program. The goal of this voluntary program is to help employees and covered dependents reach their health maintenance goals in regards to their diabetes diagnosis. According to our records, patient is missing the following requirement(s) that must be completed by December 31, 2022 to avoid discharge from the program:    Urine protein/microalbumin - active order noted     Plan:  Attempt made to reach patient by telephone to review above. Spoke to patient - patient states she was let go and no longer works for [employer]. States received severance and believes insurance will end Feb 2023.     Mk Orona, PharmD, [facility]  Department, toll free: [phone], option 3     For Pharmacy Admin Tracking Only    Time Spent (min): 5 Banner Transposition Flap Text: The defect edges were debeveled with a #15 scalpel blade.  Given the location of the defect and the proximity to free margins a Banner transposition flap was deemed most appropriate.  Using a sterile surgical marker, an appropriate flap drawn around the defect. The area thus outlined was incised deep to adipose tissue with a #15 scalpel blade.  The skin margins were undermined to an appropriate distance in all directions utilizing iris scissors.

## 2023-05-21 ENCOUNTER — E-VISIT (OUTPATIENT)
Dept: PRIMARY CARE CLINIC | Age: 48
End: 2023-05-21
Payer: COMMERCIAL

## 2023-05-21 DIAGNOSIS — R30.0 DYSURIA: ICD-10-CM

## 2023-05-21 DIAGNOSIS — R39.15 URINARY URGENCY: Primary | ICD-10-CM

## 2023-05-21 PROCEDURE — 99422 OL DIG E/M SVC 11-20 MIN: CPT | Performed by: NURSE PRACTITIONER

## 2023-05-21 RX ORDER — PHENAZOPYRIDINE HYDROCHLORIDE 100 MG/1
100 TABLET, FILM COATED ORAL 3 TIMES DAILY PRN
Qty: 21 TABLET | Refills: 0 | Status: SHIPPED | OUTPATIENT
Start: 2023-05-21 | End: 2023-05-28

## 2023-05-21 RX ORDER — CIPROFLOXACIN 500 MG/1
500 TABLET, FILM COATED ORAL 2 TIMES DAILY
Qty: 10 TABLET | Refills: 0 | Status: SHIPPED | OUTPATIENT
Start: 2023-05-21 | End: 2023-05-26

## 2023-05-22 ENCOUNTER — HOSPITAL ENCOUNTER (OUTPATIENT)
Dept: WOMENS IMAGING | Age: 48
Discharge: HOME | End: 2023-05-24
Payer: COMMERCIAL

## 2023-05-22 DIAGNOSIS — Z12.31 ENCOUNTER FOR SCREENING MAMMOGRAM FOR MALIGNANT NEOPLASM OF BREAST: ICD-10-CM

## 2023-05-22 DIAGNOSIS — R11.0 NAUSEA: ICD-10-CM

## 2023-05-22 PROCEDURE — 77063 BREAST TOMOSYNTHESIS BI: CPT

## 2023-05-24 RX ORDER — ONDANSETRON 4 MG/1
TABLET, ORALLY DISINTEGRATING ORAL
Qty: 60 TABLET | Refills: 3 | Status: SHIPPED | OUTPATIENT
Start: 2023-05-24

## 2023-06-13 NOTE — PROGRESS NOTES
Patient was exposed to flu household member. Symptoms consistent with influenza. Patient is high risk based on medications and co-morbidities. Patient was prescribed Tamiflu. She was directed to follow up with provider if symptoms do not improve or worsen. She was also given symptoms on when to proceed to her local ER. Total time: 5-7 mins. [Time Spent: ___ minutes] : I have spent [unfilled] minutes of time on the encounter.

## 2023-06-24 RX ORDER — BACLOFEN 10 MG/1
TABLET ORAL
Qty: 90 TABLET | Refills: 1 | OUTPATIENT
Start: 2023-06-24

## 2023-06-28 RX ORDER — BACLOFEN 10 MG/1
TABLET ORAL
Qty: 90 TABLET | Refills: 1 | Status: SHIPPED | OUTPATIENT
Start: 2023-06-28

## 2023-06-30 LAB
CHOLEST SERPL-MCNC: 134 MG/DL (ref 0–199)
GLUCOSE SERPL-MCNC: 199 MG/DL (ref 70–99)
HBA1C MFR BLD: 8.6 % (ref 4.8–5.9)
HDLC SERPL-MCNC: 36 MG/DL (ref 40–59)
LDLC SERPL CALC-MCNC: 63 MG/DL (ref 0–129)
TRIGL SERPL-MCNC: 176 MG/DL (ref 0–150)

## 2023-07-11 ENCOUNTER — OFFICE VISIT (OUTPATIENT)
Dept: ENDOCRINOLOGY | Age: 48
End: 2023-07-11
Payer: COMMERCIAL

## 2023-07-11 VITALS
DIASTOLIC BLOOD PRESSURE: 69 MMHG | WEIGHT: 293 LBS | BODY MASS INDEX: 47.09 KG/M2 | SYSTOLIC BLOOD PRESSURE: 107 MMHG | OXYGEN SATURATION: 93 % | HEIGHT: 66 IN | HEART RATE: 66 BPM

## 2023-07-11 DIAGNOSIS — Z79.4 TYPE 2 DIABETES MELLITUS WITH COMPLICATION, WITH LONG-TERM CURRENT USE OF INSULIN (HCC): Primary | ICD-10-CM

## 2023-07-11 DIAGNOSIS — E66.01 MORBID OBESITY (HCC): ICD-10-CM

## 2023-07-11 DIAGNOSIS — E11.8 TYPE 2 DIABETES MELLITUS WITH COMPLICATION, WITH LONG-TERM CURRENT USE OF INSULIN (HCC): Primary | ICD-10-CM

## 2023-07-11 LAB
CHP ED QC CHECK: NORMAL
GLUCOSE BLD-MCNC: 244 MG/DL

## 2023-07-11 PROCEDURE — 99213 OFFICE O/P EST LOW 20 MIN: CPT | Performed by: INTERNAL MEDICINE

## 2023-07-11 PROCEDURE — 3052F HG A1C>EQUAL 8.0%<EQUAL 9.0%: CPT | Performed by: INTERNAL MEDICINE

## 2023-07-11 PROCEDURE — 82962 GLUCOSE BLOOD TEST: CPT | Performed by: INTERNAL MEDICINE

## 2023-07-11 RX ORDER — PROCHLORPERAZINE 25 MG/1
1 SUPPOSITORY RECTAL
Qty: 3 EACH | Refills: 3 | Status: SHIPPED | OUTPATIENT
Start: 2023-07-11

## 2023-07-11 RX ORDER — PROCHLORPERAZINE 25 MG/1
1 SUPPOSITORY RECTAL
Qty: 1 EACH | Refills: 2 | Status: SHIPPED | OUTPATIENT
Start: 2023-07-11

## 2023-07-11 RX ORDER — PROCHLORPERAZINE 25 MG/1
1 SUPPOSITORY RECTAL DAILY
Qty: 1 EACH | Refills: 0 | Status: SHIPPED | OUTPATIENT
Start: 2023-07-11

## 2023-07-11 NOTE — PROGRESS NOTES
2023    Assessment:       Diagnosis Orders   1. Type 2 diabetes mellitus with complication, with long-term current use of insulin ([facility])  POCT Glucose      2. Morbid obesity ([address])              PLAN:     Orders Placed This Encounter   Procedures    Hemoglobin A1C     Standing Status:   Future     Standing Expiration Date:   3/14/8643    Basic Metabolic Panel     Standing Status:   Future     Standing Expiration Date:   2024    Gaston Martínez MD, Bariatric Surgery, [place]     Referral Priority:   Routine     Referral Type:   Eval and Treat     Referral Reason:   Specialty Services Required     Referred to Provider:   Mary Bingham MD     Requested Specialty:   Bariatric Surgery     Number of Visits Requested:   1    POCT Glucose    HM DIABETES FOOT EXAM     Orders Placed This Encounter   Medications    Continuous Blood Gluc Transmit (DEXCOM G6 TRANSMITTER) MISC     Si each by Does not apply route every 3 months E11     Dispense:  1 each     Refill:  2    Continuous Blood Gluc Sensor (DEXCOM G6 SENSOR) MISC     Si each by Does not apply route every 10 days E11     Dispense:  3 each     Refill:  3    Continuous Blood Gluc  ([address]) ALEXANDREA     Si each by Does not apply route daily      Dispense:  1 each     Refill:  0     Continue current medication regimen for diabetes refer patient to bariatric surgery given prescription for Dexcom      Orders Placed This Encounter   Procedures    POCT Glucose     No orders of the defined types were placed in this encounter. No follow-ups on file.   Subjective:     Chief Complaint   Patient presents with    Diabetes     Vitals:    23 0908   BP: 107/69   Pulse: 66   SpO2: 93%   Weight: (!) 301 lb (136.5 kg)   Height: 5' 6\" (1.676 m)     Wt Readings from Last 3 Encounters:   23 (!) 301 lb (136.5 kg)   23 (!) 300 lb 6.4 oz (136.3 kg)   23 293 lb (132.9 kg)     BP Readings from Last 3

## 2023-07-14 ENCOUNTER — TELEPHONE (OUTPATIENT)
Dept: ENDOCRINOLOGY | Age: 48
End: 2023-07-14

## 2023-07-18 DIAGNOSIS — E11.8 TYPE 2 DIABETES MELLITUS WITH COMPLICATION, WITH LONG-TERM CURRENT USE OF INSULIN (HCC): Primary | ICD-10-CM

## 2023-07-18 DIAGNOSIS — Z79.4 TYPE 2 DIABETES MELLITUS WITH COMPLICATION, WITH LONG-TERM CURRENT USE OF INSULIN (HCC): Primary | ICD-10-CM

## 2023-07-19 ENCOUNTER — CLINICAL DOCUMENTATION (OUTPATIENT)
Dept: PHARMACY | Facility: CLINIC | Age: 48
End: 2023-07-19

## 2023-07-19 NOTE — PROGRESS NOTES
**Patient is [facility]**  Pharmacy Pop Care Documentation:   Patient has completed all the requirements by 7/25/23 and therefore will be re-enrolled in the DM Program on 8/01/23. Application received: 9/66/73  Application scanned. Letter and e-mail sent to patient.     [employer], [place]   Department, toll free: [phone] Option #3    For Pharmacy Admin Tracking Only    Program: Halina in place:  No  Gap Closed?: Yes   Time Spent (min): 5

## 2023-07-22 DIAGNOSIS — E78.5 DYSLIPIDEMIA: ICD-10-CM

## 2023-07-24 NOTE — TELEPHONE ENCOUNTER
Pharmacy is requesting medication refill.  Please approve or deny this request.    Rx requested:  Requested Prescriptions     Pending Prescriptions Disp Refills    rosuvastatin (CRESTOR) 10 MG tablet [Pharmacy Med Name: ROSUVASTATIN CALCIUM 10MG TABS] 90 tablet 1     Sig: Take 1 tablet by mouth daily         Last Office Visit:   6/22/2022      Next Visit Date:  Future Appointments   Date Time Provider [address] [place] [address]   8/3/2023  1:30 PM [facility]   8/25/2023 10:30 AM Sujata Royal MD [facility]   10/10/2023  9:30 AM Nora Doll MD [address] [place] [address]   12/7/2023  8:00 AM [address] [facility] [address], MD [address]

## 2023-07-25 ENCOUNTER — TELEPHONE (OUTPATIENT)
Dept: NEUROLOGY | Age: 48
End: 2023-07-25

## 2023-07-25 DIAGNOSIS — M79.10 MUSCLE PAIN: Primary | ICD-10-CM

## 2023-07-25 RX ORDER — ROSUVASTATIN CALCIUM 10 MG/1
10 TABLET, COATED ORAL DAILY
Qty: 90 TABLET | Refills: 1 | Status: SHIPPED | OUTPATIENT
Start: 2023-07-25

## 2023-07-25 NOTE — TELEPHONE ENCOUNTER
Desi message from pt:     Does he want me to get labs done before I see him?   Please put the order through and I'll get it done

## 2023-08-01 ENCOUNTER — CLINICAL DOCUMENTATION (OUTPATIENT)
Dept: PHARMACY | Facility: CLINIC | Age: 48
End: 2023-08-01

## 2023-08-01 ENCOUNTER — ENROLLMENT (OUTPATIENT)
Dept: PHARMACY | Facility: CLINIC | Age: 48
End: 2023-08-01

## 2023-08-01 NOTE — PROGRESS NOTES
Pharmacy Pop Care Documentation: Shanon Whitmore has been reevaluated for the Diabetes Management Program and has been re-enrolled on 8/1/23.     [facility]    Program: Hlaina in place:  No  Gap Closed?: Yes   Time Spent (min): 5

## 2023-08-02 ENCOUNTER — TELEPHONE (OUTPATIENT)
Dept: PHARMACY | Facility: CLINIC | Age: 48
End: 2023-08-02

## 2023-08-02 NOTE — TELEPHONE ENCOUNTER
**[facility]**     2023 Annual Pharmacist Visit    Called patient to schedule 2023 yearly pharmacist appointment to discuss medications for Diabetes Management Program.    No answer. Left VM.      Please call back at [phone], option #3         Tony Chun   Phone: [phone], option #3

## 2023-08-03 ENCOUNTER — OFFICE VISIT (OUTPATIENT)
Dept: NEUROLOGY | Age: 48
End: 2023-08-03
Payer: COMMERCIAL

## 2023-08-03 VITALS
DIASTOLIC BLOOD PRESSURE: 64 MMHG | HEART RATE: 77 BPM | SYSTOLIC BLOOD PRESSURE: 106 MMHG | WEIGHT: 293 LBS | BODY MASS INDEX: 49.87 KG/M2

## 2023-08-03 DIAGNOSIS — E11.8 TYPE 2 DIABETES MELLITUS WITH COMPLICATION, WITH LONG-TERM CURRENT USE OF INSULIN (HCC): ICD-10-CM

## 2023-08-03 DIAGNOSIS — M35.3 POLYMYALGIA RHEUMATICA (HCC): Primary | ICD-10-CM

## 2023-08-03 DIAGNOSIS — M79.10 MUSCLE PAIN: ICD-10-CM

## 2023-08-03 DIAGNOSIS — Z79.4 TYPE 2 DIABETES MELLITUS WITH COMPLICATION, WITH LONG-TERM CURRENT USE OF INSULIN (HCC): ICD-10-CM

## 2023-08-03 DIAGNOSIS — M62.81 MUSCLE WEAKNESS: ICD-10-CM

## 2023-08-03 DIAGNOSIS — H81.13 BENIGN PAROXYSMAL POSITIONAL VERTIGO DUE TO BILATERAL VESTIBULAR DISORDER: ICD-10-CM

## 2023-08-03 DIAGNOSIS — E66.01 MORBID OBESITY (HCC): ICD-10-CM

## 2023-08-03 LAB
CREAT UR-MCNC: 89.6 MG/DL
CRP SERPL HS-MCNC: 28.6 MG/L (ref 0–5)
ERYTHROCYTE [SEDIMENTATION RATE] IN BLOOD BY WESTERGREN METHOD: 58 MM (ref 0–20)
MICROALBUMIN UR-MCNC: 4.5 MG/DL
MICROALBUMIN/CREAT UR-RTO: 50.2 MG/G (ref 0–30)

## 2023-08-03 PROCEDURE — 99214 OFFICE O/P EST MOD 30 MIN: CPT | Performed by: PSYCHIATRY & NEUROLOGY

## 2023-08-03 RX ORDER — MECLIZINE HYDROCHLORIDE 25 MG/1
TABLET ORAL
Qty: 21 TABLET | Refills: 1 | Status: SHIPPED | OUTPATIENT
Start: 2023-08-03

## 2023-08-03 ASSESSMENT — ENCOUNTER SYMPTOMS
PHOTOPHOBIA: 0
NAUSEA: 0
COLOR CHANGE: 0
TROUBLE SWALLOWING: 0
SHORTNESS OF BREATH: 0
CHOKING: 0
BACK PAIN: 0
VOMITING: 0

## 2023-08-03 NOTE — PROGRESS NOTES
PM    CREATININE 0.75 09/26/2022 03:21 PM    GFRAA >60.0 09/26/2022 03:21 PM    LABGLOM >60.0 09/26/2022 03:21 PM    GLUCOSE 244 07/11/2023 09:19 AM    GLUCOSE 262 05/31/2012 06:42 AM    PROT 7.1 05/26/2022 12:15 AM    LABALBU 3.7 05/26/2022 12:15 AM    LABALBU 4.4 05/31/2012 06:42 AM    CALCIUM 9.5 09/26/2022 03:21 PM    BILITOT 0.3 05/26/2022 12:15 AM    ALKPHOS 91 05/26/2022 12:15 AM    AST 24 05/26/2022 12:15 AM    ALT 27 05/26/2022 12:15 AM     Lab Results   Component Value Date/Time    PROTIME 13.0 05/20/2022 03:30 PM    PROTIME 10.0 11/21/2011 08:42 AM    INR 1.0 05/20/2022 03:30 PM     Lab Results   Component Value Date/Time    TSH 0.946 04/29/2022 07:40 AM    CZCIICTB57 625 08/22/2018 04:11 PM    FOLATE 5.3 08/22/2018 04:11 PM    FERRITIN 119 04/29/2022 07:40 AM    IRON 67 04/29/2022 07:40 AM    TIBC 311 04/29/2022 07:40 AM     Lab Results   Component Value Date/Time    TRIG 176 06/30/2023 07:11 AM    HDL 36 06/30/2023 07:11 AM    HDL 34 02/23/2011 12:00 AM    LDLCALC 63 06/30/2023 07:11 AM     Lab Results   Component Value Date/Time    LABAMPH Neg 09/17/2021 02:15 PM    BARBSCNU Neg 09/17/2021 02:15 PM    LABBENZ Neg 09/17/2021 02:15 PM    LABMETH Neg 09/17/2021 02:15 PM    OPIATESCREENURINE Neg 09/17/2021 02:15 PM    PHENCYCLIDINESCREENURINE Neg 09/17/2021 02:15 PM     No results found for: LITHIUM, DILFRTOT, VALPROATE    Assessment:       Diagnosis Orders   1. Polymyalgia rheumatica ([address])        2. Muscle pain  Mpo/Pr-3(Anca) Abs      3. Benign paroxysmal positional vertigo due to bilateral vestibular disorder        4. Muscle weakness        5. Morbid obesity (HCC)        Barometric of several years duration. We have continued her on a very low-dose of prednisone keeping in mind her sed rate and CRP and she is actually pain-free from the same. Her main issue appears to be her blood glucose and diabetes. Her hemoglobin A1c runs around 8.   When last seen we had continue discussed gastric bypass as this

## 2023-08-03 NOTE — TELEPHONE ENCOUNTER
Patient returned call and scheduled for 8/9/23 at [address] [place] [address].    [facility] free: [phone]     For Pharmacy Admin Tracking Only    Program: Halina in place:  No  Recommendation Provided To: Patient/Caregiver: 1 via Telephone  Intervention Detail: Scheduled Appointment  Intervention Accepted By: Patient/Caregiver: 1  Gap Closed?: Yes   Time Spent (min): 10

## 2023-08-06 LAB
MYELOPEROXIDASE AB SER-ACNC: 0 AU/ML (ref 0–19)
PROTEINASE3 AB SER-ACNC: 0 AU/ML (ref 0–19)

## 2023-08-07 RX ORDER — METOPROLOL TARTRATE 100 MG/1
TABLET ORAL
Qty: 180 TABLET | Refills: 0 | Status: SHIPPED | OUTPATIENT
Start: 2023-08-07

## 2023-08-07 NOTE — TELEPHONE ENCOUNTER
Comments:     Last Office Visit (last PCP visit):   6/22/2022    Next Visit Date:  Future Appointments   Date Time Provider [address]   8/9/2023  5:00 PM SCHEDULE, S CLINICAL PHARMACY [facility] Rx None   8/25/2023 10:30 AM Kenya Blood MD [facility]   9/8/2023  8:30 AM Rhona Campbell MD [facility]   9/8/2023  9:30 AM Neelima Marrero RD [facility]   10/10/2023  9:30 AM Alexia Reyes MD [facility]   12/7/2023  8:00 AM Minoo Robles MD [address] [place] [address]   2/1/2024  1:30 PM MD [facility] -       **If hasn't been seen in over a year OR hasn't followed up according to last diabetes/ADHD visit, make appointment for patient before sending refill to provider.     Rx requested:  Requested Prescriptions     Pending Prescriptions Disp Refills    metoprolol (LOPRESSOR) 100 MG tablet [Pharmacy Med Name: METOPROLOL TARTRATE 100MG TABS] 180 tablet 1     Sig: TAKE 1 TABLET BY MOUTH 2 TIMES A DAY

## 2023-08-09 ENCOUNTER — TELEPHONE (OUTPATIENT)
Dept: PHARMACY | Facility: CLINIC | Age: 48
End: 2023-08-09

## 2023-08-09 NOTE — TELEPHONE ENCOUNTER
mmHg due to history of DM: Is at blood pressure goal; does check at home. - Lipids: Patient is prescribed moderate intensity statin therapy. - Smoking status:  former smoker    PLAN:  Consideration(s) for provider:   Patient is interested in a prescription for glucagon use at home. Prescription for Baqsimi is pended for provider review. Patient needs updated prescriptions/refills for Prodigy test strips and lancets (prescriptions have ) - to use as a back-up meter while using the [place] 2. Future considerations:   Jael Frederick may be considered for further kidney protection. However, noted in patient's chart that she was previously on canagliflozin (Invokana) and it was discontinued 16 (reason unclear). Risk of  genitourinary fungal infections and per chart review, patient does have a history of of vaginal yeast infections and fluconazole use. Also would need to watch volume status if patient undergoes bariatric surgery. May need to increase statin dose or add agent for elevated triglycerides. Patient follows with both Endocrinology and Cardiology.       - DM program gaps identified:   Initial requirements: Requirements met     Ongoing requirements: Provider visit for DM (2nd), ACC/diabetes educator visit (if A1c over 8%), A1c (2nd), Pneumococcal vaccination: [id number], Influenza vaccination for 2630-5254, and Medication adherence over 70%     - Education to patient: Discussed general issues about diabetes pathophysiology and management., Addressed diet and exercise, Discussed foot care, Reminded to get yearly retinal exam, Addressed medication adherence, Overview of Be Well With Diabetes program, Overview of HHP, Benefit/indication for statin in patients with diabetes, Benefit/indication for pneumonia vaccine in patients with diabetes, and Benefit/indication for ACE/ARB in patients with diabetes     - Follow up: Endocrinologist for identified gaps or as scheduled below and Diabetes Educator or

## 2023-08-14 NOTE — TELEPHONE ENCOUNTER
Dr. Nikolai Clarke MD,    Your patient is currently enrolled in the Be Well with Diabetes program. After your patient's recent visit with a [facility] Clinical Pharmacist, the below were identified as opportunities to assist with their diabetes management (if patient is not eligible for below recommendations, please reply with reason/contraindication):   Patient is interested in a prescription for nasal glucagon (Baqsimi) use at home. I have pended a prescription for your review. Patient needs updated prescriptions/refills for Prodigy test strips and lancets (prescriptions have ) - to use as a back-up meter while using the [employer] 2. I have pended prescriptions for your review. Potential future considerations:   Donzell Dye may be considered for further kidney protection. However, noted in patient's chart that she was previously on canagliflozin (Invokana) and it was discontinued 16 (reason unclear). Risk of genitourinary fungal infections with Donzell Dye and per chart review, patient does have a history of of vaginal yeast infections and fluconazole use. Also would need to watch volume status if patient undergoes bariatric surgery. See pharmacist note dated 23 for complete details. To remain active in the program, the patient is to meet the requirements and documentation must be provided by pre-established deadlines (see below). Program Requirements  Initial Program Requirements (to be completed by 2023): Office visit with provider for DM (1st)  A1c (1st)    Ongoing Program Requirements (to be completed by 2023):   Office visit with provider for DM (2nd)  A1c (2nd)  Diabetes Education if A1c >8%  Lipid panel  Urine microalbumin   Pneumococcal vaccination (if applicable)  Influenza vaccination for 2023  On statin or contraindication  On ACEi/ARB or contraindication    Thank you,  Ehsan Amaral, PharmD, [address] [facility]

## 2023-08-15 RX ORDER — GLUCAGON 3 MG/1
POWDER NASAL
Qty: 1 EACH | Refills: 3 | Status: SHIPPED | OUTPATIENT
Start: 2023-08-15

## 2023-08-15 RX ORDER — BLOOD SUGAR DIAGNOSTIC
STRIP MISCELLANEOUS
Qty: 400 EACH | Refills: 3 | Status: SHIPPED | OUTPATIENT
Start: 2023-08-15

## 2023-08-15 RX ORDER — BLOOD-GLUCOSE CONTROL, LOW
EACH MISCELLANEOUS
Qty: 400 EACH | Refills: 3 | Status: SHIPPED | OUTPATIENT
Start: 2023-08-15

## 2023-08-21 ENCOUNTER — E-VISIT (OUTPATIENT)
Dept: PRIMARY CARE CLINIC | Age: 48
End: 2023-08-21
Payer: COMMERCIAL

## 2023-08-21 DIAGNOSIS — R30.0 DYSURIA: Primary | ICD-10-CM

## 2023-08-21 PROCEDURE — 99422 OL DIG E/M SVC 11-20 MIN: CPT | Performed by: PHYSICIAN ASSISTANT

## 2023-08-21 RX ORDER — NITROFURANTOIN 25; 75 MG/1; MG/1
100 CAPSULE ORAL 2 TIMES DAILY
Qty: 10 CAPSULE | Refills: 0 | Status: SHIPPED | OUTPATIENT
Start: 2023-08-21 | End: 2023-08-26

## 2023-08-21 NOTE — PROGRESS NOTES
I have reviewed the questionnaire, PMH, Medications and allergies. Diagnoses and all orders for this visit:    Dysuria  -     nitrofurantoin, macrocrystal-monohydrate, (MACROBID) 100 MG capsule; Take 1 capsule by mouth 2 times daily for 5 days        Continue with supportive care and follow up with PCP if no improvement with treatment. 11-20 minutes were spent on the digital evaluation and management of this patient.

## 2023-09-02 PROCEDURE — 99283 EMERGENCY DEPT VISIT LOW MDM: CPT

## 2023-09-03 ENCOUNTER — APPOINTMENT (OUTPATIENT)
Dept: GENERAL RADIOLOGY | Age: 48
End: 2023-09-03
Payer: COMMERCIAL

## 2023-09-03 ENCOUNTER — HOSPITAL ENCOUNTER (EMERGENCY)
Age: 48
Discharge: HOME | End: 2023-09-03
Payer: COMMERCIAL

## 2023-09-03 VITALS
SYSTOLIC BLOOD PRESSURE: 142 MMHG | TEMPERATURE: 98.3 F | HEART RATE: 77 BPM | DIASTOLIC BLOOD PRESSURE: 73 MMHG | RESPIRATION RATE: 20 BRPM | BODY MASS INDEX: 47.09 KG/M2 | WEIGHT: 293 LBS | OXYGEN SATURATION: 96 % | HEIGHT: 66 IN

## 2023-09-03 DIAGNOSIS — S82.831A CLOSED FRACTURE OF DISTAL END OF RIGHT FIBULA, UNSPECIFIED FRACTURE MORPHOLOGY, INITIAL ENCOUNTER: Primary | ICD-10-CM

## 2023-09-03 PROCEDURE — 73590 X-RAY EXAM OF LOWER LEG: CPT

## 2023-09-03 PROCEDURE — 73610 X-RAY EXAM OF ANKLE: CPT

## 2023-09-03 ASSESSMENT — PAIN DESCRIPTION - LOCATION: LOCATION: LEG

## 2023-09-03 ASSESSMENT — ENCOUNTER SYMPTOMS
BLOOD IN STOOL: 0
NAUSEA: 0
VOMITING: 0
RHINORRHEA: 0
ABDOMINAL PAIN: 0
COLOR CHANGE: 0
SORE THROAT: 0
DIARRHEA: 0
SHORTNESS OF BREATH: 0
COUGH: 0

## 2023-09-03 ASSESSMENT — PAIN DESCRIPTION - ONSET: ONSET: ON-GOING

## 2023-09-03 ASSESSMENT — PAIN DESCRIPTION - PAIN TYPE: TYPE: ACUTE PAIN

## 2023-09-03 ASSESSMENT — PAIN DESCRIPTION - ORIENTATION: ORIENTATION: RIGHT

## 2023-09-03 ASSESSMENT — PAIN SCALES - GENERAL: PAINLEVEL_OUTOF10: 3

## 2023-09-03 ASSESSMENT — PAIN DESCRIPTION - DESCRIPTORS: DESCRIPTORS: ACHING

## 2023-09-03 ASSESSMENT — LIFESTYLE VARIABLES
HOW MANY STANDARD DRINKS CONTAINING ALCOHOL DO YOU HAVE ON A TYPICAL DAY: PATIENT DOES NOT DRINK
HOW OFTEN DO YOU HAVE A DRINK CONTAINING ALCOHOL: NEVER

## 2023-09-03 ASSESSMENT — PAIN DESCRIPTION - FREQUENCY: FREQUENCY: INTERMITTENT

## 2023-09-03 ASSESSMENT — PAIN - FUNCTIONAL ASSESSMENT
PAIN_FUNCTIONAL_ASSESSMENT: NONE - DENIES PAIN
PAIN_FUNCTIONAL_ASSESSMENT: 0-10

## 2023-09-03 NOTE — ED NOTES
Pt was given d/c instructions, follow up care instructions. Pt at this time is a+ox4, no signs of distress, and was ambulatory on exit. Pt has no questions and states understanding of information given.       Roxie Spear RN  09/03/23 8282

## 2023-09-03 NOTE — ED TRIAGE NOTES
Patient present to he ED from home after a fall. Patient report a fall down the last x 2 stairs of her basement. Patient denies LOC, - blood thinners.   Patient is A/O x 4 during triage

## 2023-09-05 ENCOUNTER — CARE COORDINATION (OUTPATIENT)
Dept: OTHER | Facility: CLINIC | Age: 48
End: 2023-09-05

## 2023-09-05 ENCOUNTER — HOSPITAL ENCOUNTER (OUTPATIENT)
Dept: ORTHOPEDIC SURGERY | Age: 48
Discharge: HOME | End: 2023-09-07
Payer: COMMERCIAL

## 2023-09-05 ENCOUNTER — OFFICE VISIT (OUTPATIENT)
Dept: ORTHOPEDIC SURGERY | Age: 48
End: 2023-09-05
Payer: COMMERCIAL

## 2023-09-05 VITALS
OXYGEN SATURATION: 99 % | WEIGHT: 293 LBS | HEIGHT: 66 IN | HEART RATE: 81 BPM | BODY MASS INDEX: 47.09 KG/M2 | TEMPERATURE: 97.8 F

## 2023-09-05 DIAGNOSIS — S82.61XA CLOSED DISPLACED FRACTURE OF LATERAL MALLEOLUS OF RIGHT FIBULA, INITIAL ENCOUNTER: ICD-10-CM

## 2023-09-05 DIAGNOSIS — S82.61XA CLOSED DISPLACED FRACTURE OF LATERAL MALLEOLUS OF RIGHT FIBULA, INITIAL ENCOUNTER: Primary | ICD-10-CM

## 2023-09-05 PROCEDURE — 73610 X-RAY EXAM OF ANKLE: CPT

## 2023-09-05 PROCEDURE — 99204 OFFICE O/P NEW MOD 45 MIN: CPT | Performed by: PHYSICIAN ASSISTANT

## 2023-09-05 RX ORDER — SODIUM CHLORIDE 0.9 % (FLUSH) 0.9 %
5-40 SYRINGE (ML) INJECTION EVERY 12 HOURS SCHEDULED
OUTPATIENT
Start: 2023-09-05

## 2023-09-05 RX ORDER — SODIUM CHLORIDE 0.9 % (FLUSH) 0.9 %
5-40 SYRINGE (ML) INJECTION PRN
OUTPATIENT
Start: 2023-09-05

## 2023-09-05 RX ORDER — SODIUM CHLORIDE, SODIUM LACTATE, POTASSIUM CHLORIDE, CALCIUM CHLORIDE 600; 310; 30; 20 MG/100ML; MG/100ML; MG/100ML; MG/100ML
INJECTION, SOLUTION INTRAVENOUS CONTINUOUS
OUTPATIENT
Start: 2023-09-05

## 2023-09-05 RX ORDER — SODIUM CHLORIDE 9 MG/ML
INJECTION, SOLUTION INTRAVENOUS PRN
OUTPATIENT
Start: 2023-09-05

## 2023-09-05 SDOH — ECONOMIC STABILITY: INCOME INSECURITY: IN THE LAST 12 MONTHS, WAS THERE A TIME WHEN YOU WERE NOT ABLE TO PAY THE MORTGAGE OR RENT ON TIME?: NO

## 2023-09-05 ASSESSMENT — SOCIAL DETERMINANTS OF HEALTH (SDOH): HOW HARD IS IT FOR YOU TO PAY FOR THE VERY BASICS LIKE FOOD, HOUSING, MEDICAL CARE, AND HEATING?: NOT VERY HARD

## 2023-09-05 NOTE — CARE COORDINATION
Ambulatory Care Coordination Note  2023    Patient Current Location: [place]    ACM contacted the patient by telephone. Verified name and  with patient as identifiers. Provided introduction to self, and explanation of the ACM role. ACM: Annetta Rivera RN    Challenges to be reviewed by the provider   Additional needs identified to be addressed with provider: No  none               Method of communication with provider: none. Ambulatory Care Manager General acute hospital) contacted the patient to introduce the Associate Care Management Program related to ED visit 9/3/2023 . ACM reviewed and updated CC Protocol , medication , and goals patient was agreeable to follow up Care Management calls. Summary Note: patient states she fell down 2 steps and c/o left shin pain and right ankle pain. Patient presented to ED on 9/3/2023. Xrays showed no acute fracture on left lower extremity. Right ankle xrays showed questionable distal fibula fracture nondisplaced, per ED notes. She was placed in walking boot. Patient followed up with orthopedics today. Xrays were done and show worsening displacement of the fracture and also increased mortise widening. She will proceed with operative fixation of ankle fracture. She will continue to wear boot and NWB. Has crutches. Patient is scheduled for right ankle ORIF of lateral malleolus , possible syndesmotic fixation on 9/15/2023 with Dr. Lu Koenig. Patient states she declined narcotic pain medication because it makes her have nausea and vomiting. Patient is taking tylenol for pain. Patient states she is not very good with crutches. She states her apartment is very small and not sure she would be able to get around in a wheelchair in her apartment. She also has to go up and down about 10 steps to go in and out of her apartment. She has been sitting on her buttocks and scooting up and down steps. States her 23year old daughter lives with her and can help her around the home.  I also advised

## 2023-09-05 NOTE — PROGRESS NOTES
SN & PT to evaluate and treat/educate disease management, medications, home safety & equipment needs for total joint patients  [] Other: ____________________________________________________  Consults: Medical/Cardiac Clearance done by  ____________________    PRE-OP ORDERS:  Allergies: Morphine and related, Ace inhibitors, Bactrim [sulfamethoxazole-trimethoprim], Nitroglycerin, Percocet [oxycodone-acetaminophen], and Victoza [liraglutide] Latex Allergies: _____  Diabetic: _____  [] IV ________________________  [x] IV Start with J-loop     Preprinted Orders: Attached [] Yes [] No   Antibiotic Pre-Op: [x] ANCEF 2 gram IVPB if > 120 kg 3 grams IVPB within 1 hr. of Incision, if Allergic, use Vancomycin 1 gram IV, 2 hrs. Pre-op  [] TXA Protocol [] Other:   [x] NPO   [] Betablocker (if needed) _____________________________________   [] Knee high anti-embolic hose [] Thigh high anti-embolic hose   Other: ______________________________________________________    Physician Signature Required:  Date/Time: 9/5/2023      Orders Placed This Encounter   Procedures    XR ANKLE RIGHT (MIN 3 VIEWS)     Standing Status:   Future     Standing Expiration Date:   9/5/2024     Scheduling Instructions:      AP, Lat, mortis     Order Specific Question:   Reason for exam:     Answer:   post op     No orders of the defined types were placed in this encounter. No follow-ups on file.     Martin Ryder PA-C  [facility]  [phone]

## 2023-09-10 ENCOUNTER — PATIENT MESSAGE (OUTPATIENT)
Dept: ORTHOPEDIC SURGERY | Age: 48
End: 2023-09-10

## 2023-09-12 ENCOUNTER — PREP FOR PROCEDURE (OUTPATIENT)
Dept: ORTHOPEDIC SURGERY | Age: 48
End: 2023-09-12

## 2023-09-12 ENCOUNTER — TELEPHONE (OUTPATIENT)
Dept: ORTHOPEDIC SURGERY | Age: 48
End: 2023-09-12

## 2023-09-12 DIAGNOSIS — S82.61XA CLOSED DISPLACED FRACTURE OF LATERAL MALLEOLUS OF RIGHT FIBULA, INITIAL ENCOUNTER: ICD-10-CM

## 2023-09-12 LAB
ANION GAP SERPL CALCULATED.3IONS-SCNC: 15 MEQ/L (ref 9–15)
BUN SERPL-MCNC: 13 MG/DL (ref 6–20)
CALCIUM SERPL-MCNC: 8.9 MG/DL (ref 8.5–9.9)
CHLORIDE SERPL-SCNC: 98 MEQ/L (ref 95–107)
CO2 SERPL-SCNC: 24 MEQ/L (ref 20–31)
CREAT SERPL-MCNC: 0.78 MG/DL (ref 0.5–0.9)
ERYTHROCYTE [DISTWIDTH] IN BLOOD BY AUTOMATED COUNT: 16.2 % (ref 11.5–14.5)
GLUCOSE SERPL-MCNC: 174 MG/DL (ref 70–99)
HCT VFR BLD AUTO: 37.9 % (ref 37–47)
HGB BLD-MCNC: 12.3 G/DL (ref 12–16)
MCH RBC QN AUTO: 25.8 PG (ref 27–31.3)
MCHC RBC AUTO-ENTMCNC: 32.5 % (ref 33–37)
MCV RBC AUTO: 79.5 FL (ref 79.4–94.8)
PLATELET # BLD AUTO: 256 K/UL (ref 130–400)
POTASSIUM SERPL-SCNC: 4.6 MEQ/L (ref 3.4–4.9)
RBC # BLD AUTO: 4.77 M/UL (ref 4.2–5.4)
SODIUM SERPL-SCNC: 137 MEQ/L (ref 135–144)
WBC # BLD AUTO: 12.6 K/UL (ref 4.8–10.8)

## 2023-09-12 NOTE — TELEPHONE ENCOUNTER
Patient came into office and dropped off [facility] paperwork to be completed before 9/21/23. Patient is scheduled for surgery this Friday, 9/15/23. Paperwork is scanned into .

## 2023-09-13 ENCOUNTER — ANESTHESIA EVENT (OUTPATIENT)
Dept: OPERATING ROOM | Age: 48
End: 2023-09-13
Payer: COMMERCIAL

## 2023-09-15 ENCOUNTER — APPOINTMENT (OUTPATIENT)
Dept: GENERAL RADIOLOGY | Age: 48
End: 2023-09-15
Attending: ORTHOPAEDIC SURGERY
Payer: COMMERCIAL

## 2023-09-15 ENCOUNTER — HOSPITAL ENCOUNTER (OUTPATIENT)
Age: 48
Setting detail: OUTPATIENT SURGERY
Discharge: HOME | End: 2023-09-15
Attending: ORTHOPAEDIC SURGERY | Admitting: ORTHOPAEDIC SURGERY
Payer: COMMERCIAL

## 2023-09-15 ENCOUNTER — ANESTHESIA (OUTPATIENT)
Dept: OPERATING ROOM | Age: 48
End: 2023-09-15
Payer: COMMERCIAL

## 2023-09-15 VITALS
BODY MASS INDEX: 47.09 KG/M2 | RESPIRATION RATE: 16 BRPM | SYSTOLIC BLOOD PRESSURE: 129 MMHG | HEIGHT: 66 IN | TEMPERATURE: 98 F | WEIGHT: 293 LBS | DIASTOLIC BLOOD PRESSURE: 80 MMHG | OXYGEN SATURATION: 94 % | HEART RATE: 80 BPM

## 2023-09-15 DIAGNOSIS — S82.61XA CLOSED DISPLACED FRACTURE OF LATERAL MALLEOLUS OF RIGHT FIBULA, INITIAL ENCOUNTER: Primary | ICD-10-CM

## 2023-09-15 DIAGNOSIS — R52 PAIN: ICD-10-CM

## 2023-09-15 LAB
GLUCOSE BLD-MCNC: 155 MG/DL (ref 70–99)
GLUCOSE BLD-MCNC: 211 MG/DL (ref 70–99)
PERFORMED ON: ABNORMAL
PERFORMED ON: ABNORMAL

## 2023-09-15 PROCEDURE — 6360000002 HC RX W HCPCS: Performed by: STUDENT IN AN ORGANIZED HEALTH CARE EDUCATION/TRAINING PROGRAM

## 2023-09-15 PROCEDURE — 7100000010 HC PHASE II RECOVERY - FIRST 15 MIN: Performed by: ORTHOPAEDIC SURGERY

## 2023-09-15 PROCEDURE — 64447 NJX AA&/STRD FEMORAL NRV IMG: CPT | Performed by: STUDENT IN AN ORGANIZED HEALTH CARE EDUCATION/TRAINING PROGRAM

## 2023-09-15 PROCEDURE — 3600000014 HC SURGERY LEVEL 4 ADDTL 15MIN: Performed by: ORTHOPAEDIC SURGERY

## 2023-09-15 PROCEDURE — 3600000004 HC SURGERY LEVEL 4 BASE: Performed by: ORTHOPAEDIC SURGERY

## 2023-09-15 PROCEDURE — 7100000000 HC PACU RECOVERY - FIRST 15 MIN: Performed by: ORTHOPAEDIC SURGERY

## 2023-09-15 PROCEDURE — 2500000003 HC RX 250 WO HCPCS

## 2023-09-15 PROCEDURE — 6360000002 HC RX W HCPCS

## 2023-09-15 PROCEDURE — C1713 ANCHOR/SCREW BN/BN,TIS/BN: HCPCS | Performed by: ORTHOPAEDIC SURGERY

## 2023-09-15 PROCEDURE — 27786 TREATMENT OF ANKLE FRACTURE: CPT | Performed by: ORTHOPAEDIC SURGERY

## 2023-09-15 PROCEDURE — 6360000002 HC RX W HCPCS: Performed by: PHYSICIAN ASSISTANT

## 2023-09-15 PROCEDURE — 3700000000 HC ANESTHESIA ATTENDED CARE: Performed by: ORTHOPAEDIC SURGERY

## 2023-09-15 PROCEDURE — 3700000001 HC ADD 15 MINUTES (ANESTHESIA): Performed by: ORTHOPAEDIC SURGERY

## 2023-09-15 PROCEDURE — 2709999900 HC NON-CHARGEABLE SUPPLY: Performed by: ORTHOPAEDIC SURGERY

## 2023-09-15 PROCEDURE — C9399 UNCLASSIFIED DRUGS OR BIOLOG: HCPCS | Performed by: STUDENT IN AN ORGANIZED HEALTH CARE EDUCATION/TRAINING PROGRAM

## 2023-09-15 PROCEDURE — 2580000003 HC RX 258: Performed by: PHYSICIAN ASSISTANT

## 2023-09-15 PROCEDURE — 7100000011 HC PHASE II RECOVERY - ADDTL 15 MIN: Performed by: ORTHOPAEDIC SURGERY

## 2023-09-15 PROCEDURE — 7100000001 HC PACU RECOVERY - ADDTL 15 MIN: Performed by: ORTHOPAEDIC SURGERY

## 2023-09-15 DEVICE — SCREW CORTX SLFTP FTHRD 3.5X18MM: Type: IMPLANTABLE DEVICE | Site: ANKLE | Status: FUNCTIONAL

## 2023-09-15 DEVICE — PLATE BNE L92MM 4 H R LAT DST FIBULAR S STL VAR ANG LOK: Type: IMPLANTABLE DEVICE | Site: ANKLE | Status: FUNCTIONAL

## 2023-09-15 DEVICE — SCREW BNE L16MM DIA2.7MM ANK S STL ST VAR ANG LOK FULL THRD: Type: IMPLANTABLE DEVICE | Site: ANKLE | Status: FUNCTIONAL

## 2023-09-15 DEVICE — SCREW BNE L24MM DIA3.5MM CORT S STL ST NONCANNULATED LOK: Type: IMPLANTABLE DEVICE | Site: ANKLE | Status: FUNCTIONAL

## 2023-09-15 DEVICE — ISOLA SPINE SYSTEM ROD BENDERS (TUBULAR) SET
Type: IMPLANTABLE DEVICE | Site: ANKLE | Status: FUNCTIONAL
Brand: ISOLA

## 2023-09-15 DEVICE — SCREW BNE L16MM DIA2.7MM CORT S STL ST FULL THRD FOR SM: Type: IMPLANTABLE DEVICE | Status: FUNCTIONAL

## 2023-09-15 DEVICE — SCREW BNE L10MM DIA2.7MM ANK S STL ST VAR ANG LOK FULL THRD: Type: IMPLANTABLE DEVICE | Site: ANKLE | Status: FUNCTIONAL

## 2023-09-15 RX ORDER — SODIUM CHLORIDE 9 MG/ML
INJECTION, SOLUTION INTRAVENOUS PRN
Status: DISCONTINUED | OUTPATIENT
Start: 2023-09-15 | End: 2023-09-15 | Stop reason: HOSPADM

## 2023-09-15 RX ORDER — FENTANYL CITRATE 0.05 MG/ML
25 INJECTION, SOLUTION INTRAMUSCULAR; INTRAVENOUS EVERY 5 MIN PRN
Status: DISCONTINUED | OUTPATIENT
Start: 2023-09-15 | End: 2023-09-15 | Stop reason: HOSPADM

## 2023-09-15 RX ORDER — ONDANSETRON 2 MG/ML
4 INJECTION INTRAMUSCULAR; INTRAVENOUS
Status: COMPLETED | OUTPATIENT
Start: 2023-09-15 | End: 2023-09-15

## 2023-09-15 RX ORDER — FENTANYL CITRATE 50 UG/ML
INJECTION, SOLUTION INTRAMUSCULAR; INTRAVENOUS PRN
Status: DISCONTINUED | OUTPATIENT
Start: 2023-09-15 | End: 2023-09-15 | Stop reason: SDUPTHER

## 2023-09-15 RX ORDER — SODIUM CHLORIDE 0.9 % (FLUSH) 0.9 %
5-40 SYRINGE (ML) INJECTION EVERY 12 HOURS SCHEDULED
Status: DISCONTINUED | OUTPATIENT
Start: 2023-09-15 | End: 2023-09-15 | Stop reason: HOSPADM

## 2023-09-15 RX ORDER — ROCURONIUM BROMIDE 10 MG/ML
INJECTION, SOLUTION INTRAVENOUS PRN
Status: DISCONTINUED | OUTPATIENT
Start: 2023-09-15 | End: 2023-09-15 | Stop reason: SDUPTHER

## 2023-09-15 RX ORDER — DEXAMETHASONE SODIUM PHOSPHATE 10 MG/ML
INJECTION INTRAMUSCULAR; INTRAVENOUS PRN
Status: DISCONTINUED | OUTPATIENT
Start: 2023-09-15 | End: 2023-09-15 | Stop reason: SDUPTHER

## 2023-09-15 RX ORDER — DIPHENHYDRAMINE HYDROCHLORIDE 50 MG/ML
12.5 INJECTION INTRAMUSCULAR; INTRAVENOUS
Status: DISCONTINUED | OUTPATIENT
Start: 2023-09-15 | End: 2023-09-15 | Stop reason: HOSPADM

## 2023-09-15 RX ORDER — HYDROCODONE BITARTRATE AND ACETAMINOPHEN 5; 325 MG/1; MG/1
1 TABLET ORAL EVERY 4 HOURS PRN
Qty: 30 TABLET | Refills: 0 | Status: SHIPPED | OUTPATIENT
Start: 2023-09-15 | End: 2023-09-20

## 2023-09-15 RX ORDER — ROPIVACAINE HYDROCHLORIDE 5 MG/ML
INJECTION, SOLUTION EPIDURAL; INFILTRATION; PERINEURAL
Status: COMPLETED | OUTPATIENT
Start: 2023-09-15 | End: 2023-09-15

## 2023-09-15 RX ORDER — SODIUM CHLORIDE 0.9 % (FLUSH) 0.9 %
5-40 SYRINGE (ML) INJECTION PRN
Status: DISCONTINUED | OUTPATIENT
Start: 2023-09-15 | End: 2023-09-15 | Stop reason: HOSPADM

## 2023-09-15 RX ORDER — ONDANSETRON 4 MG/1
4 TABLET, ORALLY DISINTEGRATING ORAL EVERY 6 HOURS PRN
Qty: 28 TABLET | Refills: 0 | Status: SHIPPED | OUTPATIENT
Start: 2023-09-15 | End: 2023-09-22

## 2023-09-15 RX ORDER — ONDANSETRON 2 MG/ML
INJECTION INTRAMUSCULAR; INTRAVENOUS PRN
Status: DISCONTINUED | OUTPATIENT
Start: 2023-09-15 | End: 2023-09-15 | Stop reason: SDUPTHER

## 2023-09-15 RX ORDER — SUCCINYLCHOLINE/SOD CL,ISO/PF 100 MG/5ML
SYRINGE (ML) INTRAVENOUS PRN
Status: DISCONTINUED | OUTPATIENT
Start: 2023-09-15 | End: 2023-09-15 | Stop reason: SDUPTHER

## 2023-09-15 RX ORDER — METOCLOPRAMIDE HYDROCHLORIDE 5 MG/ML
10 INJECTION INTRAMUSCULAR; INTRAVENOUS
Status: COMPLETED | OUTPATIENT
Start: 2023-09-15 | End: 2023-09-15

## 2023-09-15 RX ORDER — PROPOFOL 10 MG/ML
INJECTION, EMULSION INTRAVENOUS PRN
Status: DISCONTINUED | OUTPATIENT
Start: 2023-09-15 | End: 2023-09-15 | Stop reason: SDUPTHER

## 2023-09-15 RX ORDER — OXYCODONE HYDROCHLORIDE 5 MG/1
5 TABLET ORAL
Status: DISCONTINUED | OUTPATIENT
Start: 2023-09-15 | End: 2023-09-15 | Stop reason: HOSPADM

## 2023-09-15 RX ORDER — LIDOCAINE HYDROCHLORIDE 20 MG/ML
INJECTION, SOLUTION INTRAVENOUS PRN
Status: DISCONTINUED | OUTPATIENT
Start: 2023-09-15 | End: 2023-09-15 | Stop reason: SDUPTHER

## 2023-09-15 RX ORDER — HYDROCODONE BITARTRATE AND ACETAMINOPHEN 5; 325 MG/1; MG/1
1 TABLET ORAL EVERY 4 HOURS PRN
Qty: 30 TABLET | Refills: 0 | Status: SHIPPED | OUTPATIENT
Start: 2023-09-15 | End: 2023-09-15

## 2023-09-15 RX ORDER — SODIUM CHLORIDE, SODIUM LACTATE, POTASSIUM CHLORIDE, CALCIUM CHLORIDE 600; 310; 30; 20 MG/100ML; MG/100ML; MG/100ML; MG/100ML
INJECTION, SOLUTION INTRAVENOUS CONTINUOUS
Status: DISCONTINUED | OUTPATIENT
Start: 2023-09-15 | End: 2023-09-15 | Stop reason: HOSPADM

## 2023-09-15 RX ADMIN — PROPOFOL 200 MG: 10 INJECTION, EMULSION INTRAVENOUS at 15:03

## 2023-09-15 RX ADMIN — FENTANYL CITRATE 50 MCG: 50 INJECTION, SOLUTION INTRAMUSCULAR; INTRAVENOUS at 15:03

## 2023-09-15 RX ADMIN — DEXAMETHASONE SODIUM PHOSPHATE 10 MG: 10 INJECTION INTRAMUSCULAR; INTRAVENOUS at 15:13

## 2023-09-15 RX ADMIN — ROCURONIUM BROMIDE 40 MG: 10 INJECTION, SOLUTION INTRAVENOUS at 15:13

## 2023-09-15 RX ADMIN — ONDANSETRON 4 MG: 2 INJECTION INTRAMUSCULAR; INTRAVENOUS at 15:54

## 2023-09-15 RX ADMIN — ONDANSETRON 4 MG: 2 INJECTION INTRAMUSCULAR; INTRAVENOUS at 17:24

## 2023-09-15 RX ADMIN — SODIUM CHLORIDE: 9 INJECTION, SOLUTION INTRAVENOUS at 14:56

## 2023-09-15 RX ADMIN — FENTANYL CITRATE 50 MCG: 50 INJECTION, SOLUTION INTRAMUSCULAR; INTRAVENOUS at 15:13

## 2023-09-15 RX ADMIN — PROPOFOL 30 MG: 10 INJECTION, EMULSION INTRAVENOUS at 16:55

## 2023-09-15 RX ADMIN — ROPIVACAINE HYDROCHLORIDE 30 ML: 5 INJECTION, SOLUTION EPIDURAL; INFILTRATION; PERINEURAL at 14:30

## 2023-09-15 RX ADMIN — Medication 200 MG: at 15:03

## 2023-09-15 RX ADMIN — PROPOFOL 40 MG: 10 INJECTION, EMULSION INTRAVENOUS at 16:36

## 2023-09-15 RX ADMIN — SUGAMMADEX 400 MG: 100 INJECTION, SOLUTION INTRAVENOUS at 16:45

## 2023-09-15 RX ADMIN — METOCLOPRAMIDE HYDROCHLORIDE 10 MG: 5 INJECTION INTRAMUSCULAR; INTRAVENOUS at 17:45

## 2023-09-15 RX ADMIN — ROCURONIUM BROMIDE 10 MG: 10 INJECTION, SOLUTION INTRAVENOUS at 15:03

## 2023-09-15 RX ADMIN — SODIUM CHLORIDE 3000 MG: 900 INJECTION INTRAVENOUS at 15:13

## 2023-09-15 RX ADMIN — LIDOCAINE HYDROCHLORIDE 100 MG: 20 INJECTION, SOLUTION INTRAVENOUS at 15:03

## 2023-09-15 ASSESSMENT — PAIN SCALES - GENERAL: PAINLEVEL_OUTOF10: 0

## 2023-09-15 ASSESSMENT — PAIN DESCRIPTION - DESCRIPTORS: DESCRIPTORS: ACHING;DULL;SHARP

## 2023-09-15 ASSESSMENT — PAIN - FUNCTIONAL ASSESSMENT
PAIN_FUNCTIONAL_ASSESSMENT: PREVENTS OR INTERFERES WITH ALL ACTIVE AND SOME PASSIVE ACTIVITIES
PAIN_FUNCTIONAL_ASSESSMENT: 0-10

## 2023-09-15 NOTE — ANESTHESIA POSTPROCEDURE EVALUATION
Department of Anesthesiology  Postprocedure Note    Patient: Philip Crouch  MRN: [id number]  YOB: 1975  Date of evaluation: 9/15/2023      Procedure Summary     Date: 09/15/23 Room / Location: [facility] [address] [place] [address]    Anesthesia Start: 6318 Anesthesia Stop: 6988    Procedure: Right open reduction internal fixation lateral malleolus, possible syndesmotic fixation, requesting large C arm, bone foam, Synthes Krystle Poser) -Laurent's initial note will serve as PAT, Case Comments/Implants: as above , Anesthesia Requested: radha ALEJANDRA (Right) Diagnosis:       Closed fracture of lateral malleolus of right ankle      (Closed fracture of lateral malleolus of right ankle [S82.61XA])    Surgeons: May Jane DO Responsible Provider: Kathryn Gill MD    Anesthesia Type: General ASA Status: 3          Anesthesia Type: General    Shahrzad Phase I: Shahrzad Score: 10    Shahrzad Phase II:        Anesthesia Post Evaluation    Patient location during evaluation: bedside  Patient participation: complete - patient participated  Level of consciousness: awake and awake and alert  Airway patency: patent  Nausea & Vomiting: no nausea and no vomiting  Complications: no  Cardiovascular status: blood pressure returned to baseline and hemodynamically stable  Respiratory status: acceptable  Hydration status: euvolemic  Pain management: adequate

## 2023-09-15 NOTE — ANESTHESIA PRE PROCEDURE
Department of Anesthesiology  Preprocedure Note       Name:  Faisal Jaramillo   Age:  53 y.o.  :  1975                                          MRN:  [id number]         Date:  9/15/2023      Surgeon: Jose Ramon Darden):  Jewel Padilla DO    Procedure: Procedure(s):  Right open reduction internal fixation lateral malleolus, possible syndesmotic fixation, requesting large C arm, bone foam, Synthes Liliane Cary) -Laurent's initial note will serve as PAT, Case Comments/Implants: as above , Anesthesia Requested: choice. [place]    Medications prior to admission:   Prior to Admission medications    Medication Sig Start Date End Date Taking? Authorizing Provider   Prodigy Lancets 28G MISC Use to test blood glucose up to 4 times daily or as directed by provider. 8/15/23   Render Mealing, MD   blood glucose test strips (PRODIGY NO CODING BLOOD GLUC) strip Use to test blood glucose up to 4 times daily or as directed by provider. 8/15/23   Render Mealing, MD   Glucagon (BAQSIMI TWO PACK) 3 MG/DOSE POWD By nasal route:  3 mg (one actuation) into a single nostril; if no response, may repeat in 15 minutes using a new intranasal device.  8/15/23   Render Mealing, MD   MAGNESIUM PO Take by mouth Patient does not know dose    Historical Provider, MD   CALCIUM PO Take 500 mg by mouth daily    Historical Provider, MD   Cholecalciferol (VITAMIN D3) 25 MCG (1000 UT) CAPS Take 1 capsule by mouth daily    Historical Provider, MD   metoprolol (LOPRESSOR) 100 MG tablet TAKE 1 TABLET BY MOUTH 2 TIMES A DAY 23   Macey Sheth PA-C   meclizine (ANTIVERT) 25 MG tablet ONE TID PRN 8/3/23   Adeline Batista MD   rosuvastatin (CRESTOR) 10 MG tablet Take 1 tablet by mouth daily 23   SANIA Sarabia - CNP   Continuous Blood Gluc Sensor (FREESTYLE JAIRO 2 SENSOR) MISC CHANGE SENSOR EVERY 14 DAYS 23   David Falcon MD   baclofen (LIORESAL) 10 MG tablet TAKE ONE-HALF TABLET BY MOUTH TWICE A DAY 23   Connie Garnett MD   esomeprazole (NEXIUM) 40 MG

## 2023-09-15 NOTE — ANESTHESIA PROCEDURE NOTES
Peripheral Block    Patient location during procedure: procedure area  Reason for block: post-op pain management and at surgeon's request  Start time: 9/15/2023 2:30 PM  End time: 9/15/2023 2:40 PM  Staffing  Performed: anesthesiologist   Anesthesiologist: Kamran Sequeira MD  Performed by: Kamran Sequeira MD  Authorized by: Kamran Sequeira MD    Preanesthetic Checklist  Completed: patient identified, IV checked, site marked, risks and benefits discussed, surgical/procedural consents, equipment checked, pre-op evaluation, timeout performed, anesthesia consent given, oxygen available and monitors applied/VS acknowledged  Peripheral Block   Patient position: supine  Prep: ChloraPrep  Provider prep: mask and sterile gloves (Sterile probe cover)  Patient monitoring: cardiac monitor, continuous pulse ox, frequent blood pressure checks and IV access  Block type: Saphenous and Sciatic  Laterality: right  Injection technique: single-shot  Guidance: nerve stimulator and ultrasound guided  Local infiltration: ropivacaine  Infiltration strength: 0.5 %  Local infiltration: ropivacaine  Dose: 30 mL    Needle   Needle type: combined needle/nerve stimulator   Needle gauge: 22 G  Needle localization: anatomical landmarks and ultrasound guidance  Needle length: 10 cm  Assessment   Injection assessment: negative aspiration for heme, no paresthesia on injection and local visualized surrounding nerve on ultrasound  Paresthesia pain: immediately resolved  Slow fractionated injection: yes  Hemodynamics: stable  Real-time US image taken/store: yes    Additional Notes  Ultrasound image printed and saved in patient chart. Sterile probe cover used.       Right Popliteal and Saphenous nerve block performed under US    Medications Administered  ropivacaine (NAROPIN) injection 0.5% - Perineural   30 mL - 9/15/2023 2:30:00 PM

## 2023-09-15 NOTE — PROGRESS NOTES
Pt. Feels dizzy and nauseated. Comfort maint. Medicated for nausea. C/o throat hurting also. Ice chips provided. 1745-con't to c/o nausea-medicated.

## 2023-09-15 NOTE — DISCHARGE INSTRUCTIONS
Juvenal Cavanaugh, DO  Orthopedics and Sports Medicine  [address]  [phone]      DR. DALE POST OP FRACTURE CARE INSTRUCTIONS    Call [phone] to make your follow-up appointment for 14 days, if not already scheduled    Anesthesia Precautions: You may feel drowsy, lightheaded, weak and/or unsteady. Pain medication can add to this effect  For 24 hours you should:  Have a responsible adult remain with you  Do not operate a vehicle/motorcycle, power tools, or anything that requires concentration  Do not make important decisions or sign legal documents  Do not drink alcohol (which includes beer and wine)  Drink liquids and eat a light meal on the day of surgery. Start your normal diet tomorrow    Medications  Tylenol as needed. Do not exceed 3000mg in 24-hour period. Each pill of Percocet has 325mg of Tylenol in it, take that into account when calculating your 3000mg  Take Vitamin C 500mg (over the counter) 1 tablet daily for 60 days   You have been given a prescription for pain medication. Take as directed  Pain medications may cause constipation, so drink plenty of fluids, and use a stool softener or laxative (Miralax, Colace, Senokot-S, Dulcolax, etc.)  NO DRIVING OR ALCOHOL (including beer and wine) while on prescription pain medication  Resume your own medications, NSAIDs ok but use as a last resort    Dressing care: Wash your hands first  Leave dressing in place until first follow-up visit.  Cover with plastic bag and/or saran wrap when bathing or showering  Sponge bathe as needed  Keep dressing clean and dry  NO water submersion of operative site for 6 weeks after surgery  Other:     Activities  Limit your activities until directed  Non-weight bearing to the operative extremity until directed otherwise  Do not engage in sports, heavy lifting, or strenuous work  Keep extremity elevated to reduce swelling, above level of heart is best               General Instructions  Ice 20 minutes every hour

## 2023-09-15 NOTE — OP NOTE
Ankle Fracture    Patient Name: Fei Santos  : 1975  MRN: [id number]  Patient Location: [facility] OR Pool/NONE   Account: [de-identified]     Date of Surgery: 9/15/2023   Surgeon(s):  Sherry Langley DO      Pre-op Diagnosis: Right ankle lateral malleolus fracture    Post-Op Diagnosis: Same    Surgical Procedure(s): Right ankle open reduction internal fixation for lateral malleolus fracture    Assistants: First Assistant: Rico Sargent. Anesthesia type/spinal/blocks/exparel: Choice    Estimated blood loss: less than 50      Specimens: * No specimens in log *    Drains: * No LDAs found *    Implants (include size of box retrograde nail down road): Implant Name Type Inv. Item Serial No.  Lot No. LRB No. Used Action   PLATE BNE [id number] 4 H R LAT DST FIBULAR S STL CHANTELL ANG KELBY - [id number]  PLATE BNE [id number] 4 H R LAT DST FIBULAR S STL CHANTELL ANG KELBY  DEPUY [employer]WD  Right 1 Implanted   SCREW BNE L10MM DIA2.7MM ANK S STL ST CHANTELL ANG KELBY FULL THRD - [id number]  SCREW BNE L10MM DIA2.7MM ANK S STL ST CHANTELL ANG KELBY FULL THRD  DEPUY [employer]WD  Right 1 Implanted   SCREW BNE L16MM DIA2.7MM ANK S STL ST CHANTELL ANG KELBY FULL THRD - [id number]  SCREW BNE L16MM DIA2.7MM ANK S STL ST CHANTELL ANG KELBY FULL THRD  DEPUY [employer]WD  Right 1 Implanted   SCREW CORTX SLFTP FTHRD 3.5X18MM - [id number] Screw/Plate/Nail/José SCREW CORTX SLFTP FTHRD 3.5X18MM  SYNTHES-PMM  Right 1 Implanted   SCREW BNE L16MM DIA2.7MM LIBERTAD S STL ST FULL THRD FOR SM - [id number]  SCREW BNE L16MM DIA2.7MM LIBERTAD S STL ST FULL THRD FOR SM  DEPUY [employer]WD  Right 2 Implanted       Complications: None    Disposition: Returned the PACU in stable condition. HPI/indication for surgery: The patient is a 52-year-old female who fell. She had immediate right ankle pain. She was taken to the emergency department where x-rays were taken. She was found to have a lateral malleolus fracture.   She follow-up in the orthopedic clinic and we discussed surgical

## 2023-09-16 DIAGNOSIS — E11.8 TYPE 2 DIABETES MELLITUS WITH COMPLICATION, WITH LONG-TERM CURRENT USE OF INSULIN (HCC): ICD-10-CM

## 2023-09-16 DIAGNOSIS — Z79.4 TYPE 2 DIABETES MELLITUS WITH COMPLICATION, WITH LONG-TERM CURRENT USE OF INSULIN (HCC): ICD-10-CM

## 2023-09-17 RX ORDER — HYDROCHLOROTHIAZIDE 25 MG/1
25 TABLET ORAL DAILY
Qty: 90 TABLET | Refills: 1 | OUTPATIENT
Start: 2023-09-17

## 2023-09-18 ENCOUNTER — CARE COORDINATION (OUTPATIENT)
Dept: OTHER | Facility: CLINIC | Age: 48
End: 2023-09-18

## 2023-09-18 NOTE — CARE COORDINATION
Ambulatory Care Coordination Note  2023    Patient Current Location: [place]     ACM contacted the patient by telephone. Verified name and  with patient as identifiers. Provided introduction to self, and explanation of the ACM role. Challenges to be reviewed by the provider   Additional needs identified to be addressed with provider: No  none               Method of communication with provider: none. ACM: Jose Whitten RN    ACM contacted the patient to follow up on progress, discuss new issues or concerns, and reinforce/provide patient education. Summary Note: patient states surgery went well. She is in a splint and non weight bearing. States pain was extremely bad yesterday after the block wore off. Pain is better today. States she is taking hydrocodone but it makes her tired and dizzy. Denies swelling. Keeping right lower extremity elevated and applying ice 20 minutes at a time. She is staying hydrated. Eating well. States her last bm was Saturday but her bm are usually not regular. She has laxatives if needed. States her blood sugars are actually really good for her and ranging from 135-150. She has a knee scooter now and is able to get around her apartment well. Has a walker if needed. Patient states she has had a panic attack with the splint being on. States she does not like constrictive things. She has PRN xanax that she had to take the other day. States she is having her daughter loosen the ace wrap at times but keeping splint in place. States they put ace wrap back on once patient has calmed down. Has done this once. Will contact ortho if this continues. Follow up is scheduled for 2023. Reinforced/Provided Education:  Discussed red flags and appropriate site of care based on symptoms and resources available to patient including: PCP  Specialist  Benefits related nurse triage line  Urgent care clinics  When to call [phone].        Importance and benefits of:

## 2023-09-25 ENCOUNTER — TELEPHONE (OUTPATIENT)
Dept: ORTHOPEDIC SURGERY | Age: 48
End: 2023-09-25

## 2023-09-25 DIAGNOSIS — K21.9 GASTROESOPHAGEAL REFLUX DISEASE, UNSPECIFIED WHETHER ESOPHAGITIS PRESENT: ICD-10-CM

## 2023-09-25 NOTE — TELEPHONE ENCOUNTER
Pharmacy is requesting medication refill.  Please approve or deny this request.    Rx requested:  Requested Prescriptions     Pending Prescriptions Disp Refills    hydroCHLOROthiazide (HYDRODIURIL) 25 MG tablet [Pharmacy Med Name: HYDROCHLOROTHIAZIDE 25MG TABS] 90 tablet 1     Sig: TAKE ONE TABLET BY MOUTH ONE TIME A DAY         Last Office Visit:   7/11/2023      Next Visit Date:  Future Appointments   Date Time Provider [facility] [address]   9/26/2023  2:30 PM Tiago Delgado DO [address] [place] [address]   10/10/2023  9:30 AM Hipolito Seymour MD [address] [place] [address]   12/7/2023  8:00 AM Tiago Ortiz MD [facility]   2/1/2024  1:30 PM Duran Christianson MD [facility] Neurology -

## 2023-09-25 NOTE — TELEPHONE ENCOUNTER
LA Paperwork     Date Recived: 09/18/2023  Date completed and awaiting provider signature:09/22/2023   Date signed and faxed: 09/25/2023  Date scanned into chart: 09/25/2023  Faxed to: [facility] [place]      Provider  [] Wilver Restrepo  [] Elisabeth Ramos  [x] Julisa Ochoa   [x] Jose mathis PA-C  [] Obdulio Bentley PA-C

## 2023-09-26 ENCOUNTER — OFFICE VISIT (OUTPATIENT)
Dept: ORTHOPEDIC SURGERY | Age: 48
End: 2023-09-26

## 2023-09-26 VITALS
OXYGEN SATURATION: 95 % | TEMPERATURE: 97.2 F | BODY MASS INDEX: 47.09 KG/M2 | HEIGHT: 66 IN | HEART RATE: 90 BPM | WEIGHT: 293 LBS

## 2023-09-26 DIAGNOSIS — S82.61XA CLOSED DISPLACED FRACTURE OF LATERAL MALLEOLUS OF RIGHT FIBULA, INITIAL ENCOUNTER: Primary | ICD-10-CM

## 2023-09-26 PROCEDURE — 99024 POSTOP FOLLOW-UP VISIT: CPT | Performed by: ORTHOPAEDIC SURGERY

## 2023-09-26 RX ORDER — HYDROCHLOROTHIAZIDE 25 MG/1
25 TABLET ORAL DAILY
Qty: 90 TABLET | Refills: 1 | Status: SHIPPED | OUTPATIENT
Start: 2023-09-26

## 2023-09-26 RX ORDER — ESOMEPRAZOLE MAGNESIUM 40 MG/1
CAPSULE, DELAYED RELEASE ORAL
Qty: 30 CAPSULE | Refills: 3 | Status: SHIPPED | OUTPATIENT
Start: 2023-09-26

## 2023-09-26 NOTE — PROGRESS NOTES
ONE-HALF TABLET BY MOUTH TWICE A DAY, Disp: 90 tablet, Rfl: 1    predniSONE (DELTASONE) 5 MG tablet, TAKE ONE TABLET BY MOUTH ONE TIME A DAY, Disp: 90 tablet, Rfl: 1    ondansetron (ZOFRAN-ODT) 4 MG disintegrating tablet, DISSOLVE ONE TABLET BY MOUTH EVERY 8 HOURS AS NEEDED FOR NAUSEA AND VOMITING, Disp: 60 tablet, Rfl: 3    Handicap Placard [facility], Good from 5/11/23 till 5/11/28, Disp: 1 each, Rfl: 0    losartan (COZAAR) 50 MG tablet, TAKE 1 TABLET BY MOUTH ONE TIME A DAY, Disp: 90 tablet, Rfl: 1    venlafaxine (EFFEXOR XR) 75 MG extended release capsule, TAKE TWO CAPSULES BY MOUTH EVERY MORNING AND TAKE ONE CAPSULE BY MOUTH EVERY EVENING, Disp: 270 capsule, Rfl: 1    Insulin Glargine, 2 Unit Dial, (TOUJEO MAX SOLOSTAR) 300 UNIT/ML SOPN, INJECT 150 UNITS UNDER THE SKIN AT BEDTIME, Disp: 270 mL, Rfl: 3    erythromycin base (E-MYCIN) 250 MG tablet, TAKE 1 TABLET BY MOUTH 3 TIMES A DAY, Disp: 90 tablet, Rfl: 3    pioglitazone (ACTOS) 30 MG tablet, TAKE 1 TABLET BY MOUTH ONE TIME A DAY, Disp: 90 tablet, Rfl: 3    solifenacin (VESICARE) 10 MG tablet, TAKE 1 TABLET BY MOUTH ONE TIME A DAY, Disp: 90 tablet, Rfl: 3    CPAP Machine MISC, New CPAP with 8 cm and supply, Disp: 1 each, Rfl: 0    metoclopramide (REGLAN) 10 MG tablet, Take 1 tablet by mouth 3 times daily (with meals) (Patient taking differently: Take 1 tablet by mouth 3 times daily (with meals) Uses PRN), Disp: 360 tablet, Rfl: 3    Insulin Pen Needle (PEN NEEDLES) 32G X 4 MM MISC, Use as directed with insulin pens, 4 times daily, Disp: 400 each, Rfl: 1    hydroCHLOROthiazide (HYDRODIURIL) 25 MG tablet, Take 1 tablet by mouth daily, Disp: 90 tablet, Rfl: [address], DO  Orthopedic and Spine Surgeon  [facility]  [phone]

## 2023-09-27 ENCOUNTER — CARE COORDINATION (OUTPATIENT)
Dept: OTHER | Facility: CLINIC | Age: 48
End: 2023-09-27

## 2023-09-27 SDOH — ECONOMIC STABILITY: FOOD INSECURITY: WITHIN THE PAST 12 MONTHS, THE FOOD YOU BOUGHT JUST DIDN'T LAST AND YOU DIDN'T HAVE MONEY TO GET MORE.: NEVER TRUE

## 2023-09-27 SDOH — ECONOMIC STABILITY: INCOME INSECURITY: IN THE LAST 12 MONTHS, WAS THERE A TIME WHEN YOU WERE NOT ABLE TO PAY THE MORTGAGE OR RENT ON TIME?: NO

## 2023-09-27 SDOH — ECONOMIC STABILITY: HOUSING INSECURITY: IN THE LAST 12 MONTHS, HOW MANY PLACES HAVE YOU LIVED?: 1

## 2023-09-27 SDOH — ECONOMIC STABILITY: FOOD INSECURITY: WITHIN THE PAST 12 MONTHS, YOU WORRIED THAT YOUR FOOD WOULD RUN OUT BEFORE YOU GOT MONEY TO BUY MORE.: NEVER TRUE

## 2023-09-27 NOTE — CARE COORDINATION
Ambulatory Care Coordination Note  2023    Patient Current Location: [place]     ACM contacted the patient by telephone. Verified name and  with patient as identifiers. Provided introduction to self, and explanation of the ACM role. Challenges to be reviewed by the provider   Additional needs identified to be addressed with provider: No  none               Method of communication with provider: none. ACM: Donald Aquino RN    Patient called back and left vm message stating she forgot to mention something on our call today. I contacted patient and she c/o left elbow pain. States there is minimal swelling. Denies redness, warm to touch, or limited ROM. States she is able to move it fine but it causes pain. States she is in a lot of pain. I advised patient she can try applying ice, taking NSAIDs, resting, and applying ace wrap for support and compression if needed. If pain is not manageable at home then she would need to be seen in office or urgent care/ED depending on severity. Patient states she is going to try this and see how she feels tomorrow. States she will contact orthopedics for an appointment if needed. Discussed red flags. Patient will call and let me know if she needs further assistance with scheduling an appointment for elbow.         Future Appointments   Date Time Provider [address] [place] [address]   10/10/2023  9:30 AM Sarah Gomez MD [address] [place] [address]   10/24/2023 10:15 AM DO Kristen Méndez   2023  8:00 AM Nilesh Smith MD [address] [place] [address]   2024  1:30 PM Duran Khan MD [facility] Neurology -

## 2023-09-27 NOTE — CARE COORDINATION
Addressed                   This Visit's Progress     Conditions and Symptoms   On track     I will schedule office visits, as directed by my provider. I will keep my appointment or reschedule if I have to cancel. I will notify my provider of any barriers to my plan of care. I will notify my provider of any symptoms that indicate a worsening of my condition.     Barriers: impairment:  physical: right ankle fracture  Plan for overcoming my barriers: work with ACM and providers  Confidence: 10/10  Anticipated Goal Completion Date: 10/5/2023                Future Appointments   Date Time Provider [address]   10/10/2023  9:30 AM Lucero Olivera MD [facility]   10/24/2023 10:15 AM Adelita Farrell DO [address] [place] [address]   12/7/2023  8:00 AM Navid Vasquez MD [address] [place] [address]   2/1/2024  1:30 PM Duran Edgar Lantigua MD [facility] Neurology -

## 2023-09-28 ENCOUNTER — APPOINTMENT (OUTPATIENT)
Dept: GENERAL RADIOLOGY | Age: 48
End: 2023-09-28
Payer: COMMERCIAL

## 2023-09-28 ENCOUNTER — APPOINTMENT (OUTPATIENT)
Dept: ULTRASOUND IMAGING | Age: 48
End: 2023-09-28
Payer: COMMERCIAL

## 2023-09-28 ENCOUNTER — HOSPITAL ENCOUNTER (EMERGENCY)
Age: 48
Discharge: HOME | End: 2023-09-28
Attending: STUDENT IN AN ORGANIZED HEALTH CARE EDUCATION/TRAINING PROGRAM
Payer: COMMERCIAL

## 2023-09-28 ENCOUNTER — NURSE TRIAGE (OUTPATIENT)
Dept: OTHER | Facility: CLINIC | Age: 48
End: 2023-09-28

## 2023-09-28 ENCOUNTER — HOSPITAL ENCOUNTER (EMERGENCY)
Age: 48
Discharge: HOME | End: 2023-09-28
Payer: COMMERCIAL

## 2023-09-28 VITALS
RESPIRATION RATE: 18 BRPM | OXYGEN SATURATION: 97 % | HEART RATE: 77 BPM | SYSTOLIC BLOOD PRESSURE: 135 MMHG | BODY MASS INDEX: 47.09 KG/M2 | DIASTOLIC BLOOD PRESSURE: 58 MMHG | TEMPERATURE: 98 F | WEIGHT: 293 LBS | HEIGHT: 66 IN

## 2023-09-28 VITALS
HEART RATE: 78 BPM | DIASTOLIC BLOOD PRESSURE: 71 MMHG | WEIGHT: 293 LBS | SYSTOLIC BLOOD PRESSURE: 124 MMHG | OXYGEN SATURATION: 96 % | BODY MASS INDEX: 48.42 KG/M2 | RESPIRATION RATE: 22 BRPM | TEMPERATURE: 98 F

## 2023-09-28 DIAGNOSIS — M25.522 LEFT ELBOW PAIN: Primary | ICD-10-CM

## 2023-09-28 DIAGNOSIS — M79.602 LEFT ARM PAIN: ICD-10-CM

## 2023-09-28 LAB
ALBUMIN SERPL-MCNC: 3.8 G/DL (ref 3.5–4.6)
ALP SERPL-CCNC: 87 U/L (ref 40–130)
ALT SERPL-CCNC: 18 U/L (ref 0–33)
ANION GAP SERPL CALCULATED.3IONS-SCNC: 12 MEQ/L (ref 9–15)
AST SERPL-CCNC: 16 U/L (ref 0–35)
BASOPHILS # BLD: 0 K/UL (ref 0–0.2)
BASOPHILS NFR BLD: 0.1 %
BILIRUB SERPL-MCNC: 0.5 MG/DL (ref 0.2–0.7)
BUN SERPL-MCNC: 17 MG/DL (ref 6–20)
CALCIUM SERPL-MCNC: 9.1 MG/DL (ref 8.5–9.9)
CHLORIDE SERPL-SCNC: 99 MEQ/L (ref 95–107)
CK SERPL-CCNC: 54 U/L (ref 0–170)
CO2 SERPL-SCNC: 27 MEQ/L (ref 20–31)
CREAT SERPL-MCNC: 0.69 MG/DL (ref 0.5–0.9)
CRP SERPL HS-MCNC: 28.6 MG/L (ref 0–5)
EKG ATRIAL RATE: 74 BPM
EKG P AXIS: 24 DEGREES
EKG P-R INTERVAL: 174 MS
EKG Q-T INTERVAL: 414 MS
EKG QRS DURATION: 78 MS
EKG QTC CALCULATION (BAZETT): 459 MS
EKG R AXIS: 18 DEGREES
EKG T AXIS: 47 DEGREES
EKG VENTRICULAR RATE: 74 BPM
EOSINOPHIL # BLD: 0.1 K/UL (ref 0–0.7)
EOSINOPHIL NFR BLD: 1.4 %
ERYTHROCYTE [DISTWIDTH] IN BLOOD BY AUTOMATED COUNT: 15.4 % (ref 11.5–14.5)
ERYTHROCYTE [SEDIMENTATION RATE] IN BLOOD BY WESTERGREN METHOD: 71 MM (ref 0–20)
GLOBULIN SER CALC-MCNC: 3.6 G/DL (ref 2.3–3.5)
GLUCOSE SERPL-MCNC: 177 MG/DL (ref 70–99)
HCT VFR BLD AUTO: 37.7 % (ref 37–47)
HGB BLD-MCNC: 11.7 G/DL (ref 12–16)
LYMPHOCYTES # BLD: 2.7 K/UL (ref 1–4.8)
LYMPHOCYTES NFR BLD: 28.3 %
MAGNESIUM SERPL-MCNC: 1.7 MG/DL (ref 1.7–2.4)
MCH RBC QN AUTO: 26.1 PG (ref 27–31.3)
MCHC RBC AUTO-ENTMCNC: 31 % (ref 33–37)
MCV RBC AUTO: 84.2 FL (ref 79.4–94.8)
MONOCYTES # BLD: 0.5 K/UL (ref 0.2–0.8)
MONOCYTES NFR BLD: 5.6 %
NEUTROPHILS # BLD: 6 K/UL (ref 1.4–6.5)
NEUTS SEG NFR BLD: 64.2 %
PLATELET # BLD AUTO: 219 K/UL (ref 130–400)
POTASSIUM SERPL-SCNC: 3.8 MEQ/L (ref 3.4–4.9)
PROT SERPL-MCNC: 7.4 G/DL (ref 6.3–8)
RBC # BLD AUTO: 4.48 M/UL (ref 4.2–5.4)
SODIUM SERPL-SCNC: 138 MEQ/L (ref 135–144)
TROPONIN T SERPL-MCNC: <0.01 NG/ML (ref 0–0.01)
URATE SERPL-MCNC: 6.8 MG/DL (ref 2.4–5.7)
WBC # BLD AUTO: 9.4 K/UL (ref 4.8–10.8)

## 2023-09-28 PROCEDURE — 82550 ASSAY OF CK (CPK): CPT

## 2023-09-28 PROCEDURE — 6370000000 HC RX 637 (ALT 250 FOR IP): Performed by: STUDENT IN AN ORGANIZED HEALTH CARE EDUCATION/TRAINING PROGRAM

## 2023-09-28 PROCEDURE — 6360000002 HC RX W HCPCS: Performed by: STUDENT IN AN ORGANIZED HEALTH CARE EDUCATION/TRAINING PROGRAM

## 2023-09-28 PROCEDURE — 86140 C-REACTIVE PROTEIN: CPT

## 2023-09-28 PROCEDURE — 93005 ELECTROCARDIOGRAM TRACING: CPT | Performed by: PERSONAL EMERGENCY RESPONSE ATTENDANT

## 2023-09-28 PROCEDURE — 6360000002 HC RX W HCPCS: Performed by: PHYSICIAN ASSISTANT

## 2023-09-28 PROCEDURE — 84550 ASSAY OF BLOOD/URIC ACID: CPT

## 2023-09-28 PROCEDURE — 93010 ELECTROCARDIOGRAM REPORT: CPT | Performed by: INTERNAL MEDICINE

## 2023-09-28 PROCEDURE — 73080 X-RAY EXAM OF ELBOW: CPT

## 2023-09-28 PROCEDURE — 96375 TX/PRO/DX INJ NEW DRUG ADDON: CPT

## 2023-09-28 PROCEDURE — 84484 ASSAY OF TROPONIN QUANT: CPT

## 2023-09-28 PROCEDURE — 80053 COMPREHEN METABOLIC PANEL: CPT

## 2023-09-28 PROCEDURE — 85025 COMPLETE CBC W/AUTO DIFF WBC: CPT

## 2023-09-28 PROCEDURE — 83735 ASSAY OF MAGNESIUM: CPT

## 2023-09-28 PROCEDURE — 36415 COLL VENOUS BLD VENIPUNCTURE: CPT

## 2023-09-28 PROCEDURE — 99285 EMERGENCY DEPT VISIT HI MDM: CPT

## 2023-09-28 PROCEDURE — 85652 RBC SED RATE AUTOMATED: CPT

## 2023-09-28 PROCEDURE — 6370000000 HC RX 637 (ALT 250 FOR IP): Performed by: PHYSICIAN ASSISTANT

## 2023-09-28 PROCEDURE — 93971 EXTREMITY STUDY: CPT

## 2023-09-28 PROCEDURE — 93005 ELECTROCARDIOGRAM TRACING: CPT | Performed by: PHYSICIAN ASSISTANT

## 2023-09-28 PROCEDURE — 96374 THER/PROPH/DIAG INJ IV PUSH: CPT

## 2023-09-28 RX ORDER — HYDROCODONE BITARTRATE AND ACETAMINOPHEN 5; 325 MG/1; MG/1
1 TABLET ORAL ONCE
Status: COMPLETED | OUTPATIENT
Start: 2023-09-28 | End: 2023-09-28

## 2023-09-28 RX ORDER — METHYLPREDNISOLONE ACETATE 80 MG/ML
80 INJECTION, SUSPENSION INTRA-ARTICULAR; INTRALESIONAL; INTRAMUSCULAR; SOFT TISSUE ONCE
Status: COMPLETED | OUTPATIENT
Start: 2023-09-28 | End: 2023-09-28

## 2023-09-28 RX ORDER — HYDROCODONE BITARTRATE AND ACETAMINOPHEN 5; 325 MG/1; MG/1
1 TABLET ORAL EVERY 6 HOURS PRN
Qty: 12 TABLET | Refills: 0 | Status: SHIPPED | OUTPATIENT
Start: 2023-09-28 | End: 2023-10-01

## 2023-09-28 RX ORDER — NAPROXEN 250 MG/1
250 TABLET ORAL 2 TIMES DAILY WITH MEALS
Qty: 18 TABLET | Refills: 0 | Status: SHIPPED | OUTPATIENT
Start: 2023-09-28

## 2023-09-28 RX ORDER — KETOROLAC TROMETHAMINE 30 MG/ML
15 INJECTION, SOLUTION INTRAMUSCULAR; INTRAVENOUS ONCE
Status: COMPLETED | OUTPATIENT
Start: 2023-09-28 | End: 2023-09-28

## 2023-09-28 RX ORDER — METHOCARBAMOL 500 MG/1
1000 TABLET, FILM COATED ORAL 4 TIMES DAILY PRN
Qty: 40 TABLET | Refills: 0 | Status: SHIPPED | OUTPATIENT
Start: 2023-09-28 | End: 2023-10-03

## 2023-09-28 RX ORDER — ACETAMINOPHEN 500 MG
1000 TABLET ORAL ONCE
Status: COMPLETED | OUTPATIENT
Start: 2023-09-28 | End: 2023-09-28

## 2023-09-28 RX ORDER — ACETAMINOPHEN 500 MG
1000 TABLET ORAL EVERY 6 HOURS PRN
Qty: 60 TABLET | Refills: 0 | Status: SHIPPED | OUTPATIENT
Start: 2023-09-28

## 2023-09-28 RX ORDER — COLCHICINE 0.6 MG/1
0.6 TABLET ORAL DAILY
Qty: 30 TABLET | Refills: 3 | Status: SHIPPED | OUTPATIENT
Start: 2023-09-28

## 2023-09-28 RX ORDER — METHOCARBAMOL 100 MG/ML
1000 INJECTION, SOLUTION INTRAMUSCULAR; INTRAVENOUS ONCE
Status: COMPLETED | OUTPATIENT
Start: 2023-09-28 | End: 2023-09-28

## 2023-09-28 RX ORDER — IBUPROFEN 400 MG/1
400 TABLET ORAL EVERY 6 HOURS PRN
Qty: 120 TABLET | Refills: 0 | Status: SHIPPED | OUTPATIENT
Start: 2023-09-28

## 2023-09-28 RX ADMIN — ACETAMINOPHEN 1000 MG: 500 TABLET ORAL at 06:17

## 2023-09-28 RX ADMIN — HYDROCODONE BITARTRATE AND ACETAMINOPHEN 1 TABLET: 5; 325 TABLET ORAL at 20:34

## 2023-09-28 RX ADMIN — METHYLPREDNISOLONE ACETATE 80 MG: 80 INJECTION, SUSPENSION INTRA-ARTICULAR; INTRALESIONAL; INTRAMUSCULAR; SOFT TISSUE at 20:33

## 2023-09-28 RX ADMIN — KETOROLAC TROMETHAMINE 15 MG: 30 INJECTION, SOLUTION INTRAMUSCULAR; INTRAVENOUS at 06:10

## 2023-09-28 RX ADMIN — METHOCARBAMOL 1000 MG: 100 INJECTION INTRAMUSCULAR; INTRAVENOUS at 06:09

## 2023-09-28 ASSESSMENT — PAIN SCALES - GENERAL
PAINLEVEL_OUTOF10: 9
PAINLEVEL_OUTOF10: 10
PAINLEVEL_OUTOF10: 9
PAINLEVEL_OUTOF10: 10
PAINLEVEL_OUTOF10: 7

## 2023-09-28 ASSESSMENT — PAIN DESCRIPTION - ORIENTATION
ORIENTATION: LEFT

## 2023-09-28 ASSESSMENT — ENCOUNTER SYMPTOMS: BACK PAIN: 0

## 2023-09-28 ASSESSMENT — PAIN - FUNCTIONAL ASSESSMENT
PAIN_FUNCTIONAL_ASSESSMENT: 0-10
PAIN_FUNCTIONAL_ASSESSMENT: 0-10

## 2023-09-28 ASSESSMENT — PAIN DESCRIPTION - LOCATION
LOCATION: ARM

## 2023-09-28 ASSESSMENT — PAIN DESCRIPTION - ONSET: ONSET: AWAKENED FROM SLEEP

## 2023-09-28 NOTE — ED TRIAGE NOTES
Pt arrives from home, reports left arm pain that woke her up from sleep. Pt denies any injury or trauma to the arm. Pt denies CP or SOB. Pt reports recent ankle fx repair and is non weight bearing to the right foot.

## 2023-09-28 NOTE — ED NOTES
Report from [facility]  Patient has returned from [facility] at this time      Alex Doyle, [place]  09/28/23 9024

## 2023-09-28 NOTE — ED PROVIDER NOTES
Attends Club or Organization Meetings: Never     Marital Status:    Intimate Partner Violence: Not At Risk (5/8/2023)    Humiliation, Afraid, Rape, and Kick questionnaire     Fear of Current or Ex-Partner: No     Emotionally Abused: No     Physically Abused: No     Sexually Abused: No   Housing Stability: Low Risk  (9/27/2023)    Housing Stability Vital Sign     Unable to Pay for Housing in the Last Year: No     Number of Places Lived in the Last Year: 1     Unstable Housing in the Last Year: No       CURRENT MEDICATIONS       Previous Medications    BACLOFEN (LIORESAL) 10 MG TABLET    TAKE ONE-HALF TABLET BY MOUTH TWICE A DAY    BLOOD GLUCOSE TEST STRIPS (PRODIGY NO CODING BLOOD GLUC) STRIP    Use to test blood glucose up to 4 times daily or as directed by provider. CALCIUM PO    Take 500 mg by mouth daily    CHOLECALCIFEROL (VITAMIN D3) 25 MCG (1000 UT) CAPS    Take 1 capsule by mouth daily    CONTINUOUS BLOOD GLUC SENSOR (FREESTYLE JAIRO 2 SENSOR) MISC    CHANGE SENSOR EVERY 14 DAYS    CPAP MACHINE MISC    New CPAP with 8 cm and supply    ERYTHROMYCIN BASE (E-MYCIN) 250 MG TABLET    TAKE 1 TABLET BY MOUTH 3 TIMES A DAY    ESOMEPRAZOLE (NEXIUM) 40 MG DELAYED RELEASE CAPSULE    TAKE ONE CAPSULE BY MOUTH ONCE A DAY    GLUCAGON (BAQSIMI TWO PACK) 3 MG/DOSE POWD    By nasal route:  3 mg (one actuation) into a single nostril; if no response, may repeat in 15 minutes using a new intranasal device.     HANDICAP PLACARD MISC    Good from 5/11/23 till 5/11/28    HYDROCHLOROTHIAZIDE (HYDRODIURIL) 25 MG TABLET    TAKE ONE TABLET BY MOUTH ONE TIME A DAY    HYDROCHLOROTHIAZIDE (HYDRODIURIL) 25 MG TABLET    Take 1 tablet by mouth daily    INSULIN GLARGINE, 2 UNIT DIAL, (TOUJEO MAX SOLOSTAR) 300 UNIT/ML SOPN    INJECT 150 UNITS UNDER THE SKIN AT BEDTIME    INSULIN PEN NEEDLE (PEN NEEDLES) 32G X 4 MM MISC    Use as directed with insulin pens, 4 times daily    LOSARTAN (COZAAR) 50 MG TABLET    TAKE 1 TABLET BY MOUTH

## 2023-09-29 ENCOUNTER — CARE COORDINATION (OUTPATIENT)
Dept: OTHER | Facility: CLINIC | Age: 48
End: 2023-09-29

## 2023-09-29 LAB
EKG ATRIAL RATE: 69 BPM
EKG P AXIS: 30 DEGREES
EKG P-R INTERVAL: 184 MS
EKG Q-T INTERVAL: 428 MS
EKG QRS DURATION: 76 MS
EKG QTC CALCULATION (BAZETT): 458 MS
EKG R AXIS: 17 DEGREES
EKG T AXIS: 41 DEGREES
EKG VENTRICULAR RATE: 69 BPM

## 2023-09-29 NOTE — ED PROVIDER NOTES
with diarrhea 04/26/2021    Liver hemangioma 2009/2015    Lumbar radiculopathy 07/07/2021    Obesity 03/11/2013    BMI 43.42    Orthostatic hypotension     Ovarian cyst     PMR (polymyalgia rheumatica) ([address])     Tobacco abuse 09/03/2015    Tremor     Uncontrolled diabetes mellitus with complications     Unspecified sleep apnea          SURGICAL HISTORY       Past Surgical History:   Procedure Laterality Date    ANKLE FRACTURE SURGERY Right 9/15/2023    Right open reduction internal fixation lateral malleolus, possible syndesmotic fixation, requesting large C arm, bone foam, Synthes Lethia Simmer) -Laurent's initial note will serve as PAT, Case Comments/Implants: as above , Anesthesia Requested: choice. [place] performed by Jalen Fernando DO at [address] [place] [address]  04/2007    COLONOSCOPY  2013    for diarrhea (-)    COLONOSCOPY N/A 02/10/2021    COLONOSCOPY DIAGNOSTIC performed by Elijah Favre, MD at [facility]  01/2005    TN BIOPSY MUSCLE SUPERFICIAL Left 09/21/2018    LEFT QUADRICEPS MUSCLE BIOPSY performed by Cara Marie MD at [facility] UNI/BI CANNULATION N/A 09/18/2018    T 12 VERTEBRALPLASTY ROOM 278 performed by Christiana Person MD at [facility] (CERVIX REMOVED)  2010    [address]    UPPER GASTROINTESTINAL ENDOSCOPY  10/13/2015    DR. HERRERA     UPPER GASTROINTESTINAL ENDOSCOPY N/A 02/10/2021    EGD ESOPHAGOGASTRODUODENOSCOPY performed by Elijah Favre, MD at [address] [place] [address]  06/2015         CURRENT MEDICATIONS       Previous Medications    ACETAMINOPHEN (TYLENOL) 500 MG TABLET    Take 2 tablets by mouth every 6 hours as needed for Pain or Fever    BACLOFEN (LIORESAL) 10 MG TABLET    TAKE ONE-HALF TABLET BY MOUTH TWICE A DAY    BLOOD GLUCOSE TEST STRIPS (PRODIGY NO CODING BLOOD GLUC) STRIP    Use to test blood glucose up to 4 times daily or as directed by provider.     CALCIUM PO    Take 500 mg by mouth

## 2023-09-29 NOTE — ED NOTES
DC, medication, and follow up instructions reviewed with patient. Pt stated understanding. Pt was still tearful and demonstrated increased pain with no relief despite pain med administration. Provider made aware, PT reviewed EKG.  Pt DC in [facility] with Daughter driving her home     Kadefinesse Desai, [address]  09/28/23 3031

## 2023-10-03 ENCOUNTER — OFFICE VISIT (OUTPATIENT)
Dept: ORTHOPEDIC SURGERY | Age: 48
End: 2023-10-03
Payer: COMMERCIAL

## 2023-10-03 VITALS
HEART RATE: 73 BPM | WEIGHT: 293 LBS | BODY MASS INDEX: 47.09 KG/M2 | OXYGEN SATURATION: 97 % | TEMPERATURE: 97.1 F | HEIGHT: 66 IN

## 2023-10-03 DIAGNOSIS — S82.61XA CLOSED DISPLACED FRACTURE OF LATERAL MALLEOLUS OF RIGHT FIBULA, INITIAL ENCOUNTER: Primary | ICD-10-CM

## 2023-10-03 DIAGNOSIS — M77.12 LATERAL EPICONDYLITIS OF LEFT ELBOW: ICD-10-CM

## 2023-10-03 PROCEDURE — 99213 OFFICE O/P EST LOW 20 MIN: CPT | Performed by: PHYSICIAN ASSISTANT

## 2023-10-03 RX ORDER — METHYLPREDNISOLONE 4 MG/1
TABLET ORAL
COMMUNITY
Start: 2023-09-29

## 2023-10-03 RX ORDER — MELOXICAM 15 MG/1
15 TABLET ORAL DAILY
Qty: 30 TABLET | Refills: 3 | Status: SHIPPED | OUTPATIENT
Start: 2023-10-03

## 2023-10-03 NOTE — PROGRESS NOTES
lateral epicondyle. She has pain with supination as well as pronation. No pain to palpation at the medial condyle. There are some deficits in motion. There is some weakness with pronation and supination. There is pain along the entire aspect of the extensor tendons. Negative Tinel tap. Negative Tinel     Laboratory Studies:   Lab Results   Component Value Date    WBC 9.4 09/28/2023    HGB 11.7 (L) 09/28/2023    HCT 37.7 09/28/2023    MCV 84.2 09/28/2023     09/28/2023     Lab Results   Component Value Date    SEDRATE 71 (H) 09/28/2023     Lab Results   Component Value Date    CRP 28.6 (H) 09/28/2023       Assessment and Plan:      Diagnosis Orders   1. Closed displaced fracture of lateral malleolus of right fibula, initial encounter        2. Lateral epicondylitis of left elbow  meloxicam (MOBIC) 15 MG tablet          Jeff Lee is a pleasant lady here likely suffering from lateral epicondylitis. We discussed modalities treatment of this which includes anti-inflammatories, bracing, therapy, ultimately injection. Given the risk of tendon rupture I would want to refrain from an injection if possible. She was proceed with surgical management with bracing as well as anti-inflammatories. When she is done with the prednisone that was given to her by the emergency department she was instructed to start her Mobic. We will see her back in 6 weeks for this if not addressed by Dr. Stephanie Dawn at her follow-up for her ankle. Also complaining of numbness on the lateral aspect of the foot after ORIF by Dr. Stephanie Dawn. We discussed that swelling, inflammation, prolonged exposure could lead to neuropraxia / nerve irritation which is likely the root of her causes. We will monitor the situation she will come back and see Dr. Stephanie Dawn in 4 weeks or sooner if needed. No orders of the defined types were placed in this encounter. No orders of the defined types were placed in this encounter. No follow-ups on file.     Kelsie Chambers

## 2023-10-05 ENCOUNTER — CARE COORDINATION (OUTPATIENT)
Dept: OTHER | Facility: CLINIC | Age: 48
End: 2023-10-05

## 2023-10-05 NOTE — CARE COORDINATION
Ambulatory Care Coordination Note    ACM attempted to reach patient for follow up call regarding status of left elbow, outcome of f/u appt with orthopedics. HIPAA compliant message left requesting a return phone call at patient convenience.

## 2023-10-13 ENCOUNTER — CARE COORDINATION (OUTPATIENT)
Dept: OTHER | Facility: CLINIC | Age: 48
End: 2023-10-13

## 2023-10-13 NOTE — CARE COORDINATION
Ambulatory Care Coordination Note    ACM attempted to reach patient for follow up call regarding status of left elbow, outcome of f/u appt with orthopedics. HIPAA compliant message left requesting a return phone call at patient convenience. This is the second attempt. Lost to follow up letter sent via [employer].

## 2023-10-17 RX ORDER — SOLIFENACIN SUCCINATE 10 MG/1
10 TABLET, FILM COATED ORAL DAILY
Qty: 90 TABLET | Refills: 3 | OUTPATIENT
Start: 2023-10-17

## 2023-10-20 ENCOUNTER — CARE COORDINATION (OUTPATIENT)
Dept: OTHER | Facility: CLINIC | Age: 48
End: 2023-10-20

## 2023-10-20 NOTE — CARE COORDINATION
Offered patient enrollment in the Remote Patient Monitoring (RPM) program for in-home monitoring: NA. Lab Results       None                 Goals Addressed                   This Visit's Progress     Conditions and Symptoms   On track     I will schedule office visits, as directed by my provider. I will keep my appointment or reschedule if I have to cancel. I will notify my provider of any barriers to my plan of care. I will notify my provider of any symptoms that indicate a worsening of my condition.     Barriers: impairment:  physical: right ankle fracture  Plan for overcoming my barriers: work with ACM and providers  Confidence: 10/10  Anticipated Goal Completion Date: 10/5/2023                Future Appointments   Date Time Provider [address]   10/24/2023 10:15 AM Sherry Langley DO [facility]   11/16/2023 10:45 AM Layla Wilder PA-C [facility]   12/7/2023  8:00 AM Navarro Mccray MD [address] [place] [address]   2/1/2024  1:30 PM MD [facility] Costello Neurology -

## 2023-10-23 RX ORDER — INSULIN LISPRO 100 [IU]/ML
INJECTION, SOLUTION INTRAVENOUS; SUBCUTANEOUS
Qty: 270 ML | Refills: 3 | OUTPATIENT
Start: 2023-10-23

## 2023-10-24 ENCOUNTER — HOSPITAL ENCOUNTER (OUTPATIENT)
Dept: ORTHOPEDIC SURGERY | Age: 48
Discharge: HOME | End: 2023-10-26
Payer: COMMERCIAL

## 2023-10-24 ENCOUNTER — OFFICE VISIT (OUTPATIENT)
Dept: ORTHOPEDIC SURGERY | Age: 48
End: 2023-10-24
Payer: COMMERCIAL

## 2023-10-24 VITALS
WEIGHT: 293 LBS | OXYGEN SATURATION: 97 % | BODY MASS INDEX: 47.09 KG/M2 | HEART RATE: 72 BPM | TEMPERATURE: 97.4 F | HEIGHT: 66 IN

## 2023-10-24 DIAGNOSIS — S82.61XA CLOSED DISPLACED FRACTURE OF LATERAL MALLEOLUS OF RIGHT FIBULA, INITIAL ENCOUNTER: ICD-10-CM

## 2023-10-24 DIAGNOSIS — R52 PAIN: ICD-10-CM

## 2023-10-24 DIAGNOSIS — R52 PAIN: Primary | ICD-10-CM

## 2023-10-24 PROCEDURE — 73610 X-RAY EXAM OF ANKLE: CPT | Performed by: ORTHOPAEDIC SURGERY

## 2023-10-24 PROCEDURE — 99024 POSTOP FOLLOW-UP VISIT: CPT | Performed by: ORTHOPAEDIC SURGERY

## 2023-10-24 PROCEDURE — 73610 X-RAY EXAM OF ANKLE: CPT

## 2023-10-25 ENCOUNTER — TELEPHONE (OUTPATIENT)
Dept: ORTHOPEDIC SURGERY | Age: 48
End: 2023-10-25

## 2023-10-26 ENCOUNTER — CARE COORDINATION (OUTPATIENT)
Dept: OTHER | Facility: CLINIC | Age: 48
End: 2023-10-26

## 2023-10-26 RX ORDER — MELOXICAM 15 MG/1
15 TABLET ORAL DAILY
Qty: 30 TABLET | Refills: 0 | Status: SHIPPED | OUTPATIENT
Start: 2023-10-26 | End: 2023-11-25

## 2023-10-26 NOTE — CARE COORDINATION
Ambulatory Care Coordination Note  10/26/2023    Patient Current Location: [place]     ACM contacted the patient by telephone. Verified name and  with patient as identifiers. Provided introduction to self, and explanation of the ACM role. Challenges to be reviewed by the provider   Additional needs identified to be addressed with provider: No  none               Method of communication with provider: none. ACM: Caesar Robin RN    ACM contacted the patient to follow up on progress, discuss new issues or concerns, and reinforce/provide patient education. Summary Note: patient states she followed up with Dr. Carlin Chambers on 10/24/2023. She is to begin PT for ROM. On Monday 10/29/2023 she will start putting weight on right lower extremity with boot on. Patient states she is confused on when she should start PT and how long she is able to apply weight to right lower extremity and how much weight. States she will contact Dr. Freida Patel office regarding this. States she sent a message regarding this on Tuesday and still has yet to hear back from the office. Patient will let me know if she has any difficulty getting answers to this and I can contact the office. Reinforced/Provided Education:  Discussed red flags and appropriate site of care based on symptoms and resources available to patient including: PCP  Specialist  Benefits related nurse triage line  Urgent care clinics  When to call [phone]. Importance and benefits of: Follow up with PCP and specialist, medication adherence, self monitoring and reporting of symptoms. Plan:  Plan for follow-up call in 7-10 days based on severity of symptoms and risk factors. Plan for next call: symptom management-how she is doing with weight bearing    Patient  verbalized understanding and is agreeable to follow up call. Offered patient enrollment in the Remote Patient Monitoring (RPM) program for in-home monitoring: NA.     Lab Results

## 2023-10-30 RX ORDER — INSULIN LISPRO 100 [IU]/ML
INJECTION, SOLUTION INTRAVENOUS; SUBCUTANEOUS
Qty: 270 ML | Refills: 3 | OUTPATIENT
Start: 2023-10-30

## 2023-10-30 RX ORDER — VENLAFAXINE HYDROCHLORIDE 75 MG/1
CAPSULE, EXTENDED RELEASE ORAL
Qty: 270 CAPSULE | Refills: 0 | Status: SHIPPED | OUTPATIENT
Start: 2023-10-30

## 2023-10-30 NOTE — TELEPHONE ENCOUNTER
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 They will reopen at 3 after the meeting is over. 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

## 2023-10-31 ENCOUNTER — TELEPHONE (OUTPATIENT)
Dept: PHARMACY | Facility: CLINIC | Age: 48
End: 2023-10-31

## 2023-10-31 RX ORDER — INSULIN LISPRO 100 [IU]/ML
20-40 INJECTION, SOLUTION INTRAVENOUS; SUBCUTANEOUS
COMMUNITY

## 2023-10-31 RX ORDER — INSULIN LISPRO 100 [IU]/ML
INJECTION, SOLUTION INTRAVENOUS; SUBCUTANEOUS
Qty: 108 ML | Refills: 1 | Status: SHIPPED | OUTPATIENT
Start: 2023-10-31

## 2023-11-01 NOTE — TELEPHONE ENCOUNTER
Reviewed with resident.      Astrid Robb, PharmD, [facility] Pharmacist  Department: [phone]    For Pharmacy Admin Tracking Only    Program: Halina in place:  No  Recommendation Provided To: Patient/Caregiver: 1 via Telephone  Intervention Detail: Benefit Assistance  Intervention Accepted By: Patient/Caregiver: 1  Gap Closed?: Yes   Time Spent (min): 45
also is on prednisone daily for PMR.  - Medication changes since last A1c: none  - Medication Adherence Assessment: Humalog 1-2 doses per day as eating less d/t less activity with recently fractured ankle. - ACE/ARB and/or Statin Gap: losartan and rosuvastatin  -Denies low BG. Denies concerns with medications at this time. Initial Program Requirements (Y indicates has completed for the year, N indicates needs to be completed by 07/01/2023): Yes - Provider Visit for DM (1st) - 7/11/23 with Dr. Hollis Reddy (Endo)  Yes - A1c (1st)     Ongoing Program Requirements (Y indicates has completed for the year, N indicates needs to be completed by 12/31/2023): No - Provider Visit for DM (2nd)  No - ACC/diabetes educator visit (if A1c over 8%)  No - A1c (2nd)  Yes - Lipid panel  Yes - Urine microalbumin  No - Pneumococcal vaccination: [id number] - plans to receive at next PCP visit  No - Influenza vaccination for Fall 2023 - plans to receive at next PCP  No - Medication adherence over 70%  Yes - On statin or contraindication(s) Not identified  Yes - On ACEi/ARB or contraindication(s) Not identified       Plan:  - Consideration(s) for provider:   Patient to reach out to endocrinology to schedule appointment for mid-November and clarify/request Humalog prescription. - DM program gaps identified:   Initial requirements: Requirements met   Ongoing requirements: Provider visit for DM (2nd), ACC/diabetes educator visit (if A1c over 8%), A1c (2nd), Pneumococcal vaccination: [id number], and Influenza vaccination for [id number]     - Education to patient: Addressed diet and exercise and Addressed medication adherence. Exercise limited by recently fractured ankle. Eating less d/t injury and limited activity. States she starts therapy next week. - Patient due for follow up with endocrinology - States she had to cancel early October appointment due to ankle injury and inability to drive to office.  States she will be able to make it to

## 2023-11-02 ENCOUNTER — E-VISIT (OUTPATIENT)
Dept: PRIMARY CARE CLINIC | Age: 48
End: 2023-11-02
Payer: COMMERCIAL

## 2023-11-02 DIAGNOSIS — R30.0 DYSURIA: Primary | ICD-10-CM

## 2023-11-02 PROCEDURE — 99422 OL DIG E/M SVC 11-20 MIN: CPT | Performed by: NURSE PRACTITIONER

## 2023-11-03 NOTE — PROGRESS NOTES
Reviewed questionnaire    Reviewed meds/allergies    Dx Dysuria    Plan Recommend urine culture prior to treatment, or be seen in urgent care.  Order made, follow up pending results    Time spent on visit 11 min

## 2023-11-06 ENCOUNTER — HOSPITAL ENCOUNTER (OUTPATIENT)
Dept: PHYSICAL THERAPY | Age: 48
Setting detail: THERAPIES SERIES
Discharge: HOME | End: 2023-11-06
Attending: ORTHOPAEDIC SURGERY
Payer: COMMERCIAL

## 2023-11-06 ENCOUNTER — CARE COORDINATION (OUTPATIENT)
Dept: OTHER | Facility: CLINIC | Age: 48
End: 2023-11-06

## 2023-11-06 DIAGNOSIS — Z79.4 TYPE 2 DIABETES MELLITUS WITH COMPLICATION, WITH LONG-TERM CURRENT USE OF INSULIN (HCC): ICD-10-CM

## 2023-11-06 DIAGNOSIS — E11.8 TYPE 2 DIABETES MELLITUS WITH COMPLICATION, WITH LONG-TERM CURRENT USE OF INSULIN (HCC): ICD-10-CM

## 2023-11-06 LAB
ANION GAP SERPL CALCULATED.3IONS-SCNC: 14 MEQ/L (ref 9–15)
BUN SERPL-MCNC: 16 MG/DL (ref 6–20)
CALCIUM SERPL-MCNC: 9.6 MG/DL (ref 8.5–9.9)
CHLORIDE SERPL-SCNC: 105 MEQ/L (ref 95–107)
CO2 SERPL-SCNC: 22 MEQ/L (ref 20–31)
CREAT SERPL-MCNC: 0.73 MG/DL (ref 0.5–0.9)
GLUCOSE SERPL-MCNC: 145 MG/DL (ref 70–99)
HBA1C MFR BLD: 7.6 % (ref 4.8–5.9)
POTASSIUM SERPL-SCNC: 4.6 MEQ/L (ref 3.4–4.9)
SODIUM SERPL-SCNC: 141 MEQ/L (ref 135–144)

## 2023-11-06 PROCEDURE — 97162 PT EVAL MOD COMPLEX 30 MIN: CPT

## 2023-11-06 NOTE — PROGRESS NOTES
[facility]  PHYSICAL THERAPY EVALUATION      Physical Therapy: Initial Evaluation    Patient: Ramona Eden (21 y.o.     female)   Examination Date: 2023   :  1975 ;    Confirmed: Yes MRN: [id number]  CSN: [id number]   Insurance: Payor: Christiano Chawla / Plan: [employer] [facility] / Product Type: *No Product type* /   Insurance ID: [id number] - ([place] [place] [employer])  PT Insurance Information: [address] [place] [address] (if applicable):     Referring Physician: Kandy Chin DO       Visits to Date/Visits Approved:     No Show/Cancelled Appts: 0      Medical Diagnosis: Closed displaced fracture of lateral malleolus of right fibula, initial encounter [S82.61XA]        Treatment Diagnosis: R lower leg/ankle weakness, impaired gait and functional mobility     PERTINENT MEDICAL HISTORY   Patient Assessed for Rehabilitation Services: Yes       Medical History: Chart Reviewed: Yes   Past Medical History:   Diagnosis Date    Abnormal Pap smear of cervix     Acute midline thoracic back pain 2018    B12 deficiency     Family history of premature CAD 2013    Fatty liver     Gastroesophageal reflux disease 2021    Gastroparesis     HPV (human papilloma virus) anogenital infection     Hyperlipidemia     Hypertension     Irritable bowel syndrome with diarrhea 2021    Liver hemangioma     Lumbar radiculopathy 2021    Obesity 2013    BMI 43.42    Orthostatic hypotension     Ovarian cyst     PMR (polymyalgia rheumatica) ([address])     Tobacco abuse 2015    Tremor     Uncontrolled diabetes mellitus with complications     Unspecified sleep apnea      Surgical History:   Past Surgical History:   Procedure Laterality Date    ANKLE FRACTURE SURGERY Right 9/15/2023    Right open reduction internal fixation lateral malleolus, possible syndesmotic fixation, requesting large C arm, bone foam, Synthes Sharda Quivers) -Laurent's initial note will

## 2023-11-06 NOTE — PLAN OF CARE
[facility]  PHYSICAL THERAPY PLAN OF CARE   [facility] Preeti Dobson, [facility]       Phone: [phone]  Fax: [phone]    [] Certification  [] Recertification [x]  Plan of Care  [] Progress Note [] Discharge      Referring Provider: Sherry Langley DO     From:  Carlin Castano, PT  Patient: Fei Santos (31 y.o. female) : 1975 Date: 2023  Medical Diagnosis: Closed displaced fracture of lateral malleolus of right fibula, initial encounter [S82.61XA]       Treatment Diagnosis: R lower leg/ankle weakness, impaired gait and functional mobility    Progress Report Period from:  2023  to 2023    Visits to Date: 1 No Show: 0 Cancelled Appts: 0    OBJECTIVE:   Long Term Goals - Time Frame for Long Term Goals : 10 weeks  Goals Current/ Discharge status Status   Long Term Goal 1: Pt will be ambulatory in standard footwear unrestricted distances with even step length, stance time, and full step through pattern to facilitate return to work. Walking boot, knee scooter for community    Walking boot WBAT short household distances   New   Long Term Goal 2: Pt will demo 5/5 R ankle strength w/o pain to MMT, ambulatory activity, or stair climbing activity to resume community ambulation w/o barriers. Strength LLE  Strength LLE: WNL  Comment: 5/5 throughout  Strength RLE  Strength RLE: Exception  R Hip Flexion: 5/5  R Hip Extension: 5/5  R Hip ABduction: 5/5  R Hip ADduction: 5/5  R Knee Flexion: 5/5  R Knee Extension: 5/5  R Ankle Dorsiflexion: At least, 3/5  R Ankle Plantar flexion: At least, 3/5  R Ankle Inversion: At least, 3/5  R Ankle Eversion:  At least, 3/5               New   Long Term Goal 3: Pt will be independent with provided HEP and therapeutic pain mgmt techniques to self manage symptoms and exercises upon PT  HEP for ankle AROM and mat level leg raises at this time; further strengthening recommended once pt medically cleared for

## 2023-11-06 NOTE — CARE COORDINATION
10/10  Anticipated Goal Completion Date: 10/5/2023                Future Appointments   Date Time Provider [address]   11/6/2023  1:30 PM Dawne Angelucci, [address]   11/16/2023 10:45 AM Sammi Wilder PA-C [facility]   12/5/2023 10:00 AM Kassie Richter DO [facility]   12/7/2023  8:00 AM Rivera Mccray MD [address] [place] [address]   2/1/2024  1:30 PM Leia Landry MD [facility] Neurology -    and   Diabetes Assessment    Medic Alert ID: No  Meal Planning: Avoidance of concentrated sweets, Carb counting   How often do you test your blood sugar?: Meals, Bedtime, Other (Comment: has [employer] Chrissy)   Do you have barriers with adherence to non-pharmacologic self-management interventions?  (Nutrition/Exercise/Self-Monitoring): Yes   Have you ever had to go to the ED for symptoms of low blood sugar?: No

## 2023-11-08 ENCOUNTER — OFFICE VISIT (OUTPATIENT)
Dept: FAMILY MEDICINE CLINIC | Age: 48
End: 2023-11-08
Payer: COMMERCIAL

## 2023-11-08 VITALS
BODY MASS INDEX: 47.09 KG/M2 | TEMPERATURE: 97.2 F | HEIGHT: 66 IN | WEIGHT: 293 LBS | DIASTOLIC BLOOD PRESSURE: 68 MMHG | HEART RATE: 85 BPM | OXYGEN SATURATION: 98 % | SYSTOLIC BLOOD PRESSURE: 116 MMHG

## 2023-11-08 DIAGNOSIS — N30.01 ACUTE CYSTITIS WITH HEMATURIA: Primary | ICD-10-CM

## 2023-11-08 DIAGNOSIS — R10.2 SUPRAPUBIC PRESSURE: ICD-10-CM

## 2023-11-08 DIAGNOSIS — Z23 FLU VACCINE NEED: ICD-10-CM

## 2023-11-08 DIAGNOSIS — R39.15 URGENCY OF URINATION: ICD-10-CM

## 2023-11-08 LAB
BILIRUBIN, POC: ABNORMAL
BLOOD URINE, POC: ABNORMAL
CLARITY, POC: ABNORMAL
COLOR, POC: YELLOW
GLUCOSE URINE, POC: ABNORMAL
KETONES, POC: ABNORMAL
LEUKOCYTE EST, POC: ABNORMAL
NITRITE, POC: ABNORMAL
PH, POC: 6
PROTEIN, POC: ABNORMAL
SPECIFIC GRAVITY, POC: 1.03
UROBILINOGEN, POC: ABNORMAL

## 2023-11-08 PROCEDURE — 99213 OFFICE O/P EST LOW 20 MIN: CPT | Performed by: NURSE PRACTITIONER

## 2023-11-08 PROCEDURE — 90471 IMMUNIZATION ADMIN: CPT | Performed by: NURSE PRACTITIONER

## 2023-11-08 PROCEDURE — 81003 URINALYSIS AUTO W/O SCOPE: CPT | Performed by: NURSE PRACTITIONER

## 2023-11-08 PROCEDURE — 90674 CCIIV4 VAC NO PRSV 0.5 ML IM: CPT | Performed by: NURSE PRACTITIONER

## 2023-11-08 RX ORDER — CIPROFLOXACIN 500 MG/1
500 TABLET, FILM COATED ORAL 2 TIMES DAILY
Qty: 14 TABLET | Refills: 0 | Status: SHIPPED | OUTPATIENT
Start: 2023-11-08 | End: 2023-11-15

## 2023-11-08 ASSESSMENT — ENCOUNTER SYMPTOMS
ABDOMINAL PAIN: 1
ABDOMINAL DISTENTION: 0
NAUSEA: 0
DIARRHEA: 0

## 2023-11-09 ENCOUNTER — HOSPITAL ENCOUNTER (OUTPATIENT)
Dept: ORTHOPEDIC SURGERY | Age: 48
Discharge: HOME | End: 2023-11-11
Payer: COMMERCIAL

## 2023-11-09 ENCOUNTER — OFFICE VISIT (OUTPATIENT)
Dept: ENDOCRINOLOGY | Age: 48
End: 2023-11-09

## 2023-11-09 ENCOUNTER — OFFICE VISIT (OUTPATIENT)
Dept: ORTHOPEDIC SURGERY | Age: 48
End: 2023-11-09
Payer: COMMERCIAL

## 2023-11-09 VITALS
SYSTOLIC BLOOD PRESSURE: 133 MMHG | WEIGHT: 293 LBS | HEART RATE: 71 BPM | HEIGHT: 66 IN | OXYGEN SATURATION: 95 % | BODY MASS INDEX: 47.09 KG/M2 | DIASTOLIC BLOOD PRESSURE: 75 MMHG

## 2023-11-09 VITALS — HEIGHT: 66 IN | BODY MASS INDEX: 47.09 KG/M2 | WEIGHT: 293 LBS

## 2023-11-09 DIAGNOSIS — Z79.4 TYPE 2 DIABETES MELLITUS WITH COMPLICATION, WITH LONG-TERM CURRENT USE OF INSULIN (HCC): Primary | ICD-10-CM

## 2023-11-09 DIAGNOSIS — M77.12 LATERAL EPICONDYLITIS OF LEFT ELBOW: Primary | ICD-10-CM

## 2023-11-09 DIAGNOSIS — R52 PAIN: ICD-10-CM

## 2023-11-09 DIAGNOSIS — S82.61XA CLOSED DISPLACED FRACTURE OF LATERAL MALLEOLUS OF RIGHT FIBULA, INITIAL ENCOUNTER: ICD-10-CM

## 2023-11-09 DIAGNOSIS — E11.8 TYPE 2 DIABETES MELLITUS WITH COMPLICATION, WITH LONG-TERM CURRENT USE OF INSULIN (HCC): Primary | ICD-10-CM

## 2023-11-09 LAB
CHP ED QC CHECK: NORMAL
GLUCOSE BLD-MCNC: 170 MG/DL

## 2023-11-09 PROCEDURE — 73610 X-RAY EXAM OF ANKLE: CPT

## 2023-11-09 PROCEDURE — 99213 OFFICE O/P EST LOW 20 MIN: CPT | Performed by: PHYSICIAN ASSISTANT

## 2023-11-09 RX ORDER — INSULIN GLARGINE 300 U/ML
INJECTION, SOLUTION SUBCUTANEOUS
Qty: 270 ML | Refills: 3 | Status: SHIPPED | OUTPATIENT
Start: 2023-11-09

## 2023-11-09 RX ORDER — INSULIN LISPRO 100 [IU]/ML
INJECTION, SOLUTION INTRAVENOUS; SUBCUTANEOUS
Qty: 108 ML | Refills: 1 | Status: SHIPPED | OUTPATIENT
Start: 2023-11-09

## 2023-11-09 NOTE — PROGRESS NOTES
(9/27/2023)    [facility]     Feeling of Stress : Not at all   Social Connections: Somewhat Isolated (10/15/2019)    Social Connection and Isolation Panel [NHANES]     Frequency of Communication with Friends and Family:  Three times a week     Frequency of Social Gatherings with Friends and Family: Once a week     Attends Caoda[employer] Services: More than 4 times per year     Active Member of [employer] Group or Organizations: No     Attends Club or Organization Meetings: Never     Marital Status:    Intimate Partner Violence: Not At Risk (5/8/2023)    Humiliation, Afraid, Rape, and Kick questionnaire     Fear of Current or Ex-Partner: No     Emotionally Abused: No     Physically Abused: No     Sexually Abused: No   Housing Stability: Low Risk  (9/27/2023)    Housing Stability Vital Sign     Unable to Pay for Housing in the Last Year: No     Number of Places Lived in the Last Year: 1     Unstable Housing in the Last Year: No     Family History   Problem Relation Age of Onset    Diabetes Father     Heart Disease Mother     Colon Cancer Neg Hx     Cancer Neg Hx      Allergies   Allergen Reactions    Morphine And Related Hives and Rash    Ace Inhibitors Other (See Comments)     Dizziness and near syncope    Bactrim [Sulfamethoxazole-Trimethoprim] Itching    Nitroglycerin Other (See Comments)     Migraine    Percocet [Oxycodone-Acetaminophen] Nausea And Vomiting    Victoza [Liraglutide]      NAUSEA       Current Outpatient Medications:     ciprofloxacin (CIPRO) 500 MG tablet, Take 1 tablet by mouth 2 times daily for 7 days, Disp: 14 tablet, Rfl: 0    insulin lispro, 1 Unit Dial, (HUMALOG KWIKPEN) 100 UNIT/ML SOPN, Inject 40 units tid with meals, Disp: 108 mL, Rfl: 1    venlafaxine (EFFEXOR XR) 75 MG extended release capsule, TAKE TWO CAPSULES BY MOUTH EVERY MORNING AND TAKE ONE CAPSULE EVERY EVENING., Disp: 270 capsule, Rfl: 0    meloxicam (MOBIC) 15 MG tablet,

## 2023-11-10 NOTE — TELEPHONE ENCOUNTER
[facility] requesting medication refill.  Please approve or deny this request.    Rx requested:  Requested Prescriptions     Pending Prescriptions Disp Refills    metoprolol (LOPRESSOR) 100 MG tablet 180 tablet 1     Sig: Take 1 tablet by mouth 2 times daily         Last Office Visit:   5/11/2023      Next Visit Date:  Future Appointments   Date Time Provider [address]   12/5/2023 10:00 AM Gaudencio Ty DO 4801 [facility]   12/7/2023  8:00 AM Roger Mccray MD [facility] [address]   2/1/2024  1:30 PM [facility]   5/9/2024 10:00 AM Thad Fong MD [facility]

## 2023-11-12 ENCOUNTER — APPOINTMENT (OUTPATIENT)
Dept: GENERAL RADIOLOGY | Age: 48
End: 2023-11-12
Payer: COMMERCIAL

## 2023-11-12 ENCOUNTER — HOSPITAL ENCOUNTER (EMERGENCY)
Age: 48
Discharge: HOME | End: 2023-11-13
Payer: COMMERCIAL

## 2023-11-12 DIAGNOSIS — R07.89 ATYPICAL CHEST PAIN: Primary | ICD-10-CM

## 2023-11-12 DIAGNOSIS — K22.4 ESOPHAGEAL SPASM: ICD-10-CM

## 2023-11-12 LAB
ALBUMIN SERPL-MCNC: 3.9 G/DL (ref 3.5–4.6)
ALP SERPL-CCNC: 81 U/L (ref 40–130)
ALT SERPL-CCNC: 27 U/L (ref 0–33)
ANION GAP SERPL CALCULATED.3IONS-SCNC: 14 MEQ/L (ref 9–15)
AST SERPL-CCNC: 21 U/L (ref 0–35)
BASOPHILS # BLD: 0 K/UL (ref 0–0.2)
BASOPHILS NFR BLD: 0.2 %
BILIRUB SERPL-MCNC: 0.4 MG/DL (ref 0.2–0.7)
BNP BLD-MCNC: 49 PG/ML
BUN SERPL-MCNC: 18 MG/DL (ref 6–20)
CALCIUM SERPL-MCNC: 9.2 MG/DL (ref 8.5–9.9)
CHLORIDE SERPL-SCNC: 97 MEQ/L (ref 95–107)
CO2 SERPL-SCNC: 26 MEQ/L (ref 20–31)
CREAT SERPL-MCNC: 0.81 MG/DL (ref 0.5–0.9)
EOSINOPHIL # BLD: 0.1 K/UL (ref 0–0.7)
EOSINOPHIL NFR BLD: 0.8 %
ERYTHROCYTE [DISTWIDTH] IN BLOOD BY AUTOMATED COUNT: 15.7 % (ref 11.5–14.5)
GLOBULIN SER CALC-MCNC: 3.1 G/DL (ref 2.3–3.5)
GLUCOSE SERPL-MCNC: 239 MG/DL (ref 70–99)
HCT VFR BLD AUTO: 40.3 % (ref 37–47)
HGB BLD-MCNC: 12.8 G/DL (ref 12–16)
LACTATE BLDV-SCNC: 2.3 MMOL/L (ref 0.5–2.2)
LIPASE SERPL-CCNC: 26 U/L (ref 12–95)
LYMPHOCYTES # BLD: 2.6 K/UL (ref 1–4.8)
LYMPHOCYTES NFR BLD: 26.4 %
MCH RBC QN AUTO: 26.9 PG (ref 27–31.3)
MCHC RBC AUTO-ENTMCNC: 31.8 % (ref 33–37)
MCV RBC AUTO: 84.8 FL (ref 79.4–94.8)
MONOCYTES # BLD: 0.6 K/UL (ref 0.2–0.8)
MONOCYTES NFR BLD: 6.5 %
NEUTROPHILS # BLD: 6.4 K/UL (ref 1.4–6.5)
NEUTS SEG NFR BLD: 65.5 %
PLATELET # BLD AUTO: 219 K/UL (ref 130–400)
POTASSIUM SERPL-SCNC: 4.2 MEQ/L (ref 3.4–4.9)
PROT SERPL-MCNC: 7 G/DL (ref 6.3–8)
RBC # BLD AUTO: 4.75 M/UL (ref 4.2–5.4)
SODIUM SERPL-SCNC: 137 MEQ/L (ref 135–144)
TROPONIN, HIGH SENSITIVITY: 11 NG/L (ref 0–19)
WBC # BLD AUTO: 9.8 K/UL (ref 4.8–10.8)

## 2023-11-12 PROCEDURE — 93005 ELECTROCARDIOGRAM TRACING: CPT | Performed by: STUDENT IN AN ORGANIZED HEALTH CARE EDUCATION/TRAINING PROGRAM

## 2023-11-12 PROCEDURE — 83605 ASSAY OF LACTIC ACID: CPT

## 2023-11-12 PROCEDURE — 6360000002 HC RX W HCPCS: Performed by: PHYSICIAN ASSISTANT

## 2023-11-12 PROCEDURE — 2500000003 HC RX 250 WO HCPCS: Performed by: PHYSICIAN ASSISTANT

## 2023-11-12 PROCEDURE — 93005 ELECTROCARDIOGRAM TRACING: CPT | Performed by: PHYSICIAN ASSISTANT

## 2023-11-12 PROCEDURE — 83690 ASSAY OF LIPASE: CPT

## 2023-11-12 PROCEDURE — A4216 STERILE WATER/SALINE, 10 ML: HCPCS | Performed by: PHYSICIAN ASSISTANT

## 2023-11-12 PROCEDURE — 36415 COLL VENOUS BLD VENIPUNCTURE: CPT

## 2023-11-12 PROCEDURE — 96375 TX/PRO/DX INJ NEW DRUG ADDON: CPT

## 2023-11-12 PROCEDURE — 84484 ASSAY OF TROPONIN QUANT: CPT

## 2023-11-12 PROCEDURE — 83880 ASSAY OF NATRIURETIC PEPTIDE: CPT

## 2023-11-12 PROCEDURE — 80053 COMPREHEN METABOLIC PANEL: CPT

## 2023-11-12 PROCEDURE — 71046 X-RAY EXAM CHEST 2 VIEWS: CPT

## 2023-11-12 PROCEDURE — 99285 EMERGENCY DEPT VISIT HI MDM: CPT

## 2023-11-12 PROCEDURE — 85025 COMPLETE CBC W/AUTO DIFF WBC: CPT

## 2023-11-12 PROCEDURE — 96374 THER/PROPH/DIAG INJ IV PUSH: CPT

## 2023-11-12 PROCEDURE — 2580000003 HC RX 258: Performed by: PHYSICIAN ASSISTANT

## 2023-11-12 RX ORDER — KETOROLAC TROMETHAMINE 15 MG/ML
30 INJECTION, SOLUTION INTRAMUSCULAR; INTRAVENOUS ONCE
Status: COMPLETED | OUTPATIENT
Start: 2023-11-12 | End: 2023-11-12

## 2023-11-12 RX ORDER — 0.9 % SODIUM CHLORIDE 0.9 %
1000 INTRAVENOUS SOLUTION INTRAVENOUS ONCE
Status: COMPLETED | OUTPATIENT
Start: 2023-11-12 | End: 2023-11-13

## 2023-11-12 RX ADMIN — KETOROLAC TROMETHAMINE 30 MG: 15 INJECTION, SOLUTION INTRAMUSCULAR; INTRAVENOUS at 21:45

## 2023-11-12 RX ADMIN — SODIUM CHLORIDE 1000 ML: 9 INJECTION, SOLUTION INTRAVENOUS at 21:45

## 2023-11-12 RX ADMIN — FAMOTIDINE 20 MG: 10 INJECTION, SOLUTION INTRAVENOUS at 21:45

## 2023-11-12 ASSESSMENT — PAIN DESCRIPTION - ORIENTATION: ORIENTATION: MID

## 2023-11-12 ASSESSMENT — PAIN - FUNCTIONAL ASSESSMENT: PAIN_FUNCTIONAL_ASSESSMENT: 0-10

## 2023-11-12 ASSESSMENT — ENCOUNTER SYMPTOMS
COUGH: 0
SORE THROAT: 0
VOMITING: 0
WHEEZING: 0
DIARRHEA: 0
BACK PAIN: 0

## 2023-11-12 ASSESSMENT — PAIN DESCRIPTION - LOCATION: LOCATION: CHEST

## 2023-11-12 ASSESSMENT — PAIN SCALES - GENERAL: PAINLEVEL_OUTOF10: 7

## 2023-11-12 ASSESSMENT — PAIN DESCRIPTION - PAIN TYPE: TYPE: ACUTE PAIN

## 2023-11-13 VITALS
OXYGEN SATURATION: 97 % | SYSTOLIC BLOOD PRESSURE: 114 MMHG | HEART RATE: 74 BPM | DIASTOLIC BLOOD PRESSURE: 76 MMHG | BODY MASS INDEX: 48.82 KG/M2 | WEIGHT: 293 LBS | RESPIRATION RATE: 22 BRPM | TEMPERATURE: 98.1 F | HEIGHT: 65 IN

## 2023-11-13 LAB
EKG ATRIAL RATE: 99 BPM
EKG P AXIS: 52 DEGREES
EKG P-R INTERVAL: 180 MS
EKG Q-T INTERVAL: 372 MS
EKG QRS DURATION: 80 MS
EKG QTC CALCULATION (BAZETT): 477 MS
EKG R AXIS: 15 DEGREES
EKG T AXIS: 59 DEGREES
EKG VENTRICULAR RATE: 99 BPM
TROPONIN, HIGH SENSITIVITY: 12 NG/L (ref 0–19)
TROPONIN, HIGH SENSITIVITY: 13 NG/L (ref 0–19)

## 2023-11-13 PROCEDURE — 93010 ELECTROCARDIOGRAM REPORT: CPT | Performed by: INTERNAL MEDICINE

## 2023-11-13 RX ORDER — METOPROLOL TARTRATE 100 MG/1
100 TABLET ORAL 2 TIMES DAILY
Qty: 180 TABLET | Refills: 0 | Status: SHIPPED | OUTPATIENT
Start: 2023-11-13

## 2023-11-13 RX ORDER — METOPROLOL TARTRATE 100 MG/1
100 TABLET ORAL 2 TIMES DAILY
Qty: 180 TABLET | Refills: 0 | OUTPATIENT
Start: 2023-11-13

## 2023-11-13 RX ORDER — FAMOTIDINE 20 MG/1
20 TABLET, FILM COATED ORAL 2 TIMES DAILY
Qty: 60 TABLET | Refills: 0 | Status: SHIPPED | OUTPATIENT
Start: 2023-11-13 | End: 2024-02-01

## 2023-11-13 NOTE — ED PROVIDER NOTES
meals, Disp-108 mL, R-1Normal      Insulin Glargine, 2 Unit Dial, (TOUJEO MAX SOLOSTAR) 300 UNIT/ML SOPN INJECT 160-170  UNITS UNDER THE SKIN AT BEDTIME, Disp-270 mL, R-3Normal      ciprofloxacin (CIPRO) 500 MG tablet Take 1 tablet by mouth 2 times daily for 7 days, Disp-14 tablet, R-0Normal      venlafaxine (EFFEXOR XR) 75 MG extended release capsule TAKE TWO CAPSULES BY MOUTH EVERY MORNING AND TAKE ONE CAPSULE EVERY EVENING., Disp-270 capsule, R-0Normal      !! meloxicam (MOBIC) 15 MG tablet Take 1 tablet by mouth daily, Disp-30 tablet, R-0Normal      solifenacin (VESICARE) 10 MG tablet Take 1 tablet by mouth daily, Disp-90 tablet, R-0Normal      !! meloxicam (MOBIC) 15 MG tablet Take 1 tablet by mouth daily, Disp-30 tablet, R-3Normal      acetaminophen (TYLENOL) 500 MG tablet Take 2 tablets by mouth every 6 hours as needed for Pain or Fever, Disp-60 tablet, R-0Normal      ibuprofen (IBU) 400 MG tablet Take 1 tablet by mouth every 6 hours as needed for Pain, Disp-120 tablet, R-0Normal      !! hydroCHLOROthiazide (HYDRODIURIL) 25 MG tablet TAKE ONE TABLET BY MOUTH ONE TIME A DAY, Disp-90 tablet, R-1Normal      esomeprazole (NEXIUM) 40 MG delayed release capsule TAKE ONE CAPSULE BY MOUTH ONCE A DAY, Disp-30 capsule, R-3Normal      !! hydroCHLOROthiazide (HYDRODIURIL) 25 MG tablet Take 1 tablet by mouth daily, Disp-90 tablet, R-1Normal      metFORMIN (GLUCOPHAGE) 500 MG tablet TAKE 1 TABLET BY MOUTH 3 TIMES A DAY, Disp-270 tablet, R-3Normal      Prodigy Lancets 28G MISC Disp-400 each, R-3, NormalUse to test blood glucose up to 4 times daily or as directed by provider. blood glucose test strips (PRODIGY NO CODING BLOOD GLUC) strip Use to test blood glucose up to 4 times daily or as directed by provider., Disp-400 each, R-3Normal      Glucagon (BAQSIMI TWO PACK) 3 MG/DOSE POWD By nasal route:  3 mg (one actuation) into a single nostril; if no response, may repeat in 15 minutes using a new intranasal device. , Neurological:      Mental Status: She is alert. DIAGNOSTIC RESULTS     EKG: All EKG's are interpreted by the Emergency Department Physician who either signs or Co-signs this chart in the absence of a cardiologist.    Normal sinus rhythm, rate 93 beats minute, no acute ST elevation    RADIOLOGY:   Non-plain film images such as CT, Ultrasound and MRI are read by the radiologist. Plain radiographic images are visualized and preliminarily interpreted by the emergency physician with the below findings:    Chest x-ray interpreted by myself is negative    Interpretation per the Radiologist below, if available at the time of this note:    XR CHEST (2 VW)   Final Result   No acute process. ED BEDSIDE ULTRASOUND:   Performed by ED Physician - none    LABS:  Labs Reviewed   CBC WITH AUTO DIFFERENTIAL - Abnormal; Notable for the following components:       Result Value    MCH 26.9 (*)     MCHC 31.8 (*)     RDW 15.7 (*)     All other components within normal limits   COMPREHENSIVE METABOLIC PANEL W/ REFLEX TO MG FOR LOW K - Abnormal; Notable for the following components:    Glucose 239 (*)     All other components within normal limits   LACTIC ACID - Abnormal; Notable for the following components:    Lactic Acid 2.3 (*)     All other components within normal limits   TROPONIN   TROPONIN   BRAIN NATRIURETIC PEPTIDE   LIPASE   TROPONIN       All other labs were within normal range or not returned as of this dictation. EMERGENCY DEPARTMENT COURSE and DIFFERENTIAL DIAGNOSIS/MDM:   Vitals:    Vitals:    11/12/23 2330 11/13/23 0000 11/13/23 0015 11/13/23 0030   BP: (!) 100/55 (!) 108/52  114/76   Pulse: 72 75  74   Resp: 16 20 15 22   Temp:       TempSrc:       SpO2: 98% 97%  97%   Weight:       Height:               Medical Decision Making  Amount and/or Complexity of Data Reviewed  Labs: ordered. ECG/medicine tests: ordered. Risk  Prescription drug management.           REASSESSMENT      Patient

## 2023-11-13 NOTE — ED NOTES
The following labs were labeled with appropriate pt sticker and tubed to lab:     [] Blue     [x] Lavender   [] on ice  [x] Green/yellow  [] Green/black [] on ice  [x] Grey  [x] on ice  [] Yellow  [] Red  [] Pink  [] Type/ Screen  [] ABG  [] VBG    [] COVID-19 swab    [] Rapid  [] PCR  [] Flu swab  [] Peds Viral Panel     [] Urine Sample  [] Fecal Sample  [] Pelvic Cultures  [] Blood Cultures  [] X 2  [] STREP Cultures      Lisa Rhoades RN  11/12/23 8725

## 2023-11-14 ENCOUNTER — CARE COORDINATION (OUTPATIENT)
Dept: OTHER | Facility: CLINIC | Age: 48
End: 2023-11-14

## 2023-11-14 LAB
EKG ATRIAL RATE: 93 BPM
EKG P AXIS: 37 DEGREES
EKG P-R INTERVAL: 172 MS
EKG Q-T INTERVAL: 362 MS
EKG QRS DURATION: 82 MS
EKG QTC CALCULATION (BAZETT): 450 MS
EKG R AXIS: 12 DEGREES
EKG T AXIS: 42 DEGREES
EKG VENTRICULAR RATE: 93 BPM

## 2023-11-14 PROCEDURE — 93010 ELECTROCARDIOGRAM REPORT: CPT | Performed by: INTERNAL MEDICINE

## 2023-11-14 NOTE — CARE COORDINATION
Ambulatory Care Coordination Note  2023    Patient Current Location: [place]     ACM contacted the patient by telephone. Verified name and  with patient as identifiers. Provided introduction to self, and explanation of the ACM role. Challenges to be reviewed by the provider   Additional needs identified to be addressed with provider: No  none               Method of communication with provider: none. ACM: Nalini Frost RN    ACM contacted the patient to follow up on progress, discuss new issues or concerns, and reinforce/provide patient education. Summary Note: patient states she went to ED 2023. She was having chest pains. States she found out it was just indigestion. States they gave her pepcid and she improved. States she was prescribed pepcid to take PRN and she will continue to take nexium. Patient states she went to the walk in clinic last week for concerns of UTI. States she did have a UTI. She is feeling much better now and denies any symptoms. States she received her flu shot at the walk in clinic. States they would not do her pneumonia shot because they told her she wouldn't be due until . Patient states she is very confused about this. I advised patient I will send a message to the [address] [place] [address] pharmacy to see if I can get clarification on this. Patient states she followed up with endo Dr. Hollis Reddy on 2023. States lantus and toujeo were increased. [facility] they sent the prescription to a local pharmacy so she will call their office today to ask for this to be sent to [facility]. States she has enough medication to last her until it will be delivered. States she also has the required labs completed for BWWD program.   Patient states she started PT on Monday for right leg. States she was told her ROM was excellent. [facility] PT spoke with Dr. Donald Asher on Wednesday and she can not come out of the boot until her follow up appointment on 2023.  PT states she can not do strength

## 2023-11-22 ENCOUNTER — CARE COORDINATION (OUTPATIENT)
Dept: OTHER | Facility: CLINIC | Age: 48
End: 2023-11-22

## 2023-11-22 NOTE — CARE COORDINATION
Ambulatory Care Coordination Note     ACMsent MyChart message for Associate Care Management follow up related to BWWD requirements. See patient message for details and response (as appropriate).

## 2023-11-25 DIAGNOSIS — K21.9 GASTROESOPHAGEAL REFLUX DISEASE, UNSPECIFIED WHETHER ESOPHAGITIS PRESENT: ICD-10-CM

## 2023-11-27 RX ORDER — LOSARTAN POTASSIUM 50 MG/1
50 TABLET ORAL DAILY
Qty: 90 TABLET | Refills: 1 | Status: SHIPPED | OUTPATIENT
Start: 2023-11-27

## 2023-11-27 RX ORDER — ESOMEPRAZOLE MAGNESIUM 40 MG/1
CAPSULE, DELAYED RELEASE ORAL
Qty: 30 CAPSULE | Refills: 3 | Status: SHIPPED | OUTPATIENT
Start: 2023-11-27

## 2023-11-27 RX ORDER — PREDNISONE 5 MG/1
TABLET ORAL
Qty: 30 TABLET | Refills: 3 | Status: SHIPPED | OUTPATIENT
Start: 2023-11-27

## 2023-11-27 NOTE — TELEPHONE ENCOUNTER
Patient is requesting medication refill.  Please approve or deny this request.    Rx requested:  Requested Prescriptions     Pending Prescriptions Disp Refills    predniSONE (DELTASONE) 5 MG tablet [Pharmacy Med Name: PREDNISONE 5MG TABS] 30 tablet 3     Sig: TAKE ONE TABLET BY MOUTH ONE TIME A DAY         Last Office Visit:   8/3/2023      Next Visit Date:  Future Appointments   Date Time Provider [address] [place] [address]   12/5/2023 10:00 AM Basilia Campuzano DO 48[address] [facility]   12/7/2023  8:00 AM Alon Mccray MD [facility] [address]   2/1/2024  1:30 PM Neymar Khalil MD [facility] Neurology -   5/9/2024 10:00 AM Katheryn Bashir MD [facility]

## 2023-12-04 ENCOUNTER — APPOINTMENT (OUTPATIENT)
Dept: PRIMARY CARE | Facility: CLINIC | Age: 48
End: 2023-12-04

## 2023-12-06 ENCOUNTER — CARE COORDINATION (OUTPATIENT)
Dept: OTHER | Facility: CLINIC | Age: 48
End: 2023-12-06

## 2023-12-06 ENCOUNTER — HOSPITAL ENCOUNTER (OUTPATIENT)
Dept: ORTHOPEDIC SURGERY | Age: 48
Discharge: HOME | End: 2023-12-08
Payer: COMMERCIAL

## 2023-12-06 ENCOUNTER — OFFICE VISIT (OUTPATIENT)
Dept: ORTHOPEDIC SURGERY | Age: 48
End: 2023-12-06
Payer: COMMERCIAL

## 2023-12-06 ENCOUNTER — E-VISIT (OUTPATIENT)
Dept: PRIMARY CARE CLINIC | Age: 48
End: 2023-12-06
Payer: COMMERCIAL

## 2023-12-06 VITALS — HEIGHT: 65 IN | BODY MASS INDEX: 49.92 KG/M2

## 2023-12-06 DIAGNOSIS — S82.61XA CLOSED DISPLACED FRACTURE OF LATERAL MALLEOLUS OF RIGHT FIBULA, INITIAL ENCOUNTER: ICD-10-CM

## 2023-12-06 DIAGNOSIS — J06.9 UPPER RESPIRATORY TRACT INFECTION, UNSPECIFIED TYPE: Primary | ICD-10-CM

## 2023-12-06 DIAGNOSIS — S82.61XA CLOSED DISPLACED FRACTURE OF LATERAL MALLEOLUS OF RIGHT FIBULA, INITIAL ENCOUNTER: Primary | ICD-10-CM

## 2023-12-06 PROCEDURE — 73610 X-RAY EXAM OF ANKLE: CPT | Performed by: ORTHOPAEDIC SURGERY

## 2023-12-06 PROCEDURE — 99024 POSTOP FOLLOW-UP VISIT: CPT | Performed by: ORTHOPAEDIC SURGERY

## 2023-12-06 PROCEDURE — 73610 X-RAY EXAM OF ANKLE: CPT

## 2023-12-06 PROCEDURE — 99422 OL DIG E/M SVC 11-20 MIN: CPT | Performed by: NURSE PRACTITIONER

## 2023-12-06 RX ORDER — CEPHALEXIN 500 MG/1
500 CAPSULE ORAL 2 TIMES DAILY
Qty: 14 CAPSULE | Refills: 0 | Status: SHIPPED | OUTPATIENT
Start: 2023-12-06 | End: 2023-12-13

## 2023-12-06 ASSESSMENT — LIFESTYLE VARIABLES
PACKS_PER_DAY: 1
SMOKING_YEARS: 10
SMOKING_STATUS: NO, I'M A FORMER SMOKER

## 2023-12-06 NOTE — PROGRESS NOTES
Reviewed questionnaire    Reviewed meds/allergies    Dx URI    Plan Symptoms x 7 days. Rx given for keflex, per patient works best for her.  Follow up with PCP if symptoms do not resolve with use of the antibiotic    Time spent on visit 11 min

## 2023-12-06 NOTE — CARE COORDINATION
COMPLETED: Conditions and Symptoms   On track     I will schedule office visits, as directed by my provider. I will keep my appointment or reschedule if I have to cancel. I will notify my provider of any barriers to my plan of care. I will notify my provider of any symptoms that indicate a worsening of my condition.     Barriers: impairment:  physical: right ankle fracture  Plan for overcoming my barriers: work with ACM and providers  Confidence: 10/10  Anticipated Goal Completion Date: 10/5/2023                Future Appointments   Date Time Provider [facility] [address]   12/7/2023  8:00 AM Fredy Conklin MD [facility] [address]   2/1/2024  1:30 PM [facility]   5/9/2024 10:00 AM Leonor Monzon MD [facility]

## 2023-12-07 ENCOUNTER — OFFICE VISIT (OUTPATIENT)
Dept: FAMILY MEDICINE CLINIC | Age: 48
End: 2023-12-07
Payer: COMMERCIAL

## 2023-12-07 VITALS
SYSTOLIC BLOOD PRESSURE: 128 MMHG | WEIGHT: 293 LBS | HEIGHT: 65 IN | BODY MASS INDEX: 48.82 KG/M2 | OXYGEN SATURATION: 96 % | TEMPERATURE: 97.1 F | HEART RATE: 71 BPM | DIASTOLIC BLOOD PRESSURE: 84 MMHG

## 2023-12-07 DIAGNOSIS — I27.20 PULMONARY HYPERTENSION (HCC): ICD-10-CM

## 2023-12-07 DIAGNOSIS — I10 HYPERTENSION, UNSPECIFIED TYPE: ICD-10-CM

## 2023-12-07 DIAGNOSIS — Z23 NEED FOR HEPATITIS B VACCINATION: ICD-10-CM

## 2023-12-07 DIAGNOSIS — E78.5 DYSLIPIDEMIA: ICD-10-CM

## 2023-12-07 DIAGNOSIS — Z23 NEED FOR PNEUMOCOCCAL VACCINATION: ICD-10-CM

## 2023-12-07 DIAGNOSIS — Z23 NEED FOR TDAP VACCINATION: ICD-10-CM

## 2023-12-07 DIAGNOSIS — Z79.4 TYPE 2 DIABETES MELLITUS WITH COMPLICATION, WITH LONG-TERM CURRENT USE OF INSULIN (HCC): ICD-10-CM

## 2023-12-07 DIAGNOSIS — E11.43 DIABETIC GASTROPARESIS ASSOCIATED WITH TYPE 2 DIABETES MELLITUS (HCC): ICD-10-CM

## 2023-12-07 DIAGNOSIS — E11.8 TYPE 2 DIABETES MELLITUS WITH COMPLICATION, WITH LONG-TERM CURRENT USE OF INSULIN (HCC): ICD-10-CM

## 2023-12-07 DIAGNOSIS — E66.01 MORBID OBESITY (HCC): ICD-10-CM

## 2023-12-07 DIAGNOSIS — M35.3 PMR (POLYMYALGIA RHEUMATICA) (HCC): ICD-10-CM

## 2023-12-07 DIAGNOSIS — M54.16 LUMBAR RADICULOPATHY: ICD-10-CM

## 2023-12-07 DIAGNOSIS — K31.84 DIABETIC GASTROPARESIS ASSOCIATED WITH TYPE 2 DIABETES MELLITUS (HCC): ICD-10-CM

## 2023-12-07 DIAGNOSIS — F41.9 ANXIETY: Primary | ICD-10-CM

## 2023-12-07 PROCEDURE — 99214 OFFICE O/P EST MOD 30 MIN: CPT | Performed by: FAMILY MEDICINE

## 2023-12-07 PROCEDURE — 90472 IMMUNIZATION ADMIN EACH ADD: CPT | Performed by: FAMILY MEDICINE

## 2023-12-07 PROCEDURE — 90715 TDAP VACCINE 7 YRS/> IM: CPT | Performed by: FAMILY MEDICINE

## 2023-12-07 PROCEDURE — 3051F HG A1C>EQUAL 7.0%<8.0%: CPT | Performed by: FAMILY MEDICINE

## 2023-12-07 PROCEDURE — 90746 HEPB VACCINE 3 DOSE ADULT IM: CPT | Performed by: FAMILY MEDICINE

## 2023-12-07 PROCEDURE — 90471 IMMUNIZATION ADMIN: CPT | Performed by: FAMILY MEDICINE

## 2023-12-07 PROCEDURE — 3074F SYST BP LT 130 MM HG: CPT | Performed by: FAMILY MEDICINE

## 2023-12-07 PROCEDURE — 90677 PCV20 VACCINE IM: CPT | Performed by: FAMILY MEDICINE

## 2023-12-07 PROCEDURE — 3079F DIAST BP 80-89 MM HG: CPT | Performed by: FAMILY MEDICINE

## 2023-12-07 RX ORDER — ALPRAZOLAM 0.5 MG/1
0.5 TABLET ORAL DAILY PRN
Qty: 30 TABLET | Refills: 0 | Status: SHIPPED | OUTPATIENT
Start: 2023-12-07 | End: 2024-03-06

## 2023-12-07 NOTE — PROGRESS NOTES
Pen Needle (PEN NEEDLES) 32G X 4 MM MISC Use as directed with insulin pens, 4 times daily 400 each 1    meloxicam (MOBIC) 15 MG tablet Take 1 tablet by mouth daily 30 tablet 0     No current facility-administered medications for this visit. ROS  CONSTITUTIONAL: The patient denies fevers, chills, sweats and body ache. HEENT: Denies headache, blurry vision, eye pain, tinnitus, vertigo,  sore throat, neck or thyroid masses. Admits to pain in the sinuses cavities and ear pain. RESPIRATORY: Denies cough, sputum, hemoptysis. CARDIAC: Denies chest pain, pressure, palpitations, Denies lower extremity edema. GASTROINTESTINAL: Denies abdominal pain, constipation, diarrhea, bleeding in the stools,   GENITOURINARY: Denies dysuria, hematuria, nocturia or frequency, urinary incontinence. NEUROLOGIC: Denies headaches, dizziness, syncope, weakness  MUSCULOSKELETAL: denies changes in range of motion, joint pain, stiffness. ENDOCRINOLOGY: Denies heat or cold intolerance. HEMATOLOGY: Denies easy bleeding or blood transfusion,anemia  DERMATOLOGY: Denies changes in moles or pigmentation changes. PSYCHIATRY: Denies depression, agitation or anxiety.     Past Medical History:   Diagnosis Date    Abnormal Pap smear of cervix     Acute midline thoracic back pain 09/18/2018    B12 deficiency     Family history of premature CAD 09/04/2013    Fatty liver     Gastroesophageal reflux disease 01/06/2021    Gastroparesis     HPV (human papilloma virus) anogenital infection     Hyperlipidemia     Hypertension     Irritable bowel syndrome with diarrhea 04/26/2021    Liver hemangioma 2009/2015    Lumbar radiculopathy 07/07/2021    Obesity 03/11/2013    BMI 43.42    Orthostatic hypotension     Ovarian cyst     PMR (polymyalgia rheumatica) (HCC)     Tobacco abuse 09/03/2015    Tremor     Uncontrolled diabetes mellitus with complications     Unspecified sleep apnea         Past Surgical History:   Procedure Laterality Date    ANKLE

## 2023-12-11 ENCOUNTER — TELEPHONE (OUTPATIENT)
Dept: FAMILY MEDICINE CLINIC | Age: 48
End: 2023-12-11

## 2023-12-11 NOTE — TELEPHONE ENCOUNTER
Pharmacy called they need to clarify directions fo Alprazolam- 2 are listed. Take 1 tablet by mouth daily as needed for Anxiety for up to 90 days. Take 0.5 mg by mouth 3 times daily as needed for Anxiety.  Max Daily Amount: 0.5 mg

## 2023-12-23 DIAGNOSIS — K31.84 GASTROPARESIS: ICD-10-CM

## 2023-12-26 RX ORDER — ERYTHROMYCIN 250 MG/1
250 TABLET, COATED ORAL 3 TIMES DAILY
Qty: 90 TABLET | Refills: 3 | Status: SHIPPED | OUTPATIENT
Start: 2023-12-26

## 2024-01-02 DIAGNOSIS — K31.84 GASTROPARESIS: ICD-10-CM

## 2024-01-03 ENCOUNTER — PATIENT MESSAGE (OUTPATIENT)
Dept: ENDOCRINOLOGY | Age: 49
End: 2024-01-03

## 2024-01-03 DIAGNOSIS — E11.8 TYPE 2 DIABETES MELLITUS WITH COMPLICATION, WITH LONG-TERM CURRENT USE OF INSULIN (HCC): Primary | ICD-10-CM

## 2024-01-03 DIAGNOSIS — Z79.4 TYPE 2 DIABETES MELLITUS WITH COMPLICATION, WITH LONG-TERM CURRENT USE OF INSULIN (HCC): Primary | ICD-10-CM

## 2024-01-03 RX ORDER — INSULIN LISPRO 100 [IU]/ML
INJECTION, SOLUTION INTRAVENOUS; SUBCUTANEOUS
Qty: 108 ML | Refills: 1 | Status: SHIPPED | OUTPATIENT
Start: 2024-01-03

## 2024-01-03 RX ORDER — HYDROCHLOROTHIAZIDE 25 MG/1
25 TABLET ORAL DAILY
Qty: 90 TABLET | Refills: 1 | Status: SHIPPED | OUTPATIENT
Start: 2024-01-03

## 2024-01-03 RX ORDER — SOLIFENACIN SUCCINATE 10 MG/1
10 TABLET, FILM COATED ORAL DAILY
Qty: 90 TABLET | Refills: 1 | Status: SHIPPED | OUTPATIENT
Start: 2024-01-03

## 2024-01-03 RX ORDER — INSULIN GLARGINE 300 U/ML
INJECTION, SOLUTION SUBCUTANEOUS
Qty: 270 ML | Refills: 3 | Status: SHIPPED | OUTPATIENT
Start: 2024-01-03

## 2024-01-03 RX ORDER — INSULIN GLARGINE 300 U/ML
INJECTION, SOLUTION SUBCUTANEOUS
Qty: 270 ML | Refills: 3 | Status: CANCELLED | OUTPATIENT
Start: 2024-01-03

## 2024-01-03 RX ORDER — PIOGLITAZONEHYDROCHLORIDE 30 MG/1
30 TABLET ORAL DAILY
Qty: 90 TABLET | Refills: 3 | Status: SHIPPED | OUTPATIENT
Start: 2024-01-03

## 2024-01-03 RX ORDER — ERYTHROMYCIN 250 MG/1
250 TABLET, COATED ORAL 3 TIMES DAILY
Qty: 90 TABLET | Refills: 3 | OUTPATIENT
Start: 2024-01-03

## 2024-01-03 RX ORDER — PIOGLITAZONEHYDROCHLORIDE 30 MG/1
30 TABLET ORAL DAILY
Qty: 90 TABLET | Refills: 3 | OUTPATIENT
Start: 2024-01-03

## 2024-01-03 RX ORDER — VENLAFAXINE HYDROCHLORIDE 75 MG/1
CAPSULE, EXTENDED RELEASE ORAL
Qty: 270 CAPSULE | Refills: 1 | Status: SHIPPED | OUTPATIENT
Start: 2024-01-03

## 2024-01-03 NOTE — TELEPHONE ENCOUNTER
From: Bertha Hebert  To: Dr. Patrick Marinelli  Sent: 1/3/2024 12:48 PM EST  Subject: Toujea and humalog    I need a refill on my toujeo and it needs to go to [facility] home delivery. Right now you have it going to [facility]. Please fix.

## 2024-01-03 NOTE — TELEPHONE ENCOUNTER
Comments:     Last Office Visit (last PCP visit):   6/22/2022    Next Visit Date:  Future Appointments   Date Time Provider Department Center   2/1/2024  1:30 PM Duran Liz MD [place] [facility] Neurology -   5/9/2024 10:00 AM Patrick Marinelli MD [place] [facility]   6/7/2024  8:00 AM Lisbeth Mccray MD [facility]       **If hasn't been seen in over a year OR hasn't followed up according to last diabetes/ADHD visit, make appointment for patient before sending refill to provider.    Rx requested:  Requested Prescriptions     Pending Prescriptions Disp Refills    venlafaxine (EFFEXOR XR) 75 MG extended release capsule 270 capsule 0

## 2024-01-04 RX ORDER — BACLOFEN 10 MG/1
TABLET ORAL
Qty: 90 TABLET | Refills: 1 | Status: SHIPPED | OUTPATIENT
Start: 2024-01-04

## 2024-01-06 DIAGNOSIS — E78.5 DYSLIPIDEMIA: ICD-10-CM

## 2024-01-08 RX ORDER — ROSUVASTATIN CALCIUM 10 MG/1
10 TABLET, COATED ORAL DAILY
Qty: 90 TABLET | Refills: 1 | Status: SHIPPED | OUTPATIENT
Start: 2024-01-08

## 2024-01-09 ENCOUNTER — TELEPHONE (OUTPATIENT)
Dept: PHARMACY | Facility: CLINIC | Age: 49
End: 2024-01-09

## 2024-01-09 ENCOUNTER — E-VISIT (OUTPATIENT)
Dept: PRIMARY CARE CLINIC | Age: 49
End: 2024-01-09
Payer: COMMERCIAL

## 2024-01-09 DIAGNOSIS — R30.0 DYSURIA: Primary | ICD-10-CM

## 2024-01-09 PROCEDURE — 99423 OL DIG E/M SVC 21+ MIN: CPT | Performed by: NURSE PRACTITIONER

## 2024-01-09 RX ORDER — CEPHALEXIN 500 MG/1
500 CAPSULE ORAL 2 TIMES DAILY
Qty: 14 CAPSULE | Refills: 0 | Status: SHIPPED | OUTPATIENT
Start: 2024-01-09 | End: 2024-01-16

## 2024-01-09 NOTE — PROGRESS NOTES
Bertha Hebert (1975) initiated an asynchronous digital communication through [employer].    HPI: per patient questionnaire     Exam: not applicable    Diagnoses and all orders for this visit:  Diagnoses and all orders for this visit:    Dysuria    Other orders  -     cephALEXin (KEFLEX) 500 MG capsule; Take 1 capsule by mouth 2 times daily for 7 days      11/2/23 e visit dysuria culture ordered. Never done   11/8/23 walk in UTI, given cipro     Antibiotic sent. Encourage f/u with her urologist. She is unable to do urine culture at this time.     Time: EV3 - 21 or more minutes were spent on the digital evaluation and management of this patient.25 min     Nat Salomon, APRN - CNP

## 2024-01-09 NOTE — TELEPHONE ENCOUNTER
**Patient is [facility]**    Called patient to schedule 2024 yearly pharmacist appointment to discuss medications for Diabetes Management Program.    No answer. Left VM on mobile TAD  Please call back at [phone] Option #3.    Lynne Ward CphT    [facility]    Clinical Pharmacy     Department, toll free: [phone] Option #3

## 2024-01-09 NOTE — TELEPHONE ENCOUNTER
Returned call and scheduled for 1/11/24 at Noon (during her lunch)    Tsering Perera CPhT.   Population Health Clinical   [facility] Clinical Pharmacy  Toll free: [phone] Option 2     For Pharmacy Admin Tracking Only    Program: [employer]  CPA in place:  No  Recommendation Provided To: Patient/Caregiver: 1 via Telephone  Intervention Detail: Scheduled Appointment  Intervention Accepted By: Patient/Caregiver: 1  Gap Closed?: Yes   Time Spent (min): 10

## 2024-01-11 ENCOUNTER — TELEPHONE (OUTPATIENT)
Dept: PHARMACY | Facility: CLINIC | Age: 49
End: 2024-01-11

## 2024-01-11 DIAGNOSIS — Z79.4 TYPE 2 DIABETES MELLITUS WITH COMPLICATION, WITH LONG-TERM CURRENT USE OF INSULIN (HCC): ICD-10-CM

## 2024-01-11 DIAGNOSIS — E11.8 TYPE 2 DIABETES MELLITUS WITH COMPLICATION, WITH LONG-TERM CURRENT USE OF INSULIN (HCC): ICD-10-CM

## 2024-01-11 RX ORDER — PEN NEEDLE, DIABETIC 30 GX3/16"
NEEDLE, DISPOSABLE MISCELLANEOUS
Qty: 400 EACH | Refills: 3 | Status: SHIPPED | OUTPATIENT
Start: 2024-01-11

## 2024-01-11 RX ORDER — ACYCLOVIR 400 MG/1
TABLET ORAL
Qty: 9 EACH | Refills: 3 | Status: SHIPPED | OUTPATIENT
Start: 2024-01-11

## 2024-01-11 NOTE — TELEPHONE ENCOUNTER
Noted pen needle and Dexcom G7 sensor rxs approved in other encounter, thank you!    Mychart message to patient.    =======================================================   For Pharmacy Admin Tracking Only    Program: [employer]  CPA in place:  No  Recommendation Provided To: Provider: 2 via Note to Provider and Patient/Caregiver: 2 via Telephone  Intervention Detail: Adherence Monitorin, New Rx: 1, reason: Cost/Formulary Change, and Refill(s) Provided  Intervention Accepted By: Provider: 2 and Patient/Caregiver: 2  Gap Closed?: Yes   Time Spent (min): 60   
request  See refill encounter to endo  - Consideration(s) for patient:  Take Humalog WITH meal, rather than after - patient hesitant as noted above. States she may try with lower Humalog dose to start.  Discussed meals/snacks and importance of eating more than 1 time each day.  States she plans to get repeat A1c in Feb  - DM program gaps identified:   Requirements recommended to be completed by 7/1: Provider Visit for DM (1st) and A1c (1st)   Requirements due by 12/31: Provider visit for DM (2nd), ACC/diabetes educator visit (if A1c over 8%), A1c (2nd), Lipid panel, Urine microalbumin, Influenza vaccination for 0135-8092, and Medication adherence over 70%   - Education to patient: Reminded to get yearly retinal exam, Addressed medication adherence, and Overview of Diabetes program- Benefits/Requirements     Carmen Pineda, PharmD, BCACP  Population Health Pharmacist  [facility] Clinical Pharmacy  Department, toll free: [phone], option 3

## 2024-01-11 NOTE — TELEPHONE ENCOUNTER
Patrick Marinelli MD,  Your patient is currently enrolled in [facility] Employee Diabetes Program.   Please assist, orders pended for your signature/modification if you agree:  Dexcom G7 sensors, 3-month supply per patient preference  G7 is also formulary preferred this year, sensors same cost as G6 and would not have transmitter cost  Plans to use her phone; no rx for  at this time  Reorder pen needles rx to [facility] per patient request    Thank you,  Carmen Pineda, PharmD, [facility] Center[facility]  Population Health Pharmacist  [facility] Clinical Pharmacy  Department, toll free: [phone], option 3

## 2024-01-12 DIAGNOSIS — K31.84 GASTROPARESIS: ICD-10-CM

## 2024-01-12 RX ORDER — ERYTHROMYCIN 250 MG/1
250 TABLET, COATED ORAL 3 TIMES DAILY
Qty: 90 TABLET | Refills: 3 | OUTPATIENT
Start: 2024-01-12

## 2024-01-13 ENCOUNTER — E-VISIT (OUTPATIENT)
Dept: PRIMARY CARE CLINIC | Age: 49
End: 2024-01-13

## 2024-01-13 DIAGNOSIS — R21 RASH: Primary | ICD-10-CM

## 2024-01-13 RX ORDER — CLOTRIMAZOLE AND BETAMETHASONE DIPROPIONATE 10; .64 MG/G; MG/G
CREAM TOPICAL
Qty: 15 G | Refills: 0 | Status: SHIPPED | OUTPATIENT
Start: 2024-01-13

## 2024-01-13 NOTE — PROGRESS NOTES
Bertha Hebert (1975) initiated an asynchronous digital communication through [employer]).    HPI: per patient questionnaire     Exam: not applicable    Diagnoses and all orders for this visit:  Diagnoses and all orders for this visit:    Rash    Other orders  -     clotrimazole-betamethasone (LOTRISONE) 1-0.05 % cream; Apply topically 2 times daily.    Reviewed photo. Concern shingles but pt denies pain or burning. C/w itching. Will send antifungal/steroid cream. Encourage f/u with pcp       Time: EV2 - 11-20 minutes were spent on the digital evaluation and management of this patient. 17 min    Nat Salomon, SANIA - CNP

## 2024-01-15 ENCOUNTER — OFFICE VISIT (OUTPATIENT)
Dept: FAMILY MEDICINE CLINIC | Age: 49
End: 2024-01-15
Payer: COMMERCIAL

## 2024-01-15 VITALS
HEART RATE: 77 BPM | DIASTOLIC BLOOD PRESSURE: 72 MMHG | BODY MASS INDEX: 48.82 KG/M2 | HEIGHT: 65 IN | SYSTOLIC BLOOD PRESSURE: 112 MMHG | WEIGHT: 293 LBS | TEMPERATURE: 97 F | OXYGEN SATURATION: 99 %

## 2024-01-15 DIAGNOSIS — B02.9 HERPES ZOSTER WITHOUT COMPLICATION: Primary | ICD-10-CM

## 2024-01-15 PROCEDURE — 1036F TOBACCO NON-USER: CPT | Performed by: FAMILY MEDICINE

## 2024-01-15 PROCEDURE — G8417 CALC BMI ABV UP PARAM F/U: HCPCS | Performed by: FAMILY MEDICINE

## 2024-01-15 PROCEDURE — 99213 OFFICE O/P EST LOW 20 MIN: CPT | Performed by: FAMILY MEDICINE

## 2024-01-15 PROCEDURE — G8427 DOCREV CUR MEDS BY ELIG CLIN: HCPCS | Performed by: FAMILY MEDICINE

## 2024-01-15 PROCEDURE — G8482 FLU IMMUNIZE ORDER/ADMIN: HCPCS | Performed by: FAMILY MEDICINE

## 2024-01-15 RX ORDER — PRAMOXINE HYDROCHLORIDE AND ZINC ACETATE LOTION 10; 1 MG/ML; MG/ML
LOTION TOPICAL
Qty: 117 ML | Refills: 0 | Status: SHIPPED | OUTPATIENT
Start: 2024-01-15

## 2024-01-15 RX ORDER — PROCHLORPERAZINE 25 MG/1
SUPPOSITORY RECTAL
COMMUNITY
Start: 2024-01-02

## 2024-01-15 RX ORDER — ACYCLOVIR 400 MG/1
400 TABLET ORAL
Refills: 0 | Status: CANCELLED | OUTPATIENT
Start: 2024-01-15 | End: 2024-01-25

## 2024-01-15 RX ORDER — VALACYCLOVIR HYDROCHLORIDE 1 G/1
1000 TABLET, FILM COATED ORAL 3 TIMES DAILY
Qty: 30 TABLET | Refills: 0 | Status: SHIPPED | OUTPATIENT
Start: 2024-01-15 | End: 2024-01-25

## 2024-01-15 ASSESSMENT — PATIENT HEALTH QUESTIONNAIRE - PHQ9
1. LITTLE INTEREST OR PLEASURE IN DOING THINGS: 0
SUM OF ALL RESPONSES TO PHQ QUESTIONS 1-9: 0
SUM OF ALL RESPONSES TO PHQ QUESTIONS 1-9: 0
SUM OF ALL RESPONSES TO PHQ9 QUESTIONS 1 & 2: 0
SUM OF ALL RESPONSES TO PHQ QUESTIONS 1-9: 0
SUM OF ALL RESPONSES TO PHQ QUESTIONS 1-9: 0
2. FEELING DOWN, DEPRESSED OR HOPELESS: 0

## 2024-01-15 NOTE — PROGRESS NOTES
Chief Complaint   Patient presents with    Rash     Patient reports under bust & on right side. Itchy & red        HPI: Bertha Hebert 48 y.o. female presenting for       Rash   Patient is coming with a chief complaint of a rash on her right flank that travels under the right breast.  Reports that the symptoms started on Thursday small but then increased in size.  Unsure if there is vesicles.  Patient denies any discharge from the area.  Patient denies any fevers, chills, nausea, vomiting, chest pain, shortness of breath, abdominal pain, urination, change stools.  Denies any pain in the area.  Patient does have a history of chickenpox when she was younger.  Patient did have a telemedicine visit and was prescribed topical steroids but has not taken it yet.        Current Outpatient Medications   Medication Sig Dispense Refill    Continuous Blood Gluc Transmit (DEXCOM G6 TRANSMITTER) MISC       Continuous Blood Gluc  (DEXCOM G6 ) ALEXANDREA       pramoxine-zinc acetate (CALAMINE CLEAR) 1-0.1 % LOTN Use as directed on the affected area 117 mL 0    valACYclovir (VALTREX) 1 g tablet Take 1 tablet by mouth 3 times daily for 10 days 30 tablet 0    clotrimazole-betamethasone (LOTRISONE) 1-0.05 % cream Apply topically 2 times daily. 15 g 0    Insulin Pen Needle (PEN NEEDLES) 32G X 4 MM MISC Use as directed with insulin pens, 4 times daily 400 each 3    Continuous Blood Gluc Sensor (DEXCOM G7 SENSOR) MISC Change sensor every 10 days as directed to monitor blood glucose 9 each 3    cephALEXin (KEFLEX) 500 MG capsule Take 1 capsule by mouth 2 times daily for 7 days 14 capsule 0    rosuvastatin (CRESTOR) 10 MG tablet Take 1 tablet by mouth daily 90 tablet 1    baclofen (LIORESAL) 10 MG tablet TAKE ONE-HALF TABLET BY MOUTH TWICE A DAY 90 tablet 1    solifenacin (VESICARE) 10 MG tablet TAKE ONE TABLET BY MOUTH ONCE A DAY 90 tablet 1    venlafaxine (EFFEXOR XR) 75 MG extended release capsule TAKE TWO CAPSULES BY MOUTH

## 2024-01-31 RX ORDER — SODIUM, POTASSIUM,MAG SULFATES 17.5-3.13G
SOLUTION, RECONSTITUTED, ORAL ORAL
Qty: 177 ML | Refills: 0 | Status: SHIPPED | OUTPATIENT
Start: 2024-01-31

## 2024-02-01 ENCOUNTER — OFFICE VISIT (OUTPATIENT)
Dept: NEUROLOGY | Age: 49
End: 2024-02-01

## 2024-02-01 VITALS
BODY MASS INDEX: 50.85 KG/M2 | WEIGHT: 293 LBS | HEART RATE: 68 BPM | SYSTOLIC BLOOD PRESSURE: 104 MMHG | DIASTOLIC BLOOD PRESSURE: 70 MMHG

## 2024-02-01 DIAGNOSIS — G72.0 STEROID MYOPATHY: ICD-10-CM

## 2024-02-01 DIAGNOSIS — E66.1 CLASS 1 DRUG-INDUCED OBESITY WITHOUT SERIOUS COMORBIDITY IN ADULT, UNSPECIFIED BMI: ICD-10-CM

## 2024-02-01 DIAGNOSIS — R29.2 AREFLEXIA: ICD-10-CM

## 2024-02-01 DIAGNOSIS — M62.81 MUSCLE WEAKNESS: ICD-10-CM

## 2024-02-01 DIAGNOSIS — T38.0X5A STEROID MYOPATHY: ICD-10-CM

## 2024-02-01 DIAGNOSIS — M35.3 POLYMYALGIA RHEUMATICA (HCC): Primary | ICD-10-CM

## 2024-02-01 DIAGNOSIS — H81.13 BENIGN PAROXYSMAL POSITIONAL VERTIGO DUE TO BILATERAL VESTIBULAR DISORDER: ICD-10-CM

## 2024-02-01 DIAGNOSIS — M79.10 MUSCLE PAIN: ICD-10-CM

## 2024-02-01 RX ORDER — MECLIZINE HYDROCHLORIDE 25 MG/1
TABLET ORAL
Qty: 90 TABLET | Refills: 1 | Status: SHIPPED | OUTPATIENT
Start: 2024-02-01

## 2024-02-05 RX ORDER — METOPROLOL TARTRATE 100 MG/1
100 TABLET ORAL 2 TIMES DAILY
Qty: 180 TABLET | Refills: 1 | Status: SHIPPED | OUTPATIENT
Start: 2024-02-05

## 2024-02-05 NOTE — TELEPHONE ENCOUNTER
Future Appointments    Encounter Information   Provider Department Appt Notes   2/20/2024 MIKHAIL BONE DENSITY RM 1 [facility] Epic // pt   2/20/2024 Derek Parmar PA [facility] Urology np - hx of UTIs *KD pt   5/9/2024 Patrick Marinelli MD [facility] Endo 6 month follow up   5/14/2024 [facility] Pap smear   6/7/2024 Lisbeth Mccray MD [facility] Specialty Care 6 mo f/u // sxc   8/1/2024 Duran Liz MD [facility] Neurology 6 MOS FUP     Past Visits    Date Provider Specialty Visit Type Primary Dx   02/01/2024 Duran Liz MD Neurology Office Visit Polymyalgia rheumatica (HCC)   01/15/2024 Lisbeth Mccray MD Family Medicine Office Visit Herpes zoster without complication

## 2024-02-05 NOTE — TELEPHONE ENCOUNTER
Future Appointments    Encounter Information   Provider Department Appt Notes   2/20/2024 MIKHAIL BONE DENSITY RM 1 [facility] Epic // pt   2/20/2024 Derek Parmar PA [facility] Urology np - hx of UTIs *KD pt   5/9/2024 Patrick Marinelli MD [facility] Endo 6 month follow up   5/14/2024 [facility]   6/7/2024 Lisbeth Mccray MD [facility] Primary and Specialty Care 6 mo f/u // sxc   8/1/2024 Duran Liz MD [facility] Neurology 6 MOS FUP     Past Visits    Date Provider Specialty Visit Type Primary Dx   02/01/2024 Duran Liz MD Neurology Office Visit Polymyalgia rheumatica (HCC)   01/15/2024 Lisbeth Mccray MD Family Medicine Office Visit Herpes zoster without complication

## 2024-02-06 RX ORDER — METOCLOPRAMIDE 10 MG/1
10 TABLET ORAL
Qty: 360 TABLET | Refills: 3 | Status: SHIPPED | OUTPATIENT
Start: 2024-02-06

## 2024-02-20 ENCOUNTER — OFFICE VISIT (OUTPATIENT)
Dept: UROLOGY | Age: 49
End: 2024-02-20
Payer: COMMERCIAL

## 2024-02-20 VITALS
BODY MASS INDEX: 47.09 KG/M2 | DIASTOLIC BLOOD PRESSURE: 80 MMHG | SYSTOLIC BLOOD PRESSURE: 126 MMHG | HEIGHT: 66 IN | WEIGHT: 293 LBS | HEART RATE: 68 BPM

## 2024-02-20 DIAGNOSIS — N39.0 RECURRENT UTI: ICD-10-CM

## 2024-02-20 DIAGNOSIS — N39.0 URINARY TRACT INFECTION WITHOUT HEMATURIA, SITE UNSPECIFIED: Primary | ICD-10-CM

## 2024-02-20 LAB
BILIRUBIN, POC: ABNORMAL
BLOOD URINE, POC: ABNORMAL
CLARITY, POC: CLEAR
COLOR, POC: YELLOW
GLUCOSE URINE, POC: ABNORMAL
KETONES, POC: ABNORMAL
LEUKOCYTE EST, POC: ABNORMAL
NITRITE, POC: ABNORMAL
PH, POC: 6.5
PROTEIN, POC: ABNORMAL
SPECIFIC GRAVITY, POC: 1.02
UROBILINOGEN, POC: 0.2

## 2024-02-20 PROCEDURE — 99203 OFFICE O/P NEW LOW 30 MIN: CPT | Performed by: PHYSICIAN ASSISTANT

## 2024-02-20 PROCEDURE — G8417 CALC BMI ABV UP PARAM F/U: HCPCS | Performed by: PHYSICIAN ASSISTANT

## 2024-02-20 PROCEDURE — 81003 URINALYSIS AUTO W/O SCOPE: CPT | Performed by: PHYSICIAN ASSISTANT

## 2024-02-20 PROCEDURE — G8482 FLU IMMUNIZE ORDER/ADMIN: HCPCS | Performed by: PHYSICIAN ASSISTANT

## 2024-02-20 PROCEDURE — G8427 DOCREV CUR MEDS BY ELIG CLIN: HCPCS | Performed by: PHYSICIAN ASSISTANT

## 2024-02-20 PROCEDURE — 1036F TOBACCO NON-USER: CPT | Performed by: PHYSICIAN ASSISTANT

## 2024-02-20 ASSESSMENT — ENCOUNTER SYMPTOMS: APNEA: 0

## 2024-02-20 NOTE — PROGRESS NOTES
Subjective:      Patient ID: Bertha Hebert is a 48 y.o. female    HPI  48 year old female who presents for recurrent urinary tract infections over the past 6 months. She states that during that time she has had at least 3 infections. She will normal have four a year. She has a history of DM2 and is on long term prednisone. She denies urinary frequency and urgency. But does have urge incontinence during the middle of the night. Currently denies any gross hematuria and dysuria    Past Medical History:   Diagnosis Date    Abnormal Pap smear of cervix     Acute midline thoracic back pain 09/18/2018    B12 deficiency     Family history of premature CAD 09/04/2013    Fatty liver     Gastroesophageal reflux disease 01/06/2021    Gastroparesis     HPV (human papilloma virus) anogenital infection     Hyperlipidemia     Hypertension     Irritable bowel syndrome with diarrhea 04/26/2021    Liver hemangioma 2009/2015    Lumbar radiculopathy 07/07/2021    Obesity 03/11/2013    BMI 43.42    Orthostatic hypotension     Ovarian cyst     PMR (polymyalgia rheumatica) (HCC)     Tobacco abuse 09/03/2015    Tremor     Uncontrolled diabetes mellitus with complications     Unspecified sleep apnea      Past Surgical History:   Procedure Laterality Date    ANKLE FRACTURE SURGERY Right 9/15/2023    Right open reduction internal fixation lateral malleolus, possible syndesmotic fixation, requesting large C arm, bone foam, Synthes (Bridger) -Laurent's initial note will serve as PAT, Case Comments/Implants: as above , Anesthesia Requested: choice. BRIDGER performed [place] Smith [place] [facility] [place]    CARDIAC CATHETERIZATION  04/2007    COLONOSCOPY  2013    for diarrhea (-)    COLONOSCOPY N/A 02/10/2021    COLONOSCOPY DIAGNOSTIC performed by Fletcher Vallejo MD at [facility]    LEEP  01/2005    ND BIOPSY MUSCLE SUPERFICIAL Left 09/21/2018    LEFT QUADRICEPS MUSCLE BIOPSY performed by Rony Joseph MD at [facility]    ND PERQ VERT AGMNTJ CAVITY CRTJ

## 2024-02-21 LAB — BACTERIA UR CULT: NORMAL

## 2024-02-21 RX ORDER — DOXYCYCLINE HYCLATE 100 MG
100 TABLET ORAL 2 TIMES DAILY
Qty: 20 TABLET | Refills: 0 | Status: SHIPPED | OUTPATIENT
Start: 2024-02-21 | End: 2024-03-02

## 2024-02-21 RX ORDER — PHENAZOPYRIDINE HYDROCHLORIDE 100 MG/1
100 TABLET, FILM COATED ORAL 3 TIMES DAILY PRN
Qty: 60 TABLET | Refills: 0 | Status: SHIPPED | OUTPATIENT
Start: 2024-02-21 | End: 2024-03-12

## 2024-02-23 ENCOUNTER — PREP FOR PROCEDURE (OUTPATIENT)
Dept: GASTROENTEROLOGY | Age: 49
End: 2024-02-23

## 2024-02-26 RX ORDER — SODIUM CHLORIDE 0.9 % (FLUSH) 0.9 %
5-40 SYRINGE (ML) INJECTION EVERY 12 HOURS SCHEDULED
Status: CANCELLED | OUTPATIENT
Start: 2024-02-26

## 2024-02-26 RX ORDER — SODIUM CHLORIDE 9 MG/ML
INJECTION, SOLUTION INTRAVENOUS PRN
Status: CANCELLED | OUTPATIENT
Start: 2024-02-26

## 2024-02-26 RX ORDER — SODIUM CHLORIDE 0.9 % (FLUSH) 0.9 %
5-40 SYRINGE (ML) INJECTION PRN
Status: CANCELLED | OUTPATIENT
Start: 2024-02-26

## 2024-02-26 RX ORDER — SODIUM CHLORIDE 9 MG/ML
INJECTION, SOLUTION INTRAVENOUS CONTINUOUS
Status: CANCELLED | OUTPATIENT
Start: 2024-02-26

## 2024-03-02 DIAGNOSIS — K21.9 GASTROESOPHAGEAL REFLUX DISEASE, UNSPECIFIED WHETHER ESOPHAGITIS PRESENT: ICD-10-CM

## 2024-03-04 RX ORDER — ESOMEPRAZOLE MAGNESIUM 40 MG/1
40 CAPSULE, DELAYED RELEASE ORAL DAILY
Qty: 30 CAPSULE | Refills: 3 | Status: SHIPPED | OUTPATIENT
Start: 2024-03-04

## 2024-03-04 RX ORDER — PREDNISONE 5 MG/1
5 TABLET ORAL DAILY
Qty: 30 TABLET | Refills: 0 | Status: SHIPPED | OUTPATIENT
Start: 2024-03-04

## 2024-03-04 NOTE — TELEPHONE ENCOUNTER
Pharmacy is  requesting medication refill. Please approve or deny this request.    Rx requested:  Requested Prescriptions     Pending Prescriptions Disp Refills    predniSONE (DELTASONE) 5 MG tablet [Pharmacy Med Name: PREDNISONE 5MG TABS] 30 tablet 3     Sig: TAKE ONE TABLET BY MOUTH ONCE A DAY         Last Office Visit:   2/1/2024      Next Visit Date:  Future Appointments   Date Time Provider Department Center   5/2/2024  1:00 PM Eliu Russo MD [facility]   5/9/2024 10:00 AM Patrick Marinelli MD [facility]   5/14/2024  8:30 AM Jeremías Iyer DO [facility]   5/14/2024  1:00 PM MIKHIAL BONE DENSITY RM 1 [facility]   6/7/2024  8:00 AM Lisbeth Mccray MD [facility] [place]   8/1/2024  2:30 PM Duran Liz MD [place] [facility] Neurology -

## 2024-03-07 ENCOUNTER — ANESTHESIA EVENT (OUTPATIENT)
Dept: ENDOSCOPY | Age: 49
End: 2024-03-07
Payer: COMMERCIAL

## 2024-03-07 NOTE — ANESTHESIA PRE PROCEDURE
Acute thoracic back pain M54.6    Muscle weakness M62.81    Muscle pain M79.10    Fracture of first lumbar vertebra (HCC) S32.019A    Polymyalgia rheumatica (HCC) M35.3    Disorder of muscle, unspecified M62.9    Diarrhea R19.7    Nausea and vomiting R11.2    Gastroesophageal reflux disease K21.9    Gastritis without bleeding K29.70    Polyp of colon K63.5    Gastroparesis K31.84    Irritable bowel syndrome with diarrhea K58.0    Wound drainage L24.A9    Lumbar radiculopathy M54.16    Erythema intertrigo L30.4    Open wound of abdomen S31.109A    Splenomegaly R16.1    Areflexia R29.2    Benign paroxysmal positional vertigo due to bilateral vestibular disorder H81.13    Closed fracture of lateral malleolus of right ankle S82.61XA       Past Medical History:        Diagnosis Date    Abnormal Pap smear of cervix     Acute midline thoracic back pain 09/18/2018    B12 deficiency     Family history of premature CAD 09/04/2013    Fatty liver     Gastroesophageal reflux disease 01/06/2021    Gastroparesis     HPV (human papilloma virus) anogenital infection     Hyperlipidemia     Hypertension     Irritable bowel syndrome with diarrhea 04/26/2021    Liver hemangioma 2009/2015    Lumbar radiculopathy 07/07/2021    Obesity 03/11/2013    BMI 43.42    Orthostatic hypotension     Ovarian cyst     PMR (polymyalgia rheumatica) ([facility])     Tobacco abuse 09/03/2015    Tremor     Uncontrolled diabetes mellitus with complications     Unspecified sleep apnea        Past Surgical History:        Procedure Laterality Date    ANKLE FRACTURE SURGERY Right 9/15/2023    Right open reduction internal fixation lateral malleolus, possible syndesmotic fixation, requesting large C arm, bone foam, Synthes (Ny) Cricket's initial note will serve as PAT, Case Comments/Implants: as above , Anesthesia Requested: choice. [place] performed by Derek Smith DO [place] [facility] [place]    CARDIAC CATHETERIZATION  04/2007    COLONOSCOPY  2013    for diarrhea (-)    COLONOSCOPY

## 2024-03-08 ENCOUNTER — ANESTHESIA (OUTPATIENT)
Dept: ENDOSCOPY | Age: 49
End: 2024-03-08
Payer: COMMERCIAL

## 2024-03-08 ENCOUNTER — E-VISIT (OUTPATIENT)
Dept: PRIMARY CARE CLINIC | Age: 49
End: 2024-03-08
Payer: COMMERCIAL

## 2024-03-08 ENCOUNTER — HOSPITAL ENCOUNTER (OUTPATIENT)
Age: 49
Setting detail: OUTPATIENT SURGERY
Discharge: HOME | End: 2024-03-08
Attending: INTERNAL MEDICINE | Admitting: INTERNAL MEDICINE
Payer: COMMERCIAL

## 2024-03-08 VITALS
TEMPERATURE: 98 F | HEART RATE: 75 BPM | OXYGEN SATURATION: 95 % | RESPIRATION RATE: 18 BRPM | DIASTOLIC BLOOD PRESSURE: 70 MMHG | SYSTOLIC BLOOD PRESSURE: 111 MMHG

## 2024-03-08 DIAGNOSIS — Z86.010 HISTORY OF COLON POLYPS: ICD-10-CM

## 2024-03-08 DIAGNOSIS — J06.9 UPPER RESPIRATORY TRACT INFECTION, UNSPECIFIED TYPE: Primary | ICD-10-CM

## 2024-03-08 PROBLEM — Z86.0100 HISTORY OF COLON POLYPS: Status: ACTIVE | Noted: 2024-03-08

## 2024-03-08 PROCEDURE — 45380 COLONOSCOPY AND BIOPSY: CPT | Performed by: INTERNAL MEDICINE

## 2024-03-08 PROCEDURE — 3700000001 HC ADD 15 MINUTES (ANESTHESIA): Performed by: INTERNAL MEDICINE

## 2024-03-08 PROCEDURE — 6370000000 HC RX 637 (ALT 250 FOR IP): Performed by: INTERNAL MEDICINE

## 2024-03-08 PROCEDURE — 88305 TISSUE EXAM BY PATHOLOGIST: CPT

## 2024-03-08 PROCEDURE — 2709999900 HC NON-CHARGEABLE SUPPLY: Performed by: INTERNAL MEDICINE

## 2024-03-08 PROCEDURE — 7100000010 HC PHASE II RECOVERY - FIRST 15 MIN: Performed by: INTERNAL MEDICINE

## 2024-03-08 PROCEDURE — 99422 OL DIG E/M SVC 11-20 MIN: CPT | Performed by: NURSE PRACTITIONER

## 2024-03-08 PROCEDURE — 2500000003 HC RX 250 WO HCPCS: Performed by: REGISTERED NURSE

## 2024-03-08 PROCEDURE — 6360000002 HC RX W HCPCS: Performed by: REGISTERED NURSE

## 2024-03-08 PROCEDURE — 7100000011 HC PHASE II RECOVERY - ADDTL 15 MIN: Performed by: INTERNAL MEDICINE

## 2024-03-08 PROCEDURE — 3609027000 HC COLONOSCOPY: Performed by: INTERNAL MEDICINE

## 2024-03-08 PROCEDURE — 2580000003 HC RX 258: Performed by: INTERNAL MEDICINE

## 2024-03-08 PROCEDURE — 3700000000 HC ANESTHESIA ATTENDED CARE: Performed by: INTERNAL MEDICINE

## 2024-03-08 RX ORDER — SODIUM CHLORIDE 0.9 % (FLUSH) 0.9 %
5-40 SYRINGE (ML) INJECTION EVERY 12 HOURS SCHEDULED
Status: DISCONTINUED | OUTPATIENT
Start: 2024-03-08 | End: 2024-03-08 | Stop reason: HOSPADM

## 2024-03-08 RX ORDER — MAGNESIUM HYDROXIDE 1200 MG/15ML
LIQUID ORAL PRN
Status: DISCONTINUED | OUTPATIENT
Start: 2024-03-08 | End: 2024-03-08 | Stop reason: ALTCHOICE

## 2024-03-08 RX ORDER — SIMETHICONE 40MG/0.6ML
SUSPENSION, DROPS(FINAL DOSAGE FORM)(ML) ORAL PRN
Status: DISCONTINUED | OUTPATIENT
Start: 2024-03-08 | End: 2024-03-08 | Stop reason: ALTCHOICE

## 2024-03-08 RX ORDER — SODIUM CHLORIDE 9 MG/ML
INJECTION, SOLUTION INTRAVENOUS CONTINUOUS
Status: DISCONTINUED | OUTPATIENT
Start: 2024-03-08 | End: 2024-03-08 | Stop reason: HOSPADM

## 2024-03-08 RX ORDER — PROPOFOL 10 MG/ML
INJECTION, EMULSION INTRAVENOUS PRN
Status: DISCONTINUED | OUTPATIENT
Start: 2024-03-08 | End: 2024-03-08 | Stop reason: SDUPTHER

## 2024-03-08 RX ORDER — SODIUM CHLORIDE 9 MG/ML
INJECTION, SOLUTION INTRAVENOUS PRN
Status: DISCONTINUED | OUTPATIENT
Start: 2024-03-08 | End: 2024-03-08 | Stop reason: HOSPADM

## 2024-03-08 RX ORDER — LIDOCAINE HYDROCHLORIDE 20 MG/ML
INJECTION, SOLUTION INFILTRATION; PERINEURAL PRN
Status: DISCONTINUED | OUTPATIENT
Start: 2024-03-08 | End: 2024-03-08 | Stop reason: SDUPTHER

## 2024-03-08 RX ORDER — AMOXICILLIN 500 MG/1
500 CAPSULE ORAL 3 TIMES DAILY
Qty: 21 CAPSULE | Refills: 0 | Status: SHIPPED | OUTPATIENT
Start: 2024-03-08 | End: 2024-03-15

## 2024-03-08 RX ORDER — SIMETHICONE 40MG/0.6ML
SUSPENSION, DROPS(FINAL DOSAGE FORM)(ML) ORAL
Status: DISCONTINUED
Start: 2024-03-08 | End: 2024-03-08 | Stop reason: HOSPADM

## 2024-03-08 RX ORDER — SODIUM CHLORIDE 0.9 % (FLUSH) 0.9 %
5-40 SYRINGE (ML) INJECTION PRN
Status: DISCONTINUED | OUTPATIENT
Start: 2024-03-08 | End: 2024-03-08 | Stop reason: HOSPADM

## 2024-03-08 RX ADMIN — PROPOFOL 50 MG: 10 INJECTION, EMULSION INTRAVENOUS at 07:33

## 2024-03-08 RX ADMIN — LIDOCAINE HYDROCHLORIDE 60 MG: 20 INJECTION, SOLUTION INFILTRATION; PERINEURAL at 07:26

## 2024-03-08 RX ADMIN — PROPOFOL 50 MG: 10 INJECTION, EMULSION INTRAVENOUS at 07:40

## 2024-03-08 RX ADMIN — SODIUM CHLORIDE: 9 INJECTION, SOLUTION INTRAVENOUS at 07:01

## 2024-03-08 RX ADMIN — PROPOFOL 50 MG: 10 INJECTION, EMULSION INTRAVENOUS at 07:35

## 2024-03-08 RX ADMIN — PROPOFOL 50 MG: 10 INJECTION, EMULSION INTRAVENOUS at 07:38

## 2024-03-08 RX ADMIN — PROPOFOL 50 MG: 10 INJECTION, EMULSION INTRAVENOUS at 07:30

## 2024-03-08 RX ADMIN — PROPOFOL 50 MG: 10 INJECTION, EMULSION INTRAVENOUS at 07:37

## 2024-03-08 RX ADMIN — PROPOFOL 100 MG: 10 INJECTION, EMULSION INTRAVENOUS at 07:26

## 2024-03-08 ASSESSMENT — LIFESTYLE VARIABLES
SMOKING_STATUS: NO, I'M A FORMER SMOKER
PACKS_PER_DAY: 1
SMOKING_YEARS: 25

## 2024-03-08 ASSESSMENT — PAIN - FUNCTIONAL ASSESSMENT
PAIN_FUNCTIONAL_ASSESSMENT: 0-10
PAIN_FUNCTIONAL_ASSESSMENT: 0-10

## 2024-03-08 NOTE — ANESTHESIA POSTPROCEDURE EVALUATION
Department of Anesthesiology  Postprocedure Note    Patient: Bertha Hebert  MRN: [id number]  YOB: 1975  Date of evaluation: 3/8/2024    Procedure Summary       Date: 03/08/24 Room / Location: [facility] OR 01 / [facility]    Anesthesia Start: 0721 Anesthesia Stop: 0748    Procedure: COLORECTAL CANCER SCREENING, HIGH RISK with polypectomy Diagnosis:       History of colon polyps      (History of colon polyps [Z86.010])    Surgeons: Fletcher Vallejo MD Responsible Provider: Ronaldo Pelaez APRN - CRNA    Anesthesia Type: MAC ASA Status: 3            Anesthesia Type: No value filed.    Shahrzad Phase I: Shahrzad Score: 10    Shahrzad Phase II: Shahrzad Score: 10    Anesthesia Post Evaluation    Patient location during evaluation: bedside  Patient participation: complete - patient participated  Level of consciousness: awake and awake and alert  Airway patency: patent  Nausea & Vomiting: no nausea and no vomiting  Cardiovascular status: blood pressure returned to baseline and hemodynamically stable  Respiratory status: acceptable  Hydration status: euvolemic  Pain management: adequate        No notable events documented.

## 2024-03-08 NOTE — PROGRESS NOTES
Reviewed questionnaire    Reviewed meds/allergies    Dx URI    Plan Symptoms x 1 week. Last antibiotic use 12/6/23. Rx given for amoxil, follow up with PCP if no improvement    Time spent on visit 13 min

## 2024-03-08 NOTE — H&P
Patient Name: Bertha Hebert  : 1975  MRN: [id number]  DATE: 24      ENDOSCOPY  History and Physical    Procedure:    [x] Diagnostic Colonoscopy       [] Screening Colonoscopy  [] EGD      [] ERCP      [] EUS       [] Other    [x] Previous office notes/History and Physical reviewed from the patients chart. Please see EMR for further details of HPI. I have examined the patient's status immediately prior to the procedure and:      Indications/HPI:    []Abdominal Pain   []Cancer- GI/Lung  []Fhx of colon CA/polyps  []History of Polyps   []Quigley’s   []Melena  []Abnormal Imaging   []Dysphagia    []Persistent Pneumonia  []Anemia   []Food Impaction  [x]History of Polyps  []GI Bleed   []Pulmonary nodule/Mass  []Change in bowel habits  []Heartburn/Reflux  []Rectal Bleed (BRBPR)  []Chest Pain - Non Cardiac  []Heme (+) Stool  []Ulcers  []Constipation   []Hemoptysis   []Varices  []Diarrhea   []Hypoxemia  []Nausea/Vomiting   []Screening   []Crohns/Colitis  []Other:    Anesthesia:   [x] MAC [] Moderate Sedation   [] General   [] None     ROS: 12 pt Review of Symptoms was negative unless mentioned above    Medications:   Prior to Admission medications    Medication Sig Start Date End Date Taking? Authorizing Provider   esomeprazole (NEXIUM) 40 MG delayed release capsule TAKE ONE CAPSULE BY MOUTH ONCE A DAY 3/4/24   Francy Helton APRN - CNP   predniSONE (DELTASONE) 5 MG tablet TAKE ONE TABLET BY MOUTH ONCE A DAY 3/4/24   Jessica Dugan APRN - CNP   phenazopyridine (PYRIDIUM) 100 MG tablet Take 1 tablet by mouth 3 times daily as needed for Pain 2/21/24 3/12/24  Derek Parmar PA   metoclopramide (REGLAN) 10 MG tablet Take 1 tablet by mouth 3 times daily (with meals) 24   Patrick Marinelli MD   metoprolol (LOPRESSOR) 100 MG tablet TAKE ONE TABLET BY MOUTH TWICE A DAY 24   Lisbeth Mccray MD   meclizine (ANTIVERT) 25 MG tablet ONE TID PRN 24   Duran Liz MD   sodium-potassium-mag

## 2024-03-14 ENCOUNTER — OFFICE VISIT (OUTPATIENT)
Dept: FAMILY MEDICINE CLINIC | Age: 49
End: 2024-03-14
Payer: COMMERCIAL

## 2024-03-14 VITALS
WEIGHT: 293 LBS | BODY MASS INDEX: 47.09 KG/M2 | HEART RATE: 71 BPM | OXYGEN SATURATION: 94 % | HEIGHT: 66 IN | RESPIRATION RATE: 18 BRPM | DIASTOLIC BLOOD PRESSURE: 60 MMHG | TEMPERATURE: 96.5 F | SYSTOLIC BLOOD PRESSURE: 110 MMHG

## 2024-03-14 DIAGNOSIS — J02.9 SORE THROAT: ICD-10-CM

## 2024-03-14 DIAGNOSIS — J20.9 ACUTE BRONCHITIS, UNSPECIFIED ORGANISM: ICD-10-CM

## 2024-03-14 DIAGNOSIS — J01.40 ACUTE NON-RECURRENT PANSINUSITIS: Primary | ICD-10-CM

## 2024-03-14 LAB — S PYO AG THROAT QL: NORMAL

## 2024-03-14 PROCEDURE — 87880 STREP A ASSAY W/OPTIC: CPT | Performed by: NURSE PRACTITIONER

## 2024-03-14 PROCEDURE — 87426 SARSCOV CORONAVIRUS AG IA: CPT | Performed by: NURSE PRACTITIONER

## 2024-03-14 PROCEDURE — 99213 OFFICE O/P EST LOW 20 MIN: CPT | Performed by: NURSE PRACTITIONER

## 2024-03-14 PROCEDURE — 87804 INFLUENZA ASSAY W/OPTIC: CPT | Performed by: NURSE PRACTITIONER

## 2024-03-14 RX ORDER — DEXTROMETHORPHAN HYDROBROMIDE AND PROMETHAZINE HYDROCHLORIDE 15; 6.25 MG/5ML; MG/5ML
5 SYRUP ORAL NIGHTLY
Qty: 100 ML | Refills: 0 | Status: SHIPPED | OUTPATIENT
Start: 2024-03-14 | End: 2024-03-24

## 2024-03-14 RX ORDER — DOXYCYCLINE HYCLATE 100 MG
100 TABLET ORAL 2 TIMES DAILY
Qty: 20 TABLET | Refills: 0 | Status: SHIPPED | OUTPATIENT
Start: 2024-03-14 | End: 2024-03-24

## 2024-03-14 RX ORDER — ALBUTEROL SULFATE 90 UG/1
2 AEROSOL, METERED RESPIRATORY (INHALATION) EVERY 6 HOURS PRN
Qty: 18 G | Refills: 1 | Status: SHIPPED | OUTPATIENT
Start: 2024-03-14

## 2024-03-14 ASSESSMENT — ENCOUNTER SYMPTOMS
SORE THROAT: 1
NAUSEA: 0
DIARRHEA: 0
TROUBLE SWALLOWING: 0
VOICE CHANGE: 0
ABDOMINAL PAIN: 0
WHEEZING: 0
CHEST TIGHTNESS: 0
SHORTNESS OF BREATH: 0
COUGH: 1
RHINORRHEA: 0

## 2024-03-14 NOTE — PROGRESS NOTES
promethazine-dextromethorphan (PROMETHAZINE-DM) 6.25-15 MG/5ML syrup    albuterol sulfate HFA (PROVENTIL;VENTOLIN;PROAIR) 108 (90 Base) MCG/ACT inhaler      3. Sore throat  POCT rapid strep A        Orders Placed This Encounter   Procedures    POCT COVID-19, Antigen     Order Specific Question:   Pregnant?     Answer:   No     Order Specific Question:   Previously tested for COVID-19?     Answer:   Yes    POCT Influenza A/B    POCT rapid strep A     Orders Placed This Encounter   Medications    doxycycline hyclate (VIBRA-TABS) 100 MG tablet     Sig: Take 1 tablet by mouth 2 times daily for 10 days     Dispense:  20 tablet     Refill:  0    promethazine-dextromethorphan (PROMETHAZINE-DM) 6.25-15 MG/5ML syrup     Sig: Take 5 mLs by mouth nightly for 10 days     Dispense:  100 mL     Refill:  0    albuterol sulfate HFA (PROVENTIL;VENTOLIN;PROAIR) 108 (90 Base) MCG/ACT inhaler     Sig: Inhale 2 puffs into the lungs every 6 hours as needed for Wheezing     Dispense:  18 g     Refill:  1       Return if symptoms worsen or fail to improve, for follow up with PCP.      Reviewed with the patient: current clinical status & medications. Side effects, adverse effects of the medications prescribed today, as well as treatment plan/rationale and result expectations have been discussed with the patient who expressed understanding.    Close follow up to evaluate treatment results and for coordination of care.  I have reviewed the patient's medical history in detail and updated the computerized patient record.      Sissy Durham, SANIA - NP

## 2024-03-16 DIAGNOSIS — K31.84 GASTROPARESIS: ICD-10-CM

## 2024-03-18 RX ORDER — ERYTHROMYCIN 250 MG/1
250 TABLET, COATED ORAL 3 TIMES DAILY
Qty: 90 TABLET | Refills: 3 | Status: SHIPPED | OUTPATIENT
Start: 2024-03-18

## 2024-04-05 ENCOUNTER — CLINICAL DOCUMENTATION (OUTPATIENT)
Dept: PHARMACY | Facility: CLINIC | Age: 49
End: 2024-04-05

## 2024-04-05 NOTE — PROGRESS NOTES
1st Quarterly Reminder sent to patient for the DM Program - See Mychart message or Letter for more information.    Rosita Martinez CphT  [facility]  Clinical Pharmacy   Department, toll free: [phone] Option #3      For Pharmacy Admin Tracking Only    Program: [employer]  CPA in place:  No  Gap Closed?: Yes   Time Spent (min): 5

## 2024-04-10 ENCOUNTER — APPOINTMENT (OUTPATIENT)
Dept: UROLOGY | Facility: CLINIC | Age: 49
End: 2024-04-10
Payer: COMMERCIAL

## 2024-04-11 ENCOUNTER — APPOINTMENT (OUTPATIENT)
Dept: UROLOGY | Facility: CLINIC | Age: 49
End: 2024-04-11
Payer: COMMERCIAL

## 2024-04-11 NOTE — PROGRESS NOTES
Referred by: ***     PCP  No primary care provider on file.         CHIEF COMPLAINT:    ***           HISTORY OF PRESENT ILLNESS:  Patient's history was reviewed. This is a 48 y.o. female, No obstetric history on file. who presents with ***    The following were reviewed to gain additional history:  External notes: ***  Test results: ***         Specifically, she describes the following pelvic floor symptoms:          Prolapse: {yes,no:21211}       - Splinting to urinate: {yes,no:21211}       - Splinting for bowel movement/stool trapping: {yes,no:21211}              Incontinence:  {yes,no:21211}             {types of incontinence:30296}              Urinary Symptoms:       - Frequency:  {yes,no:21211}             # Voids: ***x       - Nocturia: {yes,no:21211}             # Voids: ***x       - Urgency:  {yes,no:21211}       - Incomplete emptying:  {YES wildcard/NO:60}       - Hesitancy:  {YES wildcard/NO:60}       - Pain with voiding:  {YES wildcard/NO:60}       - Postvoid dribbling:  {YES wildcard/NO:60}       - Fluid intake: ***               History:       - Recurrent UTI:  {YES wildcard/NO:60}       - Hematuria:  {YES wildcard/NO:60}       - Stones:  {YES wildcard/NO:60}       - Kidney Disease:  {YES wildcard/NO:60}                  Bowel Symptoms:       - Regular: {yes,no:21211}       - Diarrhea:{yes,no:21211}       - Constipation: {yes,no:21211}       - Fecal Incontinence: {yes,no:21211}       - Flatus Incontinence:  {yes,no:21211}       - Fecal urgency:   {yes,no:21211}    Past medical and surgical hx reviewed - pertinent for ***    She reports that ***  She endorses ***  She denies ***     Gyn History:  - Menopausal: {yes,no:21211}           Postmenopausal bleeding: {YES wildcard/NO:60}  - HRT: {YES wildcard/NO:60}  - Hysterectomy: {YES wildcard/NO:60}  - Pap up to date: {YES wildcard/NO:60}   History of abnormal pap: {YES wildcard/NO:60}  - Sexually active:  {yes,no:10018}  Dyspareunia:  {yes,no:30189}   Other issues: ***  - Number of prior vaginal deliveries: ***   Number of prior operative deliveries ***   Prior OASI? ***  - Number of prior c-sections: ***    - Mammogram up to date: {YES (/NO:60}  - Colonoscopy up to date: {YES (/NO:60}    OB History    No obstetric history on file.         No past medical history on file.    No past surgical history on file.    No family history on file.    Review of Systems            PHYSICAL EXAMINATION:  No LMP recorded.  There is no height or weight on file to calculate BMI.  There were no vitals taken for this visit.  NP: Constitutional: alert and in no acute distress, well developed, well nourished.   Head and Face: head and face: unremarkable.   Neck: no neck asymmetry, supple.   Pulmonary: no respiratory distress, unremarkable respiratory effort.   Skin: normal skin color and pigmentation, normal skin turgor, and no rash.   Neurologic: cranial nerves: non-focal, grossly intact.   Psychiatric: alert and oriented x 3.     Pelvic:  Sexual Maturity: Normal.   External Genitalia: Unremarkable.   Bladder: ***  Urethra: Hypermobile***. ***CST when sitting.   Vagina:   Cervix: *** Aa: *** Ba: *** C: *** Gh: *** Pb: *** TVL: *** Ap: *** Bp: *** D: ***  Urethra: ***  Pelvic Floor/Tenderness: Highest pain level is *** (0-10).  Rectum: Unremarkable.   Anus: Unremarkable.   Perianal area: Unremarkable.     PVR (by Ultrasound): ***   Urine dip: No results found for this or any previous visit (from the past 6 hour(s)).      PROCEDURES:  ***  FPMRS Procedure: cystourethroscopy.   Indications for procedure: ***   Testing Results: UA results reviewed.   Discussed with patient: Risks, benefits, and alternative were discussed in detail. Patient appears to understand and agrees to proceed. Patient has signed the procedure consent form.   Cystoscopy findings: unremarkable introitus finding, unremarkable urethra finding, and unremarkable bladder finding.  Post-Cystoscopy:  Patient tolerated procedure without complications.   ***       IMPRESSION AND PLAN:  Danielle Molina is a 48 y.o. No obstetric history on file. who presents with ***    Problem List Items Addressed This Visit    None  Visit Diagnoses       Recurrent UTI        Relevant Orders    POCT UA Automated manually resulted    Post-Void Residual             It is my impression that ***  I would like her to ***     I asked her to follow up with *** in *** months for re-evaluation or sooner as needed.    All questions and concerns were answered and addressed.  The patient expressed understanding and agrees with the plan.       SIGNATURES  ***

## 2024-04-30 LAB
CHOLEST SERPL-MCNC: 144 MG/DL (ref 0–199)
ESTIMATED AVERAGE GLUCOSE: 174 MG/DL
GLUCOSE SERPL-MCNC: 196 MG/DL (ref 70–99)
HBA1C MFR BLD: 7.7 % (ref 4–6)
HDLC SERPL-MCNC: 36 MG/DL (ref 40–59)
LDLC SERPL CALC-MCNC: 64 MG/DL (ref 0–129)
TRIGL SERPL-MCNC: 220 MG/DL (ref 0–150)

## 2024-05-06 NOTE — TELEPHONE ENCOUNTER
Patient Reported symptoms:    Right leg   Heaviness All of the time  Achiness None of the time   Swelling Some of the time   Throbbing None of the time   Itching None of the time   Appearance Slightly noticeable   Impact on work/activities Symptoms but full able to participate    Left Leg   Heaviness All of the time  Achiness None of the time   Swelling Some of the time   Throbbing None of the time   Itching None of the time   Appearance Slightly noticeable   Impact on work/activities Symptoms but full able to participate   Pharmacy is requesting medication refill.  Please approve or deny this request.    Rx requested:  Requested Prescriptions     Pending Prescriptions Disp Refills    predniSONE (DELTASONE) 5 MG tablet [Pharmacy Med Name: PREDNISONE 5MG TAB] 30 tablet 3     Sig: TAKE 1 TABLET BY MOUTH ONE TIME A DAY         Last Office Visit:   5/2/2022      Next Visit Date:  Future Appointments   Date Time Provider Iliana Garcia   8/1/2022  4:00 PM Floyd Salcedo MD [facility]   9/7/2022  3:30 PM Kamran Hammer DIABETES ED [facility] [address] [place] [address]   10/13/2022 12:15 PM Floyd Salcedo MD [facility]   10/17/2022 11:30 AM Luis Enriqeu Hernandez, [address] [facility] [facility]   10/18/2022  8:45 AM Miguel Campos MD [facility]   10/31/2022  1:15 PM Dieter Ramires MD [facility]   10/31/2022  4:00 PM Makayla Cahu

## 2024-05-09 DIAGNOSIS — R11.0 NAUSEA: ICD-10-CM

## 2024-05-10 RX ORDER — ONDANSETRON 4 MG/1
TABLET, ORALLY DISINTEGRATING ORAL
Qty: 60 TABLET | Refills: 3 | Status: SHIPPED | OUTPATIENT
Start: 2024-05-10

## 2024-05-13 DIAGNOSIS — N30.00 ACUTE CYSTITIS WITHOUT HEMATURIA: Primary | ICD-10-CM

## 2024-05-13 RX ORDER — DOXYCYCLINE HYCLATE 100 MG
100 TABLET ORAL 2 TIMES DAILY
Qty: 20 TABLET | Refills: 0 | Status: SHIPPED | OUTPATIENT
Start: 2024-05-13 | End: 2024-05-23

## 2024-05-14 ENCOUNTER — HOSPITAL ENCOUNTER (OUTPATIENT)
Dept: WOMENS IMAGING | Age: 49
Discharge: HOME | End: 2024-05-16
Attending: PSYCHIATRY & NEUROLOGY
Payer: COMMERCIAL

## 2024-05-14 DIAGNOSIS — T38.0X5A STEROID MYOPATHY: ICD-10-CM

## 2024-05-14 DIAGNOSIS — G72.0 STEROID MYOPATHY: ICD-10-CM

## 2024-05-14 PROCEDURE — 77080 DXA BONE DENSITY AXIAL: CPT

## 2024-05-28 RX ORDER — PREDNISONE 5 MG/1
5 TABLET ORAL DAILY
Qty: 90 TABLET | Refills: 1 | Status: SHIPPED | OUTPATIENT
Start: 2024-05-28

## 2024-05-28 NOTE — TELEPHONE ENCOUNTER
Pharmacy is requesting medication refill. Please approve or deny this request.    Rx requested:  Requested Prescriptions     Pending Prescriptions Disp Refills    predniSONE (DELTASONE) 5 MG tablet [Pharmacy Med Name: PREDNISONE 5MG TABS] 90 tablet 1     Sig: TAKE ONE TABLET BY MOUTH ONCE A DAY         Last Office Visit:   Visit date not found      Next Visit Date:  Future Appointments   Date Time Provider Department Center   6/27/2024  1:15 PM [facility]   7/2/2024  8:30 AM Jeremías Iyer DO [facility]   7/2/2024 10:15 AM Lisbeth Mccray MD [facility]   8/1/2024  2:30 PM Duran Liz MD [place] [facility] Neurology -

## 2024-06-06 DIAGNOSIS — E78.5 DYSLIPIDEMIA: ICD-10-CM

## 2024-06-06 RX ORDER — LOSARTAN POTASSIUM 50 MG/1
50 TABLET ORAL DAILY
Qty: 90 TABLET | Refills: 1 | Status: SHIPPED | OUTPATIENT
Start: 2024-06-06

## 2024-06-06 RX ORDER — ROSUVASTATIN CALCIUM 10 MG/1
10 TABLET, COATED ORAL DAILY
Qty: 90 TABLET | Refills: 1 | Status: SHIPPED | OUTPATIENT
Start: 2024-06-06

## 2024-06-06 NOTE — TELEPHONE ENCOUNTER
Pt is requesting medication refill. Please approve or deny this request.    Rx requested:  Requested Prescriptions     Pending Prescriptions Disp Refills    losartan (COZAAR) 50 MG tablet 90 tablet 1     Sig: Take 1 tablet by mouth daily         Last Office Visit:   6/22/2022      Next Visit Date:  Future Appointments   Date Time Provider Department Center   6/27/2024  1:15 PM Patrick Marinelli MD [place] [facility]   7/2/2024  8:30 AM [facility]ías [facility]   7/2/2024 10:15 AM Lis[facility]   8/1/2024  2:30 PM Duran Liz MD [place] [facility] Neu[facility] -

## 2024-06-06 NOTE — TELEPHONE ENCOUNTER
Pt is requesting medication refill. Please approve or deny this request.    Rx requested:  Requested Prescriptions     Pending Prescriptions Disp Refills    rosuvastatin (CRESTOR) 10 MG tablet 90 tablet 1     Sig: Take 1 tablet by mouth daily         Last Office Visit:   1/15/2024      Next Visit Date:  Future Appointments   Date Time Provider Department Center   6/27/2024  1:15 PM Patrick Marinelli MD [place] [facility] beds [facility] the [facility]   7/2/2024  8:30 AM [facility]   7/2/2024 10:15 AM [facility]   8/1/2024  2:30 PM Duran Liz MD [place] [facility] Neu[facility] -

## 2024-06-08 DIAGNOSIS — K21.9 GASTROESOPHAGEAL REFLUX DISEASE, UNSPECIFIED WHETHER ESOPHAGITIS PRESENT: ICD-10-CM

## 2024-06-10 RX ORDER — ESOMEPRAZOLE MAGNESIUM 40 MG/1
40 CAPSULE, DELAYED RELEASE ORAL DAILY
Qty: 90 CAPSULE | Refills: 3 | Status: SHIPPED | OUTPATIENT
Start: 2024-06-10 | End: 2025-06-05

## 2024-06-17 ENCOUNTER — E-VISIT (OUTPATIENT)
Dept: PRIMARY CARE CLINIC | Age: 49
End: 2024-06-17
Payer: COMMERCIAL

## 2024-06-17 DIAGNOSIS — J06.9 UPPER RESPIRATORY TRACT INFECTION, UNSPECIFIED TYPE: Primary | ICD-10-CM

## 2024-06-17 PROCEDURE — 99422 OL DIG E/M SVC 11-20 MIN: CPT | Performed by: NURSE PRACTITIONER

## 2024-06-17 RX ORDER — AMOXICILLIN AND CLAVULANATE POTASSIUM 875; 125 MG/1; MG/1
1 TABLET, FILM COATED ORAL 2 TIMES DAILY
Qty: 20 TABLET | Refills: 0 | Status: SHIPPED | OUTPATIENT
Start: 2024-06-17 | End: 2024-06-27

## 2024-06-17 ASSESSMENT — LIFESTYLE VARIABLES
SMOKING_YEARS: 10
SMOKING_STATUS: NO, I'M A FORMER SMOKER
PACKS_PER_DAY: 1

## 2024-06-26 NOTE — PROGRESS NOTES
Referred by: PA at [facility]     PCP  Niya Camacho MD         CHIEF COMPLAINT:  Recurrent UTI           HISTORY OF PRESENT ILLNESS:  Recurrent UTI (prescribed augmentin on 24)  Urge incontinence during the night   Hysterectomy age 31  HTN  Autoimmune disease  On long term prednisone (5 mg daily) - Has been taking for 4 years  DM type 2  Metformin, Lantise   A1C 7.4    Patient's history was reviewed. This is a 49 y.o. female who presents with recurrent UTI.  She began having frequent UTIs within the last year, occurring about once a month. Before this, she would have a UTI every 3 months. Currently, she has not had a UTI in 2 months. These UTIs have white blood cells in them, per patient, but unsure if they were culture proven. Frequency and pressure with UTI, but no burning and no fever. There has been microscopic hematuria. In the absence of UTI, DTF5x and NTF2x. She has some urge incontinence during the night and some TAYLOR. She does not wear pads. She feels that she has prolonged void. She feels that her bladder fully empties. She does not feel any prolapse. During the day she drinks at least two 40z bottles of water and one soda with dinner. She does not drink coffee. Patient states that she was told that her urethra is narrow so she had a urethral dilation. She had a hysterectomy at age 31. She is not sexually active.        PMH: dm, htn, sleep apnea  PSH: , Hys  FH: disease, mother; kidney disease father.   Social hx: former smoker- 10 pack years.     Review of Systems   Constitutional: Negative.    HENT:          Ear infection, sore throat, sinus problem   Eyes: Negative.    Respiratory: Negative.     Cardiovascular:         HTN   Gastrointestinal:  Positive for abdominal pain.        Heart burn   Endocrine: Positive for cold intolerance and heat intolerance.        Tired/sluggish   Genitourinary:  Positive for dysuria and hematuria.   Musculoskeletal:  Positive for back pain.  "Negative for neck pain.        Joint pain   Skin: Negative.    Allergic/Immunologic:        Drug allergies   Neurological:  Positive for dizziness.   Hematological: Negative.    Psychiatric/Behavioral: Negative.                 PHYSICAL EXAMINATION:  No LMP recorded.  Body mass index is 48.42 kg/m².  Visit Vitals  /75   Pulse 67   Temp 36.5 °C (97.7 °F)   Ht 1.676 m (5' 6\")   Wt 136 kg (300 lb)   BMI 48.42 kg/m²   BSA 2.52 m²     NP: Constitutional: alert and in no acute distress, well developed, well nourished.   Head and Face: head and face: unremarkable.   Neck: no neck asymmetry, supple.   Pulmonary: no respiratory distress, unremarkable respiratory effort.   Skin: normal skin color and pigmentation, normal skin turgor, and no rash.   Neurologic: cranial nerves: non-focal, grossly intact.   Psychiatric: alert and oriented x 3.     Pelvic:  Sexual Maturity: Normal.   External Genitalia:  Whitish lesion around 5mm wide on interior aspect of labia minora close to clitoris on patients right side. Next to it, there is a 4mm by 2 cm raised lesion that looks like a warty lesion.   Bladder: no prolapse. No rectocele, no cystocele.   Urethra:  -CST when sitting.   Pelvic Floor/Tenderness: Kegel 3/5.   Rectum: Unremarkable.   Anus: Unremarkable.   Perianal area: Unremarkable.     PVR (by Ultrasound): 0   Urine dip:   Recent Results (from the past 6 hour(s))   POCT UA Automated manually resulted    Collection Time: 06/27/24  8:13 AM   Result Value Ref Range    POC Color, Urine Yellow Straw, Yellow, Light-Yellow    POC Appearance, Urine Clear Clear    POC Glucose, Urine NEGATIVE NEGATIVE mg/dl    POC Bilirubin, Urine NEGATIVE NEGATIVE    POC Ketones, Urine NEGATIVE NEGATIVE mg/dl    POC Specific Gravity, Urine 1.020 1.005 - 1.035    POC Blood, Urine NEGATIVE NEGATIVE    POC PH, Urine 6.0 No Reference Range Established PH    POC Protein, Urine TRACE (A) NEGATIVE, 30 (1+) mg/dl    POC Urobilinogen, Urine 0.2 0.2, 1.0 " EU/DL    Poc Nitrite, Urine NEGATIVE NEGATIVE    POC Leukocytes, Urine SMALL (1+) (A) NEGATIVE         IMPRESSION AND PLAN:  Danielle Molina is a 49 y.o. who presents with recurrent UTI.        I would like to confirm if she has true infections by checking urine during peak of symptoms. We will also test PCR at that time. I will place a standing order for urine culture.  Hold off on OAB medication. I would like her to speak with gynecologist about local estrogen cream to help prevent infection.     I asked her to follow up with either Crystal Ponce CNP  or Elsa Joseph CNP in 2 months for re-evaluation or sooner as needed.    All questions and concerns were answered and addressed.  The patient expressed understanding and agrees with the plan.       SIGNATURES  Scribe Attestation  By signing my name below, IJacquelin Scribe   attest that this documentation has been prepared under the direction and in the presence of Luther Pelletier MD.

## 2024-06-27 ENCOUNTER — OFFICE VISIT (OUTPATIENT)
Dept: ENDOCRINOLOGY | Age: 49
End: 2024-06-27

## 2024-06-27 ENCOUNTER — APPOINTMENT (OUTPATIENT)
Dept: UROLOGY | Facility: CLINIC | Age: 49
End: 2024-06-27
Payer: COMMERCIAL

## 2024-06-27 VITALS
WEIGHT: 293 LBS | TEMPERATURE: 97.7 F | HEIGHT: 66 IN | HEART RATE: 67 BPM | BODY MASS INDEX: 47.09 KG/M2 | SYSTOLIC BLOOD PRESSURE: 115 MMHG | DIASTOLIC BLOOD PRESSURE: 75 MMHG

## 2024-06-27 VITALS
OXYGEN SATURATION: 96 % | BODY MASS INDEX: 47.09 KG/M2 | HEART RATE: 71 BPM | WEIGHT: 293 LBS | HEIGHT: 66 IN | SYSTOLIC BLOOD PRESSURE: 114 MMHG | DIASTOLIC BLOOD PRESSURE: 72 MMHG

## 2024-06-27 DIAGNOSIS — E11.43 DIABETIC GASTROPARESIS ASSOCIATED WITH TYPE 2 DIABETES MELLITUS (HCC): ICD-10-CM

## 2024-06-27 DIAGNOSIS — E66.01 MORBID OBESITY (HCC): ICD-10-CM

## 2024-06-27 DIAGNOSIS — N39.0 RECURRENT UTI: ICD-10-CM

## 2024-06-27 DIAGNOSIS — Z79.4 TYPE 2 DIABETES MELLITUS WITH COMPLICATION, WITH LONG-TERM CURRENT USE OF INSULIN (HCC): Primary | ICD-10-CM

## 2024-06-27 DIAGNOSIS — K31.84 DIABETIC GASTROPARESIS ASSOCIATED WITH TYPE 2 DIABETES MELLITUS (HCC): ICD-10-CM

## 2024-06-27 DIAGNOSIS — E11.8 TYPE 2 DIABETES MELLITUS WITH COMPLICATION, WITH LONG-TERM CURRENT USE OF INSULIN (HCC): Primary | ICD-10-CM

## 2024-06-27 LAB
CHP ED QC CHECK: ABNORMAL
GLUCOSE BLD-MCNC: 240 MG/DL
POC APPEARANCE, URINE: CLEAR
POC BILIRUBIN, URINE: NEGATIVE
POC BLOOD, URINE: NEGATIVE
POC COLOR, URINE: YELLOW
POC GLUCOSE, URINE: NEGATIVE MG/DL
POC KETONES, URINE: NEGATIVE MG/DL
POC LEUKOCYTES, URINE: ABNORMAL
POC NITRITE,URINE: NEGATIVE
POC PH, URINE: 6 PH
POC PROTEIN, URINE: ABNORMAL MG/DL
POC SPECIFIC GRAVITY, URINE: 1.02
POC UROBILINOGEN, URINE: 0.2 EU/DL

## 2024-06-27 PROCEDURE — 87086 URINE CULTURE/COLONY COUNT: CPT

## 2024-06-27 PROCEDURE — 51798 US URINE CAPACITY MEASURE: CPT | Performed by: UROLOGY

## 2024-06-27 PROCEDURE — 99203 OFFICE O/P NEW LOW 30 MIN: CPT | Performed by: UROLOGY

## 2024-06-27 PROCEDURE — 81003 URINALYSIS AUTO W/O SCOPE: CPT | Performed by: UROLOGY

## 2024-06-27 RX ORDER — CEPHALEXIN 500 MG/1
1 CAPSULE ORAL
COMMUNITY
Start: 2024-06-19

## 2024-06-27 RX ORDER — PIOGLITAZONEHYDROCHLORIDE 30 MG/1
30 TABLET ORAL DAILY
COMMUNITY

## 2024-06-27 RX ORDER — BLOOD-GLUCOSE SENSOR
EACH MISCELLANEOUS
COMMUNITY
Start: 2024-04-17

## 2024-06-27 RX ORDER — METFORMIN HYDROCHLORIDE 500 MG/1
1 TABLET ORAL 3 TIMES DAILY
COMMUNITY
Start: 2023-09-18

## 2024-06-27 RX ORDER — PEN NEEDLE, DIABETIC 30 GX3/16"
NEEDLE, DISPOSABLE MISCELLANEOUS
COMMUNITY
Start: 2024-01-11

## 2024-06-27 RX ORDER — ROSUVASTATIN CALCIUM 10 MG/1
1 TABLET, COATED ORAL DAILY
COMMUNITY
Start: 2024-06-06

## 2024-06-27 RX ORDER — LOSARTAN POTASSIUM 50 MG/1
1 TABLET ORAL DAILY
COMMUNITY
Start: 2024-06-06

## 2024-06-27 RX ORDER — MECLIZINE HYDROCHLORIDE 25 MG/1
TABLET ORAL
COMMUNITY
Start: 2024-02-01

## 2024-06-27 RX ORDER — BLOOD-GLUCOSE TRANSMITTER
EACH MISCELLANEOUS
COMMUNITY
Start: 2024-01-02

## 2024-06-27 RX ORDER — BACLOFEN 10 MG/1
0.5 TABLET ORAL 2 TIMES DAILY
COMMUNITY
Start: 2024-01-04

## 2024-06-27 RX ORDER — VIT C/E/ZN/COPPR/LUTEIN/ZEAXAN 250MG-90MG
1 CAPSULE ORAL DAILY
COMMUNITY

## 2024-06-27 RX ORDER — CEPHALEXIN 500 MG/1
500 CAPSULE ORAL
COMMUNITY
Start: 2024-06-19

## 2024-06-27 RX ORDER — BLOOD-GLUCOSE,RECEIVER,CONT
EACH MISCELLANEOUS
COMMUNITY
Start: 2024-01-02

## 2024-06-27 RX ORDER — INSULIN LISPRO 100 [IU]/ML
INJECTION, SOLUTION INTRAVENOUS; SUBCUTANEOUS
COMMUNITY
Start: 2024-01-03

## 2024-06-27 RX ORDER — PREDNISONE 5 MG/1
1 TABLET ORAL DAILY
COMMUNITY
Start: 2024-03-05

## 2024-06-27 RX ORDER — METOPROLOL TARTRATE 100 MG/1
1 TABLET ORAL 2 TIMES DAILY
COMMUNITY
Start: 2024-02-05

## 2024-06-27 ASSESSMENT — ENCOUNTER SYMPTOMS
ABDOMINAL PAIN: 1
BACK PAIN: 1
RESPIRATORY NEGATIVE: 1
DIZZINESS: 1
HEMATURIA: 1
ROS GI COMMENTS: HEART BURN
CONSTITUTIONAL NEGATIVE: 1
HEMATOLOGIC/LYMPHATIC NEGATIVE: 1
NECK PAIN: 0
EYES NEGATIVE: 1
PSYCHIATRIC NEGATIVE: 1
DYSURIA: 1
ALLERGIC/IMMUNOLOGIC COMMENTS: DRUG ALLERGIES

## 2024-06-27 NOTE — PROGRESS NOTES
02/10/2021    COLONOSCOPY DIAGNOSTIC performed by Fletcher Vallejo MD at [facility]    COLONOSCOPY N/A 3/8/2024    COLORECTAL CANCER SCREENING, HIGH RISK with polypectomy performed by Fletcher Vallejo MD at [facility]    LEEP  2005    MD BIOPSY MUSCLE SUPERFICIAL Left 2018    LEFT QUADRICEPS MUSCLE BIOPSY performed by Rony Joseph MD at [facility] OR    MD PERQ VERT AGMNTJ CAVITY CRTJ UNI/BI CANNULATION N/A 2018    T 12 VERTEBRALPLASTY ROOM 278 performed by Mauri Woodruff MD at [facility] OR    NASREEN AND BSO (CERVIX REMOVED)      TONSILLECTOMY AND ADENOIDECTOMY  1981    UPPER GASTROINTESTINAL ENDOSCOPY  10/13/2015    DR. HERRERA     UPPER GASTROINTESTINAL ENDOSCOPY N/A 02/10/2021    EGD ESOPHAGOGASTRODUODENOSCOPY performed by Fletcher Vallejo MD at [facility]    URETHRA SURGERY  2015     Social History     Socioeconomic History    Marital status:      Spouse name: Not on file    Number of children: 1    Years of education: Not on file    Highest education level: Not on file   Occupational History    Not on file   Tobacco Use    Smoking status: Former     Current packs/day: 0.00     Average packs/day: 1 pack/day for 20.0 years (20.0 ttl pk-yrs)     Types: Cigarettes     Start date: 1997     Quit date: 2017     Years since quittin.4    Smokeless tobacco: Never   Vaping Use    Vaping Use: Never used   Substance and Sexual Activity    Alcohol use: Yes     Alcohol/week: 0.0 standard drinks of alcohol     Comment: socially    Drug use: No    Sexual activity: Yes     Partners: Male     Birth control/protection: Surgical     Comment: single   Other Topics Concern    Not on file   Social History Narrative    Social/Functional History     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      1 child      Social Determinants of Health     Financial Resource Strain: Low Risk  (2023)    Overall Financial Resource Strain (CARDIA)     Difficulty of Paying

## 2024-06-28 ENCOUNTER — OFFICE VISIT (OUTPATIENT)
Dept: OBGYN CLINIC | Age: 49
End: 2024-06-28

## 2024-06-28 VITALS
BODY MASS INDEX: 47.09 KG/M2 | DIASTOLIC BLOOD PRESSURE: 74 MMHG | WEIGHT: 293 LBS | HEIGHT: 66 IN | SYSTOLIC BLOOD PRESSURE: 116 MMHG

## 2024-06-28 DIAGNOSIS — N90.89 LABIAL LESION: ICD-10-CM

## 2024-06-28 DIAGNOSIS — E66.01 MORBID OBESITY WITH BMI OF 45.0-49.9, ADULT (HCC): ICD-10-CM

## 2024-06-28 DIAGNOSIS — Z01.419 ENCOUNTER FOR WELL WOMAN EXAM WITH ROUTINE GYNECOLOGICAL EXAM: Primary | ICD-10-CM

## 2024-06-28 DIAGNOSIS — Z11.51 SCREENING FOR HUMAN PAPILLOMAVIRUS: ICD-10-CM

## 2024-06-28 DIAGNOSIS — Z01.419 ENCOUNTER FOR WELL WOMAN EXAM WITH ROUTINE GYNECOLOGICAL EXAM: ICD-10-CM

## 2024-06-28 LAB — BACTERIA UR CULT: NO GROWTH

## 2024-06-28 ASSESSMENT — ENCOUNTER SYMPTOMS
SORE THROAT: 0
COUGH: 0
DIARRHEA: 0
WHEEZING: 0
SHORTNESS OF BREATH: 0
CONSTIPATION: 0
ABDOMINAL PAIN: 0
VOMITING: 0
ABDOMINAL DISTENTION: 0
BLOOD IN STOOL: 0
NAUSEA: 0

## 2024-06-28 NOTE — PROGRESS NOTES
Subjective:      Bertha rodriguez 49 y.o. female  here for routine exam.  Current Complaints: no breast pain or new or enlarging lumps on self exam, she complains of vulvar lesion and frequent urination patient thought she was having recurrent UTI.  Reviewed pelvic exam as well as urine analysis they were all negative the only positive UTI with bacteria was in .    Menstrual history:   Absent hysterectomy  Sexual activity:  yes, denies knowledge of risky exposure  Abnormalvaginal discharge:  No  Contraceptive method:  status post hysterectomy    Vitals:  /74   Ht 1.676 m (5' 6\")   Wt 135.6 kg (299 lb)   LMP  (LMP Unknown)   BMI 48.26 kg/m²   Allergies:Morphine and codeine, Ace inhibitors, Bactrim [sulfamethoxazole-trimethoprim], Nitroglycerin, Percocet [oxycodone-acetaminophen], and Victoza [liraglutide]  Past Medical History:   Diagnosis Date    Abnormal Pap smear of cervix     Acute midline thoracic back pain 2018    Autoimmune disorder (HCC)     B12 deficiency     Family history of premature CAD 2013    Fatty liver     Gastroesophageal reflux disease 2021    Gastroparesis     Gestational diabetes mellitus     HPV (human papilloma virus) anogenital infection     Hyperlipidemia     Hypertension     Irritable bowel syndrome with diarrhea 2021    Liver hemangioma     Lumbar radiculopathy 2021    Menopausal symptoms     Obesity 2013    BMI 43.42    Orthostatic hypotension     Ovarian cyst     Overactive bladder     PMR (polymyalgia rheumatica) ([facility])     Stress incontinence     Tobacco abuse 2015    Tremor     Type 2 diabetes mellitus without complication (HCC)     Uncontrolled diabetes mellitus with complications     Unspecified sleep apnea      Past Surgical History:   Procedure Laterality Date    ANKLE FRACTURE SURGERY Right 09/15/2023    Right open reduction internal fixation lateral malleolus, possible syndesmotic fixation, requesting

## 2024-06-30 PROBLEM — E11.43 DIABETIC GASTROPARESIS ASSOCIATED WITH TYPE 2 DIABETES MELLITUS (HCC): Status: ACTIVE | Noted: 2024-06-30

## 2024-06-30 PROBLEM — K31.84 DIABETIC GASTROPARESIS ASSOCIATED WITH TYPE 2 DIABETES MELLITUS (HCC): Status: ACTIVE | Noted: 2024-06-30

## 2024-07-02 ENCOUNTER — OFFICE VISIT (OUTPATIENT)
Dept: FAMILY MEDICINE CLINIC | Age: 49
End: 2024-07-02
Payer: COMMERCIAL

## 2024-07-02 VITALS
BODY MASS INDEX: 47.09 KG/M2 | OXYGEN SATURATION: 94 % | WEIGHT: 293 LBS | TEMPERATURE: 97.1 F | DIASTOLIC BLOOD PRESSURE: 84 MMHG | HEART RATE: 69 BPM | SYSTOLIC BLOOD PRESSURE: 134 MMHG | HEIGHT: 66 IN

## 2024-07-02 DIAGNOSIS — Z12.31 ENCOUNTER FOR SCREENING MAMMOGRAM FOR MALIGNANT NEOPLASM OF BREAST: ICD-10-CM

## 2024-07-02 DIAGNOSIS — E78.5 DYSLIPIDEMIA: ICD-10-CM

## 2024-07-02 DIAGNOSIS — E11.43 DIABETIC GASTROPARESIS ASSOCIATED WITH TYPE 2 DIABETES MELLITUS (HCC): ICD-10-CM

## 2024-07-02 DIAGNOSIS — E53.8 B12 DEFICIENCY: ICD-10-CM

## 2024-07-02 DIAGNOSIS — G47.33 OSA (OBSTRUCTIVE SLEEP APNEA): ICD-10-CM

## 2024-07-02 DIAGNOSIS — I10 HYPERTENSION, UNSPECIFIED TYPE: ICD-10-CM

## 2024-07-02 DIAGNOSIS — K21.9 GASTROESOPHAGEAL REFLUX DISEASE, UNSPECIFIED WHETHER ESOPHAGITIS PRESENT: ICD-10-CM

## 2024-07-02 DIAGNOSIS — L98.9 SKIN LESION: Primary | ICD-10-CM

## 2024-07-02 DIAGNOSIS — K31.84 DIABETIC GASTROPARESIS ASSOCIATED WITH TYPE 2 DIABETES MELLITUS (HCC): ICD-10-CM

## 2024-07-02 DIAGNOSIS — E66.01 MORBID OBESITY (HCC): ICD-10-CM

## 2024-07-02 DIAGNOSIS — M35.3 PMR (POLYMYALGIA RHEUMATICA) (HCC): ICD-10-CM

## 2024-07-02 DIAGNOSIS — F41.9 ANXIETY: ICD-10-CM

## 2024-07-02 DIAGNOSIS — I27.20 PULMONARY HYPERTENSION (HCC): ICD-10-CM

## 2024-07-02 PROBLEM — G82.20 PARAPARESIS (HCC): Status: RESOLVED | Noted: 2018-06-18 | Resolved: 2024-07-02

## 2024-07-02 PROCEDURE — 3079F DIAST BP 80-89 MM HG: CPT | Performed by: FAMILY MEDICINE

## 2024-07-02 PROCEDURE — 99214 OFFICE O/P EST MOD 30 MIN: CPT | Performed by: FAMILY MEDICINE

## 2024-07-02 PROCEDURE — 3051F HG A1C>EQUAL 7.0%<8.0%: CPT | Performed by: FAMILY MEDICINE

## 2024-07-02 PROCEDURE — 3075F SYST BP GE 130 - 139MM HG: CPT | Performed by: FAMILY MEDICINE

## 2024-07-02 RX ORDER — BACLOFEN 10 MG/1
TABLET ORAL
Qty: 90 TABLET | Refills: 1 | Status: SHIPPED | OUTPATIENT
Start: 2024-07-02

## 2024-07-02 RX ORDER — TRIAMCINOLONE ACETONIDE 1 MG/G
OINTMENT TOPICAL 2 TIMES DAILY
Qty: 15 G | Refills: 0 | Status: SHIPPED | OUTPATIENT
Start: 2024-07-02 | End: 2024-07-09

## 2024-07-02 NOTE — PROGRESS NOTES
Chief Complaint   Patient presents with    6 Month Follow-Up     Patient has no issues/concerns    Health Maintenance     Yes to mammogram        HPI: Bertha Hebert 49 y.o. female presenting for           Low back pain   Fractured T12   Has a history of sciatic pain   Does not see a pain doctor   Takes the prednisone every day  to help with her symptoms     F/u  Stable   No issues or concerns     PMR   Neurologist   Takes 5 mg prednisone daily   Admits if she has a flare it is increased to 20 mg   Stable at this time    GERD   Patient takes omeprzole 40 mg daily     Tobacco abuse   Former user   Quit     Hypertension   Lata is on cozaar 50 mg daily.   Hydrochlorothiazide 25 mg daily   Metoprolol 100 mg BID   Watches salt in the diet   BP Readings from Last 20 Encounters:   07/02/24 134/84   06/28/24 116/74   06/27/24 114/72   03/14/24 110/60   03/08/24 111/70   02/20/24 126/80   02/01/24 104/70   01/15/24 112/72   12/07/23 128/84   11/13/23 114/76   11/09/23 133/75   11/08/23 116/68   09/28/23 (!) 135/58   09/28/23 124/71   09/15/23 129/80   09/03/23 (!) 142/73   08/03/23 106/64   07/11/23 107/69   06/13/23 121/73   05/11/23 128/84   ]        DM / gastroparesis   Hemoglobin A1C   Date Value Ref Range Status   04/30/2024 7.7 (H) 4.0 - 6.0 % Final   Sees dr. Marinelli for the diabetes   Patient needs to have blood work done again.    Gastroparesis - makes it difficult on her body.    Reprots that her sugars have been high in the last couple of weeks   Had eyes checked - no DM retinopathy.     Fatty liver   Established with Gastroenterologist   Patient is seeing them for gastroparesis       HLD   Lata is on crestor 10 mg daily.    The 10-year ASCVD risk score (Brenna MARTINEZ, et al., 2019) is: 3.3%    Values used to calculate the score:      Age: 49 years      Sex: Female      Is Non- : No      Diabetic: Yes      Tobacco smoker: No      Systolic Blood Pressure: 134 mmHg      Is BP treated:

## 2024-07-03 ENCOUNTER — OFFICE VISIT (OUTPATIENT)
Dept: FAMILY MEDICINE CLINIC | Age: 49
End: 2024-07-03
Payer: COMMERCIAL

## 2024-07-03 VITALS
HEIGHT: 66 IN | RESPIRATION RATE: 12 BRPM | BODY MASS INDEX: 47.09 KG/M2 | OXYGEN SATURATION: 98 % | TEMPERATURE: 97.3 F | WEIGHT: 293 LBS | SYSTOLIC BLOOD PRESSURE: 126 MMHG | HEART RATE: 65 BPM | DIASTOLIC BLOOD PRESSURE: 68 MMHG

## 2024-07-03 DIAGNOSIS — E11.8 TYPE 2 DIABETES MELLITUS WITH COMPLICATION, WITH LONG-TERM CURRENT USE OF INSULIN (HCC): ICD-10-CM

## 2024-07-03 DIAGNOSIS — N30.01 ACUTE CYSTITIS WITH HEMATURIA: Primary | ICD-10-CM

## 2024-07-03 DIAGNOSIS — Z79.4 TYPE 2 DIABETES MELLITUS WITH COMPLICATION, WITH LONG-TERM CURRENT USE OF INSULIN (HCC): ICD-10-CM

## 2024-07-03 DIAGNOSIS — C51.9 VULVAR CANCER (HCC): Primary | ICD-10-CM

## 2024-07-03 DIAGNOSIS — R35.0 FREQUENCY OF URINATION: ICD-10-CM

## 2024-07-03 LAB
BILIRUBIN, POC: NORMAL
BLOOD URINE, POC: NORMAL
CLARITY, POC: CLEAR
COLOR, POC: NORMAL
GLUCOSE URINE, POC: NORMAL
HPV HR 12 DNA SPEC QL NAA+PROBE: NOT DETECTED
HPV16 DNA SPEC QL NAA+PROBE: NOT DETECTED
HPV16+18+H RISK 12 DNA SPEC-IMP: NORMAL
HPV18 DNA SPEC QL NAA+PROBE: NOT DETECTED
KETONES, POC: NORMAL
LEUKOCYTE EST, POC: NORMAL
NITRITE, POC: NORMAL
PH, POC: 7
PROTEIN, POC: NORMAL
SPECIFIC GRAVITY, POC: 1.01
UROBILINOGEN, POC: NORMAL

## 2024-07-03 PROCEDURE — 99213 OFFICE O/P EST LOW 20 MIN: CPT | Performed by: NURSE PRACTITIONER

## 2024-07-03 PROCEDURE — 81003 URINALYSIS AUTO W/O SCOPE: CPT | Performed by: NURSE PRACTITIONER

## 2024-07-03 RX ORDER — CIPROFLOXACIN 500 MG/1
500 TABLET, FILM COATED ORAL 2 TIMES DAILY
Qty: 14 TABLET | Refills: 0 | Status: SHIPPED | OUTPATIENT
Start: 2024-07-03 | End: 2024-07-10

## 2024-07-03 ASSESSMENT — ENCOUNTER SYMPTOMS
VOMITING: 0
DIARRHEA: 0
ABDOMINAL PAIN: 0
NAUSEA: 0

## 2024-07-03 NOTE — PROGRESS NOTES
Subjective:      Patient ID: Bertha Hebert is a 49 y.o. female who presents today for:  Chief Complaint   Patient presents with    Urinary Tract Infection     Patient present today with possible UTI with frequency, pain and pressure since last night. She tried Azo with some relief.       HPI  Patient is here with burning, frequency of urination that started last night.   She is taking AZO.   Denies fever or chills,.   Denies any flank pain.   Past Medical History:   Diagnosis Date    Abnormal Pap smear of cervix     Acute midline thoracic back pain 2018    Autoimmune disorder (HCC)     B12 deficiency     Family history of premature CAD 2013    Fatty liver     Gastroesophageal reflux disease 2021    Gastroparesis     Gestational diabetes mellitus     HPV (human papilloma virus) anogenital infection     Hyperlipidemia     Hypertension     Irritable bowel syndrome with diarrhea 2021    Liver hemangioma     Lumbar radiculopathy 2021    Menopausal symptoms     Obesity 2013    BMI 43.42    Orthostatic hypotension     Ovarian cyst     Overactive bladder     PMR (polymyalgia rheumatica) (HCC)     Stress incontinence     Tobacco abuse 2015    Tremor     Type 2 diabetes mellitus without complication (HCC)     Uncontrolled diabetes mellitus with complications     Unspecified sleep apnea      Past Surgical History:   Procedure Laterality Date    ANKLE FRACTURE SURGERY Right 09/15/2023    Right open reduction internal fixation lateral malleolus, possible syndesmotic fixation, requesting large C arm, bone foam, Synthes (Bridger) -Laurent's initial note will serve as PAT, Case Comments/Implants: as above , Anesthesia Requested: choice. BRIDGER performed by Derek Smith DO at [facility] [place]    CARDIAC CATHETERIZATION  2007     SECTION      COLONOSCOPY      for diarrhea (-)    COLONOSCOPY N/A 02/10/2021    COLONOSCOPY DIAGNOSTIC performed by Fletcher Vallejo MD at [facility]

## 2024-07-07 LAB
BACTERIA UR CULT: ABNORMAL
ORGANISM: ABNORMAL
ORGANISM: ABNORMAL

## 2024-07-11 ENCOUNTER — HOSPITAL ENCOUNTER (OUTPATIENT)
Dept: WOMENS IMAGING | Age: 49
Discharge: HOME | End: 2024-07-13
Payer: COMMERCIAL

## 2024-07-11 DIAGNOSIS — Z12.31 ENCOUNTER FOR SCREENING MAMMOGRAM FOR MALIGNANT NEOPLASM OF BREAST: ICD-10-CM

## 2024-07-11 PROCEDURE — 77063 BREAST TOMOSYNTHESIS BI: CPT

## 2024-07-15 ENCOUNTER — PATIENT MESSAGE (OUTPATIENT)
Dept: FAMILY MEDICINE CLINIC | Age: 49
End: 2024-07-15

## 2024-07-15 NOTE — TELEPHONE ENCOUNTER
From: Bertha Hebert  To: Dr. Lisbeth Mccray  Sent: 7/13/2024 4:49 PM EDT  Subject: Appointment Request    Appointment Request From: Bertha Hebert    With Provider: Lisbeth Mccray MD [[facility]]    Preferred Date Range: 7/19/2024 – 7/19/2024    Preferred Times: Friday Afternoon    Reason for visit: Office Visit    Comments:  Left leg circulation

## 2024-07-23 ENCOUNTER — CLINICAL DOCUMENTATION (OUTPATIENT)
Dept: PHARMACY | Facility: CLINIC | Age: 49
End: 2024-07-23

## 2024-07-23 NOTE — PROGRESS NOTES
2nd Quarterly Reminder sent to patient for the DM Program - See Mychart message or Letter for more information.    Rosita Martinez CphT  [facility]  Clinical Pharmacy   Department, toll free: [phone] Option #3    For Pharmacy Admin Tracking Only    Program: [employer]  CPA in place:  No  Gap Closed?: Yes   Time Spent (min): 5

## 2024-07-24 DIAGNOSIS — E11.8 TYPE 2 DIABETES MELLITUS WITH COMPLICATION, WITH LONG-TERM CURRENT USE OF INSULIN (HCC): ICD-10-CM

## 2024-07-24 DIAGNOSIS — Z79.4 TYPE 2 DIABETES MELLITUS WITH COMPLICATION, WITH LONG-TERM CURRENT USE OF INSULIN (HCC): ICD-10-CM

## 2024-07-24 RX ORDER — INSULIN LISPRO 100 [IU]/ML
INJECTION, SOLUTION INTRAVENOUS; SUBCUTANEOUS
Qty: 108 ML | Refills: 1 | Status: SHIPPED | OUTPATIENT
Start: 2024-07-24

## 2024-07-30 DIAGNOSIS — K31.84 GASTROPARESIS: ICD-10-CM

## 2024-07-30 DIAGNOSIS — Z79.4 TYPE 2 DIABETES MELLITUS WITH COMPLICATION, WITH LONG-TERM CURRENT USE OF INSULIN (HCC): ICD-10-CM

## 2024-07-30 DIAGNOSIS — E11.8 TYPE 2 DIABETES MELLITUS WITH COMPLICATION, WITH LONG-TERM CURRENT USE OF INSULIN (HCC): ICD-10-CM

## 2024-07-30 RX ORDER — HYDROCHLOROTHIAZIDE 25 MG/1
25 TABLET ORAL DAILY
Qty: 90 TABLET | Refills: 1 | Status: SHIPPED | OUTPATIENT
Start: 2024-07-30

## 2024-07-30 RX ORDER — PIOGLITAZONEHYDROCHLORIDE 30 MG/1
30 TABLET ORAL DAILY
Qty: 90 TABLET | Refills: 3 | Status: SHIPPED | OUTPATIENT
Start: 2024-07-30

## 2024-07-30 RX ORDER — VENLAFAXINE HYDROCHLORIDE 75 MG/1
CAPSULE, EXTENDED RELEASE ORAL
Qty: 270 CAPSULE | Refills: 1 | Status: SHIPPED | OUTPATIENT
Start: 2024-07-30

## 2024-07-30 RX ORDER — METOPROLOL TARTRATE 100 MG/1
100 TABLET ORAL 2 TIMES DAILY
Qty: 180 TABLET | Refills: 1 | Status: SHIPPED | OUTPATIENT
Start: 2024-07-30

## 2024-07-30 RX ORDER — SOLIFENACIN SUCCINATE 10 MG/1
10 TABLET, FILM COATED ORAL DAILY
Qty: 90 TABLET | Refills: 1 | Status: SHIPPED | OUTPATIENT
Start: 2024-07-30

## 2024-07-30 NOTE — TELEPHONE ENCOUNTER
Future Appointments    Encounter Information   Provider Department Appt Notes   8/1/2024 Duran Liz MD [facility] Neurology 6 MOS FUP   8/9/2024 Lisbeth Mccray MD [facility] circulation issues in feet   12/23/2024 Patrick Marinelli MD [facility] Endo 6 month   1/7/2025 Lisbeth Mccray MD [facility] 6 mo f/u // sxc     Past Visits    Date Provider Specialty Visit Type Primary Dx   07/03/2024 Carmella Epperson, SANIA - CNP Family Medicine Office Visit Acute cystitis with hematuria   07/02/2024 Lisbeth Mccray MD Family Medicine

## 2024-07-30 NOTE — TELEPHONE ENCOUNTER
Future Appointments    Encounter Information   Provider Department Appt Notes   8/1/2024 Duran Liz MD [facility] Neurology 6 MOS FUP   8/9/2024 Lisbeth Mccray MD [facility] circulation issues in feet   12/23/2024 Patrick Marinelli MD [facility] Endo 6 month   1/7/2025 Lisbeth Mccray MD [facility] 6 mo f/u // sxc     Past Visits    Date Provider Specialty Visit Type Primary Dx   07/03/2024 Carmella Epperson, SANIA - CNP Family Medicine Office Visit Acute cystitis with hematuria   07/02/2024 Lisbeth Mccray MD Family Medicine

## 2024-07-30 NOTE — TELEPHONE ENCOUNTER
Comments:     Last Office Visit (last PCP visit):   7/2/2024    Next Visit Date:  Future Appointments   Date Time Provider Department Center   8/1/2024  2:30 PM Duran Liz MD [place] [facility] Neurology -   8/9/2024  9:15 AM Lisbeth Mccray MD [facility]   12/23/2024  9:00 AM Patrick Marinelli MD [place] [facility]   1/7/2025  8:00 AM Lisbeth Mccray MD [facility]       **If hasn't been seen in over a year OR hasn't followed up according to last diabetes/ADHD visit, make appointment for patient before sending refill to provider.    Rx requested:  Requested Prescriptions     Pending Prescriptions Disp Refills    venlafaxine (EFFEXOR XR) 75 MG extended release capsule 270 capsule 1     Sig: TAKE TWO CAPSULES BY MOUTH EVERY MORNING AND TAKE ONE CAPSULE EVERY EVENING.

## 2024-08-01 ENCOUNTER — OFFICE VISIT (OUTPATIENT)
Dept: NEUROLOGY | Age: 49
End: 2024-08-01
Payer: COMMERCIAL

## 2024-08-01 VITALS
SYSTOLIC BLOOD PRESSURE: 120 MMHG | HEART RATE: 77 BPM | WEIGHT: 293 LBS | DIASTOLIC BLOOD PRESSURE: 64 MMHG | BODY MASS INDEX: 48.12 KG/M2

## 2024-08-01 DIAGNOSIS — M35.3 POLYMYALGIA RHEUMATICA (HCC): Primary | ICD-10-CM

## 2024-08-01 DIAGNOSIS — M54.16 LUMBAR RADICULOPATHY: ICD-10-CM

## 2024-08-01 PROCEDURE — 99214 OFFICE O/P EST MOD 30 MIN: CPT | Performed by: PSYCHIATRY & NEUROLOGY

## 2024-08-01 RX ORDER — ERYTHROMYCIN 250 MG/1
250 TABLET, COATED ORAL 3 TIMES DAILY
Qty: 90 TABLET | Refills: 3 | Status: SHIPPED | OUTPATIENT
Start: 2024-08-01

## 2024-08-01 NOTE — PROGRESS NOTES
Subjective:      Patient ID: Bertha Hebert is a 49 y.o. female who presents today for:  Chief Complaint   Patient presents with    6 Month Follow-Up     Pt states everything is fine nothing change. No questions or concerns at this time        HPI 49-year-old right-handed female with history of polymyalgia rheumatica with muscle weakness with steroid myopathy with areflexia and obesity.  Patient is probably benign paroxysmal positional vertigo.  Patient has been on prednisone for quite some time with response and we will try and monitor her CRP and sed rate.  She still ambulatory..  Continue to discuss gastric bypass as well.  Patient already on vitamin D and calcium replacements without any fractures.  Would further obtain a bone density test which was normal.  Patient is only on 5 mg of Deltasone    Patient actually doing very well on the low-dose.  She has gastroparesis and therefore not a candidate for Ozempic for weight loss    Past Medical History:   Diagnosis Date    Abnormal Pap smear of cervix     Acute midline thoracic back pain 09/18/2018    Autoimmune disorder (HCC)     B12 deficiency     Family history of premature CAD 09/04/2013    Fatty liver     Gastroesophageal reflux disease 01/06/2021    Gastroparesis     Gestational diabetes mellitus     HPV (human papilloma virus) anogenital infection     Hyperlipidemia     Hypertension     Irritable bowel syndrome with diarrhea 04/26/2021    Liver hemangioma 2009/2015    Lumbar radiculopathy 07/07/2021    Menopausal symptoms     Obesity 03/11/2013    BMI 43.42    Orthostatic hypotension     Ovarian cyst     Overactive bladder     PMR (polymyalgia rheumatica) ([facility])     Stress incontinence     Tobacco abuse 09/03/2015    Tremor     Type 2 diabetes mellitus without complication ([facility])     Uncontrolled diabetes mellitus with complications     Unspecified sleep apnea      Past Surgical History:   Procedure Laterality Date    ANKLE FRACTURE SURGERY Right

## 2024-08-09 ENCOUNTER — OFFICE VISIT (OUTPATIENT)
Dept: FAMILY MEDICINE CLINIC | Age: 49
End: 2024-08-09
Payer: COMMERCIAL

## 2024-08-09 VITALS
WEIGHT: 293 LBS | BODY MASS INDEX: 47.09 KG/M2 | OXYGEN SATURATION: 98 % | DIASTOLIC BLOOD PRESSURE: 72 MMHG | TEMPERATURE: 97 F | SYSTOLIC BLOOD PRESSURE: 128 MMHG | HEART RATE: 62 BPM | HEIGHT: 66 IN

## 2024-08-09 DIAGNOSIS — I73.00 RAYNAUD'S PHENOMENON WITHOUT GANGRENE: Primary | ICD-10-CM

## 2024-08-09 DIAGNOSIS — N39.0 RECURRENT UTI: ICD-10-CM

## 2024-08-09 DIAGNOSIS — R20.9 PAINFUL AND COLD LOWER EXTREMITY: ICD-10-CM

## 2024-08-09 DIAGNOSIS — J32.0 MAXILLARY SINUSITIS, UNSPECIFIED CHRONICITY: ICD-10-CM

## 2024-08-09 DIAGNOSIS — H60.549 DERMATITIS OF EAR CANAL, UNSPECIFIED LATERALITY: ICD-10-CM

## 2024-08-09 DIAGNOSIS — M79.606 PAINFUL AND COLD LOWER EXTREMITY: ICD-10-CM

## 2024-08-09 PROCEDURE — 99214 OFFICE O/P EST MOD 30 MIN: CPT | Performed by: FAMILY MEDICINE

## 2024-08-09 RX ORDER — DOXYCYCLINE HYCLATE 100 MG
100 TABLET ORAL 2 TIMES DAILY
Qty: 20 TABLET | Refills: 0 | Status: SHIPPED | OUTPATIENT
Start: 2024-08-09 | End: 2024-08-19

## 2024-08-09 RX ORDER — TRIAMCINOLONE ACETONIDE 1 MG/G
OINTMENT TOPICAL 2 TIMES DAILY
Qty: 15 G | Refills: 0 | Status: SHIPPED | OUTPATIENT
Start: 2024-08-09 | End: 2024-08-16

## 2024-08-09 NOTE — PROGRESS NOTES
g; Refill: 0    5. Recurrent UTI    - Culture, Urine; Future                Please note, this report has been partially produced using speech recognition software  and may cause  and /or contain errors related to that system including grammar, punctuation and spelling as well as words and phrases that may seem inappropriate. If there are questions or concerns please feel free to contact me to clarify.

## 2024-08-19 ENCOUNTER — HOSPITAL ENCOUNTER (OUTPATIENT)
Dept: ULTRASOUND IMAGING | Age: 49
Discharge: HOME | End: 2024-08-21
Attending: FAMILY MEDICINE
Payer: COMMERCIAL

## 2024-08-19 DIAGNOSIS — I73.00 RAYNAUD'S PHENOMENON WITHOUT GANGRENE: ICD-10-CM

## 2024-08-19 DIAGNOSIS — M79.606 PAINFUL AND COLD LOWER EXTREMITY: ICD-10-CM

## 2024-08-19 DIAGNOSIS — R20.9 PAINFUL AND COLD LOWER EXTREMITY: ICD-10-CM

## 2024-08-19 PROCEDURE — 93930 UPPER EXTREMITY STUDY: CPT

## 2024-08-19 PROCEDURE — 93926 LOWER EXTREMITY STUDY: CPT

## 2024-08-21 LAB
VAS LEFT ATA DIST PSV: 95.7 CM/S
VAS LEFT ATA MID PSV: 110 CM/S
VAS LEFT ATA PROX PSV: 109 CM/S
VAS LEFT CFA PROX PSV: 213 CM/S
VAS LEFT PERONEAL DIST PSV: 105 CM/S
VAS LEFT PERONEAL MID PSV: 94.4 CM/S
VAS LEFT PERONEAL PROX PSV: 63.4 CM/S
VAS LEFT POP A DIST PSV: 96.6 CM/S
VAS LEFT POP A PROX PSV: 84.5 CM/S
VAS LEFT PTA DIST PSV: 47.3 CM/S
VAS LEFT PTA MID PSV: 51.8 CM/S
VAS LEFT PTA PROX PSV: 82.1 CM/S
VAS LEFT SFA DIST PSV: 137 CM/S
VAS LEFT SFA MID PSV: 127 CM/S
VAS LEFT SFA PROX PSV: 142 CM/S

## 2024-08-21 PROCEDURE — 93926 LOWER EXTREMITY STUDY: CPT | Performed by: RADIOLOGY

## 2024-08-21 PROCEDURE — 93930 UPPER EXTREMITY STUDY: CPT | Performed by: RADIOLOGY

## 2024-08-22 ENCOUNTER — E-VISIT (OUTPATIENT)
Dept: PRIMARY CARE CLINIC | Age: 49
End: 2024-08-22
Payer: COMMERCIAL

## 2024-08-22 ENCOUNTER — OFFICE VISIT (OUTPATIENT)
Dept: FAMILY MEDICINE CLINIC | Age: 49
End: 2024-08-22
Payer: COMMERCIAL

## 2024-08-22 VITALS
TEMPERATURE: 97.9 F | HEART RATE: 60 BPM | DIASTOLIC BLOOD PRESSURE: 62 MMHG | SYSTOLIC BLOOD PRESSURE: 118 MMHG | BODY MASS INDEX: 47.09 KG/M2 | OXYGEN SATURATION: 96 % | WEIGHT: 293 LBS | HEIGHT: 66 IN

## 2024-08-22 DIAGNOSIS — R21 RASH: Primary | ICD-10-CM

## 2024-08-22 DIAGNOSIS — M79.606 PAINFUL AND COLD LOWER EXTREMITY: Primary | ICD-10-CM

## 2024-08-22 DIAGNOSIS — R20.9 PAINFUL AND COLD LOWER EXTREMITY: Primary | ICD-10-CM

## 2024-08-22 DIAGNOSIS — I73.9 PAD (PERIPHERAL ARTERY DISEASE) (HCC): ICD-10-CM

## 2024-08-22 DIAGNOSIS — R21 RASH AND NONSPECIFIC SKIN ERUPTION: Primary | ICD-10-CM

## 2024-08-22 PROCEDURE — 99213 OFFICE O/P EST LOW 20 MIN: CPT | Performed by: NURSE PRACTITIONER

## 2024-08-22 PROCEDURE — 99422 OL DIG E/M SVC 11-20 MIN: CPT | Performed by: NURSE PRACTITIONER

## 2024-08-22 RX ORDER — TRIAMCINOLONE ACETONIDE 1 MG/G
OINTMENT TOPICAL
Qty: 80 G | Refills: 1 | Status: SHIPPED | OUTPATIENT
Start: 2024-08-22

## 2024-08-22 RX ORDER — LORATADINE 10 MG/1
10 TABLET ORAL DAILY
Qty: 14 TABLET | Refills: 1 | Status: SHIPPED | OUTPATIENT
Start: 2024-08-22

## 2024-08-22 RX ORDER — METHYLPREDNISOLONE 4 MG/1
TABLET ORAL
Qty: 1 KIT | Refills: 1 | Status: SHIPPED | OUTPATIENT
Start: 2024-08-22 | End: 2024-08-28

## 2024-08-22 ASSESSMENT — ENCOUNTER SYMPTOMS
COLOR CHANGE: 1
DIARRHEA: 0
ABDOMINAL PAIN: 0
NAUSEA: 0

## 2024-08-22 NOTE — PROGRESS NOTES
Subjective  Bertha Hebert, 49 y.o. female presents today with:  Chief Complaint   Patient presents with    Rash     X4 days started on hands and started creeping up arms       HPI  Presents to  for a skin concern   Rash erupted: Monday   Only change was [place] at the fair   C/o itching   Denies drainage from affected sites   Sleep interrupted   Denies fever or chills   Denies joint pain associated with rash  Denies tickle in throat, cough or difficultly eating or drinking   Has tried oral Benadryl                       Past Medical History:   Diagnosis Date    Abnormal Pap smear of cervix     Acute midline thoracic back pain 2018    Anxiety 2008    Autoimmune disorder (HCC)     B12 deficiency     Family history of premature CAD 2013    Fatty liver     Gastroesophageal reflux disease 2021    Gastroparesis     Gestational diabetes mellitus     HPV (human papilloma virus) anogenital infection     Hyperlipidemia     Hypertension     Irritable bowel syndrome with diarrhea 2021    Liver hemangioma     Lumbar radiculopathy 2021    Menopausal symptoms     Obesity 2013    BMI 43.42    Orthostatic hypotension     Ovarian cyst     Overactive bladder     PMR (polymyalgia rheumatica) (HCC)     Stress incontinence     Tobacco abuse 2015    Tremor     Type 2 diabetes mellitus without complication (HCC)     Uncontrolled diabetes mellitus with complications     Unspecified sleep apnea       Past Surgical History:   Procedure Laterality Date    ANKLE FRACTURE SURGERY Right 09/15/2023    Right open reduction internal fixation lateral malleolus, possible syndesmotic fixation, requesting large C arm, bone foam, Synthes (Bridger) -Laurent's initial note will serve as PAT, Case Comments/Implants: as above , Anesthesia Requested: choice. BRIDGER performed by Derek Smith DO at [facility] OR    CARDIAC CATHETERIZATION  2007     SECTION      COLONOSCOPY      for diarrhea (-)

## 2024-08-22 NOTE — PROGRESS NOTES
Bertha Hebert (1975) initiated an asynchronous digital communication through [employer].    HPI: per patient questionnaire     Exam: not applicable    Diagnoses and all orders for this visit:  Diagnoses and all orders for this visit:    Rash    Reviewed photos.  Unable to identify any visible rash on photos.  Recommend Aquaphor or Vaseline.  Encouraged follow-up PCP      Time: EV2 - 11-20 minutes were spent on the digital evaluation and management of this patient. 19 min     Nat Salomon, SANIA - CNP

## 2024-08-27 ENCOUNTER — APPOINTMENT (OUTPATIENT)
Dept: UROLOGY | Facility: CLINIC | Age: 49
End: 2024-08-27
Payer: COMMERCIAL

## 2024-08-29 ENCOUNTER — HOSPITAL ENCOUNTER (OUTPATIENT)
Dept: CT IMAGING | Age: 49
Discharge: HOME | End: 2024-08-31
Attending: FAMILY MEDICINE
Payer: COMMERCIAL

## 2024-08-29 DIAGNOSIS — I73.9 PAD (PERIPHERAL ARTERY DISEASE) (HCC): ICD-10-CM

## 2024-08-29 DIAGNOSIS — R20.9 PAINFUL AND COLD LOWER EXTREMITY: ICD-10-CM

## 2024-08-29 DIAGNOSIS — M79.606 PAINFUL AND COLD LOWER EXTREMITY: ICD-10-CM

## 2024-08-29 PROCEDURE — 75635 CT ANGIO ABDOMINAL ARTERIES: CPT

## 2024-08-29 PROCEDURE — 2580000003 HC RX 258: Performed by: FAMILY MEDICINE

## 2024-08-29 PROCEDURE — 6360000004 HC RX CONTRAST MEDICATION: Performed by: FAMILY MEDICINE

## 2024-08-29 RX ORDER — SODIUM CHLORIDE 9 MG/ML
INJECTION, SOLUTION INTRAVENOUS ONCE
Status: COMPLETED | OUTPATIENT
Start: 2024-08-29 | End: 2024-08-29

## 2024-08-29 RX ORDER — IOPAMIDOL 755 MG/ML
125 INJECTION, SOLUTION INTRAVASCULAR
Status: COMPLETED | OUTPATIENT
Start: 2024-08-29 | End: 2024-08-29

## 2024-08-29 RX ADMIN — SODIUM CHLORIDE 50 ML: 9 INJECTION, SOLUTION INTRAVENOUS at 16:01

## 2024-08-29 RX ADMIN — IOPAMIDOL 125 ML: 755 INJECTION, SOLUTION INTRAVENOUS at 16:01

## 2024-08-31 DIAGNOSIS — K31.84 GASTROPARESIS: ICD-10-CM

## 2024-09-03 RX ORDER — ERYTHROMYCIN 250 MG/1
250 TABLET, COATED ORAL 3 TIMES DAILY
Qty: 270 TABLET | Refills: 3 | Status: SHIPPED | OUTPATIENT
Start: 2024-09-03 | End: 2025-08-29

## 2024-09-11 DIAGNOSIS — N39.0 RECURRENT UTI: ICD-10-CM

## 2024-09-16 DIAGNOSIS — Z16.24 NEWLY DIAGNOSED INFECTION DUE TO MULTIDRUG RESISTANT ORGANISM: ICD-10-CM

## 2024-09-16 DIAGNOSIS — N30.00 ACUTE CYSTITIS WITHOUT HEMATURIA: Primary | ICD-10-CM

## 2024-09-16 DIAGNOSIS — N30.00 ACUTE CYSTITIS WITHOUT HEMATURIA: ICD-10-CM

## 2024-09-16 LAB
BACTERIA UR CULT: ABNORMAL
BACTERIA UR CULT: ABNORMAL
BACTERIA URNS QL MICRO: ABNORMAL /HPF
BILIRUB UR QL STRIP: NEGATIVE
CLARITY UR: CLEAR
COLOR UR: YELLOW
EPI CELLS #/AREA URNS AUTO: ABNORMAL /HPF (ref 0–5)
GLUCOSE UR STRIP-MCNC: NEGATIVE MG/DL
HGB UR QL STRIP: ABNORMAL
HYALINE CASTS #/AREA URNS AUTO: ABNORMAL /HPF (ref 0–5)
KETONES UR STRIP-MCNC: NEGATIVE MG/DL
LEUKOCYTE ESTERASE UR QL STRIP: NEGATIVE
NITRITE UR QL STRIP: NEGATIVE
ORGANISM: ABNORMAL
PH UR STRIP: 6.5 [PH] (ref 5–9)
PROT UR STRIP-MCNC: NEGATIVE MG/DL
RBC #/AREA URNS AUTO: ABNORMAL /HPF (ref 0–5)
SP GR UR STRIP: 1.02 (ref 1–1.03)
UROBILINOGEN UR STRIP-ACNC: 0.2 E.U./DL
WBC #/AREA URNS AUTO: ABNORMAL /HPF (ref 0–5)

## 2024-09-19 ENCOUNTER — OFFICE VISIT (OUTPATIENT)
Dept: INFECTIOUS DISEASES | Age: 49
End: 2024-09-19
Payer: COMMERCIAL

## 2024-09-19 VITALS
SYSTOLIC BLOOD PRESSURE: 149 MMHG | DIASTOLIC BLOOD PRESSURE: 82 MMHG | TEMPERATURE: 98.1 F | BODY MASS INDEX: 47.09 KG/M2 | HEART RATE: 77 BPM | RESPIRATION RATE: 18 BRPM | HEIGHT: 66 IN | WEIGHT: 293 LBS

## 2024-09-19 DIAGNOSIS — R82.90 ABNORMAL URINE: Primary | ICD-10-CM

## 2024-09-19 PROCEDURE — 99203 OFFICE O/P NEW LOW 30 MIN: CPT | Performed by: INTERNAL MEDICINE

## 2024-09-19 ASSESSMENT — ENCOUNTER SYMPTOMS
RESPIRATORY NEGATIVE: 1
GASTROINTESTINAL NEGATIVE: 1

## 2024-09-19 ASSESSMENT — PATIENT HEALTH QUESTIONNAIRE - PHQ9
1. LITTLE INTEREST OR PLEASURE IN DOING THINGS: NOT AT ALL
SUM OF ALL RESPONSES TO PHQ QUESTIONS 1-9: 0
2. FEELING DOWN, DEPRESSED OR HOPELESS: NOT AT ALL
SUM OF ALL RESPONSES TO PHQ QUESTIONS 1-9: 0
SUM OF ALL RESPONSES TO PHQ QUESTIONS 1-9: 0
SUM OF ALL RESPONSES TO PHQ9 QUESTIONS 1 & 2: 0
SUM OF ALL RESPONSES TO PHQ QUESTIONS 1-9: 0

## 2024-09-21 LAB
BACTERIA UR CULT: ABNORMAL
BACTERIA UR CULT: ABNORMAL
ORGANISM: ABNORMAL

## 2024-10-03 ENCOUNTER — OFFICE VISIT (OUTPATIENT)
Dept: INFECTIOUS DISEASES | Age: 49
End: 2024-10-03
Payer: COMMERCIAL

## 2024-10-03 VITALS
TEMPERATURE: 97.3 F | DIASTOLIC BLOOD PRESSURE: 77 MMHG | BODY MASS INDEX: 47.09 KG/M2 | SYSTOLIC BLOOD PRESSURE: 122 MMHG | WEIGHT: 293 LBS | RESPIRATION RATE: 18 BRPM | HEIGHT: 66 IN | HEART RATE: 69 BPM

## 2024-10-03 DIAGNOSIS — Z86.19 HISTORY OF EXTENDED-SPECTRUM BETA-LACTAMASE PRODUCING KLEBSIELLA PNEUMONIAE INFECTION: ICD-10-CM

## 2024-10-03 DIAGNOSIS — N39.0 RECURRENT UTI: Primary | ICD-10-CM

## 2024-10-03 PROCEDURE — 99213 OFFICE O/P EST LOW 20 MIN: CPT | Performed by: INTERNAL MEDICINE

## 2024-10-03 ASSESSMENT — PATIENT HEALTH QUESTIONNAIRE - PHQ9
SUM OF ALL RESPONSES TO PHQ QUESTIONS 1-9: 0
SUM OF ALL RESPONSES TO PHQ QUESTIONS 1-9: 0
1. LITTLE INTEREST OR PLEASURE IN DOING THINGS: NOT AT ALL
SUM OF ALL RESPONSES TO PHQ QUESTIONS 1-9: 0
SUM OF ALL RESPONSES TO PHQ9 QUESTIONS 1 & 2: 0
2. FEELING DOWN, DEPRESSED OR HOPELESS: NOT AT ALL
SUM OF ALL RESPONSES TO PHQ QUESTIONS 1-9: 0

## 2024-10-03 NOTE — PROGRESS NOTES
Bertha Hebert (:  1975) is a 49 y.o. female,Established patient, here for evaluation of the following chief complaint(s):  Follow-up and Frequent/Recurrent UTI (F/u for hx of multi resist UTIs)         Assessment & Plan  Recurrent UTI   New, at goal (stable), I reassured patient that lower abdominal pressure is most probably related to postop symptoms rather than UTI since patient does not have any other symptoms and since her last urinalysis did not show significant pyuria.  The persistent positive urine culture for ESBL Klebsiella is more consistent for chronic colonization.  Patient was instructed to test urine and get treated only if she has worsening or new symptoms related to UTI.  Avoiding routine antibiotics is more beneficial.  Patient was instructed to increase fluid hydration, take cranberry tablets and probiotics on a routine basis.  Good glucose control is important.  Patient has insulin-dependent diabetes mellitus 2 with last hemoglobin A1c of 7.6.  She has morbid obesity and did not tolerate semaglutide because of gastroparesis.  She reported that she was referred to weight loss clinic.  I encouraged her to do so.    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  It would be beneficial to avoid continuous antibiotics use unless if indicated  Urinalysis with reflex culture is to be collected only if worsening symptoms  Orders:    Urinalysis with Reflex to Culture; Future           Subjective   HPI  Follow up ESBL Klebsiella UTI following vulvectomy for cancer.  Patient persist to have lower abdominal pressure and she experiences twice daily.  She denies any dysuria.  Urine looks clear.  No hematuria.  No constipation.   No fevers or chills  No flank pain or abdominal

## 2024-10-24 ENCOUNTER — TELEPHONE (OUTPATIENT)
Dept: PHARMACY | Facility: CLINIC | Age: 49
End: 2024-10-24

## 2024-10-24 DIAGNOSIS — Z79.4 TYPE 2 DIABETES MELLITUS WITH COMPLICATION, WITH LONG-TERM CURRENT USE OF INSULIN (HCC): Primary | ICD-10-CM

## 2024-10-24 DIAGNOSIS — E11.8 TYPE 2 DIABETES MELLITUS WITH COMPLICATION, WITH LONG-TERM CURRENT USE OF INSULIN (HCC): Primary | ICD-10-CM

## 2024-10-24 NOTE — TELEPHONE ENCOUNTER
[facility] CLINICAL PHARMACY REVIEW - BE WELL WITH DIABETES: HIGH A1C  =============================================  Bertha Hebert is a 49 y.o. female enrolled in the [facility] Be Well with Diabetes program.    Identified care gap(s): A1c > 8%    DM Program Prescriptions:  Current Outpatient Medications   Medication Instructions    acetaminophen (TYLENOL) 1,000 mg, Oral, EVERY 6 HOURS PRN    albuterol sulfate HFA (PROVENTIL;VENTOLIN;PROAIR) 108 (90 Base) MCG/ACT inhaler 2 puffs, Inhalation, EVERY 6 HOURS PRN    baclofen (LIORESAL) 10 MG tablet TAKE ONE-HALF TABLET BY MOUTH TWICE A DAY    blood glucose test strips (PRODIGY NO CODING BLOOD GLUC) strip Use to test blood glucose up to 4 times daily or as directed by provider.    CALCIUM  mg, Oral, DAILY    Cholecalciferol (VITAMIN D3) 25 MCG (1000 UT) CAPS 1 capsule, Oral, DAILY    clotrimazole-betamethasone (LOTRISONE) 1-0.05 % cream Apply topically 2 times daily.    Continuous Blood Gluc Sensor (DEXCOM G7 SENSOR) MISC Change sensor every 10 days as directed to monitor blood glucose    CPAP Machine MISC New CPAP with 8 cm and supply    erythromycin base (E-MYCIN) 250 mg, Oral, 3 TIMES DAILY    esomeprazole (NEXIUM) 40 mg, Oral, DAILY    Glucagon (BAQSIMI TWO PACK) 3 MG/DOSE POWD By nasal route:  3 mg (one actuation) into a single nostril; if no response, may repeat in 15 minutes using a new intranasal device.    Handicap Placard MISC Good from 5/11/23 till 5/11/28    hydroCHLOROthiazide (HYDRODIURIL) 25 mg, Oral, DAILY    ibuprofen (IBU) 400 mg, Oral, EVERY 6 HOURS PRN    Insulin Glargine, 2 Unit Dial, (TOUJEO MAX SOLOSTAR) 300 UNIT/ML SOPN INJECT 160-170  UNITS UNDER THE SKIN AT BEDTIME    insulin lispro, 1 Unit Dial, (HUMALOG KWIKPEN) 100 UNIT/ML SOPN Inject 40-50  units tid with meals    Insulin Pen Needle (PEN NEEDLES) 32G X 4 MM MISC Use as directed with insulin pens, 4 times daily    loratadine (CLARITIN) 10 mg, Oral, DAILY

## 2024-11-23 DIAGNOSIS — K31.84 GASTROPARESIS: ICD-10-CM

## 2024-11-25 ENCOUNTER — TELEPHONE (OUTPATIENT)
Dept: PHARMACY | Facility: CLINIC | Age: 49
End: 2024-11-25

## 2024-11-25 RX ORDER — ERYTHROMYCIN 250 MG/1
250 TABLET, COATED ORAL 3 TIMES DAILY
Qty: 90 TABLET | Refills: 3 | Status: SHIPPED | OUTPATIENT
Start: 2024-11-25

## 2024-11-25 NOTE — TELEPHONE ENCOUNTER
[facility] CLINICAL PHARMACY REVIEW - BE WELL WITH DIABETES: HIGH A1C  =============================================  Bertha Hebert is a 49 y.o. female enrolled in the [facility] Be Well with Diabetes program.    Identified care gap(s): A1c > 8%    DM Program Prescriptions:  Current Outpatient Medications   Medication Instructions    acetaminophen (TYLENOL) 1,000 mg, Oral, EVERY 6 HOURS PRN    albuterol sulfate HFA (PROVENTIL;VENTOLIN;PROAIR) 108 (90 Base) MCG/ACT inhaler 2 puffs, Inhalation, EVERY 6 HOURS PRN    baclofen (LIORESAL) 10 MG tablet TAKE ONE-HALF TABLET BY MOUTH TWICE A DAY    blood glucose test strips (PRODIGY NO CODING BLOOD GLUC) strip Use to test blood glucose up to 4 times daily or as directed by provider.    CALCIUM  mg, Oral, DAILY    Cholecalciferol (VITAMIN D3) 25 MCG (1000 UT) CAPS 1 capsule, Oral, DAILY    clotrimazole-betamethasone (LOTRISONE) 1-0.05 % cream Apply topically 2 times daily.    Continuous Blood Gluc Sensor (DEXCOM G7 SENSOR) MISC Change sensor every 10 days as directed to monitor blood glucose    CPAP Machine MISC New CPAP with 8 cm and supply    erythromycin base (E-MYCIN) 250 mg, Oral, 3 TIMES DAILY    esomeprazole (NEXIUM) 40 mg, Oral, DAILY    Glucagon (BAQSIMI TWO PACK) 3 MG/DOSE POWD By nasal route:  3 mg (one actuation) into a single nostril; if no response, may repeat in 15 minutes using a new intranasal device.    Handicap Placard MISC Good from 5/11/23 till 5/11/28    hydroCHLOROthiazide (HYDRODIURIL) 25 mg, Oral, DAILY    ibuprofen (IBU) 400 mg, Oral, EVERY 6 HOURS PRN    Insulin Glargine, 2 Unit Dial, (TOUJEO MAX SOLOSTAR) 300 UNIT/ML SOPN INJECT 160-170  UNITS UNDER THE SKIN AT BEDTIME    insulin lispro, 1 Unit Dial, (HUMALOG KWIKPEN) 100 UNIT/ML SOPN Inject 40-50  units tid with meals    Insulin Pen Needle (PEN NEEDLES) 32G X 4 MM MISC Use as directed with insulin pens, 4 times daily    loratadine (CLARITIN) 10 mg, Oral, DAILY

## 2024-12-06 RX ORDER — LOSARTAN POTASSIUM 50 MG/1
50 TABLET ORAL DAILY
Qty: 90 TABLET | Refills: 1 | Status: SHIPPED | OUTPATIENT
Start: 2024-12-06

## 2024-12-06 RX ORDER — BACLOFEN 10 MG/1
TABLET ORAL
Qty: 90 TABLET | Refills: 1 | OUTPATIENT
Start: 2024-12-06

## 2024-12-06 NOTE — TELEPHONE ENCOUNTER
Pharmacy requesting medication refill. Please approve or deny this request.    Rx requested:  Requested Prescriptions     Pending Prescriptions Disp Refills    baclofen (LIORESAL) 10 MG tablet 90 tablet 1     Sig: TAKE ONE-HALF TABLET BY MOUTH TWICE A DAY    losartan (COZAAR) 50 MG tablet 90 tablet 1     Sig: Take 1 tablet by mouth daily         Last Office Visit:   8/9/2024      Next Visit Date:  Future Appointments   Date Time Provider Department Center   12/19/2024  3:30 PM Patrick Marinelli MD [place] [facility]   12/31/2024  8:30 AM Nick Molina MD [place] [facility]   1/7/2025  8:00 AM Lisbeth Mccray MD [facility]   2/7/2025  8:15 AM Duran Liz MD [place] [facility] Neurology -

## 2024-12-06 NOTE — TELEPHONE ENCOUNTER
Pharmacy requesting medication refill. Please approve or deny this request.    Rx requested:  Requested Prescriptions     Pending Prescriptions Disp Refills    losartan (COZAAR) 50 MG tablet 90 tablet 1     Sig: Take 1 tablet by mouth daily     Refused Prescriptions Disp Refills    baclofen (LIORESAL) 10 MG tablet 90 tablet 1     Sig: TAKE ONE-HALF TABLET BY MOUTH TWICE A DAY         Last Office Visit:   8/9/2024      Next Visit Date:  Future Appointments   Date Time Provider Department Center   12/19/2024  3:30 PM [facility] [place] [facility]   12/31/2024  8:30 AM Nick Molina MD [place] [facility]   1/7/2025  8:00 AM Lisbeth Mccray MD [facility]   2/7/2025  8:15 AM Duran Liz MD [place] [facility] Neurology -

## 2024-12-07 DIAGNOSIS — Z79.4 TYPE 2 DIABETES MELLITUS WITH COMPLICATION, WITH LONG-TERM CURRENT USE OF INSULIN (HCC): ICD-10-CM

## 2024-12-07 DIAGNOSIS — E11.8 TYPE 2 DIABETES MELLITUS WITH COMPLICATION, WITH LONG-TERM CURRENT USE OF INSULIN (HCC): ICD-10-CM

## 2024-12-07 LAB
ANION GAP SERPL CALCULATED.3IONS-SCNC: 14 MEQ/L (ref 9–15)
BUN SERPL-MCNC: 17 MG/DL (ref 6–20)
CALCIUM SERPL-MCNC: 9.1 MG/DL (ref 8.5–9.9)
CHLORIDE SERPL-SCNC: 102 MEQ/L (ref 95–107)
CO2 SERPL-SCNC: 25 MEQ/L (ref 20–31)
CREAT SERPL-MCNC: 0.69 MG/DL (ref 0.5–0.9)
ESTIMATED AVERAGE GLUCOSE: 180 MG/DL
GLUCOSE SERPL-MCNC: 119 MG/DL (ref 70–99)
HBA1C MFR BLD: 7.9 % (ref 4–6)
POTASSIUM SERPL-SCNC: 4.3 MEQ/L (ref 3.4–4.9)
SODIUM SERPL-SCNC: 141 MEQ/L (ref 135–144)

## 2024-12-08 NOTE — TELEPHONE ENCOUNTER
requesting medication refill. Please approve or deny this request.    Rx requested:  Requested Prescriptions     Pending Prescriptions Disp Refills    baclofen (LIORESAL) 10 MG tablet 90 tablet 1     Sig: TAKE ONE-HALF TABLET BY MOUTH TWICE A DAY         Last Office Visit:   8/9/2024      Next Visit Date:  Future Appointments   Date Time Provider Department Center   12/19/2024  3:30 PM Patrick Marinelli MD [place] [facility]   12/31/2024  8:30 AM Nick Molina MD [place] [facility]   1/7/2025  8:00 AM Lisbeth Mccray MD [facility]   2/7/2025  8:15 AM Duran Liz MD [place] [facility] -

## 2024-12-09 ENCOUNTER — TELEPHONE (OUTPATIENT)
Dept: PHARMACY | Facility: CLINIC | Age: 49
End: 2024-12-09

## 2024-12-09 DIAGNOSIS — E11.8 TYPE 2 DIABETES MELLITUS WITH COMPLICATION, WITH LONG-TERM CURRENT USE OF INSULIN (HCC): Primary | ICD-10-CM

## 2024-12-09 DIAGNOSIS — Z79.4 TYPE 2 DIABETES MELLITUS WITH COMPLICATION, WITH LONG-TERM CURRENT USE OF INSULIN (HCC): Primary | ICD-10-CM

## 2024-12-09 RX ORDER — PREDNISONE 5 MG/1
5 TABLET ORAL DAILY
Qty: 90 TABLET | Refills: 1 | Status: SHIPPED | OUTPATIENT
Start: 2024-12-09

## 2024-12-09 RX ORDER — BACLOFEN 10 MG/1
TABLET ORAL
Qty: 90 TABLET | Refills: 1 | Status: SHIPPED | OUTPATIENT
Start: 2024-12-09

## 2024-12-09 NOTE — TELEPHONE ENCOUNTER
Requesting medication refill. Please approve or deny this request.    Rx requested:  Requested Prescriptions     Pending Prescriptions Disp Refills    predniSONE (DELTASONE) 5 MG tablet 90 tablet 1     Sig: Take 1 tablet by mouth daily         Last Office Visit:   Visit date not found      Next Visit Date:  Future Appointments   Date Time Provider Department Center   12/19/2024  3:30 PM Patrick Marinelli MD [place] [facility]   12/31/2024  8:30 AM Nick Molina MD [place] [facility]   1/7/2025  8:00 AM Lisbeth Mccray MD [facility]   2/7/2025  8:15 AM Duran Liz MD [place] [facility] Neu[facility] -               Last refill 5/28/24. Please approve or deny.

## 2024-12-09 NOTE — TELEPHONE ENCOUNTER
[facility] Employee Diabetes Program - Be Well With Diabetes    Quarterly Check-in  Quarterly Reminder sent to patient for the DM Program - See Mychart message or Letter for more information  Called patient to discuss missing requirements  Left VM  Sent Mychart/Letter  Nader Cueva, PharmD, [facility]  [facility] Clinical Pharmacist  Department: [phone]      =======================================================    Mychart/Letter for participant:    Thanks so much for taking the first step towards better health.    Please review the information below for requirements that need to be completed for the remainder of the year.    To ensure participants are taking steps to control their condition ongoing requirements need to be completed. To receive the Be Well With Diabetes Program benefit for 2025, the following actions must be taken:     Requirements to be completed by 12/31/24  Visit with your physician (Second yearly visit)- NOV 12/19/24  Urine Microalbumin (once yearly)- Has active order in chart    *Documentation of any requirements completed or reviewed outside of the [facility] electronic medical record will need to be faxed or emailed (fax/email info below).*    If the missing requirements(s) are not met by the date listed above, you will be disqualified from the program and will not receive program benefits in 2025. If any patient is dis-enrolled from the program then they must wait a full calendar year to re-enroll in the program.   Please reach out to our team ASAP if there are any questions or concerns.    [facility]   Phone: [phone] Option #3  Email: [email or web address]  Fax Number: [phone]     For Pharmacy Admin Tracking Only    Program: [employer]  Gap Closed?: Yes   Time Spent (min): 10

## 2024-12-16 DIAGNOSIS — Z79.4 TYPE 2 DIABETES MELLITUS WITH COMPLICATION, WITH LONG-TERM CURRENT USE OF INSULIN (HCC): ICD-10-CM

## 2024-12-16 DIAGNOSIS — E11.8 TYPE 2 DIABETES MELLITUS WITH COMPLICATION, WITH LONG-TERM CURRENT USE OF INSULIN (HCC): ICD-10-CM

## 2024-12-16 LAB
CREAT UR-MCNC: 93.1 MG/DL
MICROALBUMIN UR-MCNC: 2.4 MG/DL
MICROALBUMIN/CREAT UR-RTO: 25.8 MG/G (ref 0–30)

## 2024-12-19 ENCOUNTER — OFFICE VISIT (OUTPATIENT)
Dept: ENDOCRINOLOGY | Age: 49
End: 2024-12-19

## 2024-12-19 VITALS
SYSTOLIC BLOOD PRESSURE: 133 MMHG | BODY MASS INDEX: 47.09 KG/M2 | OXYGEN SATURATION: 97 % | WEIGHT: 293 LBS | DIASTOLIC BLOOD PRESSURE: 74 MMHG | HEIGHT: 66 IN

## 2024-12-19 DIAGNOSIS — E66.01 MORBID OBESITY: ICD-10-CM

## 2024-12-19 DIAGNOSIS — E11.8 TYPE 2 DIABETES MELLITUS WITH COMPLICATION, WITH LONG-TERM CURRENT USE OF INSULIN (HCC): Primary | ICD-10-CM

## 2024-12-19 DIAGNOSIS — Z79.4 TYPE 2 DIABETES MELLITUS WITH COMPLICATION, WITH LONG-TERM CURRENT USE OF INSULIN (HCC): Primary | ICD-10-CM

## 2024-12-19 LAB
CHP ED QC CHECK: NORMAL
GLUCOSE BLD-MCNC: 177 MG/DL

## 2024-12-19 NOTE — PROGRESS NOTES
12/19/2024    Assessment:       Diagnosis Orders   1. Type 2 diabetes mellitus with complication, with long-term current use of insulin ([facility])  POCT Glucose            PLAN:     Orders Placed This Encounter   Procedures    GLUCOSE MONITOR, 72 HOUR, PHYS INTERP    Hemoglobin A1C     Standing Status:   Future     Standing Expiration Date:   12/19/2025    Basic Metabolic Panel     Standing Status:   Future     Standing Expiration Date:   12/19/2025    POCT Glucose     Continue patient on current insulin regimen follow-up in 3 to 6 months time A1c goal of less than 7 continue using Dexcom CGM      Orders Placed This Encounter   Procedures    POCT Glucose     No orders of the defined types were placed in this encounter.    No follow-ups on file.  Subjective:     Chief Complaint   Patient presents with    Diabetes    Obesity    CGM     Dexcom [facility]     Vitals:    12/19/24 1527   BP: 133/74   SpO2: 97%   Weight: (!) 142 kg (313 lb 0.9 oz)   Height: 1.676 m (5' 6\")     Wt Readings from Last 3 Encounters:   12/19/24 (!) 142 kg (313 lb 0.9 oz)   10/03/24 (!) 137.6 kg (303 lb 6.4 oz)   09/19/24 (!) 138.3 kg (305 lb)     BP Readings from Last 3 Encounters:   12/19/24 133/74   10/03/24 122/77   09/19/24 (!) 149/82       Follow-up with type 2 diabetes morbid obesity history of high insulin dose use currently on Toujeo 160 -170 units at bedtime Humalog 40 to 50 units with each meals plus metformin 500 mg 3 times a day and pioglitazone 30 mg daily hemoglobin A1c was 7.9 using Dexcom CGM 2-week glucose average was 2 19  21% in range 53% slightly high 26% very high no hypoglycemia        Hemoglobin A1C       Date                     Value               Ref Range           Status                12/07/2024               7.9 (H)             4.0 - 6.0 %         Final            ----------      Diabetes  She presents for her follow-up diabetic visit. She has type 2 diabetes mellitus. There are no hypoglycemic complications. Symptoms are

## 2024-12-20 ENCOUNTER — PATIENT MESSAGE (OUTPATIENT)
Dept: PHARMACY | Facility: CLINIC | Age: 49
End: 2024-12-20

## 2024-12-24 DIAGNOSIS — E11.8 TYPE 2 DIABETES MELLITUS WITH COMPLICATION, WITH LONG-TERM CURRENT USE OF INSULIN (HCC): ICD-10-CM

## 2024-12-24 DIAGNOSIS — Z79.4 TYPE 2 DIABETES MELLITUS WITH COMPLICATION, WITH LONG-TERM CURRENT USE OF INSULIN (HCC): ICD-10-CM

## 2024-12-24 RX ORDER — INSULIN LISPRO 100 [IU]/ML
INJECTION, SOLUTION INTRAVENOUS; SUBCUTANEOUS
Qty: 108 ML | Refills: 1 | Status: SHIPPED | OUTPATIENT
Start: 2024-12-24

## 2024-12-27 ENCOUNTER — OFFICE VISIT (OUTPATIENT)
Age: 49
End: 2024-12-27
Payer: COMMERCIAL

## 2024-12-27 VITALS
TEMPERATURE: 97 F | OXYGEN SATURATION: 96 % | BODY MASS INDEX: 47.09 KG/M2 | HEART RATE: 83 BPM | HEIGHT: 66 IN | SYSTOLIC BLOOD PRESSURE: 134 MMHG | WEIGHT: 293 LBS | DIASTOLIC BLOOD PRESSURE: 80 MMHG

## 2024-12-27 DIAGNOSIS — M75.82 ROTATOR CUFF TENDINITIS, LEFT: Primary | ICD-10-CM

## 2024-12-27 PROCEDURE — 99213 OFFICE O/P EST LOW 20 MIN: CPT | Performed by: FAMILY MEDICINE

## 2024-12-27 RX ORDER — MELOXICAM 15 MG/1
15 TABLET ORAL DAILY
Qty: 30 TABLET | Refills: 3 | Status: SHIPPED | OUTPATIENT
Start: 2024-12-27

## 2024-12-27 RX ORDER — PREDNISONE 10 MG/1
TABLET ORAL
Qty: 40 TABLET | Refills: 0 | Status: SHIPPED | OUTPATIENT
Start: 2024-12-27

## 2024-12-27 SDOH — ECONOMIC STABILITY: FOOD INSECURITY: WITHIN THE PAST 12 MONTHS, THE FOOD YOU BOUGHT JUST DIDN'T LAST AND YOU DIDN'T HAVE MONEY TO GET MORE.: NEVER TRUE

## 2024-12-27 SDOH — ECONOMIC STABILITY: INCOME INSECURITY: HOW HARD IS IT FOR YOU TO PAY FOR THE VERY BASICS LIKE FOOD, HOUSING, MEDICAL CARE, AND HEATING?: NOT HARD AT ALL

## 2024-12-27 SDOH — ECONOMIC STABILITY: FOOD INSECURITY: WITHIN THE PAST 12 MONTHS, YOU WORRIED THAT YOUR FOOD WOULD RUN OUT BEFORE YOU GOT MONEY TO BUY MORE.: NEVER TRUE

## 2024-12-27 NOTE — PROGRESS NOTES
been partially produced using speech recognition software and may cause  and /or contain errors related to that system including grammar, punctuation and spelling as well as words and phrases that may seem inappropriate. If there are questions or concerns please feel free to contact me to clarify.    The patient (or guardian, if applicable) and other individuals in attendance with the patient were advised that Artificial Intelligence will be utilized during this visit to record, process the conversation to generate a clinical note, and support improvement of the AI technology. The patient (or guardian, if applicable) and other individuals in attendance at the appointment consented to the use of AI, including the recording.      Osbaldo Watt MD

## 2025-01-08 RX ORDER — ACYCLOVIR 400 MG/1
TABLET ORAL
Qty: 9 EACH | Refills: 3 | Status: SHIPPED | OUTPATIENT
Start: 2025-01-08

## 2025-01-13 ENCOUNTER — TELEPHONE (OUTPATIENT)
Dept: ENDOCRINOLOGY | Age: 50
End: 2025-01-13

## 2025-01-13 RX ORDER — METOPROLOL TARTRATE 100 MG/1
100 TABLET ORAL 2 TIMES DAILY
Qty: 180 TABLET | Refills: 2 | Status: SHIPPED | OUTPATIENT
Start: 2025-01-13

## 2025-01-13 RX ORDER — PIOGLITAZONE 30 MG/1
30 TABLET ORAL DAILY
Qty: 90 TABLET | Refills: 3 | Status: SHIPPED | OUTPATIENT
Start: 2025-01-13

## 2025-01-13 RX ORDER — SOLIFENACIN SUCCINATE 10 MG/1
10 TABLET, FILM COATED ORAL DAILY
Qty: 90 TABLET | Refills: 2 | Status: SHIPPED | OUTPATIENT
Start: 2025-01-13

## 2025-01-14 ENCOUNTER — PATIENT MESSAGE (OUTPATIENT)
Dept: ENDOCRINOLOGY | Age: 50
End: 2025-01-14

## 2025-01-16 RX ORDER — VENLAFAXINE HYDROCHLORIDE 75 MG/1
CAPSULE, EXTENDED RELEASE ORAL
Qty: 270 CAPSULE | Refills: 2 | Status: SHIPPED | OUTPATIENT
Start: 2025-01-16

## 2025-01-16 NOTE — TELEPHONE ENCOUNTER
Comments:     Last Office Visit (last PCP visit):   8/9/2024    Next Visit Date:  Future Appointments   Date Time Provider Department Center   1/21/2025  8:30 AM Nick Molina MD [place] [facility]   1/23/2025  8:30 AM Koko Wilder PA-C [place] [facility]   1/29/2025  8:00 AM Lisbeth Mccray MD [facility]   2/7/2025  8:15 AM Duran Liz MD [place] [facility] -   6/24/2025  9:30 AM Patrick Marinelli MD [place] [facility]       **If hasn't been seen in over a year OR hasn't followed up according to last diabetes/ADHD visit, make appointment for patient before sending refill to provider.    Rx requested:  Requested Prescriptions     Pending Prescriptions Disp Refills    venlafaxine (EFFEXOR XR) 75 MG extended release capsule 270 capsule 1     Sig: TAKE TWO CAPSULES BY MOUTH EVERY MORNING AND TAKE ONE CAPSULE EVERY EVENING.

## 2025-01-21 ENCOUNTER — OFFICE VISIT (OUTPATIENT)
Dept: PULMONOLOGY | Age: 50
End: 2025-01-21
Payer: COMMERCIAL

## 2025-01-21 VITALS
HEART RATE: 66 BPM | OXYGEN SATURATION: 98 % | BODY MASS INDEX: 47.09 KG/M2 | TEMPERATURE: 97.1 F | HEIGHT: 66 IN | DIASTOLIC BLOOD PRESSURE: 60 MMHG | WEIGHT: 293 LBS | SYSTOLIC BLOOD PRESSURE: 116 MMHG

## 2025-01-21 DIAGNOSIS — E66.01 MORBID OBESITY: ICD-10-CM

## 2025-01-21 DIAGNOSIS — G47.33 OSA (OBSTRUCTIVE SLEEP APNEA): Primary | ICD-10-CM

## 2025-01-21 PROCEDURE — 99214 OFFICE O/P EST MOD 30 MIN: CPT | Performed by: INTERNAL MEDICINE

## 2025-01-21 ASSESSMENT — ENCOUNTER SYMPTOMS
SHORTNESS OF BREATH: 0
NAUSEA: 0
VOICE CHANGE: 0
COUGH: 0
SINUS PRESSURE: 0
EYE ITCHING: 0
EYE DISCHARGE: 0
CHEST TIGHTNESS: 0
SORE THROAT: 0
WHEEZING: 0
RHINORRHEA: 0
VOMITING: 0
TROUBLE SWALLOWING: 0
DIARRHEA: 0
ABDOMINAL PAIN: 0

## 2025-01-21 NOTE — PROGRESS NOTES
Subjective:             Bertha Hebert is a 49 y.o. female who complains today of:     Chief Complaint   Patient presents with    Follow-up     CPAP        HPI  Last time seen 4/22    She is using CPAP with  8 centimeters of H2O with heated humidity.  She is using CPAP for about  7 hours every night.  She is using CPAP with  Nasal  Mask.  She said  sleep is restful with the CPAP use.  She is compliant with CPAP therapy and benefiting with CPAP use.  No snoring with CPAP use.  No complaint of daytime sleepiness or tiredness with CPAP use.  She denies taking naps.  No sleepiness with driving.   She denies difficulty falling asleep or staying asleep.     I reviewed compliance report with patient regarding CPAP therapy. She is using  CPAP for 30 days out of 30 days. Average usage of days used is 7  hours and 52  min , average AHI 0.6 with CPAP use.      She is on chronic prednisone 5 mg daily for polymyalgia rheumatica.    Allergies:  Morphine and codeine, Ace inhibitors, Bactrim [sulfamethoxazole-trimethoprim], Nitroglycerin, Percocet [oxycodone-acetaminophen], and Victoza [liraglutide]  Past Medical History:   Diagnosis Date    Abnormal Pap smear of cervix     Acute midline thoracic back pain 09/18/2018    Anxiety 2008    Autoimmune disorder (HCC)     B12 deficiency     Family history of premature CAD 09/04/2013    Fatty liver     Gastroesophageal reflux disease 01/06/2021    Gastroparesis     Gestational diabetes mellitus     HPV (human papilloma virus) anogenital infection     Hyperlipidemia     Hypertension     Irritable bowel syndrome with diarrhea 04/26/2021    Liver hemangioma 2009/2015    Lumbar radiculopathy 07/07/2021    Menopausal symptoms     Obesity 03/11/2013    BMI 43.42    Orthostatic hypotension     Ovarian cyst     Overactive bladder     PMR (polymyalgia rheumatica) ([facility])     Stress incontinence     Tobacco abuse 09/03/2015    Tremor     Type 2 diabetes mellitus without complication ([facility])

## 2025-01-26 SDOH — ECONOMIC STABILITY: FOOD INSECURITY: WITHIN THE PAST 12 MONTHS, YOU WORRIED THAT YOUR FOOD WOULD RUN OUT BEFORE YOU GOT MONEY TO BUY MORE.: NEVER TRUE

## 2025-01-26 SDOH — ECONOMIC STABILITY: FOOD INSECURITY: WITHIN THE PAST 12 MONTHS, THE FOOD YOU BOUGHT JUST DIDN'T LAST AND YOU DIDN'T HAVE MONEY TO GET MORE.: NEVER TRUE

## 2025-01-26 SDOH — ECONOMIC STABILITY: INCOME INSECURITY: IN THE LAST 12 MONTHS, WAS THERE A TIME WHEN YOU WERE NOT ABLE TO PAY THE MORTGAGE OR RENT ON TIME?: NO

## 2025-01-26 ASSESSMENT — PATIENT HEALTH QUESTIONNAIRE - PHQ9
1. LITTLE INTEREST OR PLEASURE IN DOING THINGS: NOT AT ALL
1. LITTLE INTEREST OR PLEASURE IN DOING THINGS: NOT AT ALL
SUM OF ALL RESPONSES TO PHQ QUESTIONS 1-9: 0
SUM OF ALL RESPONSES TO PHQ9 QUESTIONS 1 & 2: 0
SUM OF ALL RESPONSES TO PHQ QUESTIONS 1-9: 0
SUM OF ALL RESPONSES TO PHQ QUESTIONS 1-9: 0
SUM OF ALL RESPONSES TO PHQ9 QUESTIONS 1 & 2: 0
2. FEELING DOWN, DEPRESSED OR HOPELESS: NOT AT ALL
SUM OF ALL RESPONSES TO PHQ QUESTIONS 1-9: 0
2. FEELING DOWN, DEPRESSED OR HOPELESS: NOT AT ALL

## 2025-01-27 DIAGNOSIS — E78.5 DYSLIPIDEMIA: ICD-10-CM

## 2025-01-27 DIAGNOSIS — E11.8 TYPE 2 DIABETES MELLITUS WITH COMPLICATION, WITH LONG-TERM CURRENT USE OF INSULIN (HCC): ICD-10-CM

## 2025-01-27 DIAGNOSIS — Z79.4 TYPE 2 DIABETES MELLITUS WITH COMPLICATION, WITH LONG-TERM CURRENT USE OF INSULIN (HCC): ICD-10-CM

## 2025-01-27 RX ORDER — HYDROCHLOROTHIAZIDE 25 MG/1
25 TABLET ORAL DAILY
Qty: 90 TABLET | Refills: 1 | Status: SHIPPED | OUTPATIENT
Start: 2025-01-27

## 2025-01-27 RX ORDER — ROSUVASTATIN CALCIUM 10 MG/1
10 TABLET, COATED ORAL DAILY
Qty: 90 TABLET | Refills: 2 | Status: SHIPPED | OUTPATIENT
Start: 2025-01-27

## 2025-01-27 NOTE — TELEPHONE ENCOUNTER
Comments:     Last Office Visit (last PCP visit):   8/9/2024    Next Visit Date:  Future Appointments   Date Time Provider Department Center   1/29/2025  8:00 AM Lisbeth Mccray MD [facility]   2/7/2025  8:15 AM Duran Liz MD [place] [facility] -   6/24/2025  9:30 AM aPtrick Marinelli MD [place] [facility]   7/21/2025  8:30 AM Nick Molina MD [place] [facility]       **If hasn't been seen in over a year OR hasn't followed up according to last diabetes/ADHD visit, make appointment for patient before sending refill to provider.    Rx requested:  Requested Prescriptions     Pending Prescriptions Disp Refills    rosuvastatin (CRESTOR) 10 MG tablet 90 tablet 1     Sig: Take 1 tablet by mouth daily

## 2025-01-29 ENCOUNTER — OFFICE VISIT (OUTPATIENT)
Age: 50
End: 2025-01-29

## 2025-01-29 VITALS
HEIGHT: 66 IN | BODY MASS INDEX: 47.09 KG/M2 | WEIGHT: 293 LBS | OXYGEN SATURATION: 96 % | SYSTOLIC BLOOD PRESSURE: 128 MMHG | TEMPERATURE: 97.2 F | DIASTOLIC BLOOD PRESSURE: 84 MMHG | HEART RATE: 71 BPM

## 2025-01-29 DIAGNOSIS — K31.84 DIABETIC GASTROPARESIS ASSOCIATED WITH TYPE 2 DIABETES MELLITUS (HCC): ICD-10-CM

## 2025-01-29 DIAGNOSIS — I10 HYPERTENSION, UNSPECIFIED TYPE: ICD-10-CM

## 2025-01-29 DIAGNOSIS — K21.9 GASTROESOPHAGEAL REFLUX DISEASE, UNSPECIFIED WHETHER ESOPHAGITIS PRESENT: ICD-10-CM

## 2025-01-29 DIAGNOSIS — I73.00 RAYNAUD'S PHENOMENON WITHOUT GANGRENE: ICD-10-CM

## 2025-01-29 DIAGNOSIS — E78.5 DYSLIPIDEMIA: ICD-10-CM

## 2025-01-29 DIAGNOSIS — M75.82 ROTATOR CUFF TENDINITIS, LEFT: ICD-10-CM

## 2025-01-29 DIAGNOSIS — I27.20 PULMONARY HYPERTENSION (HCC): Primary | ICD-10-CM

## 2025-01-29 DIAGNOSIS — E66.01 MORBID OBESITY: ICD-10-CM

## 2025-01-29 DIAGNOSIS — E53.8 B12 DEFICIENCY: ICD-10-CM

## 2025-01-29 DIAGNOSIS — E11.43 DIABETIC GASTROPARESIS ASSOCIATED WITH TYPE 2 DIABETES MELLITUS (HCC): ICD-10-CM

## 2025-01-29 DIAGNOSIS — G47.33 OSA (OBSTRUCTIVE SLEEP APNEA): ICD-10-CM

## 2025-01-29 DIAGNOSIS — F41.9 ANXIETY: ICD-10-CM

## 2025-01-29 DIAGNOSIS — C44.319 BASAL CELL CARCINOMA (BCC) OF SKIN OF OTHER PART OF FACE: ICD-10-CM

## 2025-01-29 NOTE — PROGRESS NOTES
Chief Complaint   Patient presents with    6 Month Follow-Up     No issues/concerns        HPI: Bertha Hebert 49 y.o. female presenting for   History of Present Illness  The patient presents for evaluation of weight gain, pain, and basal cell carcinoma.    Weight Management   She reports a weight gain of 25 pounds over the past 6 months, despite no significant changes in her diet or exercise regimen. She attributes this weight gain to an increase in her prednisone dosage, which was prescribed by Dr. Liz approximately one month ago for tendinitis in her shoulder and arm. Additionally, she recalls a previous increase in her prednisone dosage during a bout of bronchitis. She is currently on a daily dose of 5 mg prednisone. She has not observed any new stretch marks or a hump on her back. She has previously been advised to consult with Dr. Russo regarding bariatric surgery but expresses apprehension due to her gastroparesis and daily prednisone use. She has a history of using Victoza, a GLP-1 medication, which resulted in significant weight loss but also led to gastroparesis and pancreatitis. She is hesitant to try other GLP-1 medications due to these adverse effects.    She is experiencing severe body pain, which limits her ability to stand for more than 15 minutes. She has a scheduled appointment with Dr. Liz next Friday to discuss the escalating pain.    History of vulvar dysplasia/history of valvulectomy  She had precancerous cells removed from her vulvar region in September 2024, with a follow-up scheduled for March 2025. The procedure was complicated by a urinary tract infection, which was attributed to colonization. She reports feeling well at present. A spot on her forehead was biopsied about a month ago and diagnosed as basal cell carcinoma. She is scheduled for a full Mohs procedure at [place] on Friday.      Low back pain   Fractured T12   Has a history of sciatic pain   Does not see a pain doctor

## 2025-02-03 ENCOUNTER — TELEPHONE (OUTPATIENT)
Dept: PHARMACY | Facility: CLINIC | Age: 50
End: 2025-02-03

## 2025-02-03 NOTE — TELEPHONE ENCOUNTER
**Patient is [facility] **  Called patient to schedule 2025 yearly pharmacist appointment to discuss medications for Diabetes Management Program.    Spoke to patient and appointment scheduled for 2/13/25 at 4:30pm    Tsering Perera CPhT.   Population Health Clinical   [facility]  Toll free: [phone] Option 3    For Pharmacy Admin Tracking Only    Program: [employer]  CPA in place:  No  Recommendation Provided To: Patient/Caregiver: 1 via Telephone  Intervention Detail: Scheduled Appointment  Intervention Accepted By: Patient/Caregiver: 1  Gap Closed?: Yes   Time Spent (min): 5

## 2025-02-03 NOTE — TELEPHONE ENCOUNTER
Noted.    Rosita Martinez [facility]   Population Health Clinical   [facility]  Department, toll free: [phone], option 3

## 2025-02-06 PROBLEM — N90.3 VULVAR DYSPLASIA: Status: ACTIVE | Noted: 2024-07-22

## 2025-02-07 ENCOUNTER — OFFICE VISIT (OUTPATIENT)
Dept: NEUROLOGY | Age: 50
End: 2025-02-07
Payer: COMMERCIAL

## 2025-02-07 VITALS
WEIGHT: 293 LBS | BODY MASS INDEX: 52.29 KG/M2 | DIASTOLIC BLOOD PRESSURE: 78 MMHG | SYSTOLIC BLOOD PRESSURE: 124 MMHG | HEART RATE: 76 BPM

## 2025-02-07 DIAGNOSIS — M48.44XG STRESS FRACTURE OF THORACIC VERTEBRA WITH DELAYED HEALING, SUBSEQUENT ENCOUNTER: ICD-10-CM

## 2025-02-07 DIAGNOSIS — G47.33 OSA (OBSTRUCTIVE SLEEP APNEA): ICD-10-CM

## 2025-02-07 DIAGNOSIS — M62.81 MUSCLE WEAKNESS: ICD-10-CM

## 2025-02-07 DIAGNOSIS — Q06.4 HYDROMYELIA (HCC): ICD-10-CM

## 2025-02-07 DIAGNOSIS — M35.3 POLYMYALGIA RHEUMATICA (HCC): Primary | ICD-10-CM

## 2025-02-07 DIAGNOSIS — M54.16 LUMBAR RADICULOPATHY: ICD-10-CM

## 2025-02-07 DIAGNOSIS — S32.000S COMPRESSION FRACTURE OF LUMBAR VERTEBRA, UNSPECIFIED LUMBAR VERTEBRAL LEVEL, SEQUELA: ICD-10-CM

## 2025-02-07 PROBLEM — M48.44XA: Status: ACTIVE | Noted: 2025-02-07

## 2025-02-07 PROCEDURE — 99214 OFFICE O/P EST MOD 30 MIN: CPT | Performed by: PSYCHIATRY & NEUROLOGY

## 2025-02-07 RX ORDER — PREDNISONE 5 MG/1
5 TABLET ORAL DAILY
Qty: 90 TABLET | Refills: 1 | Status: SHIPPED | OUTPATIENT
Start: 2025-02-07

## 2025-02-07 NOTE — PROGRESS NOTES
Subjective:      Patient ID: Bertha Hebert is a 49 y.o. female who presents today for:  Chief Complaint   Patient presents with    6 Month Follow-Up     Pt states everything is fine nothing change. No questions or concerns at this time.           HPI 49-year-old right-handed female with a known history of polymyalgia rheumatica.  Patient also has a medical apathy.  Patient continues on a prednisone.  We continued discussed her weight issues and she is followed at bariatric clinic as well.  Patient is followed by pulmonology as well.  Patient is followed by endocrinology as well    Patient doing otherwise well and has not developed any new weakness.  Patient is only on 5 mg of Deltasone.  On and off we may have to increase the dose when she has flareups    Patient is going through steps for bariatric surgery.  Patient main issues appears a lumbar pain.  We had evaluated this some years ago.  She has a small hydromyelia at T12 and remote compression fracture with a moderate to severe T11-12 disc degeneration but no cord compression.        Past Medical History:   Diagnosis Date    Abnormal Pap smear of cervix     Acute midline thoracic back pain 09/18/2018    Anxiety 2008    Autoimmune disorder (HCC)     B12 deficiency     Family history of premature CAD 09/04/2013    Fatty liver     Gastroesophageal reflux disease 01/06/2021    Gastroparesis     Gestational diabetes mellitus     HPV (human papilloma virus) anogenital infection     Hyperlipidemia     Hypertension     Irritable bowel syndrome with diarrhea 04/26/2021    Liver hemangioma 2009/2015    Lumbar radiculopathy 07/07/2021    Menopausal symptoms     Obesity 03/11/2013    BMI 43.42    Orthostatic hypotension     Ovarian cyst     Overactive bladder     PMR (polymyalgia rheumatica) (HCC)     Stress incontinence     Tobacco abuse 09/03/2015    Tremor     Type 2 diabetes mellitus without complication (HCC)     Uncontrolled diabetes mellitus with complications

## 2025-02-13 ENCOUNTER — TELEPHONE (OUTPATIENT)
Dept: PHARMACY | Facility: CLINIC | Age: 50
End: 2025-02-13

## 2025-02-13 NOTE — TELEPHONE ENCOUNTER
Comment: socially       ASSESSMENT:  Program Requirements (Must be completed by 12/31/25.  Highly recommend at least 1 OV and 1 A1c are completed by 7/1/25)  No - Two Provider Visits for DM  N/A- ACC/diabetes educator visit (if A1c over 8%)   No - Two A1c's  No - Lipid Panel  No - Urine Albumin/Creatinine Ratio  Yes - Influenza vaccination for current program year, Employee  Yes - Medication adherence over 70%  Yes - On statin or contraindication(s)-  Rosuvastatin  Yes - On ACEi/ARB or contraindication(s)-  Losartan       Diabetes Care:   Glycemic Goal: <7.0%. Is not at blood glucose goal. Type 2 DM under not quite adequate control.  Home blood sugar records:   Pt wearing Dexcom G7. Keeps eye in BG closely. Struggles with fluctuations of BG due to insulin use and gastroparesis.  Generally running just above goal.  She ends up chasing her elevated BG instead of preventing it leading to elevations in A1c  Any episodes of hypoglycemia? No, but aware of how to identify and correct  Eye exam current (within one year): yes  Foot exam current (within one year): yes  Current regimen:   Toujeo u300- 160-170u HS  Humalog u100- 45-50u with meals- Admits that she is still always using after meals due to severe gastroparesis and fear that BG will respond too late to insulin does if done prior to meals. Extensive counseling provided in the past to pt, but this continues.  Metformin 500mg TID  Pioglitazone 30mg daily  Therapy Optimization:  Discussed Lyumjev in detail.  Emphasized that injecton prior to or right with meal is the best thing, but she is not going to change this.  Using Lyumjev even after a meal may provide better control as it is technically a little faster acting than the Humalog.  Sent info to pt via [employer] and she is to discuss with her provider at a future visit    Other Considerations:  - Blood Pressure Goal:   Patient prescribed a ACEi/ARB:yes - Losartan 50mg  BP less than 130/80 mmHg due to history of DM: at

## 2025-02-28 ENCOUNTER — OFFICE VISIT (OUTPATIENT)
Dept: BARIATRICS/WEIGHT MGMT | Age: 50
End: 2025-02-28

## 2025-02-28 ENCOUNTER — OFFICE VISIT (OUTPATIENT)
Dept: BARIATRICS/WEIGHT MGMT | Age: 50
End: 2025-02-28
Payer: COMMERCIAL

## 2025-02-28 VITALS — BODY MASS INDEX: 50.02 KG/M2 | WEIGHT: 293 LBS | HEIGHT: 64 IN

## 2025-02-28 VITALS
WEIGHT: 293 LBS | DIASTOLIC BLOOD PRESSURE: 70 MMHG | OXYGEN SATURATION: 95 % | BODY MASS INDEX: 50.02 KG/M2 | HEIGHT: 64 IN | HEART RATE: 82 BPM | SYSTOLIC BLOOD PRESSURE: 124 MMHG

## 2025-02-28 DIAGNOSIS — Z71.3 DIETARY COUNSELING AND SURVEILLANCE: ICD-10-CM

## 2025-02-28 DIAGNOSIS — E66.9 TYPE 2 DIABETES MELLITUS WITH OBESITY (HCC): Primary | ICD-10-CM

## 2025-02-28 DIAGNOSIS — E11.69 TYPE 2 DIABETES MELLITUS WITH OBESITY (HCC): Primary | ICD-10-CM

## 2025-02-28 DIAGNOSIS — E66.01 MORBID OBESITY: Primary | ICD-10-CM

## 2025-02-28 DIAGNOSIS — I15.8 OTHER SECONDARY HYPERTENSION: ICD-10-CM

## 2025-02-28 DIAGNOSIS — E66.01 MORBID OBESITY: ICD-10-CM

## 2025-02-28 DIAGNOSIS — E11.69 TYPE 2 DIABETES MELLITUS WITH OBESITY (HCC): ICD-10-CM

## 2025-02-28 DIAGNOSIS — E66.9 TYPE 2 DIABETES MELLITUS WITH OBESITY (HCC): ICD-10-CM

## 2025-02-28 PROCEDURE — 99205 OFFICE O/P NEW HI 60 MIN: CPT | Performed by: SURGERY

## 2025-02-28 RX ORDER — TOPIRAMATE 50 MG/1
50 TABLET, FILM COATED ORAL 2 TIMES DAILY
Qty: 60 TABLET | Refills: 3 | Status: SHIPPED | OUTPATIENT
Start: 2025-02-28

## 2025-02-28 NOTE — PROGRESS NOTES
Surgery Consultation    Patient Name:  :  Hospital Day:   Bertha Hebert 1975 [unfilled]     Date of Service: 2025    Subjective:      History of Present Illness:    Bertha Hebert  is a 49 y.o. female referred by Lisbeth Mccray MD for morbid obesity and diabetes.  Bertha Hebert reports multiple episodes of weight loss and regain after multiple diet and exercise programs. There is not a current, regular exercise routine due to joint pain and PMR. She is on CPAP for RAE.  There is no nicotine or any regular alcohol use.  She had a normal EGD recently.      Past Medical History:   Diagnosis Date    Abnormal Pap smear of cervix     Acute midline thoracic back pain 2018    Anxiety 2008    Autoimmune disorder     B12 deficiency     Family history of premature CAD 2013    Fatty liver     Gastroesophageal reflux disease 2021    Gastroparesis     Gestational diabetes mellitus     HPV (human papilloma virus) anogenital infection     Hyperlipidemia     Hypertension     Irritable bowel syndrome with diarrhea 2021    Liver hemangioma     Lumbar radiculopathy 2021    Menopausal symptoms     Obesity 2013    BMI 43.42    Orthostatic hypotension     Ovarian cyst     Overactive bladder     PMR (polymyalgia rheumatica)     Stress incontinence     Tobacco abuse 2015    Tremor     Type 2 diabetes mellitus without complication (HCC)     Uncontrolled diabetes mellitus with complications     Unspecified sleep apnea     Past Surgical History:   Procedure Laterality Date    ANKLE FRACTURE SURGERY Right 09/15/2023    Right open reduction internal fixation lateral malleolus, possible syndesmotic fixation, requesting large C arm, bone foam, Synthes (Bridger) -Laurent's initial note will serve as PAT, Case Comments/Implants: as above , Anesthesia Requested: choice. BRIDGER [place] Derke Smith [place] [facility] [place]    CARDIAC CATHETERIZATION  2007     SECTION

## 2025-02-28 NOTE — PROGRESS NOTES
not have grazing. Patient does not have night eating. Patient does have a history of emotional eating or eating out of boredom.    It does not take an unusually large amount of food for the patient to feel comfortably full.     Current exercise:none   Limitations to exercise: pain d/t PMR (polymyalgia rheumatica    Assessment  Nutritional Needs: Michael Avalos = 2096 kcal x 1.1 (activity factor)= 2305 kcal - 500-1000 calories/day  (for 1-2 lb weight loss/week)= 1305 calories per day  Protein needs: 65-82  grams per day (1.2-1.5gr/kg IBW)    PES Statement:  Overweight/Obesity related to a complex combination of decreased energy needs, disordered eating patterns, physical inactivity, and increased psychological/life stress as evidenced by BMI greater than 30 and inability to maintain a significant amount of weight loss through conventional weight loss interventions.      The expectations following the surgery were reviewed in detail with patient today and patient made aware will be required to include only calorie free, caffeine free, and non carbonated beverages, aim for regular meal times, monitor portion sizes, take vitamins daily, and include daily exercise.   Also discussed the rational of pre operative weight loss goal of 5% of body weight.  Patient made aware surgery may be postponed or cancelled if there is documented weight gain throughout program.     Patient has a fair  understanding of the consequences of not complying with post-op food guidelines.  Patient has a fair  understanding of the long term changes in food intake that will be necessary for all occasions after surgery for the rest of her life.     Patient presents as a fair candidate for bariatric surgery from a nutrition standpoint.  Patient is deemed nutritionally appropriate to proceed if able to show progress towards nutrition behavior changes discussed today.    Plan  Patient will begin working on recommendations from Pre-Weight Loss Surgery

## 2025-02-28 NOTE — PATIENT INSTRUCTIONS
Make the following appointments:    Dietician, May Valle.  Psychologist, Dr. Adal Maza.  Schedule screening upper endoscopy with Dr. Russo. No aspirin, ibuprofen or other non-steroidal anti-inflammatory drugs (NSAIDs) 7 days prior to surgery and endoscopy    No food or drink six hours prior to surgery.      THE BIG FOUR GUIDELINES:  1.  Count calories!  People who count calories eat less.  Use the daily plate or other apps for your smart phone or computer.  The more you count the more of an expert you will become and will get easier and easier.  If you are trying to lose weight and exercising a lot, keep calories to around the thousand to 1200 a day.  If you are not exercising consistently try to limit them to around 800 a day.    2.  Separate liquids and solids.  Wait 30 minutes to an hour after you eat before drinking liquids.  This will reduce the total amount of food you eat in the meal.    3.  Exercise!  You need up to 5 hours a week with a combination of cardio and resistance or weight training in order to keep excess body fat off.    4.  Sleep!   Try to get 7 or more hours of sleep a night.  If you have obstructive sleep apnea definitely use your CPAP machine.    THE RULE OF 20'S:  For every meal, chew each bite 20 seconds.  Then put the fork down and wait 20 seconds after you swallow before picking up the fork again.  Each meal should take at least 20 minutes so your brain can register that you are full.

## 2025-03-04 ENCOUNTER — PATIENT MESSAGE (OUTPATIENT)
Age: 50
End: 2025-03-04

## 2025-03-05 NOTE — TELEPHONE ENCOUNTER
Please clarify if patient needs a referral to dr jarrell or a letter mentioning her weight history and things she did to help lose weight?     I do see a referral that was placed in January

## 2025-03-24 LAB
CHOLEST SERPL-MCNC: 157 MG/DL (ref 0–199)
ESTIMATED AVERAGE GLUCOSE: 186 MG/DL
GLUCOSE SERPL-MCNC: 221 MG/DL (ref 70–99)
HBA1C MFR BLD: 8.1 % (ref 4–6)
HDLC SERPL-MCNC: 41 MG/DL (ref 40–59)
LDLC SERPL CALC-MCNC: 82 MG/DL (ref 0–129)
TRIGL SERPL-MCNC: 172 MG/DL (ref 0–150)

## 2025-04-01 ENCOUNTER — OFFICE VISIT (OUTPATIENT)
Age: 50
End: 2025-04-01
Payer: COMMERCIAL

## 2025-04-01 VITALS — BODY MASS INDEX: 50.02 KG/M2 | WEIGHT: 293 LBS | HEIGHT: 64 IN

## 2025-04-01 DIAGNOSIS — E66.01 MORBID OBESITY: ICD-10-CM

## 2025-04-01 DIAGNOSIS — E11.8 TYPE 2 DIABETES MELLITUS WITH COMPLICATION, WITH LONG-TERM CURRENT USE OF INSULIN (HCC): Primary | ICD-10-CM

## 2025-04-01 DIAGNOSIS — Z79.4 TYPE 2 DIABETES MELLITUS WITH COMPLICATION, WITH LONG-TERM CURRENT USE OF INSULIN (HCC): Primary | ICD-10-CM

## 2025-04-01 DIAGNOSIS — Z71.3 DIETARY COUNSELING: ICD-10-CM

## 2025-04-01 PROCEDURE — 97803 MED NUTRITION INDIV SUBSEQ: CPT | Performed by: DIETITIAN, REGISTERED

## 2025-04-01 PROCEDURE — 3052F HG A1C>EQUAL 8.0%<EQUAL 9.0%: CPT | Performed by: DIETITIAN, REGISTERED

## 2025-04-01 NOTE — PROGRESS NOTES
Nutrition follow up prior to surgery  Visit number:  2 out of 3 for [place] en Y gastric bypass.    Initial Weight: 325 lbs    Wt Readings from Last 3 Encounters:   02/28/25 (!) 147.3 kg (324 lb 11.8 oz)   02/28/25 (!) 147.3 kg (324 lb 12.8 oz)   02/07/25 (!) 147 kg (324 lb)     gained 1 lbs over 1 month, up 1 lb from inital    Previous nutrition goals:   Begin to reduce caffeine intake  Increase meal frequency to at least 3/day (can use 1 protein shake/day)  Sample protein shakes  Begin taking Bariatric MVI with iron and Calcium citrate 500-600mg twice daily  Read \"Patient Guide to Bariatric Surgery\" before next visit  Bring back signed patient contract    Nutrition goals: partially met, patient reports reducing overall caffeine intake, diet soda from 3 cans daily to 1 every other day, iced coffee from daily to 1/day.  Patient has increased meal frequency to at least 3/day and has sampled protein shakes but hasn't found any she likes. Patient has found vitamins but has not purchased. Plans to start at gym with daughter.    PES Statement:  Overweight/Obesity related to a complex combination of decreased energy needs, disordered eating patterns, physical inactivity, and increased psychological/life stress as evidenced by BMI greater than 30 and inability to maintain a significant amount of weight loss through conventional weight loss interventions.    Intervention:  Reviewed previous goals and set new goals.  Stressed importance of preoperative weight loss/maintenance or surgery may be postponed or cancelled.  Provided continued education regarding diet and lifestyle changes for optimal success post bariatric surgery.  Encouragement and support provided.    Education materials provided:   none    Plan/Recommendations:    Add additional meal time within 1 hour of waking (can be a protein shake)  Sample additional protein shakes/membreno  Continue to reduce caffeine intake  Begin taking Bariatric MVI with iron and Calcium

## 2025-04-05 DIAGNOSIS — E11.8 TYPE 2 DIABETES MELLITUS WITH COMPLICATION, WITH LONG-TERM CURRENT USE OF INSULIN (HCC): ICD-10-CM

## 2025-04-05 DIAGNOSIS — Z79.4 TYPE 2 DIABETES MELLITUS WITH COMPLICATION, WITH LONG-TERM CURRENT USE OF INSULIN (HCC): ICD-10-CM

## 2025-04-07 RX ORDER — INSULIN GLARGINE 300 U/ML
INJECTION, SOLUTION SUBCUTANEOUS
Qty: 270 ML | Refills: 3 | Status: SHIPPED | OUTPATIENT
Start: 2025-04-07

## 2025-04-07 RX ORDER — METOCLOPRAMIDE 10 MG/1
10 TABLET ORAL
Qty: 360 TABLET | Refills: 3 | Status: SHIPPED | OUTPATIENT
Start: 2025-04-07

## 2025-04-07 NOTE — TELEPHONE ENCOUNTER
Requested Prescriptions     Pending Prescriptions Disp Refills    metoclopramide (REGLAN) 10 MG tablet 360 tablet 3     Sig: Take 1 tablet by mouth 3 times daily (with meals)

## 2025-04-12 DIAGNOSIS — K31.84 GASTROPARESIS: ICD-10-CM

## 2025-04-14 RX ORDER — ERYTHROMYCIN 250 MG/1
250 TABLET, COATED ORAL 3 TIMES DAILY
Qty: 90 TABLET | Refills: 3 | Status: SHIPPED | OUTPATIENT
Start: 2025-04-14

## 2025-04-23 ENCOUNTER — RESULTS FOLLOW-UP (OUTPATIENT)
Age: 50
End: 2025-04-23

## 2025-04-23 ENCOUNTER — OFFICE VISIT (OUTPATIENT)
Age: 50
End: 2025-04-23
Payer: COMMERCIAL

## 2025-04-23 VITALS
HEART RATE: 80 BPM | OXYGEN SATURATION: 96 % | BODY MASS INDEX: 55.52 KG/M2 | DIASTOLIC BLOOD PRESSURE: 70 MMHG | HEIGHT: 64 IN | TEMPERATURE: 97.9 F | SYSTOLIC BLOOD PRESSURE: 136 MMHG

## 2025-04-23 DIAGNOSIS — M79.644 THUMB PAIN, RIGHT: Primary | ICD-10-CM

## 2025-04-23 PROCEDURE — 99213 OFFICE O/P EST LOW 20 MIN: CPT | Performed by: NURSE PRACTITIONER

## 2025-04-23 RX ORDER — METHYLPREDNISOLONE 4 MG/1
TABLET ORAL
Qty: 1 KIT | Refills: 0 | Status: SHIPPED | OUTPATIENT
Start: 2025-04-23 | End: 2025-04-29

## 2025-04-23 NOTE — RESULT ENCOUNTER NOTE
XR of the thumb normal.   Likely tendonitis. Cont with the ice and rest and start the steroid pack.  If your symptoms do not improve would suggest Ortho

## 2025-04-24 ENCOUNTER — OFFICE VISIT (OUTPATIENT)
Age: 50
End: 2025-04-24
Payer: COMMERCIAL

## 2025-04-24 VITALS
HEART RATE: 69 BPM | DIASTOLIC BLOOD PRESSURE: 60 MMHG | WEIGHT: 293 LBS | BODY MASS INDEX: 50.02 KG/M2 | SYSTOLIC BLOOD PRESSURE: 110 MMHG | HEIGHT: 64 IN

## 2025-04-24 DIAGNOSIS — M77.8 EXTENSOR TENDINITIS OF HAND: Primary | ICD-10-CM

## 2025-04-24 PROCEDURE — 99202 OFFICE O/P NEW SF 15 MIN: CPT | Performed by: PHYSICIAN ASSISTANT

## 2025-04-24 RX ORDER — PREDNISONE 50 MG/1
50 TABLET ORAL DAILY
Qty: 5 TABLET | Refills: 0 | Status: SHIPPED | OUTPATIENT
Start: 2025-04-24 | End: 2025-04-29

## 2025-04-24 RX ORDER — MELOXICAM 15 MG/1
15 TABLET ORAL DAILY
Qty: 45 TABLET | Refills: 0 | Status: SHIPPED | OUTPATIENT
Start: 2025-04-24 | End: 2025-06-08

## 2025-04-24 NOTE — PROGRESS NOTES
a single nostril; if no response, may repeat in 15 minutes using a new intranasal device. 1 each 3    MAGNESIUM PO Take 1 tablet by mouth daily OTC      CALCIUM PO Take 500 mg by mouth daily      Cholecalciferol (VITAMIN D3) 25 MCG (1000 UT) CAPS Take 1 capsule by mouth daily      Handicap Amy LISHA Good from 5/11/23 till 5/11/28 1 each 0    CPAP Machine MISC New CPAP with 8 cm and supply 1 each 0    [DISCONTINUED] sucralfate (CARAFATE) 1 GM tablet Take 1 tablet by mouth 4 times daily for 7 days 28 tablet 0     No current facility-administered medications on file prior to visit.       Objective:   /60 (BP Site: Left Upper Arm, Patient Position: Sitting, BP Cuff Size: Large Adult)   Pulse 69   Ht 1.632 m (5' 4.25\")   Wt (!) 147.9 kg (326 lb)   LMP  (LMP Unknown)   BMI 55.52 kg/m²       Radiographs and Laboratory Studies:   Previous diagnostic imaging studies were reviewed.   The right wrist has pain and palpation along the extensor tendons of the 1st and 2nd digit.  This is mainly the APL and EPL as well but does not involve the radial styloid.  She has a positive Finkelstein's test.   strength is decently preserved.  Motion is limited in the setting of pain and guarding.    Laboratory Studies:   Lab Results   Component Value Date    WBC 9.8 11/12/2023    HGB 12.8 11/12/2023    HCT 40.3 11/12/2023    MCV 84.8 11/12/2023     11/12/2023     Lab Results   Component Value Date    SEDRATE 71 (H) 09/28/2023     Lab Results   Component Value Date    CRP 28.6 (H) 09/28/2023       Assessment and Plan:      Diagnosis Orders   1. Extensor tendinitis of hand            Elba is a very pleasant dividual here who works at [employer] was right-hand-dominant likely suffering from tendinitis of the extensors at the second digit, EPL/APL tendinitis of the thumb.  We discussed management for both these issues to include anti-inflammatories.  Will do a 5-day course of prednisone and we discussed adverse reactions

## 2025-05-20 ENCOUNTER — APPOINTMENT (OUTPATIENT)
Dept: GENERAL RADIOLOGY | Age: 50
End: 2025-05-20
Payer: COMMERCIAL

## 2025-05-20 ENCOUNTER — APPOINTMENT (OUTPATIENT)
Dept: CT IMAGING | Age: 50
End: 2025-05-20
Payer: COMMERCIAL

## 2025-05-20 ENCOUNTER — HOSPITAL ENCOUNTER (EMERGENCY)
Age: 50
Discharge: HOME | End: 2025-05-20
Attending: STUDENT IN AN ORGANIZED HEALTH CARE EDUCATION/TRAINING PROGRAM
Payer: COMMERCIAL

## 2025-05-20 VITALS
TEMPERATURE: 99 F | BODY MASS INDEX: 47.09 KG/M2 | OXYGEN SATURATION: 95 % | RESPIRATION RATE: 29 BRPM | SYSTOLIC BLOOD PRESSURE: 126 MMHG | DIASTOLIC BLOOD PRESSURE: 63 MMHG | WEIGHT: 293 LBS | HEART RATE: 90 BPM | HEIGHT: 66 IN

## 2025-05-20 DIAGNOSIS — R07.9 CHEST PAIN, UNSPECIFIED TYPE: Primary | ICD-10-CM

## 2025-05-20 DIAGNOSIS — R19.7 DIARRHEA, UNSPECIFIED TYPE: ICD-10-CM

## 2025-05-20 LAB
ALBUMIN SERPL-MCNC: 3.9 G/DL (ref 3.5–4.6)
ALP SERPL-CCNC: 78 U/L (ref 40–130)
ALT SERPL-CCNC: 21 U/L (ref 0–33)
ANION GAP SERPL CALCULATED.3IONS-SCNC: 13 MEQ/L (ref 9–15)
AST SERPL-CCNC: 17 U/L (ref 0–35)
B-OH-BUTYR SERPL-SCNC: 1.4 MG/DL (ref 0.2–2.8)
BACTERIA URNS QL MICRO: ABNORMAL /HPF
BASE EXCESS VENOUS: 6 (ref -3–3)
BASOPHILS # BLD: 0 K/UL (ref 0–0.2)
BASOPHILS NFR BLD: 0.1 %
BILIRUB SERPL-MCNC: 0.4 MG/DL (ref 0.2–0.7)
BILIRUB UR QL STRIP: NEGATIVE
BUN SERPL-MCNC: 17 MG/DL (ref 6–20)
CALCIUM IONIZED: 1.14 MMOL/L (ref 1.12–1.32)
CALCIUM SERPL-MCNC: 9.2 MG/DL (ref 8.5–9.9)
CHLORIDE SERPL-SCNC: 100 MEQ/L (ref 95–107)
CLARITY UR: CLEAR
CO2 SERPL-SCNC: 25 MEQ/L (ref 20–31)
COLOR UR: YELLOW
CREAT SERPL-MCNC: 0.93 MG/DL (ref 0.5–0.9)
D DIMER PPP FEU-MCNC: 0.54 MG/L FEU (ref 0–0.5)
EOSINOPHIL # BLD: 0.1 K/UL (ref 0–0.7)
EOSINOPHIL NFR BLD: 0.8 %
EPI CELLS #/AREA URNS AUTO: ABNORMAL /HPF (ref 0–5)
ERYTHROCYTE [DISTWIDTH] IN BLOOD BY AUTOMATED COUNT: 17 % (ref 11.5–14.5)
GLOBULIN SER CALC-MCNC: 3.5 G/DL (ref 2.3–3.5)
GLUCOSE BLD-MCNC: 334 MG/DL (ref 70–99)
GLUCOSE SERPL-MCNC: 307 MG/DL (ref 70–99)
GLUCOSE UR STRIP-MCNC: 100 MG/DL
HCO3 VENOUS: 30.7 MMOL/L (ref 23–29)
HCT VFR BLD AUTO: 39.5 % (ref 37–47)
HCT VFR BLD AUTO: 63 % (ref 36–48)
HGB BLD CALC-MCNC: 21.5 GM/DL (ref 12–16)
HGB BLD-MCNC: 12 G/DL (ref 12–16)
HGB UR QL STRIP: NEGATIVE
HYALINE CASTS #/AREA URNS AUTO: ABNORMAL /HPF (ref 0–5)
INR PPP: 1.1
KETONES UR STRIP-MCNC: NEGATIVE MG/DL
LACTATE BLDV-SCNC: 2.4 MMOL/L (ref 0.5–2.2)
LACTATE: 3.43 MMOL/L (ref 0.4–2)
LEUKOCYTE ESTERASE UR QL STRIP: ABNORMAL
LYMPHOCYTES # BLD: 0.8 K/UL (ref 1–4.8)
LYMPHOCYTES NFR BLD: 6.7 %
MCH RBC QN AUTO: 24.6 PG (ref 27–31.3)
MCHC RBC AUTO-ENTMCNC: 30.4 % (ref 33–37)
MCV RBC AUTO: 81.1 FL (ref 79.4–94.8)
MONOCYTES # BLD: 0.4 K/UL (ref 0.2–0.8)
MONOCYTES NFR BLD: 3.5 %
NEUTROPHILS # BLD: 10.4 K/UL (ref 1.4–6.5)
NEUTS SEG NFR BLD: 88.3 %
NITRITE UR QL STRIP: POSITIVE
O2 SAT, VEN: 40 %
PCO2 VENOUS: 52.2 MM HG (ref 40–50)
PERFORMED ON: ABNORMAL
PH UR STRIP: 6 [PH] (ref 5–9)
PH VENOUS: 7.38 (ref 7.32–7.42)
PLATELET # BLD AUTO: 214 K/UL (ref 130–400)
PO2 VENOUS: 24 MM HG
POC CHLORIDE: 103 MEQ/L (ref 99–110)
POC CREATININE: 0.8 MG/DL (ref 0.6–1.2)
POC FIO2: 21
POC SAMPLE TYPE: ABNORMAL
POTASSIUM SERPL-SCNC: 4.4 MEQ/L (ref 3.4–4.9)
POTASSIUM SERPL-SCNC: 4.6 MEQ/L (ref 3.5–5.1)
PROT SERPL-MCNC: 7.4 G/DL (ref 6.3–8)
PROT UR STRIP-MCNC: ABNORMAL MG/DL
PROTHROMBIN TIME: 14.7 SEC (ref 12.3–14.9)
RBC # BLD AUTO: 4.87 M/UL (ref 4.2–5.4)
RBC #/AREA URNS AUTO: ABNORMAL /HPF (ref 0–5)
SARS-COV-2 RDRP RESP QL NAA+PROBE: NOT DETECTED
SODIUM BLD-SCNC: 142 MEQ/L (ref 136–145)
SODIUM SERPL-SCNC: 138 MEQ/L (ref 135–144)
SP GR UR STRIP: 1.02 (ref 1–1.03)
T4 FREE SERPL-MCNC: 1.2 NG/DL (ref 0.84–1.68)
TCO2 CALC VENOUS: 32 MMOL/L
TROPONIN, HIGH SENSITIVITY: 18 NG/L (ref 0–19)
TROPONIN, HIGH SENSITIVITY: 20 NG/L (ref 0–19)
TSH SERPL-MCNC: 0.79 UIU/ML (ref 0.44–3.86)
URINE REFLEX TO CULTURE: YES
UROBILINOGEN UR STRIP-ACNC: 0.2 E.U./DL
WBC # BLD AUTO: 11.8 K/UL (ref 4.8–10.8)
WBC #/AREA URNS AUTO: ABNORMAL /HPF (ref 0–5)

## 2025-05-20 PROCEDURE — 6370000000 HC RX 637 (ALT 250 FOR IP): Performed by: STUDENT IN AN ORGANIZED HEALTH CARE EDUCATION/TRAINING PROGRAM

## 2025-05-20 PROCEDURE — 82565 ASSAY OF CREATININE: CPT

## 2025-05-20 PROCEDURE — 84484 ASSAY OF TROPONIN QUANT: CPT

## 2025-05-20 PROCEDURE — 81001 URINALYSIS AUTO W/SCOPE: CPT

## 2025-05-20 PROCEDURE — 87635 SARS-COV-2 COVID-19 AMP PRB: CPT

## 2025-05-20 PROCEDURE — 87040 BLOOD CULTURE FOR BACTERIA: CPT

## 2025-05-20 PROCEDURE — 2580000003 HC RX 258: Performed by: STUDENT IN AN ORGANIZED HEALTH CARE EDUCATION/TRAINING PROGRAM

## 2025-05-20 PROCEDURE — 85379 FIBRIN DEGRADATION QUANT: CPT

## 2025-05-20 PROCEDURE — 80053 COMPREHEN METABOLIC PANEL: CPT

## 2025-05-20 PROCEDURE — 82803 BLOOD GASES ANY COMBINATION: CPT

## 2025-05-20 PROCEDURE — 84132 ASSAY OF SERUM POTASSIUM: CPT

## 2025-05-20 PROCEDURE — 87086 URINE CULTURE/COLONY COUNT: CPT

## 2025-05-20 PROCEDURE — 84439 ASSAY OF FREE THYROXINE: CPT

## 2025-05-20 PROCEDURE — 96360 HYDRATION IV INFUSION INIT: CPT

## 2025-05-20 PROCEDURE — 84295 ASSAY OF SERUM SODIUM: CPT

## 2025-05-20 PROCEDURE — 99285 EMERGENCY DEPT VISIT HI MDM: CPT

## 2025-05-20 PROCEDURE — 96361 HYDRATE IV INFUSION ADD-ON: CPT

## 2025-05-20 PROCEDURE — 85025 COMPLETE CBC W/AUTO DIFF WBC: CPT

## 2025-05-20 PROCEDURE — 71275 CT ANGIOGRAPHY CHEST: CPT

## 2025-05-20 PROCEDURE — 82330 ASSAY OF CALCIUM: CPT

## 2025-05-20 PROCEDURE — 82435 ASSAY OF BLOOD CHLORIDE: CPT

## 2025-05-20 PROCEDURE — 82010 KETONE BODYS QUAN: CPT

## 2025-05-20 PROCEDURE — 85014 HEMATOCRIT: CPT

## 2025-05-20 PROCEDURE — 83605 ASSAY OF LACTIC ACID: CPT

## 2025-05-20 PROCEDURE — 36415 COLL VENOUS BLD VENIPUNCTURE: CPT

## 2025-05-20 PROCEDURE — 71045 X-RAY EXAM CHEST 1 VIEW: CPT

## 2025-05-20 PROCEDURE — 85610 PROTHROMBIN TIME: CPT

## 2025-05-20 PROCEDURE — 36600 WITHDRAWAL OF ARTERIAL BLOOD: CPT

## 2025-05-20 PROCEDURE — 6360000004 HC RX CONTRAST MEDICATION: Performed by: STUDENT IN AN ORGANIZED HEALTH CARE EDUCATION/TRAINING PROGRAM

## 2025-05-20 PROCEDURE — 84443 ASSAY THYROID STIM HORMONE: CPT

## 2025-05-20 RX ORDER — 0.9 % SODIUM CHLORIDE 0.9 %
2000 INTRAVENOUS SOLUTION INTRAVENOUS ONCE
Status: COMPLETED | OUTPATIENT
Start: 2025-05-20 | End: 2025-05-20

## 2025-05-20 RX ORDER — ONDANSETRON 4 MG/1
4 TABLET, ORALLY DISINTEGRATING ORAL 3 TIMES DAILY PRN
Qty: 6 TABLET | Refills: 0 | Status: SHIPPED | OUTPATIENT
Start: 2025-05-20 | End: 2025-05-22

## 2025-05-20 RX ORDER — LEVOFLOXACIN 500 MG/1
500 TABLET, FILM COATED ORAL DAILY
Qty: 6 TABLET | Refills: 0 | Status: SHIPPED | OUTPATIENT
Start: 2025-05-21 | End: 2025-05-27

## 2025-05-20 RX ORDER — LEVOFLOXACIN 750 MG/1
750 TABLET, FILM COATED ORAL ONCE
Status: COMPLETED | OUTPATIENT
Start: 2025-05-20 | End: 2025-05-20

## 2025-05-20 RX ORDER — IOPAMIDOL 755 MG/ML
75 INJECTION, SOLUTION INTRAVASCULAR
Status: COMPLETED | OUTPATIENT
Start: 2025-05-20 | End: 2025-05-20

## 2025-05-20 RX ORDER — FAMOTIDINE 20 MG/1
20 TABLET, FILM COATED ORAL DAILY PRN
Qty: 7 TABLET | Refills: 0 | Status: SHIPPED | OUTPATIENT
Start: 2025-05-20 | End: 2025-05-27

## 2025-05-20 RX ADMIN — IOPAMIDOL 75 ML: 755 INJECTION, SOLUTION INTRAVENOUS at 09:18

## 2025-05-20 RX ADMIN — SODIUM CHLORIDE 2000 ML: 0.9 INJECTION, SOLUTION INTRAVENOUS at 08:18

## 2025-05-20 RX ADMIN — LEVOFLOXACIN 750 MG: 750 TABLET, FILM COATED ORAL at 10:36

## 2025-05-20 ASSESSMENT — PAIN DESCRIPTION - PAIN TYPE: TYPE: ACUTE PAIN

## 2025-05-20 ASSESSMENT — PAIN DESCRIPTION - ORIENTATION: ORIENTATION: MID

## 2025-05-20 ASSESSMENT — PAIN DESCRIPTION - LOCATION: LOCATION: CHEST

## 2025-05-20 ASSESSMENT — LIFESTYLE VARIABLES
HOW OFTEN DO YOU HAVE A DRINK CONTAINING ALCOHOL: NEVER
HOW MANY STANDARD DRINKS CONTAINING ALCOHOL DO YOU HAVE ON A TYPICAL DAY: PATIENT DOES NOT DRINK

## 2025-05-20 ASSESSMENT — PAIN - FUNCTIONAL ASSESSMENT: PAIN_FUNCTIONAL_ASSESSMENT: 0-10

## 2025-05-20 ASSESSMENT — PAIN SCALES - GENERAL: PAINLEVEL_OUTOF10: 5

## 2025-05-20 ASSESSMENT — PAIN DESCRIPTION - DESCRIPTORS: DESCRIPTORS: ACHING

## 2025-05-20 NOTE — DISCHARGE INSTRUCTIONS
You take Pepcid over-the-counter  Continue taking your other medications  Take antibiotics as prescribed to completion  Call your infectious disease doctor and PCP for a follow-up appointment this week  If given narcotics (opiates) during this Emergency Department visit, please do not drink, drive or operate any machinery for at least 4 - 6 hours.    Avoid eating any spicy food, milk type products or drinks that have caffeine in it.  Take all medications as prescribed.  For pain use ibuprofen (Motrin) or acetaminophen (Tylenol), unless prescribed medications that have acetaminophen in it.  You can take over the counter acetaminophen tablets (1 - 2 tablets of the 500-mg strength every 6 hours) or ibuprofen tablets (2 tablets every 4 hours).    PLEASE RETURN TO THE EMERGENCY DEPARTMENT IMMEDIATELY for worsening symptoms, or if you develop any concerning symptoms such as: high fever not relieved by acetaminophen (Tylenol) and/or ibuprofen (Motrin), chills, shortness of breath, chest pain, persistent nausea and/or vomiting, numbness, weakness or tingling in the arms or legs or change in color of the extremities, changes in mental status, persistent headache, blurry vision.    Return within 8 - 12 hours if you have any of the following: worsening of pain in your abdomen, no food sounds good to you, you continue to vomit, pain goes to your back, have pain in the abdomen when going over a bump in the car or when you jump up and down, develop vaginal bleeding or discharge, inability to urinate, unable to follow up with your physician, or other any other care or concern.

## 2025-05-20 NOTE — ED TRIAGE NOTES
Woke up shaking with chills  Hurts to breath in  Chest pain midsternal  Non-radiating, breathing in makes it worse  Denies cough or SOB  Severe abd pain  No hx cardiac issues

## 2025-05-20 NOTE — ED PROVIDER NOTES
[facility] EMERGENCY DEPARTMENT  Emergency Department Encounter  Emergency Medicine      Pt Name: Bertha Hebert  MRN:[id number]  Birthdate 1975  Date of evaluation: 5/20/25  Time: 7:08 AM EDT  PCP:  Lisbeth Mccray MD    CHIEF COMPLAINT       Chief Complaint   Patient presents with    Chest Pain       HISTORY OF PRESENT ILLNESS  (Location/Symptom, Timing/Onset, Context/Setting, Quality, Duration, ModifyingFactors, Severity.)      Bertha Hebert is a 50 y.o. female with PMH of PMR, reflux, insulin-dependent diabetes presents with chest pain.  Patient said she was feeling well last night and this morning around 1 AM she woke up and had diarrhea for about an hour.  It was nonbloody.  She had abdominal cramping.  Felt little nauseous no vomiting.  She had a bit of chills this morning but that has resolved now she feels warm.  She denies shortness of breath or cough no hemoptysis no fevers no back pain no lightheadedness.  She said when she takes a deep breath she had chest pain in the middle of her chest.  Symptoms had gone down some.  Has not take any medications for it.  She does have reflux but takes her medications.  She did take her metoprolol this morning.  Denies SOB, calf pain or swelling, recent long travel, recent surgeries, history of DVT or PE, exogenous hormone use, hemoptysis.  Patient said her sister and mother both had heart attacks with both of them used hard drugs.  Patient denies using drugs or alcohol, she does vape, no personal heart history according to her  PAST MEDICAL / SURGICAL / SOCIAL /FAMILY HISTORY      has a past medical history of Abnormal Pap smear of cervix, Acute midline thoracic back pain, Anxiety, Autoimmune disorder, B12 deficiency, Family history of premature CAD, Fatty liver, Gastroesophageal reflux disease, Gastroparesis, Gestational diabetes mellitus, HPV (human papilloma virus) anogenital infection, Hyperlipidemia, Hypertension, Irritable bowel syndrome with

## 2025-05-21 ENCOUNTER — CARE COORDINATION (OUTPATIENT)
Dept: OTHER | Facility: CLINIC | Age: 50
End: 2025-05-21

## 2025-05-21 LAB
EKG ATRIAL RATE: 112 BPM
EKG DIAGNOSIS: NORMAL
EKG P AXIS: 34 DEGREES
EKG P-R INTERVAL: 160 MS
EKG Q-T INTERVAL: 334 MS
EKG QRS DURATION: 70 MS
EKG QTC CALCULATION (BAZETT): 455 MS
EKG R AXIS: 8 DEGREES
EKG T AXIS: 52 DEGREES
EKG VENTRICULAR RATE: 112 BPM

## 2025-05-21 NOTE — CARE COORDINATION
Ambulatory Care Coordination Note     5/21/2025 4:30 PM     ACM sent [employer] message for Associate Care Management follow up related to ED follow up. See patient message for details and response (as appropriate).     Care Summary Note:Per chart review,  Discharge Date: 5/20/25   Discharge Facility: [facility]  Reason for ED Visit: Chest Pain  Visit Diagnosis: CHest Pain    Number of ED visits in the last 6 months: 1      PCP/Specialist follow up:   Future Appointments         Provider Specialty Dept Phone    5/22/2025 1:15 PM Noa Couch MD Infectious Diseases [phone]    5/27/2025 8:30 AM May Valle RD Bariatrics [phone]    5/27/2025 9:00 AM Adal German PSYD Bariatrics [phone]    5/27/2025 1:30 PM Matteo Philip MD Cardiology [phone]    6/24/2025 9:30 AM Patrick Marinelli MD Endocrinology [phone]    7/8/2025 9:15 AM Jeremías Iyer DO Obstetrics and Gynecology [phone]    7/21/2025 8:30 AM Nick Molina MD Pulmonology [phone]    8/8/2025 8:45 AM Duran Liz MD Neurology [phone]    10/1/2025 8:00 AM Lisbeth Mccray MD Family Medicine [phone]            Follow Up:   Plan for next ACM outreach in approximately 1-2 days  to complete:  - outreach attempt to offer care management services.

## 2025-05-22 ENCOUNTER — OFFICE VISIT (OUTPATIENT)
Age: 50
End: 2025-05-22
Payer: COMMERCIAL

## 2025-05-22 VITALS
HEART RATE: 72 BPM | OXYGEN SATURATION: 99 % | WEIGHT: 293 LBS | DIASTOLIC BLOOD PRESSURE: 68 MMHG | HEIGHT: 66 IN | TEMPERATURE: 97.3 F | BODY MASS INDEX: 47.09 KG/M2 | SYSTOLIC BLOOD PRESSURE: 131 MMHG | RESPIRATION RATE: 18 BRPM

## 2025-05-22 DIAGNOSIS — Z86.19 HISTORY OF EXTENDED-SPECTRUM BETA-LACTAMASE PRODUCING KLEBSIELLA PNEUMONIAE INFECTION: ICD-10-CM

## 2025-05-22 DIAGNOSIS — N39.0 RECURRENT UTI: Primary | ICD-10-CM

## 2025-05-22 PROCEDURE — 99213 OFFICE O/P EST LOW 20 MIN: CPT | Performed by: INTERNAL MEDICINE

## 2025-05-22 ASSESSMENT — PATIENT HEALTH QUESTIONNAIRE - PHQ9
SUM OF ALL RESPONSES TO PHQ QUESTIONS 1-9: 0
2. FEELING DOWN, DEPRESSED OR HOPELESS: NOT AT ALL
SUM OF ALL RESPONSES TO PHQ QUESTIONS 1-9: 0
1. LITTLE INTEREST OR PLEASURE IN DOING THINGS: NOT AT ALL
SUM OF ALL RESPONSES TO PHQ QUESTIONS 1-9: 0
SUM OF ALL RESPONSES TO PHQ QUESTIONS 1-9: 0

## 2025-05-22 NOTE — ASSESSMENT & PLAN NOTE
Check urine culture and sensitivity.  I discussed with the patient that if her symptoms resolved and if cultures show ESBL bacteria, I would repeat urinalysis with reflex culture before treating the ESBL especially if patient's symptoms remain to be resolved.  I encourage patient to continue increase fluid intake, eating berries, drinking cranberry juice  Follow-up as needed

## 2025-05-22 NOTE — ASSESSMENT & PLAN NOTE
New, not at goal (unstable), continue current treatment plan  With resolved symptoms on Levaquin.  Finish current course of Levaquin for a total of 6 days.   Call back if recurrent symptoms or persistent symptoms

## 2025-05-22 NOTE — PROGRESS NOTES
Bertha Hebret (:  1975) is a 50 y.o. female,Established patient, here for evaluation of the following chief complaint(s):  Follow-Up from Hospital and Urinary Tract Infection ([facility] f/u for UTI)         Assessment & Plan  Recurrent UTI   New, not at goal (unstable), continue current treatment plan  With resolved symptoms on Levaquin.  Finish current course of Levaquin for a total of 6 days.   Call back if recurrent symptoms or persistent symptoms       History of extended-spectrum beta-lactamase producing Klebsiella pneumoniae infection    Check urine culture and sensitivity.  I discussed with the patient that if her symptoms resolved and if cultures show ESBL bacteria, I would repeat urinalysis with reflex culture before treating the ESBL especially if patient's symptoms remain to be resolved.  I encourage patient to continue increase fluid intake, eating berries, drinking cranberry juice  Follow-up as needed              Subjective   HPI  Referred back by emergency room for recurrent UTI.  Patient went to ER on Tuesday with acute onset of fevers and chills, frequency and urgency on urination.  Denies any dysuria.  No abdominal pain or flank pain.  Was given IV Levaquin in the ER and was sent home on p.o. Levaquin 500 mg daily for 6 more days.  Patient reports tolerating antibiotics well.  She reports currently resolved symptoms.  She reports increased fluid intake.   Denies any complaints  Currently back to work  Concerned about her history of ESBL  Review of Systems   Rest of review of system is negative    Objective   Physical Exam     Vitals:    25 1315   BP: 131/68   BP Site: Left Upper Arm   Patient Position: Sitting   BP Cuff Size: Large Adult   Pulse: 72   Resp: 18   Temp: 97.3 °F (36.3 °C)   TempSrc: Temporal   SpO2: 99%   Weight: (!) 150.3 kg (331 lb 6.4 oz)   Height: 1.676 m (5' 6\")     General Appearance: alert and oriented to person, place and time, well-developed and

## 2025-05-23 ENCOUNTER — TELEPHONE (OUTPATIENT)
Age: 50
End: 2025-05-23

## 2025-05-23 ENCOUNTER — HOSPITAL ENCOUNTER (OUTPATIENT)
Dept: INFUSION THERAPY | Age: 50
Setting detail: INFUSION SERIES
Discharge: HOME | End: 2025-05-23
Payer: COMMERCIAL

## 2025-05-23 ENCOUNTER — HOSPITAL ENCOUNTER (OUTPATIENT)
Dept: INTERVENTIONAL RADIOLOGY/VASCULAR | Age: 50
Discharge: HOME | End: 2025-05-25
Payer: COMMERCIAL

## 2025-05-23 VITALS
RESPIRATION RATE: 16 BRPM | HEART RATE: 73 BPM | OXYGEN SATURATION: 94 % | SYSTOLIC BLOOD PRESSURE: 124 MMHG | TEMPERATURE: 97.3 F | DIASTOLIC BLOOD PRESSURE: 61 MMHG

## 2025-05-23 DIAGNOSIS — N39.0 URINARY TRACT INFECTION WITHOUT HEMATURIA, SITE UNSPECIFIED: Primary | ICD-10-CM

## 2025-05-23 DIAGNOSIS — N39.0 RECURRENT UTI: ICD-10-CM

## 2025-05-23 DIAGNOSIS — Z86.19 HISTORY OF EXTENDED-SPECTRUM BETA-LACTAMASE PRODUCING KLEBSIELLA PNEUMONIAE INFECTION: ICD-10-CM

## 2025-05-23 DIAGNOSIS — N39.0 RECURRENT UTI: Primary | ICD-10-CM

## 2025-05-23 LAB
ANION GAP SERPL CALCULATED.3IONS-SCNC: 13 MEQ/L (ref 9–15)
BACTERIA UR CULT: ABNORMAL
BACTERIA UR CULT: ABNORMAL
BUN SERPL-MCNC: 14 MG/DL (ref 6–20)
CALCIUM SERPL-MCNC: 9.5 MG/DL (ref 8.5–9.9)
CHLORIDE SERPL-SCNC: 100 MEQ/L (ref 95–107)
CO2 SERPL-SCNC: 26 MEQ/L (ref 20–31)
CREAT SERPL-MCNC: 0.75 MG/DL (ref 0.5–0.9)
ERYTHROCYTE [DISTWIDTH] IN BLOOD BY AUTOMATED COUNT: 16.6 % (ref 11.5–14.5)
GLUCOSE SERPL-MCNC: 100 MG/DL (ref 70–99)
HCT VFR BLD AUTO: 33.9 % (ref 37–47)
HGB BLD-MCNC: 10.8 G/DL (ref 12–16)
MCH RBC QN AUTO: 25.3 PG (ref 27–31.3)
MCHC RBC AUTO-ENTMCNC: 31.9 % (ref 33–37)
MCV RBC AUTO: 79.4 FL (ref 79.4–94.8)
ORGANISM: ABNORMAL
PLATELET # BLD AUTO: 243 K/UL (ref 130–400)
POTASSIUM SERPL-SCNC: 4 MEQ/L (ref 3.4–4.9)
RBC # BLD AUTO: 4.27 M/UL (ref 4.2–5.4)
SODIUM SERPL-SCNC: 139 MEQ/L (ref 135–144)
WBC # BLD AUTO: 9.5 K/UL (ref 4.8–10.8)

## 2025-05-23 PROCEDURE — 76937 US GUIDE VASCULAR ACCESS: CPT

## 2025-05-23 PROCEDURE — 36410 VNPNXR 3YR/> PHY/QHP DX/THER: CPT | Performed by: RADIOLOGY

## 2025-05-23 PROCEDURE — 85027 COMPLETE CBC AUTOMATED: CPT

## 2025-05-23 PROCEDURE — 76942 ECHO GUIDE FOR BIOPSY: CPT | Performed by: RADIOLOGY

## 2025-05-23 PROCEDURE — 36410 VNPNXR 3YR/> PHY/QHP DX/THER: CPT

## 2025-05-23 PROCEDURE — 2709999900 IR MIDLINE CATH

## 2025-05-23 PROCEDURE — 6360000002 HC RX W HCPCS: Performed by: INTERNAL MEDICINE

## 2025-05-23 PROCEDURE — 36592 COLLECT BLOOD FROM PICC: CPT

## 2025-05-23 PROCEDURE — 2580000003 HC RX 258: Performed by: INTERNAL MEDICINE

## 2025-05-23 PROCEDURE — 80048 BASIC METABOLIC PNL TOTAL CA: CPT

## 2025-05-23 PROCEDURE — 96365 THER/PROPH/DIAG IV INF INIT: CPT

## 2025-05-23 RX ADMIN — ERTAPENEM SODIUM 1000 MG: 1 INJECTION INTRAMUSCULAR; INTRAVENOUS at 15:42

## 2025-05-23 NOTE — TELEPHONE ENCOUNTER
Call to patient to advise treatment can start today. [facility] de[facility] can see her for the procedure to place MidLine at 2:30pm. Thereafter she will go to the O/P Infusion dept daily. Explained to patient once she is seen in the OPI Dept, they will schedule her daily Infusions and for the Weekend days and Holidays may not be here with [facility]. Patient verbalized understanding.     All orders faxed to appropriate depts.

## 2025-05-23 NOTE — DISCHARGE INSTRUCTIONS
Discharge Instruction for Midline catheter:       Remember to carry your card for midline.  It is proof that the midline can be safely used for radiology procedures.     A midline catheter is a soft, flexible tube that runs under your skin from a vein in your arm up to the axillary area. One end of the catheter stays outside your body. You may have it for weeks.  A midline is used to give you medicine, blood products, nutrients, or fluids. A midline makes doing these things more comfortable for you because they are put directly into the catheter. So you will not be stuck with a needle every time. A midline also can be used to draw blood for tests. The end of the midline sometimes has one or two openings so that you can get more than one type of fluid or medicine at a time.    After the procedure, the site may be sore for a day or two.    Follow-up care is a key part of your treatment and safety. Be sure to make and go to all appointments, and call your doctor if you are having problems. It's also a good idea to know your test results and keep a list of the medicines you take.    How can you care for yourself at home?  Do not lift anything heavier than 10-15lbs with affected arm.  Do not perform any vigorous activity that involves affected arm; ie jumping jacks, push-ups, burpees etc.  Do not wear jewelry, such as necklaces, that can catch on the catheter.  If the catheter breaks, follow the instructions your doctor gave you. If you have no instructions, clamp or tie off the catheter. Then see a doctor as soon as possible.  To help prevent infection, take a shower instead of a bath. Do not go swimming with the catheter.  Try to keep the area dry. When you shower, cover the area with waterproof material, such as plastic wrap.  Never touch the open end of the catheter if the cap is off.  Never use scissors, knives, pins, or other sharp objects near the catheter or other tubing.  If your catheter has a clamp, keep it

## 2025-05-23 NOTE — TELEPHONE ENCOUNTER
Patient calls to ID office to inquire of Final results of UA-Cx. Found the complete report just finalized this morning. Will update ID physician.  Patient confirms SnS of urgency and pressure when she has to void.     Update to Dr Couch, discussed the results which are Resistant to most Abx. Dr Couch is asking why Meropenem is not on the list. Call the Micro Dept.   Otherwise, IV Abx of Invanz, 1-G, Q-24 for 10 days will be ordered, Midline to be ordered. Weekly Labs CBC+BMP now and in one week. Repeat UA and Cx with Reflex in one week. Check if patient eligible for HHC, if not to be done by O/P infusion. Contact precautions are indicated, wash hands thoroughly.     Per [facility] call Micro Dept in [place], [phone], they advised the Cx is still pending and two more Abx are not ready; Meropenem and Amikacin. Will update ID physician.    Per Dr Couch continue with the orders.  Call to patient to explain IV Abx are to be ordered along with a Midline. Addressed questions to best of my ability. Patient agrees to treatment.

## 2025-05-23 NOTE — PROGRESS NOTES
Per Dr Couch, patient to be treated for Multi resistant UTI, with Klebsiella/ESBL. Contact precautions indicated.  Place Midline for x10 day course of IV Invanz, 1-G, Q-24. New set of Labs now and in one week.  UA reflex to Culture order in one week.

## 2025-05-24 ENCOUNTER — HOSPITAL ENCOUNTER (OUTPATIENT)
Dept: INFUSION THERAPY | Age: 50
Setting detail: INFUSION SERIES
Discharge: HOME | End: 2025-05-24
Payer: COMMERCIAL

## 2025-05-24 VITALS
OXYGEN SATURATION: 95 % | SYSTOLIC BLOOD PRESSURE: 135 MMHG | HEART RATE: 84 BPM | TEMPERATURE: 97.8 F | RESPIRATION RATE: 18 BRPM | DIASTOLIC BLOOD PRESSURE: 66 MMHG

## 2025-05-24 DIAGNOSIS — N39.0 URINARY TRACT INFECTION WITHOUT HEMATURIA, SITE UNSPECIFIED: Primary | ICD-10-CM

## 2025-05-24 PROCEDURE — 2580000003 HC RX 258: Performed by: INTERNAL MEDICINE

## 2025-05-24 PROCEDURE — 6360000002 HC RX W HCPCS: Performed by: INTERNAL MEDICINE

## 2025-05-24 PROCEDURE — 96365 THER/PROPH/DIAG IV INF INIT: CPT

## 2025-05-24 RX ADMIN — ERTAPENEM SODIUM 1000 MG: 1 INJECTION INTRAMUSCULAR; INTRAVENOUS at 09:10

## 2025-05-24 NOTE — PROGRESS NOTES
Patient here for same day infusion. Patients midline gives blood return and flushes easily. Invanz started at this time. Vitals in chart.

## 2025-05-25 ENCOUNTER — HOSPITAL ENCOUNTER (OUTPATIENT)
Dept: INFUSION THERAPY | Age: 50
Setting detail: INFUSION SERIES
Discharge: HOME | End: 2025-05-25
Payer: COMMERCIAL

## 2025-05-25 VITALS
DIASTOLIC BLOOD PRESSURE: 75 MMHG | HEART RATE: 72 BPM | TEMPERATURE: 97.9 F | RESPIRATION RATE: 18 BRPM | SYSTOLIC BLOOD PRESSURE: 139 MMHG

## 2025-05-25 DIAGNOSIS — N39.0 URINARY TRACT INFECTION WITHOUT HEMATURIA, SITE UNSPECIFIED: Primary | ICD-10-CM

## 2025-05-25 LAB
BACTERIA BLD CULT ORG #2: NORMAL
BACTERIA BLD CULT: NORMAL

## 2025-05-25 PROCEDURE — 96365 THER/PROPH/DIAG IV INF INIT: CPT

## 2025-05-25 PROCEDURE — 6360000002 HC RX W HCPCS: Performed by: INTERNAL MEDICINE

## 2025-05-25 PROCEDURE — 2580000003 HC RX 258: Performed by: INTERNAL MEDICINE

## 2025-05-25 RX ADMIN — ERTAPENEM SODIUM 1000 MG: 1 INJECTION INTRAMUSCULAR; INTRAVENOUS at 09:14

## 2025-05-25 NOTE — CARE COORDINATION
Invanz infusion completed via right midline, line flushed per protocol and capped.  Patient left floor ambulatory.  Voiced no complaints.

## 2025-05-26 ENCOUNTER — HOSPITAL ENCOUNTER (OUTPATIENT)
Dept: INFUSION THERAPY | Age: 50
Setting detail: INFUSION SERIES
Discharge: HOME | End: 2025-05-26
Payer: COMMERCIAL

## 2025-05-26 VITALS
SYSTOLIC BLOOD PRESSURE: 148 MMHG | RESPIRATION RATE: 18 BRPM | DIASTOLIC BLOOD PRESSURE: 72 MMHG | TEMPERATURE: 98.1 F | OXYGEN SATURATION: 98 % | HEART RATE: 70 BPM

## 2025-05-26 DIAGNOSIS — N39.0 URINARY TRACT INFECTION WITHOUT HEMATURIA, SITE UNSPECIFIED: Primary | ICD-10-CM

## 2025-05-26 PROCEDURE — 2580000003 HC RX 258: Performed by: INTERNAL MEDICINE

## 2025-05-26 PROCEDURE — 6360000002 HC RX W HCPCS: Performed by: INTERNAL MEDICINE

## 2025-05-26 PROCEDURE — 96365 THER/PROPH/DIAG IV INF INIT: CPT

## 2025-05-26 RX ADMIN — ERTAPENEM SODIUM 1000 MG: 1 INJECTION INTRAMUSCULAR; INTRAVENOUS at 09:06

## 2025-05-27 ENCOUNTER — HOSPITAL ENCOUNTER (OUTPATIENT)
Dept: INFUSION THERAPY | Age: 50
Setting detail: INFUSION SERIES
Discharge: HOME | End: 2025-05-27
Payer: COMMERCIAL

## 2025-05-27 ENCOUNTER — OFFICE VISIT (OUTPATIENT)
Age: 50
End: 2025-05-27
Payer: COMMERCIAL

## 2025-05-27 VITALS
HEART RATE: 84 BPM | OXYGEN SATURATION: 94 % | BODY MASS INDEX: 52.46 KG/M2 | WEIGHT: 293 LBS | DIASTOLIC BLOOD PRESSURE: 80 MMHG | SYSTOLIC BLOOD PRESSURE: 106 MMHG

## 2025-05-27 VITALS
SYSTOLIC BLOOD PRESSURE: 143 MMHG | TEMPERATURE: 97.6 F | DIASTOLIC BLOOD PRESSURE: 77 MMHG | RESPIRATION RATE: 17 BRPM | HEART RATE: 80 BPM | OXYGEN SATURATION: 96 %

## 2025-05-27 DIAGNOSIS — I27.20 PULMONARY HYPERTENSION (HCC): ICD-10-CM

## 2025-05-27 DIAGNOSIS — N39.0 URINARY TRACT INFECTION WITHOUT HEMATURIA, SITE UNSPECIFIED: Primary | ICD-10-CM

## 2025-05-27 DIAGNOSIS — Z01.810 PRE-OPERATIVE CARDIOVASCULAR EXAMINATION: Primary | ICD-10-CM

## 2025-05-27 DIAGNOSIS — E11.8 TYPE 2 DIABETES MELLITUS WITH COMPLICATION, WITH LONG-TERM CURRENT USE OF INSULIN (HCC): ICD-10-CM

## 2025-05-27 DIAGNOSIS — I10 HYPERTENSION, UNSPECIFIED TYPE: ICD-10-CM

## 2025-05-27 DIAGNOSIS — Z79.4 TYPE 2 DIABETES MELLITUS WITH COMPLICATION, WITH LONG-TERM CURRENT USE OF INSULIN (HCC): ICD-10-CM

## 2025-05-27 DIAGNOSIS — E78.5 DYSLIPIDEMIA: ICD-10-CM

## 2025-05-27 PROCEDURE — 2580000003 HC RX 258: Performed by: INTERNAL MEDICINE

## 2025-05-27 PROCEDURE — 99214 OFFICE O/P EST MOD 30 MIN: CPT | Performed by: INTERNAL MEDICINE

## 2025-05-27 PROCEDURE — 96365 THER/PROPH/DIAG IV INF INIT: CPT

## 2025-05-27 PROCEDURE — 3079F DIAST BP 80-89 MM HG: CPT | Performed by: INTERNAL MEDICINE

## 2025-05-27 PROCEDURE — 3074F SYST BP LT 130 MM HG: CPT | Performed by: INTERNAL MEDICINE

## 2025-05-27 PROCEDURE — 3052F HG A1C>EQUAL 8.0%<EQUAL 9.0%: CPT | Performed by: INTERNAL MEDICINE

## 2025-05-27 PROCEDURE — 6360000002 HC RX W HCPCS: Performed by: INTERNAL MEDICINE

## 2025-05-27 RX ADMIN — ERTAPENEM SODIUM 1000 MG: 1 INJECTION INTRAMUSCULAR; INTRAVENOUS at 14:28

## 2025-05-27 ASSESSMENT — ENCOUNTER SYMPTOMS
STRIDOR: 0
NAUSEA: 0
CHEST TIGHTNESS: 0
GASTROINTESTINAL NEGATIVE: 1
WHEEZING: 0
EYES NEGATIVE: 1
BLOOD IN STOOL: 0
SHORTNESS OF BREATH: 1
COUGH: 0

## 2025-05-27 NOTE — PROGRESS NOTES
Subsequent Progress Note    Patient: Bertha Hebert  YOB: 1975  MRN: [id number]    Chief Complaint: Pulm HTN PMR RAE  Chief Complaint   Patient presents with    Cardiac Clearance     Surgery date pending       CV Data:  Remote cath - told negative  5/20 spect negative  6/2019 echo EF 60 1+  4/22 Echo EF 50    Subjective/HPI:  Dr. Ferraro pt.  Pt has chronic SOB and weakness. SHe has PMR that limits her. She is minimally active. She can take 1 flgiht of stairs    6/13/23 doing well no cp no sob no falls mnobleed active.     5/27/25 here for preop Bariatric sx. Denies SOB or CP. No bleed no falls.    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   Nonsmoker  No ETOH.  Lives alone  ++FH      EKG: SR 78    Past Medical History:   Diagnosis Date    Abnormal Pap smear of cervix     Acute midline thoracic back pain 09/18/2018    Anxiety 2008    Autoimmune disorder     B12 deficiency     Family history of premature CAD 09/04/2013    Fatty liver     Gastroesophageal reflux disease 01/06/2021    Gastroparesis     Gestational diabetes mellitus     HPV (human papilloma virus) anogenital infection     Hyperlipidemia     Hypertension     Irritable bowel syndrome with diarrhea 04/26/2021    Liver hemangioma 2009/2015    Lumbar radiculopathy 07/07/2021    Menopausal symptoms     Obesity 03/11/2013    BMI 43.42    Orthostatic hypotension     Ovarian cyst     Overactive bladder     PMR (polymyalgia rheumatica)     Stress incontinence     Tobacco abuse 09/03/2015    Tremor     Type 2 diabetes mellitus without complication (HCC)     Uncontrolled diabetes mellitus with complications     Unspecified sleep apnea        Past Surgical History:   Procedure Laterality Date    ANKLE FRACTURE SURGERY Right 09/15/2023    Right open reduction internal fixation lateral malleolus, possible syndesmotic fixation, requesting large C arm, bone foam, Synthes (Bridger) Cricket's initial note will serve

## 2025-05-28 ENCOUNTER — HOSPITAL ENCOUNTER (OUTPATIENT)
Dept: INFUSION THERAPY | Age: 50
Setting detail: INFUSION SERIES
Discharge: HOME | End: 2025-05-28
Payer: COMMERCIAL

## 2025-05-28 ENCOUNTER — CARE COORDINATION (OUTPATIENT)
Dept: OTHER | Facility: CLINIC | Age: 50
End: 2025-05-28

## 2025-05-28 VITALS
TEMPERATURE: 97 F | RESPIRATION RATE: 16 BRPM | DIASTOLIC BLOOD PRESSURE: 73 MMHG | HEART RATE: 80 BPM | OXYGEN SATURATION: 94 % | SYSTOLIC BLOOD PRESSURE: 125 MMHG

## 2025-05-28 DIAGNOSIS — N39.0 URINARY TRACT INFECTION WITHOUT HEMATURIA, SITE UNSPECIFIED: Primary | ICD-10-CM

## 2025-05-28 PROCEDURE — 2580000003 HC RX 258: Performed by: INTERNAL MEDICINE

## 2025-05-28 PROCEDURE — 6360000002 HC RX W HCPCS: Performed by: INTERNAL MEDICINE

## 2025-05-28 PROCEDURE — 96365 THER/PROPH/DIAG IV INF INIT: CPT

## 2025-05-28 RX ADMIN — ERTAPENEM SODIUM 1000 MG: 1 INJECTION INTRAMUSCULAR; INTRAVENOUS at 15:54

## 2025-05-28 NOTE — PROGRESS NOTES
[facility] OUTPATIENT INFUSION CENTER     PT arrived via:  [x] Ambulatory         [] Wheelchair  []With transport                [] Other:     [x]PT oriented to room and call light in reach.   [x] Vital signs obtained and are stable.   [x]Medication education provided and reviewed with patient.  1554- Infusion started, call light is within reach, care continued.   1624- Infusion complete, patient tolerated. AVS given and patient left unit ambulatory. All equipment used in room cleaned.

## 2025-05-28 NOTE — CARE COORDINATION
Ambulatory Care Coordination Note     5/28/2025 2:33 PM     Patient outreach attempt by this ACM today to offer care management services. ACM was unable to reach the patient by telephone today;   left voice message requesting a return phone call to this ACM.     ACM: Prudence Melchor RN     Care Summary Note: Per chart review,  Patient has had midline placed and getting a course of IV antibiotic therapy out patient.  She has follow up appts scheduled and engaged wit providers.     PCP/Specialist follow up:   Future Appointments         Provider Specialty Dept Phone    5/28/2025  4:00 PM [facility] BED 1 Infusion Therapy [phone]    5/29/2025 4:00 PM [facility] CHAIR 3 Infusion Therapy [phone]    5/30/2025 4:00 PM [facility]FUSION CHAIR 2 Infusion Therapy [phone]    5/31/2025 4:00 PM [facility] 3 Infusion Therapy [phone]    6/1/2025 4:00 PM [facility] 2 Infusion Therapy [phone]    6/3/2025 3:00 PM Noa Couch MD Infectious Diseases [phone]    6/24/2025 9:30 AM Patrick Marinelli MD Endocrinology [phone]    7/8/2025 9:15 AM Jeremías Iyer DO Obstetrics and Gynecology [phone]    7/21/2025 8:30 AM Nick Molina MD Pulmonology [phone]    8/8/2025 8:45 AM Duran Liz MD Neurology [phone]    10/1/2025 8:00 AM Lisbeth Mccray MD Family Medicine [phone]            Follow Up:   Plan for next AC outreach in approximately 1 week to complete:  - follow-up appointment with ID .

## 2025-05-29 ENCOUNTER — HOSPITAL ENCOUNTER (OUTPATIENT)
Dept: INFUSION THERAPY | Age: 50
Setting detail: INFUSION SERIES
Discharge: HOME | End: 2025-05-29
Payer: COMMERCIAL

## 2025-05-29 VITALS
SYSTOLIC BLOOD PRESSURE: 124 MMHG | RESPIRATION RATE: 14 BRPM | HEART RATE: 79 BPM | DIASTOLIC BLOOD PRESSURE: 72 MMHG | OXYGEN SATURATION: 96 % | TEMPERATURE: 97 F

## 2025-05-29 DIAGNOSIS — N39.0 URINARY TRACT INFECTION WITHOUT HEMATURIA, SITE UNSPECIFIED: Primary | ICD-10-CM

## 2025-05-29 PROCEDURE — 96365 THER/PROPH/DIAG IV INF INIT: CPT

## 2025-05-29 PROCEDURE — 6360000002 HC RX W HCPCS: Performed by: INTERNAL MEDICINE

## 2025-05-29 PROCEDURE — 2580000003 HC RX 258: Performed by: INTERNAL MEDICINE

## 2025-05-29 RX ADMIN — ERTAPENEM SODIUM 1000 MG: 1 INJECTION INTRAMUSCULAR; INTRAVENOUS at 15:34

## 2025-05-29 NOTE — PROGRESS NOTES
Pt tolerated infusion. Midline flushed. She left unit ambulatory. All equipment used in care has been cleaned.    Electronically signed by Radha Marina RN on 5/29/2025 at 4:08 PM

## 2025-05-29 NOTE — PROGRESS NOTES
[facility] OUTPATIENT INFUSION CENTER     PT arrived via:  [x] Ambulatory         [] Wheelchair  []With transport                [] Other:     [x]PT oriented to room and call light in reach.   [x] Vital signs obtained and are stable.   [x]Medication education provided and reviewed with patient.             .

## 2025-05-30 ENCOUNTER — TELEPHONE (OUTPATIENT)
Dept: PHARMACY | Facility: CLINIC | Age: 50
End: 2025-05-30

## 2025-05-30 ENCOUNTER — CARE COORDINATION (OUTPATIENT)
Dept: OTHER | Facility: CLINIC | Age: 50
End: 2025-05-30

## 2025-05-30 ENCOUNTER — HOSPITAL ENCOUNTER (OUTPATIENT)
Dept: INFUSION THERAPY | Age: 50
Setting detail: INFUSION SERIES
Discharge: HOME | End: 2025-05-30
Payer: COMMERCIAL

## 2025-05-30 ENCOUNTER — TELEPHONE (OUTPATIENT)
Age: 50
End: 2025-05-30

## 2025-05-30 VITALS
TEMPERATURE: 97.6 F | SYSTOLIC BLOOD PRESSURE: 128 MMHG | HEART RATE: 80 BPM | DIASTOLIC BLOOD PRESSURE: 70 MMHG | RESPIRATION RATE: 18 BRPM | OXYGEN SATURATION: 95 %

## 2025-05-30 DIAGNOSIS — N39.0 URINARY TRACT INFECTION WITHOUT HEMATURIA, SITE UNSPECIFIED: Primary | ICD-10-CM

## 2025-05-30 LAB
ANION GAP SERPL CALCULATED.3IONS-SCNC: 11 MEQ/L (ref 9–15)
BACTERIA URNS QL MICRO: NEGATIVE /HPF
BILIRUB UR QL STRIP: NEGATIVE
BUN SERPL-MCNC: 14 MG/DL (ref 6–20)
CALCIUM SERPL-MCNC: 9 MG/DL (ref 8.5–9.9)
CHLORIDE SERPL-SCNC: 98 MEQ/L (ref 95–107)
CLARITY UR: CLEAR
CO2 SERPL-SCNC: 27 MEQ/L (ref 20–31)
COLOR UR: YELLOW
CREAT SERPL-MCNC: 0.67 MG/DL (ref 0.5–0.9)
EPI CELLS #/AREA URNS AUTO: ABNORMAL /HPF (ref 0–5)
ERYTHROCYTE [DISTWIDTH] IN BLOOD BY AUTOMATED COUNT: 17 % (ref 11.5–14.5)
GLUCOSE SERPL-MCNC: 138 MG/DL (ref 70–99)
GLUCOSE UR STRIP-MCNC: NEGATIVE MG/DL
HCT VFR BLD AUTO: 37.4 % (ref 37–47)
HGB BLD-MCNC: 11.9 G/DL (ref 12–16)
HGB UR QL STRIP: NEGATIVE
HYALINE CASTS #/AREA URNS AUTO: ABNORMAL /HPF (ref 0–5)
KETONES UR STRIP-MCNC: NEGATIVE MG/DL
LEUKOCYTE ESTERASE UR QL STRIP: ABNORMAL
MCH RBC QN AUTO: 24.9 PG (ref 27–31.3)
MCHC RBC AUTO-ENTMCNC: 31.8 % (ref 33–37)
MCV RBC AUTO: 78.4 FL (ref 79.4–94.8)
NITRITE UR QL STRIP: NEGATIVE
PH UR STRIP: 7 [PH] (ref 5–9)
PLATELET # BLD AUTO: 257 K/UL (ref 130–400)
POTASSIUM SERPL-SCNC: 4.2 MEQ/L (ref 3.4–4.9)
PROT UR STRIP-MCNC: ABNORMAL MG/DL
RBC # BLD AUTO: 4.77 M/UL (ref 4.2–5.4)
RBC #/AREA URNS AUTO: ABNORMAL /HPF (ref 0–5)
SODIUM SERPL-SCNC: 136 MEQ/L (ref 135–144)
SP GR UR STRIP: 1.02 (ref 1–1.03)
URINE REFLEX TO CULTURE: YES
UROBILINOGEN UR STRIP-ACNC: 0.2 E.U./DL
WBC # BLD AUTO: 11.3 K/UL (ref 4.8–10.8)
WBC #/AREA URNS AUTO: ABNORMAL /HPF (ref 0–5)

## 2025-05-30 PROCEDURE — 85027 COMPLETE CBC AUTOMATED: CPT

## 2025-05-30 PROCEDURE — 6360000002 HC RX W HCPCS: Performed by: INTERNAL MEDICINE

## 2025-05-30 PROCEDURE — 87086 URINE CULTURE/COLONY COUNT: CPT

## 2025-05-30 PROCEDURE — 96365 THER/PROPH/DIAG IV INF INIT: CPT

## 2025-05-30 PROCEDURE — 2580000003 HC RX 258: Performed by: INTERNAL MEDICINE

## 2025-05-30 PROCEDURE — 99211 OFF/OP EST MAY X REQ PHY/QHP: CPT

## 2025-05-30 PROCEDURE — 36415 COLL VENOUS BLD VENIPUNCTURE: CPT

## 2025-05-30 PROCEDURE — 80048 BASIC METABOLIC PNL TOTAL CA: CPT

## 2025-05-30 PROCEDURE — 81001 URINALYSIS AUTO W/SCOPE: CPT

## 2025-05-30 RX ADMIN — ERTAPENEM SODIUM 1000 MG: 1 INJECTION INTRAMUSCULAR; INTRAVENOUS at 15:51

## 2025-05-30 NOTE — CARE COORDINATION
Ambulatory Care Coordination Note     5/30/2025 11:20 AM     ACM: Prudence Melchor RN    ACM sent [email or web address] message for Associate Care Management follow up related to patient response to outreach. See patient message for details and response (as appropriate).     Care Summary Note: Patient states she is doing okay.  She has completed her follow up appts/ has appts scheduled and has additional testing ordered/ scheduled.  She denies any ACM needs at this time.     PCP/Specialist follow up:   Future Appointments         Provider Specialty Dept Phone    5/30/2025  4:00 PM [facility] CHAIR 2 Infusion Therapy [phone]    5/31/2025 9:00 AM [facility] BED 3 Infusion Therapy [phone]    6/1/2025 9:00 AM [facility] 2 Infusion Therapy [phone]    6/3/2025 3:00 PM Noa Couch MD Infectious Diseases [phone]    6/24/2025 9:30 AM Patrick Marinelli MD Endocrinology [phone]    7/8/2025 9:15 AM Jeremías Iyer DO Obstetrics and Gynecology [phone]    7/8/2025 12:00 PM (Arrive by 11:45 AM) [facility] MRI ROOM 1 Radiology [phone]    7/8/2025 1:00 PM (Arrive by 12:45 PM) [facility] MRI ROOM 1 Radiology [phone]    7/21/2025 8:30 AM Nick Molina MD Pulmonology [phone]    8/8/2025 8:45 AM Duran Liz MD Neurology [phone]    10/1/2025 8:00 AM Lisbeth Mccray MD Family Medicine [phone]            Follow Up:   No further Ambulatory Care Management follow-up scheduled at this time.  Patient  has Ambulatory Care Manager's contact information for any further questions, concerns or needs.

## 2025-05-30 NOTE — TELEPHONE ENCOUNTER
[facility]– [place] CLINICAL PHARMACY REVIEW - BE WELL WITH DIABETES: HIGH A1C  =============================================  Bertha Hebert is a 50 y.o. female enrolled in the [facility] Be Well with Diabetes program.    Identified care gap(s): A1c > 8%    Communication preferences (for >8% followup, urgent messages, etc)  Preferred methods: Phone and Mychart  Preferred days/time: anytime    DM Program Prescriptions:  Current Outpatient Medications   Medication Instructions    acetaminophen (TYLENOL) 1,000 mg, Oral, EVERY 6 HOURS PRN    albuterol sulfate HFA (PROVENTIL;VENTOLIN;PROAIR) 108 (90 Base) MCG/ACT inhaler 2 puffs, Inhalation, EVERY 6 HOURS PRN    baclofen (LIORESAL) 10 MG tablet TAKE ONE-HALF TABLET BY MOUTH TWICE A DAY    blood glucose test strips (PRODIGY NO CODING BLOOD GLUC) strip Use to test blood glucose up to 4 times daily or as directed by provider.    CALCIUM  mg, DAILY    Cholecalciferol (VITAMIN D3) 25 MCG (1000 UT) CAPS 1 capsule, DAILY    clotrimazole-betamethasone (LOTRISONE) 1-0.05 % cream Apply topically 2 times daily.    Continuous Glucose Sensor (DEXCOM G7 SENSOR) MISC Change sensor every 10 days as directed to monitor blood glucose    CPAP Machine MISC New CPAP with 8 cm and supply    erythromycin base (E-MYCIN) 250 mg, Oral, 3 TIMES DAILY    esomeprazole (NEXIUM) 40 mg, Oral, DAILY    famotidine (PEPCID) 20 mg, Oral, DAILY PRN    Glucagon (BAQSIMI TWO PACK) 3 MG/DOSE POWD By nasal route:  3 mg (one actuation) into a single nostril; if no response, may repeat in 15 minutes using a new intranasal device.    Handicap Placard MISC Good from 5/11/23 till 5/11/28    hydroCHLOROthiazide (HYDRODIURIL) 25 mg, Oral, DAILY    ibuprofen (IBU) 400 mg, Oral, EVERY 6 HOURS PRN    Insulin Glargine, 2 Unit Dial, (TOUJEO MAX SOLOSTAR) 300 UNIT/ML concentrated injection pen INJECT 160-170  UNITS UNDER THE SKIN AT BEDTIME    insulin lispro, 1 Unit Dial, (HUMALOG KWIKPEN) 100 UNIT/ML

## 2025-05-30 NOTE — PROGRESS NOTES
Patient arrived ambulatory to floor. Vital signs taken and stable. Midline in place, flushed, normal saline locked. Patient oriented to room and call light. Call light within reach.    1551 - Labs collected via peripherally, labelled and sent to lab. Pt tolerated well. Invanz Infusion started. Call light within reach.     1619 - Infusion complete. Pt tolerated well. Dressing changed. Site clean, dry, and intact. Patient left the unit ambulatory. All equipment used in the care for this patient has been cleaned.

## 2025-05-31 ENCOUNTER — HOSPITAL ENCOUNTER (OUTPATIENT)
Dept: INFUSION THERAPY | Age: 50
Setting detail: INFUSION SERIES
Discharge: HOME | End: 2025-05-31
Payer: COMMERCIAL

## 2025-05-31 VITALS
DIASTOLIC BLOOD PRESSURE: 68 MMHG | OXYGEN SATURATION: 96 % | HEART RATE: 76 BPM | SYSTOLIC BLOOD PRESSURE: 140 MMHG | RESPIRATION RATE: 16 BRPM

## 2025-05-31 DIAGNOSIS — N39.0 URINARY TRACT INFECTION WITHOUT HEMATURIA, SITE UNSPECIFIED: Primary | ICD-10-CM

## 2025-05-31 LAB — BACTERIA UR CULT: NORMAL

## 2025-05-31 PROCEDURE — 6360000002 HC RX W HCPCS: Performed by: INTERNAL MEDICINE

## 2025-05-31 PROCEDURE — 96365 THER/PROPH/DIAG IV INF INIT: CPT

## 2025-05-31 PROCEDURE — 2580000003 HC RX 258: Performed by: INTERNAL MEDICINE

## 2025-05-31 RX ADMIN — ERTAPENEM SODIUM 1000 MG: 1 INJECTION INTRAMUSCULAR; INTRAVENOUS at 09:02

## 2025-05-31 NOTE — PROGRESS NOTES
0845 patient ambulated to floor independently, placed into room, vs obtained.  0850 spoke to pharmacy regarding order.  0902 Invanz started per order.   0935 infusion complete, midline flushed, patient ambulated off unit independently. Electronically signed by Arlette Guadarrama RN on 5/31/2025 at 9:43 AM

## 2025-06-01 ENCOUNTER — HOSPITAL ENCOUNTER (OUTPATIENT)
Dept: INFUSION THERAPY | Age: 50
Setting detail: INFUSION SERIES
Discharge: HOME | End: 2025-06-01
Payer: COMMERCIAL

## 2025-06-01 VITALS
OXYGEN SATURATION: 98 % | RESPIRATION RATE: 16 BRPM | SYSTOLIC BLOOD PRESSURE: 134 MMHG | HEART RATE: 77 BPM | DIASTOLIC BLOOD PRESSURE: 70 MMHG

## 2025-06-01 DIAGNOSIS — N39.0 URINARY TRACT INFECTION WITHOUT HEMATURIA, SITE UNSPECIFIED: Primary | ICD-10-CM

## 2025-06-01 PROCEDURE — 6360000002 HC RX W HCPCS: Performed by: INTERNAL MEDICINE

## 2025-06-01 PROCEDURE — 96365 THER/PROPH/DIAG IV INF INIT: CPT

## 2025-06-01 PROCEDURE — 2580000003 HC RX 258: Performed by: INTERNAL MEDICINE

## 2025-06-01 RX ADMIN — ERTAPENEM SODIUM 1000 MG: 1 INJECTION INTRAMUSCULAR; INTRAVENOUS at 08:54

## 2025-06-01 NOTE — PROGRESS NOTES
0845 patient ambulated onto unit, shown to room, VS obtained, pharmacy notified of patient's arrival.   0854 antibiotic started per order.  0925 antibiotic completed, ambulated off unit independently. Electronically signed by Arlette Guadarrama RN on 6/1/2025 at 9:25 AM

## 2025-06-03 ENCOUNTER — OFFICE VISIT (OUTPATIENT)
Age: 50
End: 2025-06-03
Payer: COMMERCIAL

## 2025-06-03 ENCOUNTER — HOSPITAL ENCOUNTER (OUTPATIENT)
Dept: INFUSION THERAPY | Age: 50
Setting detail: INFUSION SERIES
Discharge: HOME | End: 2025-06-03
Payer: COMMERCIAL

## 2025-06-03 VITALS
TEMPERATURE: 97 F | BODY MASS INDEX: 52.46 KG/M2 | HEART RATE: 72 BPM | SYSTOLIC BLOOD PRESSURE: 153 MMHG | DIASTOLIC BLOOD PRESSURE: 89 MMHG | HEIGHT: 66 IN | RESPIRATION RATE: 20 BRPM | OXYGEN SATURATION: 96 %

## 2025-06-03 DIAGNOSIS — B96.89 URINARY TRACT INFECTION DUE TO EXTENDED-SPECTRUM BETA LACTAMASE (ESBL)-PRODUCING KLEBSIELLA: ICD-10-CM

## 2025-06-03 DIAGNOSIS — N39.0 URINARY TRACT INFECTION DUE TO EXTENDED-SPECTRUM BETA LACTAMASE (ESBL)-PRODUCING KLEBSIELLA: ICD-10-CM

## 2025-06-03 DIAGNOSIS — N39.0 RECURRENT UTI: Primary | ICD-10-CM

## 2025-06-03 PROCEDURE — 99211 OFF/OP EST MAY X REQ PHY/QHP: CPT

## 2025-06-03 PROCEDURE — 99213 OFFICE O/P EST LOW 20 MIN: CPT | Performed by: INTERNAL MEDICINE

## 2025-06-03 NOTE — PROGRESS NOTES
Bertha Hebert (:  1975) is a 50 y.o. female,Established patient, here for evaluation of the following chief complaint(s):  Frequent/Recurrent UTI (10-12 day f/u- recurrent UTI multi resistant)         Assessment & Plan  Recurrent UTI   Chronic, not at goal (unstable),  patient was encouraged to keep her glucose under control, take cranberry tablets and probiotics.  Increase fluid intake.  Wipe from front to back  Patient does not qualify for chronic suppressive therapy because of multidrug-resistant organisms  Orders:    Urinalysis with Reflex to Culture; Future    Urinary tract infection due to extended-spectrum beta lactamase (ESBL)-producing Klebsiella   New, at goal (stable), DC midline and Invanz.  Currently with resolved symptoms.  Patient was instructed to collect a urinalysis with reflex culture if she has recurrent symptoms of UTI.     Orders:    Urinalysis with Reflex to Culture; Future    Follow-up as needed       Subjective   HPI  Follow-up ESBL Klebsiella UTI in a patient with recurrent UTI, history of diabetes mellitus 2, morbid obesity, chronic prednisone intake, finished 10 days course of Invanz yesterday, well-tolerated.  Currently with resolved urinary symptoms.  No dysuria or frequency.  No vaginal pain.  No rash.  No GI symptoms.  No fevers or chills.  No abdominal pain or flank pain  Review of Systems   Rest of review of system is negative    Objective   Physical Exam     Vitals:    25 0947 25 0950   BP: (!) 144/86 (!) 153/89   Pulse: 71 72   Resp: 20    Temp: 97 °F (36.1 °C)    TempSrc: Temporal    SpO2: 96%    Height: 1.676 m (5' 6\")      General Appearance: alert and oriented to person, place and time, well-developed and well-nourished, in no acute distress  Skin: warm and dry, no rash.   Head: normocephalic and atraumatic  Eyes: anicteric sclerae  ENT:  normal mucous membranes. No oral thrush  Lungs: normal respiratory effort  Abdomen: soft, no tenderness.  No CVA

## 2025-06-03 NOTE — ASSESSMENT & PLAN NOTE
Chronic, not at goal (unstable), patient was encouraged to keep her glucose under control, take cranberry tablets and probiotics.  Increase fluid intake.  Wipe from front to back  Patient does not qualify for chronic suppressive therapy because of multidrug-resistant organisms  Orders:    Urinalysis with Reflex to Culture; Future

## 2025-06-03 NOTE — PROGRESS NOTES
Patient arrived ambulatory to floor. Vital signs taken and stable. Patient oriented to room and call light. Call light within reach.    PICC line removed without complication. Catheter appears intact. Site without signs and symptoms of complications. Dressing and pressure applied. Pt tolerated well. Patient left the unit ambulatory. All equipment used in the care for this patient has been cleaned.

## 2025-06-04 ENCOUNTER — TELEPHONE (OUTPATIENT)
Age: 50
End: 2025-06-04

## 2025-06-04 NOTE — TELEPHONE ENCOUNTER
Patient calls with C/o of Pressure and Urgency to void. Patient just completed IV Abx for a UTI. Patient is staying hydrated, has No Fever/Chills, no burning sensation at this time. Will update ID physician.     Per review with Dr Couch, she says if patient is having worsening SnS to go ahead and provide a UA sample. Will update patient.   Patient advised as per Dr Couch. She verbalized understanding and will wait another day or two to see if she feels any different. Patient will call this ID office with updates.

## 2025-06-09 RX ORDER — LOSARTAN POTASSIUM 50 MG/1
50 TABLET ORAL DAILY
Qty: 90 TABLET | Refills: 1 | Status: SHIPPED | OUTPATIENT
Start: 2025-06-09

## 2025-06-21 DIAGNOSIS — Z79.4 TYPE 2 DIABETES MELLITUS WITH COMPLICATION, WITH LONG-TERM CURRENT USE OF INSULIN (HCC): ICD-10-CM

## 2025-06-21 DIAGNOSIS — E66.01 MORBID OBESITY (HCC): ICD-10-CM

## 2025-06-21 DIAGNOSIS — E11.8 TYPE 2 DIABETES MELLITUS WITH COMPLICATION, WITH LONG-TERM CURRENT USE OF INSULIN (HCC): ICD-10-CM

## 2025-06-21 LAB
ANION GAP SERPL CALCULATED.3IONS-SCNC: 14 MEQ/L (ref 9–15)
BUN SERPL-MCNC: 17 MG/DL (ref 6–20)
CALCIUM SERPL-MCNC: 9.7 MG/DL (ref 8.5–9.9)
CHLORIDE SERPL-SCNC: 97 MEQ/L (ref 95–107)
CO2 SERPL-SCNC: 27 MEQ/L (ref 20–31)
CREAT SERPL-MCNC: 0.93 MG/DL (ref 0.5–0.9)
GLUCOSE SERPL-MCNC: 200 MG/DL (ref 70–99)
POTASSIUM SERPL-SCNC: 4 MEQ/L (ref 3.4–4.9)
SODIUM SERPL-SCNC: 138 MEQ/L (ref 135–144)
TSH REFLEX: 0.62 UIU/ML (ref 0.44–3.86)

## 2025-06-22 LAB
ESTIMATED AVERAGE GLUCOSE: 169 MG/DL
HBA1C MFR BLD: 7.5 % (ref 4–6)

## 2025-06-23 RX ORDER — BACLOFEN 10 MG/1
TABLET ORAL
Qty: 90 TABLET | Refills: 1 | Status: SHIPPED | OUTPATIENT
Start: 2025-06-23

## 2025-06-24 ENCOUNTER — OFFICE VISIT (OUTPATIENT)
Age: 50
End: 2025-06-24

## 2025-06-24 VITALS
SYSTOLIC BLOOD PRESSURE: 110 MMHG | BODY MASS INDEX: 52.48 KG/M2 | HEART RATE: 65 BPM | DIASTOLIC BLOOD PRESSURE: 69 MMHG | HEIGHT: 66 IN

## 2025-06-24 DIAGNOSIS — E11.43 DIABETIC GASTROPARESIS ASSOCIATED WITH TYPE 2 DIABETES MELLITUS (HCC): ICD-10-CM

## 2025-06-24 DIAGNOSIS — E11.8 TYPE 2 DIABETES MELLITUS WITH COMPLICATION, WITH LONG-TERM CURRENT USE OF INSULIN (HCC): Primary | ICD-10-CM

## 2025-06-24 DIAGNOSIS — K31.84 DIABETIC GASTROPARESIS ASSOCIATED WITH TYPE 2 DIABETES MELLITUS (HCC): ICD-10-CM

## 2025-06-24 DIAGNOSIS — E66.01 MORBID OBESITY (HCC): ICD-10-CM

## 2025-06-24 DIAGNOSIS — Z79.4 TYPE 2 DIABETES MELLITUS WITH COMPLICATION, WITH LONG-TERM CURRENT USE OF INSULIN (HCC): Primary | ICD-10-CM

## 2025-06-24 LAB
CHP ED QC CHECK: ABNORMAL
GLUCOSE BLD-MCNC: 179 MG/DL

## 2025-06-24 ASSESSMENT — ENCOUNTER SYMPTOMS: EYES NEGATIVE: 1

## 2025-06-24 NOTE — PROGRESS NOTES
6/24/2025    Assessment:       Diagnosis Orders   1. Type 2 diabetes mellitus with complication, with long-term current use of insulin ([facility])  POCT Glucose    Basic Metabolic Panel    Hemoglobin A1C      2. Morbid obesity (HCC)        3. Diabetic gastroparesis associated with type 2 diabetes mellitus ([facility])              PLAN:     Orders Placed This Encounter   Procedures    GLUCOSE MONITOR, 72 HOUR, PHYS INTERP    Basic Metabolic Panel     Standing Status:   Future     Expected Date:   6/24/2025     Expiration Date:   6/24/2026    Hemoglobin A1C     Standing Status:   Future     Expected Date:   6/24/2025     Expiration Date:   6/24/2026    POCT Glucose     Orders Placed This Encounter   Medications    Tirzepatide (MOUNJARO) 2.5 MG/0.5ML SOAJ pen     Sig: Inject 2.5 mg into the skin every 7 days Lot [id number] exp 12/11/26 1 sample given     Dispense:  1 mL     Refill:  0     Continue current insulin regimen with metformin and pioglitazone samples of Mounjaro was given continue using Dexcom patient also to have stress test done prior to having bariatric surgery because of strong family history of coronary artery disease    Orders Placed This Encounter   Procedures    POCT Glucose     No orders of the defined types were placed in this encounter.    No follow-ups on file.  Subjective:     Chief Complaint   Patient presents with    Diabetes     CGM Dexcom     Vitals:    06/24/25 0925   BP: 110/69   Pulse: 65   Height: 1.676 m (5' 5.98\")     Wt Readings from Last 3 Encounters:   05/27/25 (!) 147.4 kg (325 lb)   05/22/25 (!) 150.3 kg (331 lb 6.4 oz)   05/20/25 (!) 148.1 kg (326 lb 9.6 oz)     BP Readings from Last 3 Encounters:   06/24/25 110/69   06/03/25 (!) 153/89   06/01/25 134/70     Follow-up on type 2 diabetes obesity history of gastroparesis patient wants to try a GLP agent has a history of side effects from GLP agents in the past because of gastroparesis patient also enrolled in bariatric program A1c is down to 7.5

## 2025-07-05 DIAGNOSIS — K21.9 GASTROESOPHAGEAL REFLUX DISEASE, UNSPECIFIED WHETHER ESOPHAGITIS PRESENT: ICD-10-CM

## 2025-07-07 RX ORDER — ESOMEPRAZOLE MAGNESIUM 40 MG/1
40 CAPSULE, DELAYED RELEASE ORAL DAILY
Qty: 90 CAPSULE | Refills: 3 | Status: SHIPPED | OUTPATIENT
Start: 2025-07-07

## 2025-07-08 ENCOUNTER — HOSPITAL ENCOUNTER (OUTPATIENT)
Dept: NUCLEAR MEDICINE | Age: 50
Discharge: HOME | End: 2025-07-10
Attending: INTERNAL MEDICINE
Payer: COMMERCIAL

## 2025-07-08 ENCOUNTER — HOSPITAL ENCOUNTER (OUTPATIENT)
Age: 50
Discharge: HOME | End: 2025-07-10
Attending: INTERNAL MEDICINE

## 2025-07-08 DIAGNOSIS — Z86.19 HISTORY OF EXTENDED-SPECTRUM BETA-LACTAMASE PRODUCING KLEBSIELLA PNEUMONIAE INFECTION: ICD-10-CM

## 2025-07-08 DIAGNOSIS — N39.0 RECURRENT UTI: ICD-10-CM

## 2025-07-08 DIAGNOSIS — Z01.810 PRE-OPERATIVE CARDIOVASCULAR EXAMINATION: ICD-10-CM

## 2025-07-08 LAB
BACTERIA URNS QL MICRO: ABNORMAL /HPF
BILIRUB UR QL STRIP: NEGATIVE
CLARITY UR: CLEAR
COLOR UR: YELLOW
EPI CELLS #/AREA URNS AUTO: ABNORMAL /HPF (ref 0–5)
GLUCOSE UR STRIP-MCNC: NEGATIVE MG/DL
HGB UR QL STRIP: NEGATIVE
HYALINE CASTS #/AREA URNS AUTO: ABNORMAL /HPF (ref 0–5)
KETONES UR STRIP-MCNC: NEGATIVE MG/DL
LEUKOCYTE ESTERASE UR QL STRIP: ABNORMAL
NITRITE UR QL STRIP: POSITIVE
PH UR STRIP: 5.5 [PH] (ref 5–9)
PROT UR STRIP-MCNC: NEGATIVE MG/DL
RBC #/AREA URNS AUTO: ABNORMAL /HPF (ref 0–5)
SP GR UR STRIP: 1.02 (ref 1–1.03)
URINE REFLEX TO CULTURE: YES
UROBILINOGEN UR STRIP-ACNC: 0.2 E.U./DL
WBC #/AREA URNS AUTO: ABNORMAL /HPF (ref 0–5)

## 2025-07-08 PROCEDURE — 2500000003 HC RX 250 WO HCPCS: Performed by: INTERNAL MEDICINE

## 2025-07-08 PROCEDURE — 6360000002 HC RX W HCPCS: Performed by: INTERNAL MEDICINE

## 2025-07-08 PROCEDURE — 93016 CV STRESS TEST SUPVJ ONLY: CPT | Performed by: INTERNAL MEDICINE

## 2025-07-08 PROCEDURE — A9502 TC99M TETROFOSMIN: HCPCS | Performed by: INTERNAL MEDICINE

## 2025-07-08 PROCEDURE — 93018 CV STRESS TEST I&R ONLY: CPT | Performed by: INTERNAL MEDICINE

## 2025-07-08 PROCEDURE — 78452 HT MUSCLE IMAGE SPECT MULT: CPT | Performed by: INTERNAL MEDICINE

## 2025-07-08 PROCEDURE — 93017 CV STRESS TEST TRACING ONLY: CPT

## 2025-07-08 PROCEDURE — 3430000000 HC RX DIAGNOSTIC RADIOPHARMACEUTICAL: Performed by: INTERNAL MEDICINE

## 2025-07-08 PROCEDURE — 78452 HT MUSCLE IMAGE SPECT MULT: CPT

## 2025-07-08 RX ORDER — REGADENOSON 0.08 MG/ML
0.4 INJECTION, SOLUTION INTRAVENOUS
Status: COMPLETED | OUTPATIENT
Start: 2025-07-08 | End: 2025-07-08

## 2025-07-08 RX ORDER — SODIUM CHLORIDE 0.9 % (FLUSH) 0.9 %
10 SYRINGE (ML) INJECTION PRN
Status: DISCONTINUED | OUTPATIENT
Start: 2025-07-08 | End: 2025-07-11 | Stop reason: HOSPADM

## 2025-07-08 RX ORDER — GRANULES FOR ORAL 3 G/1
3 POWDER ORAL ONCE
Qty: 1 EACH | Refills: 0 | Status: SHIPPED | OUTPATIENT
Start: 2025-07-08 | End: 2025-07-08

## 2025-07-08 RX ADMIN — REGADENOSON 0.4 MG: 0.08 INJECTION, SOLUTION INTRAVENOUS at 09:48

## 2025-07-08 RX ADMIN — TETROFOSMIN 34.7 MILLICURIE: 1.38 INJECTION, POWDER, LYOPHILIZED, FOR SOLUTION INTRAVENOUS at 09:48

## 2025-07-08 RX ADMIN — SODIUM CHLORIDE, PRESERVATIVE FREE 10 ML: 5 INJECTION INTRAVENOUS at 09:47

## 2025-07-08 RX ADMIN — SODIUM CHLORIDE, PRESERVATIVE FREE 10 ML: 5 INJECTION INTRAVENOUS at 09:49

## 2025-07-08 NOTE — PROGRESS NOTES
Patient called with symptoms of frequency, pressures and urgency.  Urinalysis was positive for pyuria.  Patient is unable to wait for urine culture results because of severity of symptoms and is asking for antibiotics to be started.  Urine culture is pending.  History of ESBL Klebsiella infection.   I will order fosfomycin 3 g p.o. x 1 dose.   Follow-up urine culture

## 2025-07-09 ENCOUNTER — HOSPITAL ENCOUNTER (OUTPATIENT)
Dept: NUCLEAR MEDICINE | Age: 50
Discharge: HOME | End: 2025-07-11
Attending: INTERNAL MEDICINE
Payer: COMMERCIAL

## 2025-07-09 ENCOUNTER — OFFICE VISIT (OUTPATIENT)
Dept: OBGYN CLINIC | Age: 50
End: 2025-07-09
Payer: COMMERCIAL

## 2025-07-09 VITALS — HEART RATE: 86 BPM | SYSTOLIC BLOOD PRESSURE: 118 MMHG | DIASTOLIC BLOOD PRESSURE: 68 MMHG

## 2025-07-09 DIAGNOSIS — Z01.419 ENCOUNTER FOR ANNUAL ROUTINE GYNECOLOGICAL EXAMINATION: Primary | ICD-10-CM

## 2025-07-09 DIAGNOSIS — Z12.31 ENCOUNTER FOR SCREENING MAMMOGRAM FOR MALIGNANT NEOPLASM OF BREAST: ICD-10-CM

## 2025-07-09 DIAGNOSIS — Z11.51 SCREENING FOR HUMAN PAPILLOMAVIRUS: ICD-10-CM

## 2025-07-09 LAB
NUC STRESS EJECTION FRACTION: 67 %
STRESS BASELINE DIAS BP: 49 MMHG
STRESS BASELINE HR: 71 BPM
STRESS BASELINE ST DEPRESSION: 0 MM
STRESS BASELINE SYS BP: 111 MMHG
STRESS ESTIMATED WORKLOAD: 1 METS
STRESS PEAK DIAS BP: 63 MMHG
STRESS PEAK SYS BP: 133 MMHG
STRESS PERCENT HR ACHIEVED: 51 %
STRESS POST PEAK HR: 88 BPM
STRESS RATE PRESSURE PRODUCT: NORMAL BPM*MMHG
STRESS ST DEPRESSION: 0 MM
STRESS TARGET HR: 171 BPM
TID: 1.03

## 2025-07-09 PROCEDURE — 99396 PREV VISIT EST AGE 40-64: CPT | Performed by: OBSTETRICS & GYNECOLOGY

## 2025-07-09 PROCEDURE — 3430000000 HC RX DIAGNOSTIC RADIOPHARMACEUTICAL: Performed by: INTERNAL MEDICINE

## 2025-07-09 PROCEDURE — A9502 TC99M TETROFOSMIN: HCPCS | Performed by: INTERNAL MEDICINE

## 2025-07-09 RX ADMIN — TETROFOSMIN 35.3 MILLICURIE: 1.38 INJECTION, POWDER, LYOPHILIZED, FOR SOLUTION INTRAVENOUS at 09:25

## 2025-07-10 RX ORDER — NITROFURANTOIN 25; 75 MG/1; MG/1
100 CAPSULE ORAL 2 TIMES DAILY
Qty: 20 CAPSULE | Refills: 0 | Status: SHIPPED | OUTPATIENT
Start: 2025-07-10 | End: 2025-07-20

## 2025-07-10 NOTE — PROGRESS NOTES
Patient has persistent dysuria.  After discussing urine culture with the patient, I ordered 10 days of nitrofurantoin and recommended follow-up appointment.  Patient may benefit from chronic suppressive therapy with low-dose of nitrofurantoin to be discussed during next appointment

## 2025-07-11 LAB
BACTERIA UR CULT: ABNORMAL
BACTERIA UR CULT: ABNORMAL
ORGANISM: ABNORMAL

## 2025-07-13 PROBLEM — Z01.810 PRE-OPERATIVE CARDIOVASCULAR EXAMINATION: Status: ACTIVE | Noted: 2025-07-13

## 2025-07-14 LAB
HPV HR 12 DNA SPEC QL NAA+PROBE: NOT DETECTED
HPV16 DNA SPEC QL NAA+PROBE: NOT DETECTED
HPV16+18+H RISK 12 DNA SPEC-IMP: NORMAL
HPV18 DNA SPEC QL NAA+PROBE: NOT DETECTED

## 2025-07-14 ASSESSMENT — ENCOUNTER SYMPTOMS
VOMITING: 0
ABDOMINAL PAIN: 0
CONSTIPATION: 0
NAUSEA: 0
WHEEZING: 0
DIARRHEA: 0
COUGH: 0
ABDOMINAL DISTENTION: 0
BLOOD IN STOOL: 0
SHORTNESS OF BREATH: 0
SORE THROAT: 0

## 2025-07-14 NOTE — PROGRESS NOTES
Subjective:      Bertha Hebert is a 50 y.o. female  here for routine exam.  Current Complaints: no breast pain or new or enlarging lumps on self exam, no discharge or pelvic pain.    Menstrual history:   error  Sexual activity:  yes, denies knowledge of risky exposure  Abnormalvaginal discharge:  No  Contraceptive method:  status post hysterectomy    Vitals:  /68   Pulse 86   LMP  (LMP Unknown)   Allergies:Morphine and codeine, Ace inhibitors, Bactrim [sulfamethoxazole-trimethoprim], Nitroglycerin, Percocet [oxycodone-acetaminophen], and Victoza [liraglutide]  Past Medical History:   Diagnosis Date    Abnormal Pap smear of cervix     Acute midline thoracic back pain 2018    Allergic     Anxiety     Autoimmune disorder     B12 deficiency     Family history of premature CAD 2013    Fatty liver     Fractures     Gastroesophageal reflux disease 2021    Gastroparesis     Gestational diabetes mellitus     Heart murmur     HPV (human papilloma virus) anogenital infection     Hyperlipidemia     Hypertension     Irritable bowel syndrome with diarrhea 2021    Liver hemangioma     Lumbar radiculopathy 2021    Menopausal symptoms     Obesity     BMI 43.42    Orthostatic hypotension     Ovarian cyst     Overactive bladder     PMR (polymyalgia rheumatica)     Stress incontinence     Tobacco abuse 2015    Tremor     Type 2 diabetes mellitus without complication (HCC)     Uncontrolled diabetes mellitus with complications     Unspecified sleep apnea      Past Surgical History:   Procedure Laterality Date    ANKLE FRACTURE SURGERY Right 09/15/2023    Right open reduction internal fixation lateral malleolus, possible syndesmotic fixation, requesting large C arm, bone foam, Synthes (Bridger) -Laurent's initial note will serve as PAT, Case Comments/Implants: as above , Anesthesia Requested: choice. BRIDGER performed by Derek Smith DO at [facility] [place] OR    CARDIAC

## 2025-07-15 ENCOUNTER — PATIENT MESSAGE (OUTPATIENT)
Age: 50
End: 2025-07-15

## 2025-07-18 ENCOUNTER — CARE COORDINATION (OUTPATIENT)
Dept: OTHER | Facility: CLINIC | Age: 50
End: 2025-07-18

## 2025-07-19 DIAGNOSIS — R11.0 NAUSEA: ICD-10-CM

## 2025-07-22 RX ORDER — ONDANSETRON 4 MG/1
TABLET, ORALLY DISINTEGRATING ORAL
Qty: 60 TABLET | Refills: 3 | Status: SHIPPED | OUTPATIENT
Start: 2025-07-22

## 2025-07-23 ENCOUNTER — OFFICE VISIT (OUTPATIENT)
Age: 50
End: 2025-07-23
Payer: COMMERCIAL

## 2025-07-23 VITALS
OXYGEN SATURATION: 98 % | DIASTOLIC BLOOD PRESSURE: 78 MMHG | TEMPERATURE: 97.2 F | HEART RATE: 67 BPM | RESPIRATION RATE: 20 BRPM | SYSTOLIC BLOOD PRESSURE: 130 MMHG

## 2025-07-23 DIAGNOSIS — N39.0 RECURRENT UTI: Primary | ICD-10-CM

## 2025-07-23 DIAGNOSIS — Z86.19 HISTORY OF EXTENDED-SPECTRUM BETA-LACTAMASE PRODUCING KLEBSIELLA PNEUMONIAE INFECTION: ICD-10-CM

## 2025-07-23 PROCEDURE — 99213 OFFICE O/P EST LOW 20 MIN: CPT | Performed by: INTERNAL MEDICINE

## 2025-07-23 RX ORDER — NITROFURANTOIN 25; 75 MG/1; MG/1
100 CAPSULE ORAL DAILY
Qty: 30 CAPSULE | Refills: 5 | Status: SHIPPED | OUTPATIENT
Start: 2025-07-23 | End: 2025-08-22

## 2025-07-23 NOTE — ASSESSMENT & PLAN NOTE
Chronic, not at goal (unstable), contact precautions       Follow-up in 3 months  I will order CBC BMP during next visit

## 2025-07-23 NOTE — PROGRESS NOTES
Bertha Hebert (:  1975) is a 50 y.o. female,Established patient, here for evaluation of the following chief complaint(s):  Urinary Tract Infection (Recurrent UTI- finished 10 days of Macrobid)         Assessment & Plan  Recurrent UTI   Chronic, not at goal (unstable), patient may benefit from chronic antibiotic suppressive therapy with nitrofurantoin 100 mg p.o. nightly.  Encourage increased fluid intake.  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  Diabetes mellitus 2 events well-controlled.  Morbid obesity to be started on Mounjaro.   Recent UTI responded well to nitrofurantoin, currently asymptomatic.   After discussion with the patient, she is interested in trying chronic antibiotic suppressive therapy for frequent recurrent UTI.   Reports tolerating nitrofurantoin well.   Denies any any dysuria, flank pain or abdominal pain.  No GI symptoms.  No mouth soreness or rash.  No fevers  Review of Systems   Rest of review of system is negative    Objective   Physical Exam     Vitals:    25 0850   BP: 130/78   Pulse: 67   Resp: 20   Temp: 97.2 °F (36.2 °C)   TempSrc: Temporal   SpO2: 98%     General Appearance: alert and oriented to person, place and time, well-developed and well-nourished, in no acute distress  On room air  Skin: warm and dry, no rash.   Head: normocephalic and atraumatic  Eyes: anicteric sclerae  ENT:  normal mucous membranes. No oral thrush  Lungs: normal respiratory effort  Abdomen: soft, no tenderness, no CVA tenderness  No leg edema  No erythema, no tenderness    Recent HPV was negative  Pap smear is pending  Last urine culture on 2025 was reviewed

## 2025-07-23 NOTE — ASSESSMENT & PLAN NOTE
Chronic, not at goal (unstable), patient may benefit from chronic antibiotic suppressive therapy with nitrofurantoin 100 mg p.o. nightly.  Encourage increased fluid intake.  Continue probiotics and cranberry tablets

## 2025-08-03 ENCOUNTER — CLINICAL DOCUMENTATION (OUTPATIENT)
Dept: PHARMACY | Facility: CLINIC | Age: 50
End: 2025-08-03

## 2025-08-05 ENCOUNTER — CARE COORDINATION (OUTPATIENT)
Dept: OTHER | Facility: CLINIC | Age: 50
End: 2025-08-05

## 2025-08-05 DIAGNOSIS — N39.0 RECURRENT UTI: ICD-10-CM

## 2025-08-05 DIAGNOSIS — N39.0 URINARY TRACT INFECTION DUE TO EXTENDED-SPECTRUM BETA LACTAMASE (ESBL)-PRODUCING KLEBSIELLA: ICD-10-CM

## 2025-08-05 DIAGNOSIS — B96.89 URINARY TRACT INFECTION DUE TO EXTENDED-SPECTRUM BETA LACTAMASE (ESBL)-PRODUCING KLEBSIELLA: ICD-10-CM

## 2025-08-05 LAB
BILIRUB UR QL STRIP: NEGATIVE
CLARITY UR: CLEAR
COLOR UR: YELLOW
GLUCOSE UR STRIP-MCNC: NEGATIVE MG/DL
HGB UR QL STRIP: NEGATIVE
KETONES UR STRIP-MCNC: NEGATIVE MG/DL
LEUKOCYTE ESTERASE UR QL STRIP: NEGATIVE
NITRITE UR QL STRIP: NEGATIVE
PH UR STRIP: 8 [PH] (ref 5–9)
PROT UR STRIP-MCNC: NEGATIVE MG/DL
SP GR UR STRIP: 1.01 (ref 1–1.03)
URINE REFLEX TO CULTURE: NORMAL
UROBILINOGEN UR STRIP-ACNC: 1 E.U./DL

## 2025-08-11 DIAGNOSIS — E11.8 TYPE 2 DIABETES MELLITUS WITH COMPLICATION, WITH LONG-TERM CURRENT USE OF INSULIN (HCC): ICD-10-CM

## 2025-08-11 DIAGNOSIS — Z79.4 TYPE 2 DIABETES MELLITUS WITH COMPLICATION, WITH LONG-TERM CURRENT USE OF INSULIN (HCC): ICD-10-CM

## 2025-08-11 RX ORDER — INSULIN LISPRO 100 [IU]/ML
INJECTION, SOLUTION INTRAVENOUS; SUBCUTANEOUS
Qty: 135 ML | Refills: 1 | Status: SHIPPED | OUTPATIENT
Start: 2025-08-11

## 2025-08-12 PROBLEM — Z01.810 PRE-OPERATIVE CARDIOVASCULAR EXAMINATION: Status: RESOLVED | Noted: 2025-07-13 | Resolved: 2025-08-12

## 2025-08-19 RX ORDER — HYDROCHLOROTHIAZIDE 25 MG/1
25 TABLET ORAL DAILY
Qty: 90 TABLET | Refills: 1 | Status: SHIPPED | OUTPATIENT
Start: 2025-08-19

## 2025-09-04 ENCOUNTER — HOSPITAL ENCOUNTER (OUTPATIENT)
Dept: WOMENS IMAGING | Age: 50
Discharge: HOME | End: 2025-09-06
Payer: COMMERCIAL

## 2025-09-04 DIAGNOSIS — Z12.31 ENCOUNTER FOR SCREENING MAMMOGRAM FOR MALIGNANT NEOPLASM OF BREAST: ICD-10-CM

## 2025-09-04 PROCEDURE — 77063 BREAST TOMOSYNTHESIS BI: CPT

## (undated) DEVICE — PADDING CAST W6INXL4YD POLY POR SPUN DACRON NON STERILE

## (undated) DEVICE — SUTURE MCRYL SZ 4-0 L27IN ABSRB UD L19MM PS-2 1/2 CIR PRIM Y426H

## (undated) DEVICE — CLAMP SURG JAW W12MM POLYPR DISP FOR MUSC BX RAYPRT

## (undated) DEVICE — PADDING CAST W4INXL4YD SPUN DACRON POLY POR NON STERILE

## (undated) DEVICE — NEPTUNE E-SEP SMOKE EVACUATION PENCIL, COATED, 70MM BLADE, PUSH BUTTON SWITCH: Brand: NEPTUNE E-SEP

## (undated) DEVICE — SYRINGE IRRIG 60ML SFT PLIABLE BLB EZ TO GRP 1 HND USE W/

## (undated) DEVICE — DRAPE C ARM W41XL74IN UNIV MOB W RUBBERBAND CLP

## (undated) DEVICE — ENDO CARRY-ON PROCEDURE KIT: Brand: ENDO CARRY-ON PROCEDURE KIT

## (undated) DEVICE — INTENDED FOR TISSUE SEPARATION, AND OTHER PROCEDURES THAT REQUIRE A SHARP SURGICAL BLADE TO PUNCTURE OR CUT.: Brand: BARD-PARKER ® CARBON RIB-BACK BLADES

## (undated) DEVICE — SYRINGE MED 10ML TRNSLUC BRL PLUNG BLK MRK POLYPR CTRL

## (undated) DEVICE — SUTURE VCRL SZ 0 L36IN ABSRB UD L36MM CT-1 1/2 CIR J946H

## (undated) DEVICE — Z DISCONTINUED NO SUB IDED GLOVE SURG BEAD CUF 8 STD PF WHT STRL TRIUMPH LT LTX

## (undated) DEVICE — PAD N ADH W3XL4IN POLY COT SFT PERF FLM EASILY CUT ABSRB

## (undated) DEVICE — BLADE,CARBON-STEEL,15,STRL,DISPOSABLE,TB: Brand: MEDLINE

## (undated) DEVICE — SUTURE VCRL + SZ 3-0 L27IN ABSRB UD L26MM SH 1/2 CIR VCP416H

## (undated) DEVICE — TOWEL,OR,DSP,ST,BLUE,STD,4/PK,20PK/CS: Brand: MEDLINE

## (undated) DEVICE — PATIENT RETURN ELECTRODE, SINGLE-USE, CONTACT QUALITY MONITORING, ADULT, WITH 9FT CORD, FOR PATIENTS WEIGING OVER 33LBS. (15KG): Brand: MEGADYNE

## (undated) DEVICE — NEEDLE HYPO 25GA L1.5IN BLU POLYPR HUB S STL REG BVL STR

## (undated) DEVICE — BANDAGE COMPR M W4INXL10YD WHT BGE VELC E MTRX HK AND LOOP

## (undated) DEVICE — BRUSH ENDO CLN L90.5IN SHTH DIA1.7MM BRIST DIA5-7MM 2-6MM

## (undated) DEVICE — TUBING, SUCTION, 9/32" X 12', STRAIGHT: Brand: MEDLINE INDUSTRIES, INC.

## (undated) DEVICE — Device

## (undated) DEVICE — ADAPTER FLSH PMP FLD MGMT GI IRRIG OFP 2 DISPOSABLE

## (undated) DEVICE — SUTURE STRATAFIX SPRL MCRYL + SZ 3-0 L18IN ABSRB UD PS-2 SXMP1B107

## (undated) DEVICE — FORCEPS ENDOSCP BX OVL CUP SERR W/NEEDLE 2.3MM DIA 160CML

## (undated) DEVICE — MARKER SURG SKIN GENTIAN VLT REG TIP W/ 6IN RUL DYNJSM01

## (undated) DEVICE — TUBE SET 96 MM 64 MM H2O PERISTALTIC STD AUX CHANNEL

## (undated) DEVICE — SHEET,DRAPE,53X77,STERILE: Brand: MEDLINE

## (undated) DEVICE — ELECTRODE PT RET AD L9FT HI MOIST COND ADH HYDRGEL CORDED

## (undated) DEVICE — BLADE ES ELASTOMERIC COAT INSUL DURABLE BEND UPTO 90DEG

## (undated) DEVICE — GAUZE,SPONGE,4"X4",16PLY,XRAY,STRL,LF: Brand: MEDLINE

## (undated) DEVICE — NEEDLE SPINAL 22GA L3.5IN SPINOCAN

## (undated) DEVICE — TRAP POLYP BALEEN

## (undated) DEVICE — PACK,LAPAROTOMY,NO GOWNS: Brand: MEDLINE

## (undated) DEVICE — SYRINGE MED 10ML LUERLOCK TIP W/O SFTY DISP

## (undated) DEVICE — Device: Brand: ENDO SMARTCAP

## (undated) DEVICE — GLOVE ORANGE PI 8 1/2   MSG9085

## (undated) DEVICE — TUBING, SUCTION, 1/4" X 10', STRAIGHT: Brand: MEDLINE

## (undated) DEVICE — CONMED SCOPE SAVER BITE BLOCK, 20X27 MM: Brand: SCOPE SAVER

## (undated) DEVICE — GOWN,AURORA,NONREINFORCED,LARGE: Brand: MEDLINE

## (undated) DEVICE — Z CONVERTED USE 2271043 CONTAINER SPEC COLL 4OZ SCR ON LID PEEL PCH

## (undated) DEVICE — SINGLE PORT MANIFOLD: Brand: NEPTUNE 2

## (undated) DEVICE — PACK,ARTHROSCOPY II,SIRUS: Brand: MEDLINE

## (undated) DEVICE — SUTURE VCRL + SZ 2-0 L36IN ABSRB UD L36MM CT-1 1/2 CIR VCP945H

## (undated) DEVICE — PAD,ABDOMINAL,8"X10",ST,LF: Brand: MEDLINE

## (undated) DEVICE — BANDAGE COBAN 4 IN COMPR W4INXL5YD FOAM COHESIVE QUIK STK SELF ADH SFT

## (undated) DEVICE — SUTURE VCRL SZ 2-0 L54IN ABSRB VLT LIGAPAK REEL NDL J206G

## (undated) DEVICE — GLOVE SURG SZ 75 THK118MIL BLK LTX FREE POLYISOPRENE BEAD

## (undated) DEVICE — COUNTER NDL 40 COUNT HLD 70 FOAM BLK ADH W/ MAG

## (undated) DEVICE — COVER LT HNDL BLU PLAS

## (undated) DEVICE — SPONGE,LAP,18"X18",DLX,XR,ST,5/PK,40/PK: Brand: MEDLINE

## (undated) DEVICE — GOWN,AURORA,NONRNF,XL,30/CS: Brand: MEDLINE

## (undated) DEVICE — SCREW BNE L28MM DIA3.5MM CORT S STL ST NONCANNULATED LOK
Type: IMPLANTABLE DEVICE | Status: NON-FUNCTIONAL
Removed: 2023-09-15

## (undated) DEVICE — 3M™ STERI-DRAPE™ U-DRAPE 1015: Brand: STERI-DRAPE™

## (undated) DEVICE — MARKER SURG SKIN GENTIAN VLT REG TIP W/ 6IN RUL

## (undated) DEVICE — BLADE,CARBON-STEEL,10,STRL,DISPOSABLE,TB: Brand: MEDLINE

## (undated) DEVICE — APPLICATOR MEDICATED 26 CC SOLUTION HI LT ORNG CHLORAPREP

## (undated) DEVICE — KIT ARMOR C DRP COLLAPSIBLE AND SELF EXP TOP CVR FOR FLUOROSCOPIC

## (undated) DEVICE — JELLY,LUBE,STERILE,FLIP TOP,TUBE,2-OZ: Brand: MEDLINE

## (undated) DEVICE — ELECTROSURGICAL PENCIL BUTTON SWITCH E-Z CLEAN COATED BLADE ELECTRODE 10 FT (3 M) CORD HOLSTER: Brand: MEGADYNE

## (undated) DEVICE — YANKAUER,SMOOTH HANDLE,HIGH CAPACITY: Brand: MEDLINE INDUSTRIES, INC.

## (undated) DEVICE — FORCEPS BX L240CM JAW DIA2.8MM L CAP W/ NDL MIC MESH TOOTH

## (undated) DEVICE — C-ARM: Brand: UNBRANDED

## (undated) DEVICE — SPONGE GAUZE ST VERSALON 4X4IN 8044

## (undated) DEVICE — COVER DUST PERIMETER HUMPREY

## (undated) DEVICE — SPONGE GZ W4XL4IN COT 12 PLY TYP VII WVN C FLD DSGN STERILE

## (undated) DEVICE — CHLORAPREP 26ML ORANGE

## (undated) DEVICE — LABEL MED MINI W/ MARKER

## (undated) DEVICE — GLOVE ORTHO 8   MSG9480

## (undated) DEVICE — NEEDLE HYPO 25GA L1.5IN BVL ORIENTED ECLIPSE

## (undated) DEVICE — SUTURE VCRL SZ 4-0 L18IN ABSRB UD L19MM PS-2 3/8 CIR PRIM J496H

## (undated) DEVICE — SNARE ENDOSCP AD L240CM LOOP W10MM SHTH DIA2.4MM RND INSUL

## (undated) DEVICE — CORD,CAUTERY,BIPOLAR,STERILE: Brand: MEDLINE

## (undated) DEVICE — SUTURE VCRL SZ 3-0 L27IN ABSRB UD L26MM SH 1/2 CIR J416H

## (undated) DEVICE — DRESSING GZ W1XL8IN COT XRFRM N ADH OVERWRAP CURAD